# Patient Record
Sex: MALE | Race: WHITE | NOT HISPANIC OR LATINO | URBAN - METROPOLITAN AREA
[De-identification: names, ages, dates, MRNs, and addresses within clinical notes are randomized per-mention and may not be internally consistent; named-entity substitution may affect disease eponyms.]

---

## 2017-05-02 ENCOUNTER — OUTPATIENT (OUTPATIENT)
Dept: OUTPATIENT SERVICES | Facility: HOSPITAL | Age: 62
LOS: 1 days | Discharge: HOME | End: 2017-05-02

## 2017-06-28 DIAGNOSIS — Z01.818 ENCOUNTER FOR OTHER PREPROCEDURAL EXAMINATION: ICD-10-CM

## 2017-06-28 DIAGNOSIS — H50.10 UNSPECIFIED EXOTROPIA: ICD-10-CM

## 2019-10-23 ENCOUNTER — APPOINTMENT (OUTPATIENT)
Dept: NEUROSURGERY | Facility: CLINIC | Age: 64
End: 2019-10-23

## 2019-11-05 ENCOUNTER — INPATIENT (INPATIENT)
Facility: HOSPITAL | Age: 64
LOS: 8 days | Discharge: HOME HEALTH | End: 2019-11-14
Attending: STUDENT IN AN ORGANIZED HEALTH CARE EDUCATION/TRAINING PROGRAM | Admitting: STUDENT IN AN ORGANIZED HEALTH CARE EDUCATION/TRAINING PROGRAM
Payer: MEDICARE

## 2019-11-05 VITALS
RESPIRATION RATE: 18 BRPM | TEMPERATURE: 97 F | HEART RATE: 78 BPM | DIASTOLIC BLOOD PRESSURE: 68 MMHG | OXYGEN SATURATION: 98 % | SYSTOLIC BLOOD PRESSURE: 131 MMHG

## 2019-11-05 DIAGNOSIS — Z94.0 KIDNEY TRANSPLANT STATUS: Chronic | ICD-10-CM

## 2019-11-05 LAB
ALBUMIN SERPL ELPH-MCNC: 4.2 G/DL — SIGNIFICANT CHANGE UP (ref 3.5–5.2)
ALP SERPL-CCNC: 84 U/L — SIGNIFICANT CHANGE UP (ref 30–115)
ALT FLD-CCNC: 7 U/L — SIGNIFICANT CHANGE UP (ref 0–41)
ANION GAP SERPL CALC-SCNC: 13 MMOL/L — SIGNIFICANT CHANGE UP (ref 7–14)
APTT BLD: 30.5 SEC — SIGNIFICANT CHANGE UP (ref 27–39.2)
AST SERPL-CCNC: 14 U/L — SIGNIFICANT CHANGE UP (ref 0–41)
BASOPHILS # BLD AUTO: 0.03 K/UL — SIGNIFICANT CHANGE UP (ref 0–0.2)
BASOPHILS NFR BLD AUTO: 0.5 % — SIGNIFICANT CHANGE UP (ref 0–1)
BILIRUB SERPL-MCNC: 0.5 MG/DL — SIGNIFICANT CHANGE UP (ref 0.2–1.2)
BUN SERPL-MCNC: 20 MG/DL — SIGNIFICANT CHANGE UP (ref 10–20)
CALCIUM SERPL-MCNC: 10.3 MG/DL — HIGH (ref 8.5–10.1)
CHLORIDE SERPL-SCNC: 103 MMOL/L — SIGNIFICANT CHANGE UP (ref 98–110)
CO2 SERPL-SCNC: 23 MMOL/L — SIGNIFICANT CHANGE UP (ref 17–32)
CREAT SERPL-MCNC: 1.4 MG/DL — SIGNIFICANT CHANGE UP (ref 0.7–1.5)
EOSINOPHIL # BLD AUTO: 0.06 K/UL — SIGNIFICANT CHANGE UP (ref 0–0.7)
EOSINOPHIL NFR BLD AUTO: 1.1 % — SIGNIFICANT CHANGE UP (ref 0–8)
GLUCOSE SERPL-MCNC: 88 MG/DL — SIGNIFICANT CHANGE UP (ref 70–99)
HCT VFR BLD CALC: 38.2 % — LOW (ref 42–52)
HGB BLD-MCNC: 12.2 G/DL — LOW (ref 14–18)
IMM GRANULOCYTES NFR BLD AUTO: 0.4 % — HIGH (ref 0.1–0.3)
INR BLD: 1.32 RATIO — HIGH (ref 0.65–1.3)
LYMPHOCYTES # BLD AUTO: 1.25 K/UL — SIGNIFICANT CHANGE UP (ref 1.2–3.4)
LYMPHOCYTES # BLD AUTO: 21.9 % — SIGNIFICANT CHANGE UP (ref 20.5–51.1)
MAGNESIUM SERPL-MCNC: 1.9 MG/DL — SIGNIFICANT CHANGE UP (ref 1.8–2.4)
MCHC RBC-ENTMCNC: 30.1 PG — SIGNIFICANT CHANGE UP (ref 27–31)
MCHC RBC-ENTMCNC: 31.9 G/DL — LOW (ref 32–37)
MCV RBC AUTO: 94.3 FL — HIGH (ref 80–94)
MONOCYTES # BLD AUTO: 0.49 K/UL — SIGNIFICANT CHANGE UP (ref 0.1–0.6)
MONOCYTES NFR BLD AUTO: 8.6 % — SIGNIFICANT CHANGE UP (ref 1.7–9.3)
NEUTROPHILS # BLD AUTO: 3.86 K/UL — SIGNIFICANT CHANGE UP (ref 1.4–6.5)
NEUTROPHILS NFR BLD AUTO: 67.5 % — SIGNIFICANT CHANGE UP (ref 42.2–75.2)
NRBC # BLD: 0 /100 WBCS — SIGNIFICANT CHANGE UP (ref 0–0)
NT-PROBNP SERPL-SCNC: 2698 PG/ML — HIGH (ref 0–300)
PLATELET # BLD AUTO: 141 K/UL — SIGNIFICANT CHANGE UP (ref 130–400)
POTASSIUM SERPL-MCNC: 4.5 MMOL/L — SIGNIFICANT CHANGE UP (ref 3.5–5)
POTASSIUM SERPL-SCNC: 4.5 MMOL/L — SIGNIFICANT CHANGE UP (ref 3.5–5)
PROT SERPL-MCNC: 6.9 G/DL — SIGNIFICANT CHANGE UP (ref 6–8)
PROTHROM AB SERPL-ACNC: 15.1 SEC — HIGH (ref 9.95–12.87)
RBC # BLD: 4.05 M/UL — LOW (ref 4.7–6.1)
RBC # FLD: 14.6 % — HIGH (ref 11.5–14.5)
SODIUM SERPL-SCNC: 139 MMOL/L — SIGNIFICANT CHANGE UP (ref 135–146)
TROPONIN T SERPL-MCNC: <0.01 NG/ML — SIGNIFICANT CHANGE UP
WBC # BLD: 5.71 K/UL — SIGNIFICANT CHANGE UP (ref 4.8–10.8)
WBC # FLD AUTO: 5.71 K/UL — SIGNIFICANT CHANGE UP (ref 4.8–10.8)

## 2019-11-05 PROCEDURE — 99218: CPT

## 2019-11-05 PROCEDURE — 71046 X-RAY EXAM CHEST 2 VIEWS: CPT | Mod: 26

## 2019-11-05 PROCEDURE — 93010 ELECTROCARDIOGRAM REPORT: CPT | Mod: 77

## 2019-11-05 PROCEDURE — 93010 ELECTROCARDIOGRAM REPORT: CPT

## 2019-11-05 RX ORDER — DILTIAZEM HCL 120 MG
360 CAPSULE, EXT RELEASE 24 HR ORAL DAILY
Refills: 0 | Status: DISCONTINUED | OUTPATIENT
Start: 2019-11-05 | End: 2019-11-14

## 2019-11-05 RX ORDER — GABAPENTIN 400 MG/1
300 CAPSULE ORAL THREE TIMES A DAY
Refills: 0 | Status: DISCONTINUED | OUTPATIENT
Start: 2019-11-05 | End: 2019-11-10

## 2019-11-05 RX ORDER — OXYCODONE AND ACETAMINOPHEN 5; 325 MG/1; MG/1
1 TABLET ORAL ONCE
Refills: 0 | Status: DISCONTINUED | OUTPATIENT
Start: 2019-11-05 | End: 2019-11-05

## 2019-11-05 RX ORDER — SODIUM CHLORIDE 9 MG/ML
1000 INJECTION INTRAMUSCULAR; INTRAVENOUS; SUBCUTANEOUS
Refills: 0 | Status: DISCONTINUED | OUTPATIENT
Start: 2019-11-05 | End: 2019-11-06

## 2019-11-05 RX ORDER — TACROLIMUS 5 MG/1
2 CAPSULE ORAL
Refills: 0 | Status: DISCONTINUED | OUTPATIENT
Start: 2019-11-05 | End: 2019-11-14

## 2019-11-05 RX ORDER — VALGANCICLOVIR 450 MG/1
450 TABLET, FILM COATED ORAL DAILY
Refills: 0 | Status: DISCONTINUED | OUTPATIENT
Start: 2019-11-05 | End: 2019-11-05

## 2019-11-05 RX ORDER — ATORVASTATIN CALCIUM 80 MG/1
10 TABLET, FILM COATED ORAL AT BEDTIME
Refills: 0 | Status: DISCONTINUED | OUTPATIENT
Start: 2019-11-05 | End: 2019-11-14

## 2019-11-05 RX ORDER — ISOSORBIDE MONONITRATE 60 MG/1
30 TABLET, EXTENDED RELEASE ORAL DAILY
Refills: 0 | Status: DISCONTINUED | OUTPATIENT
Start: 2019-11-05 | End: 2019-11-14

## 2019-11-05 RX ORDER — ASPIRIN/CALCIUM CARB/MAGNESIUM 324 MG
325 TABLET ORAL ONCE
Refills: 0 | Status: COMPLETED | OUTPATIENT
Start: 2019-11-05 | End: 2019-11-05

## 2019-11-05 RX ORDER — MYCOPHENOLATE MOFETIL 250 MG/1
500 CAPSULE ORAL
Refills: 0 | Status: DISCONTINUED | OUTPATIENT
Start: 2019-11-05 | End: 2019-11-14

## 2019-11-05 RX ORDER — APIXABAN 2.5 MG/1
5 TABLET, FILM COATED ORAL EVERY 12 HOURS
Refills: 0 | Status: DISCONTINUED | OUTPATIENT
Start: 2019-11-05 | End: 2019-11-06

## 2019-11-05 RX ORDER — TAMSULOSIN HYDROCHLORIDE 0.4 MG/1
0.4 CAPSULE ORAL AT BEDTIME
Refills: 0 | Status: DISCONTINUED | OUTPATIENT
Start: 2019-11-05 | End: 2019-11-14

## 2019-11-05 RX ORDER — METOPROLOL TARTRATE 50 MG
100 TABLET ORAL DAILY
Refills: 0 | Status: DISCONTINUED | OUTPATIENT
Start: 2019-11-05 | End: 2019-11-14

## 2019-11-05 RX ADMIN — OXYCODONE AND ACETAMINOPHEN 1 TABLET(S): 5; 325 TABLET ORAL at 20:58

## 2019-11-05 RX ADMIN — MYCOPHENOLATE MOFETIL 500 MILLIGRAM(S): 250 CAPSULE ORAL at 22:15

## 2019-11-05 RX ADMIN — Medication 360 MILLIGRAM(S): at 22:08

## 2019-11-05 RX ADMIN — TAMSULOSIN HYDROCHLORIDE 0.4 MILLIGRAM(S): 0.4 CAPSULE ORAL at 22:15

## 2019-11-05 RX ADMIN — ATORVASTATIN CALCIUM 10 MILLIGRAM(S): 80 TABLET, FILM COATED ORAL at 22:14

## 2019-11-05 RX ADMIN — ISOSORBIDE MONONITRATE 30 MILLIGRAM(S): 60 TABLET, EXTENDED RELEASE ORAL at 22:10

## 2019-11-05 RX ADMIN — GABAPENTIN 300 MILLIGRAM(S): 400 CAPSULE ORAL at 22:14

## 2019-11-05 RX ADMIN — APIXABAN 5 MILLIGRAM(S): 2.5 TABLET, FILM COATED ORAL at 22:14

## 2019-11-05 RX ADMIN — Medication 100 MILLIGRAM(S): at 22:10

## 2019-11-05 RX ADMIN — SODIUM CHLORIDE 50 MILLILITER(S): 9 INJECTION INTRAMUSCULAR; INTRAVENOUS; SUBCUTANEOUS at 21:52

## 2019-11-05 RX ADMIN — TACROLIMUS 2 MILLIGRAM(S): 5 CAPSULE ORAL at 22:15

## 2019-11-05 NOTE — ED PROVIDER NOTE - PROGRESS NOTE DETAILS
Discussed case with patient's cardiologist who states patient has been awaiting CCTA study - recommends obs unit for CCTA . Dr. Baron aware of EKG obtained in ed and depression with twi reports that is why he wanted to do a cath but due to his embolism in arm wants pt to to go to obs for ccta, hydration due to kidney transplant, will follow. Dr. Low aware of EKG obtained in ed and depression with twi reports that is why he wanted to do a cath but due to his embolism in arm wants pt to to go to obs for ccta, hydration due to kidney transplant, will follow. Discussed case with Dr. Low Discussed case with Dr. Low who is aware of ST depressions in V4-V6 wanted to cath him but unable to due to embolism now wants CCTA if able to due to a.fibb. Recommends hydration with fluids @50cc. Endorsed care to GUSTAVO Bryant.

## 2019-11-05 NOTE — ED PROVIDER NOTE - NS ED ROS FT
Review of Systems:  	•	CONSTITUTIONAL - no fever, no diaphoresis, no chills  	•	SKIN - no rash  	•	HEMATOLOGIC - no bleeding, no bruising  	•	EYES - no eye pain, no blurry vision  	•	ENT - no congestion  	•	RESPIRATORY - + shortness of breath, no cough  	•	CARDIAC - + chest pain, no palpitations  	•	GI - no abd pain, no nausea, no vomiting, no diarrhea, no constipation  	•	GENITO-URINARY - no dysuria; no hematuria, no increased urinary frequency  	•	MUSCULOSKELETAL - no joint paint, no swelling, no redness  	•	NEUROLOGIC - no weakness, no headache, no paresthesias, no LOC  	•	PSYCH - no anxiety, no depression  	All other ROS are negative except as documented in HPI.

## 2019-11-05 NOTE — ED CDU PROVIDER INITIAL DAY NOTE - OBJECTIVE STATEMENT
65 yo male hx of ESRD s/p kidney transplant 12 years ago (nephrologist Dr Heart), HTN/HLD/A.fib on Eliquis present c/o chest pain off/on 1 month. pain is usually pressure like and doesn't relate to exertion. Denies chest pain/sob/palpitation now in EDOU. patient was seen by his Cardio (Dr Low) and was told to come to ED if continue with chest pain.   denies exogenous hormone use/recent hospitalization/hx of DVT. denies recent illness/fever/chill/HA/dizziness/sob/abd pain/n/v/d/urinary sxs.

## 2019-11-05 NOTE — ED PROVIDER NOTE - CARE PLAN
Assessment and plan of treatment:	Plan: Monitor, EKG, CXR, labs, reassess. Principal Discharge DX:	Chest pain  Assessment and plan of treatment:	Plan: Monitor, EKG, CXR, labs, reassess.

## 2019-11-05 NOTE — ED CDU PROVIDER INITIAL DAY NOTE - PMH
Atrial fibrillation    CKD (chronic kidney disease)    ESRD (end stage renal disease)    HLD (hyperlipidemia)    HTN (hypertension)

## 2019-11-05 NOTE — ED PROVIDER NOTE - CLINICAL SUMMARY MEDICAL DECISION MAKING FREE TEXT BOX
pt aware of lab and imaging, discussion had with Cardiology Dr. Low who reports admission to obs, cannot perform cath due to history of embolism , hydration due to kidney, will follow, ekg x2 obtained, obs team aware of pt and discussion with cardiologist and possible ccta, pt aware of plan and agrees.

## 2019-11-05 NOTE — ED PROVIDER NOTE - OBJECTIVE STATEMENT
63 yo M with pmhx of lymphoma, skin cancer, A.fibb on eliquis, LUE embolism, COPD, renal disease s/p kidney transplant presenting with mid-sternal, non-radiating chest pain x 1 month. Symptoms are moderate. Reports some associated sob on exertion. No alleviating/aggravating factors. Advised by cardiologist that he needs further testing is scheduled for January but cannot wait that long. No fever, chills, abdominal pain, nausea, vomiting, diarrhea, back pain, urinary symptoms, headache, dizziness, paresthesias, or weakness.

## 2019-11-05 NOTE — ED CDU PROVIDER INITIAL DAY NOTE - PHYSICAL EXAMINATION
CONSTITUTIONAL: Well-appearing; well-nourished; in no apparent distress.   EYES: PERRL; EOM intact.   ENT: normal nose; no rhinorrhea; normal pharynx with no tonsillar hypertrophy.   NECK: Supple; non-tender; no cervical lymphadenopathy. No JVD.   CARDIOVASCULAR: Normal S1, S2; no murmurs, rubs, or gallops.   RESPIRATORY: Normal chest excursion with respiration; breath sounds clear and equal bilaterally; no wheezes, rhonchi, or rales.  GI/: Normal bowel sounds; non-distended; non-tender; no palpable organomegaly.   MS: left upper with healing wound. no bleeding and drainage.   SKIN: see MS exam  NEURO/PSYCH: A & O x 4; grossly unremarkable. mood and manner are appropriate. Grooming and personal hygiene are appropriate. No apparent thoughts of harm to self or others.

## 2019-11-05 NOTE — ED PROVIDER NOTE - ATTENDING CONTRIBUTION TO CARE
65 y/o m w/ pmhx of lymphedema, cirrhosis, esrd not on hd as pt reports he had a kidney transplant, copd afib on Eliquis, recent admission to [facility] for LUE clot s/p extraction at the end of october, presents for one month of midsternal chest pain, described as pressure, non-radiating, intermittent, worsened today, associated with sob on exertion. reports was told by cardiologist to come to ed for further evaluation. No fever, chills, n/v, pleuritic cp, palpitations, diaphoresis, cough, ha/lh/dizziness, numbness/tingling, neck pain/ stiffness, abd pain, diarrhea, constipation, melena/brbpr, urinary symptoms, trauma, weakness, calf pain/swelling/erythema, sick contacts, recent travel or rash. (+) smoker, cardiologist-Dr. Baron/Dr. Low  on exam: malept  sitting on stretcher speaking full sentences, no rash, mmm, neck supple. non-tender , radial pulses 2/4 b/l, no jvd, no pain to palpation to chest wall, no pain with movement/ not reproducible, ctabl w/ breath sounds present b/l, no wheezing or crackles,  no accessory muscle use, no tachypnea, no stridor, bs present throughout all 4 quadrants, abd soft, nd, nt, no rebound tenderness or guarding, no cvat, FROM of ext, no calf pain/erythema, LUE with staples in place (reports was told he would be called by surgeon at [facility] to have staples removed this month)- no crepitus, no induration, no fluctuance, no pain to palpation, no warmth, to palpation, no purulent discharge or odor, AAOx3. motor 5/5 and sensation intact throughout upper and lower ext. no focal deficits.

## 2019-11-05 NOTE — ED CDU PROVIDER INITIAL DAY NOTE - MEDICAL DECISION MAKING DETAILS
Will remain on continuous telemetry.  For repeat cardiac enzymes and EKG and will attempt CCTA in the AM despite hx of atrial fibrillation.  Will discuss with on call Cardiac Radiologist.

## 2019-11-06 DIAGNOSIS — Z98.890 OTHER SPECIFIED POSTPROCEDURAL STATES: Chronic | ICD-10-CM

## 2019-11-06 LAB
APTT BLD: 47.3 SEC — HIGH (ref 27–39.2)
APTT BLD: 59.2 SEC — HIGH (ref 27–39.2)
BLD GP AB SCN SERPL QL: SIGNIFICANT CHANGE UP
CHOLEST SERPL-MCNC: 105 MG/DL — SIGNIFICANT CHANGE UP (ref 100–200)
ESTIMATED AVERAGE GLUCOSE: 100 MG/DL — SIGNIFICANT CHANGE UP (ref 68–114)
HBA1C BLD-MCNC: 5.1 % — SIGNIFICANT CHANGE UP (ref 4–5.6)
HDLC SERPL-MCNC: 32 MG/DL — LOW
LIPID PNL WITH DIRECT LDL SERPL: 62 MG/DL — SIGNIFICANT CHANGE UP (ref 4–129)
TOTAL CHOLESTEROL/HDL RATIO MEASUREMENT: 3.3 RATIO — LOW (ref 4–5.5)
TRIGL SERPL-MCNC: 112 MG/DL — SIGNIFICANT CHANGE UP (ref 10–149)
TROPONIN T SERPL-MCNC: <0.01 NG/ML — SIGNIFICANT CHANGE UP
TROPONIN T SERPL-MCNC: <0.01 NG/ML — SIGNIFICANT CHANGE UP

## 2019-11-06 PROCEDURE — 93010 ELECTROCARDIOGRAM REPORT: CPT | Mod: 77

## 2019-11-06 PROCEDURE — 93010 ELECTROCARDIOGRAM REPORT: CPT

## 2019-11-06 PROCEDURE — 75571 CT HRT W/O DYE W/CA TEST: CPT | Mod: 26

## 2019-11-06 PROCEDURE — 93971 EXTREMITY STUDY: CPT | Mod: 26,LT

## 2019-11-06 PROCEDURE — 93931 UPPER EXTREMITY STUDY: CPT | Mod: 26,LT

## 2019-11-06 PROCEDURE — 99217: CPT

## 2019-11-06 RX ORDER — CHLORHEXIDINE GLUCONATE 213 G/1000ML
1 SOLUTION TOPICAL
Refills: 0 | Status: DISCONTINUED | OUTPATIENT
Start: 2019-11-06 | End: 2019-11-14

## 2019-11-06 RX ORDER — ALPRAZOLAM 0.25 MG
0.5 TABLET ORAL ONCE
Refills: 0 | Status: DISCONTINUED | OUTPATIENT
Start: 2019-11-06 | End: 2019-11-06

## 2019-11-06 RX ORDER — HEPARIN SODIUM 5000 [USP'U]/ML
INJECTION INTRAVENOUS; SUBCUTANEOUS
Qty: 25000 | Refills: 0 | Status: DISCONTINUED | OUTPATIENT
Start: 2019-11-06 | End: 2019-11-06

## 2019-11-06 RX ORDER — OXYCODONE HYDROCHLORIDE 5 MG/1
10 TABLET ORAL EVERY 8 HOURS
Refills: 0 | Status: DISCONTINUED | OUTPATIENT
Start: 2019-11-06 | End: 2019-11-12

## 2019-11-06 RX ORDER — ZOLPIDEM TARTRATE 10 MG/1
5 TABLET ORAL AT BEDTIME
Refills: 0 | Status: DISCONTINUED | OUTPATIENT
Start: 2019-11-06 | End: 2019-11-12

## 2019-11-06 RX ORDER — OXYCODONE HYDROCHLORIDE 5 MG/1
10 TABLET ORAL ONCE
Refills: 0 | Status: DISCONTINUED | OUTPATIENT
Start: 2019-11-06 | End: 2019-11-06

## 2019-11-06 RX ORDER — METOPROLOL TARTRATE 50 MG
50 TABLET ORAL ONCE
Refills: 0 | Status: COMPLETED | OUTPATIENT
Start: 2019-11-06 | End: 2019-11-06

## 2019-11-06 RX ORDER — HEPARIN SODIUM 5000 [USP'U]/ML
1200 INJECTION INTRAVENOUS; SUBCUTANEOUS
Qty: 25000 | Refills: 0 | Status: DISCONTINUED | OUTPATIENT
Start: 2019-11-06 | End: 2019-11-08

## 2019-11-06 RX ADMIN — OXYCODONE HYDROCHLORIDE 10 MILLIGRAM(S): 5 TABLET ORAL at 05:04

## 2019-11-06 RX ADMIN — HEPARIN SODIUM 12 UNIT(S)/HR: 5000 INJECTION INTRAVENOUS; SUBCUTANEOUS at 22:33

## 2019-11-06 RX ADMIN — MYCOPHENOLATE MOFETIL 500 MILLIGRAM(S): 250 CAPSULE ORAL at 17:21

## 2019-11-06 RX ADMIN — OXYCODONE HYDROCHLORIDE 10 MILLIGRAM(S): 5 TABLET ORAL at 14:18

## 2019-11-06 RX ADMIN — ISOSORBIDE MONONITRATE 30 MILLIGRAM(S): 60 TABLET, EXTENDED RELEASE ORAL at 11:34

## 2019-11-06 RX ADMIN — MYCOPHENOLATE MOFETIL 500 MILLIGRAM(S): 250 CAPSULE ORAL at 06:41

## 2019-11-06 RX ADMIN — Medication 100 MILLIGRAM(S): at 06:41

## 2019-11-06 RX ADMIN — ATORVASTATIN CALCIUM 10 MILLIGRAM(S): 80 TABLET, FILM COATED ORAL at 21:27

## 2019-11-06 RX ADMIN — GABAPENTIN 300 MILLIGRAM(S): 400 CAPSULE ORAL at 11:34

## 2019-11-06 RX ADMIN — TACROLIMUS 2 MILLIGRAM(S): 5 CAPSULE ORAL at 17:21

## 2019-11-06 RX ADMIN — HEPARIN SODIUM 1200 UNIT(S)/HR: 5000 INJECTION INTRAVENOUS; SUBCUTANEOUS at 11:30

## 2019-11-06 RX ADMIN — Medication 50 MILLIGRAM(S): at 09:32

## 2019-11-06 RX ADMIN — Medication 0.5 MILLIGRAM(S): at 23:03

## 2019-11-06 RX ADMIN — OXYCODONE HYDROCHLORIDE 10 MILLIGRAM(S): 5 TABLET ORAL at 17:14

## 2019-11-06 RX ADMIN — TAMSULOSIN HYDROCHLORIDE 0.4 MILLIGRAM(S): 0.4 CAPSULE ORAL at 21:27

## 2019-11-06 RX ADMIN — GABAPENTIN 300 MILLIGRAM(S): 400 CAPSULE ORAL at 06:41

## 2019-11-06 RX ADMIN — GABAPENTIN 300 MILLIGRAM(S): 400 CAPSULE ORAL at 21:27

## 2019-11-06 RX ADMIN — Medication 360 MILLIGRAM(S): at 06:41

## 2019-11-06 RX ADMIN — Medication 10 MILLIGRAM(S): at 11:34

## 2019-11-06 RX ADMIN — TACROLIMUS 2 MILLIGRAM(S): 5 CAPSULE ORAL at 06:42

## 2019-11-06 NOTE — ED CDU PROVIDER SUBSEQUENT DAY NOTE - MEDICAL DECISION MAKING DETAILS
Case re-discussed with Dr. Low (Cardiology) and Dr. Carlisle (Radiology).  Will attempt rate control with betablockers and do a modulated study.  Patient stable.

## 2019-11-06 NOTE — H&P ADULT - NSICDXPASTMEDICALHX_GEN_ALL_CORE_FT
PAST MEDICAL HISTORY:  Atrial fibrillation     CKD (chronic kidney disease)     ESRD (end stage renal disease)     HLD (hyperlipidemia)     HTN (hypertension) PAST MEDICAL HISTORY:  Atrial fibrillation on eliquis    Cirrhosis possibly related to hepC    CKD (chronic kidney disease)     COPD, mild not on O2    ESRD (end stage renal disease)     HLD (hyperlipidemia)     HTN (hypertension)     Lymphoma ?questionable, not treated, not followed    Melanoma R eye, s/p laser    Peripheral neuropathy unclear cause

## 2019-11-06 NOTE — CONSULT NOTE ADULT - ASSESSMENT
64 year old male with PMH of COPD (no O2), hx of ESRD s/p AV fistulas placed b/l arms 15 years ago and s/p L kidney transplant, eye melanoma w/ R blindness, HTN, HLD, Afib on eliquis, LUE embolism in Oct consulted for follow up regarding a surgical wound from a LUE open arterial thrombectomy (brachial artery?) performed at [facility] on october 16th. Patient was lost to follow up. Currently he has no fever, pain, swelling, or discharge from the surgical site. On exam, scaling/dryness around incision, staples remain in situ, mild erythema, no increased temperature compared to right, no discharge, no tenderness.   He now presents to the ED with a 1 month history of chest pain and is being worked up by the medical service for CAD. Patient is planned for cardiac catheterization on 11/8.      Plan:   - discuss with attending

## 2019-11-06 NOTE — H&P ADULT - NSHPSOCIALHISTORY_GEN_ALL_CORE
Former smoking hx, quit >10ys prior. former IVDA nothing recent. No EtOH abuse. Lives alone, no family/children. Has friend who works as his aid 4 hours/day.

## 2019-11-06 NOTE — H&P ADULT - NSHPPHYSICALEXAM_GEN_ALL_CORE
PHYSICAL EXAM:  GENERAL: NAD, well-developed  HEENT:  Atraumatic, Normocephalic; EOMI, PERRLA, conjunctiva pink, sclera white, Supple, No JVD, thyromegally or LYAD appreciated  CHEST/LUNG: Clear to auscultation bilaterally; No wheeze, ronchi or rales  HEART: Regular rate and rhythm; No murmurs, rubs, or gallops  ABDOMEN: Soft, Nontender, Nondistended; Bowel sounds present  EXTREMITIES:  2+ Peripheral Pulses, No peripheral pitting edema  PSYCH: AAOx3  NEUROLOGY: non-focal  SKIN: No rashes/lesions appreciated PHYSICAL EXAM:  GENERAL: NAD, thin M flat in bed but complaining of SOB not in respiratory distress   HEENT:  Atraumatic, Normocephalic; EOMI, PERRLA, conjunctiva pink, sclera white, no red reflex R eye, no lens opacification, Supple, No JVD or cervical lymphadenopathy appreciated  CHEST/LUNG: Clear to auscultation bilaterally; No wheeze appreciated but decreased BS throughout.   HEART: Irregular rhythm, regular rate; No murmurs  ABDOMEN: Soft, Nontender, mild distension, tympanic, no shifting dullness; Bowel sounds present  EXTREMITIES:  2+ Peripheral Pulses, No peripheral pitting edema  PSYCH: AAOx3, anxious personality   NEUROLOGY: non-focal  SKIN: L arm ~13" with staples in surgical site itself healing but surrounded by crusting and some skin erythema w/o fluctuance or significant warmth PHYSICAL EXAM:  GENERAL: NAD, thin M flat in bed but complaining of SOB not in respiratory distress   HEENT:  Atraumatic, Normocephalic; EOMI, PERRLA, conjunctiva pink, sclera white, no red reflex R eye, no lens opacification, Supple, No JVD or cervical lymphadenopathy appreciated  CHEST/LUNG: Clear to auscultation bilaterally; No wheeze appreciated but decreased BS throughout. SaO2 100% on RA, no tachypnea or use of accessory muscles  HEART: Irregular rhythm, regular rate; No murmurs  ABDOMEN: Soft, Nontender, mild distension, tympanic, no shifting dullness; Bowel sounds present  EXTREMITIES:  2+ Peripheral radial pulse bilat, fingers warm without cyanosis or clubbing,, No peripheral pitting edema;   PSYCH: AAOx3, anxious personality   NEUROLOGY: non-focal  SKIN: L arm ~13" with staples in surgical site itself healing but surrounded by crusting and some skin erythema w/o fluctuance or significant warmth

## 2019-11-06 NOTE — H&P ADULT - ASSESSMENT
63y/o M with ESRD s/p kidney transplant, Afib on eliquis presenting 1mo CP found to have pos CCTA.     #Rule out underlying ischemic CAD  - Coronary Calcium score 99th percentile  - EKG no acute ST changes, trop x2 neg  - will start HEPARIN DRIP, goal 60-80, f/u PTT  - f/u cardio note (Gala)  - Cath planned 11/8    #Afib on eliquis  - rate controlled, hold eliquis while on heparin drip    #HTN  #HLD  #COPD    #MISC:  - DVT ppx: heparin drip  - GI ppx: not indicated  - Diet: DASH  - Activity: OOBTC 63y/o M with ESRD s/p kidney transplant, Afib on eliquis presenting 1mo CP found to have pos CCTA.     #Rule out underlying ischemic CAD  - Coronary Calcium score 99th percentile  - EKG no acute ST changes, trop x2 neg  - will start HEPARIN DRIP, goal 60-80, f/u PTT  - f/u cardio note (Gala)  - Cath planned 11/8    #LUE hx of ?embolism, with staples in place w/ overlying skin changes  - no fluctuance, but has overlying redness and crusting  - US LUE  - Vascular consult (staples likely due to be removed)  - consider ID consult depending on vascular recs    #Afib on eliquis  - rate controlled, hold eliquis while on heparin drip  - c/w cardizem 360, toprol 100    #Hx of ESRD, s/p L kidney transplant  - c/w cellcept, tacrolimus  - currently labs stable, not on HD    #Hx of COPD  - not on any meds, no active sxs  - will give Duonebs PRN    #HLD  - c/w lipitor 10 daily  - check lipid panel, A1c  - really should be on higher dose but was told by his Dr to only do 10 q48h?? unclear why, pt doesn't know    #Likely BPH- c/w flomax  #HTN- c/w cardizem, toprol, imdur  #Hx of ?cirrhosis due to hep C- liver enzymes stable, outpt PCP f/u  #R eye blindness w/ hx of melanoma- c/w outpt optho f/u    #MISC:  - DVT ppx: heparin drip  - GI ppx: not indicated  - Diet: DASH  - Activity: OOBTC

## 2019-11-06 NOTE — H&P ADULT - NSHPLABSRESULTS_GEN_ALL_CORE
< from: CT Heart Calcium Score (11.06.19 @ 10:52) >    1.  Total coronary calcium score is 4510.  For a 64 year old male , this   corresponds to 99 percentile.    2.  Scattered bilateral pulmonary nodules measuring up to 5 mm on the   left and 2 mm on the right. Further evaluation with dedicated noncontrast   CT chest is recommended.    < end of copied text >

## 2019-11-06 NOTE — CONSULT NOTE ADULT - SUBJECTIVE AND OBJECTIVE BOX
DAVONTE CHOU [id number]  64y Male      HPI: 64 year old male with PMH of COPD (no O2), hx of ESRD s/p AV fistulas placed b/l arms 15 years ago and s/p L kidney transplant, eye melanoma w/ R blindness, HTN, HLD, Afib on eliquis, LUE embolism in Oct consulted for follow up regarding a surgical wound from a LUE open arterial thrombectomy (brachial artery?) performed at [facility] on october 16th. Patient states Dr. Peters was the surgeon however Dr. Peters does not operate at [facility]. Records are not accessible here. In mid October, he was asked to stop his home eliquis (has afib) in advance of a coronary angiogram. Two days after stopping it, he woke up at night with numbness and pain in his left arm. Patient went to [facility] and was diagnosed with what I can discern as a brachial artery thromboembolism and was taken to the OR for thrombectomy. Patient was lost to follow up. Currently he has no fever, pain, swelling, or discharge from the surgical site. He now presents to the ED with a 1 month history of chest pain and is being worked up by the medical service for CAD. Patient is planned for cardiac catheterization on 11/8.       PAST MEDICAL & SURGICAL HISTORY:  Cirrhosis: possibly related to hepC  Melanoma: R eye, s/p laser  Lymphoma: ?questionable, not treated, not followed  Peripheral neuropathy: unclear cause  COPD, mild: not on O2  Atrial fibrillation: on eliquis  HLD (hyperlipidemia)  HTN (hypertension)  ESRD (end stage renal disease)  CKD (chronic kidney disease)  S/P arteriovenous (AV) fistula creation: prior old fistula in R arm  History of kidney transplant: 3 years ago        MEDICATIONS  (STANDING):  atorvastatin 10 milliGRAM(s) Oral at bedtime  chlorhexidine 4% Liquid 1 Application(s) Topical <User Schedule>  diltiazem    milliGRAM(s) Oral daily  gabapentin 300 milliGRAM(s) Oral three times a day  heparin  Infusion.  Unit(s)/Hr (12 mL/Hr) IV Continuous <Continuous>  isosorbide   mononitrate ER Tablet (IMDUR) 30 milliGRAM(s) Oral daily  metoprolol succinate  milliGRAM(s) Oral daily  mycophenolate mofetil 500 milliGRAM(s) Oral two times a day  PARoxetine 10 milliGRAM(s) Oral daily  tacrolimus 2 milliGRAM(s) Oral two times a day  tamsulosin 0.4 milliGRAM(s) Oral at bedtime    MEDICATIONS  (PRN):  oxyCODONE    IR 10 milliGRAM(s) Oral every 8 hours PRN Severe Pain (7 - 10)      Allergies    Allergy Status Unknown    Intolerances        REVIEW OF SYSTEMS    [x] A ten-point review of systems was otherwise negative except as noted.  [ ] Due to altered mental status/intubation, subjective information were not able to be obtained from the patient. History was obtained, to the extent possible, from review of the chart and collateral sources of information.      Vital Signs Last 24 Hrs  T(C): 36.8 (06 Nov 2019 14:14), Max: 36.8 (06 Nov 2019 14:14)  T(F): 98.3 (06 Nov 2019 14:14), Max: 98.3 (06 Nov 2019 14:14)  HR: 54 (06 Nov 2019 14:14) (54 - 86)  BP: 113/58 (06 Nov 2019 14:14) (113/58 - 131/68)  BP(mean): --  RR: 18 (06 Nov 2019 14:14) (18 - 18)  SpO2: 97% (06 Nov 2019 08:09) (97% - 99%)    PHYSICAL EXAM:  GENERAL: NAD, well-appearing  CHEST/LUNG: Clear to auscultation bilaterally  HEART: irregularly irregular rhythm, S1 and S2 present with no added sounds  ABDOMEN: Soft, Nontender, Nondistended;   EXTREMITIES:  left arm has approximately 25cm incision closed with staples, dry skin is noted around the surgical site with scabs around the proximal aspect of the scar, mild erythema adjacent to incision, no discharge, equal in temperature to right arm, no fluctuance, mildly firm to palpation, non tender        LABS:  Labs:  CAPILLARY BLOOD GLUCOSE                              12.2   5.71  )-----------( 141      ( 05 Nov 2019 20:18 )             38.2       Auto Neutrophil %: 67.5 % (11-05-19 @ 20:18)  Auto Immature Granulocyte %: 0.4 % (11-05-19 @ 20:18)    11-05    139  |  103  |  20  ----------------------------<  88  4.5   |  23  |  1.4      Calcium, Total Serum: 10.3 mg/dL (11-05-19 @ 20:18)      LFTs:             6.9  | 0.5  | 14       ------------------[84      ( 05 Nov 2019 20:18 )  4.2  | x    | 7           Lipase:x      Amylase:x             Coags:     x      ----< x       ( 06 Nov 2019 15:00 )     47.3        CARDIAC MARKERS ( 06 Nov 2019 15:00 )  x     / <0.01 ng/mL / x     / x     / x      CARDIAC MARKERS ( 05 Nov 2019 23:30 )  x     / <0.01 ng/mL / x     / x     / x      CARDIAC MARKERS ( 05 Nov 2019 20:18 )  x     / <0.01 ng/mL / x     / x     / x          Serum Pro-Brain Natriuretic Peptide: 2698 pg/mL (11-05-19 @ 20:30)        RADIOLOGY & ADDITIONAL STUDIES:  < from: CT Heart Calcium Score (11.06.19 @ 10:52) >  IMPRESSION:    1.  Total coronary calcium score is 4510.  For a 64 year old male , this   corresponds to 99 percentile.    2.  Scattered bilateral pulmonary nodules measuring up to 5 mm on the   left and 2 mm on the right. Further evaluation with dedicated noncontrast   CT chest is recommended.    CAC-DRS Category determined risk classification and treatment   recommendations    < end of copied text >

## 2019-11-06 NOTE — H&P ADULT - ATTENDING COMMENTS
I saw and examined the patient independently. I agree with above history, physical exam and plan of care which I have reviewed and edited where appropriate with following additions.    patient reports he has been having midsternal chest pain with exertional dyspnea over some time, was scheduled for cardiac pisano as OP but had chest pain again yesterday so cardiologist told him to come to ER. Patient had lengthy discussion about LUE embolectomy and wound not healing properly although he followed up at [facility] for that so he does not like [facility] anymore.     chest pain with exertional dyspnea possible unstable angina:  telemonitoring.   trops negative x , BNP on higher side.   TTE.   CT coronaries with high calcium score.   Cardio , Dr. Rodgers followed up , patient scheduled for possible cardiac Cath later today.  on heparin drip/ to be held for cardiac cath today.   not on Aspirin/plavix??/ on eliquis for A- fib   c/w toprol/ on low dose atorvastatin unclear why but was told by his Doctor not to increase the dose.    h/o LUE DVT sp thrombectomy :  sutures LUE in place with dried up healing tissue . does not look infected.   Vascular fu requested.   doppler LUE negative for clots.    dvt ppx on heparin drip.     Progress note Handoff:   Pending: cardiac cath/ vascular attending fu .   Discussion: plan of care with  patient /  satisfied- all questions answered.  Disposition: home when stable

## 2019-11-06 NOTE — H&P ADULT - HISTORY OF PRESENT ILLNESS
63y/o M with PMH of COPD (no O2), hx of ESRD s/p kidney transplant, hx of lymphoma, HTN, HLD, Afib on eliquis, LUE embolism (believed to be from Afib) presenting with 1mo of midsternal non-radiating chest pain. Pain is moderate, some SOB on exertion but no significantly alleviating or triggering factors. Follows with Dr. Low who scheduled him for further f/u in January but doesn't want to wait any longer. Otherwise denies fever, chills, abd pain, N/V/D/C, urinary symptoms, dizzy/lightheadedness, weakness, headache.     In ED: T98.3, /58, HR 54, RR18, SpO2 97%. Labs grossly unremarkable except for BNP 2698, trop x2 neg. EKG showed rate controlled Afib w/o acute ST change. CXR neg. Was sent to OBS pending CCTA which came back grossly pos with score meeting 99th percentile. Admit for cardiac cath on Friday 11/8. 63y/o M with PMH of COPD (no O2), hx of ESRD s/p L kidney transplant, eye melanoma w/ R blindness, HTN, HLD, Afib on eliquis, LUE embolism in Oct (believed to be from ?Afib) presenting with 1mo of midsternal non-radiating chest pain. Pain is moderate, some SOB on exertion but no significantly alleviating or triggering factors. Follows with Dr. Low who scheduled him for further f/u in January but doesn't want to wait any longer so Maranda sent him to ED. Endorses dyspnea on exertion. Otherwise denies fever, chills, abd pain, N/V/D/C, urinary symptoms, dizzy/lightheadedness, weakness, headache. Lives alone, has aid.     Has questionable prior hx of cirrhosis likely 2/2 hep C & lymphoma but doens't follow with anyone for them and doesn't know too much info on these.    In ED: T98.3, /58, HR 54, RR18, SpO2 97%. Labs grossly unremarkable except for BNP 2698, trop x2 neg. EKG showed rate controlled Afib w/o acute ST change. CXR neg. Was sent to OBS pending CCTA which came back grossly pos with score meeting 99th percentile. Admit for cardiac cath on Friday 11/8.

## 2019-11-06 NOTE — ED CDU PROVIDER SUBSEQUENT DAY NOTE - PROGRESS NOTE DETAILS
pt seen bedside, NAD, no complaints overnight, asymptomatic. Negative cardiac enzymes x2 and nl ekg. pt has a-fib and pt is scheduled to go for CCTA need to confirm with radiologist he is able to get the test still. Will continue to monitor patient.

## 2019-11-06 NOTE — ED CDU PROVIDER DISPOSITION NOTE - CLINICAL COURSE
CCTA not obtainable due to extremely high calcium burden.  D/W Dr. Low.  Will prep for cath in the next 48 hours.  D/C DOAC and start bridging with Heparin.  Stable for Tele.

## 2019-11-06 NOTE — H&P ADULT - NSICDXPASTSURGICALHX_GEN_ALL_CORE_FT
PAST SURGICAL HISTORY:  History of kidney transplant 12 years ago PAST SURGICAL HISTORY:  History of kidney transplant 3 years ago    S/P arteriovenous (AV) fistula creation prior old fistula in R arm

## 2019-11-07 LAB
ALBUMIN SERPL ELPH-MCNC: 3.5 G/DL — SIGNIFICANT CHANGE UP (ref 3.5–5.2)
ALP SERPL-CCNC: 66 U/L — SIGNIFICANT CHANGE UP (ref 30–115)
ALT FLD-CCNC: 7 U/L — SIGNIFICANT CHANGE UP (ref 0–41)
ANION GAP SERPL CALC-SCNC: 13 MMOL/L — SIGNIFICANT CHANGE UP (ref 7–14)
APTT BLD: 59.6 SEC — HIGH (ref 27–39.2)
AST SERPL-CCNC: 11 U/L — SIGNIFICANT CHANGE UP (ref 0–41)
BASOPHILS # BLD AUTO: 0.01 K/UL — SIGNIFICANT CHANGE UP (ref 0–0.2)
BASOPHILS NFR BLD AUTO: 0.2 % — SIGNIFICANT CHANGE UP (ref 0–1)
BILIRUB DIRECT SERPL-MCNC: 0.2 MG/DL — SIGNIFICANT CHANGE UP (ref 0–0.2)
BILIRUB INDIRECT FLD-MCNC: 0.3 MG/DL — SIGNIFICANT CHANGE UP (ref 0.2–1.2)
BILIRUB SERPL-MCNC: 0.5 MG/DL — SIGNIFICANT CHANGE UP (ref 0.2–1.2)
BUN SERPL-MCNC: 20 MG/DL — SIGNIFICANT CHANGE UP (ref 10–20)
CALCIUM SERPL-MCNC: 9.7 MG/DL — SIGNIFICANT CHANGE UP (ref 8.5–10.1)
CHLORIDE SERPL-SCNC: 108 MMOL/L — SIGNIFICANT CHANGE UP (ref 98–110)
CO2 SERPL-SCNC: 19 MMOL/L — SIGNIFICANT CHANGE UP (ref 17–32)
CREAT SERPL-MCNC: 1.1 MG/DL — SIGNIFICANT CHANGE UP (ref 0.7–1.5)
EOSINOPHIL # BLD AUTO: 0.03 K/UL — SIGNIFICANT CHANGE UP (ref 0–0.7)
EOSINOPHIL NFR BLD AUTO: 0.7 % — SIGNIFICANT CHANGE UP (ref 0–8)
GLUCOSE SERPL-MCNC: 111 MG/DL — HIGH (ref 70–99)
HCT VFR BLD CALC: 31.7 % — LOW (ref 42–52)
HGB BLD-MCNC: 10.1 G/DL — LOW (ref 14–18)
IMM GRANULOCYTES NFR BLD AUTO: 0.5 % — HIGH (ref 0.1–0.3)
INR BLD: 1.18 RATIO — SIGNIFICANT CHANGE UP (ref 0.65–1.3)
LYMPHOCYTES # BLD AUTO: 0.64 K/UL — LOW (ref 1.2–3.4)
LYMPHOCYTES # BLD AUTO: 14.9 % — LOW (ref 20.5–51.1)
MAGNESIUM SERPL-MCNC: 1.6 MG/DL — LOW (ref 1.8–2.4)
MCHC RBC-ENTMCNC: 29.9 PG — SIGNIFICANT CHANGE UP (ref 27–31)
MCHC RBC-ENTMCNC: 31.9 G/DL — LOW (ref 32–37)
MCV RBC AUTO: 93.8 FL — SIGNIFICANT CHANGE UP (ref 80–94)
MONOCYTES # BLD AUTO: 0.18 K/UL — SIGNIFICANT CHANGE UP (ref 0.1–0.6)
MONOCYTES NFR BLD AUTO: 4.2 % — SIGNIFICANT CHANGE UP (ref 1.7–9.3)
NEUTROPHILS # BLD AUTO: 3.42 K/UL — SIGNIFICANT CHANGE UP (ref 1.4–6.5)
NEUTROPHILS NFR BLD AUTO: 79.5 % — HIGH (ref 42.2–75.2)
NRBC # BLD: 0 /100 WBCS — SIGNIFICANT CHANGE UP (ref 0–0)
PLATELET # BLD AUTO: 103 K/UL — LOW (ref 130–400)
POTASSIUM SERPL-MCNC: 4.3 MMOL/L — SIGNIFICANT CHANGE UP (ref 3.5–5)
POTASSIUM SERPL-SCNC: 4.3 MMOL/L — SIGNIFICANT CHANGE UP (ref 3.5–5)
PROT SERPL-MCNC: 5.5 G/DL — LOW (ref 6–8)
PROTHROM AB SERPL-ACNC: 13.6 SEC — HIGH (ref 9.95–12.87)
RBC # BLD: 3.38 M/UL — LOW (ref 4.7–6.1)
RBC # FLD: 14.8 % — HIGH (ref 11.5–14.5)
SODIUM SERPL-SCNC: 140 MMOL/L — SIGNIFICANT CHANGE UP (ref 135–146)
WBC # BLD: 4.3 K/UL — LOW (ref 4.8–10.8)
WBC # FLD AUTO: 4.3 K/UL — LOW (ref 4.8–10.8)

## 2019-11-07 PROCEDURE — 99223 1ST HOSP IP/OBS HIGH 75: CPT | Mod: AI

## 2019-11-07 PROCEDURE — 93571 IV DOP VEL&/PRESS C FLO 1ST: CPT | Mod: 26,LD

## 2019-11-07 PROCEDURE — 93458 L HRT ARTERY/VENTRICLE ANGIO: CPT | Mod: 26

## 2019-11-07 RX ORDER — MAGNESIUM SULFATE 500 MG/ML
2 VIAL (ML) INJECTION ONCE
Refills: 0 | Status: COMPLETED | OUTPATIENT
Start: 2019-11-07 | End: 2019-11-07

## 2019-11-07 RX ORDER — SODIUM CHLORIDE 9 MG/ML
1000 INJECTION INTRAMUSCULAR; INTRAVENOUS; SUBCUTANEOUS
Refills: 0 | Status: DISCONTINUED | OUTPATIENT
Start: 2019-11-07 | End: 2019-11-12

## 2019-11-07 RX ORDER — ASPIRIN/CALCIUM CARB/MAGNESIUM 324 MG
81 TABLET ORAL DAILY
Refills: 0 | Status: DISCONTINUED | OUTPATIENT
Start: 2019-11-07 | End: 2019-11-13

## 2019-11-07 RX ADMIN — TACROLIMUS 2 MILLIGRAM(S): 5 CAPSULE ORAL at 18:45

## 2019-11-07 RX ADMIN — Medication 360 MILLIGRAM(S): at 06:10

## 2019-11-07 RX ADMIN — ATORVASTATIN CALCIUM 10 MILLIGRAM(S): 80 TABLET, FILM COATED ORAL at 21:42

## 2019-11-07 RX ADMIN — Medication 10 MILLIGRAM(S): at 11:56

## 2019-11-07 RX ADMIN — SODIUM CHLORIDE 150 MILLILITER(S): 9 INJECTION INTRAMUSCULAR; INTRAVENOUS; SUBCUTANEOUS at 17:03

## 2019-11-07 RX ADMIN — MYCOPHENOLATE MOFETIL 500 MILLIGRAM(S): 250 CAPSULE ORAL at 18:45

## 2019-11-07 RX ADMIN — TAMSULOSIN HYDROCHLORIDE 0.4 MILLIGRAM(S): 0.4 CAPSULE ORAL at 21:42

## 2019-11-07 RX ADMIN — Medication 100 MILLIGRAM(S): at 06:09

## 2019-11-07 RX ADMIN — GABAPENTIN 300 MILLIGRAM(S): 400 CAPSULE ORAL at 21:42

## 2019-11-07 RX ADMIN — OXYCODONE HYDROCHLORIDE 10 MILLIGRAM(S): 5 TABLET ORAL at 13:49

## 2019-11-07 RX ADMIN — ISOSORBIDE MONONITRATE 30 MILLIGRAM(S): 60 TABLET, EXTENDED RELEASE ORAL at 11:56

## 2019-11-07 RX ADMIN — HEPARIN SODIUM 12 UNIT(S)/HR: 5000 INJECTION INTRAVENOUS; SUBCUTANEOUS at 06:10

## 2019-11-07 RX ADMIN — CHLORHEXIDINE GLUCONATE 1 APPLICATION(S): 213 SOLUTION TOPICAL at 06:10

## 2019-11-07 RX ADMIN — GABAPENTIN 300 MILLIGRAM(S): 400 CAPSULE ORAL at 13:51

## 2019-11-07 RX ADMIN — MYCOPHENOLATE MOFETIL 500 MILLIGRAM(S): 250 CAPSULE ORAL at 06:09

## 2019-11-07 RX ADMIN — GABAPENTIN 300 MILLIGRAM(S): 400 CAPSULE ORAL at 06:09

## 2019-11-07 RX ADMIN — Medication 50 GRAM(S): at 11:55

## 2019-11-07 RX ADMIN — TACROLIMUS 2 MILLIGRAM(S): 5 CAPSULE ORAL at 06:09

## 2019-11-07 NOTE — CONSULT NOTE ADULT - ASSESSMENT
Angina recurrent on medications   renal transplant   htn   sob   cirrhosis   afib on eliquis     pt comfortable now in bed   with no angina, elevated calcium score   for cath today with dr. miller   further w/u based on cath       Note from 11/6 and f/u 11/7

## 2019-11-07 NOTE — CONSULT NOTE ADULT - SUBJECTIVE AND OBJECTIVE BOX
Patient is a 64y old  Male who presents with a chief complaint of chest pain (2019 10:36)        PAST MEDICAL & SURGICAL HISTORY:  Cirrhosis: possibly related to hepC  Melanoma: R eye, s/p laser  Lymphoma: ?questionable, not treated, not followed  Peripheral neuropathy: unclear cause  COPD, mild: not on O2  Atrial fibrillation: on eliquis  HLD (hyperlipidemia)  HTN (hypertension)  ESRD (end stage renal disease)  CKD (chronic kidney disease)  S/P arteriovenous (AV) fistula creation: prior old fistula in R arm  History of kidney transplant: 3 years ago      HPI:      pt wiht h/o cirrhosis, AFib on eliquis, CKD ESRD s/p renal transplant with recurrent angina on meds had elevated calcium score           PREVIOUS DIAGNOSTIC TESTING:      ECHO  FINDINGS:    STRESS  FINDINGS:    CATHETERIZATION  FINDINGS:    ELECTROPHYSIOLOGY STUDY  FINDINGS:    CAROTID ULTRASOUND:  FINDINGS    VENOUS DUPLEX SCAN:  FINDINGS:    CHEST CT PULMONARY ANGIO with IV Contrast:  FINDINGS:  MEDICATIONS  (STANDING):  atorvastatin 10 milliGRAM(s) Oral at bedtime  chlorhexidine 4% Liquid 1 Application(s) Topical <User Schedule>  diltiazem    milliGRAM(s) Oral daily  gabapentin 300 milliGRAM(s) Oral three times a day  heparin  Infusion 1200 Unit(s)/Hr (12 mL/Hr) IV Continuous <Continuous>  isosorbide   mononitrate ER Tablet (IMDUR) 30 milliGRAM(s) Oral daily  metoprolol succinate  milliGRAM(s) Oral daily  mycophenolate mofetil 500 milliGRAM(s) Oral two times a day  PARoxetine 10 milliGRAM(s) Oral daily  tacrolimus 2 milliGRAM(s) Oral two times a day  tamsulosin 0.4 milliGRAM(s) Oral at bedtime    MEDICATIONS  (PRN):  oxyCODONE    IR 10 milliGRAM(s) Oral every 8 hours PRN Severe Pain (7 - 10)  zolpidem 5 milliGRAM(s) Oral at bedtime PRN Insomnia      FAMILY HISTORY:  FHx: breast cancer: sister ( in 30s)  FH: dementia: mother, alive      SOCIAL HISTORY:    CIGARETTES:    ALCOHOL:    REVIEW OF SYSTEMS:      RESPIRATORY: SEE HPI   CARDIOVASCULAR: SEE HPI   GASTROINTESTINAL: No abdominal pain  NEUROLOGICAL: grossly intact motor and sensory   ENDOCRINE: No heat or cold intolerance, or hair loss  MUSCULOSKELETAL: No joint pain or swelling, muscle, back, or extremity pain  HEME/LYMPH: No easy bruising or bleeding gums      Vital Signs Last 24 Hrs  T(C): 35.9 (2019 13:16), Max: 36.6 (2019 21:03)  T(F): 96.7 (2019 13:16), Max: 97.9 (2019 21:03)  HR: 76 (2019 13:16) (72 - 79)  BP: 138/72 (2019 13:16) (118/57 - 140/78)  BP(mean): --  RR: 16 (2019 13:16) (16 - 18)  SpO2: 94% (2019 11:53) (93% - 94%)    PHYSICAL EXAM:    GENERAL: In no apparent distress, well nourished, and hydrated.  HEAD:  Atraumatic, Normocephalic  EYES: EOMI, PERRLA, conjunctiva and sclera clear  ENMT: No tonsillar erythema, exudates, or enlargement; Moist mucous membranes, Good dentition, No lesions  NECK: Supple and normal thyroid.  No JVD or carotid bruit.  Carotid pulse is 2+ bilaterally.  HEART: Regular rate and rhythm; No murmurs, rubs, or gallops.  PULMONARY: Clear to auscultation and perfusion.  No rales, wheezing, or rhonchi bilaterally.  ABDOMEN: Soft, Nontender, Nondistended; Bowel sounds present  EXTREMITIES:  2+ Peripheral Pulses, No clubbing, cyanosis, or edema  LYMPH: No lymphadenopathy noted  NEUROLOGICAL: Grossly nonfocal          INTERPRETATION OF TELEMETRY:    ECG:    I&O's Detail    2019 07:  -  2019 07:00  --------------------------------------------------------  IN:    heparin  Infusion.: 132 mL    heparin Infusion: 156 mL    Oral Fluid: 740 mL  Total IN: 1028 mL    OUT:    Voided: 300 mL  Total OUT: 300 mL    Total NET: 728 mL      2019 07:01  -  2019 15:46  --------------------------------------------------------  IN:    Oral Fluid: 210 mL  Total IN: 210 mL    OUT:    Voided: 1000 mL  Total OUT: 1000 mL    Total NET: -790 mL          LABS:                        10.1   4.30  )-----------( 103      ( 2019 07:02 )             31.7     11-07    140  |  108  |  20  ----------------------------<  111<H>  4.3   |  19  |  1.1    Ca    9.7      2019 07:02  Mg     1.6     1107    TPro  5.5<L>  /  Alb  3.5  /  TBili  0.5  /  DBili  0.2  /  AST  11  /  ALT  7   /  AlkPhos  66  11-07    CARDIAC MARKERS ( 2019 15:00 )  x     / <0.01 ng/mL / x     / x     / x      CARDIAC MARKERS ( 2019 23:30 )  x     / <0.01 ng/mL / x     / x     / x      CARDIAC MARKERS ( 2019 20:18 )  x     / <0.01 ng/mL / x     / x     / x          PT/INR - ( 2019 07:02 )   PT: 13.60 sec;   INR: 1.18 ratio         PTT - ( 2019 07:02 )  PTT:59.6 sec    BNP  I&O's Detail    2019 07:  -  2019 07:00  --------------------------------------------------------  IN:    heparin  Infusion.: 132 mL    heparin Infusion: 156 mL    Oral Fluid: 740 mL  Total IN: 1028 mL    OUT:    Voided: 300 mL  Total OUT: 300 mL    Total NET: 728 mL      2019 07:  -  2019 15:46  --------------------------------------------------------  IN:    Oral Fluid: 210 mL  Total IN: 210 mL    OUT:    Voided: 1000 mL  Total OUT: 1000 mL    Total NET: -790 mL        Daily     Daily Weight in k (2019 06:07)    RADIOLOGY & ADDITIONAL STUDIES:

## 2019-11-07 NOTE — PROGRESS NOTE ADULT - ASSESSMENT
65y/o M with ESRD s/p kidney transplant, Afib on eliquis presenting 1mo CP found to have pos CCTA.     #Rule out underlying ischemic CAD  - Coronary Calcium score 99th percentile  - EKG no acute ST changes, trop x2 neg  - cw HEPARIN DRIP, goal 60-80  - Dr Maranda serna cardiac cath at 2 o clock today    #LUE hx of embolism, with staples in place w/ overlying skin changes (His eliquis was stopped x 2 days for possible cath at [facility] and he developed arm clots s/p thrombectomy)  - no fluctuance, but has overlying redness and crusting  - US LUE negative  - Vascular consult (staples likely due to be removed)  - will consider ID consult depending on vascular recs    #Afib on eliquis  - rate controlled, hold eliquis while on heparin drip  - c/w cardizem 360, toprol 100    #Hx of ESRD, s/p L kidney transplant  - c/w cellcept, tacrolimus  - currently labs stable, not on HD    #Hx of COPD  - not on any meds, no active sxs  - cw Duonebs PRN    #HLD  - c/w lipitor 10 daily  - lipid panel wnl, HbA1c 5.1      #Likely BPH- c/w flomax  #HTN- c/w cardizem, toprol, imdur  #Hx of ?cirrhosis due to hep C- liver enzymes stable, outpt PCP f/u  #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

## 2019-11-07 NOTE — PROGRESS NOTE ADULT - SUBJECTIVE AND OBJECTIVE BOX
SUBJECTIVE:  Patient is a 64y old Male who presents with a chief complaint of chest pain (06 Nov 2019 17:11)  Currently admitted to medicine with the primary diagnosis of Chest pain / high calcium score on CT       Today is hospital day 1d. This morning he is resting comfortably in bed and reports no new issues or overnight events.       PAST MEDICAL & SURGICAL HISTORY  Cirrhosis: possibly related to hepC  Melanoma: R eye, s/p laser  Lymphoma: ?questionable, not treated, not followed  Peripheral neuropathy: unclear cause  COPD, mild: not on O2  Atrial fibrillation: on eliquis  HLD (hyperlipidemia)  HTN (hypertension)  ESRD (end stage renal disease)  CKD (chronic kidney disease)  S/P arteriovenous (AV) fistula creation: prior old fistula in R arm  History of kidney transplant: 3 years ago    SOCIAL HISTORY:  Negative for smoking/alcohol/drug use.     ALLERGIES:  Allergy Status Unknown    MEDICATIONS:  STANDING MEDICATIONS  atorvastatin 10 milliGRAM(s) Oral at bedtime  chlorhexidine 4% Liquid 1 Application(s) Topical <User Schedule>  diltiazem    milliGRAM(s) Oral daily  gabapentin 300 milliGRAM(s) Oral three times a day  heparin  Infusion 1200 Unit(s)/Hr IV Continuous <Continuous>  isosorbide   mononitrate ER Tablet (IMDUR) 30 milliGRAM(s) Oral daily  metoprolol succinate  milliGRAM(s) Oral daily  mycophenolate mofetil 500 milliGRAM(s) Oral two times a day  PARoxetine 10 milliGRAM(s) Oral daily  tacrolimus 2 milliGRAM(s) Oral two times a day  tamsulosin 0.4 milliGRAM(s) Oral at bedtime    PRN MEDICATIONS  oxyCODONE    IR 10 milliGRAM(s) Oral every 8 hours PRN  zolpidem 5 milliGRAM(s) Oral at bedtime PRN    Home Medications:  Cardizem  mg/24 hours oral capsule, extended release: 1 cap(s) orally once a day (06 Nov 2019 15:17)  CellCept 500 mg oral tablet: 1 tab(s) orally 2 times a day (06 Nov 2019 15:17)  Flomax 0.4 mg oral capsule: 1 cap(s) orally once a day (06 Nov 2019 15:17)  gabapentin 300 mg oral tablet: 1 tab(s) orally 3 times a day (06 Nov 2019 15:17)  Imdur 30 mg oral tablet, extended release: 1 tab(s) orally once a day (in the morning) (06 Nov 2019 15:17)  Lipitor 10 mg oral tablet: 1 tab(s) orally every 48 hours (06 Nov 2019 15:17)  oxyCODONE 10 mg oral tablet: 1 tab(s) orally every 6 hours, As Needed (06 Nov 2019 15:17)  Paxil 10 mg oral tablet: 1 tab(s) orally once a day (06 Nov 2019 15:17)  tacrolimus 1 mg oral capsule: 2 cap(s) orally every 12 hours (06 Nov 2019 15:17)  Toprol- mg oral tablet, extended release: 1 tab(s) orally once a day (06 Nov 2019 15:17)    Vital Signs Last 24 Hrs  T(C): 35.9 (07 Nov 2019 06:07), Max: 36.8 (06 Nov 2019 14:14)  T(F): 96.7 (07 Nov 2019 06:07), Max: 98.3 (06 Nov 2019 14:14)  HR: 78 (07 Nov 2019 06:07) (54 - 79)  BP: 140/78 (07 Nov 2019 06:07) (113/58 - 140/78)  BP(mean): --  RR: 18 (07 Nov 2019 06:07) (18 - 18)  SpO2: 93% (06 Nov 2019 20:04) (93% - 93%)      LABS:                        10.1   4.30  )-----------( 103      ( 07 Nov 2019 07:02 )             31.7     11-07    140  |  108  |  20  ----------------------------<  111<H>  4.3   |  19  |  1.1    Ca    9.7      07 Nov 2019 07:02  Mg     1.6     11-07    TPro  5.5<L>  /  Alb  3.5  /  TBili  0.5  /  DBili  0.2  /  AST  11  /  ALT  7   /  AlkPhos  66  11-07    PT/INR - ( 07 Nov 2019 07:02 )   PT: 13.60 sec;   INR: 1.18 ratio         PTT - ( 07 Nov 2019 07:02 )  PTT:59.6 sec      Troponin T, Serum: <0.01 ng/mL (11-06-19 @ 15:00)      CARDIAC MARKERS ( 06 Nov 2019 15:00 )  x     / <0.01 ng/mL / x     / x     / x      CARDIAC MARKERS ( 05 Nov 2019 23:30 )  x     / <0.01 ng/mL / x     / x     / x      CARDIAC MARKERS ( 05 Nov 2019 20:18 )  x     / <0.01 ng/mL / x     / x     / x              PHYSICAL EXAM:  GEN: No acute distress / legally blind / staples left arm   LUNGS: Clear to auscultation bilaterally   HEART: S1/S2 present. RRR.   ABD: Soft, non-tender, non-distended. Bowel sounds present  NEURO: AAOX3

## 2019-11-07 NOTE — CHART NOTE - NSCHARTNOTEFT_GEN_A_CORE
PRE-OP DIAGNOSIS: stable angina, hx of ESRD s/p kidney transplant, HTN, DL, very high calcium score on CT coronaries    PROCEDURE: [facility] with coronary angiography    Physician: Dr Rodgers  Assistant: Beth Bojorquez    ANESTHESIA TYPE:  [  ]General Anesthesia  [  ] Sedation  [ x ] Local/Regional    ESTIMATED BLOOD LOSS:    10   mL    CONDITION  [  ] Critical  [  ] Serious  [  ]Fair  [ x ]Good      SPECIMENS REMOVED (IF APPLICABLE): N/A      IV CONTRAST:    60      mL      IMPLANTS (IF APPLICABLE)      FINDINGS    Left Heart Catheterization:    LVEDP: elevated         LEFT HEART CATHETERIZATION                                    Left main normal    LAD:    Prox LAD: heavy calcified no sginificant lesion. MId LAD 60% lesion, Distal: minor irregularities.                     Diag: patent    Left Circumflex: mild to moderate disease   OM: small patent     Right Coronary Artery: heavy calcified vessel, Prox 50- 60% diffuse disease, MId 80% eccentric lesion , distal midls disease  RPDA patent       DOMINANCE: Right    ACCESS: right femoral  CLOSURE: manual    INTERVENTION  iFR mid LAD lesion , non significant disease           POST-OP DIAGNOSIS  heavy calcified coronary arteries, non significant mid LAD stenosis  significant mid RCA calcified lesion with diffuse disease         PLAN OF CARE  [ ] D/C Home today  [ ]  D/C in AM  [x ] Return to In-patient bed  IV hydration  resume heparin 6 hrs after removing femoral sheath  monitor cbc/ptt  follow up kidney function  will plan for possible PCI to RCA with atherectomy. PRE-OP DIAGNOSIS: stable angina, hx of ESRD s/p kidney transplant, HTN, DL, very high calcium score on CT coronaries    PROCEDURE: [facility] with coronary angiography    Physician: Dr Rodgers  Assistant: Beth Bojorquez    ANESTHESIA TYPE:  [  ]General Anesthesia  [  ] Sedation  [ x ] Local/Regional    ESTIMATED BLOOD LOSS:    10   mL    CONDITION  [  ] Critical  [  ] Serious  [  ]Fair  [ x ]Good      SPECIMENS REMOVED (IF APPLICABLE): N/A      IV CONTRAST:    60      mL      IMPLANTS (IF APPLICABLE)      FINDINGS    Left Heart Catheterization:    LVEDP: elevated         LEFT HEART CATHETERIZATION                                    Left main normal    LAD:    Prox LAD: heavy calcified no significant lesion. MId LAD 60% lesion, Distal: minor irregularities.                     Diag: patent    Left Circumflex: mild to moderate disease   OM: small patent     Right Coronary Artery: heavy calcified vessel, Prox 50- 60% diffuse disease, MId 80% eccentric lesion , distal 80% disease  RPDA patent       DOMINANCE: Right    ACCESS: right femoral  CLOSURE: manual    INTERVENTION  iFR mid LAD lesion , non significant disease           POST-OP DIAGNOSIS  heavy calcified coronary arteries, non significant mid LAD stenosis  significant mid RCA calcified lesion with diffuse disease         PLAN OF CARE  [ ] D/C Home today  [ ]  D/C in AM  [x ] Return to In-patient bed  IV hydration  resume heparin 6 hrs after removing femoral sheath  monitor cbc/ptt  follow up kidney function  will plan for possible PCI to RCA with atherectomy.

## 2019-11-08 LAB
ANION GAP SERPL CALC-SCNC: 12 MMOL/L — SIGNIFICANT CHANGE UP (ref 7–14)
APTT BLD: 43 SEC — HIGH (ref 27–39.2)
APTT BLD: 50.1 SEC — HIGH (ref 27–39.2)
BASOPHILS # BLD AUTO: 0.01 K/UL — SIGNIFICANT CHANGE UP (ref 0–0.2)
BASOPHILS NFR BLD AUTO: 0.3 % — SIGNIFICANT CHANGE UP (ref 0–1)
BUN SERPL-MCNC: 17 MG/DL — SIGNIFICANT CHANGE UP (ref 10–20)
CALCIUM SERPL-MCNC: 10 MG/DL — SIGNIFICANT CHANGE UP (ref 8.5–10.1)
CHLORIDE SERPL-SCNC: 106 MMOL/L — SIGNIFICANT CHANGE UP (ref 98–110)
CO2 SERPL-SCNC: 21 MMOL/L — SIGNIFICANT CHANGE UP (ref 17–32)
CREAT SERPL-MCNC: 1.1 MG/DL — SIGNIFICANT CHANGE UP (ref 0.7–1.5)
EOSINOPHIL # BLD AUTO: 0.02 K/UL — SIGNIFICANT CHANGE UP (ref 0–0.7)
EOSINOPHIL NFR BLD AUTO: 0.5 % — SIGNIFICANT CHANGE UP (ref 0–8)
GLUCOSE SERPL-MCNC: 109 MG/DL — HIGH (ref 70–99)
HCT VFR BLD CALC: 33.3 % — LOW (ref 42–52)
HCT VFR BLD CALC: 33.7 % — LOW (ref 42–52)
HCV AB S/CO SERPL IA: 11.72 S/CO — HIGH (ref 0–0.99)
HCV AB SERPL-IMP: REACTIVE
HCV RNA FLD QL NAA+PROBE: SIGNIFICANT CHANGE UP
HCV RNA SPEC QL PROBE+SIG AMP: SIGNIFICANT CHANGE UP
HGB BLD-MCNC: 10.8 G/DL — LOW (ref 14–18)
HGB BLD-MCNC: 10.9 G/DL — LOW (ref 14–18)
IMM GRANULOCYTES NFR BLD AUTO: 0.3 % — SIGNIFICANT CHANGE UP (ref 0.1–0.3)
LYMPHOCYTES # BLD AUTO: 0.46 K/UL — LOW (ref 1.2–3.4)
LYMPHOCYTES # BLD AUTO: 12.6 % — LOW (ref 20.5–51.1)
MCHC RBC-ENTMCNC: 29.9 PG — SIGNIFICANT CHANGE UP (ref 27–31)
MCHC RBC-ENTMCNC: 30.3 PG — SIGNIFICANT CHANGE UP (ref 27–31)
MCHC RBC-ENTMCNC: 32.3 G/DL — SIGNIFICANT CHANGE UP (ref 32–37)
MCHC RBC-ENTMCNC: 32.4 G/DL — SIGNIFICANT CHANGE UP (ref 32–37)
MCV RBC AUTO: 92.2 FL — SIGNIFICANT CHANGE UP (ref 80–94)
MCV RBC AUTO: 93.6 FL — SIGNIFICANT CHANGE UP (ref 80–94)
MONOCYTES # BLD AUTO: 0.13 K/UL — SIGNIFICANT CHANGE UP (ref 0.1–0.6)
MONOCYTES NFR BLD AUTO: 3.6 % — SIGNIFICANT CHANGE UP (ref 1.7–9.3)
NEUTROPHILS # BLD AUTO: 3.03 K/UL — SIGNIFICANT CHANGE UP (ref 1.4–6.5)
NEUTROPHILS NFR BLD AUTO: 82.7 % — HIGH (ref 42.2–75.2)
NRBC # BLD: 0 /100 WBCS — SIGNIFICANT CHANGE UP (ref 0–0)
NRBC # BLD: 0 /100 WBCS — SIGNIFICANT CHANGE UP (ref 0–0)
PLATELET # BLD AUTO: 80 K/UL — LOW (ref 130–400)
PLATELET # BLD AUTO: 87 K/UL — LOW (ref 130–400)
POTASSIUM SERPL-MCNC: 4.6 MMOL/L — SIGNIFICANT CHANGE UP (ref 3.5–5)
POTASSIUM SERPL-SCNC: 4.6 MMOL/L — SIGNIFICANT CHANGE UP (ref 3.5–5)
RBC # BLD: 3.6 M/UL — LOW (ref 4.7–6.1)
RBC # BLD: 3.61 M/UL — LOW (ref 4.7–6.1)
RBC # FLD: 14.6 % — HIGH (ref 11.5–14.5)
RBC # FLD: 14.7 % — HIGH (ref 11.5–14.5)
SODIUM SERPL-SCNC: 139 MMOL/L — SIGNIFICANT CHANGE UP (ref 135–146)
WBC # BLD: 3.66 K/UL — LOW (ref 4.8–10.8)
WBC # BLD: 4 K/UL — LOW (ref 4.8–10.8)
WBC # FLD AUTO: 3.66 K/UL — LOW (ref 4.8–10.8)
WBC # FLD AUTO: 4 K/UL — LOW (ref 4.8–10.8)

## 2019-11-08 PROCEDURE — 99233 SBSQ HOSP IP/OBS HIGH 50: CPT

## 2019-11-08 RX ORDER — HEPARIN SODIUM 5000 [USP'U]/ML
1300 INJECTION INTRAVENOUS; SUBCUTANEOUS
Qty: 25000 | Refills: 0 | Status: DISCONTINUED | OUTPATIENT
Start: 2019-11-08 | End: 2019-11-08

## 2019-11-08 RX ORDER — HEPARIN SODIUM 5000 [USP'U]/ML
1500 INJECTION INTRAVENOUS; SUBCUTANEOUS
Qty: 25000 | Refills: 0 | Status: DISCONTINUED | OUTPATIENT
Start: 2019-11-08 | End: 2019-11-11

## 2019-11-08 RX ADMIN — MYCOPHENOLATE MOFETIL 500 MILLIGRAM(S): 250 CAPSULE ORAL at 17:27

## 2019-11-08 RX ADMIN — Medication 81 MILLIGRAM(S): at 11:41

## 2019-11-08 RX ADMIN — Medication 10 MILLIGRAM(S): at 11:41

## 2019-11-08 RX ADMIN — HEPARIN SODIUM 13 UNIT(S)/HR: 5000 INJECTION INTRAVENOUS; SUBCUTANEOUS at 17:29

## 2019-11-08 RX ADMIN — Medication 100 MILLIGRAM(S): at 05:40

## 2019-11-08 RX ADMIN — TACROLIMUS 2 MILLIGRAM(S): 5 CAPSULE ORAL at 17:27

## 2019-11-08 RX ADMIN — HEPARIN SODIUM 12 UNIT(S)/HR: 5000 INJECTION INTRAVENOUS; SUBCUTANEOUS at 01:19

## 2019-11-08 RX ADMIN — TAMSULOSIN HYDROCHLORIDE 0.4 MILLIGRAM(S): 0.4 CAPSULE ORAL at 21:48

## 2019-11-08 RX ADMIN — OXYCODONE HYDROCHLORIDE 10 MILLIGRAM(S): 5 TABLET ORAL at 15:41

## 2019-11-08 RX ADMIN — Medication 360 MILLIGRAM(S): at 05:40

## 2019-11-08 RX ADMIN — GABAPENTIN 300 MILLIGRAM(S): 400 CAPSULE ORAL at 05:40

## 2019-11-08 RX ADMIN — MYCOPHENOLATE MOFETIL 500 MILLIGRAM(S): 250 CAPSULE ORAL at 05:40

## 2019-11-08 RX ADMIN — ATORVASTATIN CALCIUM 10 MILLIGRAM(S): 80 TABLET, FILM COATED ORAL at 21:48

## 2019-11-08 RX ADMIN — ISOSORBIDE MONONITRATE 30 MILLIGRAM(S): 60 TABLET, EXTENDED RELEASE ORAL at 11:41

## 2019-11-08 RX ADMIN — GABAPENTIN 300 MILLIGRAM(S): 400 CAPSULE ORAL at 14:12

## 2019-11-08 RX ADMIN — GABAPENTIN 300 MILLIGRAM(S): 400 CAPSULE ORAL at 21:48

## 2019-11-08 RX ADMIN — HEPARIN SODIUM 13 UNIT(S)/HR: 5000 INJECTION INTRAVENOUS; SUBCUTANEOUS at 10:06

## 2019-11-08 RX ADMIN — TACROLIMUS 2 MILLIGRAM(S): 5 CAPSULE ORAL at 05:41

## 2019-11-08 RX ADMIN — OXYCODONE HYDROCHLORIDE 10 MILLIGRAM(S): 5 TABLET ORAL at 14:11

## 2019-11-08 NOTE — PROGRESS NOTE ADULT - ASSESSMENT
renal transplant   angina   RCA calcified lesions       d/w dr. miller and reviewed records   keep on IV heparin   start ivf ns 50 cc/hour x 1 liter   for pci on monday   f/u with dr. miller for timing   continue other meds   hold eliquis

## 2019-11-08 NOTE — PROGRESS NOTE ADULT - SUBJECTIVE AND OBJECTIVE BOX
SUBJECTIVE:    Patient is a 64y old Male who presents with a chief complaint of chest pain      Today is hospital day 2d. This morning he is resting comfortably in bed and reports no new issues or overnight events. He is frustrated about the staples in his arm and wants to know when are they coming out    PAST MEDICAL & SURGICAL HISTORY  Cirrhosis: possibly related to hepC  Melanoma: R eye, s/p laser  Lymphoma: ?questionable, not treated, not followed  Peripheral neuropathy: unclear cause  COPD, mild: not on O2  Atrial fibrillation: on eliquis  HLD (hyperlipidemia)  HTN (hypertension)  ESRD (end stage renal disease)  CKD (chronic kidney disease)  S/P arteriovenous (AV) fistula creation: prior old fistula in R arm  History of kidney transplant: 3 years ago      ALLERGIES:  Allergy Status Unknown    MEDICATIONS:  STANDING MEDICATIONS  aspirin  chewable 81 milliGRAM(s) Oral daily  atorvastatin 10 milliGRAM(s) Oral at bedtime  chlorhexidine 4% Liquid 1 Application(s) Topical <User Schedule>  diltiazem    milliGRAM(s) Oral daily  gabapentin 300 milliGRAM(s) Oral three times a day  heparin  Infusion 1300 Unit(s)/Hr IV Continuous <Continuous>  isosorbide   mononitrate ER Tablet (IMDUR) 30 milliGRAM(s) Oral daily  metoprolol succinate  milliGRAM(s) Oral daily  mycophenolate mofetil 500 milliGRAM(s) Oral two times a day  PARoxetine 10 milliGRAM(s) Oral daily  sodium chloride 0.9%. 1000 milliLiter(s) IV Continuous <Continuous>  tacrolimus 2 milliGRAM(s) Oral two times a day  tamsulosin 0.4 milliGRAM(s) Oral at bedtime    PRN MEDICATIONS  oxyCODONE    IR 10 milliGRAM(s) Oral every 8 hours PRN  zolpidem 5 milliGRAM(s) Oral at bedtime PRN    VITALS:   T(F): 99.1  HR: 93  BP: 137/75  RR: 18  SpO2: 97%    LABS:                        10.8   3.66  )-----------( 80       ( 08 Nov 2019 05:28 )             33.3     11-08    139  |  106  |  17  ----------------------------<  109<H>  4.6   |  21  |  1.1    Ca    10.0      08 Nov 2019 05:28  Mg     1.6     11-07    TPro  5.5<L>  /  Alb  3.5  /  TBili  0.5  /  DBili  0.2  /  AST  11  /  ALT  7   /  AlkPhos  66  11-07    PT/INR - ( 07 Nov 2019 07:02 )   PT: 13.60 sec;   INR: 1.18 ratio         PTT - ( 08 Nov 2019 05:28 )  PTT:43.0 sec          CARDIAC MARKERS ( 06 Nov 2019 15:00 )  x     / <0.01 ng/mL / x     / x     / x            PHYSICAL EXAM:  GEN: No acute distress  LUNGS: Clear to auscultation bilaterally   HEART: S1/S2 present. RRR.   ABD: Soft  EXT: Staples in LUE  NEURO: AAOX3

## 2019-11-08 NOTE — PROGRESS NOTE ADULT - SUBJECTIVE AND OBJECTIVE BOX
SUBJ: pt comfortable groin stable       MEDICATIONS  (STANDING):  aspirin  chewable 81 milliGRAM(s) Oral daily  atorvastatin 10 milliGRAM(s) Oral at bedtime  chlorhexidine 4% Liquid 1 Application(s) Topical <User Schedule>  diltiazem    milliGRAM(s) Oral daily  gabapentin 300 milliGRAM(s) Oral three times a day  heparin  Infusion 1300 Unit(s)/Hr (13 mL/Hr) IV Continuous <Continuous>  isosorbide   mononitrate ER Tablet (IMDUR) 30 milliGRAM(s) Oral daily  metoprolol succinate  milliGRAM(s) Oral daily  mycophenolate mofetil 500 milliGRAM(s) Oral two times a day  PARoxetine 10 milliGRAM(s) Oral daily  sodium chloride 0.9%. 1000 milliLiter(s) (150 mL/Hr) IV Continuous <Continuous>  tacrolimus 2 milliGRAM(s) Oral two times a day  tamsulosin 0.4 milliGRAM(s) Oral at bedtime    MEDICATIONS  (PRN):  oxyCODONE    IR 10 milliGRAM(s) Oral every 8 hours PRN Severe Pain (7 - 10)  zolpidem 5 milliGRAM(s) Oral at bedtime PRN Insomnia            Vital Signs Last 24 Hrs  T(C): 37.3 (08 Nov 2019 06:09), Max: 37.3 (08 Nov 2019 06:09)  T(F): 99.1 (08 Nov 2019 06:09), Max: 99.1 (08 Nov 2019 06:09)  HR: 93 (08 Nov 2019 06:09) (72 - 93)  BP: 137/75 (08 Nov 2019 06:09) (120/57 - 138/75)  BP(mean): --  RR: 18 (08 Nov 2019 06:09) (16 - 18)  SpO2: 97% (07 Nov 2019 23:53) (94% - 97%)    REVIEW OF SYSTEMS:  see hpi all other systems negative     PHYSICAL EXAM:  ·  ·RESPIRATORY:    clear to auscultation bilaterally  · CARDIOVASCULAR	regular rate and rhythm  no rub  no murmur  normal PMI no JVD   . GASTROINTESTINAL:  no tenderness   · EXTREMITIES: No cyanosis, clubbing or edema  · VASCULAR: 	Equal and normal pulses (carotid, femoral, dorsalis pedis)  ·NEUROLOGICAL:   alert and oriented x 3;     TELEMETRY:    ECG:    TTE:    LABS:                        10.8   3.66  )-----------( 80       ( 08 Nov 2019 05:28 )             33.3     11-08    139  |  106  |  17  ----------------------------<  109<H>  4.6   |  21  |  1.1    Ca    10.0      08 Nov 2019 05:28  Mg     1.6     11-07    TPro  5.5<L>  /  Alb  3.5  /  TBili  0.5  /  DBili  0.2  /  AST  11  /  ALT  7   /  AlkPhos  66  11-07    CARDIAC MARKERS ( 06 Nov 2019 15:00 )  x     / <0.01 ng/mL / x     / x     / x          PT/INR - ( 07 Nov 2019 07:02 )   PT: 13.60 sec;   INR: 1.18 ratio         PTT - ( 08 Nov 2019 05:28 )  PTT:43.0 sec    I&O's Summary    07 Nov 2019 07:01  -  08 Nov 2019 07:00  --------------------------------------------------------  IN: 660 mL / OUT: 2375 mL / NET: -1715 mL      BNP  RADIOLOGY & ADDITIONAL STUDIES:    IMPRESSION AND PLAN:

## 2019-11-08 NOTE — PROGRESS NOTE ADULT - ASSESSMENT
65y/o M with ESRD s/p kidney transplant, Afib on eliquis presenting 1mo CP found to have pos CCTA.     # Rule out underlying ischemic CAD  - Coronary Calcium score 99th percentile  - EKG no acute ST changes, trop x2 neg  - cw HEPARIN DRIP, goal 60-80. rate inc to 13. will monitor   - PCI on Monday as per cardio     # LUE hx of embolism, with staples in place w/ overlying skin changes (His eliquis was stopped x 2 days for possible cath at [facility] and he developed arm clots s/p thrombectomy)  - no fluctuance, but has overlying redness and crusting  - US LUE negative  - Vascular consult (staples likely due to be removed)  - will consider ID consult depending on vascular recs    # Afib on eliquis  - rate controlled, hold eliquis while on heparin drip  - c/w cardizem 360, toprol 100    # Hx of ESRD, s/p L kidney transplant  - c/w cellcept, tacrolimus  - currently labs stable, not on HD    # Hx of COPD  - not on any meds, no active sxs  - cw Duonebs PRN    # HLD  - c/w lipitor 10 daily  - lipid panel wnl, HbA1c 5.1      #Likely BPH- c/w flomax  #HTN- c/w cardizem, toprol, imdur  #Hx of ?cirrhosis due to hep C- liver enzymes stable, outpt PCP f/u  #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

## 2019-11-08 NOTE — PROGRESS NOTE ADULT - ATTENDING COMMENTS
I saw and examined the patient independently. I agree with above history, physical exam and plan of care which I have reviewed and edited where appropriate with following additions.    patient looked comfortable/ very anxious and concerned about cardiac Cath with contrast with risk of kidney injury as he was on dialysis 11 years ago and is post renal transplant . little upset still waiting for vascular eval for LUE.     chest pain with exertional dyspnea possible unstable angina:  telemonitoring.   trops negative x , BNP on higher side.   TTE- pending   CT coronaries with high calcium score.   Cardiology following - for possible cardiac Cath on Monday/ renal function stable but will hydrate for 12 hours prior to procedure to prevent YOLANDA.   on heparin drip as per cardio/fu PTT q6hr and adjust rate accordingly.   platelet count dropped today to 80/ fu repeat with 4 pm labs if persistently dropping >50% of baselilne/ hold heparin drip and send HIT AB although patient had cirrhosis underlying.   not on Aspirin/plavix??/ on eliquis for A- fib /eliquis on hold for anticipated procedure as patient is on heparin drip   c/w toprol/ on low dose atorvastatin unclear why but was told by his Doctor not to increase the dose.    h/o LUE DVT sp thrombectomy :  sutures LUE in place with dried up healing tissue . does not look infected.   Vascular eval requested/patient does not want to fu with RUMSY anymore.   doppler LUE negative for clots.    dvt ppx on heparin drip.     Progress note Handoff:   Pending: cardiac cath on Monday / vascular attending fu .   Discussion: plan of care with  patient /  satisfied- all questions answered.  Disposition: home when stable .

## 2019-11-09 LAB
ANION GAP SERPL CALC-SCNC: 14 MMOL/L — SIGNIFICANT CHANGE UP (ref 7–14)
APTT BLD: 74 SEC — CRITICAL HIGH (ref 27–39.2)
BASOPHILS # BLD AUTO: 0.01 K/UL — SIGNIFICANT CHANGE UP (ref 0–0.2)
BASOPHILS NFR BLD AUTO: 0.4 % — SIGNIFICANT CHANGE UP (ref 0–1)
BUN SERPL-MCNC: 18 MG/DL — SIGNIFICANT CHANGE UP (ref 10–20)
CALCIUM SERPL-MCNC: 10.1 MG/DL — SIGNIFICANT CHANGE UP (ref 8.5–10.1)
CHLORIDE SERPL-SCNC: 105 MMOL/L — SIGNIFICANT CHANGE UP (ref 98–110)
CO2 SERPL-SCNC: 20 MMOL/L — SIGNIFICANT CHANGE UP (ref 17–32)
CREAT SERPL-MCNC: 1.3 MG/DL — SIGNIFICANT CHANGE UP (ref 0.7–1.5)
EOSINOPHIL # BLD AUTO: 0.03 K/UL — SIGNIFICANT CHANGE UP (ref 0–0.7)
EOSINOPHIL NFR BLD AUTO: 1.2 % — SIGNIFICANT CHANGE UP (ref 0–8)
GLUCOSE SERPL-MCNC: 100 MG/DL — HIGH (ref 70–99)
HCT VFR BLD CALC: 34.2 % — LOW (ref 42–52)
HGB BLD-MCNC: 11 G/DL — LOW (ref 14–18)
IMM GRANULOCYTES NFR BLD AUTO: 0.4 % — HIGH (ref 0.1–0.3)
LYMPHOCYTES # BLD AUTO: 0.55 K/UL — LOW (ref 1.2–3.4)
LYMPHOCYTES # BLD AUTO: 22.5 % — SIGNIFICANT CHANGE UP (ref 20.5–51.1)
MAGNESIUM SERPL-MCNC: 1.8 MG/DL — SIGNIFICANT CHANGE UP (ref 1.8–2.4)
MCHC RBC-ENTMCNC: 30.1 PG — SIGNIFICANT CHANGE UP (ref 27–31)
MCHC RBC-ENTMCNC: 32.2 G/DL — SIGNIFICANT CHANGE UP (ref 32–37)
MCV RBC AUTO: 93.7 FL — SIGNIFICANT CHANGE UP (ref 80–94)
MONOCYTES # BLD AUTO: 0.13 K/UL — SIGNIFICANT CHANGE UP (ref 0.1–0.6)
MONOCYTES NFR BLD AUTO: 5.3 % — SIGNIFICANT CHANGE UP (ref 1.7–9.3)
NEUTROPHILS # BLD AUTO: 1.71 K/UL — SIGNIFICANT CHANGE UP (ref 1.4–6.5)
NEUTROPHILS NFR BLD AUTO: 70.2 % — SIGNIFICANT CHANGE UP (ref 42.2–75.2)
NRBC # BLD: 0 /100 WBCS — SIGNIFICANT CHANGE UP (ref 0–0)
PHOSPHATE SERPL-MCNC: 3.6 MG/DL — SIGNIFICANT CHANGE UP (ref 2.1–4.9)
PLATELET # BLD AUTO: 77 K/UL — LOW (ref 130–400)
POTASSIUM SERPL-MCNC: 4.8 MMOL/L — SIGNIFICANT CHANGE UP (ref 3.5–5)
POTASSIUM SERPL-SCNC: 4.8 MMOL/L — SIGNIFICANT CHANGE UP (ref 3.5–5)
RBC # BLD: 3.65 M/UL — LOW (ref 4.7–6.1)
RBC # FLD: 14.8 % — HIGH (ref 11.5–14.5)
SODIUM SERPL-SCNC: 139 MMOL/L — SIGNIFICANT CHANGE UP (ref 135–146)
WBC # BLD: 2.44 K/UL — LOW (ref 4.8–10.8)
WBC # FLD AUTO: 2.44 K/UL — LOW (ref 4.8–10.8)

## 2019-11-09 PROCEDURE — 99233 SBSQ HOSP IP/OBS HIGH 50: CPT

## 2019-11-09 PROCEDURE — 99232 SBSQ HOSP IP/OBS MODERATE 35: CPT

## 2019-11-09 PROCEDURE — 93306 TTE W/DOPPLER COMPLETE: CPT | Mod: 26

## 2019-11-09 RX ORDER — KETOROLAC TROMETHAMINE 30 MG/ML
15 SYRINGE (ML) INJECTION ONCE
Refills: 0 | Status: DISCONTINUED | OUTPATIENT
Start: 2019-11-09 | End: 2019-11-09

## 2019-11-09 RX ADMIN — ZOLPIDEM TARTRATE 5 MILLIGRAM(S): 10 TABLET ORAL at 22:03

## 2019-11-09 RX ADMIN — CHLORHEXIDINE GLUCONATE 1 APPLICATION(S): 213 SOLUTION TOPICAL at 05:37

## 2019-11-09 RX ADMIN — MYCOPHENOLATE MOFETIL 500 MILLIGRAM(S): 250 CAPSULE ORAL at 18:19

## 2019-11-09 RX ADMIN — TACROLIMUS 2 MILLIGRAM(S): 5 CAPSULE ORAL at 05:37

## 2019-11-09 RX ADMIN — Medication 100 MILLIGRAM(S): at 05:37

## 2019-11-09 RX ADMIN — GABAPENTIN 300 MILLIGRAM(S): 400 CAPSULE ORAL at 22:04

## 2019-11-09 RX ADMIN — GABAPENTIN 300 MILLIGRAM(S): 400 CAPSULE ORAL at 18:18

## 2019-11-09 RX ADMIN — TAMSULOSIN HYDROCHLORIDE 0.4 MILLIGRAM(S): 0.4 CAPSULE ORAL at 22:04

## 2019-11-09 RX ADMIN — ISOSORBIDE MONONITRATE 30 MILLIGRAM(S): 60 TABLET, EXTENDED RELEASE ORAL at 12:01

## 2019-11-09 RX ADMIN — GABAPENTIN 300 MILLIGRAM(S): 400 CAPSULE ORAL at 05:37

## 2019-11-09 RX ADMIN — TACROLIMUS 2 MILLIGRAM(S): 5 CAPSULE ORAL at 18:19

## 2019-11-09 RX ADMIN — ATORVASTATIN CALCIUM 10 MILLIGRAM(S): 80 TABLET, FILM COATED ORAL at 22:04

## 2019-11-09 RX ADMIN — Medication 15 MILLIGRAM(S): at 16:41

## 2019-11-09 RX ADMIN — Medication 15 MILLIGRAM(S): at 18:26

## 2019-11-09 RX ADMIN — MYCOPHENOLATE MOFETIL 500 MILLIGRAM(S): 250 CAPSULE ORAL at 05:37

## 2019-11-09 RX ADMIN — HEPARIN SODIUM 15 UNIT(S)/HR: 5000 INJECTION INTRAVENOUS; SUBCUTANEOUS at 00:00

## 2019-11-09 RX ADMIN — HEPARIN SODIUM 15 UNIT(S)/HR: 5000 INJECTION INTRAVENOUS; SUBCUTANEOUS at 12:01

## 2019-11-09 RX ADMIN — Medication 81 MILLIGRAM(S): at 12:01

## 2019-11-09 RX ADMIN — Medication 360 MILLIGRAM(S): at 05:37

## 2019-11-09 NOTE — PROGRESS NOTE ADULT - SUBJECTIVE AND OBJECTIVE BOX
SUBJECTIVE:    Patient is a 64y old Male who presents with a chief complaint of chest pain     Today is hospital day 3d. This morning he is resting comfortably in bed and reports no new issues or overnight events. He reports no more chest pain. He is very frustrated about the arm situation     PAST MEDICAL & SURGICAL HISTORY  Cirrhosis: possibly related to hepC  Melanoma: R eye, s/p laser  Lymphoma: ?questionable, not treated, not followed  Peripheral neuropathy: unclear cause  COPD, mild: not on O2  Atrial fibrillation: on eliquis  HLD (hyperlipidemia)  HTN (hypertension)  ESRD (end stage renal disease)  CKD (chronic kidney disease)  S/P arteriovenous (AV) fistula creation: prior old fistula in R arm  History of kidney transplant: 3 years ago      ALLERGIES:  Allergy Status Unknown    MEDICATIONS:  STANDING MEDICATIONS  aspirin  chewable 81 milliGRAM(s) Oral daily  atorvastatin 10 milliGRAM(s) Oral at bedtime  chlorhexidine 4% Liquid 1 Application(s) Topical <User Schedule>  diltiazem    milliGRAM(s) Oral daily  gabapentin 300 milliGRAM(s) Oral three times a day  heparin  Infusion 1500 Unit(s)/Hr IV Continuous <Continuous>  isosorbide   mononitrate ER Tablet (IMDUR) 30 milliGRAM(s) Oral daily  metoprolol succinate  milliGRAM(s) Oral daily  mycophenolate mofetil 500 milliGRAM(s) Oral two times a day  PARoxetine 10 milliGRAM(s) Oral daily  sodium chloride 0.9%. 1000 milliLiter(s) IV Continuous <Continuous>  tacrolimus 2 milliGRAM(s) Oral two times a day  tamsulosin 0.4 milliGRAM(s) Oral at bedtime    PRN MEDICATIONS  oxyCODONE    IR 10 milliGRAM(s) Oral every 8 hours PRN  zolpidem 5 milliGRAM(s) Oral at bedtime PRN    VITALS:   T(F): 97.5  HR: 89  BP: 133/79  RR: 18  SpO2: --    LABS:                        11.0   2.44  )-----------( 77       ( 09 Nov 2019 05:20 )             34.2     11-09    139  |  105  |  18  ----------------------------<  100<H>  4.8   |  20  |  1.3    Ca    10.1      09 Nov 2019 05:20  Phos  3.6     11-09  Mg     1.8     11-09      PTT - ( 09 Nov 2019 05:20 )  PTT:74.0 sec      PHYSICAL EXAM:  GEN: No acute distress  LUNGS: Clear to auscultation bilaterally   HEART: S1/S2 present. RRR.   ABD: Soft  EXT: Staples in LUE  NEURO: AAOX3

## 2019-11-09 NOTE — PROGRESS NOTE ADULT - ASSESSMENT
65y/o M with ESRD s/p kidney transplant, Afib on eliquis presenting 1mo CP found to have pos CCTA.     # Rule out underlying ischemic CAD  - Coronary Calcium score 99th percentile  - EKG no acute ST changes, trop x2 neg  - cw HEPARIN DRIP, goal 60-80. Will monitor. currently @15   - PCI on Monday as per cardio     # LUE hx of embolism, with staples in place w/ overlying skin changes (His eliquis was stopped x 2 days for possible cath at [facility] and he developed arm clots s/p thrombectomy)  - no fluctuance, but has overlying redness and crusting  - US LUE negative  - Spoke with vascular surgery over the phone. As per patient he had surgery with Dr Peters at [facility]. As per Dr Peters he does not do surgery at [facility]. Explained the situation to vascular team. They want the patient to follow up with the doctor who did the surgery. Patient not willing to go back to [facility]. Patient aware and frustrated of the situation.     # Afib on eliquis  - rate controlled, hold eliquis while on heparin drip  - c/w cardizem 360, toprol 100    # Hx of ESRD, s/p L kidney transplant  - c/w cellcept, tacrolimus  - currently labs stable, not on HD    # Hx of COPD  - not on any meds, no active sxs  - cw Duonebs PRN    # HLD  - c/w lipitor 10 daily  - lipid panel wnl, HbA1c 5.1      #Likely BPH- c/w flomax  #HTN- c/w cardizem, toprol, imdur  #Hx of ?cirrhosis due to hep C- liver enzymes stable, outpt PCP f/u. 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     # Rule out underlying ischemic CAD  - Coronary Calcium score 99th percentile  - EKG no acute ST changes, trop x2 neg  - cw HEPARIN DRIP, goal 60-80. Will monitor. currently @15   - will monitor platelet on heparin. If falls suspect HIT  - PCI on Monday as per cardio. Will make NPO at midnight tomorrow for procedure. Will start IV hydration on the day of procedure since he was on dialysis before transplant and is scared of contrast induced nephropathy     # LUE hx of embolism, with staples in place w/ overlying skin changes (His eliquis was stopped x 2 days for possible cath at [facility] and he developed arm clots s/p thrombectomy)  - no fluctuance, but has overlying redness and crusting  - US LUE negative  - Spoke with vascular surgery over the phone. As per patient he had surgery with Dr Peters at [facility]. As per Dr Peters he does not do surgery at [facility]. Explained the situation to vascular team. They want the patient to follow up with the doctor who did the surgery. Patient not willing to go back to [facility]. Patient aware and frustrated of the situation.     # Afib on eliquis  - rate controlled, hold eliquis while on heparin drip  - c/w cardizem 360, toprol 100    # Hx of ESRD, s/p L kidney transplant  - c/w cellcept, tacrolimus  - currently labs stable, not on HD    # Hx of COPD  - not on any meds, no active sxs  - cw Duonebs PRN    # HLD  - c/w lipitor 10 daily  - lipid panel wnl, HbA1c 5.1      #Likely BPH- c/w flomax  #HTN- c/w cardizem, toprol, imdur  #Hx of ?cirrhosis due to hep C- liver enzymes stable, outpt PCP f/u. 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

## 2019-11-09 NOTE — PROGRESS NOTE ADULT - SUBJECTIVE AND OBJECTIVE BOX
SUBJECTIVE:    No CP / Dyspnea.    VITALS:  T(C): 36.4 (11-09-19 @ 05:11), Max: 37.2 (11-08-19 @ 20:59)  HR: 89 (11-09-19 @ 05:11) (83 - 89)  BP: 133/79 (11-09-19 @ 05:11) (133/79 - 146/69)  RR: 18 (11-09-19 @ 05:11) (18 - 18)  SpO2: --    I&O's Summary:    08 Nov 2019 07:01  -  09 Nov 2019 07:00  --------------------------------------------------------  IN: 406 mL / OUT: 1125 mL / NET: -719 mL    09 Nov 2019 07:01  -  09 Nov 2019 14:15  --------------------------------------------------------  IN: 290 mL / OUT: 350 mL / NET: -60 mL    PHYSICAL:  Alert, no distress  IRIR, nL s1/s2, no m/r/g  CTA  Warm, no edema    LABS:                        11.0   2.44  )-----------( 77       ( 09 Nov 2019 05:20 )             34.2     11-09    139  |  105  |  18  ----------------------------<  100<H>  4.8   |  20  |  1.3    Ca    10.1      09 Nov 2019 05:20  Phos  3.6     11-09  Mg     1.8     11-09      PTT - ( 09 Nov 2019 05:20 )  PTT:74.0 sec    MEDICATIONS  (STANDING):  aspirin  chewable 81 milliGRAM(s) Oral daily  atorvastatin 10 milliGRAM(s) Oral at bedtime  chlorhexidine 4% Liquid 1 Application(s) Topical <User Schedule>  diltiazem    milliGRAM(s) Oral daily  gabapentin 300 milliGRAM(s) Oral three times a day  heparin  Infusion 1500 Unit(s)/Hr (15 mL/Hr) IV Continuous <Continuous>  isosorbide   mononitrate ER Tablet (IMDUR) 30 milliGRAM(s) Oral daily  metoprolol succinate  milliGRAM(s) Oral daily  mycophenolate mofetil 500 milliGRAM(s) Oral two times a day  PARoxetine 10 milliGRAM(s) Oral daily  sodium chloride 0.9%. 1000 milliLiter(s) (150 mL/Hr) IV Continuous <Continuous>  tacrolimus 2 milliGRAM(s) Oral two times a day  tamsulosin 0.4 milliGRAM(s) Oral at bedtime    IMPRESSION:  1) CAD / Stable Angina:  - Stable.    2) Atrial Fibrillation:  - Rate Controlled.    3) History of KATIUSKA arterial embolism.    RECOMMEND:  - Cont ASA  - Cont Heparin  - Cont Lipitor.  - Cont Metoprolol, Cardizem, and Imdur.  - Nephrology consult (renal transplant / immunosuppressives / impending cath).  - PCI Monday as planned.

## 2019-11-09 NOTE — PROGRESS NOTE ADULT - ATTENDING COMMENTS
I saw and examined the patient independently. I agree with above history, physical exam and plan of care which I have reviewed and edited where appropriate with following additions.     patient little upset about why our vascular team can not take over his case for LUE thrombectomy and stitches removal which was done at [facility].     chest pain with exertional dyspnea possible unstable angina:  telemonitoring.   trops negative x , BNP on higher side.   TTE- shows normal LV systoic and diastolic function .   CT coronaries with high calcium score.   Cardiology following - for possible cardiac Cath on Monday  renal function stable but with h/o renal transplant on immunosuppressants- cardio suggests nephro consult prevent YOLANDA .   on heparin drip as per cardio/fu PTT q6hr and adjust rate accordingly.   platelet count dropped but remains stable / continue heparin drip as per cardio / monitor platelet count closely   not on Aspirin/plavix??/ on eliquis for A- fib /eliquis on hold for anticipated procedure as patient is on heparin drip   c/w toprol/ on low dose atorvastatin unclear why but was told by his Doctor not to increase the dose.    h/o LUE DVT sp thrombectomy :  sutures LUE in place with dried up healing tissue . does not look infected.   Vascular eval requested/patient does not want to fu with [facility] anymore.   doppler LUE negative for clots.    dvt ppx on heparin drip.     Progress note Handoff:   Pending: cardiac cath on Monday / vascular attending fu .   Discussion: plan of care with  patient /  satisfied- all questions answered.  Disposition: home when stable . I saw and examined the patient independently. I agree with above history, physical exam and plan of care which I have reviewed and edited where appropriate with following additions.     patient little upset about why our vascular team can not take over his case for LUE thrombectomy and stitches removal which was done at [facility].     chest pain with exertional dyspnea possible unstable angina:  telemonitoring.   trops negative x , BNP on higher side.   TTE- shows normal LV systoic and diastolic function .   CT coronaries with high calcium score.   Cardiology following - for possible cardiac Cath on Monday  renal function stable but with h/o renal transplant on immunosuppressants- cardio suggests nephro consult prevent YOLANDA .   on heparin drip as per cardio/fu PTT q6hr and adjust rate accordingly.   platelet count dropped but remains stable / continue heparin drip as per cardio / monitor platelet count closely   not on Aspirin/plavix??/ on eliquis for A- fib /eliquis on hold for anticipated procedure as patient is on heparin drip   c/w toprol/ on low dose atorvastatin unclear why but was told by his Doctor not to increase the dose.    h/o LUE DVT sp thrombectomy :  sutures LUE in place with dried up healing tissue . does not look infected.   Vascular eval requested but our vascular team refused to take over the case and suggest to fu with the vascular doctor who performed surgery at [facility] for suture removal   doppler LUE negative for clots.    Hx of ESRD, s/p L kidney transplant  - c/w cellcept, tacrolimus  - currently labs stable, not on HD  nephrology consult for fluid management to prevent YOLANDA for Cardiac cath.     dvt ppx on heparin drip.     Progress note Handoff:   Pending: cardiac cath on Monday   Discussion: plan of care with  patient /  satisfied- all questions answered.  Disposition: home when stable after cardiac cath

## 2019-11-10 LAB
ANION GAP SERPL CALC-SCNC: 16 MMOL/L — HIGH (ref 7–14)
APTT BLD: 65.9 SEC — HIGH (ref 27–39.2)
APTT BLD: 72.8 SEC — CRITICAL HIGH (ref 27–39.2)
BASOPHILS # BLD AUTO: 0.01 K/UL — SIGNIFICANT CHANGE UP (ref 0–0.2)
BASOPHILS NFR BLD AUTO: 0.3 % — SIGNIFICANT CHANGE UP (ref 0–1)
BUN SERPL-MCNC: 26 MG/DL — HIGH (ref 10–20)
CALCIUM SERPL-MCNC: 10 MG/DL — SIGNIFICANT CHANGE UP (ref 8.5–10.1)
CHLORIDE SERPL-SCNC: 105 MMOL/L — SIGNIFICANT CHANGE UP (ref 98–110)
CO2 SERPL-SCNC: 16 MMOL/L — LOW (ref 17–32)
CREAT SERPL-MCNC: 1.3 MG/DL — SIGNIFICANT CHANGE UP (ref 0.7–1.5)
EOSINOPHIL # BLD AUTO: 0.04 K/UL — SIGNIFICANT CHANGE UP (ref 0–0.7)
EOSINOPHIL NFR BLD AUTO: 1.2 % — SIGNIFICANT CHANGE UP (ref 0–8)
GLUCOSE SERPL-MCNC: 113 MG/DL — HIGH (ref 70–99)
HCT VFR BLD CALC: 33.2 % — LOW (ref 42–52)
HGB BLD-MCNC: 10.8 G/DL — LOW (ref 14–18)
IMM GRANULOCYTES NFR BLD AUTO: 0.3 % — SIGNIFICANT CHANGE UP (ref 0.1–0.3)
INR BLD: 1.09 RATIO — SIGNIFICANT CHANGE UP (ref 0.65–1.3)
LYMPHOCYTES # BLD AUTO: 0.58 K/UL — LOW (ref 1.2–3.4)
LYMPHOCYTES # BLD AUTO: 17.2 % — LOW (ref 20.5–51.1)
MAGNESIUM SERPL-MCNC: 1.7 MG/DL — LOW (ref 1.8–2.4)
MCHC RBC-ENTMCNC: 30.3 PG — SIGNIFICANT CHANGE UP (ref 27–31)
MCHC RBC-ENTMCNC: 32.5 G/DL — SIGNIFICANT CHANGE UP (ref 32–37)
MCV RBC AUTO: 93 FL — SIGNIFICANT CHANGE UP (ref 80–94)
MONOCYTES # BLD AUTO: 0.17 K/UL — SIGNIFICANT CHANGE UP (ref 0.1–0.6)
MONOCYTES NFR BLD AUTO: 5 % — SIGNIFICANT CHANGE UP (ref 1.7–9.3)
NEUTROPHILS # BLD AUTO: 2.56 K/UL — SIGNIFICANT CHANGE UP (ref 1.4–6.5)
NEUTROPHILS NFR BLD AUTO: 76 % — HIGH (ref 42.2–75.2)
NRBC # BLD: 0 /100 WBCS — SIGNIFICANT CHANGE UP (ref 0–0)
PLATELET # BLD AUTO: 74 K/UL — LOW (ref 130–400)
POTASSIUM SERPL-MCNC: 4.3 MMOL/L — SIGNIFICANT CHANGE UP (ref 3.5–5)
POTASSIUM SERPL-SCNC: 4.3 MMOL/L — SIGNIFICANT CHANGE UP (ref 3.5–5)
PROTHROM AB SERPL-ACNC: 12.5 SEC — SIGNIFICANT CHANGE UP (ref 9.95–12.87)
RBC # BLD: 3.57 M/UL — LOW (ref 4.7–6.1)
RBC # FLD: 14.6 % — HIGH (ref 11.5–14.5)
SODIUM SERPL-SCNC: 137 MMOL/L — SIGNIFICANT CHANGE UP (ref 135–146)
WBC # BLD: 3.37 K/UL — LOW (ref 4.8–10.8)
WBC # FLD AUTO: 3.37 K/UL — LOW (ref 4.8–10.8)

## 2019-11-10 PROCEDURE — 99233 SBSQ HOSP IP/OBS HIGH 50: CPT

## 2019-11-10 RX ORDER — GABAPENTIN 400 MG/1
600 CAPSULE ORAL DAILY
Refills: 0 | Status: DISCONTINUED | OUTPATIENT
Start: 2019-11-11 | End: 2019-11-10

## 2019-11-10 RX ORDER — SODIUM CHLORIDE 9 MG/ML
1000 INJECTION INTRAMUSCULAR; INTRAVENOUS; SUBCUTANEOUS
Refills: 0 | Status: DISCONTINUED | OUTPATIENT
Start: 2019-11-10 | End: 2019-11-11

## 2019-11-10 RX ORDER — GABAPENTIN 400 MG/1
300 CAPSULE ORAL AT BEDTIME
Refills: 0 | Status: DISCONTINUED | OUTPATIENT
Start: 2019-11-10 | End: 2019-11-12

## 2019-11-10 RX ORDER — GABAPENTIN 400 MG/1
300 CAPSULE ORAL ONCE
Refills: 0 | Status: COMPLETED | OUTPATIENT
Start: 2019-11-10 | End: 2019-11-10

## 2019-11-10 RX ORDER — GABAPENTIN 400 MG/1
600 CAPSULE ORAL DAILY
Refills: 0 | Status: DISCONTINUED | OUTPATIENT
Start: 2019-11-11 | End: 2019-11-12

## 2019-11-10 RX ORDER — GABAPENTIN 400 MG/1
300 CAPSULE ORAL DAILY
Refills: 0 | Status: DISCONTINUED | OUTPATIENT
Start: 2019-11-10 | End: 2019-11-10

## 2019-11-10 RX ADMIN — MYCOPHENOLATE MOFETIL 500 MILLIGRAM(S): 250 CAPSULE ORAL at 06:07

## 2019-11-10 RX ADMIN — Medication 81 MILLIGRAM(S): at 13:24

## 2019-11-10 RX ADMIN — GABAPENTIN 300 MILLIGRAM(S): 400 CAPSULE ORAL at 21:39

## 2019-11-10 RX ADMIN — OXYCODONE HYDROCHLORIDE 10 MILLIGRAM(S): 5 TABLET ORAL at 20:17

## 2019-11-10 RX ADMIN — ISOSORBIDE MONONITRATE 30 MILLIGRAM(S): 60 TABLET, EXTENDED RELEASE ORAL at 13:24

## 2019-11-10 RX ADMIN — Medication 1 APPLICATION(S): at 06:07

## 2019-11-10 RX ADMIN — Medication 1 APPLICATION(S): at 18:11

## 2019-11-10 RX ADMIN — Medication 360 MILLIGRAM(S): at 06:07

## 2019-11-10 RX ADMIN — HEPARIN SODIUM 15 UNIT(S)/HR: 5000 INJECTION INTRAVENOUS; SUBCUTANEOUS at 22:19

## 2019-11-10 RX ADMIN — CHLORHEXIDINE GLUCONATE 1 APPLICATION(S): 213 SOLUTION TOPICAL at 06:06

## 2019-11-10 RX ADMIN — TACROLIMUS 2 MILLIGRAM(S): 5 CAPSULE ORAL at 06:07

## 2019-11-10 RX ADMIN — ATORVASTATIN CALCIUM 10 MILLIGRAM(S): 80 TABLET, FILM COATED ORAL at 21:38

## 2019-11-10 RX ADMIN — TACROLIMUS 2 MILLIGRAM(S): 5 CAPSULE ORAL at 18:10

## 2019-11-10 RX ADMIN — GABAPENTIN 300 MILLIGRAM(S): 400 CAPSULE ORAL at 06:07

## 2019-11-10 RX ADMIN — GABAPENTIN 300 MILLIGRAM(S): 400 CAPSULE ORAL at 15:07

## 2019-11-10 RX ADMIN — TAMSULOSIN HYDROCHLORIDE 0.4 MILLIGRAM(S): 0.4 CAPSULE ORAL at 21:38

## 2019-11-10 RX ADMIN — Medication 100 MILLIGRAM(S): at 06:07

## 2019-11-10 RX ADMIN — OXYCODONE HYDROCHLORIDE 10 MILLIGRAM(S): 5 TABLET ORAL at 21:17

## 2019-11-10 RX ADMIN — MYCOPHENOLATE MOFETIL 500 MILLIGRAM(S): 250 CAPSULE ORAL at 18:10

## 2019-11-10 NOTE — PROGRESS NOTE ADULT - SUBJECTIVE AND OBJECTIVE BOX
Progress Note:  Provider Speciality                            Hospitalist      DAVONTE CHOU [id number] 64y Male     CHIEF PRESENTING COMPLAINT:  Patient is a 64y old  Male who presents with a chief complaint of chest pain (10 Nov 2019 12:37)        SUBJECTIVE:  Patient was seen and examined at bedside.   Reports doing ok just tired of waiting long time for cardiac cath.   reports his LUE wound stitches were removed yesterday   No significant overnight events reported.     HISTORY OF PRESENTING ILLNESS:  HPI:  63y/o M with PMH of COPD (no O2), hx of ESRD s/p L kidney transplant, eye melanoma w/ R blindness, HTN, HLD, Afib on eliquis, LUE embolism in Oct (believed to be from ?Afib) presenting with 1mo of midsternal non-radiating chest pain. Pain is moderate, some SOB on exertion but no significantly alleviating or triggering factors. Follows with Dr. Low who scheduled him for further f/u in January but doesn't want to wait any longer so Maranda sent him to ED. Endorses dyspnea on exertion. Otherwise denies fever, chills, abd pain, N/V/D/C, urinary symptoms, dizzy/lightheadedness, weakness, headache. Lives alone, has aid.     Has questionable prior hx of cirrhosis likely 2/2 hep C & lymphoma but doens't follow with anyone for them and doesn't know too much info on these.    In ED: T98.3, /58, HR 54, RR18, SpO2 97%. Labs grossly unremarkable except for BNP 2698, trop x2 neg. EKG showed rate controlled Afib w/o acute ST change. CXR neg. Was sent to OBS pending CCTA which came back grossly pos with score meeting 99th percentile. Admit for cardiac cath on Friday 11/8. (06 Nov 2019 14:26)        REVIEW OF SYSTEMS:    At least 10 systems were reviewed in ROS. All systems reviewed  are within normal limits except for the complaints as described in Subjective.    PAST MEDICAL & SURGICAL HISTORY:  PAST MEDICAL & SURGICAL HISTORY:  Cirrhosis: possibly related to hepC  Melanoma: R eye, s/p laser  Lymphoma: ?questionable, not treated, not followed  Peripheral neuropathy: unclear cause  COPD, mild: not on O2  Atrial fibrillation: on eliquis  HLD (hyperlipidemia)  HTN (hypertension)  ESRD (end stage renal disease)  CKD (chronic kidney disease)  S/P arteriovenous (AV) fistula creation: prior old fistula in R arm  History of kidney transplant: 3 years ago          VITAL SIGNS:  Vital Signs Last 24 Hrs  T(C): 36.4 (10 Nov 2019 13:17), Max: 36.6 (09 Nov 2019 20:52)  T(F): 97.6 (10 Nov 2019 13:17), Max: 97.9 (09 Nov 2019 20:52)  HR: 90 (10 Nov 2019 13:17) (79 - 90)  BP: 125/58 (10 Nov 2019 13:17) (125/58 - 125/70)  BP(mean): --  RR: 18 (09 Nov 2019 20:52) (18 - 18)  SpO2: --          PHYSICAL EXAMINATION:    General: AAO, Not in acute distress  HEENT:   EOMI, atraumatic  Heart: S1+S2 audible, no murmur  Lungs: bilateral  fair air entry, no wheezing, no crepitations.  Abdomen: Soft, non-tender, non-distended   CNS: AAO  , no focal deficits.  Extremities:  LUE wound from prior thrombectomy looks clean/ sp stitches removal today             CONSULTS:  Consultant(s) Notes Reviewed by me.   Care Discussed with Consultants/Other Providers where required.        MEDICATIONS:  MEDICATIONS  (STANDING):  aspirin  chewable 81 milliGRAM(s) Oral daily  atorvastatin 10 milliGRAM(s) Oral at bedtime  chlorhexidine 4% Liquid 1 Application(s) Topical <User Schedule>  diltiazem    milliGRAM(s) Oral daily  gabapentin 300 milliGRAM(s) Oral at bedtime  gabapentin 600 milliGRAM(s) Oral daily  gabapentin 300 milliGRAM(s) Oral once  heparin  Infusion 1500 Unit(s)/Hr (15 mL/Hr) IV Continuous <Continuous>  isosorbide   mononitrate ER Tablet (IMDUR) 30 milliGRAM(s) Oral daily  metoprolol succinate  milliGRAM(s) Oral daily  mycophenolate mofetil 500 milliGRAM(s) Oral two times a day  PARoxetine 10 milliGRAM(s) Oral daily  silver sulfADIAZINE 1% Cream 1 Application(s) Topical two times a day  sodium chloride 0.9%. 1000 milliLiter(s) (75 mL/Hr) IV Continuous <Continuous>  sodium chloride 0.9%. 1000 milliLiter(s) (150 mL/Hr) IV Continuous <Continuous>  tacrolimus 2 milliGRAM(s) Oral two times a day  tamsulosin 0.4 milliGRAM(s) Oral at bedtime    MEDICATIONS  (PRN):  oxyCODONE    IR 10 milliGRAM(s) Oral every 8 hours PRN Severe Pain (7 - 10)  zolpidem 5 milliGRAM(s) Oral at bedtime PRN Insomnia      LABOROTORY DATA/MICROBIOLOGY/I & O's:                        10.8   3.37  )-----------( 74       ( 10 Nov 2019 05:49 )             33.2     11-10    137  |  105  |  26<H>  ----------------------------<  113<H>  4.3   |  16<L>  |  1.3    Ca    10.0      10 Nov 2019 05:49  Phos  3.6     11-09  Mg     1.7     11-10      PT/INR - ( 10 Nov 2019 05:49 )   PT: 12.50 sec;   INR: 1.09 ratio         PTT - ( 10 Nov 2019 05:49 )  PTT:72.8 sec    CAPILLARY BLOOD GLUCOSE                    11-09-19 @ 07:01  -  11-10-19 @ 07:00  --------------------------------------------------------  IN: 1140 mL / OUT: 1750 mL / NET: -610 mL    11-10-19 @ 07:01  -  11-10-19 @ 15:34  --------------------------------------------------------  IN: 30 mL / OUT: 0 mL / NET: 30 mL              ASSESSMENT:  63y/o M with ESRD s/p kidney transplant, Afib on eliquis presenting 1mo CP found to have pos CCTA.       chest pain with exertional dyspnea possible unstable angina:  telemonitoring.   trops negative x , BNP on higher side.   TTE- shows normal LV systolic and diastolic function .   CT coronaries with high calcium score.   Cardiology following - for possible cardiac Cath tomorrow /Keep NPO after midnight  IVF pre and post cath as per nephro  renal function stable   nephro consult appreciated - c.w IVF pre/post cath and immunosuppressants   on heparin drip as per cardio/fu PTT q6hr and adjust rate accordingly.   platelet count dropping gradually/ monitor daily   c/w low dose Aspirin  c/w toprol/ on low dose atorvastatin unclear why but was told by his Doctor not to increase the dose.    h/o LUE arterial thrombus sp open thrombectomy :  sutures LUE removed    vascular evaluated patient    doppler LUE negative for clots.    Hx of ESRD, s/p L kidney transplant  - c/w cellcept, tacrolimus  - currently labs stable, not on HD  nephrology consult for fluid management appreciated .     thrombocytopenia:   platelet count gradually dropping with continuation of heparin drip   monitor daily - no active bleed reported   Hematology consult for further recommendations      Afib on eliquis  - rate controlled, hold eliquis while on heparin drip  - c/w cardizem 360, toprol 100mg daily     h/o eye melanoma leading to R eye blindness:   fu ophthalmology  as OP      Hx of COPD: stable   - not on any meds, no active sxs  - cw Duonebs PRN     HLD  - c/w lipitor 10 daily    BPH- c/w flomax    HTN- c/w cardizem, toprol, imdur    Hx of ?cirrhosis due to hep C:   liver enzymes stable,   HEP C viral RNA not detected.  outpt PCP /GI f/u      dvt ppx on heparin drip.     Progress note Handoff:   Pending: cardiac cath tomorrow   Discussion: plan of care with  patient /  satisfied- all questions answered.  Disposition: home when stable after cardiac cath .

## 2019-11-10 NOTE — CONSULT NOTE ADULT - SUBJECTIVE AND OBJECTIVE BOX
HPI:    63y/o M with PMH of COPD (no O2), hx of ESRD s/p L kidney transplant, eye melanoma w/ R blindness, HTN, HLD, Afib on eliquis, LUE embolism in Oct (believed to be from ?Afib) presenting with 1mo of midsternal non-radiating chest pain. Pain is moderate, some SOB on exertion but no significantly alleviating or triggering factors. Follows with Dr. Low who scheduled him for further f/u in January but doesn't want to wait any longer so Maranda sent him to ED. Endorses dyspnea on exertion. Otherwise denies fever, chills, abd pain, N/V/D/C, urinary symptoms, dizzy/lightheadedness, weakness, headache. Lives alone, has aid.   In ED: T98.3, /58, HR 54, RR18, SpO2 97%. Labs grossly unremarkable except for BNP 2698, trop x2 neg. EKG showed rate controlled Afib w/o acute ST change. CXR neg. Was sent to OBS pending CCTA which came back grossly pos with score meeting 99th percentile.  Nephrology called for Kidney Transplant status and Management of Immunosuppression      PAST MEDICAL & SURGICAL HISTORY:  PAST MEDICAL & SURGICAL HISTORY:  Cirrhosis: possibly related to hepC  Melanoma: R eye, s/p laser  Lymphoma: ?questionable, not treated, not followed  Peripheral neuropathy: unclear cause  COPD, mild: not on O2  Atrial fibrillation: on eliquis  HLD (hyperlipidemia)  HTN (hypertension)  ESRD (end stage renal disease)  CKD (chronic kidney disease)  S/P arteriovenous (AV) fistula creation: prior old fistula in R arm  History of kidney transplant: 3 years ago      REVIEW OF SYSTEMS  REVIEW OF SYSTEMS      General:	 Lying in bed in no acute distress    Skin/Breast:  no redness and rash  	  Ophthalmologic:  no eye pain, or blurred vision  	  ENMT:	no nasal pain, or discharge    Respiratory and Thorax:   no cough, no sob, no phlegm  	  Cardiovascular:	no chest pain, sob    Gastrointestinal:	no nausea, vomiting or diarrhea    Genitourinary:	no hematuria, no dysuria or burning in urine    Musculoskeletal:	no muscle pain    Neurological:	 no headche, tingling or numbness    Psychiatric:	no depression, anxiety    Hematology/Lymphatics:	no lymphnodes    Endocrine:	no heat or cold intolerance    Allergic/Immunologic:	 no rash    MEDICATIONS:  MEDICATIONS  (STANDING):  aspirin  chewable 81 milliGRAM(s) Oral daily  atorvastatin 10 milliGRAM(s) Oral at bedtime  chlorhexidine 4% Liquid 1 Application(s) Topical <User Schedule>  diltiazem    milliGRAM(s) Oral daily  gabapentin 600 milliGRAM(s) Oral daily  gabapentin 300 milliGRAM(s) Oral daily  heparin  Infusion 1500 Unit(s)/Hr (15 mL/Hr) IV Continuous <Continuous>  isosorbide   mononitrate ER Tablet (IMDUR) 30 milliGRAM(s) Oral daily  metoprolol succinate  milliGRAM(s) Oral daily  mycophenolate mofetil 500 milliGRAM(s) Oral two times a day  PARoxetine 10 milliGRAM(s) Oral daily  silver sulfADIAZINE 1% Cream 1 Application(s) Topical two times a day  sodium chloride 0.9%. 1000 milliLiter(s) (150 mL/Hr) IV Continuous <Continuous>  tacrolimus 2 milliGRAM(s) Oral two times a day  tamsulosin 0.4 milliGRAM(s) Oral at bedtime    MEDICATIONS  (PRN):  oxyCODONE    IR 10 milliGRAM(s) Oral every 8 hours PRN Severe Pain (7 - 10)  zolpidem 5 milliGRAM(s) Oral at bedtime PRN Insomnia      ALLERGIES:      Allergy Status Unknown    Intolerances        SOCIAL HISTORY:  denies smoking    FAMILY HISTORY:  FAMILY HISTORY:  FHx: breast cancer: sister ( in 30s)  FH: dementia: mother, alive      VITALS:  T(F): 97.9, Max: 97.9 (19 @ 20:52)  HR: 79  BP: 125/70  RR: 18  SpO2: --    IN AND OUT        PHYSICAL EXAM:  PHYSICAL EXAM:      Constitutional:  NAD    Eyes:  ELENITA    ENMT: No discharge    Neck:  SUPPLE    Respiratory:  clear bilateral    Cardiovascular:  regular rhythm, no murmur    Gastrointestinal:  Left LLQ scar, non tender    Extremities:  no edema    Neurological: Alert awake oriented    Lymph Nodes:  no lymph nodes          LABS:                        10.8   3.37  )-----------( 74       ( 10 Nov 2019 05:49 )             33.2     11-10    137  |  105  |  26<H>  ----------------------------<  113<H>  4.3   |  16<L>  |  1.3    Ca    10.0      10 Nov 2019 05:49  Phos  3.6     11-09  Mg     1.7     11-10      PT/INR - ( 10 Nov 2019 05:49 )   PT: 12.50 sec;   INR: 1.09 ratio         PTT - ( 10 Nov 2019 05:49 )  PTT:72.8 sec      RADIOLOGY & ADDITIONAL STUDIES:

## 2019-11-10 NOTE — CONSULT NOTE ADULT - ASSESSMENT
Assessment    1-s/p Kidney Transplant in 2016 [facility]  2- h/o ESRD s/p Transplant  3- CAD  4- Atrial fibrillation  5- HTN  6- Anemia  7- Hypomagnesemia    Plan    Renal function stable  cont Tacrolimus 2/2  cont Cellcept 500 mg BID  BP acceptable, cont current meds  Going for cath in am  start NS at 75cc/hr for 12 hours pre and prost cath  Replace Magnesium  will follow

## 2019-11-11 LAB
ANION GAP SERPL CALC-SCNC: 18 MMOL/L — HIGH (ref 7–14)
APTT BLD: 28.9 SEC — SIGNIFICANT CHANGE UP (ref 27–39.2)
APTT BLD: 50.3 SEC — HIGH (ref 27–39.2)
BUN SERPL-MCNC: 22 MG/DL — HIGH (ref 10–20)
CALCIUM SERPL-MCNC: 10.4 MG/DL — HIGH (ref 8.5–10.1)
CHLORIDE SERPL-SCNC: 101 MMOL/L — SIGNIFICANT CHANGE UP (ref 98–110)
CO2 SERPL-SCNC: 16 MMOL/L — LOW (ref 17–32)
CREAT SERPL-MCNC: 1.3 MG/DL — SIGNIFICANT CHANGE UP (ref 0.7–1.5)
GLUCOSE SERPL-MCNC: 115 MG/DL — HIGH (ref 70–99)
HCT VFR BLD CALC: 35 % — LOW (ref 42–52)
HGB BLD-MCNC: 11.3 G/DL — LOW (ref 14–18)
INR BLD: 1.16 RATIO — SIGNIFICANT CHANGE UP (ref 0.65–1.3)
INR BLD: 1.18 RATIO — SIGNIFICANT CHANGE UP (ref 0.65–1.3)
LACTATE SERPL-SCNC: 1.1 MMOL/L — SIGNIFICANT CHANGE UP (ref 0.5–2.2)
MAGNESIUM SERPL-MCNC: 1.4 MG/DL — LOW (ref 1.8–2.4)
MAGNESIUM SERPL-MCNC: 1.6 MG/DL — LOW (ref 1.8–2.4)
MCHC RBC-ENTMCNC: 30.3 PG — SIGNIFICANT CHANGE UP (ref 27–31)
MCHC RBC-ENTMCNC: 32.3 G/DL — SIGNIFICANT CHANGE UP (ref 32–37)
MCV RBC AUTO: 93.8 FL — SIGNIFICANT CHANGE UP (ref 80–94)
NRBC # BLD: 0 /100 WBCS — SIGNIFICANT CHANGE UP (ref 0–0)
PLATELET # BLD AUTO: 74 K/UL — LOW (ref 130–400)
POTASSIUM SERPL-MCNC: 5 MMOL/L — SIGNIFICANT CHANGE UP (ref 3.5–5)
POTASSIUM SERPL-SCNC: 5 MMOL/L — SIGNIFICANT CHANGE UP (ref 3.5–5)
PROTHROM AB SERPL-ACNC: 13.3 SEC — HIGH (ref 9.95–12.87)
PROTHROM AB SERPL-ACNC: 13.6 SEC — HIGH (ref 9.95–12.87)
RBC # BLD: 3.73 M/UL — LOW (ref 4.7–6.1)
RBC # FLD: 14.8 % — HIGH (ref 11.5–14.5)
SODIUM SERPL-SCNC: 135 MMOL/L — SIGNIFICANT CHANGE UP (ref 135–146)
WBC # BLD: 7.35 K/UL — SIGNIFICANT CHANGE UP (ref 4.8–10.8)
WBC # FLD AUTO: 7.35 K/UL — SIGNIFICANT CHANGE UP (ref 4.8–10.8)

## 2019-11-11 PROCEDURE — 99223 1ST HOSP IP/OBS HIGH 75: CPT

## 2019-11-11 PROCEDURE — 93971 EXTREMITY STUDY: CPT | Mod: 26,RT

## 2019-11-11 PROCEDURE — 99233 SBSQ HOSP IP/OBS HIGH 50: CPT

## 2019-11-11 RX ORDER — DAPTOMYCIN 500 MG/10ML
INJECTION, POWDER, LYOPHILIZED, FOR SOLUTION INTRAVENOUS
Refills: 0 | Status: DISCONTINUED | OUTPATIENT
Start: 2019-11-11 | End: 2019-11-13

## 2019-11-11 RX ORDER — MORPHINE SULFATE 50 MG/1
2 CAPSULE, EXTENDED RELEASE ORAL ONCE
Refills: 0 | Status: DISCONTINUED | OUTPATIENT
Start: 2019-11-11 | End: 2019-11-11

## 2019-11-11 RX ORDER — HYDROMORPHONE HYDROCHLORIDE 2 MG/ML
1 INJECTION INTRAMUSCULAR; INTRAVENOUS; SUBCUTANEOUS ONCE
Refills: 0 | Status: DISCONTINUED | OUTPATIENT
Start: 2019-11-11 | End: 2019-11-11

## 2019-11-11 RX ORDER — OXYCODONE HYDROCHLORIDE 5 MG/1
5 TABLET ORAL ONCE
Refills: 0 | Status: DISCONTINUED | OUTPATIENT
Start: 2019-11-11 | End: 2019-11-11

## 2019-11-11 RX ORDER — CEFAZOLIN SODIUM 1 G
VIAL (EA) INJECTION
Refills: 0 | Status: DISCONTINUED | OUTPATIENT
Start: 2019-11-11 | End: 2019-11-11

## 2019-11-11 RX ORDER — CEFAZOLIN SODIUM 1 G
1000 VIAL (EA) INJECTION EVERY 8 HOURS
Refills: 0 | Status: DISCONTINUED | OUTPATIENT
Start: 2019-11-11 | End: 2019-11-11

## 2019-11-11 RX ORDER — MAGNESIUM SULFATE 500 MG/ML
2 VIAL (ML) INJECTION ONCE
Refills: 0 | Status: COMPLETED | OUTPATIENT
Start: 2019-11-11 | End: 2019-11-11

## 2019-11-11 RX ORDER — DAPTOMYCIN 500 MG/10ML
450 INJECTION, POWDER, LYOPHILIZED, FOR SOLUTION INTRAVENOUS EVERY 24 HOURS
Refills: 0 | Status: DISCONTINUED | OUTPATIENT
Start: 2019-11-12 | End: 2019-11-13

## 2019-11-11 RX ORDER — DILTIAZEM HCL 120 MG
5 CAPSULE, EXT RELEASE 24 HR ORAL
Qty: 125 | Refills: 0 | Status: DISCONTINUED | OUTPATIENT
Start: 2019-11-11 | End: 2019-11-11

## 2019-11-11 RX ORDER — CEFAZOLIN SODIUM 1 G
1000 VIAL (EA) INJECTION ONCE
Refills: 0 | Status: COMPLETED | OUTPATIENT
Start: 2019-11-11 | End: 2019-11-11

## 2019-11-11 RX ORDER — METOPROLOL TARTRATE 50 MG
50 TABLET ORAL ONCE
Refills: 0 | Status: COMPLETED | OUTPATIENT
Start: 2019-11-11 | End: 2019-11-11

## 2019-11-11 RX ORDER — ACETAMINOPHEN 500 MG
650 TABLET ORAL EVERY 6 HOURS
Refills: 0 | Status: DISCONTINUED | OUTPATIENT
Start: 2019-11-11 | End: 2019-11-12

## 2019-11-11 RX ORDER — SODIUM CHLORIDE 9 MG/ML
1000 INJECTION INTRAMUSCULAR; INTRAVENOUS; SUBCUTANEOUS
Refills: 0 | Status: DISCONTINUED | OUTPATIENT
Start: 2019-11-11 | End: 2019-11-14

## 2019-11-11 RX ORDER — DAPTOMYCIN 500 MG/10ML
450 INJECTION, POWDER, LYOPHILIZED, FOR SOLUTION INTRAVENOUS ONCE
Refills: 0 | Status: COMPLETED | OUTPATIENT
Start: 2019-11-11 | End: 2019-11-11

## 2019-11-11 RX ORDER — DILTIAZEM HCL 120 MG
19 CAPSULE, EXT RELEASE 24 HR ORAL ONCE
Refills: 0 | Status: COMPLETED | OUTPATIENT
Start: 2019-11-11 | End: 2019-11-11

## 2019-11-11 RX ORDER — HEPARIN SODIUM 5000 [USP'U]/ML
1500 INJECTION INTRAVENOUS; SUBCUTANEOUS
Qty: 25000 | Refills: 0 | Status: DISCONTINUED | OUTPATIENT
Start: 2019-11-11 | End: 2019-11-12

## 2019-11-11 RX ADMIN — MORPHINE SULFATE 2 MILLIGRAM(S): 50 CAPSULE, EXTENDED RELEASE ORAL at 01:49

## 2019-11-11 RX ADMIN — TACROLIMUS 2 MILLIGRAM(S): 5 CAPSULE ORAL at 18:09

## 2019-11-11 RX ADMIN — MORPHINE SULFATE 2 MILLIGRAM(S): 50 CAPSULE, EXTENDED RELEASE ORAL at 02:20

## 2019-11-11 RX ADMIN — GABAPENTIN 600 MILLIGRAM(S): 400 CAPSULE ORAL at 12:37

## 2019-11-11 RX ADMIN — TACROLIMUS 2 MILLIGRAM(S): 5 CAPSULE ORAL at 05:35

## 2019-11-11 RX ADMIN — OXYCODONE HYDROCHLORIDE 10 MILLIGRAM(S): 5 TABLET ORAL at 13:38

## 2019-11-11 RX ADMIN — ISOSORBIDE MONONITRATE 30 MILLIGRAM(S): 60 TABLET, EXTENDED RELEASE ORAL at 12:36

## 2019-11-11 RX ADMIN — ATORVASTATIN CALCIUM 10 MILLIGRAM(S): 80 TABLET, FILM COATED ORAL at 21:47

## 2019-11-11 RX ADMIN — GABAPENTIN 300 MILLIGRAM(S): 400 CAPSULE ORAL at 21:47

## 2019-11-11 RX ADMIN — SODIUM CHLORIDE 75 MILLILITER(S): 9 INJECTION INTRAMUSCULAR; INTRAVENOUS; SUBCUTANEOUS at 13:39

## 2019-11-11 RX ADMIN — Medication 10 MILLIGRAM(S): at 12:36

## 2019-11-11 RX ADMIN — HYDROMORPHONE HYDROCHLORIDE 1 MILLIGRAM(S): 2 INJECTION INTRAMUSCULAR; INTRAVENOUS; SUBCUTANEOUS at 08:06

## 2019-11-11 RX ADMIN — OXYCODONE HYDROCHLORIDE 10 MILLIGRAM(S): 5 TABLET ORAL at 05:36

## 2019-11-11 RX ADMIN — MORPHINE SULFATE 2 MILLIGRAM(S): 50 CAPSULE, EXTENDED RELEASE ORAL at 01:50

## 2019-11-11 RX ADMIN — Medication 1 APPLICATION(S): at 18:09

## 2019-11-11 RX ADMIN — CHLORHEXIDINE GLUCONATE 1 APPLICATION(S): 213 SOLUTION TOPICAL at 05:26

## 2019-11-11 RX ADMIN — Medication 12.5 GRAM(S): at 10:49

## 2019-11-11 RX ADMIN — HYDROMORPHONE HYDROCHLORIDE 1 MILLIGRAM(S): 2 INJECTION INTRAMUSCULAR; INTRAVENOUS; SUBCUTANEOUS at 08:36

## 2019-11-11 RX ADMIN — HYDROMORPHONE HYDROCHLORIDE 1 MILLIGRAM(S): 2 INJECTION INTRAMUSCULAR; INTRAVENOUS; SUBCUTANEOUS at 19:47

## 2019-11-11 RX ADMIN — Medication 19 MILLIGRAM(S): at 08:28

## 2019-11-11 RX ADMIN — HEPARIN SODIUM 15 UNIT(S)/HR: 5000 INJECTION INTRAVENOUS; SUBCUTANEOUS at 13:38

## 2019-11-11 RX ADMIN — MORPHINE SULFATE 2 MILLIGRAM(S): 50 CAPSULE, EXTENDED RELEASE ORAL at 00:40

## 2019-11-11 RX ADMIN — MYCOPHENOLATE MOFETIL 500 MILLIGRAM(S): 250 CAPSULE ORAL at 05:35

## 2019-11-11 RX ADMIN — Medication 100 MILLIGRAM(S): at 08:55

## 2019-11-11 RX ADMIN — Medication 650 MILLIGRAM(S): at 13:00

## 2019-11-11 RX ADMIN — Medication 360 MILLIGRAM(S): at 05:36

## 2019-11-11 RX ADMIN — HYDROMORPHONE HYDROCHLORIDE 1 MILLIGRAM(S): 2 INJECTION INTRAMUSCULAR; INTRAVENOUS; SUBCUTANEOUS at 09:40

## 2019-11-11 RX ADMIN — Medication 5 MG/HR: at 08:54

## 2019-11-11 RX ADMIN — TAMSULOSIN HYDROCHLORIDE 0.4 MILLIGRAM(S): 0.4 CAPSULE ORAL at 23:08

## 2019-11-11 RX ADMIN — OXYCODONE HYDROCHLORIDE 10 MILLIGRAM(S): 5 TABLET ORAL at 14:15

## 2019-11-11 RX ADMIN — Medication 1 APPLICATION(S): at 05:36

## 2019-11-11 RX ADMIN — DAPTOMYCIN 118 MILLIGRAM(S): 500 INJECTION, POWDER, LYOPHILIZED, FOR SOLUTION INTRAVENOUS at 19:27

## 2019-11-11 RX ADMIN — HYDROMORPHONE HYDROCHLORIDE 1 MILLIGRAM(S): 2 INJECTION INTRAMUSCULAR; INTRAVENOUS; SUBCUTANEOUS at 10:00

## 2019-11-11 RX ADMIN — Medication 50 MILLIGRAM(S): at 13:38

## 2019-11-11 RX ADMIN — HEPARIN SODIUM 15 UNIT(S)/HR: 5000 INJECTION INTRAVENOUS; SUBCUTANEOUS at 08:07

## 2019-11-11 RX ADMIN — OXYCODONE HYDROCHLORIDE 10 MILLIGRAM(S): 5 TABLET ORAL at 06:36

## 2019-11-11 RX ADMIN — Medication 650 MILLIGRAM(S): at 12:35

## 2019-11-11 RX ADMIN — SODIUM CHLORIDE 75 MILLILITER(S): 9 INJECTION INTRAMUSCULAR; INTRAVENOUS; SUBCUTANEOUS at 03:45

## 2019-11-11 RX ADMIN — Medication 81 MILLIGRAM(S): at 12:36

## 2019-11-11 RX ADMIN — Medication 100 MILLIGRAM(S): at 15:36

## 2019-11-11 RX ADMIN — MYCOPHENOLATE MOFETIL 500 MILLIGRAM(S): 250 CAPSULE ORAL at 18:09

## 2019-11-11 RX ADMIN — Medication 100 MILLIGRAM(S): at 05:35

## 2019-11-11 NOTE — CHART NOTE - NSCHARTNOTEFT_GEN_A_CORE
Called by the nurse because the patient is very persistent that he wants to leave AMA.   IV was removed from his right arm and he has pain at that site.  The removal site was examined. Redness and fluctuance noted. Duplex ordered.   Patient is specifically requesting 4 mg of morphine. 2 mg was given already. He still wants 2 mg more. Ordered 2 more mg of morphine. Patient will get a total of 4 mg of morphine.  As per the patient, he has pain meds at home and if he is not given enough here, he will go home and take pain meds   Risks of leaving AMA explained to the patient.

## 2019-11-11 NOTE — PROVIDER CONTACT NOTE (OTHER) - SITUATION
Patient came back from Cath LAB complaining of right arm pain in the upper area of forearm. Patient assessed. Patient appears to have localized redness near upper forearm.

## 2019-11-11 NOTE — PROGRESS NOTE ADULT - SUBJECTIVE AND OBJECTIVE BOX
pt seen and examined  events noted  cath canceled  ID and Hem notes noted  f/up their recom  d/c iv cardizem  will follow

## 2019-11-11 NOTE — PROVIDER CONTACT NOTE (OTHER) - ACTION/TREATMENT ORDERED:
MD Hanley came to assess patient. MD Hanley ordered dilaudid for pain. Antibiotics ordered. Infectious disease came to see patient. Pain medication given.

## 2019-11-11 NOTE — PROGRESS NOTE ADULT - ASSESSMENT
63y/o M with ESRD s/p kidney transplant, Afib on eliquis presenting with 1mo chest pain, found to have pos CCTA. Planned for cath, however patient developed cellulitis in R arm.     # Rule out underlying ischemic CAD  - Coronary Calcium score 99th percentile  - EKG no acute ST changes, trop x2 neg  - will monitor platelet on heparin. If falls suspect HIT  - Cath on Monday --canceled secondary to cellulitis, will need cath when cleared as per cardio  -- cw HEPARIN DRIP, goal 60-80. Will monitor. Currently @15     #CORBY cellulitis  -ID consult placed, patient immunosuppressed  --Warm compress  --Started Ancef 11/11  --F/u Duplex rule out thrombophlebitis  --F/u blood culture     # Afib on eliquis--RVR, uncontrolled  - Holding Eliquis while on heparin drip  --c/w cardizem 360, toprol 100  --On cardizem drip-will discuss rate control with cardiology    # LUE hx of embolism, staples in place w/ overlying skin changes (Eliquis had been stopped x 2 days for possible cath at [facility] and he developed arm clots s/p thrombectomy)  - no fluctuance, but has overlying redness and crusting  - US LUE negative  - Spoke with vascular surgery over the phone. As per patient he had surgery with Dr Peters at [facility]. As per Dr Peters he does not do surgery at [facility]. Explained the situation to vascular team. They want the patient to follow up with the doctor who did the surgery. Patient not willing to go back to [facility]. Patient aware and frustrated of the situation.     # Hx of ESRD, s/p L kidney transplant  - currently labs stable, not on hemodialysis  --c/w cellcept, tacrolimus  --Monitor and replete electrolytes    # Hx of COPD  - not on any meds, no active sxs  --cw Duonebs PRN    # HLD  -lipid panel wnl, HbA1c 5.1  -- c/w lipitor 10 daily    #Likely BPH- c/w flomax  #HTN- c/w cardizem, toprol, imdur  #Hx of ?cirrhosis due to hep C- liver enzymes stable, outpt PCP f/u. 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 Planned for cath, however patient developed cellulitis in R arm.     # Rule out underlying ischemic CAD  - Coronary Calcium score 99th percentile  - EKG no acute ST changes, trop x2 neg  - will monitor platelet on heparin. If falls suspect HIT  - Cath on Monday --canceled secondary to cellulitis, will need cath when cleared as per cardio  -- cw HEPARIN DRIP, goal 60-80. Will monitor. Currently @15     #RUE cellulitis  -ID consult placed, patient immunosuppressed  -Requesting opiates, not happy with oxycodone or small amounts Dilaudid, pain management consult placed  --Warm compress  --Started Ancef 11/11  --F/u Duplex rule out thrombophlebitis  --F/u blood culture     # Afib on eliquis--RVR, uncontrolled  - Holding Eliquis while on heparin drip  --c/w cardizem 360, toprol 100  --On cardizem drip-will discuss rate control with cardiology    # LUE hx of embolism, staples in place w/ overlying skin changes (Eliquis had been stopped x 2 days for possible cath at [facility] and he developed arm clots s/p thrombectomy)  - no fluctuance, but has overlying redness and crusting  - US LUE negative  - Spoke with vascular surgery over the phone. As per patient he had surgery with Dr Peters at [facility]. As per Dr Peters he does not do surgery at [facility]. Explained the situation to vascular team. They want the patient to follow up with the doctor who did the surgery. Patient not willing to go back to [facility]. Patient aware and frustrated of the situation.     # Hx of ESRD, s/p L kidney transplant  - currently labs stable, not on hemodialysis  --c/w cellcept, tacrolimus  --Monitor and replete electrolytes    # Hx of COPD  - not on any meds, no active sxs  --cw Duonebs PRN    # HLD  -lipid panel wnl, HbA1c 5.1  -- c/w lipitor 10 daily    #Likely BPH- c/w flomax  #HTN- c/w cardizem, toprol, imdur  #Hx of ?cirrhosis due to hep C- liver enzymes stable, outpt PCP f/u. 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

## 2019-11-11 NOTE — PROGRESS NOTE ADULT - SUBJECTIVE AND OBJECTIVE BOX
DAVONTE CHOU 64y Male  MRN#: [id number]     SUBJECTIVE  Patient is a 64y old Male who presents with a chief complaint of chest pain (10 Nov 2019 15:33)      Today is hospital day 5d, and this morning he is lying in bed. Was taken down for cath, however R arm IV site appears to be infected, patient returned to floor and cath canceled. RVR overnight to 181. Patient reports severe pain in R arm, requesting 4 mg of Dilaudid; states oxycodone "does nothing". Chart states he takes oxycodone daily at home, patient tells me he does not take it daily. Denies chest pain, palpitations, shortness of breath.     OBJECTIVE  PAST MEDICAL & SURGICAL HISTORY  Cirrhosis: possibly related to hepC  Melanoma: R eye, s/p laser  Lymphoma: ?questionable, not treated, not followed  Peripheral neuropathy: unclear cause  COPD, mild: not on O2  Atrial fibrillation: on eliquis  HLD (hyperlipidemia)  HTN (hypertension)  ESRD (end stage renal disease)  CKD (chronic kidney disease)  S/P arteriovenous (AV) fistula creation: prior old fistula in R arm  History of kidney transplant: 3 years ago    ALLERGIES:  Allergy Status Unknown    MEDICATIONS:  STANDING MEDICATIONS  aspirin  chewable 81 milliGRAM(s) Oral daily  atorvastatin 10 milliGRAM(s) Oral at bedtime  ceFAZolin   IVPB      ceFAZolin   IVPB 1000 milliGRAM(s) IV Intermittent every 8 hours  chlorhexidine 4% Liquid 1 Application(s) Topical <User Schedule>  diltiazem    milliGRAM(s) Oral daily  diltiazem Infusion 5 mG/Hr IV Continuous <Continuous>  gabapentin 300 milliGRAM(s) Oral at bedtime  gabapentin 600 milliGRAM(s) Oral daily  heparin  Infusion 1500 Unit(s)/Hr IV Continuous <Continuous>  isosorbide   mononitrate ER Tablet (IMDUR) 30 milliGRAM(s) Oral daily  magnesium sulfate  IVPB 2 Gram(s) IV Intermittent once  metoprolol succinate  milliGRAM(s) Oral daily  mycophenolate mofetil 500 milliGRAM(s) Oral two times a day  PARoxetine 10 milliGRAM(s) Oral daily  silver sulfADIAZINE 1% Cream 1 Application(s) Topical two times a day  sodium chloride 0.9%. 1000 milliLiter(s) IV Continuous <Continuous>  sodium chloride 0.9%. 1000 milliLiter(s) IV Continuous <Continuous>  tacrolimus 2 milliGRAM(s) Oral two times a day  tamsulosin 0.4 milliGRAM(s) Oral at bedtime    PRN MEDICATIONS  oxyCODONE    IR 10 milliGRAM(s) Oral every 8 hours PRN  zolpidem 5 milliGRAM(s) Oral at bedtime PRN    HOME MEDICATIONS  Home Medications:  Cardizem  mg/24 hours oral capsule, extended release: 1 cap(s) orally once a day (06 Nov 2019 15:17)  CellCept 500 mg oral tablet: 1 tab(s) orally 2 times a day (06 Nov 2019 15:17)  Flomax 0.4 mg oral capsule: 1 cap(s) orally once a day (06 Nov 2019 15:17)  gabapentin 300 mg oral tablet: 1 tab(s) orally 3 times a day (06 Nov 2019 15:17)  Imdur 30 mg oral tablet, extended release: 1 tab(s) orally once a day (in the morning) (06 Nov 2019 15:17)  Lipitor 10 mg oral tablet: 1 tab(s) orally every 48 hours (06 Nov 2019 15:17)  oxyCODONE 10 mg oral tablet: 1 tab(s) orally every 6 hours, As Needed (06 Nov 2019 15:17)  Paxil 10 mg oral tablet: 1 tab(s) orally once a day (06 Nov 2019 15:17)  tacrolimus 1 mg oral capsule: 2 cap(s) orally every 12 hours (06 Nov 2019 15:17)  Toprol- mg oral tablet, extended release: 1 tab(s) orally once a day (06 Nov 2019 15:17)      LABS:                        11.3   7.35  )-----------( 74       ( 11 Nov 2019 05:29 )             35.0     11-11    135  |  101  |  22<H>  ----------------------------<  115<H>  5.0   |  16<L>  |  1.3    Ca    10.4<H>      11 Nov 2019 05:29  Mg     1.4     11-11        PT/INR - ( 11 Nov 2019 05:29 )   PT: 13.30 sec;   INR: 1.16 ratio         PTT - ( 11 Nov 2019 05:29 )  PTT:28.9 sec      PHYSICAL EXAM:  T(C): 37.4 (11-11-19 @ 08:15), Max: 37.4 (11-11-19 @ 08:15)  HR: 137 (11-11-19 @ 06:01) (80 - 137)  BP: 129/92 (11-11-19 @ 06:01) (110/58 - 129/92)  RR: 18 (11-11-19 @ 06:01) (18 - 18)  SpO2: --    GENERAL: NAD, well-developed, 64y sitting in bed  EENT: EOMI, conjunctiva and sclera clear, No nasal obstruction or discharge  RESPIRATORY: Clear to auscultation bilaterally; No wheeze or crackles  CARDIOVASCULAR: Tachycardic, difficult to determine rhythm no LE edema  GASTROINTESTINAL: Abdomen Soft, Nontender, Nondistended; Bowel sounds present  MUSCULOSKELETAL:  No cyanosis. L arm with well-healed linear post-surgical scar; R forearm with prior IV site with erythema, tenderness, no drainage noted  NEUROLOGY: A&Ox3, non focal, cognition intact, MAEE    ADMISSION SUMMARY  Patient is a 64y old Male who presents with a chief complaint of chest pain (10 Nov 2019 15:33)

## 2019-11-11 NOTE — PROGRESS NOTE ADULT - SUBJECTIVE AND OBJECTIVE BOX
[facility] FOLLOW UP NOTE  --------------------------------------------------------------------------------  24 hour events/subjective: Patient examined. Appears comfortable.    PAST HISTORY  --------------------------------------------------------------------------------  No significant changes to PMH, PSH, FHx, SHx, unless otherwise noted    ALLERGIES & MEDICATIONS  --------------------------------------------------------------------------------  Allergies    Allergy Status Unknown    Standing Inpatient Medications  aspirin  chewable 81 milliGRAM(s) Oral daily  atorvastatin 10 milliGRAM(s) Oral at bedtime  ceFAZolin   IVPB 1000 milliGRAM(s) IV Intermittent every 8 hours  ceFAZolin   IVPB      chlorhexidine 4% Liquid 1 Application(s) Topical <User Schedule>  diltiazem    milliGRAM(s) Oral daily  diltiazem Infusion 5 mG/Hr IV Continuous <Continuous>  gabapentin 300 milliGRAM(s) Oral at bedtime  gabapentin 600 milliGRAM(s) Oral daily  heparin  Infusion 1500 Unit(s)/Hr IV Continuous <Continuous>  isosorbide   mononitrate ER Tablet (IMDUR) 30 milliGRAM(s) Oral daily  metoprolol succinate  milliGRAM(s) Oral daily  mycophenolate mofetil 500 milliGRAM(s) Oral two times a day  PARoxetine 10 milliGRAM(s) Oral daily  silver sulfADIAZINE 1% Cream 1 Application(s) Topical two times a day  sodium chloride 0.9%. 1000 milliLiter(s) IV Continuous <Continuous>  sodium chloride 0.9%. 1000 milliLiter(s) IV Continuous <Continuous>  tacrolimus 2 milliGRAM(s) Oral two times a day  tamsulosin 0.4 milliGRAM(s) Oral at bedtime    PRN Inpatient Medications  acetaminophen   Tablet .. 650 milliGRAM(s) Oral every 6 hours PRN  oxyCODONE    IR 10 milliGRAM(s) Oral every 8 hours PRN  zolpidem 5 milliGRAM(s) Oral at bedtime PRN      VITALS/PHYSICAL EXAM  --------------------------------------------------------------------------------  T(C): 37.1 (11-11-19 @ 14:37), Max: 38.3 (11-11-19 @ 12:17)  HR: 70 (11-11-19 @ 13:30) (70 - 137)  BP: 109/66 (11-11-19 @ 14:15) (109/66 - 136/72)  RR: 14 (11-11-19 @ 13:30) (14 - 18)    11-10-19 @ 07:01  -  11-11-19 @ 07:00  --------------------------------------------------------  IN: 120 mL / OUT: 500 mL / NET: -380 mL    11-11-19 @ 07:01  -  11-11-19 @ 15:06  --------------------------------------------------------  IN: 570 mL / OUT: 700 mL / NET: -130 mL    Physical Exam:  	Gen: NAD  	Pulm: CTA B/L  	CV: RRR, S1S2  	Abd: +BS, soft, nontender/nondistended  	: No suprapubic tenderness  	LE: Warm,  no edema    LABS/STUDIES  --------------------------------------------------------------------------------              11.3   7.35  >-----------<  74       [11-11-19 @ 05:29]              35.0     135  |  101  |  22  ----------------------------<  115      [11-11-19 @ 05:29]  5.0   |  16  |  1.3        Ca     10.4     [11-11-19 @ 05:29]      Mg     1.6     [11-11-19 @ 12:14]      PT/INR: PT 13.30, INR 1.16       [11-11-19 @ 05:29]  PTT: 28.9       [11-11-19 @ 05:29]      Creatinine Trend:  SCr 1.3 [11-11 @ 05:29]  SCr 1.3 [11-10 @ 05:49]  SCr 1.3 [11-09 @ 05:20]  SCr 1.1 [11-08 @ 05:28]  SCr 1.1 [11-07 @ 07:02]        HbA1c 5.1      [11-06-19 @ 15:00]  Lipid: chol 105, , HDL 32, LDL 62      [11-06-19 @ 15:00]    HCV 11.72, Reactive      [11-07-19 @ 07:02]

## 2019-11-11 NOTE — PROGRESS NOTE ADULT - ATTENDING COMMENTS
I saw and examined the patient independently. I agree with above history, physical exam and plan of care which I have reviewed and edited where appropriate with following additions.     patient very anxious today morning as Cardiac Cath was cancelled due to fever with possible infection of RUE with pain RUE .     chest pain with exertional dyspnea possible unstable angina:  telemonitoring.   trops negative , BNP on higher side.   TTE- shows normal LV systolic and diastolic function .   CT coronaries with high calcium score.   Cardiology following - Cardiac cath cancelled today due to fever with possible thrombophlebitis RUE in a patient with baseline immunosuppression.   c/w ASA/toprol / statin/imdur / heparin drip continuation for AC for AFIB with monitoring of PTT q6hr   Nephrology fu appreciated for recommendations to prevent YOLANDA.       RUE catheter related thrombophlebitis:   febrile to 101 F in last 24 hours.   monitor for further fever spikes.   fu doppler us RUE.   send LA/ BCX/cxr   c/w gentle IVF hydration for 12 hours and restart later before cardiac cath when planned.   c/w IV ancef for now.   ID consult.     AFIB with RVR possibly precipitated by  thrombophlebitis:   HR overnight increased to 150's to 160's.   started on cardizem drip .   c/w oral cardizem and toprol and titrate down drip as tolerated   Cardio follow up .  c/w heparin drip for AC /continue holding eliquis for anticipated cardiac cath procedure       h/o LUE arterial thrombus sp open thrombectomy :  sutures LUE removed    vascular evaluated patient    doppler LUE negative for clots.    Hx of ESRD, s/p L kidney transplant  - c/w cellcept, tacrolimus  - currently labs stable, not on HD  nephrology consult for fluid management to prevent YOLANDA appreciated.     acute on chronic thrombocytopenia with h/o cirrhosis.   platelet count gradually dropped but stable in 70's.    no active bleed reported   Hematology eval appreciated   c/w heparin drip for AC for AFIB.     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  outpt PCP /GI f/u      dvt ppx on heparin drip.     Progress note Handoff:   Pending: clinical improvement /improvement of Afib with RVR and thrombophlebitis consult/ cardiac cath   Discussion: plan of care with  patient /  satisfied- all questions answered.  Disposition: home when stable after cardiac cath .

## 2019-11-11 NOTE — CONSULT NOTE ADULT - ASSESSMENT
Assessment and plan     Thrombocytopenia   Known to have thrombocytopenia in past most likely secondary to Cirrhosis and Splenomegaly   HIT Score 3- Less likely to be HIT   No further workup recommended Assessment and plan     Thrombocytopenia   Most likely secondary to Cirrhosis and Splenomegaly. Normal Platelet count on admission could be reactive and the levels then dropped down to his probable baseline of 70s. Please obtain his records/labs from [facility] for his last admission.   Continue Heparin IV for now. Monitor PTT  Screen for HIV

## 2019-11-11 NOTE — PROGRESS NOTE ADULT - ASSESSMENT
1- ESRD s/p Kidney Transplant in 2016 [facility]  2- CAD  3- Atrial fibrillation  4- HTN  5- Anemia  6- Hypomagnesemia    Plan    Renal function stable  cont Tacrolimus  cont Cellcept   Cath cancelled due to thrombophlebitis  Replace Magnesium  Check tacro level, on diltiazem drip

## 2019-11-11 NOTE — CONSULT NOTE ADULT - SUBJECTIVE AND OBJECTIVE BOX
Patient is a 64y old  Male who presents with a chief complaint of chest pain. Heme/onc consulted for thrombocytopenia.       This patient is 65y/o M, poor historian with PMH of COPD (not on home O2), hx of ESRD s/p L kidney transplant on cellcept and tacrolimus, eye melanoma w/ R blindness, HTN, HLD, Afib on eliquis, LUE embolism in Oct (believed to be from ?Afib), Hepatitis C s/p treatment, Liver Cirrhosis with splenomegaly. Patient came to ED with chief complaint of chest pain for 1 month for which CCTA was done which showed CAD, now awaiting cath but course complicated by right upper extremity cellulitis. Has questionable history of lymphoma in the past. Heme/Onc has been consulted for thrombocytopenia.       PAST MEDICAL & SURGICAL HISTORY:  Cirrhosis: possibly related to hepC  Melanoma: R eye, s/p laser surgery now has blindness  Lymphoma: ?questionable, not treated, not followed  Peripheral neuropathy: unclear cause  COPD, mild: not on O2  Atrial fibrillation: on eliquis  HLD (hyperlipidemia)  HTN (hypertension)  ESRD (end stage renal disease)  CKD (chronic kidney disease) at baseline kidney function   S/P arteriovenous (AV) fistula creation: prior old fistula in R arm  History of kidney transplant: 3 years ago      Home Medications:  Cardizem  mg/24 hours oral capsule, extended release: 1 cap(s) orally once a day (2019 15:17)  CellCept 500 mg oral tablet: 1 tab(s) orally 2 times a day (2019 15:17)  Flomax 0.4 mg oral capsule: 1 cap(s) orally once a day (2019 15:17)  gabapentin 300 mg oral tablet: 1 tab(s) orally 3 times a day (2019 15:17)  Imdur 30 mg oral tablet, extended release: 1 tab(s) orally once a day (in the morning) (2019 15:17)  Lipitor 10 mg oral tablet: 1 tab(s) orally every 48 hours (2019 15:17)  oxyCODONE 10 mg oral tablet: 1 tab(s) orally every 6 hours, As Needed (2019 15:17)  Paxil 10 mg oral tablet: 1 tab(s) orally once a day (2019 15:17)  tacrolimus 1 mg oral capsule: 2 cap(s) orally every 12 hours (2019 15:17)  Toprol- mg oral tablet, extended release: 1 tab(s) orally once a day (2019 15:17)      Tobacco use: H/O of smoking  EtOH use: Denies absolute usage of alcohol  Illicit drug use: Reports using in remote past only IV as well      Allergy Status Unknown      FAMILY HISTORY:  FHx: breast cancer: sister ( in 30s)  FH: dementia: mother, alive    Hospital Medications   acetaminophen   Tablet .. 650 milliGRAM(s) Oral every 6 hours PRN  aspirin  chewable 81 milliGRAM(s) Oral daily  atorvastatin 10 milliGRAM(s) Oral at bedtime  ceFAZolin   IVPB 1000 milliGRAM(s) IV Intermittent every 8 hours  ceFAZolin   IVPB      chlorhexidine 4% Liquid 1 Application(s) Topical <User Schedule>  diltiazem    milliGRAM(s) Oral daily  diltiazem Infusion 5 mG/Hr IV Continuous <Continuous>  gabapentin 300 milliGRAM(s) Oral at bedtime  gabapentin 600 milliGRAM(s) Oral daily  heparin  Infusion 1500 Unit(s)/Hr IV Continuous <Continuous>  isosorbide   mononitrate ER Tablet (IMDUR) 30 milliGRAM(s) Oral daily  metoprolol succinate  milliGRAM(s) Oral daily  mycophenolate mofetil 500 milliGRAM(s) Oral two times a day  oxyCODONE    IR 10 milliGRAM(s) Oral every 8 hours PRN  PARoxetine 10 milliGRAM(s) Oral daily  silver sulfADIAZINE 1% Cream 1 Application(s) Topical two times a day  sodium chloride 0.9%. 1000 milliLiter(s) IV Continuous <Continuous>  sodium chloride 0.9%. 1000 milliLiter(s) IV Continuous <Continuous>  tacrolimus 2 milliGRAM(s) Oral two times a day  tamsulosin 0.4 milliGRAM(s) Oral at bedtime  zolpidem 5 milliGRAM(s) Oral at bedtime PRN      Vital Signs Last 24 Hrs  T(C): 38.3 (2019 12:17), Max: 38.3 (2019 12:17)  T(F): 101 (2019 12:17), Max: 101 (2019 12:17)  HR: 123 (2019 12:17) (80 - 137)  BP: 136/72 (2019 12:17) (110/58 - 136/72)  BP(mean): --  RR: 18 (2019 12:17) (18 - 18)  SpO2: --    I&O's Summary    10 Nov 2019 07:01  -  2019 07:00  --------------------------------------------------------  IN: 120 mL / OUT: 500 mL / NET: -380 mL    2019 07:01  -  2019 13:00  --------------------------------------------------------  IN: 240 mL / OUT: 0 mL / NET: 240 mL                              11.3   7.35  )-----------( 74       ( 2019 05:29 )             35.0           135  |  101  |  22<H>  ----------------------------<  115<H>  5.0   |  16<L>  |  1.3    Ca    10.4<H>      2019 05:29  Mg     1.4             Physical examination Patient is a 64y old  Male who presents with a chief complaint of chest pain. Heme/onc consulted for thrombocytopenia.       This patient is 63y/o M, poor historian with PMH of COPD (not on home O2), hx of ESRD s/p L kidney transplant on cellcept and tacrolimus, eye melanoma w/ R blindness, HTN, HLD, Afib on eliquis, LUE embolism in Oct (believed to be from ?Afib), Hepatitis C s/p treatment, Liver Cirrhosis with splenomegaly. Patient came to ED with chief complaint of chest pain for 1 month for which CCTA was done which showed CAD, now awaiting cath but course complicated by right upper extremity cellulitis. Has questionable history of lymphoma in the past. Heme/Onc has been consulted for thrombocytopenia.       PAST MEDICAL & SURGICAL HISTORY:  Cirrhosis: possibly related to hepC  Melanoma: R eye, s/p laser surgery now has blindness  Lymphoma: ?questionable, not treated, not followed  Peripheral neuropathy: unclear cause  COPD, mild: not on O2  Atrial fibrillation: on eliquis  HLD (hyperlipidemia)  HTN (hypertension)  ESRD (end stage renal disease)  CKD (chronic kidney disease) at baseline kidney function   S/P arteriovenous (AV) fistula creation: prior old fistula in R arm  History of kidney transplant: 3 years ago      Home Medications:  Cardizem  mg/24 hours oral capsule, extended release: 1 cap(s) orally once a day (2019 15:17)  CellCept 500 mg oral tablet: 1 tab(s) orally 2 times a day (2019 15:17)  Flomax 0.4 mg oral capsule: 1 cap(s) orally once a day (2019 15:17)  gabapentin 300 mg oral tablet: 1 tab(s) orally 3 times a day (2019 15:17)  Imdur 30 mg oral tablet, extended release: 1 tab(s) orally once a day (in the morning) (2019 15:17)  Lipitor 10 mg oral tablet: 1 tab(s) orally every 48 hours (2019 15:17)  oxyCODONE 10 mg oral tablet: 1 tab(s) orally every 6 hours, As Needed (2019 15:17)  Paxil 10 mg oral tablet: 1 tab(s) orally once a day (2019 15:17)  tacrolimus 1 mg oral capsule: 2 cap(s) orally every 12 hours (2019 15:17)  Toprol- mg oral tablet, extended release: 1 tab(s) orally once a day (2019 15:17)      Tobacco use: H/O of smoking  EtOH use: Denies absolute usage of alcohol  Illicit drug use: Reports using in remote past only IV as well      Allergy Status Unknown      FAMILY HISTORY:  FHx: breast cancer: sister ( in 30s)  FH: dementia: mother, alive    Hospital Medications   acetaminophen   Tablet .. 650 milliGRAM(s) Oral every 6 hours PRN  aspirin  chewable 81 milliGRAM(s) Oral daily  atorvastatin 10 milliGRAM(s) Oral at bedtime  ceFAZolin   IVPB 1000 milliGRAM(s) IV Intermittent every 8 hours  ceFAZolin   IVPB      chlorhexidine 4% Liquid 1 Application(s) Topical <User Schedule>  diltiazem    milliGRAM(s) Oral daily  diltiazem Infusion 5 mG/Hr IV Continuous <Continuous>  gabapentin 300 milliGRAM(s) Oral at bedtime  gabapentin 600 milliGRAM(s) Oral daily  heparin  Infusion 1500 Unit(s)/Hr IV Continuous <Continuous>  isosorbide   mononitrate ER Tablet (IMDUR) 30 milliGRAM(s) Oral daily  metoprolol succinate  milliGRAM(s) Oral daily  mycophenolate mofetil 500 milliGRAM(s) Oral two times a day  oxyCODONE    IR 10 milliGRAM(s) Oral every 8 hours PRN  PARoxetine 10 milliGRAM(s) Oral daily  silver sulfADIAZINE 1% Cream 1 Application(s) Topical two times a day  sodium chloride 0.9%. 1000 milliLiter(s) IV Continuous <Continuous>  sodium chloride 0.9%. 1000 milliLiter(s) IV Continuous <Continuous>  tacrolimus 2 milliGRAM(s) Oral two times a day  tamsulosin 0.4 milliGRAM(s) Oral at bedtime  zolpidem 5 milliGRAM(s) Oral at bedtime PRN      Vital Signs Last 24 Hrs  T(C): 38.3 (2019 12:17), Max: 38.3 (2019 12:17)  T(F): 101 (2019 12:17), Max: 101 (2019 12:17)  HR: 123 (2019 12:17) (80 - 137)  BP: 136/72 (2019 12:17) (110/58 - 136/72)  BP(mean): --  RR: 18 (2019 12:17) (18 - 18)  SpO2: --    I&O's Summary    10 Nov 2019 07:01  -  2019 07:00  --------------------------------------------------------  IN: 120 mL / OUT: 500 mL / NET: -380 mL    2019 07:01  -  2019 13:00  --------------------------------------------------------  IN: 240 mL / OUT: 0 mL / NET: 240 mL                              11.3   7.35  )-----------( 74       ( 2019 05:29 )             35.0           135  |  101  |  22<H>  ----------------------------<  115<H>  5.0   |  16<L>  |  1.3    Ca    10.4<H>      2019 05:29  Mg     1.4             Physical examination   GENERAL: NAD, well-developed, 64y sitting in bed  EENT: EOMI, conjunctiva and sclera clear, No nasal obstruction or discharge  RESPIRATORY: Clear to auscultation bilaterally; No wheeze or crackles  CARDIOVASCULAR: Tachycardic, difficult to determine rhythm no LE edema  GASTROINTESTINAL: Abdomen Soft, Nontender, Nondistended; Bowel sounds present  MUSCULOSKELETAL:  No cyanosis. L arm with well-healed linear post-surgical scar; R forearm with prior IV site with erythema, tenderness, no drainage noted  NEUROLOGY: A&Ox3, non focal, cognition intact, MAEE

## 2019-11-11 NOTE — CONSULT NOTE ADULT - ATTENDING COMMENTS
Patient examined independently , above note read, modified and discussed with house staff . History of Hepatitis C with cirrhosis , splenomegaly on exam . 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 ( hep c related ? , not requiring treatement except for antiviral therapy ) , S/P renal transplant , atrial fibrillation with recent episode of LUE embolization while on NOAC . Now admitted with suspected coronary ischemia , started on heparin and scheduled for cardiac cath . . RUE  cellulitis/abscess ? Platelets dropped 2 days after admission from 140 to 70 and remained stable for last 48 hours , wbc is fluctuating ( normal to low )   no clinical evidence of new thrombosis . History of recent heparin exposure in October ?. Cannot rule out HIT with certainty however the patient has most likely low baseline platelets and wbc secondary to hypersplenism .  Will obtain recent blood work and reports from [facility] , also check HIT panel .

## 2019-11-11 NOTE — CONSULT NOTE ADULT - SUBJECTIVE AND OBJECTIVE BOX
DAVONTE CHOU  64y, Male  Allergy: Allergy Status Unknown      All historical available data reviewed.    HPI:  63y/o M with PMH of COPD (no O2), hx of ESRD s/p L kidney transplant, eye melanoma w/ R blindness, HTN, HLD, Afib on eliquis, LUE embolism in Oct (believed to be from ?Afib) presenting with 1mo of midsternal non-radiating chest pain. Pain is moderate, some SOB on exertion but no significantly alleviating or triggering factors. Follows with Dr. Low who scheduled him for further f/u in January but doesn't want to wait any longer so Maranda sent him to ED. Endorses dyspnea on exertion. Otherwise denies fever, chills, abd pain, N/V/D/C, urinary symptoms, dizzy/lightheadedness, weakness, headache. Lives alone, has aid.     Has questionable prior hx of cirrhosis likely 2/2 hep C & lymphoma but doens't follow with anyone for them and doesn't know too much info on these.    In ED: T98.3, /58, HR 54, RR18, SpO2 97%. Labs grossly unremarkable except for BNP 2698, trop x2 neg. EKG showed rate controlled Afib w/o acute ST change. CXR neg. Was sent to OBS pending CCTA which came back grossly pos with score meeting 99th percentile. Admit for cardiac cath on . (2019 14:26)    FAMILY HISTORY:  FHx: breast cancer: sister ( in 30s)  FH: dementia: mother, alive    PAST MEDICAL & SURGICAL HISTORY:  Cirrhosis: possibly related to hepC  Melanoma: R eye, s/p laser  Lymphoma: ?questionable, not treated, not followed  Peripheral neuropathy: unclear cause  COPD, mild: not on O2  Atrial fibrillation: on eliquis  HLD (hyperlipidemia)  HTN (hypertension)  ESRD (end stage renal disease)  CKD (chronic kidney disease)  S/P arteriovenous (AV) fistula creation: prior old fistula in R arm  History of kidney transplant: 3 years ago        VITALS:  T(F): 98.7, Max: 101 (19 @ 12:17)  HR: 70  BP: 109/66  RR: 14Vital Signs Last 24 Hrs  T(C): 37.1 (2019 14:37), Max: 38.3 (2019 12:17)  T(F): 98.7 (2019 14:37), Max: 101 (2019 12:17)  HR: 70 (2019 13:30) (70 - 137)  BP: 109/66 (2019 14:15) (109/66 - 136/72)  BP(mean): --  RR: 14 (2019 13:30) (14 - 18)  SpO2: --    TESTS & MEASUREMENTS:                        11.3   7.35  )-----------( 74       ( 2019 05:29 )             35.0         135  |  101  |  22<H>  ----------------------------<  115<H>  5.0   |  16<L>  |  1.3    Ca    10.4<H>      2019 05:29  Mg     1.6                     RADIOLOGY & ADDITIONAL TESTS:  Personal review of radiological diagnostics performed  Echo and EKG results noted when applicable.     ANTIBIOTICS:

## 2019-11-11 NOTE — CONSULT NOTE ADULT - ASSESSMENT
63y/o M with ESRD s/p kidney transplant, Afib on eliquis presenting with 1mo chest pain, found to have pos CCTA. Planned for cath, however patient developed cellulitis in R arm.     IMPRESSION:  Right cephalic/ basilic vein superficial thrombophlebitis non suppurative but appears to be an evolving abscess at the insertion site  no sepsis    RECOMMENDATIONS:  vascular surgery evaluation  warm compressors  BCx  Daptomycin 6 mg / kg iv q24h  d/c other ABx

## 2019-11-12 DIAGNOSIS — G62.9 POLYNEUROPATHY, UNSPECIFIED: ICD-10-CM

## 2019-11-12 LAB
ANION GAP SERPL CALC-SCNC: 13 MMOL/L — SIGNIFICANT CHANGE UP (ref 7–14)
APTT BLD: 40 SEC — HIGH (ref 27–39.2)
APTT BLD: 47.9 SEC — HIGH (ref 27–39.2)
APTT BLD: 49.3 SEC — HIGH (ref 27–39.2)
BASOPHILS # BLD AUTO: 0.01 K/UL — SIGNIFICANT CHANGE UP (ref 0–0.2)
BASOPHILS NFR BLD AUTO: 0.1 % — SIGNIFICANT CHANGE UP (ref 0–1)
BUN SERPL-MCNC: 14 MG/DL — SIGNIFICANT CHANGE UP (ref 10–20)
CALCIUM SERPL-MCNC: 10.2 MG/DL — HIGH (ref 8.5–10.1)
CHLORIDE SERPL-SCNC: 103 MMOL/L — SIGNIFICANT CHANGE UP (ref 98–110)
CO2 SERPL-SCNC: 20 MMOL/L — SIGNIFICANT CHANGE UP (ref 17–32)
CREAT SERPL-MCNC: 1 MG/DL — SIGNIFICANT CHANGE UP (ref 0.7–1.5)
EOSINOPHIL # BLD AUTO: 0 K/UL — SIGNIFICANT CHANGE UP (ref 0–0.7)
EOSINOPHIL NFR BLD AUTO: 0 % — SIGNIFICANT CHANGE UP (ref 0–8)
GLUCOSE SERPL-MCNC: 114 MG/DL — HIGH (ref 70–99)
HCT VFR BLD CALC: 33.7 % — LOW (ref 42–52)
HGB BLD-MCNC: 10.9 G/DL — LOW (ref 14–18)
IMM GRANULOCYTES NFR BLD AUTO: 0.5 % — HIGH (ref 0.1–0.3)
INR BLD: 1.31 RATIO — HIGH (ref 0.65–1.3)
INR BLD: 1.33 RATIO — HIGH (ref 0.65–1.3)
LYMPHOCYTES # BLD AUTO: 0.66 K/UL — LOW (ref 1.2–3.4)
LYMPHOCYTES # BLD AUTO: 7.9 % — LOW (ref 20.5–51.1)
MAGNESIUM SERPL-MCNC: 1.8 MG/DL — SIGNIFICANT CHANGE UP (ref 1.8–2.4)
MCHC RBC-ENTMCNC: 30 PG — SIGNIFICANT CHANGE UP (ref 27–31)
MCHC RBC-ENTMCNC: 32.3 G/DL — SIGNIFICANT CHANGE UP (ref 32–37)
MCV RBC AUTO: 92.8 FL — SIGNIFICANT CHANGE UP (ref 80–94)
MONOCYTES # BLD AUTO: 0.78 K/UL — HIGH (ref 0.1–0.6)
MONOCYTES NFR BLD AUTO: 9.3 % — SIGNIFICANT CHANGE UP (ref 1.7–9.3)
MRSA PCR RESULT.: NEGATIVE — SIGNIFICANT CHANGE UP
NEUTROPHILS # BLD AUTO: 6.88 K/UL — HIGH (ref 1.4–6.5)
NEUTROPHILS NFR BLD AUTO: 82.2 % — HIGH (ref 42.2–75.2)
NRBC # BLD: 0 /100 WBCS — SIGNIFICANT CHANGE UP (ref 0–0)
PLATELET # BLD AUTO: 68 K/UL — LOW (ref 130–400)
POTASSIUM SERPL-MCNC: 4.4 MMOL/L — SIGNIFICANT CHANGE UP (ref 3.5–5)
POTASSIUM SERPL-SCNC: 4.4 MMOL/L — SIGNIFICANT CHANGE UP (ref 3.5–5)
PROTHROM AB SERPL-ACNC: 15 SEC — HIGH (ref 9.95–12.87)
PROTHROM AB SERPL-ACNC: 15.2 SEC — HIGH (ref 9.95–12.87)
RBC # BLD: 3.63 M/UL — LOW (ref 4.7–6.1)
RBC # FLD: 15 % — HIGH (ref 11.5–14.5)
SODIUM SERPL-SCNC: 136 MMOL/L — SIGNIFICANT CHANGE UP (ref 135–146)
TACROLIMUS SERPL-MCNC: 5.8 NG/ML — SIGNIFICANT CHANGE UP
WBC # BLD: 8.37 K/UL — SIGNIFICANT CHANGE UP (ref 4.8–10.8)
WBC # FLD AUTO: 8.37 K/UL — SIGNIFICANT CHANGE UP (ref 4.8–10.8)

## 2019-11-12 PROCEDURE — 99233 SBSQ HOSP IP/OBS HIGH 50: CPT

## 2019-11-12 PROCEDURE — 99222 1ST HOSP IP/OBS MODERATE 55: CPT

## 2019-11-12 RX ORDER — ACETAMINOPHEN 500 MG
650 TABLET ORAL EVERY 6 HOURS
Refills: 0 | Status: DISCONTINUED | OUTPATIENT
Start: 2019-11-12 | End: 2019-11-14

## 2019-11-12 RX ORDER — OXYCODONE HYDROCHLORIDE 5 MG/1
10 TABLET ORAL EVERY 4 HOURS
Refills: 0 | Status: DISCONTINUED | OUTPATIENT
Start: 2019-11-12 | End: 2019-11-12

## 2019-11-12 RX ORDER — GABAPENTIN 400 MG/1
300 CAPSULE ORAL EVERY 8 HOURS
Refills: 0 | Status: DISCONTINUED | OUTPATIENT
Start: 2019-11-12 | End: 2019-11-14

## 2019-11-12 RX ORDER — OXYCODONE HYDROCHLORIDE 5 MG/1
10 TABLET ORAL EVERY 4 HOURS
Refills: 0 | Status: DISCONTINUED | OUTPATIENT
Start: 2019-11-12 | End: 2019-11-14

## 2019-11-12 RX ORDER — ZOLPIDEM TARTRATE 10 MG/1
5 TABLET ORAL AT BEDTIME
Refills: 0 | Status: DISCONTINUED | OUTPATIENT
Start: 2019-11-12 | End: 2019-11-14

## 2019-11-12 RX ORDER — HEPARIN SODIUM 5000 [USP'U]/ML
1600 INJECTION INTRAVENOUS; SUBCUTANEOUS
Qty: 25000 | Refills: 0 | Status: DISCONTINUED | OUTPATIENT
Start: 2019-11-12 | End: 2019-11-13

## 2019-11-12 RX ADMIN — Medication 360 MILLIGRAM(S): at 05:09

## 2019-11-12 RX ADMIN — Medication 650 MILLIGRAM(S): at 17:38

## 2019-11-12 RX ADMIN — Medication 650 MILLIGRAM(S): at 23:52

## 2019-11-12 RX ADMIN — OXYCODONE HYDROCHLORIDE 10 MILLIGRAM(S): 5 TABLET ORAL at 07:42

## 2019-11-12 RX ADMIN — TACROLIMUS 2 MILLIGRAM(S): 5 CAPSULE ORAL at 17:39

## 2019-11-12 RX ADMIN — OXYCODONE HYDROCHLORIDE 10 MILLIGRAM(S): 5 TABLET ORAL at 22:14

## 2019-11-12 RX ADMIN — Medication 100 MILLIGRAM(S): at 05:09

## 2019-11-12 RX ADMIN — TACROLIMUS 2 MILLIGRAM(S): 5 CAPSULE ORAL at 05:08

## 2019-11-12 RX ADMIN — OXYCODONE HYDROCHLORIDE 10 MILLIGRAM(S): 5 TABLET ORAL at 17:30

## 2019-11-12 RX ADMIN — HEPARIN SODIUM 16 UNIT(S)/HR: 5000 INJECTION INTRAVENOUS; SUBCUTANEOUS at 05:02

## 2019-11-12 RX ADMIN — Medication 10 MILLIGRAM(S): at 12:03

## 2019-11-12 RX ADMIN — GABAPENTIN 600 MILLIGRAM(S): 400 CAPSULE ORAL at 12:03

## 2019-11-12 RX ADMIN — OXYCODONE HYDROCHLORIDE 10 MILLIGRAM(S): 5 TABLET ORAL at 12:00

## 2019-11-12 RX ADMIN — Medication 1 APPLICATION(S): at 12:02

## 2019-11-12 RX ADMIN — MYCOPHENOLATE MOFETIL 500 MILLIGRAM(S): 250 CAPSULE ORAL at 17:39

## 2019-11-12 RX ADMIN — GABAPENTIN 300 MILLIGRAM(S): 400 CAPSULE ORAL at 22:14

## 2019-11-12 RX ADMIN — ATORVASTATIN CALCIUM 10 MILLIGRAM(S): 80 TABLET, FILM COATED ORAL at 22:14

## 2019-11-12 RX ADMIN — OXYCODONE HYDROCHLORIDE 10 MILLIGRAM(S): 5 TABLET ORAL at 16:39

## 2019-11-12 RX ADMIN — OXYCODONE HYDROCHLORIDE 10 MILLIGRAM(S): 5 TABLET ORAL at 23:00

## 2019-11-12 RX ADMIN — TAMSULOSIN HYDROCHLORIDE 0.4 MILLIGRAM(S): 0.4 CAPSULE ORAL at 22:15

## 2019-11-12 RX ADMIN — DAPTOMYCIN 118 MILLIGRAM(S): 500 INJECTION, POWDER, LYOPHILIZED, FOR SOLUTION INTRAVENOUS at 16:39

## 2019-11-12 RX ADMIN — MYCOPHENOLATE MOFETIL 500 MILLIGRAM(S): 250 CAPSULE ORAL at 05:08

## 2019-11-12 RX ADMIN — OXYCODONE HYDROCHLORIDE 10 MILLIGRAM(S): 5 TABLET ORAL at 16:10

## 2019-11-12 RX ADMIN — Medication 650 MILLIGRAM(S): at 19:17

## 2019-11-12 RX ADMIN — OXYCODONE HYDROCHLORIDE 10 MILLIGRAM(S): 5 TABLET ORAL at 05:26

## 2019-11-12 RX ADMIN — ISOSORBIDE MONONITRATE 30 MILLIGRAM(S): 60 TABLET, EXTENDED RELEASE ORAL at 12:03

## 2019-11-12 RX ADMIN — Medication 1 APPLICATION(S): at 17:39

## 2019-11-12 RX ADMIN — Medication 81 MILLIGRAM(S): at 12:02

## 2019-11-12 NOTE — PROGRESS NOTE ADULT - ASSESSMENT
1- ESRD s/p Kidney Transplant in 2016 [facility]  2- CAD  3- Atrial fibrillation  4- HTN  5- Anemia  6- Hypomagnesemia    Plan    Renal function stable  cont Tacrolimus  cont Cellcept   Cath cancelled due to thrombophlebitis  Replace Magnesium  Followup tacro level, was on diltiazem drip

## 2019-11-12 NOTE — CONSULT NOTE ADULT - ASSESSMENT
REVIEW OF SYSTEMS    General:	NAD   Skin/Breast:	Neg  Ophthalmologic:	Neg  ENMT: Neg	  Respiratory and Thorax: Neg	  Cardiovascular:	Neg  Gastrointestinal:	Neg  Genitourinary:	Neg  Musculoskeletal:	Neg  Neurological:	Neg  Psychiatric:	Hx of illicit drug use  Hematology/Lymphatics:	Neg  Endocrine:	Neg        PHYSICAL EXAM:    GENERAL: NAD, well-groomed, well-developed  HEAD:  Atraumatic, Normocephalic  EYES: EOMI, PERRLA, conjunctiva and sclera clear  ENMT: No tonsillar erythema, exudates, or enlargement; Moist mucous membranes, Good dentition, No lesions  NECK: Supple, No JVD, Normal thyroid  NERVOUS SYSTEM:  Alert & Oriented X3, Good concentration; Motor Strength 5/5 B/L upper and lower extremities  CHEST/LUNG: Clear to percussion bilaterally; No rales, rhonchi, wheezing, or rubs  HEART: Regular rate and rhythm; No murmurs, rubs, or gallops  ABDOMEN: Soft, Nontender, Nondistended; Bowel sounds present  EXTREMITIES:  Bilat upper ext's with darkened skin and multiple scars. right with possible AV Fistula tube still in place.   LYMPH: No lymphadenopathy noted  SKIN: bilat arm with surgical scars and some small scabs      Patient lying in bed supine. Patient with difficulty straightening out his arm secondary to pain. Neg cyanosis noted. Right arm tender to touch to the right upper arm. Pain is 8/10 now. Pain is described as sharp, constant, non-radiating.

## 2019-11-12 NOTE — CONSULT NOTE ADULT - SUBJECTIVE AND OBJECTIVE BOX
Chief Complaint:         65y/o M with PMH of COPD (no O2), hx of ESRD s/p L kidney transplant, eye melanoma w/ R blindness, HTN, HLD, Afib on eliquis, LUE embolism in Oct. Complaining of pain to the right mid to upper arm and left forearm.     Allergies    Allergy Status Unknown    Intolerances        PAST MEDICAL & SURGICAL HISTORY:  Cirrhosis: possibly related to hepC  Melanoma: R eye, s/p laser  Lymphoma: ?questionable, not treated, not followed  Peripheral neuropathy: unclear cause  COPD, mild: not on O2  Atrial fibrillation: on eliquis  HLD (hyperlipidemia)  HTN (hypertension)  ESRD (end stage renal disease)  CKD (chronic kidney disease)  S/P arteriovenous (AV) fistula creation: prior old fistula in R arm  History of kidney transplant: 3 years ago        SOCIAL HISTORY:  Denies Smoking, Alcohol, or Drug Use    Allergy Status Unknown      PAIN MEDICATIONS:  acetaminophen   Tablet .. 650 milliGRAM(s) Oral every 6 hours  gabapentin 300 milliGRAM(s) Oral every 8 hours  oxyCODONE    IR 10 milliGRAM(s) Oral every 4 hours PRN  PARoxetine 10 milliGRAM(s) Oral daily  zolpidem 5 milliGRAM(s) Oral at bedtime PRN    Heme:  aspirin  chewable 81 milliGRAM(s) Oral daily  heparin  Infusion 1600 Unit(s)/Hr IV Continuous <Continuous>    Antibiotics:  DAPTOmycin IVPB 450 milliGRAM(s) IV Intermittent every 24 hours  DAPTOmycin IVPB        Cardiovascular:  diltiazem    milliGRAM(s) Oral daily  isosorbide   mononitrate ER Tablet (IMDUR) 30 milliGRAM(s) Oral daily  metoprolol succinate  milliGRAM(s) Oral daily  tamsulosin 0.4 milliGRAM(s) Oral at bedtime    GI:    Endocrine:  atorvastatin 10 milliGRAM(s) Oral at bedtime    All Other Medications:  chlorhexidine 4% Liquid 1 Application(s) Topical <User Schedule>  mycophenolate mofetil 500 milliGRAM(s) Oral two times a day  silver sulfADIAZINE 1% Cream 1 Application(s) Topical two times a day  sodium chloride 0.9%. 1000 milliLiter(s) IV Continuous <Continuous>  sodium chloride 0.9%. 1000 milliLiter(s) IV Continuous <Continuous>  tacrolimus 2 milliGRAM(s) Oral two times a day      Vital Signs Last 24 Hrs  T(C): 36.8 (12 Nov 2019 13:37), Max: 37.8 (11 Nov 2019 20:52)  T(F): 98.2 (12 Nov 2019 13:37), Max: 100 (11 Nov 2019 20:52)  HR: 103 (12 Nov 2019 13:37) (97 - 112)  BP: 119/63 (12 Nov 2019 13:37) (119/63 - 136/81)  BP(mean): --  RR: 19 (12 Nov 2019 13:37) (18 - 19)  SpO2: --      11-11-19 @ 07:01  -  11-12-19 @ 07:00  --------------------------------------------------------  IN: 1470 mL / OUT: 1750 mL / NET: -280 mL    11-12-19 @ 07:01  -  11-12-19 @ 14:53  --------------------------------------------------------  IN: 785 mL / OUT: 500 mL / NET: 285 mL        LABS:                          10.9   8.37  )-----------( 68       ( 12 Nov 2019 04:56 )             33.7     11-12    136  |  103  |  14  ----------------------------<  114<H>  4.4   |  20  |  1.0    Ca    10.2<H>      12 Nov 2019 04:56  Mg     1.8     11-12      PT/INR - ( 12 Nov 2019 04:56 )   PT: 15.20 sec;   INR: 1.33 ratio         PTT - ( 12 Nov 2019 04:56 )  PTT:49.3 sec      RADIOLOGY:    Drug Screen:        [ ]  [facility]  Reviewed on 11/12/19, 2:54P  Patient Name:	Chito Echols	YOB: 1955  Address:	[address] [place] [id number]	Sex:	Male  Rx Written	Rx Dispensed	Drug	Quantity	Days Supply	Prescriber Name  10/28/2019	10/28/2019	oxycodone hcl 10 mg tablet	90	30	Evan Jeyson  09/25/2019	09/30/2019	oxycodone hcl 10 mg tablet	120	30	Dinah Trujillo  08/28/2019	09/02/2019	oxycodone hcl 10 mg tablet	90	30	Gordon, Jeyson  08/24/2019	08/27/2019	diazepam 10 mg tablet	1	1	Lion Sweet MD  07/30/2019	07/30/2019	oxycodone hcl 10 mg tablet	90	30	Gordon, Jeyson  06/27/2019	06/28/2019	oxycodone hcl 10 mg tablet	90	30	Gordon, Jeyson  05/28/2019	05/29/2019	oxycodone hcl 10 mg tablet	90	30	GordonJeyson lawson MD  05/01/2019	05/02/2019	methadone hcl 10 mg tablet	90	30	Jennie Trujillohleen  04/20/2019	04/20/2019	methadone hcl 10 mg tablet	45	15	Jeyson Gordon MD  04/04/2019	04/05/2019	methadone hcl 10 mg tablet	30	15	Jeyson Gordon MD  03/25/2019	03/25/2019	oxycodone hcl 10 mg tablet	21	7	Jeyson Gordon MD  02/27/2019	02/27/2019	oxycodone-acetaminophen  mg tab	6	2	Daniel Lyon Chi)  02/12/2019	02/13/2019	oxycodone-acetaminophen  mg tab	5	1	Daniel Lyon Chi (MD)  Patient Name:	Chito Echols	YOB: 1955  Address:	[address] [place] [address]	Sex:	Male  Rx Written	Rx Dispensed	Drug	Quantity	Days Supply	Prescriber Name  06/28/2019	06/28/2019	oxycodone-acetaminophen 5-325 mg tab	12	3	Lion Coffey  06/19/2019	06/19/2019	oxycodone-acetaminophen 7.5-325 mg tablet	20	5	Lion Coffey

## 2019-11-12 NOTE — PROGRESS NOTE ADULT - ASSESSMENT
63y/o M with ESRD s/p kidney transplant, Afib on eliquis presenting with 1mo chest pain, found to have pos CCTA. Planned for cath, however patient developed cellulitis in R arm.     # Rule out underlying ischemic CAD  - Coronary Calcium score 99th percentile  - EKG no acute ST changes, trop x2 neg  - will monitor platelet on heparin. If falls suspect HIT  - Cath canceled secondary to cellulitis, will need cath when cleared as per cardio  -- cw Heparin drip, goal 60-80. Will monitor. Currently @16    #RUE cellulitis  -RUE Duplex neg   -ID following, patient immunosuppressed  --Pain management consult placed  --Warm compress  --Started Daptomycin 11/11   --F/u blood culture   --Vascular consult placed for concern for evolving abscess    # Afib on eliquis--RVR  - Holding Eliquis while on heparin drip  -11/11 cardizem drip for 's and gave additional dose of lopressor 50 with good effect  --c/w cardizem 360, toprol 100    #Thrombocytopenia. Per [facility] record, Plt levels vary, but went 140->70 this admission  -Heme/onc following  -Low suspicion for HIT at this time, more likely due to hypersplenism  --F/u HIT antibody  --Continue heparin for now, Plt stable    # LUE hx of embolism, staples in place w/ overlying skin changes (Eliquis had been stopped x 2 days for possible cath at [facility] and he developed arm clots s/p thrombectomy)  - no fluctuance, but has overlying redness and crusting  - US LUE negative  - Spoke with vascular surgery over the phone. As per patient he had surgery with Dr Peters at [facility]. As per Dr Peters he does not do surgery at [facility]. Explained the situation to vascular team. They want the patient to follow up with the doctor who did the surgery. Patient not willing to go back to [facility]. Patient aware and frustrated of the situation.     # Hx of ESRD, s/p L kidney transplant  - currently labs stable, not on hemodialysis  --c/w cellcept, tacrolimus  --Monitor and replete electrolytes  --F/u tacrolimus level    # Hx of COPD  - not on any meds, no active sxs  --cw Duonebs PRN    # HLD  -lipid panel wnl, HbA1c 5.1  -- c/w lipitor 10 daily    #Likely BPH- c/w flomax  #HTN- c/w cardizem, toprol, imdur  #Hx of ?cirrhosis due to hep C- liver enzymes stable, outpt PCP f/u. 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

## 2019-11-12 NOTE — CONSULT NOTE ADULT - PROBLEM SELECTOR RECOMMENDATION 9
Change acetaminophen   Tablet .. 650 milliGRAM(s) Oral every 6 hours  Change gabapentin 300 milliGRAM(s) Oral every 8 hours  Start oxyCODONE    IR 10 milliGRAM(s) Oral every 4 hours PRN  Start Warm compress to bilat arms Q1hr [id number] PRN

## 2019-11-12 NOTE — PROGRESS NOTE ADULT - ASSESSMENT
Admitted with unstable angina.   s/p coronary angiogram. Significant atherosclerotic and calcific RCA disease.   Right arm cellulitis in hospital. on antibiotics now.   chronic atrial fibrillation.   recent left arm embolus s/p embolectomy at [facility].  ESRD s/p renal transplant.   htn  H/o Hep c and cirrhosis.   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 THIS IS NOT NEW. HIS BASELINE IS AROUND 70s.  legally blind.       Adv:  continue antibiotics.  No active cardiac coronary intervention for now.   continue all meds.   PLEASE FOLLOW EP ADVICE ABOUT MEDS. FOR HEART RATE CONTROL.  STOP IV HEPARIN  RESUME ELIQUIS 5 MG BID if no other surgical / interventional procedures planned by any other physician. NO CORONARY INTERVENTION FROM MY PERSPECTIVE FOR NOW.   will schedule rota pci of RCA as out pt with lovenox bridging when eliquis is stopped.   PLEASE RECALL US PRN

## 2019-11-12 NOTE — PROGRESS NOTE ADULT - ATTENDING COMMENTS
I saw and examined the patient independently. I agree with above history, physical exam and plan of care which I have reviewed and edited where appropriate with following additions.    patient looked anxious/  c/o severe pain RUE with phlebitis today morning requesting pain management to see him ASAP as current pain meds are not working.     chest pain with exertional dyspnea possible unstable angina:  telemonitoring.   trops negative.  TTE- shows normal LV systolic and diastolic function .   CT coronaries with high calcium score.   Cardiology following - needs Cardiac cath but  cancelled yesterday due to  possible thrombophlebitis RUE in a patient with baseline immunosuppression.   c/w ASA/toprol / statin/imdur / heparin drip continuation for AC for AFIB with monitoring of PTT q6hr     RUE catheter related thrombophlebitis:   afebrile today   monitor for further fever spikes.   doppler us RUE: negative for sup. thrombophlebitis or dvt  LA: noted / normal /BCX: pending.  c/w gentle IVF hydration   ID follow up appreciated- ABX changed / c/w IV daptomycin   agree with vascular consult for possible evolving abscess RUE.    AFIB with RVR possibly precipitated by  thrombophlebitis:   HR better controlled today  EP consult appreciated.   off cardizem drip now/ c/w oral cardizem and titrate up metoprolol as suggested by EP   c/w heparin drip for AC /continue holding eliquis for anticipated cardiac cath procedure       h/o LUE arterial thrombus sp open thrombectomy :  sutures LUE removed    vascular evaluated patient    doppler LUE negative for clots.    Hx of ESRD, s/p L kidney transplant  - c/w cellcept, tacrolimus  - currently labs stable, not on HD  nephrology following- fu recs.    acute on chronic thrombocytopenia with h/o cirrhosis.   platelet count gradually dropping  from 140's to 68 today - patient reports he always had low platelet count.    no active bleed reported   Hematology eval appreciated -sent HIT ABx/obtain records from [facility] to see baseline platelet count   c/w heparin drip for AC for AFIB for now.     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  outpt PCP /GI f/u      dvt ppx on heparin drip.     Progress note Handoff:   Pending: acute/ clinical improvement /improvement  thrombophlebitis/ID clearance for Cardiac Cath/ cardiac cath   Discussion: plan of care with  patient /  satisfied- all questions answered.  Disposition: home when stable after cardiac cath .

## 2019-11-12 NOTE — PROGRESS NOTE ADULT - SUBJECTIVE AND OBJECTIVE BOX
DAVONTE CHOU 64y Male  MRN#: [id number]     SUBJECTIVE  Patient is a 64y old Male who presents with a chief complaint of chest pain (11 Nov 2019 18:16)      Today is hospital day 6d, and this morning he is sitting in his chair without distress. Appears comfortable in his chair and chatting with roommate when initially looked in on him; after entered the room, patient begins complaining of severe pain in R forearm, states 2 mg of Dilaudid "was like Aspirin" for him and he needs more. Other than arm pain, no complaints; no chest pain, shortness of breath, palpitations, abdominal pain, leg pain.   On telemetry, RVR to 150's; this AM, 100-110. Afebrile.   No acute overnight events.     OBJECTIVE  PAST MEDICAL & SURGICAL HISTORY  Cirrhosis: possibly related to hepC  Melanoma: R eye, s/p laser  Lymphoma: ?questionable, not treated, not followed  Peripheral neuropathy: unclear cause  COPD, mild: not on O2  Atrial fibrillation: on eliquis  HLD (hyperlipidemia)  HTN (hypertension)  ESRD (end stage renal disease)  CKD (chronic kidney disease)  S/P arteriovenous (AV) fistula creation: prior old fistula in R arm  History of kidney transplant: 3 years ago    ALLERGIES:  Allergy Status Unknown    MEDICATIONS:  STANDING MEDICATIONS  aspirin  chewable 81 milliGRAM(s) Oral daily  atorvastatin 10 milliGRAM(s) Oral at bedtime  chlorhexidine 4% Liquid 1 Application(s) Topical <User Schedule>  DAPTOmycin IVPB 450 milliGRAM(s) IV Intermittent every 24 hours  DAPTOmycin IVPB      diltiazem    milliGRAM(s) Oral daily  gabapentin 300 milliGRAM(s) Oral at bedtime  gabapentin 600 milliGRAM(s) Oral daily  heparin  Infusion 1600 Unit(s)/Hr IV Continuous <Continuous>  isosorbide   mononitrate ER Tablet (IMDUR) 30 milliGRAM(s) Oral daily  metoprolol succinate  milliGRAM(s) Oral daily  mycophenolate mofetil 500 milliGRAM(s) Oral two times a day  PARoxetine 10 milliGRAM(s) Oral daily  silver sulfADIAZINE 1% Cream 1 Application(s) Topical two times a day  sodium chloride 0.9%. 1000 milliLiter(s) IV Continuous <Continuous>  sodium chloride 0.9%. 1000 milliLiter(s) IV Continuous <Continuous>  tacrolimus 2 milliGRAM(s) Oral two times a day  tamsulosin 0.4 milliGRAM(s) Oral at bedtime    PRN MEDICATIONS  acetaminophen   Tablet .. 650 milliGRAM(s) Oral every 6 hours PRN  oxyCODONE    IR 10 milliGRAM(s) Oral every 8 hours PRN  zolpidem 5 milliGRAM(s) Oral at bedtime PRN    HOME MEDICATIONS  Home Medications:  Cardizem  mg/24 hours oral capsule, extended release: 1 cap(s) orally once a day (06 Nov 2019 15:17)  CellCept 500 mg oral tablet: 1 tab(s) orally 2 times a day (06 Nov 2019 15:17)  Flomax 0.4 mg oral capsule: 1 cap(s) orally once a day (06 Nov 2019 15:17)  gabapentin 300 mg oral tablet: 1 tab(s) orally 3 times a day (06 Nov 2019 15:17)  Imdur 30 mg oral tablet, extended release: 1 tab(s) orally once a day (in the morning) (06 Nov 2019 15:17)  Lipitor 10 mg oral tablet: 1 tab(s) orally every 48 hours (06 Nov 2019 15:17)  oxyCODONE 10 mg oral tablet: 1 tab(s) orally every 6 hours, As Needed (06 Nov 2019 15:17)  Paxil 10 mg oral tablet: 1 tab(s) orally once a day (06 Nov 2019 15:17)  tacrolimus 1 mg oral capsule: 2 cap(s) orally every 12 hours (06 Nov 2019 15:17)  Toprol- mg oral tablet, extended release: 1 tab(s) orally once a day (06 Nov 2019 15:17)      LABS:                        10.9   8.37  )-----------( 68       ( 12 Nov 2019 04:56 )             33.7     11-12    136  |  103  |  14  ----------------------------<  114<H>  4.4   |  20  |  1.0    Ca    10.2<H>      12 Nov 2019 04:56  Mg     1.8     11-12        PT/INR - ( 12 Nov 2019 04:56 )   PT: 15.20 sec;   INR: 1.33 ratio         PTT - ( 12 Nov 2019 04:56 )  PTT:49.3 sec      Lactate, Blood: 1.1 mmol/L (11-11-19 @ 17:26)      PHYSICAL EXAM:  T(C): 37.1 (11-12-19 @ 05:31), Max: 38.3 (11-11-19 @ 12:17)  HR: 110 (11-12-19 @ 05:31) (70 - 123)  BP: 126/73 (11-12-19 @ 05:31) (109/66 - 136/72)  RR: 18 (11-12-19 @ 05:31) (14 - 18)  SpO2: --    GENERAL: NAD, well-developed, 64y sitting in his chair  EENT: EOMI, conjunctiva and sclera clear, No nasal obstruction or discharge  RESPIRATORY: Clear to auscultation bilaterally; No wheeze or crackles  CARDIOVASCULAR: Tachycardic, difficult to determine rhythm no LE edema  GASTROINTESTINAL: Abdomen Soft, Nontender, Nondistended; Bowel sounds present  MUSCULOSKELETAL:  No cyanosis. L arm with well-healed linear post-surgical scar; R forearm with prior IV site with erythema, tenderness, no drainage noted  NEUROLOGY: A&Ox3, non focal, cognition intact, MAEE        ADMISSION SUMMARY  Patient is a 64y old Male who presents with a chief complaint of chest pain (11 Nov 2019 18:16)

## 2019-11-12 NOTE — PROGRESS NOTE ADULT - ASSESSMENT
· Assessment		  65y/o M with ESRD s/p kidney transplant, Afib on eliquis presenting with 1mo chest pain, found to have pos CCTA. Planned for cath, however patient developed cellulitis in R arm.     IMPRESSION:  Right cephalic/ basilic vein superficial thrombophlebitis non suppurative  Clinically improved  no sepsis    RECOMMENDATIONS:  warm compressors  BCx  Daptomycin 6 mg / kg iv q24h

## 2019-11-12 NOTE — PROGRESS NOTE ADULT - SUBJECTIVE AND OBJECTIVE BOX
[facility] [facility] FOLLOW UP NOTE  --------------------------------------------------------------------------------  24 hour events/subjective: Patient examined. Appears comfortable.    PAST HISTORY  --------------------------------------------------------------------------------  No significant changes to PMH, PSH, FHx, SHx, unless otherwise noted    ALLERGIES & MEDICATIONS  --------------------------------------------------------------------------------  Allergies    Allergy Status Unknown    Standing Inpatient Medications  acetaminophen   Tablet .. 650 milliGRAM(s) Oral every 6 hours  aspirin  chewable 81 milliGRAM(s) Oral daily  atorvastatin 10 milliGRAM(s) Oral at bedtime  chlorhexidine 4% Liquid 1 Application(s) Topical <User Schedule>  DAPTOmycin IVPB 450 milliGRAM(s) IV Intermittent every 24 hours  DAPTOmycin IVPB      diltiazem    milliGRAM(s) Oral daily  gabapentin 300 milliGRAM(s) Oral every 8 hours  heparin  Infusion 1600 Unit(s)/Hr IV Continuous <Continuous>  isosorbide   mononitrate ER Tablet (IMDUR) 30 milliGRAM(s) Oral daily  metoprolol succinate  milliGRAM(s) Oral daily  mycophenolate mofetil 500 milliGRAM(s) Oral two times a day  PARoxetine 10 milliGRAM(s) Oral daily  silver sulfADIAZINE 1% Cream 1 Application(s) Topical two times a day  sodium chloride 0.9%. 1000 milliLiter(s) IV Continuous <Continuous>  sodium chloride 0.9%. 1000 milliLiter(s) IV Continuous <Continuous>  tacrolimus 2 milliGRAM(s) Oral two times a day  tamsulosin 0.4 milliGRAM(s) Oral at bedtime    PRN Inpatient Medications  oxyCODONE    IR 10 milliGRAM(s) Oral every 4 hours PRN  zolpidem 5 milliGRAM(s) Oral at bedtime PRN    VITALS/PHYSICAL EXAM  --------------------------------------------------------------------------------  T(C): 36.8 (11-12-19 @ 13:37), Max: 37.8 (11-11-19 @ 20:52)  HR: 103 (11-12-19 @ 13:37) (97 - 112)  BP: 119/63 (11-12-19 @ 13:37) (109/66 - 136/81)  RR: 19 (11-12-19 @ 13:37) (18 - 19)      11-11-19 @ 07:01  -  11-12-19 @ 07:00  --------------------------------------------------------  IN: 1470 mL / OUT: 1750 mL / NET: -280 mL    11-12-19 @ 07:01  -  11-12-19 @ 14:03  --------------------------------------------------------  IN: 785 mL / OUT: 400 mL / NET: 385 mL      Physical Exam:  	Gen: NAD  	Pulm: CTA B/L  	CV: RRR, S1S2  	Abd: +BS, soft, nontender/nondistended  	: No suprapubic tenderness    LABS/STUDIES  --------------------------------------------------------------------------------              10.9   8.37  >-----------<  68       [11-12-19 @ 04:56]              33.7     136  |  103  |  14  ----------------------------<  114      [11-12-19 @ 04:56]  4.4   |  20  |  1.0        Ca     10.2     [11-12-19 @ 04:56]      Mg     1.8     [11-12-19 @ 04:56]    PT/INR: PT 15.20, INR 1.33       [11-12-19 @ 04:56]  PTT: 49.3       [11-12-19 @ 04:56]    Creatinine Trend:  SCr 1.0 [11-12 @ 04:56]  SCr 1.3 [11-11 @ 05:29]  SCr 1.3 [11-10 @ 05:49]  SCr 1.3 [11-09 @ 05:20]  SCr 1.1 [11-08 @ 05:28]        HbA1c 5.1      [11-06-19 @ 15:00]  Lipid: chol 105, , HDL 32, LDL 62      [11-06-19 @ 15:00]    HCV 11.72, Reactive      [11-07-19 @ 07:02]

## 2019-11-12 NOTE — PROGRESS NOTE ADULT - SUBJECTIVE AND OBJECTIVE BOX
Pt feels fine. no chest pain no dyspnea. no palpitations. right arm is bothersome.    On Exam:    Vital Signs Last 24 Hrs  T(C): 36.8 (12 Nov 2019 13:37), Max: 37.8 (11 Nov 2019 20:52)  T(F): 98.2 (12 Nov 2019 13:37), Max: 100 (11 Nov 2019 20:52)  HR: 88 (12 Nov 2019 17:41) (88 - 112)  BP: 124/68 (12 Nov 2019 17:41) (119/63 - 136/81)  RR: 19 (12 Nov 2019 13:37) (18 - 19)  SpO2: --    PHYSICAL EXAM:    · NECK:	 supple  ·RESPIRATORY:  Good air entry bilaterally.  · CARDIOVASCULAR	Irregularly irregular rhythm. S1 variable in intensity.  . ABDOMEN :  no distention; bowel sounds normal; no rebound tenderness; no guarding; no rigidity  · EXTREMITIES: left arm no edema. right arm cellulitis. .  ·NEUROLOGICAL:   alert and oriented x 3;                          10.9   8.37  )-----------( 68       ( 12 Nov 2019 04:56 )             33.7       11-12    136  |  103  |  14  ----------------------------<  114<H>  4.4   |  20  |  1.0    Ca    10.2<H>      12 Nov 2019 04:56  Mg     1.8     11-12

## 2019-11-12 NOTE — PROGRESS NOTE ADULT - SUBJECTIVE AND OBJECTIVE BOX
DAVONTE CHOU  64y, Male    All available historical data reviewed    OVERNIGHT EVENTS:  none    ROS:  General: Denies rigors, nightsweats  HEENT: Denies headache, rhinorrhea, sore throat, eye pain  CV: Denies CP, palpitations  PULM: Denies wheezing, hemoptysis  GI: Denies hematemesis, hematochezia, melena  : Denies discharge, hematuria  MSK: Denies arthralgias, myalgias, has pain LUE  SKIN: Denies rash, lesions  NEURO: Denies paresthesias, weakness  PSYCH: Denies depression, anxiety    VITALS:  T(F): 98.2, Max: 100 (11-11-19 @ 20:52)  HR: 103  BP: 119/63  RR: 19Vital Signs Last 24 Hrs  T(C): 36.8 (12 Nov 2019 13:37), Max: 37.8 (11 Nov 2019 20:52)  T(F): 98.2 (12 Nov 2019 13:37), Max: 100 (11 Nov 2019 20:52)  HR: 103 (12 Nov 2019 13:37) (97 - 112)  BP: 119/63 (12 Nov 2019 13:37) (119/63 - 136/81)  BP(mean): --  RR: 19 (12 Nov 2019 13:37) (18 - 19)  SpO2: --    TESTS & MEASUREMENTS:                        10.9   8.37  )-----------( 68       ( 12 Nov 2019 04:56 )             33.7     11-12    136  |  103  |  14  ----------------------------<  114<H>  4.4   |  20  |  1.0    Ca    10.2<H>      12 Nov 2019 04:56  Mg     1.8     11-12                RADIOLOGY & ADDITIONAL TESTS:  Personal review of radiological diagnostics performed  Echo and EKG results noted when applicable.     ANTIBIOTICS:  DAPTOmycin IVPB 450 milliGRAM(s) IV Intermittent every 24 hours  DAPTOmycin IVPB

## 2019-11-12 NOTE — CONSULT NOTE ADULT - SUBJECTIVE AND OBJECTIVE BOX
Patient is a 64y old  Male who presents with a chief complaint of chest pain (2019 10:17)    HPI:  63y/o M with PMH of COPD (no O2), hx of ESRD s/p L kidney transplant, eye melanoma w/ R blindness, HTN, HLD, Afib on eliquis, LUE embolism in Oct (believed to be from ?Afib) presenting with 1mo of midsternal non-radiating chest pain. Pain is moderate, some SOB on exertion but no significantly alleviating or triggering factors. Follows with Dr. Low who scheduled him for further f/u in January but doesn't want to wait any longer so Maranda sent him to ED. Endorses dyspnea on exertion. Otherwise denies fever, chills, abd pain, N/V/D/C, urinary symptoms, dizzy/lightheadedness, weakness, headache. Lives alone, has aid.     Has questionable prior hx of cirrhosis likely 2/2 hep C & lymphoma but doens't follow with anyone for them and doesn't know too much info on these.    In ED: T98.3, /58, HR 54, RR18, SpO2 97%. Labs grossly unremarkable except for BNP 2698, trop x2 neg. EKG showed rate controlled Afib w/o acute ST change. CXR neg. Was sent to OBS pending CCTA which came back grossly pos with score meeting 99th percentile. Planned for LHC on , however patient developed cellulitis in R arm and procedure was canceled.    Patient went into AF with RVR and was started on a cardizem gtt. Gtt discontinued yesterday approximately 3 PM d/t HR in the 60s and started on PO. EP consulted for further recommendations.       REVIEW OF SYSTEMS  A ten-point review of systems was otherwise negative except as noted.    PAST MEDICAL & SURGICAL HISTORY:  Cirrhosis: possibly related to hepC  Melanoma: R eye, s/p laser  Lymphoma: ?questionable, not treated, not followed  Peripheral neuropathy: unclear cause  COPD, mild: not on O2  Atrial fibrillation: on eliquis  HLD (hyperlipidemia)  HTN (hypertension)  ESRD (end stage renal disease)  CKD (chronic kidney disease)  S/P arteriovenous (AV) fistula creation: prior old fistula in R arm  History of kidney transplant: 3 years ago      Home Medications:  Cardizem  mg/24 hours oral capsule, extended release: 1 cap(s) orally once a day (2019 15:17)  CellCept 500 mg oral tablet: 1 tab(s) orally 2 times a day (2019 15:17)  Flomax 0.4 mg oral capsule: 1 cap(s) orally once a day (2019 15:17)  gabapentin 300 mg oral tablet: 1 tab(s) orally 3 times a day (2019 15:17)  Imdur 30 mg oral tablet, extended release: 1 tab(s) orally once a day (in the morning) (2019 15:17)  Lipitor 10 mg oral tablet: 1 tab(s) orally every 48 hours (2019 15:17)  oxyCODONE 10 mg oral tablet: 1 tab(s) orally every 6 hours, As Needed (2019 15:17)  Paxil 10 mg oral tablet: 1 tab(s) orally once a day (2019 15:17)  tacrolimus 1 mg oral capsule: 2 cap(s) orally every 12 hours (2019 15:17)  Toprol- mg oral tablet, extended release: 1 tab(s) orally once a day (2019 15:17)      Allergies:    Allergy Status Unknown      PREVIOUS DIAGNOSTIC TESTING:      ECHO  FINDINGS:  < from: Transthoracic Echocardiogram (19 @ 14:57) >  Summary:   1. Mild mitral valve regurgitation.   2. Moderate mitral annular calcification.   3. Structurally normal mitral valve, with normal leaflet excursion.   4. Mild-moderate tricuspid regurgitation.   5. Sclerotic aortic valve with normal opening.   6. Mildly dilated aortic root to 4.1cm.   7. LA volume Index is 59.6 ml/m² ml/m2.    < end of copied text >    STRESS  FINDINGS:    CATHETERIZATION  FINDINGS:  < from: Cardiac Cath Lab - Adult (19 @ 15:43) >  VENTRICLES: No left ventriculogram was performed.    VALVES: AORTIC VALVE: No significant aorticgradient.    CORONARY CIRCULATION: The coronary circulation is right dominant. There was    2-vessel coronary artery disease (LAD and RCA). Left main: Normal.    Proximal LAD: The vessel was heavily calcified. Angiography showed mild    atherosclerosis with no flow limiting lesions. Mid LAD: There was a 60 %    stenosis at a site with no prior intervention. The lesion was moderately    calcified. There was ANGELO grade 3 flow through the vessel (brisk flow).    Distal LAD: Angiography showed mild atherosclerosis with no flow limiting    lesions. 1st diagonal: Angiography showed mild atherosclerosis with no    flow limiting lesions. Proximal circumflex: Angiography showed mild    atherosclerosis with no flow limiting lesions. Distal circumflex:    Angiography showed minor luminal irregularities with no flow limiting    lesions. 1st obtuse marginal: The vessel was small sized. RCA: The vessel    was large sized and heavily calcified. Proximal RCA: There was a diffuse    80 % stenosis at a site with no prior intervention. The lesion was heavily    calcified. There was ANGELO grade 3 flow through the vessel (brisk flow).    Mid RCA: There was a tubular 80 % stenosis at a site with no prior    intervention. The lesion was heavily calcified. There was ANGELO grade 3    flow through the vessel (brisk flow). Distal RCA: Angiography showed minor    luminal irregularities with no flow limiting lesions. Right PDA: Normal.      COMPLICATIONS: No complications occurred during the cath lab visit.    IMPRESSIONS: There is 2 vessel coronary artery disease.    < end of copied text >    ELECTROPHYSIOLOGY STUDY  FINDINGS:    CAROTID ULTRASOUND:  FINDINGS    VENOUS DUPLEX SCAN:  FINDINGS:  < from: VA Duplex Upper Ext Vein Scan, Left (19 @ 17:53) >  Impression:    No evidence of deep venous thrombosis in the left upper extremity.    Nonvisualization of left cephalic and basilic veins.    < end of copied text >    CHEST CT PULMONARY ANGIO with IV Contrast:  FINDINGS:    FAMILY HISTORY:  FHx: breast cancer: sister ( in 30s)  FH: dementia: mother, alive      SOCIAL HISTORY:  Former smoking hx, quit >10ys prior. former IVDA nothing recent. No EtOH abuse. Lives alone, no family/children. Has friend who works as his aid 4 hours/day.    MEDICATIONS  (STANDING):  aspirin  chewable 81 milliGRAM(s) Oral daily  atorvastatin 10 milliGRAM(s) Oral at bedtime  chlorhexidine 4% Liquid 1 Application(s) Topical <User Schedule>  DAPTOmycin IVPB 450 milliGRAM(s) IV Intermittent every 24 hours  DAPTOmycin IVPB      diltiazem    milliGRAM(s) Oral daily  gabapentin 300 milliGRAM(s) Oral at bedtime  gabapentin 600 milliGRAM(s) Oral daily  heparin  Infusion 1600 Unit(s)/Hr (16 mL/Hr) IV Continuous <Continuous>  isosorbide   mononitrate ER Tablet (IMDUR) 30 milliGRAM(s) Oral daily  metoprolol succinate  milliGRAM(s) Oral daily  mycophenolate mofetil 500 milliGRAM(s) Oral two times a day  oxyCODONE    IR 10 milliGRAM(s) Oral every 4 hours  PARoxetine 10 milliGRAM(s) Oral daily  silver sulfADIAZINE 1% Cream 1 Application(s) Topical two times a day  sodium chloride 0.9%. 1000 milliLiter(s) (75 mL/Hr) IV Continuous <Continuous>  sodium chloride 0.9%. 1000 milliLiter(s) (150 mL/Hr) IV Continuous <Continuous>  tacrolimus 2 milliGRAM(s) Oral two times a day  tamsulosin 0.4 milliGRAM(s) Oral at bedtime    MEDICATIONS  (PRN):  acetaminophen   Tablet .. 650 milliGRAM(s) Oral every 6 hours PRN Temp greater or equal to 38C (100.4F)  zolpidem 5 milliGRAM(s) Oral at bedtime PRN Insomnia      Vital Signs Last 24 Hrs  T(C): 36.8 (2019 13:37), Max: 38.2 (2019 13:53)  T(F): 98.2 (2019 13:37), Max: 100.8 (2019 13:53)  HR: 103 (2019 13:37) (97 - 112)  BP: 119/63 (2019 13:37) (109/66 - 136/81)  BP(mean): --  RR: 19 (2019 13:37) (18 - 19)  SpO2: --    PHYSICAL EXAM:    GENERAL: In no apparent distress, well nourished, and hydrated..  HEART: Irregular rate and rhythm; No murmurs, rubs, or gallops.  PULMONARY: Clear to auscultation and perfusion.  No rales, wheezing, or rhonchi bilaterally.  ABDOMEN: Soft, Nontender, Nondistended; Bowel sounds present  EXTREMITIES:  2+ Peripheral Pulses, No clubbing, cyanosis, or edema  NEUROLOGICAL: Grossly nonfocal. AO x3.      INTERPRETATION OF TELEMETRY: AF 88 BPM    ECG: < from: 12 Lead ECG (19 @ 23:44) >  Ventricular Rate 61 BPM    Atrial Rate 68 BPM    QRS Duration 76 ms    Q-T Interval 454 ms    QTC Calculation(Bezet) 457 ms    R Axis 56 degrees    T Axis -68 degrees    Diagnosis Line Atrial fibrillation  Voltage criteria for left ventricular hypertrophy  Cannot rule out Septal infarct , age undetermined  T wave abnormality, consider lateral ischemia  Abnormal ECG    < end of copied text >      I&O's Detail    2019 07:01  -  2019 07:00  --------------------------------------------------------  IN:    heparin Infusion: 150 mL    Oral Fluid: 570 mL    sodium chloride 0.9%.: 750 mL  Total IN: 1470 mL    OUT:    Voided: 1750 mL  Total OUT: 1750 mL    Total NET: -280 mL      2019 07:01  -  2019 13:48  --------------------------------------------------------  IN:    heparin Infusion: 80 mL    Oral Fluid: 330 mL    sodium chloride 0.9%.: 375 mL  Total IN: 785 mL    OUT:    Voided: 400 mL  Total OUT: 400 mL    Total NET: 385 mL    LABS:                        10.9   8.37  )-----------( 68       ( 2019 04:56 )             33.7         136  |  103  |  14  ----------------------------<  114<H>  4.4   |  20  |  1.0    Ca    10.2<H>      2019 04:56  Mg     1.8           PT/INR - ( 2019 04:56 )   PT: 15.20 sec;   INR: 1.33 ratio         PTT - ( 2019 04:56 )  PTT:49.3 sec    I&O's Detail    2019 07:01  -  2019 07:00  --------------------------------------------------------  IN:    heparin Infusion: 150 mL    Oral Fluid: 570 mL    sodium chloride 0.9%.: 750 mL  Total IN: 1470 mL    OUT:    Voided: 1750 mL  Total OUT: 1750 mL    Total NET: -280 mL      2019 07:  -  2019 13:49  --------------------------------------------------------  IN:    heparin Infusion: 80 mL    Oral Fluid: 330 mL    sodium chloride 0.9%.: 375 mL  Total IN: 785 mL    OUT:    Voided: 400 mL  Total OUT: 400 mL    Total NET: 385 mL      RADIOLOGY & ADDITIONAL STUDIES:

## 2019-11-12 NOTE — CONSULT NOTE ADULT - ASSESSMENT
65y/o M with PMH of COPD (no O2), hx of ESRD s/p L kidney transplant, eye melanoma w/ R blindness, HTN, HLD, Afib on eliquis, LUE embolism in Oct (believed to be from ?Afib), and questionable prior hx of cirrhosis likely 2/2 hep C & lymphoma presenting with chest pain. Now in AF with RVR and was started on a cardizem gtt, later transitioned to PO.    Impression:  AF with RVR, now rate controlled  Hx COPD  Hx HTN  Hx HLD    Plan:   - Patient rate controlled as of this morning  - Cont cardizem  mg daily  - Would increase lopressor a BP tolerates - consider 50 mg PO Q8  - Maintain k>4 and mag >2  - Cont tele  - [facility] PENDING  - Cont Heparin gtt for stroke prevention  - Cont to trend platelets

## 2019-11-13 ENCOUNTER — TRANSCRIPTION ENCOUNTER (OUTPATIENT)
Age: 64
End: 2019-11-13

## 2019-11-13 LAB
ANION GAP SERPL CALC-SCNC: 14 MMOL/L — SIGNIFICANT CHANGE UP (ref 7–14)
APTT BLD: 50.2 SEC — HIGH (ref 27–39.2)
APTT BLD: 55.3 SEC — HIGH (ref 27–39.2)
BASOPHILS # BLD AUTO: 0.02 K/UL — SIGNIFICANT CHANGE UP (ref 0–0.2)
BASOPHILS NFR BLD AUTO: 0.4 % — SIGNIFICANT CHANGE UP (ref 0–1)
BUN SERPL-MCNC: 15 MG/DL — SIGNIFICANT CHANGE UP (ref 10–20)
CALCIUM SERPL-MCNC: 10 MG/DL — SIGNIFICANT CHANGE UP (ref 8.5–10.1)
CHLORIDE SERPL-SCNC: 101 MMOL/L — SIGNIFICANT CHANGE UP (ref 98–110)
CO2 SERPL-SCNC: 18 MMOL/L — SIGNIFICANT CHANGE UP (ref 17–32)
CREAT SERPL-MCNC: 1.1 MG/DL — SIGNIFICANT CHANGE UP (ref 0.7–1.5)
EOSINOPHIL # BLD AUTO: 0.02 K/UL — SIGNIFICANT CHANGE UP (ref 0–0.7)
EOSINOPHIL NFR BLD AUTO: 0.4 % — SIGNIFICANT CHANGE UP (ref 0–8)
GLUCOSE SERPL-MCNC: 131 MG/DL — HIGH (ref 70–99)
HCT VFR BLD CALC: 30.6 % — LOW (ref 42–52)
HEPARIN-PF4 AB RESULT: <0.6 U/ML — SIGNIFICANT CHANGE UP (ref 0–0.9)
HGB BLD-MCNC: 10 G/DL — LOW (ref 14–18)
IMM GRANULOCYTES NFR BLD AUTO: 0.6 % — HIGH (ref 0.1–0.3)
LYMPHOCYTES # BLD AUTO: 0.58 K/UL — LOW (ref 1.2–3.4)
LYMPHOCYTES # BLD AUTO: 11.3 % — LOW (ref 20.5–51.1)
MAGNESIUM SERPL-MCNC: 1.7 MG/DL — LOW (ref 1.8–2.4)
MCHC RBC-ENTMCNC: 30.8 PG — SIGNIFICANT CHANGE UP (ref 27–31)
MCHC RBC-ENTMCNC: 32.7 G/DL — SIGNIFICANT CHANGE UP (ref 32–37)
MCV RBC AUTO: 94.2 FL — HIGH (ref 80–94)
MONOCYTES # BLD AUTO: 0.39 K/UL — SIGNIFICANT CHANGE UP (ref 0.1–0.6)
MONOCYTES NFR BLD AUTO: 7.6 % — SIGNIFICANT CHANGE UP (ref 1.7–9.3)
NEUTROPHILS # BLD AUTO: 4.08 K/UL — SIGNIFICANT CHANGE UP (ref 1.4–6.5)
NEUTROPHILS NFR BLD AUTO: 79.7 % — HIGH (ref 42.2–75.2)
NRBC # BLD: 0 /100 WBCS — SIGNIFICANT CHANGE UP (ref 0–0)
PF4 HEPARIN CMPLX AB SER-ACNC: NEGATIVE — SIGNIFICANT CHANGE UP
PLATELET # BLD AUTO: 56 K/UL — LOW (ref 130–400)
POTASSIUM SERPL-MCNC: 4.4 MMOL/L — SIGNIFICANT CHANGE UP (ref 3.5–5)
POTASSIUM SERPL-SCNC: 4.4 MMOL/L — SIGNIFICANT CHANGE UP (ref 3.5–5)
RBC # BLD: 3.25 M/UL — LOW (ref 4.7–6.1)
RBC # FLD: 15 % — HIGH (ref 11.5–14.5)
SODIUM SERPL-SCNC: 133 MMOL/L — LOW (ref 135–146)
WBC # BLD: 5.12 K/UL — SIGNIFICANT CHANGE UP (ref 4.8–10.8)
WBC # FLD AUTO: 5.12 K/UL — SIGNIFICANT CHANGE UP (ref 4.8–10.8)

## 2019-11-13 PROCEDURE — 99233 SBSQ HOSP IP/OBS HIGH 50: CPT

## 2019-11-13 RX ORDER — APIXABAN 2.5 MG/1
5 TABLET, FILM COATED ORAL EVERY 12 HOURS
Refills: 0 | Status: DISCONTINUED | OUTPATIENT
Start: 2019-11-13 | End: 2019-11-13

## 2019-11-13 RX ORDER — ASPIRIN/CALCIUM CARB/MAGNESIUM 324 MG
81 TABLET ORAL DAILY
Refills: 0 | Status: DISCONTINUED | OUTPATIENT
Start: 2019-11-13 | End: 2019-11-14

## 2019-11-13 RX ORDER — MAGNESIUM SULFATE 500 MG/ML
2 VIAL (ML) INJECTION ONCE
Refills: 0 | Status: COMPLETED | OUTPATIENT
Start: 2019-11-13 | End: 2019-11-13

## 2019-11-13 RX ORDER — CEPHALEXIN 500 MG
500 CAPSULE ORAL EVERY 8 HOURS
Refills: 0 | Status: DISCONTINUED | OUTPATIENT
Start: 2019-11-13 | End: 2019-11-14

## 2019-11-13 RX ORDER — APIXABAN 2.5 MG/1
5 TABLET, FILM COATED ORAL EVERY 12 HOURS
Refills: 0 | Status: DISCONTINUED | OUTPATIENT
Start: 2019-11-13 | End: 2019-11-14

## 2019-11-13 RX ORDER — POLYETHYLENE GLYCOL 3350 17 G/17G
17 POWDER, FOR SOLUTION ORAL DAILY
Refills: 0 | Status: DISCONTINUED | OUTPATIENT
Start: 2019-11-13 | End: 2019-11-14

## 2019-11-13 RX ORDER — SENNA PLUS 8.6 MG/1
2 TABLET ORAL AT BEDTIME
Refills: 0 | Status: DISCONTINUED | OUTPATIENT
Start: 2019-11-13 | End: 2019-11-14

## 2019-11-13 RX ADMIN — Medication 650 MILLIGRAM(S): at 23:55

## 2019-11-13 RX ADMIN — OXYCODONE HYDROCHLORIDE 10 MILLIGRAM(S): 5 TABLET ORAL at 04:05

## 2019-11-13 RX ADMIN — OXYCODONE HYDROCHLORIDE 10 MILLIGRAM(S): 5 TABLET ORAL at 15:52

## 2019-11-13 RX ADMIN — TACROLIMUS 2 MILLIGRAM(S): 5 CAPSULE ORAL at 17:41

## 2019-11-13 RX ADMIN — CHLORHEXIDINE GLUCONATE 1 APPLICATION(S): 213 SOLUTION TOPICAL at 06:17

## 2019-11-13 RX ADMIN — APIXABAN 5 MILLIGRAM(S): 2.5 TABLET, FILM COATED ORAL at 08:29

## 2019-11-13 RX ADMIN — Medication 650 MILLIGRAM(S): at 06:43

## 2019-11-13 RX ADMIN — POLYETHYLENE GLYCOL 3350 17 GRAM(S): 17 POWDER, FOR SOLUTION ORAL at 11:39

## 2019-11-13 RX ADMIN — MYCOPHENOLATE MOFETIL 500 MILLIGRAM(S): 250 CAPSULE ORAL at 06:17

## 2019-11-13 RX ADMIN — TAMSULOSIN HYDROCHLORIDE 0.4 MILLIGRAM(S): 0.4 CAPSULE ORAL at 22:27

## 2019-11-13 RX ADMIN — Medication 650 MILLIGRAM(S): at 18:01

## 2019-11-13 RX ADMIN — Medication 100 MILLIGRAM(S): at 06:17

## 2019-11-13 RX ADMIN — OXYCODONE HYDROCHLORIDE 10 MILLIGRAM(S): 5 TABLET ORAL at 12:30

## 2019-11-13 RX ADMIN — GABAPENTIN 300 MILLIGRAM(S): 400 CAPSULE ORAL at 06:17

## 2019-11-13 RX ADMIN — ATORVASTATIN CALCIUM 10 MILLIGRAM(S): 80 TABLET, FILM COATED ORAL at 22:27

## 2019-11-13 RX ADMIN — Medication 500 MILLIGRAM(S): at 14:18

## 2019-11-13 RX ADMIN — OXYCODONE HYDROCHLORIDE 10 MILLIGRAM(S): 5 TABLET ORAL at 04:51

## 2019-11-13 RX ADMIN — Medication 81 MILLIGRAM(S): at 11:39

## 2019-11-13 RX ADMIN — SENNA PLUS 2 TABLET(S): 8.6 TABLET ORAL at 22:27

## 2019-11-13 RX ADMIN — APIXABAN 5 MILLIGRAM(S): 2.5 TABLET, FILM COATED ORAL at 17:41

## 2019-11-13 RX ADMIN — Medication 650 MILLIGRAM(S): at 23:06

## 2019-11-13 RX ADMIN — Medication 650 MILLIGRAM(S): at 12:01

## 2019-11-13 RX ADMIN — ISOSORBIDE MONONITRATE 30 MILLIGRAM(S): 60 TABLET, EXTENDED RELEASE ORAL at 11:39

## 2019-11-13 RX ADMIN — TACROLIMUS 2 MILLIGRAM(S): 5 CAPSULE ORAL at 06:17

## 2019-11-13 RX ADMIN — Medication 650 MILLIGRAM(S): at 00:32

## 2019-11-13 RX ADMIN — Medication 1 APPLICATION(S): at 17:43

## 2019-11-13 RX ADMIN — Medication 1 APPLICATION(S): at 06:18

## 2019-11-13 RX ADMIN — Medication 650 MILLIGRAM(S): at 06:16

## 2019-11-13 RX ADMIN — OXYCODONE HYDROCHLORIDE 10 MILLIGRAM(S): 5 TABLET ORAL at 16:39

## 2019-11-13 RX ADMIN — Medication 650 MILLIGRAM(S): at 11:39

## 2019-11-13 RX ADMIN — Medication 650 MILLIGRAM(S): at 17:41

## 2019-11-13 RX ADMIN — OXYCODONE HYDROCHLORIDE 10 MILLIGRAM(S): 5 TABLET ORAL at 23:06

## 2019-11-13 RX ADMIN — Medication 360 MILLIGRAM(S): at 06:17

## 2019-11-13 RX ADMIN — GABAPENTIN 300 MILLIGRAM(S): 400 CAPSULE ORAL at 22:27

## 2019-11-13 RX ADMIN — MYCOPHENOLATE MOFETIL 500 MILLIGRAM(S): 250 CAPSULE ORAL at 17:42

## 2019-11-13 RX ADMIN — OXYCODONE HYDROCHLORIDE 10 MILLIGRAM(S): 5 TABLET ORAL at 11:48

## 2019-11-13 RX ADMIN — GABAPENTIN 300 MILLIGRAM(S): 400 CAPSULE ORAL at 14:18

## 2019-11-13 RX ADMIN — OXYCODONE HYDROCHLORIDE 10 MILLIGRAM(S): 5 TABLET ORAL at 22:27

## 2019-11-13 RX ADMIN — Medication 10 MILLIGRAM(S): at 11:39

## 2019-11-13 RX ADMIN — Medication 500 MILLIGRAM(S): at 22:27

## 2019-11-13 RX ADMIN — Medication 12.5 GRAM(S): at 14:18

## 2019-11-13 NOTE — PROGRESS NOTE ADULT - SUBJECTIVE AND OBJECTIVE BOX
DAVONTE CHOU  64y, Male    All available historical data reviewed    OVERNIGHT EVENTS:  no fevers  ROS:  General: Denies rigors, nightsweats  HEENT: Denies headache, rhinorrhea, sore throat, eye pain  CV: Denies CP, palpitations  PULM: Denies wheezing, hemoptysis  GI: Denies hematemesis, hematochezia, melena  : Denies discharge, hematuria  MSK: Denies arthralgias, myalgias, has pain right arm  SKIN: Denies rash, lesions  NEURO: Denies paresthesias, weakness  PSYCH: Denies depression, anxiety    VITALS:  T(F): 98, Max: 98.8 (11-12-19 @ 20:40)  HR: 94  BP: 133/76  RR: 18Vital Signs Last 24 Hrs  T(C): 36.7 (13 Nov 2019 04:35), Max: 37.1 (12 Nov 2019 20:40)  T(F): 98 (13 Nov 2019 04:35), Max: 98.8 (12 Nov 2019 20:40)  HR: 94 (13 Nov 2019 04:35) (88 - 103)  BP: 133/76 (13 Nov 2019 04:35) (111/62 - 136/81)  BP(mean): --  RR: 18 (13 Nov 2019 04:35) (18 - 19)  SpO2: --    TESTS & MEASUREMENTS:                        10.0   5.12  )-----------( 56       ( 13 Nov 2019 06:57 )             30.6     11-13    133<L>  |  101  |  15  ----------------------------<  131<H>  4.4   |  18  |  1.1    Ca    10.0      13 Nov 2019 06:57  Mg     1.7     11-13          Culture - Blood (collected 11-11-19 @ 12:14)  Source: .Blood Blood-Venous  Preliminary Report (11-12-19 @ 23:00):    No growth to date.    Culture - Blood (collected 11-11-19 @ 12:14)  Source: .Blood Blood-Venous  Preliminary Report (11-12-19 @ 23:00):    No growth to date.            RADIOLOGY & ADDITIONAL TESTS:  Personal review of radiological diagnostics performed  Echo and EKG results noted when applicable.     ANTIBIOTICS:  DAPTOmycin IVPB 450 milliGRAM(s) IV Intermittent every 24 hours  DAPTOmycin IVPB

## 2019-11-13 NOTE — PROGRESS NOTE ADULT - ASSESSMENT
· Assessment		  63y/o M with ESRD s/p kidney transplant, Afib on eliquis presenting with 1mo chest pain, found to have pos CCTA. Planned for cath, however patient developed cellulitis in R arm.     IMPRESSION:  Right cephalic/ basilic vein superficial thrombophlebitis non suppurative  Clinically improved  no abscess  no sepsis  BCx 11/11 MARIAN FONSECA    RECOMMENDATIONS:  warm compressors  po Keflex 50 mg q8h for 7 days  recall prn please

## 2019-11-13 NOTE — DISCHARGE NOTE NURSING/CASE MANAGEMENT/SOCIAL WORK - PATIENT PORTAL LINK FT
You can access the FollowMyHealth Patient Portal offered by [facility] by registering at the following website: [email or web address]. By joining [employer]’s FollowMyHealth portal, you will also be able to view your health information using other applications (apps) compatible with our system.

## 2019-11-13 NOTE — PROGRESS NOTE ADULT - ASSESSMENT
1- ESRD s/p Kidney Transplant in 2016 [facility]  2- CAD  3- Atrial fibrillation  4- HTN  5- Anemia  6- Hypomagnesemia    Plan    Renal function stable  Tacrolimus level acceptable  cont Cellcept   Cath cancelled due to thrombophlebitis  Replace Magnesium

## 2019-11-13 NOTE — DIETITIAN INITIAL EVALUATION ADULT. - FACTORS AFF FOOD INTAKE
c/o pain in his arm, however observed pt. eating lunch- pt. states his unable to cut his meats in smaller pieces d/t pain, denies GI distress or chew/swallow difficulty, unsure of last BM,  not on bowel regimen/pain

## 2019-11-13 NOTE — PROGRESS NOTE ADULT - ASSESSMENT
Activity: out of bed to chairRD s/p kidney transplant, Afib on eliquis presenting with 1mo chest pain, found to have pos CCTA. Planned for cath, however patient developed cellulitis in R arm; cath now postponed to outpatient and focus is on resolving cellulitis.    #RUE cellulitis, improving  -BCx 11/11 and 11/12 NGTD  -RUE Duplex neg   -ID following, patient immunosuppressed  -Pain management following for complaints of severe pain and requests for Dilaudid  -Vascular consulted, no intervention at this time  --Warm compress  --Daptomycin 11/11---Will discuss plan for abx with ID  --Oxycodone, no Dilaudid    # Rule out underlying ischemic CAD  - Coronary Calcium score 99th percentile  - EKG no acute ST changes, trop x2 neg  --Cath canceled secondary to cellulitis, plan for cath outpatient per Dr Low    # Afib on eliquis--rate controlled  - Back on Eliquis as of 11/13  --c/w cardizem 360, toprol 100--EP recs noted, go up on metroprolol if needed  --No events on telemetry, ok to dc     #Thrombocytopenia. Per [facility] record Plt levels vary, but went 140->70 this admission  -Heme/onc following  -Low suspicion for HIT at this time, more likely due to hypersplenism  --F/u HIT antibody    # LUE hx of embolism, staples in place w/ overlying skin changes (Eliquis had been stopped x 2 days for possible cath at [facility] and he developed arm clots s/p thrombectomy)  - no fluctuance, but has overlying redness and crusting  - US LUE negative  - Spoke with vascular surgery over the phone. As per patient he had surgery with Dr Peters at [facility]. As per Dr Peters he does not do surgery at [facility]. Explained the situation to vascular team. They want the patient to follow up with the doctor who did the surgery. Patient not willing to go back to [facility]. Patient aware and frustrated of the situation.     # Hx of ESRD, s/p L kidney transplant  - currently labs stable, not on hemodialysis  -Nephro following  --c/w cellcept, tacrolimus  --Monitor and replete electrolytes  --F/u tacrolimus level    # Hx of COPD  - not on any meds, no active sxs  --cw Duonebs PRN    # HLD  -lipid panel wnl, HbA1c 5.1  -- c/w lipitor 10 daily    #Likely BPH- c/w flomax  #HTN- c/w cardizem, toprol, imdur  #Hx of ?cirrhosis due to hep C- liver enzymes stable, outpt PCP f/u. Hep C past infection  #R eye blindness w/ hx of melanoma- c/w outpatient optho f/u    DVT ppx: Eliquis  GI ppx: not indicated  Diet: DASH  Activity: As tolerated  CODE: FULL Activity: out of bed to chairRD s/p kidney transplant, Afib on eliquis presenting with 1mo chest pain, found to have pos CCTA. Planned for cath, however patient developed cellulitis in R arm; cath now postponed to outpatient and focus is on resolving cellulitis.    #RUE cellulitis, improving  -BCx 11/11 and 11/12 NGTD  -RUE Duplex neg   -ID following, patient immunosuppressed  -Pain management following for complaints of severe pain and requests for Dilaudid  -Vascular consulted, no intervention at this time  --Warm compress  --Daptomycin 11/11---Will discuss plan for abx with ID  --Oxycodone, no Dilaudid    # Rule out underlying ischemic CAD  - Coronary Calcium score 99th percentile  - EKG no acute ST changes, trop x2 neg  --Cath canceled secondary to cellulitis, plan for cath outpatient per Dr Low    # Afib on eliquis--rate controlled  - Back on Eliquis as of 11/13  --c/w cardizem 360, toprol 100--EP recs noted, go up on metroprolol if needed  --No events on telemetry, ok to dc     #Thrombocytopenia. Per [facility] record Plt levels vary, but went 140->70 this admission  -Heme/onc following  -Low suspicion for HIT at this time, more likely due to hypersplenism  --F/u HIT antibody    # LUE hx of embolism, staples in place w/ overlying skin changes (Eliquis had been stopped x 2 days for possible cath at [facility] and he developed arm clots s/p thrombectomy)  - no fluctuance, but has overlying redness and crusting  - US LUE negative  - Spoke with vascular surgery over the phone. As per patient he had surgery with Dr Goldsmith at [facility]. They want the patient to follow up with the doctor who did the surgery. Patient not willing to go back to [facility]. Patient aware and frustrated of the situation.     # Hx of ESRD, s/p L kidney transplant  - currently labs stable, not on hemodialysis  -Nephro following  --c/w cellcept, tacrolimus  --Monitor and replete electrolytes  --F/u tacrolimus level    # Hx of COPD  - not on any meds, no active sxs  --cw Duonebs PRN    # HLD  -lipid panel wnl, HbA1c 5.1  -- c/w lipitor 10 daily    #Likely BPH- c/w flomax  #HTN- c/w cardizem, toprol, imdur  #Hx of ?cirrhosis due to hep C- liver enzymes stable, outpt PCP f/u. Hep C past infection  #R eye blindness w/ hx of melanoma- c/w outpatient optho f/u    DVT ppx: Eliquis  GI ppx: not indicated  Diet: DASH  Activity: As tolerated  CODE: FULL Activity: out of bed to chairRD s/p kidney transplant, Afib on eliquis presenting with 1mo chest pain, found to have pos CCTA. Planned for cath, however patient developed cellulitis in R arm; cath now postponed to outpatient and focus is on resolving cellulitis.    #RUE cellulitis, improving  -BCx 11/11 and 11/12 NGTD  -RUE Duplex neg   -ID following, patient immunosuppressed  -Pain management following for complaints of severe pain and requests for Dilaudid  -Vascular consulted, no intervention at this time  --Warm compress  --Daptomycin 11/11---Will discuss plan for abx with ID  --Oxycodone, no Dilaudid    # Rule out underlying ischemic CAD  - Coronary Calcium score 99th percentile  - EKG no acute ST changes, trop x2 neg  --Cath canceled secondary to cellulitis, plan for cath outpatient per Dr Low    # Afib on eliquis--rate controlled  - Back on Eliquis as of 11/13  --c/w cardizem 360, toprol 100--EP recs noted, go up on metroprolol if needed  --No events on telemetry, ok to dc     #Thrombocytopenia. Per [facility] record Plt levels vary, but went 140->70 this admission  -Heme/onc following  -Low suspicion for HIT at this time, more likely due to hypersplenism  --F/u HIT antibody    # LUE hx of embolism, staples in place w/ overlying skin changes (Eliquis had been stopped x 2 days for possible cath at [facility] and he developed arm clots s/p thrombectomy)  - no fluctuance, but has overlying redness and crusting  - US LUE negative  - Spoke with vascular surgery over the phone. As per patient he had surgery with Dr Goldsmith at [facility]. They want the patient to follow up with the doctor who did the surgery. Patient not willing to go back to [facility]. Patient aware and frustrated of the situation.     # Hx of ESRD, s/p L kidney transplant  - currently labs stable, not on hemodialysis  -Nephro following  --c/w cellcept, tacrolimus  --Monitor and replete electrolytes  --F/u tacrolimus level    # Hx of COPD  - not on any meds, no active sxs  --cw Duonebs PRN    # HLD  -lipid panel wnl, HbA1c 5.1  -- c/w lipitor 10 daily    #Likely BPH- c/w flomax  #HTN- c/w cardizem, toprol, imdur  #Hx of ?cirrhosis due to hep C- liver enzymes stable, outpt PCP f/u. Hep C past infection  #R eye blindness w/ hx of melanoma- c/w outpatient optho f/u    DVT ppx: Eliquis  GI ppx: not indicated  Diet: DASH  Activity: As tolerated  CODE: FULL     Attending Attestation    Pt has been seen and examined. Case and Plan discussed with pt and agree with above documentation as reviewed.   ID/Nephro follow ups appreciated.    RUE Thrombophlebitis  - Negative blood cxs  - stop daptomycin  - Keflex 50mg po q8hrs x 7days  - c/w warm compresses and arm elevation   - Pain control    Chest Pain   - Rule out CAD  - d/c telemetry as he ruled out  - Needs to f/u to schedule Cardiac Cath - being postponed due to active infection    Afib  - Rate controlled / On Eliquis    ESRD - Kidney Transplantation   - c/w Cellcept and Tacrolimus  - Monitor BMP    Dispo: Anticipate / f/u CV in 7-10 days to schedule LHC

## 2019-11-13 NOTE — DIETITIAN INITIAL EVALUATION ADULT. - DIET TYPE
Pt. reports relatively good appetite PTP, relies heavily on take out meals, regular diet. Picky eater. Discussed heart healthy diet with pt. Stable weight trends. NKFA. No supplement use. No cultural/Islam dietary restr. dysphagia 3, soft, thin liquids/DASH/TLC (sodium and cholesterol restricted diet)

## 2019-11-13 NOTE — PROGRESS NOTE ADULT - SUBJECTIVE AND OBJECTIVE BOX
DAVONTE CHOU 64y Male  MRN#: [id number]     SUBJECTIVE  Patient is a 64y old Male who presents with a chief complaint of chest pain (12 Nov 2019 18:31)      Today is hospital day 7d, and this morning he is lying in bed without distress. Reports that the oxycodone has been helping somewhat but he still has pain. Also has constipation, reports that Dilaudid does not give him constipation. Last bowel movement 3 days. Denies chest pain, palpitations, shortness of breath.   No acute overnight events.     OBJECTIVE  PAST MEDICAL & SURGICAL HISTORY  Cirrhosis: possibly related to hepC  Melanoma: R eye, s/p laser  Lymphoma: ?questionable, not treated, not followed  Peripheral neuropathy: unclear cause  COPD, mild: not on O2  Atrial fibrillation: on eliquis  HLD (hyperlipidemia)  HTN (hypertension)  ESRD (end stage renal disease)  CKD (chronic kidney disease)  S/P arteriovenous (AV) fistula creation: prior old fistula in R arm  History of kidney transplant: 3 years ago    ALLERGIES:  Allergy Status Unknown    MEDICATIONS:  STANDING MEDICATIONS  acetaminophen   Tablet .. 650 milliGRAM(s) Oral every 6 hours  apixaban 5 milliGRAM(s) Oral every 12 hours  aspirin  chewable 81 milliGRAM(s) Oral daily  atorvastatin 10 milliGRAM(s) Oral at bedtime  chlorhexidine 4% Liquid 1 Application(s) Topical <User Schedule>  DAPTOmycin IVPB 450 milliGRAM(s) IV Intermittent every 24 hours  DAPTOmycin IVPB      diltiazem    milliGRAM(s) Oral daily  gabapentin 300 milliGRAM(s) Oral every 8 hours  isosorbide   mononitrate ER Tablet (IMDUR) 30 milliGRAM(s) Oral daily  metoprolol succinate  milliGRAM(s) Oral daily  mycophenolate mofetil 500 milliGRAM(s) Oral two times a day  PARoxetine 10 milliGRAM(s) Oral daily  polyethylene glycol 3350 17 Gram(s) Oral daily  silver sulfADIAZINE 1% Cream 1 Application(s) Topical two times a day  sodium chloride 0.9%. 1000 milliLiter(s) IV Continuous <Continuous>  tacrolimus 2 milliGRAM(s) Oral two times a day  tamsulosin 0.4 milliGRAM(s) Oral at bedtime    PRN MEDICATIONS  oxyCODONE    IR 10 milliGRAM(s) Oral every 4 hours PRN  zolpidem 5 milliGRAM(s) Oral at bedtime PRN    HOME MEDICATIONS  Home Medications:  Cardizem  mg/24 hours oral capsule, extended release: 1 cap(s) orally once a day (06 Nov 2019 15:17)  CellCept 500 mg oral tablet: 1 tab(s) orally 2 times a day (06 Nov 2019 15:17)  Flomax 0.4 mg oral capsule: 1 cap(s) orally once a day (06 Nov 2019 15:17)  gabapentin 300 mg oral tablet: 1 tab(s) orally 3 times a day (06 Nov 2019 15:17)  Imdur 30 mg oral tablet, extended release: 1 tab(s) orally once a day (in the morning) (06 Nov 2019 15:17)  Lipitor 10 mg oral tablet: 1 tab(s) orally every 48 hours (06 Nov 2019 15:17)  oxyCODONE 10 mg oral tablet: 1 tab(s) orally every 6 hours, As Needed (06 Nov 2019 15:17)  Paxil 10 mg oral tablet: 1 tab(s) orally once a day (06 Nov 2019 15:17)  tacrolimus 1 mg oral capsule: 2 cap(s) orally every 12 hours (06 Nov 2019 15:17)  Toprol- mg oral tablet, extended release: 1 tab(s) orally once a day (06 Nov 2019 15:17)      LABS:                        10.0   5.12  )-----------( 56       ( 13 Nov 2019 06:57 )             30.6     11-12    136  |  103  |  14  ----------------------------<  114<H>  4.4   |  20  |  1.0    Ca    10.2<H>      12 Nov 2019 04:56  Mg     1.8     11-12        PT/INR - ( 12 Nov 2019 04:56 )   PT: 15.20 sec;   INR: 1.33 ratio         PTT - ( 13 Nov 2019 06:57 )  PTT:50.2 sec          Culture - Blood (collected 11 Nov 2019 12:14)  Source: .Blood Blood-Venous  Preliminary Report (12 Nov 2019 23:00):    No growth to date.    Culture - Blood (collected 11 Nov 2019 12:14)  Source: .Blood Blood-Venous  Preliminary Report (12 Nov 2019 23:00):    No growth to date.        PHYSICAL EXAM:  T(C): 36.7 (11-13-19 @ 04:35), Max: 37.1 (11-12-19 @ 20:40)  HR: 94 (11-13-19 @ 04:35) (88 - 103)  BP: 133/76 (11-13-19 @ 04:35) (111/62 - 136/81)  RR: 18 (11-13-19 @ 04:35) (18 - 19)  SpO2: --    GENERAL: NAD, well-developed, 64y lying in bed, awake  EENT: EOMI, conjunctiva and sclera clear, No nasal obstruction or discharge  RESPIRATORY: Clear to auscultation bilaterally; No wheeze or crackles  CARDIOVASCULAR: Irregularly irregular, no LE edema  GASTROINTESTINAL: Abdomen Soft, Nontender, Nondistended  MUSCULOSKELETAL:  No cyanosis. L arm with well-healed linear post-surgical scar; R forearm with prior IV site with erythema, decreased tenderness from prior, no drainage noted  NEUROLOGY: A&Ox3, non focal, cognition intact, MAEE    ADMISSION SUMMARY  Patient is a 64y old Male who presents with a chief complaint of chest pain (12 Nov 2019 18:31)

## 2019-11-13 NOTE — PROGRESS NOTE ADULT - SUBJECTIVE AND OBJECTIVE BOX
[place] [facility]PHR[facility] FOLLOW UP NOTE  --------------------------------------------------------------------------------  24 hour events/subjective: Patient examined. Appears comfortable.    PAST HISTORY  --------------------------------------------------------------------------------  No significant changes to PMH, PSH, FHx, SHx, unless otherwise noted    ALLERGIES & MEDICATIONS  --------------------------------------------------------------------------------  Allergies    Allergy Status Unknown      Standing Inpatient Medications  acetaminophen   Tablet .. 650 milliGRAM(s) Oral every 6 hours  apixaban 5 milliGRAM(s) Oral every 12 hours  aspirin  chewable 81 milliGRAM(s) Oral daily  atorvastatin 10 milliGRAM(s) Oral at bedtime  cephalexin 500 milliGRAM(s) Oral every 8 hours  chlorhexidine 4% Liquid 1 Application(s) Topical <User Schedule>  diltiazem    milliGRAM(s) Oral daily  gabapentin 300 milliGRAM(s) Oral every 8 hours  isosorbide   mononitrate ER Tablet (IMDUR) 30 milliGRAM(s) Oral daily  magnesium sulfate  IVPB 2 Gram(s) IV Intermittent once  metoprolol succinate  milliGRAM(s) Oral daily  mycophenolate mofetil 500 milliGRAM(s) Oral two times a day  PARoxetine 10 milliGRAM(s) Oral daily  polyethylene glycol 3350 17 Gram(s) Oral daily  silver sulfADIAZINE 1% Cream 1 Application(s) Topical two times a day  sodium chloride 0.9%. 1000 milliLiter(s) IV Continuous <Continuous>  tacrolimus 2 milliGRAM(s) Oral two times a day  tamsulosin 0.4 milliGRAM(s) Oral at bedtime    PRN Inpatient Medications  oxyCODONE    IR 10 milliGRAM(s) Oral every 4 hours PRN  zolpidem 5 milliGRAM(s) Oral at bedtime PRN      VITALS/PHYSICAL EXAM  --------------------------------------------------------------------------------  T(C): 36.7 (11-13-19 @ 04:35), Max: 37.1 (11-12-19 @ 20:40)  HR: 94 (11-13-19 @ 04:35) (88 - 103)  BP: 133/76 (11-13-19 @ 04:35) (111/62 - 136/81)  RR: 18 (11-13-19 @ 04:35) (18 - 19)  SpO2: --  Wt(kg): --        11-12-19 @ 07:01  -  11-13-19 @ 07:00  --------------------------------------------------------  IN: 1451 mL / OUT: 1200 mL / NET: 251 mL    11-13-19 @ 07:01  -  11-13-19 @ 11:21  --------------------------------------------------------  IN: 240 mL / OUT: 200 mL / NET: 40 mL      Physical Exam:  	Gen: NAD  	Pulm: CTA B/L  	CV: RRR, S1S2  	Abd: +BS, soft, nontender/nondistended  	: No suprapubic tenderness      LABS/STUDIES  --------------------------------------------------------------------------------              10.0   5.12  >-----------<  56       [11-13-19 @ 06:57]              30.6     133  |  101  |  15  ----------------------------<  131      [11-13-19 @ 06:57]  4.4   |  18  |  1.1        Ca     10.0     [11-13-19 @ 06:57]      Mg     1.7     [11-13-19 @ 06:57]      PT/INR: PT 15.20, INR 1.33       [11-12-19 @ 04:56]  PTT: 50.2       [11-13-19 @ 06:57]      Creatinine Trend:  SCr 1.1 [11-13 @ 06:57]  SCr 1.0 [11-12 @ 04:56]  SCr 1.3 [11-11 @ 05:29]  SCr 1.3 [11-10 @ 05:49]  SCr 1.3 [11-09 @ 05:20]        HbA1c 5.1      [11-06-19 @ 15:00]  Lipid: chol 105, , HDL 32, LDL 62      [11-06-19 @ 15:00]    HCV 11.72, Reactive      [11-07-19 @ 07:02]

## 2019-11-14 ENCOUNTER — TRANSCRIPTION ENCOUNTER (OUTPATIENT)
Age: 64
End: 2019-11-14

## 2019-11-14 VITALS
TEMPERATURE: 98 F | RESPIRATION RATE: 16 BRPM | SYSTOLIC BLOOD PRESSURE: 104 MMHG | DIASTOLIC BLOOD PRESSURE: 58 MMHG | HEART RATE: 91 BPM

## 2019-11-14 LAB
ANION GAP SERPL CALC-SCNC: 15 MMOL/L — HIGH (ref 7–14)
BASOPHILS # BLD AUTO: 0.01 K/UL — SIGNIFICANT CHANGE UP (ref 0–0.2)
BASOPHILS NFR BLD AUTO: 0.2 % — SIGNIFICANT CHANGE UP (ref 0–1)
BUN SERPL-MCNC: 18 MG/DL — SIGNIFICANT CHANGE UP (ref 10–20)
CALCIUM SERPL-MCNC: 10.5 MG/DL — HIGH (ref 8.5–10.1)
CHLORIDE SERPL-SCNC: 102 MMOL/L — SIGNIFICANT CHANGE UP (ref 98–110)
CO2 SERPL-SCNC: 19 MMOL/L — SIGNIFICANT CHANGE UP (ref 17–32)
CREAT SERPL-MCNC: 1.4 MG/DL — SIGNIFICANT CHANGE UP (ref 0.7–1.5)
EOSINOPHIL # BLD AUTO: 0.05 K/UL — SIGNIFICANT CHANGE UP (ref 0–0.7)
EOSINOPHIL NFR BLD AUTO: 1.2 % — SIGNIFICANT CHANGE UP (ref 0–8)
GLUCOSE SERPL-MCNC: 105 MG/DL — HIGH (ref 70–99)
HCT VFR BLD CALC: 34.6 % — LOW (ref 42–52)
HGB BLD-MCNC: 11.1 G/DL — LOW (ref 14–18)
IMM GRANULOCYTES NFR BLD AUTO: 0.5 % — HIGH (ref 0.1–0.3)
LYMPHOCYTES # BLD AUTO: 0.47 K/UL — LOW (ref 1.2–3.4)
LYMPHOCYTES # BLD AUTO: 11.2 % — LOW (ref 20.5–51.1)
MAGNESIUM SERPL-MCNC: 2.1 MG/DL — SIGNIFICANT CHANGE UP (ref 1.8–2.4)
MCHC RBC-ENTMCNC: 30.4 PG — SIGNIFICANT CHANGE UP (ref 27–31)
MCHC RBC-ENTMCNC: 32.1 G/DL — SIGNIFICANT CHANGE UP (ref 32–37)
MCV RBC AUTO: 94.8 FL — HIGH (ref 80–94)
MONOCYTES # BLD AUTO: 0.38 K/UL — SIGNIFICANT CHANGE UP (ref 0.1–0.6)
MONOCYTES NFR BLD AUTO: 9.1 % — SIGNIFICANT CHANGE UP (ref 1.7–9.3)
NEUTROPHILS # BLD AUTO: 3.26 K/UL — SIGNIFICANT CHANGE UP (ref 1.4–6.5)
NEUTROPHILS NFR BLD AUTO: 77.8 % — HIGH (ref 42.2–75.2)
NRBC # BLD: 0 /100 WBCS — SIGNIFICANT CHANGE UP (ref 0–0)
PLATELET # BLD AUTO: 71 K/UL — LOW (ref 130–400)
POTASSIUM SERPL-MCNC: 4.6 MMOL/L — SIGNIFICANT CHANGE UP (ref 3.5–5)
POTASSIUM SERPL-SCNC: 4.6 MMOL/L — SIGNIFICANT CHANGE UP (ref 3.5–5)
RBC # BLD: 3.65 M/UL — LOW (ref 4.7–6.1)
RBC # FLD: 14.7 % — HIGH (ref 11.5–14.5)
SODIUM SERPL-SCNC: 136 MMOL/L — SIGNIFICANT CHANGE UP (ref 135–146)
WBC # BLD: 4.19 K/UL — LOW (ref 4.8–10.8)
WBC # FLD AUTO: 4.19 K/UL — LOW (ref 4.8–10.8)

## 2019-11-14 PROCEDURE — 99238 HOSP IP/OBS DSCHRG MGMT 30/<: CPT

## 2019-11-14 RX ORDER — ASPIRIN/CALCIUM CARB/MAGNESIUM 324 MG
1 TABLET ORAL
Qty: 30 | Refills: 2
Start: 2019-11-14 | End: 2020-02-11

## 2019-11-14 RX ORDER — CEPHALEXIN 500 MG
1 CAPSULE ORAL
Qty: 18 | Refills: 0
Start: 2019-11-14 | End: 2019-11-19

## 2019-11-14 RX ORDER — ACETAMINOPHEN 500 MG
2 TABLET ORAL
Qty: 0 | Refills: 0 | DISCHARGE
Start: 2019-11-14

## 2019-11-14 RX ORDER — OXYCODONE HYDROCHLORIDE 5 MG/1
10 TABLET ORAL EVERY 8 HOURS
Refills: 0 | Status: DISCONTINUED | OUTPATIENT
Start: 2019-11-14 | End: 2019-11-14

## 2019-11-14 RX ORDER — APIXABAN 2.5 MG/1
1 TABLET, FILM COATED ORAL
Qty: 60 | Refills: 0
Start: 2019-11-14 | End: 2019-12-13

## 2019-11-14 RX ADMIN — CHLORHEXIDINE GLUCONATE 1 APPLICATION(S): 213 SOLUTION TOPICAL at 05:30

## 2019-11-14 RX ADMIN — MYCOPHENOLATE MOFETIL 500 MILLIGRAM(S): 250 CAPSULE ORAL at 05:30

## 2019-11-14 RX ADMIN — Medication 500 MILLIGRAM(S): at 13:53

## 2019-11-14 RX ADMIN — OXYCODONE HYDROCHLORIDE 10 MILLIGRAM(S): 5 TABLET ORAL at 13:52

## 2019-11-14 RX ADMIN — Medication 360 MILLIGRAM(S): at 05:30

## 2019-11-14 RX ADMIN — APIXABAN 5 MILLIGRAM(S): 2.5 TABLET, FILM COATED ORAL at 05:30

## 2019-11-14 RX ADMIN — Medication 650 MILLIGRAM(S): at 05:29

## 2019-11-14 RX ADMIN — OXYCODONE HYDROCHLORIDE 10 MILLIGRAM(S): 5 TABLET ORAL at 09:17

## 2019-11-14 RX ADMIN — TACROLIMUS 2 MILLIGRAM(S): 5 CAPSULE ORAL at 05:30

## 2019-11-14 RX ADMIN — OXYCODONE HYDROCHLORIDE 10 MILLIGRAM(S): 5 TABLET ORAL at 14:30

## 2019-11-14 RX ADMIN — ISOSORBIDE MONONITRATE 30 MILLIGRAM(S): 60 TABLET, EXTENDED RELEASE ORAL at 11:11

## 2019-11-14 RX ADMIN — Medication 650 MILLIGRAM(S): at 11:10

## 2019-11-14 RX ADMIN — OXYCODONE HYDROCHLORIDE 10 MILLIGRAM(S): 5 TABLET ORAL at 04:24

## 2019-11-14 RX ADMIN — OXYCODONE HYDROCHLORIDE 10 MILLIGRAM(S): 5 TABLET ORAL at 05:13

## 2019-11-14 RX ADMIN — GABAPENTIN 300 MILLIGRAM(S): 400 CAPSULE ORAL at 13:53

## 2019-11-14 RX ADMIN — Medication 81 MILLIGRAM(S): at 11:11

## 2019-11-14 RX ADMIN — POLYETHYLENE GLYCOL 3350 17 GRAM(S): 17 POWDER, FOR SOLUTION ORAL at 11:16

## 2019-11-14 RX ADMIN — Medication 100 MILLIGRAM(S): at 05:30

## 2019-11-14 RX ADMIN — Medication 1 APPLICATION(S): at 05:30

## 2019-11-14 RX ADMIN — OXYCODONE HYDROCHLORIDE 10 MILLIGRAM(S): 5 TABLET ORAL at 09:47

## 2019-11-14 RX ADMIN — Medication 650 MILLIGRAM(S): at 15:30

## 2019-11-14 RX ADMIN — Medication 500 MILLIGRAM(S): at 05:30

## 2019-11-14 RX ADMIN — Medication 650 MILLIGRAM(S): at 06:53

## 2019-11-14 RX ADMIN — Medication 10 MILLIGRAM(S): at 11:12

## 2019-11-14 RX ADMIN — GABAPENTIN 300 MILLIGRAM(S): 400 CAPSULE ORAL at 05:30

## 2019-11-14 NOTE — DISCHARGE NOTE PROVIDER - PROVIDER TOKENS
PROVIDER:[TOKEN:[[id number]],FOLLOWUP:[1 week]],PROVIDER:[TOKEN:[[id number]],FOLLOWUP:[1 week]],PROVIDER:[TOKEN:[[id number]],FOLLOWUP:[1 week]]

## 2019-11-14 NOTE — PROGRESS NOTE ADULT - ASSESSMENT
63y/o M with ESRD s/p kidney transplant, Afib on eliquis presenting with 1mo chest pain, found to have pos CCTA. Planned for cath, however patient developed cellulitis in R arm; cath now postponed to outpatient and focus is on resolving cellulitis.    #RUE cellulitis, improving  -BCx 11/11 and 11/12 NGTD  -RUE Duplex neg   -ID following, patient immunosuppressed  -Pain management following for complaints of severe pain and requests for Dilaudid  -Vascular consulted, no intervention at this time  --Warm compress  --Daptomycin 11/11---switch to Keflex x7 days, 11/13-19  --Oxycodone, no Dilaudid    # Rule out underlying ischemic CAD  - Coronary Calcium score 99th percentile  - EKG no acute ST changes, trop x2 neg  --Cath canceled secondary to cellulitis, plan for cath outpatient per Dr Low    # Afib on eliquis--rate controlled  - Back on Eliquis as of 11/13  --c/w cardizem 360, toprol 100--EP recs noted, go up on metroprolol if needed  --No events on telemetry    #Thrombocytopenia. Per [facility] record Plt levels vary, but went 140->70 this admission  -Heme/onc following  -Likely due to hypersplenism  --HIT antibody neg    # LUE hx of embolism, staples in place w/ overlying skin changes (Eliquis had been stopped x 2 days for possible cath at [facility] and he developed arm clots s/p thrombectomy)  - no fluctuance, but has overlying redness and crusting  - US LUE negative  - Spoke with vascular surgery over the phone. As per patient he had surgery with Dr Goldsmith at [facility]. They want the patient to follow up with the doctor who did the surgery. Patient not willing to go back to [facility]. Patient aware and frustrated of the situation.     # Hx of ESRD, s/p L kidney transplant  - currently labs stable, not on hemodialysis  -Nephro following  -Tacrolimus level good  --c/w cellcept, tacrolimus  --Monitor and replete electrolytes    # Hx of COPD  - not on any meds, no active sxs  --cw Duonebs PRN    # HLD  -lipid panel wnl, HbA1c 5.1  -- c/w lipitor 10 daily    #Likely BPH- c/w flomax  #HTN- c/w cardizem, toprol, imdur  #Hx of ?cirrhosis due to hep C- liver enzymes stable, outpt PCP f/u. Hep C past infection  #R eye blindness w/ hx of melanoma- c/w outpatient optho f/u    DVT ppx: Eliquis  GI ppx: not indicated  Diet: DASH  Activity: As tolerated  CODE: FULL   Disposition: Back to home with services

## 2019-11-14 NOTE — DISCHARGE NOTE PROVIDER - NSDCCPCAREPLAN_GEN_ALL_CORE_FT
PRINCIPAL DISCHARGE DIAGNOSIS  Diagnosis: Chest pain  Assessment and Plan of Treatment: You came in with chest pain but we could not do a cath because you had cellulitis.  -Please Follow up with cardiology  -If you develop chest pain, palpitations, shortness of breath, nausea, vomiting, sweating, feel unwell or pass out, please seek immediate medical attention      SECONDARY DISCHARGE DIAGNOSES  Diagnosis: Cellulitis  Assessment and Plan of Treatment: You developed cellulitis of the right arm. We are giving you antibiotics for it.  -Please complete the course of antibiotics as prescribed  -Please Follow up with the infectious disease doctor and your primary care provider  -If you develop worsening pain, pus, fever, chills, please seek immediate medical attention    Diagnosis: Atrial fibrillation with RVR  Assessment and Plan of Treatment: Your heart rate was very fast during your hospitalization and we controlled it with medication.  -Please continue to take your medications as prescribed  -Please Follow up with cardiology  -If you develop chest pain, shortness of breath, palpitations, dizziness, or pass out please seek immediate medical attention    Diagnosis: Pain, chronic  Assessment and Plan of Treatment: You have chronic pain and have been taking oxycodone for it.   -Please do not take more oxycodone than prescribed.  -Please Follow up with your primary care provider and a pain management specialist

## 2019-11-14 NOTE — DISCHARGE NOTE PROVIDER - NSDCHHENCOUNTER_GEN_ALL_CORE
Please check out the documentary [place] over knives as well as the website for information about a plant based diet.    
14-Nov-2019

## 2019-11-14 NOTE — PROGRESS NOTE ADULT - SUBJECTIVE AND OBJECTIVE BOX
DAVONTE CHOU 64y Male  MRN#: [id number]     SUBJECTIVE  Patient is a 64y old Male who presents with a chief complaint of chest pain (13 Nov 2019 11:20)      Today is hospital day 8d, and this morning he is lying in bed without distress. Sleeping comfortably.  No acute overnight events.     OBJECTIVE  PAST MEDICAL & SURGICAL HISTORY  Cirrhosis: possibly related to hepC  Melanoma: R eye, s/p laser  Lymphoma: ?questionable, not treated, not followed  Peripheral neuropathy: unclear cause  COPD, mild: not on O2  Atrial fibrillation: on eliquis  HLD (hyperlipidemia)  HTN (hypertension)  ESRD (end stage renal disease)  CKD (chronic kidney disease)  S/P arteriovenous (AV) fistula creation: prior old fistula in R arm  History of kidney transplant: 3 years ago    ALLERGIES:  Allergy Status Unknown    MEDICATIONS:  STANDING MEDICATIONS  acetaminophen   Tablet .. 650 milliGRAM(s) Oral every 6 hours  apixaban 5 milliGRAM(s) Oral every 12 hours  aspirin  chewable 81 milliGRAM(s) Oral daily  atorvastatin 10 milliGRAM(s) Oral at bedtime  cephalexin 500 milliGRAM(s) Oral every 8 hours  chlorhexidine 4% Liquid 1 Application(s) Topical <User Schedule>  diltiazem    milliGRAM(s) Oral daily  gabapentin 300 milliGRAM(s) Oral every 8 hours  isosorbide   mononitrate ER Tablet (IMDUR) 30 milliGRAM(s) Oral daily  metoprolol succinate  milliGRAM(s) Oral daily  mycophenolate mofetil 500 milliGRAM(s) Oral two times a day  PARoxetine 10 milliGRAM(s) Oral daily  polyethylene glycol 3350 17 Gram(s) Oral daily  senna 2 Tablet(s) Oral at bedtime  silver sulfADIAZINE 1% Cream 1 Application(s) Topical two times a day  sodium chloride 0.9%. 1000 milliLiter(s) IV Continuous <Continuous>  tacrolimus 2 milliGRAM(s) Oral two times a day  tamsulosin 0.4 milliGRAM(s) Oral at bedtime    PRN MEDICATIONS  oxyCODONE    IR 10 milliGRAM(s) Oral every 4 hours PRN  zolpidem 5 milliGRAM(s) Oral at bedtime PRN    HOME MEDICATIONS  Home Medications:  Cardizem  mg/24 hours oral capsule, extended release: 1 cap(s) orally once a day (06 Nov 2019 15:17)  CellCept 500 mg oral tablet: 1 tab(s) orally 2 times a day (06 Nov 2019 15:17)  Flomax 0.4 mg oral capsule: 1 cap(s) orally once a day (06 Nov 2019 15:17)  gabapentin 300 mg oral tablet: 1 tab(s) orally 3 times a day (06 Nov 2019 15:17)  Imdur 30 mg oral tablet, extended release: 1 tab(s) orally once a day (in the morning) (06 Nov 2019 15:17)  Lipitor 10 mg oral tablet: 1 tab(s) orally every 48 hours (06 Nov 2019 15:17)  oxyCODONE 10 mg oral tablet: 1 tab(s) orally every 6 hours, As Needed (06 Nov 2019 15:17)  Paxil 10 mg oral tablet: 1 tab(s) orally once a day (06 Nov 2019 15:17)  tacrolimus 1 mg oral capsule: 2 cap(s) orally every 12 hours (06 Nov 2019 15:17)  Toprol- mg oral tablet, extended release: 1 tab(s) orally once a day (06 Nov 2019 15:17)      LABS:                        11.1   4.19  )-----------( 71       ( 14 Nov 2019 05:34 )             34.6     11-14    136  |  102  |  18  ----------------------------<  105<H>  4.6   |  19  |  1.4    Ca    10.5<H>      14 Nov 2019 05:34  Mg     2.1     11-14        PTT - ( 13 Nov 2019 06:57 )  PTT:50.2 sec          Culture - Blood (collected 12 Nov 2019 04:56)  Source: .Blood None  Preliminary Report (13 Nov 2019 14:01):    No growth to date.    Culture - Blood (collected 11 Nov 2019 12:14)  Source: .Blood Blood-Venous  Preliminary Report (12 Nov 2019 23:00):    No growth to date.    Culture - Blood (collected 11 Nov 2019 12:14)  Source: .Blood Blood-Venous  Preliminary Report (12 Nov 2019 23:00):    No growth to date.        PHYSICAL EXAM:  T(C): 36.9 (11-14-19 @ 05:18), Max: 37.1 (11-13-19 @ 20:56)  HR: 74 (11-14-19 @ 05:18) (74 - 99)  BP: 116/74 (11-14-19 @ 05:18) (112/58 - 118/73)  RR: 18 (11-14-19 @ 05:18) (18 - 18)  SpO2: 95% (11-13-19 @ 19:42) (95% - 95%)    GENERAL: NAD, well-developed, 64y lying in bed, asleep  EENT: NC, AT  RESPIRATORY: On room air, no increased work of breathing  CARDIOVASCULAR/GASTROINTESTINAL: Deferred  MUSCULOSKELETAL:  No cyanosis. L arm with well-healed linear post-surgical scar; R forearm with prior IV site with erythema, no drainage noted  NEUROLOGY: Deferred    ADMISSION SUMMARY  Patient is a 64y old Male who presents with a chief complaint of chest pain (13 Nov 2019 11:20)

## 2019-11-14 NOTE — DISCHARGE NOTE PROVIDER - NSDCFUADDAPPT_GEN_ALL_CORE_FT
To see a pain management specialist for your chronic pain, please contact:  [facility] [address]  [phone]

## 2019-11-14 NOTE — PROGRESS NOTE ADULT - SUBJECTIVE AND OBJECTIVE BOX
Patient is a 64y old  Male who presents with a chief complaint of chest pain (14 Nov 2019 11:09)      Subjective: Seen  and examined , no acute events reported.       Vital Signs Last 24 Hrs  T(C): 36.2 (14 Nov 2019 13:27), Max: 37.1 (13 Nov 2019 20:56)  T(F): 97.1 (14 Nov 2019 13:27), Max: 98.7 (13 Nov 2019 20:56)  HR: 96 (14 Nov 2019 13:27) (74 - 96)  BP: 101/58 (14 Nov 2019 13:27) (101/58 - 118/73)  BP(mean): --  RR: 18 (14 Nov 2019 05:18) (18 - 18)  SpO2: 95% (13 Nov 2019 19:42) (95% - 95%)      Physical examination   GENERAL: NAD  EENT: EOMI, conjunctiva and sclera clear, No nasal obstruction or discharge  RESPIRATORY: Clear to auscultation bilaterally; No wheeze or crackles  CARDIOVASCULAR: Tachycardic, difficult to determine rhythm no LE edema  GASTROINTESTINAL: Abdomen Soft, Nontender, Nondistended; Bowel sounds present  MUSCULOSKELETAL:  No cyanosis. L arm with well-healed linear post-surgical scar; R forearm with prior IV site with erythema, tenderness, no drainage noted  NEUROLOGY: A&Ox3, non focal, cognition intact, MAEE        MEDICATIONS  (STANDING):  acetaminophen   Tablet .. 650 milliGRAM(s) Oral every 6 hours  apixaban 5 milliGRAM(s) Oral every 12 hours  aspirin  chewable 81 milliGRAM(s) Oral daily  atorvastatin 10 milliGRAM(s) Oral at bedtime  cephalexin 500 milliGRAM(s) Oral every 8 hours  chlorhexidine 4% Liquid 1 Application(s) Topical <User Schedule>  diltiazem    milliGRAM(s) Oral daily  gabapentin 300 milliGRAM(s) Oral every 8 hours  isosorbide   mononitrate ER Tablet (IMDUR) 30 milliGRAM(s) Oral daily  metoprolol succinate  milliGRAM(s) Oral daily  mycophenolate mofetil 500 milliGRAM(s) Oral two times a day  PARoxetine 10 milliGRAM(s) Oral daily  polyethylene glycol 3350 17 Gram(s) Oral daily  senna 2 Tablet(s) Oral at bedtime  silver sulfADIAZINE 1% Cream 1 Application(s) Topical two times a day  sodium chloride 0.9%. 1000 milliLiter(s) (75 mL/Hr) IV Continuous <Continuous>  tacrolimus 2 milliGRAM(s) Oral two times a day  tamsulosin 0.4 milliGRAM(s) Oral at bedtime    MEDICATIONS  (PRN):  oxyCODONE    IR 10 milliGRAM(s) Oral every 8 hours PRN Severe Pain (7 - 10)  zolpidem 5 milliGRAM(s) Oral at bedtime PRN Insomnia      LABS:                          11.1   4.19  )-----------( 71       ( 14 Nov 2019 05:34 )             34.6         Mean Cell Volume : 94.8 fL  Mean Cell Hemoglobin : 30.4 pg  Mean Cell Hemoglobin Concentration : 32.1 g/dL  Auto Neutrophil # : 3.26 K/uL  Auto Lymphocyte # : 0.47 K/uL  Auto Monocyte # : 0.38 K/uL  Auto Eosinophil # : 0.05 K/uL  Auto Basophil # : 0.01 K/uL  Auto Neutrophil % : 77.8 %  Auto Lymphocyte % : 11.2 %  Auto Monocyte % : 9.1 %  Auto Eosinophil % : 1.2 %  Auto Basophil % : 0.2 %      Serial CBC's  11-14 @ 05:34  Hct-34.6 / Hgb-11.1 / Plat-71 / RBC-3.65 / WBC-4.19  Serial CBC's  11-13 @ 06:57  Hct-30.6 / Hgb-10.0 / Plat-56 / RBC-3.25 / WBC-5.12  Serial CBC's  11-12 @ 04:56  Hct-33.7 / Hgb-10.9 / Plat-68 / RBC-3.63 / WBC-8.37  Serial CBC's  11-11 @ 05:29  Hct-35.0 / Hgb-11.3 / Plat-74 / RBC-3.73 / WBC-7.35      11-14    136  |  102  |  18  ----------------------------<  105<H>  4.6   |  19  |  1.4    Ca    10.5<H>      14 Nov 2019 05:34  Mg     2.1     11-14        PTT - ( 13 Nov 2019 06:57 )  PTT:50.2 sec                      Culture - Blood (collected 12 Nov 2019 04:56)  Source: .Blood None  Preliminary Report (13 Nov 2019 14:01):    No growth to date.            BLOOD SMEAR INTERPRETATION:       RADIOLOGY & ADDITIONAL STUDIES:

## 2019-11-14 NOTE — DISCHARGE NOTE PROVIDER - HOSPITAL COURSE
63y/o M with ESRD s/p kidney transplant, Afib on eliquis presenting with 1mo chest pain, found to have pos CCTA. Planned for cath, however patient developed cellulitis in R arm; cath now postponed to outpatient and focus was on resolving cellulitis. Patient improved on abx, ready to be discharged. 65y/o M with ESRD s/p kidney transplant, Afib on eliquis presenting with 1mo chest pain, found to have pos CCTA. Planned for cath, however patient developed cellulitis in R arm; cath now postponed to outpatient and focus was on resolving cellulitis. Patient improved on abx, ready to be discharged.         Vital Signs Last 24 Hrs    T(C): 36.9 (14 Nov 2019 05:18), Max: 37.1 (13 Nov 2019 20:56)    T(F): 98.5 (14 Nov 2019 05:18), Max: 98.7 (13 Nov 2019 20:56)    HR: 74 (14 Nov 2019 05:18) (74 - 83)    BP: 116/74 (14 Nov 2019 05:18) (116/74 - 118/73)    RR: 18 (14 Nov 2019 05:18) (18 - 18)    SpO2: 95% (13 Nov 2019 19:42) (95% - 95%)        MEDICATIONS  (STANDING):    acetaminophen   Tablet .. 650 milliGRAM(s) Oral every 6 hours    apixaban 5 milliGRAM(s) Oral every 12 hours    aspirin  chewable 81 milliGRAM(s) Oral daily    atorvastatin 10 milliGRAM(s) Oral at bedtime    cephalexin 500 milliGRAM(s) Oral every 8 hours    diltiazem    milliGRAM(s) Oral daily    gabapentin 300 milliGRAM(s) Oral every 8 hours    isosorbide   mononitrate ER Tablet (IMDUR) 30 milliGRAM(s) Oral daily    metoprolol succinate  milliGRAM(s) Oral daily    mycophenolate mofetil 500 milliGRAM(s) Oral two times a day    PARoxetine 10 milliGRAM(s) Oral daily    polyethylene glycol 3350 17 Gram(s) Oral daily    senna 2 Tablet(s) Oral at bedtime    silver sulfADIAZINE 1% Cream 1 Application(s) Topical two times a day    tacrolimus 2 milliGRAM(s) Oral two times a day    tamsulosin 0.4 milliGRAM(s) Oral at bedtime        MEDICATIONS  (PRN):    oxyCODONE IR 10 milliGRAM(s) Oral every 8 hours PRN Severe Pain (7 - 10)    zolpidem 5 milliGRAM(s) Oral at bedtime PRN Insomnia        Assessment:    RUE Phlebitis / Cellulitis - complete course of keflex per ID recs.    Follow up with ID in the clinic     follow up with Nephrology in the clinic (Kidney transplant)    Follow up with Cardiology (cardiac cath to be scheduled from the office)    Follow up with PMD or Pain Management clinic for Chronic Pain management (pls keep pain meds as home doses and he needs follow up)        All above discussed with intern covering    Med rec discussed as well 65y/o M with ESRD s/p kidney transplant, Afib on eliquis presenting with 1mo chest pain, found to have pos CCTA. Planned for cath, however patient developed cellulitis in R arm; cath now postponed to outpatient and focus was on resolving cellulitis. Patient improved on abx, ready to be discharged.             Attending Attestation        Vital Signs Last 24 Hrs    T(C): 36.9 (14 Nov 2019 05:18), Max: 37.1 (13 Nov 2019 20:56)    T(F): 98.5 (14 Nov 2019 05:18), Max: 98.7 (13 Nov 2019 20:56)    HR: 74 (14 Nov 2019 05:18) (74 - 83)    BP: 116/74 (14 Nov 2019 05:18) (116/74 - 118/73)    RR: 18 (14 Nov 2019 05:18) (18 - 18)    SpO2: 95% (13 Nov 2019 19:42) (95% - 95%)        MEDICATIONS  (STANDING):    acetaminophen   Tablet .. 650 milliGRAM(s) Oral every 6 hours    apixaban 5 milliGRAM(s) Oral every 12 hours    aspirin  chewable 81 milliGRAM(s) Oral daily    atorvastatin 10 milliGRAM(s) Oral at bedtime    cephalexin 500 milliGRAM(s) Oral every 8 hours    diltiazem    milliGRAM(s) Oral daily    gabapentin 300 milliGRAM(s) Oral every 8 hours    isosorbide   mononitrate ER Tablet (IMDUR) 30 milliGRAM(s) Oral daily    metoprolol succinate  milliGRAM(s) Oral daily    mycophenolate mofetil 500 milliGRAM(s) Oral two times a day    PARoxetine 10 milliGRAM(s) Oral daily    polyethylene glycol 3350 17 Gram(s) Oral daily    senna 2 Tablet(s) Oral at bedtime    silver sulfADIAZINE 1% Cream 1 Application(s) Topical two times a day    tacrolimus 2 milliGRAM(s) Oral two times a day    tamsulosin 0.4 milliGRAM(s) Oral at bedtime        MEDICATIONS  (PRN):    oxyCODONE IR 10 milliGRAM(s) Oral every 8 hours PRN Severe Pain (7 - 10)    zolpidem 5 milliGRAM(s) Oral at bedtime PRN Insomnia        Assessment:    RUE Phlebitis / Cellulitis - complete course of keflex per ID recs.    Follow up with ID in the clinic     follow up with Nephrology in the clinic (Kidney transplant) - tacrolimus levels are good c/w same dose     Follow up with Cardiology (cardiac cath to be scheduled from the office)    Afib with RVR resolved - c/w Cardizem 360mg cd by mouth daily and eliquis and follow with cardiologist     Follow up with PMD or Pain Management clinic for Chronic Pain management (pls keep pain meds as home doses and he needs follow up)        All above discussed with intern covering    Med rec discussed as well

## 2019-11-14 NOTE — CHART NOTE - NSCHARTNOTEFT_GEN_A_CORE
Reviewed the discharge paperwork with the patient and nurse Cyr at bedside. The patient is instructed to continue his home oxycodone as prescribed by his outpatient doctor. He stated that he has no pills left and this is "the most important" medication. He states that he refuses to go home unless we give him some oxycodone to take home with him.   Per pain management NP notes/i-stop record there, he picked up a 30-day supply of oxycodone on 10/28.  Discussed with attending Dr Rajan, instructed patient that he will need to follow up with his outpatient doctor for oxycodone refills.

## 2019-11-14 NOTE — PROGRESS NOTE ADULT - ASSESSMENT
65y/o M with PMH of COPD (no O2), hx of ESRD s/p L kidney transplant, eye melanoma w/ R blindness, HTN, HLD, Afib on eliquis, LUE embolism in Oct (believed to be from ?Afib) presenting with 1mo of midsternal non-radiating chest pain. Pain is moderate, some SOB on exertion but no significantly alleviating or triggering factors.  Hematology was consulted for thrombocytopenia.    # Chronic Thrombocytopenia likely sec to cirrhosis , splenomegaly , HEP C , lymphoma :  -Records were obtained from [facility] , chronic thrombocytopenia with fluctuating platelet counts 50-90 k .   - HIT screening negative .   -recommend out pt f/u .

## 2019-11-14 NOTE — DISCHARGE NOTE PROVIDER - NSDCMRMEDTOKEN_GEN_ALL_CORE_FT
acetaminophen 325 mg oral tablet: 2 tab(s) orally every 6 hours, As Needed  apixaban 5 mg oral tablet: 1 tab(s) orally every 12 hours  aspirin 81 mg oral tablet, chewable: 1 tab(s) orally once a day  Cardizem  mg/24 hours oral capsule, extended release: 1 cap(s) orally once a day  CellCept 500 mg oral tablet: 1 tab(s) orally 2 times a day  cephalexin 500 mg oral capsule: 1 cap(s) orally every 8 hours until you have completed the antibiotics.   Flomax 0.4 mg oral capsule: 1 cap(s) orally once a day  gabapentin 300 mg oral tablet: 1 tab(s) orally 3 times a day  Imdur 30 mg oral tablet, extended release: 1 tab(s) orally once a day (in the morning)  Lipitor 10 mg oral tablet: 1 tab(s) orally every 48 hours  oxyCODONE 10 mg oral tablet: 1 tab(s) orally every 6 hours, As Needed  Paxil 10 mg oral tablet: 1 tab(s) orally once a day  tacrolimus 1 mg oral capsule: 2 cap(s) orally every 12 hours  Toprol- mg oral tablet, extended release: 1 tab(s) orally once a day acetaminophen 325 mg oral tablet: 2 tab(s) orally every 6 hours, As Needed  apixaban 5 mg oral tablet: 1 tab(s) orally every 12 hours  aspirin 81 mg oral tablet, chewable: 1 tab(s) orally once a day  Cardizem  mg/24 hours oral capsule, extended release: 1 cap(s) orally once a day  CellCept 500 mg oral tablet: 1 tab(s) orally 2 times a day  cephalexin 500 mg oral capsule: 1 cap(s) orally every 8 hours until you have completed the antibiotics.   Flomax 0.4 mg oral capsule: 1 cap(s) orally once a day  gabapentin 300 mg oral tablet: 1 tab(s) orally 3 times a day  Imdur 30 mg oral tablet, extended release: 1 tab(s) orally once a day (in the morning)  Lipitor 10 mg oral tablet: 1 tab(s) orally every 48 hours  oxyCODONE 10 mg oral tablet: 1 tab(s) orally every 6 hours, As Needed  tacrolimus 1 mg oral capsule: 2 cap(s) orally every 12 hours  Toprol- mg oral tablet, extended release: 1 tab(s) orally once a day

## 2019-11-14 NOTE — PROGRESS NOTE ADULT - SUBJECTIVE AND OBJECTIVE BOX
[place] [facility] FOLLOW UP NOTE  --------------------------------------------------------------------------------  24 hour events/subjective: Patient examined. Appears comfortable.    PAST HISTORY  --------------------------------------------------------------------------------  No significant changes to PMH, PSH, FHx, SHx, unless otherwise noted    ALLERGIES & MEDICATIONS  --------------------------------------------------------------------------------  Allergies    Allergy Status Unknown    Intolerances      Standing Inpatient Medications  acetaminophen   Tablet .. 650 milliGRAM(s) Oral every 6 hours  apixaban 5 milliGRAM(s) Oral every 12 hours  aspirin  chewable 81 milliGRAM(s) Oral daily  atorvastatin 10 milliGRAM(s) Oral at bedtime  cephalexin 500 milliGRAM(s) Oral every 8 hours  chlorhexidine 4% Liquid 1 Application(s) Topical <User Schedule>  diltiazem    milliGRAM(s) Oral daily  gabapentin 300 milliGRAM(s) Oral every 8 hours  isosorbide   mononitrate ER Tablet (IMDUR) 30 milliGRAM(s) Oral daily  metoprolol succinate  milliGRAM(s) Oral daily  mycophenolate mofetil 500 milliGRAM(s) Oral two times a day  PARoxetine 10 milliGRAM(s) Oral daily  polyethylene glycol 3350 17 Gram(s) Oral daily  senna 2 Tablet(s) Oral at bedtime  silver sulfADIAZINE 1% Cream 1 Application(s) Topical two times a day  sodium chloride 0.9%. 1000 milliLiter(s) IV Continuous <Continuous>  tacrolimus 2 milliGRAM(s) Oral two times a day  tamsulosin 0.4 milliGRAM(s) Oral at bedtime    PRN Inpatient Medications  oxyCODONE    IR 10 milliGRAM(s) Oral every 8 hours PRN  zolpidem 5 milliGRAM(s) Oral at bedtime PRN      VITALS/PHYSICAL EXAM  --------------------------------------------------------------------------------  T(C): 36.2 (11-14-19 @ 13:27), Max: 37.1 (11-13-19 @ 20:56)  HR: 96 (11-14-19 @ 13:27) (74 - 96)  BP: 101/58 (11-14-19 @ 13:27) (101/58 - 118/73)  RR: 18 (11-14-19 @ 05:18) (18 - 18)  SpO2: 95% (11-13-19 @ 19:42) (95% - 95%)  Wt(kg): --  Height (cm): 175.3 (11-13-19 @ 12:42)      11-13-19 @ 07:01  -  11-14-19 @ 07:00  --------------------------------------------------------  IN: 1080 mL / OUT: 1500 mL / NET: -420 mL    11-14-19 @ 07:01  -  11-14-19 @ 13:47  --------------------------------------------------------  IN: 240 mL / OUT: 200 mL / NET: 40 mL      Physical Exam:  	Gen: NAD  	Pulm: CTA B/L  	CV: RRR, S1S2  	Abd: +BS, soft, nontender/nondistended  	: No suprapubic tenderness  	LE: Warm, no edema    LABS/STUDIES  --------------------------------------------------------------------------------              11.1   4.19  >-----------<  71       [11-14-19 @ 05:34]              34.6     136  |  102  |  18  ----------------------------<  105      [11-14-19 @ 05:34]  4.6   |  19  |  1.4        Ca     10.5     [11-14-19 @ 05:34]      Mg     2.1     [11-14-19 @ 05:34]        PTT: 50.2       [11-13-19 @ 06:57]      Creatinine Trend:  SCr 1.4 [11-14 @ 05:34]  SCr 1.1 [11-13 @ 06:57]  SCr 1.0 [11-12 @ 04:56]  SCr 1.3 [11-11 @ 05:29]  SCr 1.3 [11-10 @ 05:49]        HbA1c 5.1      [11-06-19 @ 15:00]  Lipid: chol 105, , HDL 32, LDL 62      [11-06-19 @ 15:00]    HCV 11.72, Reactive      [11-07-19 @ 07:02]

## 2019-11-14 NOTE — DISCHARGE NOTE PROVIDER - CARE PROVIDERS DIRECT ADDRESSES
[email or web address],DirectAddress_Unknown,DirectAddress_Unknown

## 2019-11-16 LAB
CULTURE RESULTS: SIGNIFICANT CHANGE UP
CULTURE RESULTS: SIGNIFICANT CHANGE UP
SPECIMEN SOURCE: SIGNIFICANT CHANGE UP
SPECIMEN SOURCE: SIGNIFICANT CHANGE UP

## 2019-11-17 LAB
CULTURE RESULTS: SIGNIFICANT CHANGE UP
SPECIMEN SOURCE: SIGNIFICANT CHANGE UP

## 2019-11-19 DIAGNOSIS — I10 ESSENTIAL (PRIMARY) HYPERTENSION: ICD-10-CM

## 2019-11-19 DIAGNOSIS — Z85.820 PERSONAL HISTORY OF MALIGNANT MELANOMA OF SKIN: ICD-10-CM

## 2019-11-19 DIAGNOSIS — I08.1 RHEUMATIC DISORDERS OF BOTH MITRAL AND TRICUSPID VALVES: ICD-10-CM

## 2019-11-19 DIAGNOSIS — E78.5 HYPERLIPIDEMIA, UNSPECIFIED: ICD-10-CM

## 2019-11-19 DIAGNOSIS — Z79.01 LONG TERM (CURRENT) USE OF ANTICOAGULANTS: ICD-10-CM

## 2019-11-19 DIAGNOSIS — Z94.0 KIDNEY TRANSPLANT STATUS: ICD-10-CM

## 2019-11-19 DIAGNOSIS — E83.42 HYPOMAGNESEMIA: ICD-10-CM

## 2019-11-19 DIAGNOSIS — I80.8 PHLEBITIS AND THROMBOPHLEBITIS OF OTHER SITES: ICD-10-CM

## 2019-11-19 DIAGNOSIS — B19.20 UNSPECIFIED VIRAL HEPATITIS C WITHOUT HEPATIC COMA: ICD-10-CM

## 2019-11-19 DIAGNOSIS — Z80.3 FAMILY HISTORY OF MALIGNANT NEOPLASM OF BREAST: ICD-10-CM

## 2019-11-19 DIAGNOSIS — T85.79XA INFECTION AND INFLAMMATORY REACTION DUE TO OTHER INTERNAL PROSTHETIC DEVICES, IMPLANTS AND GRAFTS, INITIAL ENCOUNTER: ICD-10-CM

## 2019-11-19 DIAGNOSIS — I48.20 CHRONIC ATRIAL FIBRILLATION, UNSPECIFIED: ICD-10-CM

## 2019-11-19 DIAGNOSIS — G62.9 POLYNEUROPATHY, UNSPECIFIED: ICD-10-CM

## 2019-11-19 DIAGNOSIS — N18.9 CHRONIC KIDNEY DISEASE, UNSPECIFIED: ICD-10-CM

## 2019-11-19 DIAGNOSIS — R16.1 SPLENOMEGALY, NOT ELSEWHERE CLASSIFIED: ICD-10-CM

## 2019-11-19 DIAGNOSIS — Z85.72 PERSONAL HISTORY OF NON-HODGKIN LYMPHOMAS: ICD-10-CM

## 2019-11-19 DIAGNOSIS — D69.59 OTHER SECONDARY THROMBOCYTOPENIA: ICD-10-CM

## 2019-11-19 DIAGNOSIS — J44.9 CHRONIC OBSTRUCTIVE PULMONARY DISEASE, UNSPECIFIED: ICD-10-CM

## 2019-11-19 DIAGNOSIS — N40.0 BENIGN PROSTATIC HYPERPLASIA WITHOUT LOWER URINARY TRACT SYMPTOMS: ICD-10-CM

## 2019-11-19 DIAGNOSIS — Z87.898 PERSONAL HISTORY OF OTHER SPECIFIED CONDITIONS: ICD-10-CM

## 2019-11-19 DIAGNOSIS — I25.110 ATHEROSCLEROTIC HEART DISEASE OF NATIVE CORONARY ARTERY WITH UNSTABLE ANGINA PECTORIS: ICD-10-CM

## 2019-11-19 DIAGNOSIS — D64.9 ANEMIA, UNSPECIFIED: ICD-10-CM

## 2019-11-19 DIAGNOSIS — R07.9 CHEST PAIN, UNSPECIFIED: ICD-10-CM

## 2019-11-19 DIAGNOSIS — K74.60 UNSPECIFIED CIRRHOSIS OF LIVER: ICD-10-CM

## 2019-11-19 DIAGNOSIS — L03.113 CELLULITIS OF RIGHT UPPER LIMB: ICD-10-CM

## 2019-11-19 DIAGNOSIS — Z87.891 PERSONAL HISTORY OF NICOTINE DEPENDENCE: ICD-10-CM

## 2019-11-22 PROBLEM — I10 ESSENTIAL (PRIMARY) HYPERTENSION: Chronic | Status: ACTIVE | Noted: 2019-11-05

## 2019-11-22 PROBLEM — E78.5 HYPERLIPIDEMIA, UNSPECIFIED: Chronic | Status: ACTIVE | Noted: 2019-11-05

## 2019-11-22 PROBLEM — I48.91 UNSPECIFIED ATRIAL FIBRILLATION: Chronic | Status: ACTIVE | Noted: 2019-11-05

## 2019-11-22 PROBLEM — J44.9 CHRONIC OBSTRUCTIVE PULMONARY DISEASE, UNSPECIFIED: Chronic | Status: ACTIVE | Noted: 2019-11-06

## 2019-11-22 PROBLEM — N18.9 CHRONIC KIDNEY DISEASE, UNSPECIFIED: Chronic | Status: ACTIVE | Noted: 2019-11-05

## 2019-11-22 PROBLEM — C85.90 NON-HODGKIN LYMPHOMA, UNSPECIFIED, UNSPECIFIED SITE: Chronic | Status: ACTIVE | Noted: 2019-11-06

## 2019-11-22 PROBLEM — K74.60 UNSPECIFIED CIRRHOSIS OF LIVER: Chronic | Status: ACTIVE | Noted: 2019-11-06

## 2019-11-22 PROBLEM — G62.9 POLYNEUROPATHY, UNSPECIFIED: Chronic | Status: ACTIVE | Noted: 2019-11-06

## 2019-11-22 PROBLEM — C43.9 MALIGNANT MELANOMA OF SKIN, UNSPECIFIED: Chronic | Status: ACTIVE | Noted: 2019-11-06

## 2019-11-22 PROBLEM — N18.6 END STAGE RENAL DISEASE: Chronic | Status: ACTIVE | Noted: 2019-11-05

## 2019-11-26 ENCOUNTER — OUTPATIENT (OUTPATIENT)
Dept: OUTPATIENT SERVICES | Facility: HOSPITAL | Age: 64
LOS: 1 days | Discharge: HOME | End: 2019-11-26

## 2019-11-26 ENCOUNTER — INPATIENT (INPATIENT)
Facility: HOSPITAL | Age: 64
LOS: 0 days | Discharge: HOME | End: 2019-11-27
Attending: INTERNAL MEDICINE | Admitting: INTERNAL MEDICINE
Payer: MEDICARE

## 2019-11-26 VITALS
TEMPERATURE: 98 F | DIASTOLIC BLOOD PRESSURE: 98 MMHG | HEIGHT: 69 IN | SYSTOLIC BLOOD PRESSURE: 146 MMHG | RESPIRATION RATE: 18 BRPM | HEART RATE: 106 BPM | WEIGHT: 168.65 LBS

## 2019-11-26 DIAGNOSIS — E78.49 OTHER HYPERLIPIDEMIA: ICD-10-CM

## 2019-11-26 DIAGNOSIS — I10 ESSENTIAL (PRIMARY) HYPERTENSION: ICD-10-CM

## 2019-11-26 DIAGNOSIS — I25.10 ATHEROSCLEROTIC HEART DISEASE OF NATIVE CORONARY ARTERY WITHOUT ANGINA PECTORIS: ICD-10-CM

## 2019-11-26 DIAGNOSIS — Z98.890 OTHER SPECIFIED POSTPROCEDURAL STATES: Chronic | ICD-10-CM

## 2019-11-26 DIAGNOSIS — K74.60 UNSPECIFIED CIRRHOSIS OF LIVER: ICD-10-CM

## 2019-11-26 DIAGNOSIS — Z94.0 KIDNEY TRANSPLANT STATUS: Chronic | ICD-10-CM

## 2019-11-26 DIAGNOSIS — I48.91 UNSPECIFIED ATRIAL FIBRILLATION: ICD-10-CM

## 2019-11-26 DIAGNOSIS — Z79.02 LONG TERM (CURRENT) USE OF ANTITHROMBOTICS/ANTIPLATELETS: ICD-10-CM

## 2019-11-26 DIAGNOSIS — Z94.0 KIDNEY TRANSPLANT STATUS: ICD-10-CM

## 2019-11-26 DIAGNOSIS — Z87.891 PERSONAL HISTORY OF NICOTINE DEPENDENCE: ICD-10-CM

## 2019-11-26 DIAGNOSIS — J44.9 CHRONIC OBSTRUCTIVE PULMONARY DISEASE, UNSPECIFIED: ICD-10-CM

## 2019-11-26 DIAGNOSIS — Z79.82 LONG TERM (CURRENT) USE OF ASPIRIN: ICD-10-CM

## 2019-11-26 DIAGNOSIS — I20.0 UNSTABLE ANGINA: ICD-10-CM

## 2019-11-26 DIAGNOSIS — I25.110 ATHEROSCLEROTIC HEART DISEASE OF NATIVE CORONARY ARTERY WITH UNSTABLE ANGINA PECTORIS: ICD-10-CM

## 2019-11-26 DIAGNOSIS — N19 UNSPECIFIED KIDNEY FAILURE: ICD-10-CM

## 2019-11-26 LAB
ANION GAP SERPL CALC-SCNC: 12 MMOL/L — SIGNIFICANT CHANGE UP (ref 7–14)
APTT BLD: >200 SEC — CRITICAL HIGH (ref 27–39.2)
BUN SERPL-MCNC: 27 MG/DL — HIGH (ref 10–20)
CALCIUM SERPL-MCNC: 10 MG/DL — SIGNIFICANT CHANGE UP (ref 8.5–10.1)
CHLORIDE SERPL-SCNC: 105 MMOL/L — SIGNIFICANT CHANGE UP (ref 98–110)
CO2 SERPL-SCNC: 21 MMOL/L — SIGNIFICANT CHANGE UP (ref 17–32)
CREAT SERPL-MCNC: 1.2 MG/DL — SIGNIFICANT CHANGE UP (ref 0.7–1.5)
GLUCOSE SERPL-MCNC: 107 MG/DL — HIGH (ref 70–99)
HCT VFR BLD CALC: 36 % — LOW (ref 42–52)
HGB BLD-MCNC: 11.2 G/DL — LOW (ref 14–18)
INR BLD: 1.27 RATIO — SIGNIFICANT CHANGE UP (ref 0.65–1.3)
MCHC RBC-ENTMCNC: 30.5 PG — SIGNIFICANT CHANGE UP (ref 27–31)
MCHC RBC-ENTMCNC: 31.1 G/DL — LOW (ref 32–37)
MCV RBC AUTO: 98.1 FL — HIGH (ref 80–94)
NRBC # BLD: 0 /100 WBCS — SIGNIFICANT CHANGE UP (ref 0–0)
PLATELET # BLD AUTO: 102 K/UL — LOW (ref 130–400)
POTASSIUM SERPL-MCNC: 4.9 MMOL/L — SIGNIFICANT CHANGE UP (ref 3.5–5)
POTASSIUM SERPL-SCNC: 4.9 MMOL/L — SIGNIFICANT CHANGE UP (ref 3.5–5)
PROTHROM AB SERPL-ACNC: 14.6 SEC — HIGH (ref 9.95–12.87)
RBC # BLD: 3.67 M/UL — LOW (ref 4.7–6.1)
RBC # FLD: 15.3 % — HIGH (ref 11.5–14.5)
SODIUM SERPL-SCNC: 138 MMOL/L — SIGNIFICANT CHANGE UP (ref 135–146)
WBC # BLD: 5.73 K/UL — SIGNIFICANT CHANGE UP (ref 4.8–10.8)
WBC # FLD AUTO: 5.73 K/UL — SIGNIFICANT CHANGE UP (ref 4.8–10.8)

## 2019-11-26 RX ORDER — PANTOPRAZOLE SODIUM 20 MG/1
40 TABLET, DELAYED RELEASE ORAL
Refills: 0 | Status: DISCONTINUED | OUTPATIENT
Start: 2019-11-26 | End: 2019-11-27

## 2019-11-26 RX ORDER — APIXABAN 2.5 MG/1
5 TABLET, FILM COATED ORAL EVERY 12 HOURS
Refills: 0 | Status: DISCONTINUED | OUTPATIENT
Start: 2019-11-27 | End: 2019-11-27

## 2019-11-26 RX ORDER — ATORVASTATIN CALCIUM 80 MG/1
10 TABLET, FILM COATED ORAL
Refills: 0 | Status: DISCONTINUED | OUTPATIENT
Start: 2019-11-26 | End: 2019-11-27

## 2019-11-26 RX ORDER — ACETAMINOPHEN 500 MG
650 TABLET ORAL EVERY 6 HOURS
Refills: 0 | Status: DISCONTINUED | OUTPATIENT
Start: 2019-11-26 | End: 2019-11-27

## 2019-11-26 RX ORDER — TACROLIMUS 5 MG/1
1 CAPSULE ORAL EVERY 12 HOURS
Refills: 0 | Status: DISCONTINUED | OUTPATIENT
Start: 2019-11-26 | End: 2019-11-27

## 2019-11-26 RX ORDER — ISOSORBIDE MONONITRATE 60 MG/1
30 TABLET, EXTENDED RELEASE ORAL DAILY
Refills: 0 | Status: DISCONTINUED | OUTPATIENT
Start: 2019-11-26 | End: 2019-11-27

## 2019-11-26 RX ORDER — METOPROLOL TARTRATE 50 MG
100 TABLET ORAL DAILY
Refills: 0 | Status: DISCONTINUED | OUTPATIENT
Start: 2019-11-26 | End: 2019-11-27

## 2019-11-26 RX ORDER — OXYCODONE HYDROCHLORIDE 5 MG/1
10 TABLET ORAL EVERY 6 HOURS
Refills: 0 | Status: DISCONTINUED | OUTPATIENT
Start: 2019-11-26 | End: 2019-11-27

## 2019-11-26 RX ORDER — ASPIRIN/CALCIUM CARB/MAGNESIUM 324 MG
81 TABLET ORAL DAILY
Refills: 0 | Status: DISCONTINUED | OUTPATIENT
Start: 2019-11-26 | End: 2019-11-27

## 2019-11-26 RX ORDER — GABAPENTIN 400 MG/1
300 CAPSULE ORAL THREE TIMES A DAY
Refills: 0 | Status: DISCONTINUED | OUTPATIENT
Start: 2019-11-26 | End: 2019-11-27

## 2019-11-26 RX ORDER — MYCOPHENOLATE MOFETIL 250 MG/1
500 CAPSULE ORAL
Refills: 0 | Status: DISCONTINUED | OUTPATIENT
Start: 2019-11-26 | End: 2019-11-27

## 2019-11-26 RX ORDER — CLOPIDOGREL BISULFATE 75 MG/1
75 TABLET, FILM COATED ORAL DAILY
Refills: 0 | Status: DISCONTINUED | OUTPATIENT
Start: 2019-11-27 | End: 2019-11-27

## 2019-11-26 RX ORDER — OXYCODONE HYDROCHLORIDE 5 MG/1
10 TABLET ORAL ONCE
Refills: 0 | Status: DISCONTINUED | OUTPATIENT
Start: 2019-11-26 | End: 2019-11-27

## 2019-11-26 RX ORDER — DILTIAZEM HCL 120 MG
300 CAPSULE, EXT RELEASE 24 HR ORAL DAILY
Refills: 0 | Status: DISCONTINUED | OUTPATIENT
Start: 2019-11-26 | End: 2019-11-27

## 2019-11-26 RX ORDER — SODIUM CHLORIDE 9 MG/ML
1000 INJECTION INTRAMUSCULAR; INTRAVENOUS; SUBCUTANEOUS
Refills: 0 | Status: DISCONTINUED | OUTPATIENT
Start: 2019-11-26 | End: 2019-11-27

## 2019-11-26 RX ADMIN — ATORVASTATIN CALCIUM 10 MILLIGRAM(S): 80 TABLET, FILM COATED ORAL at 22:47

## 2019-11-26 RX ADMIN — OXYCODONE HYDROCHLORIDE 10 MILLIGRAM(S): 5 TABLET ORAL at 20:42

## 2019-11-26 RX ADMIN — GABAPENTIN 300 MILLIGRAM(S): 400 CAPSULE ORAL at 22:47

## 2019-11-26 RX ADMIN — OXYCODONE HYDROCHLORIDE 10 MILLIGRAM(S): 5 TABLET ORAL at 21:43

## 2019-11-26 NOTE — ASU PATIENT PROFILE, ADULT - PSH
History of kidney transplant  3 years ago  S/P arteriovenous (AV) fistula creation  prior old fistula in R arm

## 2019-11-26 NOTE — PROGRESS NOTE ADULT - SUBJECTIVE AND OBJECTIVE BOX
I have personally seen and examined the patient.  I agree with the history and physical which I have reviewed and noted any changes below.  11-26-19 @ 13:00    PRE-OP DIAGNOSIS: Unstable angina    PROCEDURE: PCI of RCA    Physician:  Kin Low MD  Assistant:  MD    ANESTHESIA TYPE:    [ X] Sedation  [ X] Local/Regional    ESTIMATED BLOOD LOSS:   <20  mL    CONDITION      [x  ]Fair    IV CONTRAST:  150  mL      IMPLANTS (IF APPLICABLE) 3.5 x 18 mm Cobra stent x 2 (prox and mid non overlapping)      FINDINGS    RCA heavily calcified stenotic lesions.                            INTERVENTIONS: JR4 guide, heparin, aspirin, plavix.   Right CFA 6 fr sheath. Right CFV 5 fr sheath with US access. 5 Fr TVP placed in RV.  1.25 Rotablator Geneva and 1.75 [place]. Multiple passes.   Run through wire and Hernán Blue wire.  3.5 x 18 mm Cobra stent x 2 (prox and mid non overlapping)  Post dilated with 3.75 x 12 mm NC balloon x multiple inflations.   Excellent angiographic results obtained.   TVP removed  sheaths sutured in place.    COMPLICATIONS:  None      POST-OP DIAGNOSIS Unstable angina  s/p PCI of RCA.    PLAN OF CARE     D/C in AM if stable.  Admit for observation  Continue aspirin, plavix.   restart eliquis 5 mg bid tomorrow am  labs in am  iv saline 75 cc/hr till 9 am tomorrow.   Continue B-blocker & Statin therapy    GIVE PPIs.

## 2019-11-26 NOTE — ASU PATIENT PROFILE, ADULT - PMH
Atrial fibrillation  on eliquis  Cirrhosis  possibly related to hepC  CKD (chronic kidney disease)    COPD, mild  not on O2  ESRD (end stage renal disease)    HLD (hyperlipidemia)    HTN (hypertension)    Lymphoma  ?questionable, not treated, not followed  Melanoma  R eye, s/p laser  Peripheral neuropathy  unclear cause

## 2019-11-26 NOTE — H&P CARDIOLOGY - HISTORY OF PRESENT ILLNESS
Pre cath note: 64 male with CAD, CKD ( s/p kidney transplant) presenting for an elective LHC. Previously admitted for for unstable angina s/p LHC with mid-LAD stenosis (iFR 1) and RCA calcified lesion. He is here today for RCA PCI.     indication:  [ ] STEMI                [ ] NSTEMI                 [ ] Acute coronary syndrome                     [x]Unstable Angina   [ ] high risk  [ ] intermediate risk  [ ] low risk                     [ ] Stable Angina     non-invasive testing:                          Date:                     result: [ ] high risk  [x] intermediate risk  [ ] low risk    Anti- Anginal medications:                    [ ] not used                       [x] used                   [ ] not used but strong indication not to use    Ejection Fraction                   [ ] <29            [ ] 30-39%   [ ] 40-49%     [x]>50%    CHF                   [ ] active (within last 14 days on meds   [ ] Chronic (on meds but no exacerbation)    COPD                   [ ] mild (on chronic bronchodilators)  [ ] moderate (on chronic steroid therapy)      [ ] severe (indication for home O2 or PACO2 >50)    Other risk factors:                       [ ] Previous MI                     [ ] CVA/ stroke                    [ ] carotid stent/ CEA                    [ ] PVD/PAD- (arterial aneurysm, non-palpable pulses, tortuous vessel with inability to insert catheter, infra-renal dissection, renal or subclavian artery stenosis)                    [ ] diabetic                    [ ] previous CABG                    [x] Renal Failure

## 2019-11-27 ENCOUNTER — TRANSCRIPTION ENCOUNTER (OUTPATIENT)
Age: 64
End: 2019-11-27

## 2019-11-27 VITALS
DIASTOLIC BLOOD PRESSURE: 94 MMHG | RESPIRATION RATE: 18 BRPM | TEMPERATURE: 99 F | SYSTOLIC BLOOD PRESSURE: 154 MMHG | HEART RATE: 108 BPM

## 2019-11-27 LAB
ANION GAP SERPL CALC-SCNC: 14 MMOL/L — SIGNIFICANT CHANGE UP (ref 7–14)
BUN SERPL-MCNC: 16 MG/DL — SIGNIFICANT CHANGE UP (ref 10–20)
CALCIUM SERPL-MCNC: 10.1 MG/DL — SIGNIFICANT CHANGE UP (ref 8.5–10.1)
CHLORIDE SERPL-SCNC: 100 MMOL/L — SIGNIFICANT CHANGE UP (ref 98–110)
CO2 SERPL-SCNC: 20 MMOL/L — SIGNIFICANT CHANGE UP (ref 17–32)
CREAT SERPL-MCNC: 1 MG/DL — SIGNIFICANT CHANGE UP (ref 0.7–1.5)
GLUCOSE SERPL-MCNC: 134 MG/DL — HIGH (ref 70–99)
HCT VFR BLD CALC: 35.8 % — LOW (ref 42–52)
HGB BLD-MCNC: 11.3 G/DL — LOW (ref 14–18)
MCHC RBC-ENTMCNC: 30.3 PG — SIGNIFICANT CHANGE UP (ref 27–31)
MCHC RBC-ENTMCNC: 31.6 G/DL — LOW (ref 32–37)
MCV RBC AUTO: 96 FL — HIGH (ref 80–94)
NRBC # BLD: 0 /100 WBCS — SIGNIFICANT CHANGE UP (ref 0–0)
PLATELET # BLD AUTO: 97 K/UL — LOW (ref 130–400)
POTASSIUM SERPL-MCNC: 4.5 MMOL/L — SIGNIFICANT CHANGE UP (ref 3.5–5)
POTASSIUM SERPL-SCNC: 4.5 MMOL/L — SIGNIFICANT CHANGE UP (ref 3.5–5)
RBC # BLD: 3.73 M/UL — LOW (ref 4.7–6.1)
RBC # FLD: 15.3 % — HIGH (ref 11.5–14.5)
SODIUM SERPL-SCNC: 134 MMOL/L — LOW (ref 135–146)
WBC # BLD: 5.48 K/UL — SIGNIFICANT CHANGE UP (ref 4.8–10.8)
WBC # FLD AUTO: 5.48 K/UL — SIGNIFICANT CHANGE UP (ref 4.8–10.8)

## 2019-11-27 PROCEDURE — 93010 ELECTROCARDIOGRAM REPORT: CPT

## 2019-11-27 RX ORDER — PANTOPRAZOLE SODIUM 20 MG/1
1 TABLET, DELAYED RELEASE ORAL
Qty: 30 | Refills: 0
Start: 2019-11-27 | End: 2019-12-26

## 2019-11-27 RX ORDER — CLOPIDOGREL BISULFATE 75 MG/1
1 TABLET, FILM COATED ORAL
Qty: 30 | Refills: 0
Start: 2019-11-27 | End: 2019-12-26

## 2019-11-27 RX ADMIN — Medication 100 MILLIGRAM(S): at 05:47

## 2019-11-27 RX ADMIN — PANTOPRAZOLE SODIUM 40 MILLIGRAM(S): 20 TABLET, DELAYED RELEASE ORAL at 06:34

## 2019-11-27 RX ADMIN — Medication 300 MILLIGRAM(S): at 05:47

## 2019-11-27 RX ADMIN — OXYCODONE HYDROCHLORIDE 10 MILLIGRAM(S): 5 TABLET ORAL at 11:26

## 2019-11-27 RX ADMIN — Medication 81 MILLIGRAM(S): at 11:23

## 2019-11-27 RX ADMIN — CLOPIDOGREL BISULFATE 75 MILLIGRAM(S): 75 TABLET, FILM COATED ORAL at 11:24

## 2019-11-27 RX ADMIN — MYCOPHENOLATE MOFETIL 500 MILLIGRAM(S): 250 CAPSULE ORAL at 05:47

## 2019-11-27 RX ADMIN — TACROLIMUS 1 MILLIGRAM(S): 5 CAPSULE ORAL at 05:47

## 2019-11-27 RX ADMIN — ISOSORBIDE MONONITRATE 30 MILLIGRAM(S): 60 TABLET, EXTENDED RELEASE ORAL at 11:23

## 2019-11-27 RX ADMIN — APIXABAN 5 MILLIGRAM(S): 2.5 TABLET, FILM COATED ORAL at 05:47

## 2019-11-27 RX ADMIN — OXYCODONE HYDROCHLORIDE 10 MILLIGRAM(S): 5 TABLET ORAL at 01:27

## 2019-11-27 RX ADMIN — GABAPENTIN 300 MILLIGRAM(S): 400 CAPSULE ORAL at 05:47

## 2019-11-27 NOTE — DISCHARGE NOTE PROVIDER - HOSPITAL COURSE
DAVONTE CHOU     64y Male    PAST MEDICAL & SURGICAL HISTORY:    Cirrhosis: possibly related to hepC    Melanoma: R eye, s/p laser    Lymphoma: ?questionable, not treated, not followed    Peripheral neuropathy: unclear cause    COPD, mild: not on O2    Atrial fibrillation: on eliquis    HLD (hyperlipidemia)    HTN (hypertension)    ESRD (end stage renal disease)    CKD (chronic kidney disease)    S/P arteriovenous (AV) fistula creation: prior old fistula in R arm    History of kidney transplant: 3 years ago             HPI:    Pre cath note: 64 male with CAD, CKD ( s/p kidney transplant) presenting for an elective LHC. Previously admitted for for unstable angina s/p LHC with mid-LAD stenosis (iFR 1) and RCA calcified lesion. He is here today for RCA PCI.         s/p PCI COBRA stents x2    EXTREMITY:               Right Groin: pressure dressing removed, access site soft, no hematoma, no pain, + pulses/same as baseline, no sign of infection, no numbness                         Start Protonix 40 DR PO q Daily                      Resume Eliquis in AM                     BMP on Friday as out patient     	     Continue DAPT ( Aspirin 81 mg daily and Plavix 75 mg daily),  B-Blocker, Statin Therapy                     Patient given 30 day supply of ( Aspirin 81 mg daily and Plavix 75 mg daily ) to take at home                     Patient agreeing to take DAPT for at least one year or as directed by cardiologist                      Pt given instructions on importance of taking antiplatelet medication or risk acute stent thrombosis/death                     Post cath instructions, access site care and activity restrictions reviewed with patient                       Discussed with patient to return to hospital if experience chest pain, shortness breath, dizziness and site bleeding                     Aggressive risk factor modification, diet counseling, smoking cessation discussed with patient                         Can discharge patient from cardiac standpoint                      Follow up with Cardiology Dr. Low on December 4 2019 at 2:15 pm.

## 2019-11-27 NOTE — DISCHARGE NOTE PROVIDER - CARE PROVIDER_API CALL
Kin Low)  Cardiovascular Disease; Interventional Cardiology  [address] [place] [address]  Phone: [phone]  Fax: [phone]  Follow Up Time:

## 2019-11-27 NOTE — DISCHARGE NOTE PROVIDER - NSDCCPCAREPLAN_GEN_ALL_CORE_FT
PRINCIPAL DISCHARGE DIAGNOSIS  Diagnosis: CAD S/P percutaneous coronary angioplasty  Assessment and Plan of Treatment: Cobra stents x 2

## 2019-11-27 NOTE — DISCHARGE NOTE NURSING/CASE MANAGEMENT/SOCIAL WORK - PATIENT PORTAL LINK FT
You can access the FollowMyHealth Patient Portal offered by [facility] by registering at the following website: [email or web address]. By joining [employer]’s FollowMyHealth portal, you will also be able to view your health information using other applications (apps) compatible with our system.

## 2019-11-27 NOTE — PROGRESS NOTE ADULT - SUBJECTIVE AND OBJECTIVE BOX
Cardiology Follow up    DAVONTE CHOU   64y Male  PAST MEDICAL & SURGICAL HISTORY:  Cirrhosis: possibly related to hepC  Melanoma: R eye, s/p laser  Lymphoma: ?questionable, not treated, not followed  Peripheral neuropathy: unclear cause  COPD, mild: not on O2  Atrial fibrillation: on eliquis  HLD (hyperlipidemia)  HTN (hypertension)  ESRD (end stage renal disease)  CKD (chronic kidney disease)  S/P arteriovenous (AV) fistula creation: prior old fistula in R arm  History of kidney transplant: 3 years ago       HPI:  Pre cath note: 64 male with CAD, CKD ( s/p kidney transplant) presenting for an elective LHC. Previously admitted for for unstable angina s/p LHC with mid-LAD stenosis (iFR 1) and RCA calcified lesion. He is here today for RCA PCI.     indication:  [ ] STEMI                [ ] NSTEMI                 [ ] Acute coronary syndrome                     [x]Unstable Angina   [ ] high risk  [ ] intermediate risk  [ ] low risk                     [ ] Stable Angina     non-invasive testing:                          Date:                     result: [ ] high risk  [x] intermediate risk  [ ] low risk    Anti- Anginal medications:                    [ ] not used                       [x] used                   [ ] not used but strong indication not to use    Ejection Fraction                   [ ] <29            [ ] 30-39%   [ ] 40-49%     [x]>50%    CHF                   [ ] active (within last 14 days on meds   [ ] Chronic (on meds but no exacerbation)    COPD                   [ ] mild (on chronic bronchodilators)  [ ] moderate (on chronic steroid therapy)      [ ] severe (indication for home O2 or PACO2 >50)    Other risk factors:                       [ ] Previous MI                     [ ] CVA/ stroke                    [ ] carotid stent/ CEA                    [ ] PVD/PAD- (arterial aneurysm, non-palpable pulses, tortuous vessel with inability to insert catheter, infra-renal dissection, renal or subclavian artery stenosis)                    [ ] diabetic                    [ ] previous CABG                    [x] Renal Failure (26 Nov 2019 10:31)    Allergies    No Known Allergies    Intolerances    Patient without complaints. Pt ambulated without issues/symptoms  Denies CP, SOB, palpitations, or dizziness  No events on telemetry overnight    Vital Signs Last 24 Hrs  T(C): 37.1 (27 Nov 2019 05:47), Max: 37.1 (27 Nov 2019 05:47)  T(F): 98.8 (27 Nov 2019 05:47), Max: 98.8 (27 Nov 2019 05:47)  HR: 108 (27 Nov 2019 05:47) (106 - 108)  BP: 154/94 (27 Nov 2019 05:47) (146/98 - 154/94)  BP(mean): --  RR: 18 (27 Nov 2019 05:47) (18 - 18)  SpO2: --    MEDICATIONS  (STANDING):  apixaban 5 milliGRAM(s) Oral every 12 hours  aspirin  chewable 81 milliGRAM(s) Oral daily  atorvastatin 10 milliGRAM(s) Oral <User Schedule>  clopidogrel Tablet 75 milliGRAM(s) Oral daily  diltiazem    milliGRAM(s) Oral daily  gabapentin 300 milliGRAM(s) Oral three times a day  isosorbide   mononitrate ER Tablet (IMDUR) 30 milliGRAM(s) Oral daily  metoprolol succinate  milliGRAM(s) Oral daily  mycophenolate mofetil 500 milliGRAM(s) Oral two times a day  pantoprazole    Tablet 40 milliGRAM(s) Oral before breakfast  sodium chloride 0.9%. 1000 milliLiter(s) (75 mL/Hr) IV Continuous <Continuous>  tacrolimus 1 milliGRAM(s) Oral every 12 hours    MEDICATIONS  (PRN):  acetaminophen   Tablet .. 650 milliGRAM(s) Oral every 6 hours PRN Mild Pain (1 - 3)  oxyCODONE    IR 10 milliGRAM(s) Oral every 6 hours PRN Moderate Pain (4 - 6)    REVIEW OF SYSTEMS:          CONSTITUTIONAL: No weakness, fevers or chills          EYES/ENT: No visual changes;  No vertigo or throat pain           NECK: No pain or stiffness          RESPIRATORY: No cough, wheezing, hemoptysis          CARDIOVASCULAR: no pain, no PABLO, no palpitations           GASTROINTESTINAL: No abdominal or epigastric pain. No nausea, vomiting, or hematemesis;           GENITOURINARY: No dysuria, frequency or hematuria          NEUROLOGICAL: No numbness or weakness          SKIN: No itching, rashes    PHYSICAL EXAM:           CONSTITUTIONAL: Well-developed; well-nourished; in no acute distress  	SKIN: warm, dry  	HEAD: Normocephalic; atraumatic  	EYES: PERRL.  	ENT: No nasal discharge, airway clear, mucous membranes moist  	NECK: Supple; non tender.  	CARD: +S1, +S2, no murmurs, gallops, or rubs. Irregular rate and rhythm    	RESP: No wheezes, rales or rhonchi. CTA B/L  	ABD: soft ntnd, + BS x 4 quadrants  	EXT: moves all extremities,  no clubbing, cyanosis or edema  	NEURO: Alert and oriented x3, no focal deficits          PSYCH: Cooperative, appropriate          VASCULAR:  + 1 Rad / + 1 PTs / + 1 DPs          EXTREMITY:             Right Groin: pressure dressing removed, access site soft, no hematoma, no pain, + pulses/same as baseline, no sign of infection, no numbness  	     ECG:   A-Fib hr 115   QTc 390  ST and T wave abnormality, Voltage criteria for Left ventricular hypertrophy                                                                                                                 LABS:                        11.3   5.48  )-----------( 97       ( 27 Nov 2019 09:31 )             35.8     11-27    134<L>  |  100  |  16  ----------------------------<  134<H>  4.5   |  20  |  1.0    Ca    10.1      27 Nov 2019 09:31    A/P:  I discussed the case with Cardiologist Dr. Low recommend the following:    s/p PCI COBRA stents x2                     Resume Eliquis in AM                   BMP on Friday as OP  	     Continue DAPT ( Aspirin 81 mg daily and Plavix 75 mg daily),  B-Blocker, Statin Therapy                   Patient given 30 day supply of ( Aspirin 81 mg daily and Plavix 75 mg daily ) to take at home                   Patient agreeing to take DAPT for at least one year or as directed by cardiologist                    Pt given instructions on importance of taking antiplatelet medication or risk acute stent thrombosis/death                   Post cath instructions, access site care and activity restrictions reviewed with patient                     Discussed with patient to return to hospital if experience chest pain, shortness breath, dizziness and site bleeding                   Aggressive risk factor modification, diet counseling, smoking cessation discussed with patient                       Can discharge patient from cardiac standpoint after ambulating without symptoms and access site wnl and ECG reviewed                    Follow up with Cardiology Dr. Low on December 4 2019 at 2:15 pm.

## 2019-11-27 NOTE — PROGRESS NOTE ADULT - ASSESSMENT
s/p Rotablator pci of rca x 2 non overlapping COBRA stents.  esrd s/p renal transplant  chronic afib.   htn  dyslipidemia  ex smoker.   legally blind.     adv:  dc home  aspirin 81 mg once daily with food. (EC tab)  clopidogrel 75 mg once a day.  eliquis 5 mg bid.   CONTINUE PANTOPRAZOLE 40 MG ONCE A DAY    continue diltiazem 360  continue toprol xl 100   continue atorvastatin 10 mg .  continue other meds for renal transplant.     pt has appt on dec 4 at 2:15 pm. to see me in office.   will keep same.     follow am labs.

## 2019-11-27 NOTE — PROGRESS NOTE ADULT - SUBJECTIVE AND OBJECTIVE BOX
Pt feels fine. no chest pain no dyspnea. no palpitations.     On Exam:    Vital Signs Last 24 Hrs  T(C): 37.1 (27 Nov 2019 05:47), Max: 37.1 (27 Nov 2019 05:47)  T(F): 98.8 (27 Nov 2019 05:47), Max: 98.8 (27 Nov 2019 05:47)  HR: 108 (27 Nov 2019 05:47) (106 - 108)  BP: 154/94 (27 Nov 2019 05:47) (146/98 - 154/94)  RR: 18 (27 Nov 2019 05:47) (18 - 18)      PHYSICAL EXAM:    · NECK:	 supple  ·RESPIRATORY:  Good air entry bilaterally.  · CARDIOVASCULAR	rregularly irregular rhythm. S1 variable in intensity.  . ABDOMEN :  no distention; bowel sounds normal; no rebound tenderness; no guarding; no rigidity  · EXTREMITIES: No cyanosis, clubbing or edema. mild ecchymosis right groin. no hematoma.   ·NEUROLOGICAL:   alert and oriented x 3;                          11.2   5.73  )-----------( 102      ( 26 Nov 2019 10:40 )             36.0       11-26    138  |  105  |  27<H>  ----------------------------<  107<H>  4.9   |  21  |  1.2    Ca    10.0      26 Nov 2019 10:40

## 2019-11-27 NOTE — DISCHARGE NOTE PROVIDER - NSDCMRMEDTOKEN_GEN_ALL_CORE_FT
acetaminophen 325 mg oral tablet: 2 tab(s) orally every 6 hours, As Needed  apixaban 5 mg oral tablet: 1 tab(s) orally every 12 hours  aspirin 81 mg oral tablet, chewable: 1 tab(s) orally once a day  Cardizem  mg/24 hours oral capsule, extended release: 1 cap(s) orally once a day  CellCept 500 mg oral tablet: 1 tab(s) orally 2 times a day  cephalexin 500 mg oral capsule: 1 cap(s) orally every 8 hours until you have completed the antibiotics.   clopidogrel 75 mg oral tablet: 1 tab(s) orally once a day MDD:1  Flomax 0.4 mg oral capsule: 1 cap(s) orally once a day  gabapentin 300 mg oral tablet: 1 tab(s) orally 3 times a day  Imdur 30 mg oral tablet, extended release: 1 tab(s) orally once a day (in the morning)  Lipitor 10 mg oral tablet: 1 tab(s) orally every 48 hours  oxyCODONE 10 mg oral tablet: 1 tab(s) orally every 6 hours, As Needed  pantoprazole 40 mg oral delayed release tablet: 1 tab(s) orally once a day MDD:1  tacrolimus 1 mg oral capsule: 2 cap(s) orally every 12 hours  Toprol- mg oral tablet, extended release: 1 tab(s) orally once a day

## 2019-12-11 DIAGNOSIS — I12.9 HYPERTENSIVE CHRONIC KIDNEY DISEASE WITH STAGE 1 THROUGH STAGE 4 CHRONIC KIDNEY DISEASE, OR UNSPECIFIED CHRONIC KIDNEY DISEASE: ICD-10-CM

## 2019-12-11 DIAGNOSIS — Z79.82 LONG TERM (CURRENT) USE OF ASPIRIN: ICD-10-CM

## 2019-12-11 DIAGNOSIS — I48.20 CHRONIC ATRIAL FIBRILLATION, UNSPECIFIED: ICD-10-CM

## 2019-12-11 DIAGNOSIS — K74.60 UNSPECIFIED CIRRHOSIS OF LIVER: ICD-10-CM

## 2019-12-11 DIAGNOSIS — H54.8 LEGAL BLINDNESS, AS DEFINED IN USA: ICD-10-CM

## 2019-12-11 DIAGNOSIS — N18.9 CHRONIC KIDNEY DISEASE, UNSPECIFIED: ICD-10-CM

## 2019-12-11 DIAGNOSIS — J44.9 CHRONIC OBSTRUCTIVE PULMONARY DISEASE, UNSPECIFIED: ICD-10-CM

## 2019-12-11 DIAGNOSIS — Z94.0 KIDNEY TRANSPLANT STATUS: ICD-10-CM

## 2019-12-11 DIAGNOSIS — Z87.891 PERSONAL HISTORY OF NICOTINE DEPENDENCE: ICD-10-CM

## 2019-12-11 DIAGNOSIS — E78.5 HYPERLIPIDEMIA, UNSPECIFIED: ICD-10-CM

## 2019-12-11 DIAGNOSIS — G62.9 POLYNEUROPATHY, UNSPECIFIED: ICD-10-CM

## 2020-06-09 ENCOUNTER — OUTPATIENT (OUTPATIENT)
Dept: OUTPATIENT SERVICES | Facility: HOSPITAL | Age: 65
LOS: 1 days | Discharge: HOME | End: 2020-06-09
Payer: MEDICARE

## 2020-06-09 DIAGNOSIS — Z94.0 KIDNEY TRANSPLANT STATUS: Chronic | ICD-10-CM

## 2020-06-09 DIAGNOSIS — Z98.890 OTHER SPECIFIED POSTPROCEDURAL STATES: Chronic | ICD-10-CM

## 2020-06-09 PROCEDURE — 92004 COMPRE OPH EXAM NEW PT 1/>: CPT

## 2020-12-04 ENCOUNTER — TRANSCRIPTION ENCOUNTER (OUTPATIENT)
Age: 65
End: 2020-12-04

## 2020-12-07 ENCOUNTER — EMERGENCY (EMERGENCY)
Facility: HOSPITAL | Age: 65
LOS: 0 days | Discharge: HOME | End: 2020-12-07
Attending: EMERGENCY MEDICINE | Admitting: EMERGENCY MEDICINE
Payer: MEDICARE

## 2020-12-07 VITALS
SYSTOLIC BLOOD PRESSURE: 121 MMHG | DIASTOLIC BLOOD PRESSURE: 88 MMHG | TEMPERATURE: 98 F | WEIGHT: 169.98 LBS | HEIGHT: 69 IN | HEART RATE: 62 BPM | RESPIRATION RATE: 20 BRPM | OXYGEN SATURATION: 98 %

## 2020-12-07 DIAGNOSIS — J44.9 CHRONIC OBSTRUCTIVE PULMONARY DISEASE, UNSPECIFIED: ICD-10-CM

## 2020-12-07 DIAGNOSIS — Z79.82 LONG TERM (CURRENT) USE OF ASPIRIN: ICD-10-CM

## 2020-12-07 DIAGNOSIS — Z94.0 KIDNEY TRANSPLANT STATUS: Chronic | ICD-10-CM

## 2020-12-07 DIAGNOSIS — Z87.891 PERSONAL HISTORY OF NICOTINE DEPENDENCE: ICD-10-CM

## 2020-12-07 DIAGNOSIS — G51.0 BELL'S PALSY: ICD-10-CM

## 2020-12-07 DIAGNOSIS — Z79.899 OTHER LONG TERM (CURRENT) DRUG THERAPY: ICD-10-CM

## 2020-12-07 DIAGNOSIS — R29.810 FACIAL WEAKNESS: ICD-10-CM

## 2020-12-07 DIAGNOSIS — I12.0 HYPERTENSIVE CHRONIC KIDNEY DISEASE WITH STAGE 5 CHRONIC KIDNEY DISEASE OR END STAGE RENAL DISEASE: ICD-10-CM

## 2020-12-07 DIAGNOSIS — Z98.890 OTHER SPECIFIED POSTPROCEDURAL STATES: Chronic | ICD-10-CM

## 2020-12-07 DIAGNOSIS — N18.6 END STAGE RENAL DISEASE: ICD-10-CM

## 2020-12-07 DIAGNOSIS — E78.5 HYPERLIPIDEMIA, UNSPECIFIED: ICD-10-CM

## 2020-12-07 DIAGNOSIS — I48.91 UNSPECIFIED ATRIAL FIBRILLATION: ICD-10-CM

## 2020-12-07 PROCEDURE — 99283 EMERGENCY DEPT VISIT LOW MDM: CPT

## 2020-12-07 NOTE — ED PROVIDER NOTE - NS ED ROS FT
Constitutional: no fever, chills, no recent weight loss, change in appetite or malaise  Eyes: no redness/discharge/pain/vision changes  ENT: no rhinorrhea/ear pain/sore throat  Cardiac: No chest pain, SOB or edema.  Respiratory: No cough or respiratory distress  GI: No nausea, vomiting, diarrhea or abdominal pain.  : No dysuria, frequency, urgency or hematuria  MS: no pain to back or extremities, no loss of ROM, no weakness  Neuro: No headache; No LOC.  Skin: No skin rash.  Except as documented in the HPI, all other systems are negative.

## 2020-12-07 NOTE — ED PROVIDER NOTE - PATIENT PORTAL LINK FT
You can access the FollowMyHealth Patient Portal offered by [facility] by registering at the following website: [email or web address]. By joining [employer]’s FollowMyHealth portal, you will also be able to view your health information using other applications (apps) compatible with our system.

## 2020-12-07 NOTE — ED ADULT NURSE NOTE - CHIEF COMPLAINT QUOTE
left sided facial droop onset saturday. went to [facility] and was sent home. hx of CA kidney transplant and CVA. patient noted to have left sided arm weakness and slurred speech.

## 2020-12-07 NOTE — ED ADULT TRIAGE NOTE - CHIEF COMPLAINT QUOTE
left sided facial droop onset saturday. went to [facility] [place] and was sent home. hx of CA kidney transplant and CVA. patient noted to have left sided arm weakness and slurred speech.

## 2020-12-07 NOTE — ED PROVIDER NOTE - PROGRESS NOTE DETAILS
given hx and presentation several days post onset, utility of steroids and antivirals not indicated. pt to f/u with neuro. Pt counseled - warning si/sxs d/w pt. Pt instructed to return to ed or f/u with PCP should sxs persist/worsen. Pt verbalizes an understanding & agreement with the plan as discussed

## 2020-12-07 NOTE — ED PROVIDER NOTE - PHYSICAL EXAMINATION
CONSTITUTIONAL: Well-appearing; well-nourished; in no apparent distress.   CARDIOVASCULAR: Normal S1, S2; no murmurs, rubs, or gallops.   RESPIRATORY: Normal chest excursion with respiration; breath sounds clear and equal bilaterally; no wheezes, rhonchi, or rales.  MS: No evidence of trauma or deformity. Non-tender to palpation. equal motor/strength b/l  NEURO/PSYCH: left sided facial droop involving forehead, unable to close left eye; A & O x 4; otherwise grossly unremarkable. mood and manner are appropriate. Grooming and personal hygiene are appropriate. No apparent thoughts of harm to self or others.

## 2020-12-07 NOTE — ED PROVIDER NOTE - ATTENDING CONTRIBUTION TO CARE
64 y/o male h/o HTN, HLD, afib on eliquis, Hep C, cirrhosis, CKD, COPD, ESRD c/o left sided facial droop x 3 days, sx are constant, denies modifying factors, associated fullness behind left ear denies other associated sx or complaints.    Old chart reviewed.  I have reviewed and agree with the nursing note, except as documented in my note.    VSS, awake, alert, non-toxic appearing, lying comfortably in stretcher, in NAD, oropharynx clear, mmm, no skin rash or lesions, chest CTAB, non-labored breathing, no w/r/r, +S1/S2, no m/r/g, abdomen soft, NT, ND, +BS, alert and oriented to  person, place and time, clear speech, lft facial droop with lft forehead involvement and inability to close lft eye completely, otherwise cranial nerves II-XII are intact, upper and lower extremity strength is 5/5, symmetrical coordination and gait are normal.     IMP: CN 7 palsy, likely [place], rec artificial tears, no steroids / antivirals due to duration of sx. The patient was given detailed return precautions and advised to return to the emergency department if any new symptoms developed, symptoms worsened or for any concerns. The patient was offered the opportunity to ask questions and verbalized that they understand the diagnosis and discharge instructions.

## 2020-12-07 NOTE — ED PROVIDER NOTE - NSFOLLOWUPINSTRUCTIONS_ED_ALL_ED_FT
Unger's Palsy    Bell's palsy is a condition in which the muscles on one side of the face become paralyzed. This often causes one side of the face to droop. It is a common condition and most people recover completely. Causes include viral infections but most of the time the reason remains unknown. Signs and symptoms include not being able to raise your eyebrow, not being able to close your eye, drooping of the eyelid and corner of the mouth, sensitivity to loud noises, dryness of the eye, change in taste, and not being able to close your mouth/drooling. Take medicines only as directed by your health care provider. If your eye is affected use moisturizing eye drops to prevent drying of your eye and tape your eyelid shut at night as directed by your health care provider.    SEEK IMMEDIATE MEDICAL CARE IF YOU HAVE THE FOLLOWING SYMPTOMS: weakness or numbness in another part of your body, difficulty swallowing, fever, or neck pain.

## 2021-06-07 ENCOUNTER — APPOINTMENT (OUTPATIENT)
Dept: SURGERY | Facility: CLINIC | Age: 66
End: 2021-06-07

## 2021-06-10 ENCOUNTER — NON-APPOINTMENT (OUTPATIENT)
Age: 66
End: 2021-06-10

## 2021-07-01 ENCOUNTER — APPOINTMENT (OUTPATIENT)
Dept: SURGERY | Facility: CLINIC | Age: 66
End: 2021-07-01
Payer: MEDICARE

## 2021-07-01 VITALS
HEIGHT: 69 IN | HEART RATE: 70 BPM | BODY MASS INDEX: 25.03 KG/M2 | DIASTOLIC BLOOD PRESSURE: 72 MMHG | TEMPERATURE: 97.3 F | SYSTOLIC BLOOD PRESSURE: 128 MMHG | WEIGHT: 169 LBS

## 2021-07-01 DIAGNOSIS — Z85.79 PERSONAL HISTORY OF OTHER MALIGNANT NEOPLASMS OF LYMPHOID, HEMATOPOIETIC AND RELATED TISSUES: ICD-10-CM

## 2021-07-01 DIAGNOSIS — Z87.448 PERSONAL HISTORY OF OTHER DISEASES OF URINARY SYSTEM: ICD-10-CM

## 2021-07-01 PROCEDURE — 99202 OFFICE O/P NEW SF 15 MIN: CPT

## 2021-07-06 PROBLEM — Z85.79 HISTORY OF LYMPHOMA: Status: RESOLVED | Noted: 2021-07-01 | Resolved: 2021-07-06

## 2021-07-06 PROBLEM — Z87.448 HISTORY OF ACUTE RENAL FAILURE: Status: RESOLVED | Noted: 2021-07-01 | Resolved: 2021-07-06

## 2021-07-06 NOTE — PHYSICAL EXAM
[Normal Thyroid] : the thyroid was normal [Normal Breath Sounds] : Normal breath sounds [Normal Heart Sounds] : normal heart sounds [Normal Rate and Rhythm] : normal rate and rhythm [Abdominal Masses] : No abdominal masses [Abdomen Tenderness] : ~T ~M No abdominal tenderness [de-identified] : chronically ill-appearing, no acute distress [de-identified] : no adenopathy [de-identified] : No nipple discharge, nipple retraction, suspicious skin changes bilaterally. The breasts are asymmetrically enlarged, more so on the left and had a mixed fatty and glandular consistency throughout. No discrete masses or areas of suspicion are palpable in either breast region. The left breast region has diffuse mild tenderness. No axillary adenopathy bilaterally. [de-identified] : Questionable ascites, liver not clearly palpable.

## 2021-07-06 NOTE — DATA REVIEWED
[FreeTextEntry1] : Bilateral mammogram and breast sonogram from 2019 showed findings consistent with bilateral gynecomastia, more so on the left, but with no suspicious findings.

## 2021-07-06 NOTE — HISTORY OF PRESENT ILLNESS
[de-identified] : The patient presents to be evaluated for a left mastectomy. He has known bilateral gynecomastia and complains of progressive pain and enlargement of the left breast region. He was seen for this in the past by another general surgeon but the findings were thought to be benign and no surgery was performed at that time. He reports that he was recently evaluated by his cardiologist and found to be in satisfactory condition for surgery from that standpoint. He denies any other symptoms or changes in either breast and has no prior history of any prior breast surgery or biopsies.

## 2021-07-06 NOTE — ASSESSMENT
[FreeTextEntry1] : Asymmetric bilateral gynecomastia, symptomatic on the left, likely related to his underlying liver disease. He is requesting a left mastectomy but I believe that his multiple comorbidities place him at significant risk for surgery such as this, including significant risk of bleeding, infection, and problems with wound healing. As this is a chronic problem and there were no suspicious findings, surgery was not advised at this time. The patient was encouraged to seek other surgical opinions in this regard and all his questions were answered. He will return here as need be.

## 2022-02-19 ENCOUNTER — INPATIENT (INPATIENT)
Facility: HOSPITAL | Age: 67
LOS: 5 days | Discharge: HOME HEALTH | End: 2022-02-25
Attending: INTERNAL MEDICINE | Admitting: INTERNAL MEDICINE
Payer: MEDICARE

## 2022-02-19 VITALS
SYSTOLIC BLOOD PRESSURE: 107 MMHG | OXYGEN SATURATION: 100 % | HEART RATE: 78 BPM | HEIGHT: 69 IN | RESPIRATION RATE: 18 BRPM | DIASTOLIC BLOOD PRESSURE: 64 MMHG | TEMPERATURE: 98 F

## 2022-02-19 DIAGNOSIS — Z98.890 OTHER SPECIFIED POSTPROCEDURAL STATES: Chronic | ICD-10-CM

## 2022-02-19 DIAGNOSIS — Z94.0 KIDNEY TRANSPLANT STATUS: Chronic | ICD-10-CM

## 2022-02-19 LAB
ALBUMIN SERPL ELPH-MCNC: 3.7 G/DL — SIGNIFICANT CHANGE UP (ref 3.5–5.2)
ALP SERPL-CCNC: 56 U/L — SIGNIFICANT CHANGE UP (ref 30–115)
ALT FLD-CCNC: 9 U/L — SIGNIFICANT CHANGE UP (ref 0–41)
ANION GAP SERPL CALC-SCNC: 9 MMOL/L — SIGNIFICANT CHANGE UP (ref 7–14)
APTT BLD: 41.9 SEC — HIGH (ref 27–39.2)
AST SERPL-CCNC: 11 U/L — SIGNIFICANT CHANGE UP (ref 0–41)
BASOPHILS # BLD AUTO: 0 K/UL — SIGNIFICANT CHANGE UP (ref 0–0.2)
BASOPHILS NFR BLD AUTO: 0 % — SIGNIFICANT CHANGE UP (ref 0–1)
BILIRUB SERPL-MCNC: 0.5 MG/DL — SIGNIFICANT CHANGE UP (ref 0.2–1.2)
BLD GP AB SCN SERPL QL: SIGNIFICANT CHANGE UP
BUN SERPL-MCNC: 17 MG/DL — SIGNIFICANT CHANGE UP (ref 10–20)
CALCIUM SERPL-MCNC: 10.3 MG/DL — HIGH (ref 8.5–10.1)
CHLORIDE SERPL-SCNC: 105 MMOL/L — SIGNIFICANT CHANGE UP (ref 98–110)
CO2 SERPL-SCNC: 23 MMOL/L — SIGNIFICANT CHANGE UP (ref 17–32)
CREAT SERPL-MCNC: 1.8 MG/DL — HIGH (ref 0.7–1.5)
EOSINOPHIL # BLD AUTO: 0.03 K/UL — SIGNIFICANT CHANGE UP (ref 0–0.7)
EOSINOPHIL NFR BLD AUTO: 1.7 % — SIGNIFICANT CHANGE UP (ref 0–8)
GIANT PLATELETS BLD QL SMEAR: PRESENT — SIGNIFICANT CHANGE UP
GLUCOSE SERPL-MCNC: 100 MG/DL — HIGH (ref 70–99)
HCT VFR BLD CALC: 26.4 % — LOW (ref 42–52)
HGB BLD-MCNC: 7.8 G/DL — LOW (ref 14–18)
INR BLD: 1.4 RATIO — HIGH (ref 0.65–1.3)
LYMPHOCYTES # BLD AUTO: 0.36 K/UL — LOW (ref 1.2–3.4)
LYMPHOCYTES # BLD AUTO: 18.4 % — LOW (ref 20.5–51.1)
MANUAL SMEAR VERIFICATION: SIGNIFICANT CHANGE UP
MCHC RBC-ENTMCNC: 29.2 PG — SIGNIFICANT CHANGE UP (ref 27–31)
MCHC RBC-ENTMCNC: 29.5 G/DL — LOW (ref 32–37)
MCV RBC AUTO: 98.9 FL — HIGH (ref 80–94)
MONOCYTES # BLD AUTO: 0.17 K/UL — SIGNIFICANT CHANGE UP (ref 0.1–0.6)
MONOCYTES NFR BLD AUTO: 8.8 % — SIGNIFICANT CHANGE UP (ref 1.7–9.3)
NEUTROPHILS # BLD AUTO: 1.34 K/UL — LOW (ref 1.4–6.5)
NEUTROPHILS NFR BLD AUTO: 69.3 % — SIGNIFICANT CHANGE UP (ref 42.2–75.2)
PLAT MORPH BLD: NORMAL — SIGNIFICANT CHANGE UP
PLATELET # BLD AUTO: 98 K/UL — LOW (ref 130–400)
POIKILOCYTOSIS BLD QL AUTO: SIGNIFICANT CHANGE UP
POLYCHROMASIA BLD QL SMEAR: SLIGHT — SIGNIFICANT CHANGE UP
POTASSIUM SERPL-MCNC: 4.7 MMOL/L — SIGNIFICANT CHANGE UP (ref 3.5–5)
POTASSIUM SERPL-SCNC: 4.7 MMOL/L — SIGNIFICANT CHANGE UP (ref 3.5–5)
PROT SERPL-MCNC: 5.8 G/DL — LOW (ref 6–8)
PROTHROM AB SERPL-ACNC: 16.1 SEC — HIGH (ref 9.95–12.87)
RBC # BLD: 2.67 M/UL — LOW (ref 4.7–6.1)
RBC # FLD: 16 % — HIGH (ref 11.5–14.5)
RBC BLD AUTO: ABNORMAL
SARS-COV-2 RNA SPEC QL NAA+PROBE: SIGNIFICANT CHANGE UP
SODIUM SERPL-SCNC: 137 MMOL/L — SIGNIFICANT CHANGE UP (ref 135–146)
TROPONIN T SERPL-MCNC: 0.01 NG/ML — SIGNIFICANT CHANGE UP
VARIANT LYMPHS # BLD: 1.8 % — SIGNIFICANT CHANGE UP (ref 0–5)
WBC # BLD: 1.93 K/UL — LOW (ref 4.8–10.8)
WBC # FLD AUTO: 1.93 K/UL — LOW (ref 4.8–10.8)

## 2022-02-19 PROCEDURE — 71045 X-RAY EXAM CHEST 1 VIEW: CPT | Mod: 26

## 2022-02-19 PROCEDURE — 99221 1ST HOSP IP/OBS SF/LOW 40: CPT | Mod: GC

## 2022-02-19 PROCEDURE — 93010 ELECTROCARDIOGRAM REPORT: CPT

## 2022-02-19 PROCEDURE — 99285 EMERGENCY DEPT VISIT HI MDM: CPT

## 2022-02-19 PROCEDURE — 93010 ELECTROCARDIOGRAM REPORT: CPT | Mod: 77

## 2022-02-19 RX ORDER — CALCITRIOL 0.5 UG/1
0.5 CAPSULE ORAL DAILY
Refills: 0 | Status: DISCONTINUED | OUTPATIENT
Start: 2022-02-19 | End: 2022-02-22

## 2022-02-19 RX ORDER — OXYCODONE HYDROCHLORIDE 5 MG/1
5 TABLET ORAL ONCE
Refills: 0 | Status: DISCONTINUED | OUTPATIENT
Start: 2022-02-19 | End: 2022-02-19

## 2022-02-19 RX ORDER — ISOSORBIDE MONONITRATE 60 MG/1
1 TABLET, EXTENDED RELEASE ORAL
Qty: 0 | Refills: 0 | DISCHARGE

## 2022-02-19 RX ORDER — OXYCODONE HYDROCHLORIDE 5 MG/1
10 TABLET ORAL ONCE
Refills: 0 | Status: DISCONTINUED | OUTPATIENT
Start: 2022-02-19 | End: 2022-02-19

## 2022-02-19 RX ORDER — GABAPENTIN 400 MG/1
300 CAPSULE ORAL THREE TIMES A DAY
Refills: 0 | Status: DISCONTINUED | OUTPATIENT
Start: 2022-02-19 | End: 2022-02-25

## 2022-02-19 RX ORDER — DILTIAZEM HCL 120 MG
1 CAPSULE, EXT RELEASE 24 HR ORAL
Qty: 0 | Refills: 0 | DISCHARGE

## 2022-02-19 RX ORDER — OXYCODONE HYDROCHLORIDE 5 MG/1
1 TABLET ORAL
Qty: 0 | Refills: 0 | DISCHARGE

## 2022-02-19 RX ORDER — CLOPIDOGREL BISULFATE 75 MG/1
75 TABLET, FILM COATED ORAL DAILY
Refills: 0 | Status: DISCONTINUED | OUTPATIENT
Start: 2022-02-19 | End: 2022-02-25

## 2022-02-19 RX ORDER — METOPROLOL TARTRATE 50 MG
100 TABLET ORAL DAILY
Refills: 0 | Status: DISCONTINUED | OUTPATIENT
Start: 2022-02-19 | End: 2022-02-25

## 2022-02-19 RX ORDER — TACROLIMUS 5 MG/1
1 CAPSULE ORAL EVERY 12 HOURS
Refills: 0 | Status: DISCONTINUED | OUTPATIENT
Start: 2022-02-19 | End: 2022-02-25

## 2022-02-19 RX ORDER — SODIUM CHLORIDE 9 MG/ML
1000 INJECTION INTRAMUSCULAR; INTRAVENOUS; SUBCUTANEOUS
Refills: 0 | Status: DISCONTINUED | OUTPATIENT
Start: 2022-02-19 | End: 2022-02-22

## 2022-02-19 RX ORDER — APIXABAN 2.5 MG/1
5 TABLET, FILM COATED ORAL EVERY 12 HOURS
Refills: 0 | Status: DISCONTINUED | OUTPATIENT
Start: 2022-02-19 | End: 2022-02-25

## 2022-02-19 RX ORDER — ATORVASTATIN CALCIUM 80 MG/1
10 TABLET, FILM COATED ORAL
Refills: 0 | Status: DISCONTINUED | OUTPATIENT
Start: 2022-02-19 | End: 2022-02-25

## 2022-02-19 RX ORDER — MYCOPHENOLATE MOFETIL 250 MG/1
500 CAPSULE ORAL
Refills: 0 | Status: DISCONTINUED | OUTPATIENT
Start: 2022-02-19 | End: 2022-02-25

## 2022-02-19 RX ORDER — OXYCODONE HYDROCHLORIDE 5 MG/1
15 TABLET ORAL EVERY 12 HOURS
Refills: 0 | Status: DISCONTINUED | OUTPATIENT
Start: 2022-02-19 | End: 2022-02-19

## 2022-02-19 RX ORDER — TAMSULOSIN HYDROCHLORIDE 0.4 MG/1
0.4 CAPSULE ORAL AT BEDTIME
Refills: 0 | Status: DISCONTINUED | OUTPATIENT
Start: 2022-02-19 | End: 2022-02-25

## 2022-02-19 RX ADMIN — OXYCODONE HYDROCHLORIDE 10 MILLIGRAM(S): 5 TABLET ORAL at 17:49

## 2022-02-19 RX ADMIN — SODIUM CHLORIDE 50 MILLILITER(S): 9 INJECTION INTRAMUSCULAR; INTRAVENOUS; SUBCUTANEOUS at 17:49

## 2022-02-19 RX ADMIN — OXYCODONE HYDROCHLORIDE 5 MILLIGRAM(S): 5 TABLET ORAL at 20:15

## 2022-02-19 RX ADMIN — OXYCODONE HYDROCHLORIDE 5 MILLIGRAM(S): 5 TABLET ORAL at 20:48

## 2022-02-19 NOTE — ED PROVIDER NOTE - CCCP TRG CHIEF CMPLNT
Hospitalist Discharge Summary    Patient:  Brittni Gonzales  YOB: 1968    MRN: [id number]   Acct: [de-identified]    Primary Care Physician: Petty Alexander MD    Admit date:  11/12/2021    Discharge date:  11/14/2021    Discharge Diagnoses: Active Problems:    Breakthrough seizure ([address]  Resolved Problems:    * No resolved hospital problems.  *      Discharge Medications:         Medication List      CHANGE how you take these medications    lactulose 10 GM/15ML solution  Commonly known as: CHRONULAC  Take 15 mLs by mouth 3 times daily  What changed: how much to take     vitamin D 1.25 MG (17570 UT) Caps capsule  Commonly known as: ERGOCALCIFEROL  Take 1 capsule by mouth once a week  What changed: additional instructions     zonisamide 100 MG capsule  Commonly known as: ZONEGRAN  Take 5 capsules by mouth nightly  What changed: how much to take        CONTINUE taking these medications    Basaglar KwikPen 100 UNIT/ML injection pen  Generic drug: insulin glargine     cephALEXin 250 MG capsule  Commonly known as: KEFLEX  Take 2 capsules by mouth 3 times daily     divalproex 250 MG DR tablet  Commonly known as: DEPAKOTE     DULoxetine 60 MG extended release capsule  Commonly known as: CYMBALTA     gabapentin 600 MG tablet  Commonly known as: NEURONTIN     insulin lispro 100 UNIT/ML injection vial  Commonly known as: HUMALOG     Jardiance 25 MG tablet  Generic drug: empagliflozin  Take 25 mg by mouth daily     levETIRAcetam 1000 MG tablet  Commonly known as: KEPPRA  Take 2 tablets by mouth 2 times daily     LORazepam 0.5 MG tablet  Commonly known as: ATIVAN     metoprolol tartrate 25 MG tablet  Commonly known as: LOPRESSOR  Take 1 tablet by mouth 2 times daily     OneTouch Ultra strip  Generic drug: blood glucose test strips  1 each by In Vitro route 3 times daily DX: E11.65 Dispense Onetouch Ultra 2 test strips     pantoprazole 40 MG tablet  Commonly known as: Protonix  Take 1 tablet by mouth 2 times daily     promethazine 12.5 MG tablet  Commonly known as: PHENERGAN  Take 1-2 tablets by mouth every 8 hours as needed for Nausea     rOPINIRole 1 MG tablet  Commonly known as: REQUIP  TAKE 1 TABLET BY MOUTH THREE TIMES A DAY     sildenafil 100 MG tablet  Commonly known as: Viagra  Take 1 tablet by mouth as needed for Erectile Dysfunction     Vimpat 100 MG Tabs tablet  Generic drug: lacosamide     vitamin B-12 1000 MCG tablet  Commonly known as: CYANOCOBALAMIN        STOP taking these medications    mupirocin 2 % cream  Commonly known as: Bactroban              Diet:  ADULT DIET; Regular; 3 carb choices (45 gm/meal)    Activity:  Activity as tolerated (Patient may move about with assist as indicated or with supervision.)    Follow-up:  in 1-2 weeks with Radha Fritz MD. To the specialist as arranged     Consultants:  Neurology: Dr. Edwin Suarez    Procedures:  none    Diagnostic Test:      Recent Labs     11/12/21 2000   WBC 7.5   HGB 16.5   HCT 46.2        Recent Labs     11/12/21 2000      K 4.1      CO2 24   BUN 12   CREATININE 0.8   CALCIUM 8.8     Recent Labs     11/12/21 2000   AST 16   ALT 24   BILITOT 0.5   ALKPHOS 96     Lab Results   Component Value Date    AMYLASE 29 09/01/2020     Recent Labs     11/12/21  2017   INR 1.01     Recent Labs     11/12/21  2000 11/13/21  0218 11/13/21  0850   TROPONINT < 0.010 < 0.010 < 0.010     No results for input(s): BNP in the last 72 hours. No results for input(s): LACTA in the last 72 hours. Lab Results   Component Value Date    LABA1C 7.4 11/13/2021       Urinalysis:   Lab Results   Component Value Date    NITRU NEGATIVE 11/12/2021    WBCUA NONE 08/12/2019    WBCUA 6-10 10/17/2015    BACTERIA NONE 08/12/2019    RBCUA NONE 08/12/2019    BLOODU NEGATIVE 11/12/2021    SPECGRAV >1.030 08/12/2019    GLUCOSEU NEGATIVE 11/12/2021     VL DUP LOWER EXTREMITY VENOUS BILATERAL   Final Result   Normal venous ultrasound.  No evidence for deep venous thrombosis. **This report has been created using voice recognition software. It may contain minor errors which are inherent in voice recognition technology. **      Final report electronically signed by Dr. Katie Torres on 11/13/2021 5:56 PM      CT Head WO Contrast   Final Result   Stable appearance of the brain no acute process            **This report has been created using voice recognition software. It may contain minor errors which are inherent in voice recognition technology. **      Final report electronically signed by Dr. Shannon Go on 11/12/2021 8:49 PM      XR CHEST PORTABLE   Final Result   No acute finding            **This report has been created using voice recognition software. It may contain minor errors which are inherent in voice recognition technology. **      Final report electronically signed by Dr. Shannon Go on 11/12/2021 8:18 PM          Hospital Course: Please see the H&P for detailed admission details. Patient clinical course has improved,   Paco Montesinos is a 48 y.o. male with PMHx of difficult to control seizure disorder on multiple antielliptic medications, bipolar disorder, chronic lower back pain, depression, diabetes mellitus, diabetic foot ulcer with MRSA infection and GERD who presented to the emergency room here at [address] [address] accompanied by his family with complaints of uncontrolled blood sugars in the 400s+ multiple seizures and falls at home that has progressively gotten worse over the last 2 weeks. Patient also has been reported to see visual hallucinations and to act a little off with increased confusion at times according to the family. Patient apparently follows with neurology at [facility] and is supposed to have a weeklong EEG done the end of this month. He has been taking all of his seizure medications as prescribed which includes about 5 seizure medications. According to the patient and family he has had at least 5 falls in the last 48 hours. Patient himself states that his right foot kind of gives away at times and feels weaker. He does also have a resting tremor worsening his balance. Family believes that patient has been having seizures with his eyes rolling to the back and postictal state. Again compliant with medications as he states and his wife does help him with taking his medications. No fevers or chills, no nausea vomiting or diarrhea, no abdominal pain, no chest pain, no UTI or URI symptoms.     In the emergency room the patient was afebrile with tachycardia at 95 and tachypnea 18, blood pressure 145/94 with pulse oximetry of 96% on room air. Including CBC and BMP within acceptable range. Blood sugar 196. Beta hydroxybutyrate slightly elevated at 0.84. COVID-19 PCR was negative. Urinalysis was negative. CT of the head with no acute intracranial process. Chest x-ray also no acute cardiopulmonary process. EKG normal sinus rhythm with no ischemic changes. Patient was given 1 L of normal saline fluid bolus. Patient was then admitted to our hospitalist service for further evaluation and treatment.     At the time of evaluation on the medical floor patient was laying in bed comfortably sleeping but easily awakened. No acute distress. Cooperative and able to answer all questions appropriately despite being very slow to answer. Alert and oriented x3.     1. Breakthrough seizures:  Patient with known seizure disorder that is difficult to control on 5 antiepileptic medications (keppra, zonigran, depakote, vimpat and ativan). Neurology consultation: no change in meds  Follows with  neurology and is due for a weeklong EEG in [place] within 1 to 2 weeks. Fall/seizure/aspiration precautions.        2. Type 2 diabetes mellitus:  Check hemoglobin A1c in a.m. 7.3  Patient has been hyperglycemic at home. Continue insulin  meal time insulin per home regimen. Accu-Cheks q. before meals and at bedtime. Diabetic diet.     3.   Diabetic foot ulcer:  History of multiple diabetic foot ulcers and infections. Currently with right lateral surface lower extremity chronic diabetic ulcer with MRSA infection. On Keflex by wound clinic on 11/10/2021. Wound cultures positive for MRSA 11/10/2021. Change to clindamycin 450 mg 3 times daily x10 days. Continue Clindamycin/Keflex. Continue wound care and dressing changes per instructions by wound care clinic. Patient has been having recurrent RLE abscess'. Etiology unclear besides his DM increasing his risk.      4.  Psychiatric disorders:  Reports visual hallucinations with known underlying history of depression and anxiety. Resume silas meds     5. Diabetic neuropathy:   neurontin     6. Restless leg syndrome:  R  Requip.     7. GERD:  Home dose PPI.     8. DVT prophylaxis:  Subcu Lovenox.     9. Hypertension:  Not optimally controlled initially but better during the admission         10. Multiple falls at home:  Likely due to breakthrough seizures. No events on tele  Physical deconditioning and polypharmacy likely contributing. Urinalysis checked on medical floor and negative.   PT/OT evaluation and treatment and to assess safety prior to discharge versus placement for rehab.      Disposition: home    Condition: Stable    Time Spent: 33 minutes        Milena Jimenez MD, MD  Discharging Hospitalist abnormal lab result

## 2022-02-19 NOTE — ED PROVIDER NOTE - OBJECTIVE STATEMENT
65-year-old male with past medical history of atrial fibrillation on Eliquis, cirrhosis, hep C, CKD, status post kidney transplant, COPD, hypertension, hyperlipidemia, lymphoma, right eye melanoma, chronic back pain, presents with complaint of abnormal lab results.  Patient has been complaining of weakness for a few months and has been worsening.  Patient went to his cardiologist and was cleared.  Patient went to his nephrologist and had labs drawn (Dr. BEVERLEY LERMA) 10 days ago and was recently told that his labs were abnormal.  Patient was told to go to the ED for further evaluation.  Patient denies rectal bleeding or dark stool.  Patient denies chest pain or shortness of breath denies abdominal pain.  Patient denies fever, chills, weight loss or diaphoresis.  Patient cannot recall the name of his previous hematologist but thinks it might be Dr. iSm.  Patient was seen in inpatient by Dr. ARTUR GORDON a few years ago.

## 2022-02-19 NOTE — H&P ADULT - NSICDXPASTSURGICALHX_GEN_ALL_CORE_FT
PAST SURGICAL HISTORY:  History of kidney transplant 3 years ago    S/P arteriovenous (AV) fistula creation prior old fistula in R arm

## 2022-02-19 NOTE — H&P ADULT - NSICDXPASTMEDICALHX_GEN_ALL_CORE_FT
PAST MEDICAL HISTORY:  Atrial fibrillation on eliquis    Cirrhosis possibly related to hepC    CKD (chronic kidney disease)     COPD, mild not on O2    ESRD (end stage renal disease)     HLD (hyperlipidemia)     HTN (hypertension)     Lymphoma ?questionable, not treated, not followed    Melanoma R eye, s/p laser    Peripheral neuropathy unclear cause

## 2022-02-19 NOTE — H&P ADULT - ATTENDING COMMENTS
HPI:   65-year-old male with past medical history of atrial fibrillation on Eliquis, cirrhosis, Hep C, CKD, status post kidney transplant, COPD, hypertension, hyperlipidemia, lymphoma, right eye melanoma, chronic back pain, presents with complaint of abnormal lab results.  Patient has been complaining of weakness for a few months and has been worsening.  Patient went to his cardiologist and was cleared.  Patient went to his nephrologist and had labs drawn (Dr. Heart) 10 days ago and was recently told that his labs were abnormal.  Patient was told to go to the ED for further evaluation.  Patient denies rectal bleeding or dark stool.  Patient denies chest pain or shortness of breath denies abdominal pain.  Patient denies fever, chills, weight loss or diaphoresis.  Patient cannot recall the name of his previous hematologist but thinks it might be Dr. Sim.  Patient was seen in inpatient by Dr. Collins a few years ago.    Patient with abnormal labs drawn on 2/9 showing WBC 1.6 with absolute neutrophils of 1014, hemoglobin 7.4 and platelets of 53.  As per previous charts patient has chronic thrombocytopenia and runs 50-90.  Previous WBC count has been above 5 and hemoglobin is usually 9 or above.    In the ED, pt hemodynamically stable. Labs show WBC 1.93, Hb 7.8, MCV 98.9, Plt 98, Cr 1.8 (Baseline ~ 1.1), Ca 10.3    s/p 1u pRBC in the ED (19 Feb 2022 22:35)    REVIEW OF SYSTEMS: see cc/HPI   CONSTITUTIONAL: (+) generalized weakness, No fevers or chills  EYES/ENT: No visual changes;  No vertigo or throat pain   NECK: No pain or stiffness  RESPIRATORY: No cough, wheezing, hemoptysis; No shortness of breath  CARDIOVASCULAR: No chest pain or palpitations  GASTROINTESTINAL: No abdominal or epigastric pain. No nausea, vomiting, or hematemesis; No diarrhea or constipation. No melena or hematochezia.  GENITOURINARY: No dysuria, frequency or hematuria  NEUROLOGICAL: No numbness or weakness  SKIN: No itching, rashes    Physical Exam:   General: WN/WD NAD  Neurology: A&Ox3, nonfocal, follows commands  Eyes: PERRLA/ EOMI  ENT/Neck: Neck supple, trachea midline, No JVD  Respiratory: CTA B/L, No wheezing, rales, rhonchi  CV: Normal rate regular rhythm, S1S2, no murmurs, rubs or gallops  Abdominal: Soft, NT, ND +BS,   Extremities: No edema, + peripheral pulses  Skin: No Rashes, Hematoma, Ecchymosis  Incisions: n/a  Tubes: n/a    A/p  Pancytopenia - multifactorial w/ patient having a h/o hypersplenism , liver failure s/p transplant on immunosuppressant and low grade lymphoma r/o progression   Hypercalcemia - r/o malignancy   -agree w/ labs w/u   -Hem/Onc eval - Dr. Collins   -serial CBC ( s/p PRBC in the ED overnight )    SANTA on CDK   -Nephrology eval   -check Renal/bladder U/S   -Is and Os and Urine lytes/ Cr  -check levels     Mild hypercalcemia   -hydration     chronic A.Fib   -c/w Eliquis     Dyslipidemia   HTN   -statin and home antihypertensives     h/O COPD   -PRN Rx     BPH   -flomax     DVT prophylaxis -on Eliquis    Patient seen 2/19/22 ( note revised 2/20)

## 2022-02-19 NOTE — ED PROVIDER NOTE - NS ED ROS FT
Review of Systems:  	•	CONSTITUTIONAL - no fever, no diaphoresis, no weight change, +weakness  	•	SKIN - no rash  	•	HEMATOLOGIC - no bleeding, no bruising  	•	EYES - no eye pain, no blurred vision  	•	ENT - no change in hearing, no pain  	•	RESPIRATORY - no shortness of breath, no cough  	•	CARDIAC - no chest pain, no palpitations  	•	GI - no abd pain, no nausea, no vomiting, no diarrhea, no constipation, no bleeding  	•	 - no dysuria, no hematuria, no flank pain, no urinary retention  	•	MUSCULOSKELETAL - no joint paint, no swelling, no redness  	•	NEUROLOGIC - no focal weakness or numbness, no headache, no paresthesias, no dizziness, no facial droop, no slurred speech, no vision changes  All other systems negative, unless specified in HPI

## 2022-02-19 NOTE — H&P ADULT - HISTORY OF PRESENT ILLNESS
65-year-old male with past medical history of atrial fibrillation on Eliquis, cirrhosis, Hep C, CKD, status post kidney transplant, COPD, hypertension, hyperlipidemia, lymphoma, right eye melanoma, chronic back pain, presents with complaint of abnormal lab results.  Patient has been complaining of weakness for a few months and has been worsening.  Patient went to his cardiologist and was cleared.  Patient went to his nephrologist and had labs drawn (Dr. Heart) 10 days ago and was recently told that his labs were abnormal.  Patient was told to go to the ED for further evaluation.  Patient denies rectal bleeding or dark stool.  Patient denies chest pain or shortness of breath denies abdominal pain.  Patient denies fever, chills, weight loss or diaphoresis.  Patient cannot recall the name of his previous hematologist but thinks it might be Dr. Sim.  Patient was seen in inpatient by Dr. Collins a few years ago.    Patient with abnormal labs drawn on 2/9 showing WBC 1.6 with absolute neutrophils of 1014 hemoglobin 7.4 and platelets of 53.  As per previous charts patient has chronic thrombocytopenia and runs 50-90.  Previous WBC count has been above 5 and hemoglobin is usually 9 or above.    In the ED, pt hemodynamically stable. Labs show WBC 1.93, Hb 7.8, MCV 98.9, Plt 98, Cr 1.8 (Baseline ~ 1.1), Ca 10.3  65-year-old male with past medical history of atrial fibrillation on Eliquis, cirrhosis, Hep C, CKD, status post kidney transplant, COPD, hypertension, hyperlipidemia, lymphoma, right eye melanoma, chronic back pain, presents with complaint of abnormal lab results.  Patient has been complaining of weakness for a few months and has been worsening.  Patient went to his cardiologist and was cleared.  Patient went to his nephrologist and had labs drawn (Dr. Heart) 10 days ago and was recently told that his labs were abnormal.  Patient was told to go to the ED for further evaluation.  Patient denies rectal bleeding or dark stool.  Patient denies chest pain or shortness of breath denies abdominal pain.  Patient denies fever, chills, weight loss or diaphoresis.  Patient cannot recall the name of his previous hematologist but thinks it might be Dr. Sim.  Patient was seen in inpatient by Dr. Collins a few years ago.    Patient with abnormal labs drawn on 2/9 showing WBC 1.6 with absolute neutrophils of 1014, hemoglobin 7.4 and platelets of 53.  As per previous charts patient has chronic thrombocytopenia and runs 50-90.  Previous WBC count has been above 5 and hemoglobin is usually 9 or above.    In the ED, pt hemodynamically stable. Labs show WBC 1.93, Hb 7.8, MCV 98.9, Plt 98, Cr 1.8 (Baseline ~ 1.1), Ca 10.3    s/p 1u pRBC in the ED

## 2022-02-19 NOTE — ED PROVIDER NOTE - PHYSICAL EXAMINATION
VITAL SIGNS: I have reviewed nursing notes and confirm.  CONSTITUTIONAL: Well-developed; well-nourished; in no acute distress.  SKIN: Skin exam is warm and dry, no acute rash.  HEAD: Normocephalic; atraumatic.  EYES: PERRL, EOM intact; +pale conjunctiva and sclera clear.  ENT: No nasal discharge; airway clear. TMs clear. dry mm  NECK: Supple; non tender.  CARD: S1, S2 normal; no murmurs, gallops, or rubs. Regular rate and rhythm.  RESP: No wheezes, rales or rhonchi.  ABD: Normal bowel sounds; soft; non-distended; non-tender;  EXT: Normal ROM. No clubbing, cyanosis or edema.  NEURO: aox3, cn2-12 grossly normal, 5/5 strength all ext, sensation intact, finger to nose normal, romberg neg, normal gait, no nystagmus  PSYCH: Cooperative, appropriate.

## 2022-02-19 NOTE — H&P ADULT - NSHPPHYSICALEXAM_GEN_ALL_CORE
GENERAL: NAD, speaks in full sentences, no signs of respiratory distress  HEAD:  Atraumatic, Normocephalic  EYES: EOMI, PERRLA, conjunctiva and sclera clear  NECK: Supple, No JVD  CHEST/LUNG: Clear to auscultation bilaterally; No wheeze; No crackles; No accessory muscles used  HEART: Regular rate and rhythm; No murmurs;   ABDOMEN: Soft, Nontender, Nondistended; Bowel sounds present; No guarding  EXTREMITIES:  2+ Peripheral Pulses, No cyanosis or edema  PSYCH: AAOx3  NEUROLOGY: non-focal

## 2022-02-19 NOTE — H&P ADULT - NSHPLABSRESULTS_GEN_ALL_CORE
7.8    1.93  )-----------( 98       ( 19 Feb 2022 17:44 )             26.4       02-19    137  |  105  |  17  ----------------------------<  100<H>  4.7   |  23  |  1.8<H>    Ca    10.3<H>      19 Feb 2022 17:44    TPro  5.8<L>  /  Alb  3.7  /  TBili  0.5  /  DBili  x   /  AST  11  /  ALT  9   /  AlkPhos  56  02-19                  PT/INR - ( 19 Feb 2022 17:44 )   PT: 16.10 sec;   INR: 1.40 ratio         PTT - ( 19 Feb 2022 17:44 )  PTT:41.9 sec    Lactate Trend      CARDIAC MARKERS ( 19 Feb 2022 20:23 )  x     / 0.01 ng/mL / x     / x     / x            CAPILLARY BLOOD GLUCOSE

## 2022-02-19 NOTE — H&P ADULT - ASSESSMENT
Home medications reviewed  f/u TSH, vitamin B12, Folate, Zinc, Copper, Hepatitis panel, SPEP, UPEP  f/u heme/onc consult    #Pancytopenia likely multifactorial in a pt with hx of chronic thrombocytopenia likely sec to cirrhosis , splenomegaly , hep C , low grade lymphoma   DDX include hematologic malignancies, metastatic cancer, myelofibrosis,  infectious diseases, nutritional disorders , aplastic anemia, immune destruction,  disseminated intravascular coagulation, thrombotic thrombocytopenia purpura, hypersplenism, autoimmune disorders and Medications - pt on tacrolimus and Cellcept  ?ANC  f/u peripheral smear   Plt count improved 53-->98  WBC 1.6-->1.9  f/u TSH, vitamin B12, Folate, Iron,  Zinc, Copper, Hepatitis panel, SPEP, UPEP  f/u HIV  f/u retic count  f/u LDH   f/ uric acid levels, phosphorus   r/o MM - pt with mildly elevated Ca  f/u heme/onc consult   Monitor CBC qD  Keep active T&S  Transfuse to keep Hb above 7      #SANTA on CKD  s/p L. 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        #Cr       #Pancytopenia likely multifactorial in a pt with hx of chronic thrombocytopenia likely sec to cirrhosis , splenomegaly , hep C , low grade lymphoma   DDX include hematologic malignancies, metastatic cancer, myelofibrosis,  infectious diseases, nutritional disorders , aplastic anemia, immune destruction,  disseminated intravascular coagulation, thrombotic thrombocytopenia purpura, hypersplenism, autoimmune disorders and Medications - pt on tacrolimus and Cellcept  f/u peripheral smear   Plt count improved 53-->98  WBC 1.6-->1.9  f/u TSH, vitamin B12, Folate, Iron,  Zinc, Copper, Hepatitis panel, SPEP, UPEP  f/u HIV  f/u retic count  f/u LDH   f/ uric acid levels, phosphorus   r/o MM - pt with mildly elevated Ca  f/u heme/onc consult   s/p 1u pRBC in the ED  Monitor CBC qD  Keep active T&S  Transfuse to keep Hb above 7      #SANTA on CKD  s/p L. Kidney transplant   Cr 1.8, Cr level 2/9/2022 was 2.2, baseline about 3 year ago ~1.1  cont gentle hydration  f/u nephro consult   Cont home meds   f/u tacrolimus levels   Avoid nephrotoxic medications  f/u Ulytes, urine pr;cr    obtain RBUS if Cr worsening       #Mild Hypercalcemia   Gentle hydration       #Atrial fibrillation   - rate controlled,  -Cont Eliquis       #Hx of COPD  - not on any meds, no active sxs  - Duonebs PRN    #HLD  - c/w lipitor 10 daily  - f/u lipid panel, A1c      # BPH  - c/w flomax    #HTN  - c/w home meds    #Hx of cirrhosis due to hep C  - liver enzymes stable    #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  Patient has been complaining of weakness for a few months and has been worsening.  Patient went to his cardiologist and was cleared.  Patient went to his nephrologist and had labs drawn (Dr. Heart) 10 days ago and was recently told that his labs were abnormal.  Patient was told to go to the ED for further evaluation.  Patient denies rectal bleeding or dark stool.  Patient denies chest pain or shortness of breath denies abdominal pain.  Patient denies fever, chills, weight loss or diaphoresis.  Patient cannot recall the name of his previous hematologist but thinks it might be Dr. Sim.  Patient was seen in inpatient by Dr. Collins a few years ago.      #Pancytopenia likely multifactorial in a pt with hx of chronic thrombocytopenia likely sec to cirrhosis, splenomegaly , hep C , low grade lymphoma   DDX include hematologic malignancies, metastatic cancer, myelofibrosis,  infectious diseases, nutritional disorders , aplastic anemia, immune destruction,  disseminated intravascular coagulation, thrombotic thrombocytopenia purpura, hypersplenism, autoimmune disorders and Medications - pt on tacrolimus and Cellcept  f/u peripheral smear   Plt count improved 53-->98  WBC 1.6-->1.9  f/u TSH, vitamin B12, Folate, Iron,  Zinc, Copper, Hepatitis panel, SPEP, UPEP  f/u HIV  f/u retic count  f/u LDH   f/ uric acid levels, phosphorus   r/o MM - pt with mildly elevated Ca  f/u heme/onc consult   s/p 1u pRBC in the ED  Monitor CBC qD  Keep active T&S  Transfuse to keep Hb above 7      #SANTA on CKD  s/p L. Kidney transplant   Cr 1.8, Cr level 2/9/2022 was 2.2, baseline about 3 year ago ~1.1  cont gentle hydration  f/u nephro consult   Cont home meds   f/u tacrolimus levels   Avoid nephrotoxic medications  f/u Ulytes, urine pr;cr    obtain RBUS if Cr worsening       #Mild Hypercalcemia   Gentle hydration       #Atrial fibrillation   - rate controlled,  -Cont Eliquis       #Hx of COPD  - not on any meds, no active sxs  - Duonebs PRN    #HLD  - c/w lipitor 10 daily  - f/u lipid panel, A1c      # BPH  - c/w flomax    #HTN  - c/w home meds    #Hx of cirrhosis due to hep C  - liver enzymes stable    #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

## 2022-02-19 NOTE — ED PROVIDER NOTE - CLINICAL SUMMARY MEDICAL DECISION MAKING FREE TEXT BOX
Patient with abnormal labs drawn on 2/9 showing WBC 1.6 with absolute neutrophils of 1014 hemoglobin 7.4 and platelets of 53.  As per previous charts patient has chronic thrombocytopenia and runs 50-90.  Previous WBC count has been above 5 and hemoglobin is usually 9 or above.  Will check labs EKG and chest x-ray, will likely admit for pancytopenia work-up

## 2022-02-19 NOTE — ED ADULT TRIAGE NOTE - CHIEF COMPLAINT QUOTE
pt has low WBC count and platelet count, low hemoglobin. pt had kidney transplant 6 years ago. sent to ED for repeat blood work.

## 2022-02-19 NOTE — ED ADULT NURSE REASSESSMENT NOTE - NS ED NURSE REASSESS COMMENT FT1
Received pt alert, oriented x 3, no distress noted, complained of pain. MD made aware. Awaiting for disposition.

## 2022-02-20 LAB
ALBUMIN SERPL ELPH-MCNC: 3.6 G/DL — SIGNIFICANT CHANGE UP (ref 3.5–5.2)
ALP SERPL-CCNC: 55 U/L — SIGNIFICANT CHANGE UP (ref 30–115)
ALT FLD-CCNC: 8 U/L — SIGNIFICANT CHANGE UP (ref 0–41)
ANION GAP SERPL CALC-SCNC: 9 MMOL/L — SIGNIFICANT CHANGE UP (ref 7–14)
AST SERPL-CCNC: 11 U/L — SIGNIFICANT CHANGE UP (ref 0–41)
BASOPHILS # BLD AUTO: 0.01 K/UL — SIGNIFICANT CHANGE UP (ref 0–0.2)
BASOPHILS NFR BLD AUTO: 0.4 % — SIGNIFICANT CHANGE UP (ref 0–1)
BILIRUB SERPL-MCNC: 0.8 MG/DL — SIGNIFICANT CHANGE UP (ref 0.2–1.2)
BUN SERPL-MCNC: 19 MG/DL — SIGNIFICANT CHANGE UP (ref 10–20)
CALCIUM SERPL-MCNC: 9.9 MG/DL — SIGNIFICANT CHANGE UP (ref 8.5–10.1)
CHLORIDE SERPL-SCNC: 103 MMOL/L — SIGNIFICANT CHANGE UP (ref 98–110)
CHOLEST SERPL-MCNC: 112 MG/DL — SIGNIFICANT CHANGE UP
CO2 SERPL-SCNC: 24 MMOL/L — SIGNIFICANT CHANGE UP (ref 17–32)
CREAT SERPL-MCNC: 1.9 MG/DL — HIGH (ref 0.7–1.5)
EOSINOPHIL # BLD AUTO: 0.01 K/UL — SIGNIFICANT CHANGE UP (ref 0–0.7)
EOSINOPHIL NFR BLD AUTO: 0.4 % — SIGNIFICANT CHANGE UP (ref 0–8)
GLUCOSE SERPL-MCNC: 101 MG/DL — HIGH (ref 70–99)
HCT VFR BLD CALC: 29.8 % — LOW (ref 42–52)
HDLC SERPL-MCNC: 40 MG/DL — LOW
HGB BLD-MCNC: 9 G/DL — LOW (ref 14–18)
IMM GRANULOCYTES NFR BLD AUTO: 1.2 % — HIGH (ref 0.1–0.3)
LDH SERPL L TO P-CCNC: 176 U/L — SIGNIFICANT CHANGE UP (ref 50–242)
LIPID PNL WITH DIRECT LDL SERPL: 63 MG/DL — SIGNIFICANT CHANGE UP
LYMPHOCYTES # BLD AUTO: 0.33 K/UL — LOW (ref 1.2–3.4)
LYMPHOCYTES # BLD AUTO: 13.4 % — LOW (ref 20.5–51.1)
MCHC RBC-ENTMCNC: 29.3 PG — SIGNIFICANT CHANGE UP (ref 27–31)
MCHC RBC-ENTMCNC: 30.2 G/DL — LOW (ref 32–37)
MCV RBC AUTO: 97.1 FL — HIGH (ref 80–94)
MONOCYTES # BLD AUTO: 0.18 K/UL — SIGNIFICANT CHANGE UP (ref 0.1–0.6)
MONOCYTES NFR BLD AUTO: 7.3 % — SIGNIFICANT CHANGE UP (ref 1.7–9.3)
NEUTROPHILS # BLD AUTO: 1.91 K/UL — SIGNIFICANT CHANGE UP (ref 1.4–6.5)
NEUTROPHILS NFR BLD AUTO: 77.3 % — HIGH (ref 42.2–75.2)
NON HDL CHOLESTEROL: 72 MG/DL — SIGNIFICANT CHANGE UP
NRBC # BLD: 0 /100 WBCS — SIGNIFICANT CHANGE UP (ref 0–0)
PHOSPHATE SERPL-MCNC: 3.1 MG/DL — SIGNIFICANT CHANGE UP (ref 2.1–4.9)
PLATELET # BLD AUTO: 77 K/UL — LOW (ref 130–400)
POTASSIUM SERPL-MCNC: 4.6 MMOL/L — SIGNIFICANT CHANGE UP (ref 3.5–5)
POTASSIUM SERPL-SCNC: 4.6 MMOL/L — SIGNIFICANT CHANGE UP (ref 3.5–5)
PROT SERPL-MCNC: 5.5 G/DL — LOW (ref 6–8)
RBC # BLD: 3.07 M/UL — LOW (ref 4.7–6.1)
RBC # BLD: 3.07 M/UL — LOW (ref 4.7–6.1)
RBC # FLD: 16.3 % — HIGH (ref 11.5–14.5)
RETICS #: 149.5 K/UL — HIGH (ref 25–125)
RETICS/RBC NFR: 4.9 % — HIGH (ref 0.5–1.5)
SODIUM SERPL-SCNC: 136 MMOL/L — SIGNIFICANT CHANGE UP (ref 135–146)
TRIGL SERPL-MCNC: 65 MG/DL — SIGNIFICANT CHANGE UP
URATE SERPL-MCNC: 5.9 MG/DL — SIGNIFICANT CHANGE UP (ref 3.4–8.8)
WBC # BLD: 2.47 K/UL — LOW (ref 4.8–10.8)
WBC # FLD AUTO: 2.47 K/UL — LOW (ref 4.8–10.8)

## 2022-02-20 RX ORDER — IPRATROPIUM/ALBUTEROL SULFATE 18-103MCG
3 AEROSOL WITH ADAPTER (GRAM) INHALATION EVERY 6 HOURS
Refills: 0 | Status: DISCONTINUED | OUTPATIENT
Start: 2022-02-20 | End: 2022-02-25

## 2022-02-20 RX ORDER — ONDANSETRON 8 MG/1
4 TABLET, FILM COATED ORAL EVERY 8 HOURS
Refills: 0 | Status: DISCONTINUED | OUTPATIENT
Start: 2022-02-20 | End: 2022-02-25

## 2022-02-20 RX ORDER — LANOLIN ALCOHOL/MO/W.PET/CERES
3 CREAM (GRAM) TOPICAL AT BEDTIME
Refills: 0 | Status: DISCONTINUED | OUTPATIENT
Start: 2022-02-20 | End: 2022-02-25

## 2022-02-20 RX ORDER — OXYCODONE HYDROCHLORIDE 5 MG/1
15 TABLET ORAL EVERY 12 HOURS
Refills: 0 | Status: DISCONTINUED | OUTPATIENT
Start: 2022-02-20 | End: 2022-02-25

## 2022-02-20 RX ORDER — OXYCODONE HYDROCHLORIDE 5 MG/1
5 TABLET ORAL EVERY 6 HOURS
Refills: 0 | Status: DISCONTINUED | OUTPATIENT
Start: 2022-02-20 | End: 2022-02-20

## 2022-02-20 RX ORDER — ACETAMINOPHEN 500 MG
650 TABLET ORAL EVERY 6 HOURS
Refills: 0 | Status: DISCONTINUED | OUTPATIENT
Start: 2022-02-20 | End: 2022-02-25

## 2022-02-20 RX ADMIN — MYCOPHENOLATE MOFETIL 500 MILLIGRAM(S): 250 CAPSULE ORAL at 16:46

## 2022-02-20 RX ADMIN — GABAPENTIN 300 MILLIGRAM(S): 400 CAPSULE ORAL at 11:42

## 2022-02-20 RX ADMIN — Medication 1 DROP(S): at 16:04

## 2022-02-20 RX ADMIN — TACROLIMUS 1 MILLIGRAM(S): 5 CAPSULE ORAL at 05:48

## 2022-02-20 RX ADMIN — ATORVASTATIN CALCIUM 10 MILLIGRAM(S): 80 TABLET, FILM COATED ORAL at 05:48

## 2022-02-20 RX ADMIN — CALCITRIOL 0.5 MICROGRAM(S): 0.5 CAPSULE ORAL at 11:42

## 2022-02-20 RX ADMIN — APIXABAN 5 MILLIGRAM(S): 2.5 TABLET, FILM COATED ORAL at 16:46

## 2022-02-20 RX ADMIN — CLOPIDOGREL BISULFATE 75 MILLIGRAM(S): 75 TABLET, FILM COATED ORAL at 11:42

## 2022-02-20 RX ADMIN — OXYCODONE HYDROCHLORIDE 15 MILLIGRAM(S): 5 TABLET ORAL at 18:30

## 2022-02-20 RX ADMIN — TAMSULOSIN HYDROCHLORIDE 0.4 MILLIGRAM(S): 0.4 CAPSULE ORAL at 21:57

## 2022-02-20 RX ADMIN — TACROLIMUS 1 MILLIGRAM(S): 5 CAPSULE ORAL at 16:46

## 2022-02-20 RX ADMIN — SODIUM CHLORIDE 50 MILLILITER(S): 9 INJECTION INTRAMUSCULAR; INTRAVENOUS; SUBCUTANEOUS at 05:56

## 2022-02-20 RX ADMIN — APIXABAN 5 MILLIGRAM(S): 2.5 TABLET, FILM COATED ORAL at 05:48

## 2022-02-20 RX ADMIN — OXYCODONE HYDROCHLORIDE 15 MILLIGRAM(S): 5 TABLET ORAL at 16:43

## 2022-02-20 RX ADMIN — Medication 100 MILLIGRAM(S): at 05:49

## 2022-02-20 RX ADMIN — GABAPENTIN 300 MILLIGRAM(S): 400 CAPSULE ORAL at 21:57

## 2022-02-20 RX ADMIN — MYCOPHENOLATE MOFETIL 500 MILLIGRAM(S): 250 CAPSULE ORAL at 05:48

## 2022-02-20 RX ADMIN — GABAPENTIN 300 MILLIGRAM(S): 400 CAPSULE ORAL at 05:48

## 2022-02-20 RX ADMIN — Medication 1 DROP(S): at 05:52

## 2022-02-20 NOTE — PATIENT PROFILE ADULT - FALL HARM RISK - HARM RISK INTERVENTIONS
Assistance with ambulation/Assistance OOB with selected safe patient handling equipment/Communicate Risk of Fall with Harm to all staff/Discuss with provider need for PT consult/Monitor gait and stability/Provide patient with walking aids - walker, cane, crutches/Reinforce activity limits and safety measures with patient and family/Sit up slowly, dangle for a short time, stand at bedside before walking/Tailored Fall Risk Interventions/Visual Cue: Yellow wristband and red socks/Bed in lowest position, wheels locked, appropriate side rails in place/Call bell, personal items and telephone in reach/Instruct patient to call for assistance before getting out of bed or chair/Non-slip footwear when patient is out of bed/[place] to call system/Physically safe environment - no spills, clutter or unnecessary equipment/Purposeful Proactive Rounding/Room/bathroom lighting operational, light cord in reach

## 2022-02-20 NOTE — PROGRESS NOTE ADULT - SUBJECTIVE AND OBJECTIVE BOX
CHOU DAVONTE    Patient is a 67y old  Male who presents with a chief complaint of   HPI:   65-year-old male with past medical history of atrial fibrillation on Eliquis, cirrhosis, Hep C, CKD, status post kidney transplant, COPD, hypertension, hyperlipidemia, lymphoma, right eye melanoma, chronic back pain, presents with complaint of abnormal lab results.  Patient has been complaining of weakness for a few months and has been worsening.  Patient went to his cardiologist and was cleared.  Patient went to his nephrologist and had labs drawn (Dr. Heart) 10 days ago and was recently told that his labs were abnormal.  Patient was told to go to the ED for further evaluation.  Patient denies rectal bleeding or dark stool.  Patient denies chest pain or shortness of breath denies abdominal pain.  Patient denies fever, chills, weight loss or diaphoresis.  Patient cannot recall the name of his previous hematologist but thinks it might be Dr. Sim.  Patient was seen in inpatient by Dr. Collins a few years ago.    Patient with abnormal labs drawn on 2/9 showing WBC 1.6 with absolute neutrophils of 1014, hemoglobin 7.4 and platelets of 53.  As per previous charts patient has chronic thrombocytopenia and runs 50-90.  Previous WBC count has been above 5 and hemoglobin is usually 9 or above.    In the ED, pt hemodynamically stable. Labs show WBC 1.93, Hb 7.8, MCV 98.9, Plt 98, Cr 1.8 (Baseline ~ 1.1), Ca 10.3    s/p 1u pRBC in the ED (19 Feb 2022 22:35)    INTERVAL HPI/OVERNIGHT EVENTS:    No events. feels slightly better today. Still feels mild generalized weakness    ROS: All ROS negative except    PHYSICAL EXAM:  T(C): 35.6, Max: 36.7 (02-19-22 @ 15:51)  HR: 83 (78 - 98)  BP: 147/91 (107/64 - 166/104)  RR: 20 (18 - 20)  SpO2: 98% (98% - 100%)    GENERAL: NAD  NECK: Supple, No JVD  PULMONARY/CHEST: No rales, rhonchi, wheezing  CARDIOVASC: Regular rate and rhythm; No murmurs  GI/ABDOMEN: Soft, Nontender, Nondistended; Bowel sounds present  EXTREMITIES:  No clubbing, cyanosis, or edema, no deformity. No calf tenderness b/l.  SKIN: No rashes or lesions  NERVOUS SYSTEM:  Alert & Oriented X3, no gross neurological  deficit     Consultant(s) Notes Reviewed by me.   Care Discussed with Consultants/Other Providers     LABS:                        9.0    2.47  )-----------( 77       ( 20 Feb 2022 04:30 )             29.8     02-20    136  |  103  |  19  ----------------------------<  101<H>  4.6   |  24  |  1.9<H>    Ca    9.9      20 Feb 2022 04:30  Phos  3.1     02-20    TPro  5.5<L>  /  Alb  3.6  /  TBili  0.8  /  DBili  x   /  AST  11  /  ALT  8   /  AlkPhos  55  02-20    PT/INR - ( 19 Feb 2022 17:44 )   PT: 16.10 sec;   INR: 1.40 ratio         PTT - ( 19 Feb 2022 17:44 )  PTT:41.9 sec    CAPILLARY BLOOD GLUCOSE                RADIOLOGY & ADDITIONAL TESTS:      MEDICATIONS  (STANDING):  apixaban 5 milliGRAM(s) Oral every 12 hours  atorvastatin Oral Tab/Cap - Peds 10 milliGRAM(s) Oral every 48 hours  calcitriol   Capsule 0.5 MICROGram(s) Oral daily  clopidogrel Tablet 75 milliGRAM(s) Oral daily  gabapentin 300 milliGRAM(s) Oral three times a day  metoprolol succinate  milliGRAM(s) Oral daily  mycophenolate mofetil  Oral Tab/Cap - Peds 500 milliGRAM(s) Oral two times a day  polyvinyl alcohol 1.4%/povidone 0.6% Ophthalmic Solution - Peds 1 Drop(s) Both EYES four times a day  sodium chloride 0.9%. 1000 milliLiter(s) (50 mL/Hr) IV Continuous <Continuous>  tacrolimus 1 milliGRAM(s) Oral every 12 hours  tamsulosin 0.4 milliGRAM(s) Oral at bedtime    MEDICATIONS  (PRN):  acetaminophen     Tablet .. 650 milliGRAM(s) Oral every 6 hours PRN Temp greater or equal to 38C (100.4F), Mild Pain (1 - 3)  albuterol/ipratropium for Nebulization 3 milliLiter(s) Nebulizer every 6 hours PRN Shortness of Breath and/or Wheezing  aluminum hydroxide/magnesium hydroxide/simethicone Suspension 30 milliLiter(s) Oral every 4 hours PRN Dyspepsia  melatonin 3 milliGRAM(s) Oral at bedtime PRN Insomnia  ondansetron Injectable 4 milliGRAM(s) IV Push every 8 hours PRN Nausea and/or Vomiting

## 2022-02-20 NOTE — PROGRESS NOTE ADULT - ASSESSMENT
#Pancytopenia likely multifactorial in a pt with hx of chronic thrombocytopenia likely sec to cirrhosis, splenomegaly , hep C , low grade lymphoma     f/u peripheral smear   f/u TSH, vitamin B12, Folate, Iron,  Zinc, Copper, Hepatitis panel,  f/u HIV  f/ uric acid levels, phosphorus   f/u heme/onc consult   s/p 1u pRBC in the ED  Monitor CBC qD  Keep active T&S    #SANTA on CKD  s/p L. 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 Repeat Labs. renal/bladder sono. Trend CBC

## 2022-02-21 LAB
A1C WITH ESTIMATED AVERAGE GLUCOSE RESULT: 5.2 % — SIGNIFICANT CHANGE UP (ref 4–5.6)
ANION GAP SERPL CALC-SCNC: 8 MMOL/L — SIGNIFICANT CHANGE UP (ref 7–14)
APPEARANCE UR: CLEAR — SIGNIFICANT CHANGE UP
BACTERIA # UR AUTO: ABNORMAL
BILIRUB UR-MCNC: NEGATIVE — SIGNIFICANT CHANGE UP
BUN SERPL-MCNC: 25 MG/DL — HIGH (ref 10–20)
CALCIUM SERPL-MCNC: 9.4 MG/DL — SIGNIFICANT CHANGE UP (ref 8.5–10.1)
CHLORIDE SERPL-SCNC: 103 MMOL/L — SIGNIFICANT CHANGE UP (ref 98–110)
CHLORIDE UR-SCNC: 94 — SIGNIFICANT CHANGE UP
CO2 SERPL-SCNC: 22 MMOL/L — SIGNIFICANT CHANGE UP (ref 17–32)
COLOR SPEC: SIGNIFICANT CHANGE UP
CREAT ?TM UR-MCNC: 58 MG/DL — SIGNIFICANT CHANGE UP
CREAT SERPL-MCNC: 1.8 MG/DL — HIGH (ref 0.7–1.5)
DIFF PNL FLD: NEGATIVE — SIGNIFICANT CHANGE UP
EPI CELLS # UR: 0 /HPF — SIGNIFICANT CHANGE UP (ref 0–5)
ESTIMATED AVERAGE GLUCOSE: 103 MG/DL — SIGNIFICANT CHANGE UP (ref 68–114)
FERRITIN SERPL-MCNC: 492 NG/ML — HIGH (ref 30–400)
FOLATE SERPL-MCNC: 2.7 NG/ML — LOW
GLUCOSE SERPL-MCNC: 94 MG/DL — SIGNIFICANT CHANGE UP (ref 70–99)
GLUCOSE UR QL: NEGATIVE — SIGNIFICANT CHANGE UP
HAV IGM SER-ACNC: SIGNIFICANT CHANGE UP
HBV CORE IGM SER-ACNC: SIGNIFICANT CHANGE UP
HBV SURFACE AG SER-ACNC: SIGNIFICANT CHANGE UP
HCT VFR BLD CALC: 28.8 % — LOW (ref 42–52)
HCV AB S/CO SERPL IA: 11.34 S/CO — HIGH (ref 0–0.99)
HCV AB SERPL-IMP: REACTIVE
HGB BLD-MCNC: 8.7 G/DL — LOW (ref 14–18)
HIV 1+2 AB+HIV1 P24 AG SERPL QL IA: SIGNIFICANT CHANGE UP
HYALINE CASTS # UR AUTO: 1 /LPF — SIGNIFICANT CHANGE UP (ref 0–7)
KETONES UR-MCNC: NEGATIVE — SIGNIFICANT CHANGE UP
LEUKOCYTE ESTERASE UR-ACNC: ABNORMAL
MCHC RBC-ENTMCNC: 28.9 PG — SIGNIFICANT CHANGE UP (ref 27–31)
MCHC RBC-ENTMCNC: 30.2 G/DL — LOW (ref 32–37)
MCV RBC AUTO: 95.7 FL — HIGH (ref 80–94)
NITRITE UR-MCNC: NEGATIVE — SIGNIFICANT CHANGE UP
NRBC # BLD: 0 /100 WBCS — SIGNIFICANT CHANGE UP (ref 0–0)
OSMOLALITY UR: 444 MOS/KG — SIGNIFICANT CHANGE UP (ref 50–1200)
PH UR: 7 — SIGNIFICANT CHANGE UP (ref 5–8)
PLATELET # BLD AUTO: 65 K/UL — LOW (ref 130–400)
POTASSIUM SERPL-MCNC: 4.4 MMOL/L — SIGNIFICANT CHANGE UP (ref 3.5–5)
POTASSIUM SERPL-SCNC: 4.4 MMOL/L — SIGNIFICANT CHANGE UP (ref 3.5–5)
PROT SERPL-MCNC: 5.6 G/DL — LOW (ref 6–8.3)
PROT SERPL-MCNC: 5.6 G/DL — LOW (ref 6–8.3)
PROT UR-MCNC: ABNORMAL
RBC # BLD: 3.01 M/UL — LOW (ref 4.7–6.1)
RBC # FLD: 16 % — HIGH (ref 11.5–14.5)
RBC CASTS # UR COMP ASSIST: 6 /HPF — HIGH (ref 0–4)
SODIUM SERPL-SCNC: 133 MMOL/L — LOW (ref 135–146)
SODIUM UR-SCNC: 118 MMOL/L — SIGNIFICANT CHANGE UP
SP GR SPEC: 1.02 — SIGNIFICANT CHANGE UP (ref 1.01–1.03)
TACROLIMUS SERPL-MCNC: 11.8 NG/ML — SIGNIFICANT CHANGE UP
TSH SERPL-MCNC: 4.27 UIU/ML — HIGH (ref 0.27–4.2)
UROBILINOGEN FLD QL: SIGNIFICANT CHANGE UP
VIT B12 SERPL-MCNC: 494 PG/ML — SIGNIFICANT CHANGE UP (ref 232–1245)
WBC # BLD: 2.1 K/UL — LOW (ref 4.8–10.8)
WBC # FLD AUTO: 2.1 K/UL — LOW (ref 4.8–10.8)
WBC UR QL: 82 /HPF — HIGH (ref 0–5)

## 2022-02-21 PROCEDURE — 99223 1ST HOSP IP/OBS HIGH 75: CPT

## 2022-02-21 PROCEDURE — 71250 CT THORAX DX C-: CPT | Mod: 26

## 2022-02-21 PROCEDURE — 99233 SBSQ HOSP IP/OBS HIGH 50: CPT

## 2022-02-21 PROCEDURE — 74176 CT ABD & PELVIS W/O CONTRAST: CPT | Mod: 26

## 2022-02-21 RX ADMIN — OXYCODONE HYDROCHLORIDE 15 MILLIGRAM(S): 5 TABLET ORAL at 21:19

## 2022-02-21 RX ADMIN — MYCOPHENOLATE MOFETIL 500 MILLIGRAM(S): 250 CAPSULE ORAL at 17:26

## 2022-02-21 RX ADMIN — SODIUM CHLORIDE 50 MILLILITER(S): 9 INJECTION INTRAMUSCULAR; INTRAVENOUS; SUBCUTANEOUS at 06:24

## 2022-02-21 RX ADMIN — APIXABAN 5 MILLIGRAM(S): 2.5 TABLET, FILM COATED ORAL at 17:25

## 2022-02-21 RX ADMIN — Medication 100 MILLIGRAM(S): at 06:00

## 2022-02-21 RX ADMIN — CALCITRIOL 0.5 MICROGRAM(S): 0.5 CAPSULE ORAL at 12:36

## 2022-02-21 RX ADMIN — SODIUM CHLORIDE 50 MILLILITER(S): 9 INJECTION INTRAMUSCULAR; INTRAVENOUS; SUBCUTANEOUS at 17:25

## 2022-02-21 RX ADMIN — GABAPENTIN 300 MILLIGRAM(S): 400 CAPSULE ORAL at 06:00

## 2022-02-21 RX ADMIN — TACROLIMUS 1 MILLIGRAM(S): 5 CAPSULE ORAL at 17:26

## 2022-02-21 RX ADMIN — MYCOPHENOLATE MOFETIL 500 MILLIGRAM(S): 250 CAPSULE ORAL at 06:00

## 2022-02-21 RX ADMIN — TAMSULOSIN HYDROCHLORIDE 0.4 MILLIGRAM(S): 0.4 CAPSULE ORAL at 21:23

## 2022-02-21 RX ADMIN — Medication 1 DROP(S): at 06:07

## 2022-02-21 RX ADMIN — Medication 1 DROP(S): at 13:04

## 2022-02-21 RX ADMIN — TACROLIMUS 1 MILLIGRAM(S): 5 CAPSULE ORAL at 06:07

## 2022-02-21 RX ADMIN — APIXABAN 5 MILLIGRAM(S): 2.5 TABLET, FILM COATED ORAL at 06:00

## 2022-02-21 RX ADMIN — CLOPIDOGREL BISULFATE 75 MILLIGRAM(S): 75 TABLET, FILM COATED ORAL at 12:35

## 2022-02-21 RX ADMIN — GABAPENTIN 300 MILLIGRAM(S): 400 CAPSULE ORAL at 13:04

## 2022-02-21 RX ADMIN — Medication 1 DROP(S): at 17:26

## 2022-02-21 RX ADMIN — GABAPENTIN 300 MILLIGRAM(S): 400 CAPSULE ORAL at 21:23

## 2022-02-21 NOTE — CONSULT NOTE ADULT - SUBJECTIVE AND OBJECTIVE BOX
Patient is a 67y old  Male who presents with a chief complaint of Pancytopenia (2022 07:20)      HPI:   65-year-old male with past medical history of atrial fibrillation on Eliquis, cirrhosis, Hep C, CKD, status post kidney transplant, COPD, hypertension, hyperlipidemia, lymphoma, right eye melanoma, chronic back pain, presents with complaint of abnormal lab results.  Patient has been complaining of weakness for a few months and has been worsening.  Patient went to his cardiologist and was cleared.  Patient went to his nephrologist and had labs drawn (Dr. Heart) 10 days ago and was recently told that his labs were abnormal.  Patient was told to go to the ED for further evaluation.  Patient denies rectal bleeding or dark stool.  Patient denies chest pain or shortness of breath denies abdominal pain.  Patient denies fever, chills, weight loss or diaphoresis.  Patient cannot recall the name of his previous hematologist but thinks it might be Dr. Sim.  Patient was seen in inpatient by Dr. Collins a few years ago.    Patient with abnormal labs drawn on  showing WBC 1.6 with absolute neutrophils of 1014, hemoglobin 7.4 and platelets of 53.  As per previous charts patient has chronic thrombocytopenia and runs 50-90.  Previous WBC count has been above 5 and hemoglobin is usually 9 or above.    In the ED, pt hemodynamically stable. Labs show WBC 1.93, Hb 7.8, MCV 98.9, Plt 98, Cr 1.8 (Baseline ~ 1.1), Ca 10.3    s/p 1u pRBC in the ED (2022 22:35)     Additional History   65-year-old male with PMHx of atrial fibrillation on Eliquis, cirrhosis, Hep C, CKD, status post kidney transplant, COPD, hypertension, hyperlipidemia, lymphoma, right eye melanoma, chronic back pain, presents with complaint of abnormal lab results.  Patient has been complaining of weakness for a few months and has been worsening.  He went to his nephrologist and had labs drawn (Dr. Heart) 10 days ago and wa  told that his labs were abnormal. He was referred to ED for further evaluation.  Patient denies rectal bleeding or dark stool.  Patient denies chest pain or shortness of breath denies abdominal pain.      Patient with abnormal labs drawn on  showing WBC 1.6 with absolute neutrophils of 1014, hemoglobin 7.4 and platelets of 53.  As per previous charts patient has chronic thrombocytopenia and runs 50-90.  Previous WBC count has been above 5 and hemoglobin is usually 9 or above.    Of note he does have history of Hepatitis C with cirrhosis , splenomegaly on exam . 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 ( hep c related ? , not requiring treatement except for antiviral therapy )    Blood work on admission showed  WBC 1.93  Hemoglobin 7.8  Hct 26.4  MCV 98.9  Platelets 98K  Retic 4.9% (corrected absolute count of 3.1)    He received 1 unit of pRBC      ROS:  Negative except for:    PAST MEDICAL & SURGICAL HISTORY:  CKD (chronic kidney disease)    ESRD (end stage renal disease)    HTN (hypertension)    HLD (hyperlipidemia)    Atrial fibrillation  on eliquis    COPD, mild  not on O2    Peripheral neuropathy  unclear cause    Lymphoma  ?questionable, not treated, not followed    Melanoma  R eye, s/p laser    Cirrhosis  possibly related to hepC    History of kidney transplant  3 years ago    S/P arteriovenous (AV) fistula creation  prior old fistula in R arm        SOCIAL HISTORY:    FAMILY HISTORY:  FH: dementia  mother, alive    FHx: breast cancer  sister ( in 30s)        MEDICATIONS  (STANDING):  apixaban 5 milliGRAM(s) Oral every 12 hours  artificial  tears Solution 1 Drop(s) Both EYES four times a day  atorvastatin Oral Tab/Cap - Peds 10 milliGRAM(s) Oral every 48 hours  calcitriol   Capsule 0.5 MICROGram(s) Oral daily  clopidogrel Tablet 75 milliGRAM(s) Oral daily  gabapentin 300 milliGRAM(s) Oral three times a day  metoprolol succinate  milliGRAM(s) Oral daily  mycophenolate mofetil  Oral Tab/Cap - Peds 500 milliGRAM(s) Oral two times a day  sodium chloride 0.9%. 1000 milliLiter(s) (50 mL/Hr) IV Continuous <Continuous>  tacrolimus 1 milliGRAM(s) Oral every 12 hours  tamsulosin 0.4 milliGRAM(s) Oral at bedtime    MEDICATIONS  (PRN):  acetaminophen     Tablet .. 650 milliGRAM(s) Oral every 6 hours PRN Temp greater or equal to 38C (100.4F), Mild Pain (1 - 3)  albuterol/ipratropium for Nebulization 3 milliLiter(s) Nebulizer every 6 hours PRN Shortness of Breath and/or Wheezing  aluminum hydroxide/magnesium hydroxide/simethicone Suspension 30 milliLiter(s) Oral every 4 hours PRN Dyspepsia  melatonin 3 milliGRAM(s) Oral at bedtime PRN Insomnia  ondansetron Injectable 4 milliGRAM(s) IV Push every 8 hours PRN Nausea and/or Vomiting  oxyCODONE    IR 15 milliGRAM(s) Oral every 12 hours PRN Severe Pain (7 - 10)      Allergies    No Known Allergies    Intolerances        Vital Signs Last 24 Hrs  T(C): 35.9 (2022 07:44), Max: 36.2 (2022 16:00)  T(F): 96.6 (2022 07:44), Max: 97.1 (2022 16:00)  HR: 88 (2022 07:44) (60 - 89)  BP: 140/63 (2022 07:44) (140/63 - 165/89)  BP(mean): --  RR: 18 (2022 07:44) (18 - 18)  SpO2: 95% (2022 00:00) (95% - 95%)    PHYSICAL EXAM  General: adult in NAD  HEENT: PERRL  CV: normal S1/S2 with no murmur rubs or gallops  Lungs: CTABL  Abdomen: soft non-tender non-distended, slightly palpable spleen   Ext: no edema  Neuro: alert and oriented X 4      LABS:                          8.7    2.10  )-----------( 65       ( 2022 06:37 )             28.8         Mean Cell Volume : 95.7 fL  Mean Cell Hemoglobin : 28.9 pg  Mean Cell Hemoglobin Concentration : 30.2 g/dL  Auto Neutrophil # : x  Auto Lymphocyte # : x  Auto Monocyte # : x  Auto Eosinophil # : x  Auto Basophil # : x  Auto Neutrophil % : x  Auto Lymphocyte % : x  Auto Monocyte % : x  Auto Eosinophil % : x  Auto Basophil % : x      Serial CBC's   @ 06:37  Hct-28.8 / Hgb-8.7 / Plat-65 / RBC-3.01 / WBC-2.10  Serial CBC's   @ 04:30  Hct-29.8 / Hgb-9.0 / Plat-77 / RBC-3.07 / WBC-2.47  Serial CBC's   @ 17:44  Hct-26.4 / Hgb-7.8 / Plat-98 / RBC-2.67 / WBC-1.93          133<L>  |  103  |  25<H>  ----------------------------<  94  4.4   |  22  |  1.8<H>    Ca    9.4      2022 06:37  Phos  3.1         TPro  5.5<L>  /  Alb  3.6  /  TBili  0.8  /  DBili  x   /  AST  11  /  ALT  8   /  AlkPhos  55        PT/INR - ( 2022 17:44 )   PT: 16.10 sec;   INR: 1.40 ratio         PTT - ( 2022 17:44 )  PTT:41.9 sec    Reticulocyte Percent: 4.9 % ( @ 04:30)              BLOOD SMEAR INTERPRETATION:       RADIOLOGY & ADDITIONAL STUDIES:     Patient is a 67y old  Male who was admitted with a chief complaint of Pancytopenia (2022 07:20)      HPI:  65-year-old male with past medical history of atrial fibrillation on Eliquis, cirrhosis, Hep C, CKD, status post kidney transplant, COPD, hypertension, hyperlipidemia, lymphoma, right eye melanoma, chronic back pain, presents with complaint of abnormal lab results.  Patient has been complaining of weakness for a few months and that has been worsening.  Patient went to his cardiologist and was cleared.  Patient went to his nephrologist and had labs drawn (Dr. Heart) 10 days ago and was recently told that his labs were abnormal.  Patient was told to go to the ED for further evaluation.  Patient denies rectal bleeding or dark stool.  Patient denies chest pain or shortness of breath, denies abdominal pain. Also, patient denies fever, chills, weight loss or diaphoresis.  Patient cannot recall the name of his previous hematologist but thinks it might be Dr. Sim.  Patient was seen in inpatient by Dr. Collins a few years ago.    Patient with abnormal labs drawn on  showing WBC 1.6 with absolute neutrophils of 1014, hemoglobin 7.4 and platelets of 53.  As per previous charts patient has chronic thrombocytopenia and runs 50-90.  Previous WBC count has been above 5 and hemoglobin is usually 9 or above.    In the ED, pt hemodynamically stable. Labs show WBC 1.93, Hb 7.8, MCV 98.9, Plt 98, Cr 1.8 (Baseline ~ 1.1), Ca 10.3    s/p 1u pRBC in the ED (2022 22:35)     Additional History   65-year-old male with PMHx of atrial fibrillation on Eliquis, cirrhosis, Hep C, CKD, status post kidney transplant, COPD, hypertension, hyperlipidemia, lymphoma, right eye melanoma, chronic back pain, presents with complaint of abnormal lab results.  Patient has been complaining of weakness for a few months and has been worsening.  He went to his nephrologist and had labs drawn (Dr. Heart) 10 days ago and wa  told that his labs were abnormal. He was referred to ED for further evaluation.  Patient denies rectal bleeding or dark stool.  Patient denies chest pain or shortness of breath denies abdominal pain.      Patient with abnormal labs drawn on  showing WBC 1.6 with absolute neutrophils of 1014, hemoglobin 7.4 and platelets of 53.  As per previous charts patient has chronic thrombocytopenia and runs 50-90.  Previous WBC count has been above 5 and hemoglobin is usually 9 or above.    Of note he does have history of Hepatitis C with cirrhosis , splenomegaly on exam . 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 ( hep C related ? , not requiring treatement except for antiviral therapy )    Blood work on admission showed  WBC 1.93  Hemoglobin 7.8  Hct 26.4  MCV 98.9  Platelets 98K  Retic 4.9% (corrected absolute count of 3.1)    He received 1 unit of pRBC      ROS:  Negative except for:    PAST MEDICAL & SURGICAL HISTORY:  CKD (chronic kidney disease)    ESRD (end stage renal disease)    HTN (hypertension)    HLD (hyperlipidemia)    Atrial fibrillation  on eliquis    COPD, mild  not on O2    Peripheral neuropathy  unclear cause    Lymphoma  ?questionable, not treated, not followed    Melanoma  R eye, s/p laser    Cirrhosis  possibly related to hepC    History of kidney transplant  3 years ago    S/P arteriovenous (AV) fistula creation  prior old fistula in R arm        SOCIAL HISTORY:    FAMILY HISTORY:  FH: dementia  mother, alive    FHx: breast cancer  sister ( in 30s)        MEDICATIONS  (STANDING):  apixaban 5 milliGRAM(s) Oral every 12 hours  artificial  tears Solution 1 Drop(s) Both EYES four times a day  atorvastatin Oral Tab/Cap - Peds 10 milliGRAM(s) Oral every 48 hours  calcitriol   Capsule 0.5 MICROGram(s) Oral daily  clopidogrel Tablet 75 milliGRAM(s) Oral daily  gabapentin 300 milliGRAM(s) Oral three times a day  metoprolol succinate  milliGRAM(s) Oral daily  mycophenolate mofetil  Oral Tab/Cap - Peds 500 milliGRAM(s) Oral two times a day  sodium chloride 0.9%. 1000 milliLiter(s) (50 mL/Hr) IV Continuous <Continuous>  tacrolimus 1 milliGRAM(s) Oral every 12 hours  tamsulosin 0.4 milliGRAM(s) Oral at bedtime    MEDICATIONS  (PRN):  acetaminophen     Tablet .. 650 milliGRAM(s) Oral every 6 hours PRN Temp greater or equal to 38C (100.4F), Mild Pain (1 - 3)  albuterol/ipratropium for Nebulization 3 milliLiter(s) Nebulizer every 6 hours PRN Shortness of Breath and/or Wheezing  aluminum hydroxide/magnesium hydroxide/simethicone Suspension 30 milliLiter(s) Oral every 4 hours PRN Dyspepsia  melatonin 3 milliGRAM(s) Oral at bedtime PRN Insomnia  ondansetron Injectable 4 milliGRAM(s) IV Push every 8 hours PRN Nausea and/or Vomiting  oxyCODONE    IR 15 milliGRAM(s) Oral every 12 hours PRN Severe Pain (7 - 10)      Allergies    No Known Allergies    Intolerances        Vital Signs Last 24 Hrs  T(C): 35.9 (2022 07:44), Max: 36.2 (2022 16:00)  T(F): 96.6 (2022 07:44), Max: 97.1 (2022 16:00)  HR: 88 (2022 07:44) (60 - 89)  BP: 140/63 (2022 07:44) (140/63 - 165/89)  BP(mean): --  RR: 18 (2022 07:44) (18 - 18)  SpO2: 95% (2022 00:00) (95% - 95%)    PHYSICAL EXAM  General: adult in NAD  HEENT: Asymmetry of the eyes.   CV: normal S1/S2 with no murmur rubs or gallops  Lungs: CTABL  Abdomen: soft non-tender non-distended, slightly palpable spleen   Ext: no edema  Neuro: alert and oriented X 4      LABS:                          8.7    2.10  )-----------( 65       ( 2022 06:37 )             28.8         Mean Cell Volume : 95.7 fL  Mean Cell Hemoglobin : 28.9 pg  Mean Cell Hemoglobin Concentration : 30.2 g/dL  Auto Neutrophil # : x  Auto Lymphocyte # : x  Auto Monocyte # : x  Auto Eosinophil # : x  Auto Basophil # : x  Auto Neutrophil % : x  Auto Lymphocyte % : x  Auto Monocyte % : x  Auto Eosinophil % : x  Auto Basophil % : x      Serial CBC's   @ 06:37  Hct-28.8 / Hgb-8.7 / Plat-65 / RBC-3.01 / WBC-2.10  Serial CBC's   @ 04:30  Hct-29.8 / Hgb-9.0 / Plat-77 / RBC-3.07 / WBC-2.47  Serial CBC's   @ 17:44  Hct-26.4 / Hgb-7.8 / Plat-98 / RBC-2.67 / WBC-1.93          133<L>  |  103  |  25<H>  ----------------------------<  94  4.4   |  22  |  1.8<H>    Ca    9.4      2022 06:37  Phos  3.1         TPro  5.5<L>  /  Alb  3.6  /  TBili  0.8  /  DBili  x   /  AST  11  /  ALT  8   /  AlkPhos  55        PT/INR - ( 2022 17:44 )   PT: 16.10 sec;   INR: 1.40 ratio         PTT - ( 2022 17:44 )  PTT:41.9 sec    Reticulocyte Percent: 4.9 % ( @ 04:30)              BLOOD SMEAR INTERPRETATION:       RADIOLOGY & ADDITIONAL STUDIES:

## 2022-02-21 NOTE — PROGRESS NOTE ADULT - SUBJECTIVE AND OBJECTIVE BOX
Patient is a 67y old  Male who presents with a chief complaint of Pancytopenia (21 Feb 2022 07:20)    Patient was seen and examined.  C/o weakness  Denies any other complaints.  All systems reviewed positive history as mentioned above.    PAST MEDICAL & SURGICAL HISTORY:  ESRD (end stage renal disease)  HTN (hypertension)  HLD (hyperlipidemia)  Atrial fibrillationon eliquis  COPD, mild not on O2  Peripheral neuropathy, unclear cause  Lymphoma ?questionable, not treated, not followed  MelanomaR eye, s/p laser  Cirrhosis possibly related to hepC  History of kidney transplant 3 years ago  S/P arteriovenous (AV) fistula creation prior old fistula in R arm    Allergies  No Known Allergies    MEDICATIONS  (STANDING):  apixaban 5 milliGRAM(s) Oral every 12 hours  artificial  tears Solution 1 Drop(s) Both EYES four times a day  atorvastatin Oral Tab/Cap - Peds 10 milliGRAM(s) Oral every 48 hours  calcitriol   Capsule 0.5 MICROGram(s) Oral daily  clopidogrel Tablet 75 milliGRAM(s) Oral daily  gabapentin 300 milliGRAM(s) Oral three times a day  metoprolol succinate  milliGRAM(s) Oral daily  mycophenolate mofetil  Oral Tab/Cap - Peds 500 milliGRAM(s) Oral two times a day  sodium chloride 0.9%. 1000 milliLiter(s) (50 mL/Hr) IV Continuous <Continuous>  tacrolimus 1 milliGRAM(s) Oral every 12 hours  tamsulosin 0.4 milliGRAM(s) Oral at bedtime    MEDICATIONS  (PRN):  acetaminophen     Tablet .. 650 milliGRAM(s) Oral every 6 hours PRN Temp greater or equal to 38C (100.4F), Mild Pain (1 - 3)  albuterol/ipratropium for Nebulization 3 milliLiter(s) Nebulizer every 6 hours PRN Shortness of Breath and/or Wheezing  aluminum hydroxide/magnesium hydroxide/simethicone Suspension 30 milliLiter(s) Oral every 4 hours PRN Dyspepsia  melatonin 3 milliGRAM(s) Oral at bedtime PRN Insomnia  ondansetron Injectable 4 milliGRAM(s) IV Push every 8 hours PRN Nausea and/or Vomiting  oxyCODONE    IR 15 milliGRAM(s) Oral every 12 hours PRN Severe Pain (7 - 10)    Vital Signs Last 24 Hrs  T(C): 35.9  T(F): 96.6  HR: 88  BP: 140/63  BP(mean): --  RR: 18  SpO2: 95%    O/E:  Awake, alert, not in distress.  HEENT: atraumatic,  right eye blidness  Chest: clear.  CVS: SIS2 +, irregular+  no murmur.  P/A: Soft, BS+  CNS:  awake, alert  Ext: no edema feet.  Skin: no rash, no ulcers.  All systems reviewed positive findings as above.                          8.7<L>  2.10<L> )-----------( 65<L>    ( 21 Feb 2022 06:37 )             28.8<L>                        9.0<L>  2.47<L> )-----------( 77<L>    ( 20 Feb 2022 04:30 )             29.8<L>    02-21    133<L>  |  103  |  25<H>  ----------------------------<  94  4.4   |  22  |  1.8<H>  02-20    136  |  103  |  19  ----------------------------<  101<H>  4.6   |  24  |  1.9<H>    Ca    9.4      21 Feb 2022 06:37  Ca    9.9      20 Feb 2022 04:30  Ca    10.3<H>      19 Feb 2022 17:44  Phos  3.1     02-20    TPro  5.5<L>  /  Alb  3.6  /  TBili  0.8  /  DBili  x   /  AST  11  /  ALT  8   /  AlkPhos  55  02-20  TPro  5.8<L>  /  Alb  3.7  /  TBili  0.5  /  DBili  x   /  AST  11  /  ALT  9   /  AlkPhos  56  02-19      CARDIAC MARKERS ( 19 Feb 2022 20:23 )  x     / 0.01 ng/mL / x     / x     / x          PT/INR - ( 19 Feb 2022 17:44 )   PT: 16.10 sec;   INR: 1.40 ratio         PTT - ( 19 Feb 2022 17:44 )  PTT:41.9 sec

## 2022-02-21 NOTE — PROGRESS NOTE ADULT - SUBJECTIVE AND OBJECTIVE BOX
DAVONTE CHOU 67y Male  MRN#: [id number]   Hospital Day: 2d    HPI:   65-year-old male with past medical history of atrial fibrillation on Eliquis, cirrhosis, Hep C, CKD, status post kidney transplant, COPD, hypertension, hyperlipidemia, lymphoma, right eye melanoma, chronic back pain, presents with complaint of abnormal lab results.  Patient has been complaining of weakness for a few months and has been worsening.  Patient went to his cardiologist and was cleared.  Patient went to his nephrologist and had labs drawn (Dr. Heart) 10 days ago and was recently told that his labs were abnormal.  Patient was told to go to the ED for further evaluation.  Patient denies rectal bleeding or dark stool.  Patient denies chest pain or shortness of breath denies abdominal pain.  Patient denies fever, chills, weight loss or diaphoresis.  Patient cannot recall the name of his previous hematologist but thinks it might be Dr. Sim.  Patient was seen in inpatient by Dr. Collins a few years ago.    Patient with abnormal labs drawn on 2/9 showing WBC 1.6 with absolute neutrophils of 1014, hemoglobin 7.4 and platelets of 53.  As per previous charts patient has chronic thrombocytopenia and runs 50-90.  Previous WBC count has been above 5 and hemoglobin is usually 9 or above.    In the ED, pt hemodynamically stable. Labs show WBC 1.93, Hb 7.8, MCV 98.9, Plt 98, Cr 1.8 (Baseline ~ 1.1), Ca 10.3    s/p 1u pRBC in the ED (19 Feb 2022 22:35)      SUBJECTIVE  Patient is a 67y old Male who presents with a chief complaint of Weakness (20 Feb 2022 14:53)  Currently admitted to medicine with the primary diagnosis of Pancytopenia      INTERVAL HPI AND OVERNIGHT EVENTS:  Patient was examined and seen at bedside. This morning he is resting comfortably in bed and reports no issues or overnight events.    REVIEW OF SYMPTOMS:  CONSTITUTIONAL: No weakness, fevers or chills; No headaches  EYES: No visual changes, eye pain, or discharge  ENT: No vertigo; No ear pain or change in hearing; No sore throat or difficulty swallowing  NECK: No pain or stiffness  RESPIRATORY: No cough, wheezing, or hemoptysis; No shortness of breath  CARDIOVASCULAR: No chest pain or palpitations  GASTROINTESTINAL: No abdominal or epigastric pain; No nausea, vomiting, or hematemesis; No diarrhea or constipation; No melena or hematochezia  GENITOURINARY: No dysuria, frequency or hematuria  MUSCULOSKELETAL: No joint pain, no muscle pain, no weakness  NEUROLOGICAL: No numbness or weakness  SKIN: No itching or rashes    OBJECTIVE  PAST MEDICAL & SURGICAL HISTORY  CKD (chronic kidney disease)    ESRD (end stage renal disease)    HTN (hypertension)    HLD (hyperlipidemia)    Atrial fibrillation  on eliquis    COPD, mild  not on O2    Peripheral neuropathy  unclear cause    Lymphoma  ?questionable, not treated, not followed    Melanoma  R eye, s/p laser    Cirrhosis  possibly related to hepC    History of kidney transplant  3 years ago    S/P arteriovenous (AV) fistula creation  prior old fistula in R arm      ALLERGIES:  No Known Allergies    MEDICATIONS:  STANDING MEDICATIONS  apixaban 5 milliGRAM(s) Oral every 12 hours  atorvastatin Oral Tab/Cap - Peds 10 milliGRAM(s) Oral every 48 hours  calcitriol   Capsule 0.5 MICROGram(s) Oral daily  clopidogrel Tablet 75 milliGRAM(s) Oral daily  gabapentin 300 milliGRAM(s) Oral three times a day  metoprolol succinate  milliGRAM(s) Oral daily  mycophenolate mofetil  Oral Tab/Cap - Peds 500 milliGRAM(s) Oral two times a day  polyvinyl alcohol 1.4%/povidone 0.6% Ophthalmic Solution - Peds 1 Drop(s) Both EYES four times a day  sodium chloride 0.9%. 1000 milliLiter(s) IV Continuous <Continuous>  tacrolimus 1 milliGRAM(s) Oral every 12 hours  tamsulosin 0.4 milliGRAM(s) Oral at bedtime    PRN MEDICATIONS  acetaminophen     Tablet .. 650 milliGRAM(s) Oral every 6 hours PRN  albuterol/ipratropium for Nebulization 3 milliLiter(s) Nebulizer every 6 hours PRN  aluminum hydroxide/magnesium hydroxide/simethicone Suspension 30 milliLiter(s) Oral every 4 hours PRN  melatonin 3 milliGRAM(s) Oral at bedtime PRN  ondansetron Injectable 4 milliGRAM(s) IV Push every 8 hours PRN  oxyCODONE    IR 15 milliGRAM(s) Oral every 12 hours PRN      VITAL SIGNS: Last 24 Hours  T(C): 35.9 (21 Feb 2022 00:00), Max: 36.2 (20 Feb 2022 16:00)  T(F): 96.6 (21 Feb 2022 00:00), Max: 97.1 (20 Feb 2022 16:00)  HR: 89 (21 Feb 2022 05:34) (60 - 89)  BP: 165/89 (21 Feb 2022 05:34) (140/94 - 166/104)  BP(mean): --  RR: 18 (21 Feb 2022 00:00) (18 - 18)  SpO2: 95% (21 Feb 2022 00:00) (95% - 95%)    LABS:                        9.0    2.47  )-----------( 77       ( 20 Feb 2022 04:30 )             29.8     02-20    136  |  103  |  19  ----------------------------<  101<H>  4.6   |  24  |  1.9<H>    Ca    9.9      20 Feb 2022 04:30  Phos  3.1     02-20    TPro  5.5<L>  /  Alb  3.6  /  TBili  0.8  /  DBili  x   /  AST  11  /  ALT  8   /  AlkPhos  55  02-20    PT/INR - ( 19 Feb 2022 17:44 )   PT: 16.10 sec;   INR: 1.40 ratio         PTT - ( 19 Feb 2022 17:44 )  PTT:41.9 sec          CARDIAC MARKERS ( 19 Feb 2022 20:23 )  x     / 0.01 ng/mL / x     / x     / x          RADIOLOGY:      PHYSICAL EXAM:  CONSTITUTIONAL: No acute distress, well-developed, well-groomed, AAOx3  HEAD: Atraumatic, normocephalic  EYES: EOM intact, PERRLA, conjunctiva and sclera clear  ENT: Supple, no masses, no thyromegaly, no bruits, no JVD; moist mucous membranes  PULMONARY: Clear to auscultation bilaterally; no wheezes, rales, or rhonchi  CARDIOVASCULAR: Regular rate and rhythm; no murmurs, rubs, or gallops  GASTROINTESTINAL: Soft, non-tender, non-distended; bowel sounds present  MUSCULOSKELETAL: 2+ peripheral pulses; no clubbing, no cyanosis, no edema  NEUROLOGY: non-focal  SKIN: No rashes or lesions; warm and dry    ASSESSMENT & PLAN  #    PAST MEDICAL & SURGICAL HISTORY:  CKD (chronic kidney disease)    ESRD (end stage renal disease)    HTN (hypertension)    HLD (hyperlipidemia)    Atrial fibrillation  on eliquis    COPD, mild  not on O2    Peripheral neuropathy  unclear cause    Lymphoma  ?questionable, not treated, not followed    Melanoma  R eye, s/p laser    Cirrhosis  possibly related to hepC    History of kidney transplant  3 years ago    S/P arteriovenous (AV) fistula creation  prior old fistula in R arm        #Misc  - DVT Prophylaxis:  - Diet:  - GI Prophylaxis:  - Activity:  - IV Fluids:  - Code Status:    Dispo: DAVONTE CHOU 67y Male  MRN#: [id number]   Hospital Day: 2d    SUBJECTIVE  65-year-old male with past medical history of atrial fibrillation on Eliquis, cirrhosis, Hep C, CKD, status post kidney transplant, COPD, hypertension, hyperlipidemia, lymphoma, right eye melanoma, chronic back pain, presents with complaint of abnormal lab results outpatient.    Currently admitted to medicine with the primary diagnosis of Pancytopenia      INTERVAL HPI AND OVERNIGHT EVENTS:  Patient was examined and seen at bedside. This morning he is resting comfortably in bed and reports no issues or overnight events.    REVIEW OF SYMPTOMS:  Denies any k    OBJECTIVE  PAST MEDICAL & SURGICAL HISTORY  CKD (chronic kidney disease)    ESRD (end stage renal disease)    HTN (hypertension)    HLD (hyperlipidemia)    Atrial fibrillation  on eliquis    COPD, mild  not on O2    Peripheral neuropathy  unclear cause    Lymphoma  ?questionable, not treated, not followed    Melanoma  R eye, s/p laser    Cirrhosis  possibly related to hepC    History of kidney transplant  3 years ago    S/P arteriovenous (AV) fistula creation  prior old fistula in R arm      ALLERGIES:  No Known Allergies    MEDICATIONS:  STANDING MEDICATIONS  apixaban 5 milliGRAM(s) Oral every 12 hours  atorvastatin Oral Tab/Cap - Peds 10 milliGRAM(s) Oral every 48 hours  calcitriol   Capsule 0.5 MICROGram(s) Oral daily  clopidogrel Tablet 75 milliGRAM(s) Oral daily  gabapentin 300 milliGRAM(s) Oral three times a day  metoprolol succinate  milliGRAM(s) Oral daily  mycophenolate mofetil  Oral Tab/Cap - Peds 500 milliGRAM(s) Oral two times a day  polyvinyl alcohol 1.4%/povidone 0.6% Ophthalmic Solution - Peds 1 Drop(s) Both EYES four times a day  sodium chloride 0.9%. 1000 milliLiter(s) IV Continuous <Continuous>  tacrolimus 1 milliGRAM(s) Oral every 12 hours  tamsulosin 0.4 milliGRAM(s) Oral at bedtime    PRN MEDICATIONS  acetaminophen     Tablet .. 650 milliGRAM(s) Oral every 6 hours PRN  albuterol/ipratropium for Nebulization 3 milliLiter(s) Nebulizer every 6 hours PRN  aluminum hydroxide/magnesium hydroxide/simethicone Suspension 30 milliLiter(s) Oral every 4 hours PRN  melatonin 3 milliGRAM(s) Oral at bedtime PRN  ondansetron Injectable 4 milliGRAM(s) IV Push every 8 hours PRN  oxyCODONE    IR 15 milliGRAM(s) Oral every 12 hours PRN      VITAL SIGNS: Last 24 Hours  T(C): 35.9 (21 Feb 2022 00:00), Max: 36.2 (20 Feb 2022 16:00)  T(F): 96.6 (21 Feb 2022 00:00), Max: 97.1 (20 Feb 2022 16:00)  HR: 89 (21 Feb 2022 05:34) (60 - 89)  BP: 165/89 (21 Feb 2022 05:34) (140/94 - 166/104)  BP(mean): --  RR: 18 (21 Feb 2022 00:00) (18 - 18)  SpO2: 95% (21 Feb 2022 00:00) (95% - 95%)    LABS:                        9.0    2.47  )-----------( 77       ( 20 Feb 2022 04:30 )             29.8     02-20    136  |  103  |  19  ----------------------------<  101<H>  4.6   |  24  |  1.9<H>    Ca    9.9      20 Feb 2022 04:30  Phos  3.1     02-20    TPro  5.5<L>  /  Alb  3.6  /  TBili  0.8  /  DBili  x   /  AST  11  /  ALT  8   /  AlkPhos  55  02-20    PT/INR - ( 19 Feb 2022 17:44 )   PT: 16.10 sec;   INR: 1.40 ratio         PTT - ( 19 Feb 2022 17:44 )  PTT:41.9 sec          CARDIAC MARKERS ( 19 Feb 2022 20:23 )  x     / 0.01 ng/mL / x     / x     / x          RADIOLOGY:      PHYSICAL EXAM:  CONSTITUTIONAL: No acute distress, well-developed, well-groomed, AAOx3  HEAD: Atraumatic, normocephalic  EYES: EOM intact, PERRLA, conjunctiva and sclera clear  ENT: Supple, no masses, no thyromegaly, no bruits, no JVD; moist mucous membranes  PULMONARY: Clear to auscultation bilaterally; no wheezes, rales, or rhonchi  CARDIOVASCULAR: Regular rate and rhythm; no murmurs, rubs, or gallops  GASTROINTESTINAL: Soft, non-tender, non-distended; bowel sounds present  MUSCULOSKELETAL: 2+ peripheral pulses; no clubbing, no cyanosis, no edema  NEUROLOGY: non-focal  SKIN: No rashes or lesions; warm and dry    ASSESSMENT & PLAN  #    PAST MEDICAL & SURGICAL HISTORY:  CKD (chronic kidney disease)    ESRD (end stage renal disease)    HTN (hypertension)    HLD (hyperlipidemia)    Atrial fibrillation  on eliquis    COPD, mild  not on O2    Peripheral neuropathy  unclear cause    Lymphoma  ?questionable, not treated, not followed    Melanoma  R eye, s/p laser    Cirrhosis  possibly related to hepC    History of kidney transplant  3 years ago    S/P arteriovenous (AV) fistula creation  prior old fistula in R arm        #Misc  - DVT Prophylaxis:  - Diet:  - GI Prophylaxis:  - Activity:  - IV Fluids:  - Code Status:    Dispo: DAVONTE CHOU 67y Male  MRN#: [id number]   Hospital Day: 2d    SUBJECTIVE  65-year-old male with past medical history of atrial fibrillation on Eliquis, cirrhosis, Hep C, CKD, status post kidney transplant, COPD, hypertension, hyperlipidemia, lymphoma, right eye melanoma, chronic back pain, presents with complaint of abnormal lab results outpatient.    Currently admitted to medicine with the primary diagnosis of Pancytopenia      INTERVAL HPI AND OVERNIGHT EVENTS:  Patient was examined and seen at bedside. This morning he is resting comfortably in bed and reports no issues or overnight events.    REVIEW OF SYMPTOMS:  Denies any headache, cp, sob, abdominal pain. Denies n/v    OBJECTIVE  PAST MEDICAL & SURGICAL HISTORY  CKD (chronic kidney disease)    ESRD (end stage renal disease)    HTN (hypertension)    HLD (hyperlipidemia)    Atrial fibrillation  on eliquis    COPD, mild  not on O2    Peripheral neuropathy  unclear cause    Lymphoma  ?questionable, not treated, not followed    Melanoma  R eye, s/p laser    Cirrhosis  possibly related to hepC    History of kidney transplant  3 years ago    S/P arteriovenous (AV) fistula creation  prior old fistula in R arm      ALLERGIES:  No Known Allergies    MEDICATIONS:  STANDING MEDICATIONS  apixaban 5 milliGRAM(s) Oral every 12 hours  atorvastatin Oral Tab/Cap - Peds 10 milliGRAM(s) Oral every 48 hours  calcitriol   Capsule 0.5 MICROGram(s) Oral daily  clopidogrel Tablet 75 milliGRAM(s) Oral daily  gabapentin 300 milliGRAM(s) Oral three times a day  metoprolol succinate  milliGRAM(s) Oral daily  mycophenolate mofetil  Oral Tab/Cap - Peds 500 milliGRAM(s) Oral two times a day  polyvinyl alcohol 1.4%/povidone 0.6% Ophthalmic Solution - Peds 1 Drop(s) Both EYES four times a day  sodium chloride 0.9%. 1000 milliLiter(s) IV Continuous <Continuous>  tacrolimus 1 milliGRAM(s) Oral every 12 hours  tamsulosin 0.4 milliGRAM(s) Oral at bedtime    PRN MEDICATIONS  acetaminophen     Tablet .. 650 milliGRAM(s) Oral every 6 hours PRN  albuterol/ipratropium for Nebulization 3 milliLiter(s) Nebulizer every 6 hours PRN  aluminum hydroxide/magnesium hydroxide/simethicone Suspension 30 milliLiter(s) Oral every 4 hours PRN  melatonin 3 milliGRAM(s) Oral at bedtime PRN  ondansetron Injectable 4 milliGRAM(s) IV Push every 8 hours PRN  oxyCODONE    IR 15 milliGRAM(s) Oral every 12 hours PRN      VITAL SIGNS: Last 24 Hours  T(C): 35.9 (21 Feb 2022 00:00), Max: 36.2 (20 Feb 2022 16:00)  T(F): 96.6 (21 Feb 2022 00:00), Max: 97.1 (20 Feb 2022 16:00)  HR: 89 (21 Feb 2022 05:34) (60 - 89)  BP: 165/89 (21 Feb 2022 05:34) (140/94 - 166/104)  BP(mean): --  RR: 18 (21 Feb 2022 00:00) (18 - 18)  SpO2: 95% (21 Feb 2022 00:00) (95% - 95%)    LABS:                        9.0    2.47  )-----------( 77       ( 20 Feb 2022 04:30 )             29.8     02-20    136  |  103  |  19  ----------------------------<  101<H>  4.6   |  24  |  1.9<H>    Ca    9.9      20 Feb 2022 04:30  Phos  3.1     02-20    TPro  5.5<L>  /  Alb  3.6  /  TBili  0.8  /  DBili  x   /  AST  11  /  ALT  8   /  AlkPhos  55  02-20    PT/INR - ( 19 Feb 2022 17:44 )   PT: 16.10 sec;   INR: 1.40 ratio         PTT - ( 19 Feb 2022 17:44 )  PTT:41.9 sec      CARDIAC MARKERS ( 19 Feb 2022 20:23 )  x     / 0.01 ng/mL / x     / x     / x          RADIOLOGY:   from: Xray Chest 1 View-PORTABLE IMMEDIATE (Xray Chest 1 View-PORTABLE IMMEDIATE .) (02.19.22 @ 18:29)   No radiographicevidence of acute cardiopulmonary disease.    PHYSICAL EXAM:  CONSTITUTIONAL: No acute distress, AAOx3  HEAD: Atraumatic, normocephalic  EYES:  conjunctiva and sclera clear  ENT: Supple, moist mucous membranes  PULMONARY: Clear to auscultation bilaterally; no wheezes  CARDIOVASCULAR: Regular rate and rhythm; no murmurs  GASTROINTESTINAL: Soft, non-tender, non-distended; bowel sounds present  MUSCULOSKELETAL: no edema  SKIN: warm and dry

## 2022-02-21 NOTE — PROGRESS NOTE ADULT - ASSESSMENT
65-year-old male with past medical history of atrial fibrillation on Eliquis, cirrhosis, Hep C, CKD, status post kidney transplant, COPD, hypertension, hyperlipidemia, lymphoma, right eye melanoma, chronic back pain, presents with complaint of abnormal lab results.  Patient has been complaining of weakness for a few months and has been worsening.  Patient went to his cardiologist and was cleared.  Patient went to his nephrologist and had labs drawn (Dr. Heart) 10 days ago and was recently told that his labs were abnormal.  Patient was told to go to the ED for further evaluation.      # Generalized weaknes  # Pancytopenia likely multifactorial   # H/o Hep C with cirrhosis and splenomegaly  # H/o lymphoma  - Reticulocyte Percent: 4.9 % (02.20.22 @ 04:30)  - S/p 1 unit PRC  - Check CT chest abd/pelvis  - F/u B12, folate, HIV, cryoglobinLDH, haptoglobin, yanet test  - F/u  peripheral smear   - hematology eval  - monitor cbc  - LFT normal    # Acute kidney injury on CKD stage 3  # H/o renal transplant in 2016 [facility] for ESRD  # Hypercalcemia-resolving  # Hyponatremia  - renal US  - Check UA  - c/w mycophenalate, tacrolimus  - renal eval  - f/u tacrolimus levels   - hold calcitriol  - monitor BMP    # H/o Chronic afib- rate controlled  # H/o CAD  # Hypertension   - c/w eliquis, statin, plavix, metoprolol    # H/o COPD- stable  - c/w duoneb prn    # Neuropathy  - c/w neurontin    # H/o  BPH  - c/w flomax    # 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

## 2022-02-21 NOTE — CONSULT NOTE ADULT - ATTENDING COMMENTS
Patient also seen and examined by myself. I agree with Dr. Munoz's (Hem-Onc fellow) note above. Situation discussed with her and the patient who was not enthusiastic about the idea of bone marrow studies.   Will reevaluate, especially that his numbers look slightly better today.  All questions answered.

## 2022-02-21 NOTE — CONSULT NOTE ADULT - ASSESSMENT
65-year-old male with PMHx of atrial fibrillation on Eliquis, cirrhosis, Hep C, CKD, status post kidney transplant, COPD, hypertension, hyperlipidemia, low grade lymphoma, right eye melanoma, chronic back pain, presents with pancytopenia. Hematology consult requested for further evaluation and work up.     ASSESSMENT  #) Pancytopenia likely multifactorial in a pt with hx of chronic thrombocytopenia likely sec to cirrhosis, splenomegaly , hep C. His platelet count tends to range from 50-90K and does fluctuate   #) Low grade lymphoma (?). Patient reports he had bone marrow biopsy over 15 years ago in which he received the diagnosis   #) SANTA on CKD vs CKD?    PLAN  - Please complete anemia work up with LDH/Haptoglobin and Carolina test   - Would recommend to obtain CT Chest/Abdomen/Pelvis (can be non contrast to assess for any evidence of lymphadenopathy as well to assess his liver and spleen)   - Please send for cryoglobulin level as he mentioned he had abnormal cryoglobulin levels in past (possible sequelae of his hep C?)  - If above work up does not show any findings, will likely need to proceed with bone marrow biopsy  65-year-old male with PMHx of atrial fibrillation on Eliquis, cirrhosis, Hep C, CKD, status post kidney transplant, COPD, hypertension, hyperlipidemia, low grade lymphoma, right eye melanoma, chronic back pain, presents with pancytopenia. Hematology consult requested for further evaluation and work up.     ASSESSMENT  #) Pancytopenia likely multifactorial in a pt with hx of chronic thrombocytopenia likely sec to cirrhosis, splenomegaly , hep C. His platelet count tends to range from 50-90K and does fluctuate. Now seems to be improving slowly which is reassuring.   #) Low grade lymphoma (?). Patient reports he had bone marrow biopsy over 15 years ago in which he received the diagnosis.  #) SANTA on CKD vs CKD?    PLAN  - Please complete anemia work up with LDH/Haptoglobin and Carolina test   - Would recommend to obtain CT Chest/Abdomen/Pelvis (can be non contrast to assess for any evidence of lymphadenopathy as well to assess his liver and spleen)   - Please send for cryoglobulin level as he mentioned he had abnormal cryoglobulin levels in past (possible sequelae of his hep C?)  - If above work up does not show any findings, will likely need to proceed with bone marrow biopsy.

## 2022-02-22 LAB
ALBUMIN SERPL ELPH-MCNC: 3.3 G/DL — LOW (ref 3.5–5.2)
ALP SERPL-CCNC: 50 U/L — SIGNIFICANT CHANGE UP (ref 30–115)
ALT FLD-CCNC: 8 U/L — SIGNIFICANT CHANGE UP (ref 0–41)
ANION GAP SERPL CALC-SCNC: 12 MMOL/L — SIGNIFICANT CHANGE UP (ref 7–14)
AST SERPL-CCNC: 11 U/L — SIGNIFICANT CHANGE UP (ref 0–41)
BASOPHILS # BLD AUTO: 0.01 K/UL — SIGNIFICANT CHANGE UP (ref 0–0.2)
BASOPHILS NFR BLD AUTO: 0.5 % — SIGNIFICANT CHANGE UP (ref 0–1)
BILIRUB SERPL-MCNC: 0.6 MG/DL — SIGNIFICANT CHANGE UP (ref 0.2–1.2)
BUN SERPL-MCNC: 30 MG/DL — HIGH (ref 10–20)
CALCIUM SERPL-MCNC: 9.3 MG/DL — SIGNIFICANT CHANGE UP (ref 8.5–10.1)
CHLORIDE SERPL-SCNC: 106 MMOL/L — SIGNIFICANT CHANGE UP (ref 98–110)
CO2 SERPL-SCNC: 19 MMOL/L — SIGNIFICANT CHANGE UP (ref 17–32)
CREAT SERPL-MCNC: 1.7 MG/DL — HIGH (ref 0.7–1.5)
CREATININE, URINE RESULT: 62 MG/DL — SIGNIFICANT CHANGE UP
DIR ANTIGLOB POLYSPECIFIC INTERPRETATION: SIGNIFICANT CHANGE UP
EOSINOPHIL # BLD AUTO: 0.01 K/UL — SIGNIFICANT CHANGE UP (ref 0–0.7)
EOSINOPHIL NFR BLD AUTO: 0.5 % — SIGNIFICANT CHANGE UP (ref 0–8)
GLUCOSE SERPL-MCNC: 85 MG/DL — SIGNIFICANT CHANGE UP (ref 70–99)
HAPTOGLOB SERPL-MCNC: 132 MG/DL — SIGNIFICANT CHANGE UP (ref 34–200)
HCT VFR BLD CALC: 27.8 % — LOW (ref 42–52)
HGB BLD-MCNC: 8.5 G/DL — LOW (ref 14–18)
IMM GRANULOCYTES NFR BLD AUTO: 0.5 % — HIGH (ref 0.1–0.3)
LYMPHOCYTES # BLD AUTO: 0.48 K/UL — LOW (ref 1.2–3.4)
LYMPHOCYTES # BLD AUTO: 23.3 % — SIGNIFICANT CHANGE UP (ref 20.5–51.1)
MAGNESIUM SERPL-MCNC: 1.7 MG/DL — LOW (ref 1.8–2.4)
MCHC RBC-ENTMCNC: 29.4 PG — SIGNIFICANT CHANGE UP (ref 27–31)
MCHC RBC-ENTMCNC: 30.6 G/DL — LOW (ref 32–37)
MCV RBC AUTO: 96.2 FL — HIGH (ref 80–94)
MONOCYTES # BLD AUTO: 0.18 K/UL — SIGNIFICANT CHANGE UP (ref 0.1–0.6)
MONOCYTES NFR BLD AUTO: 8.7 % — SIGNIFICANT CHANGE UP (ref 1.7–9.3)
NEUTROPHILS # BLD AUTO: 1.37 K/UL — LOW (ref 1.4–6.5)
NEUTROPHILS NFR BLD AUTO: 66.5 % — SIGNIFICANT CHANGE UP (ref 42.2–75.2)
NRBC # BLD: 0 /100 WBCS — SIGNIFICANT CHANGE UP (ref 0–0)
PLATELET # BLD AUTO: 60 K/UL — LOW (ref 130–400)
POTASSIUM SERPL-MCNC: 4.5 MMOL/L — SIGNIFICANT CHANGE UP (ref 3.5–5)
POTASSIUM SERPL-SCNC: 4.5 MMOL/L — SIGNIFICANT CHANGE UP (ref 3.5–5)
PROT ?TM UR-MCNC: 26 MG/DL — HIGH (ref 0–12)
PROT SERPL-MCNC: 5.5 G/DL — LOW (ref 6–8)
RBC # BLD: 2.89 M/UL — LOW (ref 4.7–6.1)
RBC # FLD: 16 % — HIGH (ref 11.5–14.5)
SODIUM SERPL-SCNC: 137 MMOL/L — SIGNIFICANT CHANGE UP (ref 135–146)
WBC # BLD: 2.06 K/UL — LOW (ref 4.8–10.8)
WBC # FLD AUTO: 2.06 K/UL — LOW (ref 4.8–10.8)

## 2022-02-22 PROCEDURE — 99233 SBSQ HOSP IP/OBS HIGH 50: CPT

## 2022-02-22 RX ORDER — CEFTRIAXONE 500 MG/1
INJECTION, POWDER, FOR SOLUTION INTRAMUSCULAR; INTRAVENOUS
Refills: 0 | Status: COMPLETED | OUTPATIENT
Start: 2022-02-22 | End: 2022-02-26

## 2022-02-22 RX ORDER — SODIUM CHLORIDE 9 MG/ML
1000 INJECTION, SOLUTION INTRAVENOUS
Refills: 0 | Status: DISCONTINUED | OUTPATIENT
Start: 2022-02-22 | End: 2022-02-24

## 2022-02-22 RX ORDER — CEFTRIAXONE 500 MG/1
1000 INJECTION, POWDER, FOR SOLUTION INTRAMUSCULAR; INTRAVENOUS EVERY 24 HOURS
Refills: 0 | Status: COMPLETED | OUTPATIENT
Start: 2022-02-23 | End: 2022-02-25

## 2022-02-22 RX ORDER — MAGNESIUM SULFATE 500 MG/ML
2 VIAL (ML) INJECTION ONCE
Refills: 0 | Status: COMPLETED | OUTPATIENT
Start: 2022-02-22 | End: 2022-02-22

## 2022-02-22 RX ORDER — CEFTRIAXONE 500 MG/1
1000 INJECTION, POWDER, FOR SOLUTION INTRAMUSCULAR; INTRAVENOUS ONCE
Refills: 0 | Status: COMPLETED | OUTPATIENT
Start: 2022-02-22 | End: 2022-02-22

## 2022-02-22 RX ORDER — FOLIC ACID 0.8 MG
1 TABLET ORAL DAILY
Refills: 0 | Status: DISCONTINUED | OUTPATIENT
Start: 2022-02-22 | End: 2022-02-25

## 2022-02-22 RX ADMIN — Medication 25 GRAM(S): at 11:48

## 2022-02-22 RX ADMIN — SODIUM CHLORIDE 75 MILLILITER(S): 9 INJECTION, SOLUTION INTRAVENOUS at 11:49

## 2022-02-22 RX ADMIN — CALCITRIOL 0.5 MICROGRAM(S): 0.5 CAPSULE ORAL at 11:49

## 2022-02-22 RX ADMIN — CEFTRIAXONE 100 MILLIGRAM(S): 500 INJECTION, POWDER, FOR SOLUTION INTRAMUSCULAR; INTRAVENOUS at 09:51

## 2022-02-22 RX ADMIN — CLOPIDOGREL BISULFATE 75 MILLIGRAM(S): 75 TABLET, FILM COATED ORAL at 11:49

## 2022-02-22 RX ADMIN — GABAPENTIN 300 MILLIGRAM(S): 400 CAPSULE ORAL at 13:09

## 2022-02-22 RX ADMIN — TACROLIMUS 1 MILLIGRAM(S): 5 CAPSULE ORAL at 17:43

## 2022-02-22 RX ADMIN — APIXABAN 5 MILLIGRAM(S): 2.5 TABLET, FILM COATED ORAL at 17:42

## 2022-02-22 RX ADMIN — MYCOPHENOLATE MOFETIL 500 MILLIGRAM(S): 250 CAPSULE ORAL at 17:42

## 2022-02-22 RX ADMIN — APIXABAN 5 MILLIGRAM(S): 2.5 TABLET, FILM COATED ORAL at 06:09

## 2022-02-22 RX ADMIN — TACROLIMUS 1 MILLIGRAM(S): 5 CAPSULE ORAL at 06:10

## 2022-02-22 RX ADMIN — Medication 1 DROP(S): at 11:48

## 2022-02-22 RX ADMIN — ATORVASTATIN CALCIUM 10 MILLIGRAM(S): 80 TABLET, FILM COATED ORAL at 06:09

## 2022-02-22 RX ADMIN — ONDANSETRON 4 MILLIGRAM(S): 8 TABLET, FILM COATED ORAL at 21:10

## 2022-02-22 RX ADMIN — Medication 1 DROP(S): at 17:42

## 2022-02-22 RX ADMIN — SODIUM CHLORIDE 50 MILLILITER(S): 9 INJECTION INTRAMUSCULAR; INTRAVENOUS; SUBCUTANEOUS at 07:47

## 2022-02-22 RX ADMIN — GABAPENTIN 300 MILLIGRAM(S): 400 CAPSULE ORAL at 21:10

## 2022-02-22 RX ADMIN — Medication 100 MILLIGRAM(S): at 06:10

## 2022-02-22 RX ADMIN — MYCOPHENOLATE MOFETIL 500 MILLIGRAM(S): 250 CAPSULE ORAL at 06:11

## 2022-02-22 RX ADMIN — GABAPENTIN 300 MILLIGRAM(S): 400 CAPSULE ORAL at 06:09

## 2022-02-22 RX ADMIN — OXYCODONE HYDROCHLORIDE 15 MILLIGRAM(S): 5 TABLET ORAL at 20:09

## 2022-02-22 NOTE — PROGRESS NOTE ADULT - SUBJECTIVE AND OBJECTIVE BOX
Patient is a 67y old  Male who presents with a chief complaint of Pancytopenia (21 Feb 2022 07:20)    Patient was seen and examined.  C/o weakness  Denies any other complaints.  All systems reviewed positive history as mentioned above.    PAST MEDICAL & SURGICAL HISTORY:  ESRD (end stage renal disease)  HTN (hypertension)  HLD (hyperlipidemia)  Atrial fibrillationon eliquis  COPD, mild not on O2  Peripheral neuropathy, unclear cause  Lymphoma ?questionable, not treated, not followed  MelanomaR eye, s/p laser  Cirrhosis possibly related to hepC  History of kidney transplant 3 years ago  S/P arteriovenous (AV) fistula creation prior old fistula in R arm    Allergies  No Known Allergies    MEDICATIONS  (STANDING):  apixaban 5 milliGRAM(s) Oral every 12 hours  artificial  tears Solution 1 Drop(s) Both EYES four times a day  atorvastatin Oral Tab/Cap - Peds 10 milliGRAM(s) Oral every 48 hours  calcitriol   Capsule 0.5 MICROGram(s) Oral daily  cefTRIAXone   IVPB      clopidogrel Tablet 75 milliGRAM(s) Oral daily  gabapentin 300 milliGRAM(s) Oral three times a day  metoprolol succinate  milliGRAM(s) Oral daily  mycophenolate mofetil  Oral Tab/Cap - Peds 500 milliGRAM(s) Oral two times a day  sodium chloride 0.45%. 1000 milliLiter(s) (75 mL/Hr) IV Continuous <Continuous>  tacrolimus 1 milliGRAM(s) Oral every 12 hours  tamsulosin 0.4 milliGRAM(s) Oral at bedtime    MEDICATIONS  (PRN):  acetaminophen     Tablet .. 650 milliGRAM(s) Oral every 6 hours PRN Temp greater or equal to 38C (100.4F), Mild Pain (1 - 3)  albuterol/ipratropium for Nebulization 3 milliLiter(s) Nebulizer every 6 hours PRN Shortness of Breath and/or Wheezing  aluminum hydroxide/magnesium hydroxide/simethicone Suspension 30 milliLiter(s) Oral every 4 hours PRN Dyspepsia  melatonin 3 milliGRAM(s) Oral at bedtime PRN Insomnia  ondansetron Injectable 4 milliGRAM(s) IV Push every 8 hours PRN Nausea and/or Vomiting  oxyCODONE    IR 15 milliGRAM(s) Oral every 12 hours PRN Severe Pain (7 - 10)    T(C): 35.2 (02-22-22 @ 08:15), Max: 36.6 (02-21-22 @ 23:57)  HR: 80 (02-22-22 @ 08:15) (73 - 80)  BP: 148/91 (02-22-22 @ 08:15) (129/81 - 149/90)  RR: 20 (02-22-22 @ 08:15) (20 - 20)  SpO2: --    O/E:  Awake, alert, not in distress.  HEENT: atraumatic,  right eye blindness  Chest: clear.  CVS: SIS2 +, irregular+  no murmur.  P/A: Soft, BS+  CNS:  awake, alert  Ext: no edema feet.  Skin: no rash, no ulcers.  All systems reviewed positive findings as above.                                              8.5<L>  2.06<L> )-----------( 60<L>    ( 22 Feb 2022 04:30 )             27.8<L>                        8.7<L>  2.10<L> )-----------( 65<L>    ( 21 Feb 2022 06:37 )             28.8<L>  02-22    137  |  106  |  30<H>  ----------------------------<  85  4.5   |  19  |  1.7<H>  02-21    133<L>  |  103  |  25<H>  ----------------------------<  94  4.4   |  22  |  1.8<H>    Ca    9.3      22 Feb 2022 04:30  Ca    9.4      21 Feb 2022 06:37  Mg     1.7     02-22    TPro  5.5<L>  /  Alb  3.3<L>  /  TBili  0.6  /  DBili  x   /  AST  11  /  ALT  8   /  AlkPhos  50  02-22

## 2022-02-22 NOTE — PROGRESS NOTE ADULT - ASSESSMENT
65-year-old male with past medical history of atrial fibrillation on Eliquis, cirrhosis, Hep C, CKD, status post kidney transplant, COPD, hypertension, hyperlipidemia, lymphoma, right eye melanoma, chronic back pain, presents with complaint of abnormal lab results outpatient.    Currently admitted to medicine with the primary diagnosis of Pancytopenia    #Pancytopenia likely multifactorial in a pt with hx of chronic thrombocytopenia likely sec to cirrhosis, splenomegaly , hep C , low grade lymphoma   - s/p 1u pRBC in the ED  - TSH 4.27  - vitamin B12 494  - Folate 2.7  - F/U Iron,  Zinc, Copper  - Hep C positive  - HIV - neg  -  uric acid levels, phosphorus - wnl  - heme/onc consulted, recs appreciated - LDH/Haptoglobin and Carolina test, cryoglobulin level - all pending,  Pan CT  - Pan CT results reviewed  - Monitor CBC qD  - Keep active T&S    #SANTA on CKD - improving  - s/p L. Kidney transplant   - Cr 1.7 today, Cr level 2/9/2022 was 2.2, baseline about 3 year ago ~1.1  - cont gentle hydration  - Cont home meds   - tacrolimus level 11.8   - Avoid nephrotoxic medications  - f/u nephro consult   - UA - positive    #UTI  - UA positive  - will start pt on rocephin 1g q24    #Mild Hypercalcemia; resolved  Gentle hydration     #Atrial fibrillation - rate controlled  - c/w metoprolol succ ER 100mg daily  -Cont Eliquis     #Hx of COPD; not in exacerbation  - not on any meds, no active sxs  - Duonebs PRN    #HLD  - c/w lipitor 10 daily  - lipid panel wnl  -  A1c 5.2    # BPH  - c/w flomax    #HTN   - c/w metoprolol  - continue to monitor    #Hx of cirrhosis due to hep C  - liver enzymes stable    #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    Currently admitted to medicine with the primary diagnosis of Pancytopenia    #Pancytopenia likely multifactorial in a pt with hx of chronic thrombocytopenia likely sec to cirrhosis, splenomegaly , hep C , low grade lymphoma   - s/p 1u pRBC in the ED  - TSH 4.27  - vitamin B12 494  - Folate 2.7  - F/U Iron,  Zinc, Copper  - Hep C positive  - HIV - neg  -  uric acid levels, phosphorus - wnl  - heme/onc consulted, recs appreciated - LDH/Haptoglobin and Carolina test, cryoglobulin level - all pending,  Pan CT  - Pan CT results reviewed  - Monitor CBC qD  - Keep active T&S    #SANTA on CKD - improving  - s/p L. Kidney transplant   - Cr 1.7 today, Cr level 2/9/2022 was 2.2, baseline about 3 year ago ~1.1  - cont gentle hydration  - Cont home meds   - tacrolimus level 11.8   - Avoid nephrotoxic medications  - f/u nephro consult   - UA - positive    #Hypomag  - 1.7 today  - will replete    #UTI  - UA positive  - will start pt on rocephin 1g q24    #Mild Hypercalcemia; resolved  Gentle hydration     #Atrial fibrillation - rate controlled  - c/w metoprolol succ ER 100mg daily  -Cont Eliquis     #Hx of COPD; not in exacerbation  - not on any meds, no active sxs  - Duonebs PRN    #HLD  - c/w lipitor 10 daily  - lipid panel wnl  -  A1c 5.2    # BPH  - c/w flomax    #HTN   - c/w metoprolol  - continue to monitor    #Hx of cirrhosis due to hep C  - liver enzymes stable    #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

## 2022-02-22 NOTE — PROGRESS NOTE ADULT - ASSESSMENT
65-year-old male with past medical history of atrial fibrillation on Eliquis, cirrhosis, Hep C, CKD, status post kidney transplant, COPD, hypertension, hyperlipidemia, lymphoma, right eye melanoma, chronic back pain, presents with complaint of abnormal lab results.  Patient has been complaining of weakness for a few months and has been worsening.  Patient went to his cardiologist and was cleared.  Patient went to his nephrologist and had labs drawn (Dr. Heart) 10 days ago and was recently told that his labs were abnormal.  Patient was told to go to the ED for further evaluation.      # Generalized weaknes  # Pancytopenia likely multifactorial   # H/o Hep C with cirrhosis and splenomegaly  # H/o lymphoma  - Reticulocyte Percent: 4.9 % (02.20.22 @ 04:30)  - S/p 1 unit PRC  -  CT Abdomen and Pelvis No Cont (02.21.22 @ 18:27) >  No enlarged abdominal or pelvic lymph nodes.  2.  Mineral perivesicular fat stranding with small diverticulum arising   from the left posterior wall of the urinary bladder, possibly reflecting   underlying cystitis; correlation with urinalysis may be of use.  3.  Atrophic native kidneys and left lower quadrant renal transplant.      < end of copied text >      - Check CT chest abd/pelvis  - F/u B12, folate, HIV, cryoglobinLDH, haptoglobin, yanet test  - F/u  peripheral smear   - hematology eval  - monitor cbc  - LFT normal    # Acute kidney injury on CKD stage 3  # H/o renal transplant in 2016 [facility] for ESRD  # Hypercalcemia-resolving  # Hyponatremia  - renal US  - Check UA  - c/w mycophenalate, tacrolimus  - renal eval  - f/u tacrolimus levels   - hold calcitriol  - monitor BMP    # H/o Chronic afib- rate controlled  # H/o CAD  # Hypertension   - c/w eliquis, statin, plavix, metoprolol    # H/o COPD- stable  - c/w duoneb prn    # Neuropathy  - c/w neurontin    # H/o  BPH  - c/w flomax    # 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   65-year-old male with past medical history of atrial fibrillation on Eliquis, cirrhosis, Hep C, CKD, status post kidney transplant, COPD, hypertension, hyperlipidemia, lymphoma, right eye melanoma, chronic back pain, presents with complaint of abnormal lab results.  Patient has been complaining of weakness for a few months and has been worsening.  Patient went to his cardiologist and was cleared.  Patient went to his nephrologist and had labs drawn (Dr. Heart) 10 days ago and was recently told that his labs were abnormal.  Patient was told to go to the ED for further evaluation.      # Generalized weaknes  # Pancytopenia likely multifactorial   # H/o Hep C with cirrhosis and splenomegaly  # H/o lymphoma  - Reticulocyte Percent: 4.9 % (02.20.22 @ 04:30)  - S/p 1 unit PRC  -  CT Abdomen and Pelvis No Cont (02.21.22 @ 18:27) >No enlarged abdominal or pelvic lymph nodes.Mineral perivesicular fat stranding with small diverticulum arising  from the left posterior wall of the urinary bladder, possibly reflecting underlying cystitis;Atrophic native kidneys and left lower quadrant renal transplant.  - CT Chest No Cont (02.21.22 @ 18:26) >No significant interval change in slightly enlarged mediastinal lymph nodes, likely reactive. No suspicious mediastinal lesions.Cardiomegaly with enlarged left atrium and stable extensive mitral annular calcifications. Extensive coronary artery calcifications Centrilobular emphysema. Basilar reticular densities most pronounced, left lower lobe.  Likely subsegmental nodular atelectasis, medial left lower lobe (4/233).Stable mild dilatation ascending aorta 4.2 cm (4/114).  - Vitamin B12, Serum: 494 pg/mL (02.20.22 @ 04:30)  - Folate, Serum: 2.7 ng/mL (02.20.22 @ 04:30)  - HIV-1/2 Combo Result: Nonreact: 02.20.22 @ 04:30)  - Lactate Dehydrogenase, Serum: 176 U/L (02.20.22 @ 04:30)  - Dir Antiglob Polyspecific Interpretation: NEG (02.22.22 @ 04:30)  - F/u cryoglobin, haptoglobin  - F/u  peripheral smear   - hematology eval: If  work up does not show any findings, will likely need to proceed with bone marrow biopsy.  - monitor cbc  - LFT normal    # UTI  - F/u urine culture  - Start ceftriaxone    # Acute kidney injury, prerenal  on CKD stage 3  # H/o renal transplant in 2016 [facility] for ESRD  # Hypercalcemia-resolving  # Hyponatremia  - c/w mycophenalate, tacrolimus  - renal eval  -Tacrolimus (), Serum: 11.8ng/mL (02.20.22 @ 04:30)  - hold calcitriol  - monitor BMP    # Hypomagnesemia  - replete Mg    # H/o Chronic afib- rate controlled  # H/o CAD  # Hypertension   - c/w eliquis, statin, plavix, metoprolol    # H/o COPD- stable  - c/w duoneb prn    # Neuropathy  - c/w neurontin    # H/o  BPH  - c/w flomax    # H/o right  eye blindness sec to melanoma  - outpt F/u     # Full code    # Pending: F/u cryoglobin, haptoglobin, renal eval, hematology F/u  # Discussed plan of care with patient  # Disposition: Home when stable     65-year-old male with past medical history of atrial fibrillation on Eliquis, cirrhosis, Hep C, CKD, status post kidney transplant, COPD, hypertension, hyperlipidemia, lymphoma, right eye melanoma, chronic back pain, presents with complaint of abnormal lab results.  Patient has been complaining of weakness for a few months and has been worsening.  Patient went to his cardiologist and was cleared.  Patient went to his nephrologist and had labs drawn (Dr. Heart) 10 days ago and was recently told that his labs were abnormal.  Patient was told to go to the ED for further evaluation.      # Generalized weaknes  # Pancytopenia likely multifactorial   # H/o Hep C with cirrhosis and splenomegaly  # H/o lymphoma  - Reticulocyte Percent: 4.9 % (02.20.22 @ 04:30)  - S/p 1 unit PRC  -  CT Abdomen and Pelvis No Cont (02.21.22 @ 18:27) >No enlarged abdominal or pelvic lymph nodes.Mineral perivesicular fat stranding with small diverticulum arising  from the left posterior wall of the urinary bladder, possibly reflecting underlying cystitis;Atrophic native kidneys and left lower quadrant renal transplant.  - CT Chest No Cont (02.21.22 @ 18:26) >No significant interval change in slightly enlarged mediastinal lymph nodes, likely reactive. No suspicious mediastinal lesions.Cardiomegaly with enlarged left atrium and stable extensive mitral annular calcifications. Extensive coronary artery calcifications Centrilobular emphysema. Basilar reticular densities most pronounced, left lower lobe.  Likely subsegmental nodular atelectasis, medial left lower lobe (4/233).Stable mild dilatation ascending aorta 4.2 cm (4/114).  - Vitamin B12, Serum: 494 pg/mL (02.20.22 @ 04:30)  - Folate, Serum: 2.7 ng/mL (02.20.22 @ 04:30)  - HIV-1/2 Combo Result: Nonreact: 02.20.22 @ 04:30)  - Lactate Dehydrogenase, Serum: 176 U/L (02.20.22 @ 04:30)  - Dir Antiglob Polyspecific Interpretation: NEG (02.22.22 @ 04:30)  - F/u cryoglobin, haptoglobin  - F/u  peripheral smear   - hematology eval: If  work up does not show any findings, will likely need to proceed with bone marrow biopsy.  - monitor cbc  - LFT normal    # UTI  - F/u urine culture  - Start ceftriaxone    # Acute kidney injury, prerenal  on CKD stage 3  # H/o renal transplant in 2016 [facility] for ESRD  # Hypercalcemia-resolving  # Hyponatremia  - c/w mycophenalate, tacrolimus  - renal eval  -Tacrolimus (), Serum: 11.8ng/mL (02.20.22 @ 04:30)  - hold calcitriol  - monitor BMP    # Folic acid deficiency  - Start folic acid     # Hypomagnesemia  - replete Mg    # H/o Chronic afib- rate controlled  # H/o CAD  # Hypertension   - c/w eliquis, statin, plavix, metoprolol    # H/o COPD- stable  - c/w duoneb prn    # Neuropathy  - c/w neurontin    # H/o  BPH  - c/w flomax    # H/o right  eye blindness sec to melanoma  - outpt F/u     # Full code    # Pending: F/u cryoglobin, haptoglobin, renal eval, hematology F/u  # Discussed plan of care with patient  # Disposition: Home when stable

## 2022-02-22 NOTE — PROGRESS NOTE ADULT - SUBJECTIVE AND OBJECTIVE BOX
DAVONTE CHOU 67y Male  MRN#: [id number]   Hospital Day: 3d    SUBJECTIVE  65-year-old male with past medical history of atrial fibrillation on Eliquis, cirrhosis, Hep C, CKD, status post kidney transplant, COPD, hypertension, hyperlipidemia, lymphoma, right eye melanoma, chronic back pain, presents with complaint of abnormal lab results outpatient.    Currently admitted to medicine with the primary diagnosis of Pancytopenia      INTERVAL HPI AND OVERNIGHT EVENTS:  Patient was examined and seen at bedside. This morning he is resting comfortably in bed and reports no issues or overnight events.    REVIEW OF SYMPTOMS:  Denies any headache, cp, sob, abdominal pain. Denies n/v/c. Admits to his chronic back pain.     OBJECTIVE  PAST MEDICAL & SURGICAL HISTORY  CKD (chronic kidney disease)    ESRD (end stage renal disease)    HTN (hypertension)    HLD (hyperlipidemia)    Atrial fibrillation  on eliquis    COPD, mild  not on O2    Peripheral neuropathy  unclear cause    Lymphoma  ?questionable, not treated, not followed    Melanoma  R eye, s/p laser    Cirrhosis  possibly related to hepC    History of kidney transplant  3 years ago    S/P arteriovenous (AV) fistula creation  prior old fistula in R arm      ALLERGIES:  No Known Allergies    MEDICATIONS:  STANDING MEDICATIONS  apixaban 5 milliGRAM(s) Oral every 12 hours  artificial  tears Solution 1 Drop(s) Both EYES four times a day  atorvastatin Oral Tab/Cap - Peds 10 milliGRAM(s) Oral every 48 hours  calcitriol   Capsule 0.5 MICROGram(s) Oral daily  cefTRIAXone   IVPB      clopidogrel Tablet 75 milliGRAM(s) Oral daily  gabapentin 300 milliGRAM(s) Oral three times a day  magnesium sulfate  IVPB 2 Gram(s) IV Intermittent once  metoprolol succinate  milliGRAM(s) Oral daily  mycophenolate mofetil  Oral Tab/Cap - Peds 500 milliGRAM(s) Oral two times a day  sodium chloride 0.9%. 1000 milliLiter(s) IV Continuous <Continuous>  tacrolimus 1 milliGRAM(s) Oral every 12 hours  tamsulosin 0.4 milliGRAM(s) Oral at bedtime    PRN MEDICATIONS  acetaminophen     Tablet .. 650 milliGRAM(s) Oral every 6 hours PRN  albuterol/ipratropium for Nebulization 3 milliLiter(s) Nebulizer every 6 hours PRN  aluminum hydroxide/magnesium hydroxide/simethicone Suspension 30 milliLiter(s) Oral every 4 hours PRN  melatonin 3 milliGRAM(s) Oral at bedtime PRN  ondansetron Injectable 4 milliGRAM(s) IV Push every 8 hours PRN  oxyCODONE    IR 15 milliGRAM(s) Oral every 12 hours PRN      VITAL SIGNS: Last 24 Hours  T(C): 35.2 (2022 08:15), Max: 36.6 (2022 23:57)  T(F): 95.3 (2022 08:15), Max: 97.9 (2022 23:57)  HR: 80 (2022 08:15) (73 - 80)  BP: 148/91 (2022 08:15) (129/81 - 149/90)  BP(mean): --  RR: 20 (2022 08:15) (20 - 20)  SpO2: --    LABS:                        8.5    2.06  )-----------( 60       ( 2022 04:30 )             27.8         137  |  106  |  30<H>  ----------------------------<  85  4.5   |  19  |  1.7<H>    Ca    9.3      2022 04:30  Mg     1.7         TPro  5.5<L>  /  Alb  3.3<L>  /  TBili  0.6  /  DBili  x   /  AST  11  /  ALT  8   /  AlkPhos  50        Urinalysis Basic - ( 2022 12:58 )    Color: Light Yellow / Appearance: Clear / S.016 / pH: x  Gluc: x / Ketone: Negative  / Bili: Negative / Urobili: <2 mg/dL   Blood: x / Protein: 30 mg/dL / Nitrite: Negative   Leuk Esterase: Large / RBC: 6 /HPF / WBC 82 /HPF   Sq Epi: x / Non Sq Epi: 0 /HPF / Bacteria: Moderate    RADIOLOGY:  < from: CT Chest No Cont (22 @ 18:26) >  Since 2019.    No significant interval change in slightly enlarged mediastinal lymph   nodes, likely reactive. No suspicious mediastinal lesions.    Cardiomegaly with enlarged left atrium and stable extensive mitral   annular calcifications. Extensive coronary artery calcifications    Centrilobular emphysema.    Basilar reticular densities most pronounced, left lower lobe.  Likely   subsegmental nodular atelectasis, medial left lower lobe (4/233).    Stable mild dilatation ascending aorta 4.2 cm (4/114).      < from: CT Abdomen and Pelvis No Cont (22 @ 18:27) >  1.  No enlarged abdominal or pelvic lymph nodes.  2.  Mineral perivesicular fat stranding with small diverticulum arising   from the left posterior wall of the urinary bladder, possibly reflecting   underlying cystitis; correlation with urinalysis may be of use.  3.  Atrophic native kidneys and left lower quadrant renal transplant.      Please see separately dictated reportof concurrently performed thoracic   CT for report of intrathoracic findings.    < end of copied text >    PHYSICAL EXAM:  CONSTITUTIONAL: No acute distress, AAOx3  HEAD: Atraumatic, normocephalic  EYES:  no conjunctiva and sclera clear  ENT: Supple, moist mucous membranes  PULMONARY: Clear to auscultation bilaterally; no wheezes  CARDIOVASCULAR: Regular rate and rhythm; no murmurs  GASTROINTESTINAL: Soft, non-tender, non-distended; bowel sounds present  MUSCULOSKELETAL: no edema  SKIN: warm and dry

## 2022-02-22 NOTE — PROGRESS NOTE ADULT - TIME BILLING
Direct patient care, Discussed on rounds with Housestaff
Direct patient care, discussed on rounds with Housestaff

## 2022-02-23 LAB
% ALBUMIN: 54.7 % — SIGNIFICANT CHANGE UP
% ALPHA 1: 6.8 % — SIGNIFICANT CHANGE UP
% ALPHA 2: 13.5 % — SIGNIFICANT CHANGE UP
% BETA: 10.7 % — SIGNIFICANT CHANGE UP
% GAMMA: 14.3 % — SIGNIFICANT CHANGE UP
% M SPIKE: 2.8 % — SIGNIFICANT CHANGE UP
ALBUMIN SERPL ELPH-MCNC: 3.1 G/DL — LOW (ref 3.6–5.5)
ALBUMIN SERPL ELPH-MCNC: 3.2 G/DL — LOW (ref 3.5–5.2)
ALBUMIN/GLOB SERPL ELPH: 1.2 RATIO — SIGNIFICANT CHANGE UP
ALP SERPL-CCNC: 44 U/L — SIGNIFICANT CHANGE UP (ref 30–115)
ALPHA1 GLOB SERPL ELPH-MCNC: 0.4 G/DL — SIGNIFICANT CHANGE UP (ref 0.1–0.4)
ALPHA2 GLOB SERPL ELPH-MCNC: 0.8 G/DL — SIGNIFICANT CHANGE UP (ref 0.5–1)
ALT FLD-CCNC: 7 U/L — SIGNIFICANT CHANGE UP (ref 0–41)
ANION GAP SERPL CALC-SCNC: 9 MMOL/L — SIGNIFICANT CHANGE UP (ref 7–14)
AST SERPL-CCNC: 11 U/L — SIGNIFICANT CHANGE UP (ref 0–41)
B-GLOBULIN SERPL ELPH-MCNC: 0.6 G/DL — SIGNIFICANT CHANGE UP (ref 0.5–1)
BASOPHILS # BLD AUTO: 0.01 K/UL — SIGNIFICANT CHANGE UP (ref 0–0.2)
BASOPHILS NFR BLD AUTO: 0.4 % — SIGNIFICANT CHANGE UP (ref 0–1)
BILIRUB SERPL-MCNC: 0.5 MG/DL — SIGNIFICANT CHANGE UP (ref 0.2–1.2)
BUN SERPL-MCNC: 26 MG/DL — HIGH (ref 10–20)
CALCIUM SERPL-MCNC: 9.1 MG/DL — SIGNIFICANT CHANGE UP (ref 8.5–10.1)
CHLORIDE SERPL-SCNC: 105 MMOL/L — SIGNIFICANT CHANGE UP (ref 98–110)
CO2 SERPL-SCNC: 19 MMOL/L — SIGNIFICANT CHANGE UP (ref 17–32)
COPPER SERPL-MCNC: 136 UG/DL — HIGH (ref 69–132)
CREAT SERPL-MCNC: 1.7 MG/DL — HIGH (ref 0.7–1.5)
EOSINOPHIL # BLD AUTO: 0.02 K/UL — SIGNIFICANT CHANGE UP (ref 0–0.7)
EOSINOPHIL NFR BLD AUTO: 0.9 % — SIGNIFICANT CHANGE UP (ref 0–8)
GAMMA GLOBULIN: 0.8 G/DL — SIGNIFICANT CHANGE UP (ref 0.6–1.6)
GLUCOSE SERPL-MCNC: 89 MG/DL — SIGNIFICANT CHANGE UP (ref 70–99)
HCT VFR BLD CALC: 26.8 % — LOW (ref 42–52)
HGB BLD-MCNC: 8 G/DL — LOW (ref 14–18)
IMM GRANULOCYTES NFR BLD AUTO: 0.4 % — HIGH (ref 0.1–0.3)
LYMPHOCYTES # BLD AUTO: 0.54 K/UL — LOW (ref 1.2–3.4)
LYMPHOCYTES # BLD AUTO: 23.7 % — SIGNIFICANT CHANGE UP (ref 20.5–51.1)
M-SPIKE: 0.2 G/DL — HIGH (ref 0–0)
MAGNESIUM SERPL-MCNC: 2.1 MG/DL — SIGNIFICANT CHANGE UP (ref 1.8–2.4)
MCHC RBC-ENTMCNC: 28.9 PG — SIGNIFICANT CHANGE UP (ref 27–31)
MCHC RBC-ENTMCNC: 29.9 G/DL — LOW (ref 32–37)
MCV RBC AUTO: 96.8 FL — HIGH (ref 80–94)
MONOCYTES # BLD AUTO: 0.2 K/UL — SIGNIFICANT CHANGE UP (ref 0.1–0.6)
MONOCYTES NFR BLD AUTO: 8.8 % — SIGNIFICANT CHANGE UP (ref 1.7–9.3)
MYCOPHENOLATE SERPL-MCNC: 3.5 UG/ML — SIGNIFICANT CHANGE UP (ref 1–3.5)
MYCOPHENOLIC ACID GLUCURONIDE: 41 UG/ML — SIGNIFICANT CHANGE UP (ref 15–125)
NEUTROPHILS # BLD AUTO: 1.5 K/UL — SIGNIFICANT CHANGE UP (ref 1.4–6.5)
NEUTROPHILS NFR BLD AUTO: 65.8 % — SIGNIFICANT CHANGE UP (ref 42.2–75.2)
NRBC # BLD: 0 /100 WBCS — SIGNIFICANT CHANGE UP (ref 0–0)
PLATELET # BLD AUTO: 60 K/UL — LOW (ref 130–400)
POTASSIUM SERPL-MCNC: 4.5 MMOL/L — SIGNIFICANT CHANGE UP (ref 3.5–5)
POTASSIUM SERPL-SCNC: 4.5 MMOL/L — SIGNIFICANT CHANGE UP (ref 3.5–5)
PROT PATTERN SERPL ELPH-IMP: SIGNIFICANT CHANGE UP
PROT SERPL-MCNC: 4.9 G/DL — LOW (ref 6–8)
RBC # BLD: 2.77 M/UL — LOW (ref 4.7–6.1)
RBC # FLD: 16.1 % — HIGH (ref 11.5–14.5)
SODIUM SERPL-SCNC: 133 MMOL/L — LOW (ref 135–146)
WBC # BLD: 2.28 K/UL — LOW (ref 4.8–10.8)
WBC # FLD AUTO: 2.28 K/UL — LOW (ref 4.8–10.8)
ZINC SERPL-MCNC: 49 UG/DL — SIGNIFICANT CHANGE UP (ref 44–115)

## 2022-02-23 PROCEDURE — 99233 SBSQ HOSP IP/OBS HIGH 50: CPT

## 2022-02-23 RX ADMIN — OXYCODONE HYDROCHLORIDE 15 MILLIGRAM(S): 5 TABLET ORAL at 21:41

## 2022-02-23 RX ADMIN — TACROLIMUS 1 MILLIGRAM(S): 5 CAPSULE ORAL at 05:41

## 2022-02-23 RX ADMIN — APIXABAN 5 MILLIGRAM(S): 2.5 TABLET, FILM COATED ORAL at 05:40

## 2022-02-23 RX ADMIN — CLOPIDOGREL BISULFATE 75 MILLIGRAM(S): 75 TABLET, FILM COATED ORAL at 12:05

## 2022-02-23 RX ADMIN — TACROLIMUS 1 MILLIGRAM(S): 5 CAPSULE ORAL at 17:28

## 2022-02-23 RX ADMIN — GABAPENTIN 300 MILLIGRAM(S): 400 CAPSULE ORAL at 05:40

## 2022-02-23 RX ADMIN — Medication 1 DROP(S): at 17:27

## 2022-02-23 RX ADMIN — Medication 1 MILLIGRAM(S): at 12:05

## 2022-02-23 RX ADMIN — GABAPENTIN 300 MILLIGRAM(S): 400 CAPSULE ORAL at 21:42

## 2022-02-23 RX ADMIN — Medication 1 DROP(S): at 12:10

## 2022-02-23 RX ADMIN — TAMSULOSIN HYDROCHLORIDE 0.4 MILLIGRAM(S): 0.4 CAPSULE ORAL at 21:41

## 2022-02-23 RX ADMIN — CEFTRIAXONE 100 MILLIGRAM(S): 500 INJECTION, POWDER, FOR SOLUTION INTRAMUSCULAR; INTRAVENOUS at 10:37

## 2022-02-23 RX ADMIN — MYCOPHENOLATE MOFETIL 500 MILLIGRAM(S): 250 CAPSULE ORAL at 05:39

## 2022-02-23 RX ADMIN — Medication 1 DROP(S): at 05:39

## 2022-02-23 RX ADMIN — OXYCODONE HYDROCHLORIDE 15 MILLIGRAM(S): 5 TABLET ORAL at 09:35

## 2022-02-23 RX ADMIN — Medication 100 MILLIGRAM(S): at 05:40

## 2022-02-23 RX ADMIN — GABAPENTIN 300 MILLIGRAM(S): 400 CAPSULE ORAL at 13:18

## 2022-02-23 RX ADMIN — MYCOPHENOLATE MOFETIL 500 MILLIGRAM(S): 250 CAPSULE ORAL at 17:27

## 2022-02-23 RX ADMIN — APIXABAN 5 MILLIGRAM(S): 2.5 TABLET, FILM COATED ORAL at 17:26

## 2022-02-23 NOTE — PROGRESS NOTE ADULT - SUBJECTIVE AND OBJECTIVE BOX
DAVONTE CHOU  67y  [facility] [id number]      Patient is a 67y old  Male who presents with a chief complaint of Pancytopenia (2022 11:03)      INTERVAL HPI/OVERNIGHT EVENTS:  no acute events overnight       REVIEW OF SYSTEMS:  CONSTITUTIONAL: No fever, weight loss, or fatigue  EYES: No eye pain, visual disturbances, or discharge  ENMT:  No difficulty hearing, tinnitus, vertigo; No sinus or throat pain  NECK: No pain or stiffness  BREASTS: No pain, masses, or nipple discharge  RESPIRATORY: No cough, wheezing, chills or hemoptysis; No shortness of breath  CARDIOVASCULAR: No chest pain, palpitations, dizziness, or leg swelling  GASTROINTESTINAL: No abdominal or epigastric pain. No nausea, vomiting, or hematemesis; No diarrhea or constipation. No melena or hematochezia.  GENITOURINARY: No dysuria, frequency, hematuria, or incontinence  NEUROLOGICAL: No headaches, memory loss, loss of strength, numbness, or tremors  SKIN: No itching, burning, rashes, or lesions   LYMPH NODES: No enlarged glands  ENDOCRINE: No heat or cold intolerance; No hair loss  MUSCULOSKELETAL: No joint pain or swelling; No muscle, back, or extremity pain  PSYCHIATRIC: No depression, anxiety, mood swings, or difficulty sleeping  HEME/LYMPH: No easy bruising, or bleeding gums  ALLERY AND IMMUNOLOGIC: No hives or eczema  FAMILY HISTORY:  FH: dementia  mother, alive    FHx: breast cancer  sister ( in 30s)      T(C): 35.8 (22 @ 08:04), Max: 36.1 (22 @ 15:46)  HR: 81 (22 @ 08:04) (75 - 81)  BP: 122/85 (22 @ 08:04) (122/85 - 140/64)  RR: 18 (22 @ 08:04) (17 - 18)  SpO2: --  Wt(kg): --Vital Signs Last 24 Hrs  T(C): 35.8 (2022 08:04), Max: 36.1 (2022 15:46)  T(F): 96.5 (2022 08:04), Max: 96.9 (2022 15:46)  HR: 81 (2022 08:04) (75 - 81)  BP: 122/85 (2022 08:04) (122/85 - 140/64)  BP(mean): --  RR: 18 (2022 08:04) (17 - 18)  SpO2: --    PHYSICAL EXAM:  GENERAL: NAD, well-groomed, well-developed  HEAD:  Atraumatic, Normocephalic  EYES: EOMI, PERRLA, conjunctiva and sclera clear  ENMT: No tonsillar erythema, exudates, or enlargement; Moist mucous membranes, Good dentition, No lesions  NECK: Supple, No JVD, Normal thyroid  NERVOUS SYSTEM:  Alert & Oriented X3, Good concentration; Motor Strength 5/5 B/L upper and lower extremities; DTRs 2+ intact and symmetric  PULM: Clear to auscultation bilaterally  CARDIAC: Regular rate and rhythm; No murmurs, rubs, or gallops  GI: Soft, Nontender, Nondistended; Bowel sounds present  EXTREMITIES:  2+ Peripheral Pulses, No clubbing, cyanosis, or edema  LYMPH: No lymphadenopathy noted  SKIN: No rashes or lesions    Consultant(s) Notes Reviewed:  [x ] YES  [ ] NO  Care Discussed with Consultants/Other Providers [ x] YES  [ ] NO    LABS:                            8.0    2.28  )-----------( 60       ( 2022 06:30 )             26.8       133<L>  |  105  |  26<H>  ----------------------------<  89  4.5   |  19  |  1.7<H>    Ca    9.1      2022 06:30  Mg     2.1         TPro  4.9<L>  /  Alb  3.2<L>  /  TBili  0.5  /  DBili  x   /  AST  11  /  ALT  7   /  AlkPhos  44              acetaminophen     Tablet .. 650 milliGRAM(s) Oral every 6 hours PRN  albuterol/ipratropium for Nebulization 3 milliLiter(s) Nebulizer every 6 hours PRN  aluminum hydroxide/magnesium hydroxide/simethicone Suspension 30 milliLiter(s) Oral every 4 hours PRN  apixaban 5 milliGRAM(s) Oral every 12 hours  artificial  tears Solution 1 Drop(s) Both EYES four times a day  atorvastatin Oral Tab/Cap - Peds 10 milliGRAM(s) Oral every 48 hours  cefTRIAXone   IVPB      cefTRIAXone   IVPB 1000 milliGRAM(s) IV Intermittent every 24 hours  clopidogrel Tablet 75 milliGRAM(s) Oral daily  folic acid 1 milliGRAM(s) Oral daily  gabapentin 300 milliGRAM(s) Oral three times a day  melatonin 3 milliGRAM(s) Oral at bedtime PRN  metoprolol succinate  milliGRAM(s) Oral daily  mycophenolate mofetil  Oral Tab/Cap - Peds 500 milliGRAM(s) Oral two times a day  ondansetron Injectable 4 milliGRAM(s) IV Push every 8 hours PRN  oxyCODONE    IR 15 milliGRAM(s) Oral every 12 hours PRN  sodium chloride 0.45%. 1000 milliLiter(s) IV Continuous <Continuous>  tacrolimus 1 milliGRAM(s) Oral every 12 hours  tamsulosin 0.4 milliGRAM(s) Oral at bedtime      HEALTH ISSUES - PROBLEM Dx:          Case Discussed with House Staff   41 minutes spent on total encounter; more than 50% of the visit was spent counseling and/or coordinating care by the attending physician.   Spectra [phone]

## 2022-02-23 NOTE — PROGRESS NOTE ADULT - ASSESSMENT
65-year-old male with past medical history of atrial fibrillation on Eliquis, cirrhosis, Hep C, CKD, status post kidney transplant, COPD, hypertension, hyperlipidemia, lymphoma, right eye melanoma, chronic back pain, presents with complaint of abnormal lab results outpatient.      Currently admitted to medicine with the primary diagnosis of Pancytopenia    #Pancytopenia likely multifactorial in a pt with hx of chronic thrombocytopenia likely sec to cirrhosis, splenomegaly , hep C , low grade lymphoma   - s/p 1u pRBC in the ED  - TSH 4.27  - vitamin B12 494  - Folate 2.7  - Zinc wnl, Copper 136, slightly high  - F/U Iron  - Hep C positive  - HIV - neg  -  uric acid levels, phosphorus - wnl  - heme/onc consulted, recs appreciated - LDH/Haptoglobin wnl, [place] test neg, cryoglobulin level - pending  - Pan CT results reviewed  - Monitor CBC qD  - Keep active T&S  - heme/onc f/u    #SANTA on CKD - improving  - s/p L. Kidney transplant   - Cr 1.7 today, Cr level 2/9/2022 was 2.2, baseline about 3 year ago ~1.1  - cont gentle hydration  - Cont home meds   - tacrolimus level 11.8   - Avoid nephrotoxic medications  - f/u nephro consult ?  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

## 2022-02-23 NOTE — PROGRESS NOTE ADULT - SUBJECTIVE AND OBJECTIVE BOX
DAVONTE CHOU 67y Male  MRN#: [id number]   Hospital Day: 4d    SUBJECTIVE  65-year-old male with past medical history of atrial fibrillation on Eliquis, cirrhosis, Hep C, CKD, status post kidney transplant, COPD, hypertension, hyperlipidemia, lymphoma, right eye melanoma, chronic back pain, presents with complaint of abnormal lab results outpatient.    Currently admitted to medicine with the primary diagnosis of Pancytopenia      INTERVAL HPI AND OVERNIGHT EVENTS:  Patient was examined and seen at bedside. This morning he is resting comfortably in bed and reports no issues or overnight events.    REVIEW OF SYMPTOMS:  Denies any headache, cp, sob, abdominal pain. Denies n/v/c. Admits to his chronic back pain.     OBJECTIVE  PAST MEDICAL & SURGICAL HISTORY  CKD (chronic kidney disease)    ESRD (end stage renal disease)    HTN (hypertension)    HLD (hyperlipidemia)    Atrial fibrillation  on eliquis    COPD, mild  not on O2    Peripheral neuropathy  unclear cause    Lymphoma  ?questionable, not treated, not followed    Melanoma  R eye, s/p laser    Cirrhosis  possibly related to hepC    History of kidney transplant  3 years ago    S/P arteriovenous (AV) fistula creation  prior old fistula in R arm      ALLERGIES:  No Known Allergies    MEDICATIONS:  STANDING MEDICATIONS  apixaban 5 milliGRAM(s) Oral every 12 hours  artificial  tears Solution 1 Drop(s) Both EYES four times a day  atorvastatin Oral Tab/Cap - Peds 10 milliGRAM(s) Oral every 48 hours  cefTRIAXone   IVPB      cefTRIAXone   IVPB 1000 milliGRAM(s) IV Intermittent every 24 hours  clopidogrel Tablet 75 milliGRAM(s) Oral daily  folic acid 1 milliGRAM(s) Oral daily  gabapentin 300 milliGRAM(s) Oral three times a day  metoprolol succinate  milliGRAM(s) Oral daily  mycophenolate mofetil  Oral Tab/Cap - Peds 500 milliGRAM(s) Oral two times a day  sodium chloride 0.45%. 1000 milliLiter(s) IV Continuous <Continuous>  tacrolimus 1 milliGRAM(s) Oral every 12 hours  tamsulosin 0.4 milliGRAM(s) Oral at bedtime    PRN MEDICATIONS  acetaminophen     Tablet .. 650 milliGRAM(s) Oral every 6 hours PRN  albuterol/ipratropium for Nebulization 3 milliLiter(s) Nebulizer every 6 hours PRN  aluminum hydroxide/magnesium hydroxide/simethicone Suspension 30 milliLiter(s) Oral every 4 hours PRN  melatonin 3 milliGRAM(s) Oral at bedtime PRN  ondansetron Injectable 4 milliGRAM(s) IV Push every 8 hours PRN  oxyCODONE    IR 15 milliGRAM(s) Oral every 12 hours PRN      VITAL SIGNS: Last 24 Hours  T(C): 35.8 (2022 08:04), Max: 36.1 (2022 15:46)  T(F): 96.5 (2022 08:04), Max: 96.9 (2022 15:46)  HR: 81 (2022 08:04) (75 - 81)  BP: 122/85 (2022 08:04) (122/85 - 140/64)  BP(mean): --  RR: 18 (2022 08:04) (17 - 18)  SpO2: --    LABS:                        8.0    2.28  )-----------( 60       ( 2022 06:30 )             26.8     02-23    133<L>  |  105  |  26<H>  ----------------------------<  89  4.5   |  19  |  1.7<H>    Ca    9.1      2022 06:30  Mg     2.1     -    TPro  4.9<L>  /  Alb  3.2<L>  /  TBili  0.5  /  DBili  x   /  AST  11  /  ALT  7   /  AlkPhos  44  02-23      Urinalysis Basic - ( 2022 12:58 )    Color: Light Yellow / Appearance: Clear / S.016 / pH: x  Gluc: x / Ketone: Negative  / Bili: Negative / Urobili: <2 mg/dL   Blood: x / Protein: 30 mg/dL / Nitrite: Negative   Leuk Esterase: Large / RBC: 6 /HPF / WBC 82 /HPF   Sq Epi: x / Non Sq Epi: 0 /HPF / Bacteria: Moderate    RADIOLOGY:      PHYSICAL EXAM:  CONSTITUTIONAL: No acute distress, AAOx3  HEAD: Atraumatic, normocephalic  EYES:  no conjunctiva and sclera clear  ENT: Supple, moist mucous membranes  PULMONARY: Clear to auscultation bilaterally; no wheezes  CARDIOVASCULAR: Regular rate and rhythm; no murmurs  GASTROINTESTINAL: Soft, non-tender, non-distended; bowel sounds present  MUSCULOSKELETAL: no edema  SKIN: warm and dry

## 2022-02-23 NOTE — PROGRESS NOTE ADULT - ASSESSMENT
65-year-old male with past medical history of atrial fibrillation on Eliquis, cirrhosis, Hep C, CKD, status post kidney transplant, COPD, hypertension, hyperlipidemia, lymphoma, right eye melanoma, chronic back pain, presents with complaint of abnormal lab results.  Patient has been complaining of weakness for a few months and has been worsening.  Patient went to his cardiologist and was cleared.  Patient went to his nephrologist and had labs drawn (Dr. Heart) 10 days ago and was recently told that his labs were abnormal.  Patient was told to go to the ED for further evaluation.      # Generalized weakness secondary  Pancytopenia secondary to suspected urinary tract infection in the setting of  H/o Hep C with cirrhosis and splenomegaly and H/o lymphoma and immunosuppresion   on ceftriaxone   improving cbc   awaiting cultures   workup negative currently , may need bmt , will follow up with hem onc     #).Stable mild dilatation ascending aorta 4.2 cm (4/114).    #Atelectasis deep breathing    # Acute kidney injury, prerenal  on CKD stage 3    # H/o renal transplant in 2016 [facility] for ESRD    # Hypercalcemia-resolving    # Hyponatremia recurred , no intervention     # Folic acid deficiency on folic acid supplementaion     # Hypomagnesemia resolved    #Insomnia on melatonin     # H/o Chronic afib- rate controlled on apixiban    # H/o CAD    #Mild mitral valve regurgitation    #Mild-moderate tricuspid regurgitation.    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

## 2022-02-24 LAB
ALBUMIN SERPL ELPH-MCNC: 3.5 G/DL — SIGNIFICANT CHANGE UP (ref 3.5–5.2)
ALP SERPL-CCNC: 50 U/L — SIGNIFICANT CHANGE UP (ref 30–115)
ALT FLD-CCNC: 9 U/L — SIGNIFICANT CHANGE UP (ref 0–41)
ANION GAP SERPL CALC-SCNC: 9 MMOL/L — SIGNIFICANT CHANGE UP (ref 7–14)
AST SERPL-CCNC: 12 U/L — SIGNIFICANT CHANGE UP (ref 0–41)
BASOPHILS # BLD AUTO: 0.01 K/UL — SIGNIFICANT CHANGE UP (ref 0–0.2)
BASOPHILS NFR BLD AUTO: 0.4 % — SIGNIFICANT CHANGE UP (ref 0–1)
BILIRUB SERPL-MCNC: 0.4 MG/DL — SIGNIFICANT CHANGE UP (ref 0.2–1.2)
BLD GP AB SCN SERPL QL: SIGNIFICANT CHANGE UP
BUN SERPL-MCNC: 28 MG/DL — HIGH (ref 10–20)
CALCIUM SERPL-MCNC: 9.5 MG/DL — SIGNIFICANT CHANGE UP (ref 8.5–10.1)
CHLORIDE SERPL-SCNC: 108 MMOL/L — SIGNIFICANT CHANGE UP (ref 98–110)
CO2 SERPL-SCNC: 22 MMOL/L — SIGNIFICANT CHANGE UP (ref 17–32)
CREAT SERPL-MCNC: 2 MG/DL — HIGH (ref 0.7–1.5)
CULTURE RESULTS: SIGNIFICANT CHANGE UP
EOSINOPHIL # BLD AUTO: 0.02 K/UL — SIGNIFICANT CHANGE UP (ref 0–0.7)
EOSINOPHIL NFR BLD AUTO: 0.9 % — SIGNIFICANT CHANGE UP (ref 0–8)
GLUCOSE SERPL-MCNC: 103 MG/DL — HIGH (ref 70–99)
HCT VFR BLD CALC: 27.3 % — LOW (ref 42–52)
HGB BLD-MCNC: 8.1 G/DL — LOW (ref 14–18)
IMM GRANULOCYTES NFR BLD AUTO: 0.4 % — HIGH (ref 0.1–0.3)
IRON SATN MFR SERPL: 26 % — SIGNIFICANT CHANGE UP (ref 15–50)
IRON SATN MFR SERPL: 63 UG/DL — SIGNIFICANT CHANGE UP (ref 35–150)
LYMPHOCYTES # BLD AUTO: 0.5 K/UL — LOW (ref 1.2–3.4)
LYMPHOCYTES # BLD AUTO: 21.4 % — SIGNIFICANT CHANGE UP (ref 20.5–51.1)
MAGNESIUM SERPL-MCNC: 2 MG/DL — SIGNIFICANT CHANGE UP (ref 1.8–2.4)
MCHC RBC-ENTMCNC: 29.2 PG — SIGNIFICANT CHANGE UP (ref 27–31)
MCHC RBC-ENTMCNC: 29.7 G/DL — LOW (ref 32–37)
MCV RBC AUTO: 98.6 FL — HIGH (ref 80–94)
MONOCYTES # BLD AUTO: 0.16 K/UL — SIGNIFICANT CHANGE UP (ref 0.1–0.6)
MONOCYTES NFR BLD AUTO: 6.8 % — SIGNIFICANT CHANGE UP (ref 1.7–9.3)
NEUTROPHILS # BLD AUTO: 1.64 K/UL — SIGNIFICANT CHANGE UP (ref 1.4–6.5)
NEUTROPHILS NFR BLD AUTO: 70.1 % — SIGNIFICANT CHANGE UP (ref 42.2–75.2)
NRBC # BLD: 0 /100 WBCS — SIGNIFICANT CHANGE UP (ref 0–0)
PLATELET # BLD AUTO: 57 K/UL — LOW (ref 130–400)
POTASSIUM SERPL-MCNC: 4.9 MMOL/L — SIGNIFICANT CHANGE UP (ref 3.5–5)
POTASSIUM SERPL-SCNC: 4.9 MMOL/L — SIGNIFICANT CHANGE UP (ref 3.5–5)
PROT SERPL-MCNC: 5.3 G/DL — LOW (ref 6–8)
RBC # BLD: 2.77 M/UL — LOW (ref 4.7–6.1)
RBC # FLD: 15.9 % — HIGH (ref 11.5–14.5)
SODIUM SERPL-SCNC: 139 MMOL/L — SIGNIFICANT CHANGE UP (ref 135–146)
SPECIMEN SOURCE: SIGNIFICANT CHANGE UP
TIBC SERPL-MCNC: 239 UG/DL — SIGNIFICANT CHANGE UP (ref 220–430)
UIBC SERPL-MCNC: 176 UG/DL — SIGNIFICANT CHANGE UP (ref 110–370)
WBC # BLD: 2.34 K/UL — LOW (ref 4.8–10.8)
WBC # FLD AUTO: 2.34 K/UL — LOW (ref 4.8–10.8)

## 2022-02-24 PROCEDURE — 99233 SBSQ HOSP IP/OBS HIGH 50: CPT

## 2022-02-24 PROCEDURE — 99232 SBSQ HOSP IP/OBS MODERATE 35: CPT

## 2022-02-24 RX ORDER — SODIUM CHLORIDE 9 MG/ML
1000 INJECTION, SOLUTION INTRAVENOUS
Refills: 0 | Status: DISCONTINUED | OUTPATIENT
Start: 2022-02-24 | End: 2022-02-25

## 2022-02-24 RX ADMIN — SODIUM CHLORIDE 75 MILLILITER(S): 9 INJECTION, SOLUTION INTRAVENOUS at 18:12

## 2022-02-24 RX ADMIN — CLOPIDOGREL BISULFATE 75 MILLIGRAM(S): 75 TABLET, FILM COATED ORAL at 11:32

## 2022-02-24 RX ADMIN — ATORVASTATIN CALCIUM 10 MILLIGRAM(S): 80 TABLET, FILM COATED ORAL at 05:05

## 2022-02-24 RX ADMIN — Medication 1 DROP(S): at 05:05

## 2022-02-24 RX ADMIN — GABAPENTIN 300 MILLIGRAM(S): 400 CAPSULE ORAL at 21:08

## 2022-02-24 RX ADMIN — Medication 1 DROP(S): at 11:32

## 2022-02-24 RX ADMIN — APIXABAN 5 MILLIGRAM(S): 2.5 TABLET, FILM COATED ORAL at 18:12

## 2022-02-24 RX ADMIN — GABAPENTIN 300 MILLIGRAM(S): 400 CAPSULE ORAL at 11:32

## 2022-02-24 RX ADMIN — CEFTRIAXONE 100 MILLIGRAM(S): 500 INJECTION, POWDER, FOR SOLUTION INTRAMUSCULAR; INTRAVENOUS at 12:05

## 2022-02-24 RX ADMIN — TAMSULOSIN HYDROCHLORIDE 0.4 MILLIGRAM(S): 0.4 CAPSULE ORAL at 21:08

## 2022-02-24 RX ADMIN — MYCOPHENOLATE MOFETIL 500 MILLIGRAM(S): 250 CAPSULE ORAL at 05:05

## 2022-02-24 RX ADMIN — MYCOPHENOLATE MOFETIL 500 MILLIGRAM(S): 250 CAPSULE ORAL at 18:12

## 2022-02-24 RX ADMIN — TACROLIMUS 1 MILLIGRAM(S): 5 CAPSULE ORAL at 05:05

## 2022-02-24 RX ADMIN — TACROLIMUS 1 MILLIGRAM(S): 5 CAPSULE ORAL at 18:32

## 2022-02-24 RX ADMIN — Medication 1 DROP(S): at 00:00

## 2022-02-24 RX ADMIN — GABAPENTIN 300 MILLIGRAM(S): 400 CAPSULE ORAL at 05:05

## 2022-02-24 RX ADMIN — APIXABAN 5 MILLIGRAM(S): 2.5 TABLET, FILM COATED ORAL at 05:04

## 2022-02-24 RX ADMIN — Medication 100 MILLIGRAM(S): at 05:04

## 2022-02-24 RX ADMIN — Medication 1 MILLIGRAM(S): at 11:31

## 2022-02-24 NOTE — PROGRESS NOTE ADULT - SUBJECTIVE AND OBJECTIVE BOX
DAVONTE CHOU 67y Male  MRN#: [id number]   Hospital Day: 5d    SUBJECTIVE  65-year-old male with past medical history of atrial fibrillation on Eliquis, cirrhosis, Hep C, CKD, status post kidney transplant, COPD, hypertension, hyperlipidemia, lymphoma, right eye melanoma, chronic back pain, presents with complaint of abnormal lab results outpatient.    Currently admitted to medicine with the primary diagnosis of Pancytopenia      INTERVAL HPI AND OVERNIGHT EVENTS:  Patient was examined and seen at bedside. This morning he is resting comfortably in bed and reports no issues or overnight events.    REVIEW OF SYMPTOMS:  Denies any headache, cp, sob, abdominal pain. Denies n/v/c. Admits being anxious with everything going on.       OBJECTIVE  PAST MEDICAL & SURGICAL HISTORY  CKD (chronic kidney disease)    ESRD (end stage renal disease)    HTN (hypertension)    HLD (hyperlipidemia)    Atrial fibrillation  on eliquis    COPD, mild  not on O2    Peripheral neuropathy  unclear cause    Lymphoma  ?questionable, not treated, not followed    Melanoma  R eye, s/p laser    Cirrhosis  possibly related to hepC    History of kidney transplant  3 years ago    S/P arteriovenous (AV) fistula creation  prior old fistula in R arm      ALLERGIES:  No Known Allergies    MEDICATIONS:  STANDING MEDICATIONS  apixaban 5 milliGRAM(s) Oral every 12 hours  artificial  tears Solution 1 Drop(s) Both EYES four times a day  atorvastatin Oral Tab/Cap - Peds 10 milliGRAM(s) Oral every 48 hours  cefTRIAXone   IVPB      cefTRIAXone   IVPB 1000 milliGRAM(s) IV Intermittent every 24 hours  clopidogrel Tablet 75 milliGRAM(s) Oral daily  folic acid 1 milliGRAM(s) Oral daily  gabapentin 300 milliGRAM(s) Oral three times a day  lactated ringers. 1000 milliLiter(s) IV Continuous <Continuous>  metoprolol succinate  milliGRAM(s) Oral daily  mycophenolate mofetil  Oral Tab/Cap - Peds 500 milliGRAM(s) Oral two times a day  tacrolimus 1 milliGRAM(s) Oral every 12 hours  tamsulosin 0.4 milliGRAM(s) Oral at bedtime    PRN MEDICATIONS  acetaminophen     Tablet .. 650 milliGRAM(s) Oral every 6 hours PRN  albuterol/ipratropium for Nebulization 3 milliLiter(s) Nebulizer every 6 hours PRN  aluminum hydroxide/magnesium hydroxide/simethicone Suspension 30 milliLiter(s) Oral every 4 hours PRN  melatonin 3 milliGRAM(s) Oral at bedtime PRN  ondansetron Injectable 4 milliGRAM(s) IV Push every 8 hours PRN  oxyCODONE    IR 15 milliGRAM(s) Oral every 12 hours PRN      VITAL SIGNS: Last 24 Hours  T(C): 36 (24 Feb 2022 07:59), Max: 36.5 (23 Feb 2022 15:00)  T(F): 96.8 (24 Feb 2022 07:59), Max: 97.7 (23 Feb 2022 15:00)  HR: 72 (24 Feb 2022 07:59) (61 - 85)  BP: 155/79 (24 Feb 2022 07:59) (145/60 - 167/75)  BP(mean): --  RR: 18 (24 Feb 2022 07:59) (18 - 18)  SpO2: --    LABS:                        8.1    2.34  )-----------( 57       ( 24 Feb 2022 05:44 )             27.3     02-24    139  |  108  |  28<H>  ----------------------------<  103<H>  4.9   |  22  |  2.0<H>    Ca    9.5      24 Feb 2022 05:44  Mg     2.0     02-24    TPro  5.3<L>  /  Alb  3.5  /  TBili  0.4  /  DBili  x   /  AST  12  /  ALT  9   /  AlkPhos  50  02-24        Culture - Urine (collected 22 Feb 2022 10:00)  Source: Clean Catch Clean Catch (Midstream)  Final Report (24 Feb 2022 09:02):    50,000 - 99,000 CFU/mL Coag Negative Staphylococcus    "Susceptibilities not performed"        RADIOLOGY:       PHYSICAL EXAM:  CONSTITUTIONAL: No acute distress, AAOx3  HEAD: Atraumatic, normocephalic  EYES:  no conjunctiva and sclera clear  ENT: Supple, moist mucous membranes  PULMONARY: Clear to auscultation bilaterally; no wheezes  CARDIOVASCULAR: Regular rate and rhythm; no murmurs  GASTROINTESTINAL: Soft, non-tender, non-distended; bowel sounds present  MUSCULOSKELETAL: no edema  SKIN: warm and dry

## 2022-02-24 NOTE — PROGRESS NOTE ADULT - SUBJECTIVE AND OBJECTIVE BOX
DAVONTE CHOU 67y Male  MRN#: [id number]     SUBJECTIVE  Patient is a 67y old Male who presents with a chief complaint of Pancytopenia (24 Feb 2022 09:43)  Today is hospital day 5d, and this morning he is lying in bed without distress.   No acute overnight events.     OBJECTIVE  PAST MEDICAL & SURGICAL HISTORY  CKD (chronic kidney disease)    ESRD (end stage renal disease)    HTN (hypertension)    HLD (hyperlipidemia)    Atrial fibrillation  on eliquis    COPD, mild  not on O2    Peripheral neuropathy  unclear cause    Lymphoma  ?questionable, not treated, not followed    Melanoma  R eye, s/p laser    Cirrhosis  possibly related to hepC    History of kidney transplant  3 years ago    S/P arteriovenous (AV) fistula creation  prior old fistula in R arm      ALLERGIES:  No Known Allergies    MEDICATIONS:  STANDING MEDICATIONS  apixaban 5 milliGRAM(s) Oral every 12 hours  artificial  tears Solution 1 Drop(s) Both EYES four times a day  atorvastatin Oral Tab/Cap - Peds 10 milliGRAM(s) Oral every 48 hours  cefTRIAXone   IVPB      cefTRIAXone   IVPB 1000 milliGRAM(s) IV Intermittent every 24 hours  clopidogrel Tablet 75 milliGRAM(s) Oral daily  folic acid 1 milliGRAM(s) Oral daily  gabapentin 300 milliGRAM(s) Oral three times a day  lactated ringers. 1000 milliLiter(s) IV Continuous <Continuous>  metoprolol succinate  milliGRAM(s) Oral daily  mycophenolate mofetil  Oral Tab/Cap - Peds 500 milliGRAM(s) Oral two times a day  tacrolimus 1 milliGRAM(s) Oral every 12 hours  tamsulosin 0.4 milliGRAM(s) Oral at bedtime    PRN MEDICATIONS  acetaminophen     Tablet .. 650 milliGRAM(s) Oral every 6 hours PRN  albuterol/ipratropium for Nebulization 3 milliLiter(s) Nebulizer every 6 hours PRN  aluminum hydroxide/magnesium hydroxide/simethicone Suspension 30 milliLiter(s) Oral every 4 hours PRN  melatonin 3 milliGRAM(s) Oral at bedtime PRN  ondansetron Injectable 4 milliGRAM(s) IV Push every 8 hours PRN  oxyCODONE    IR 15 milliGRAM(s) Oral every 12 hours PRN    HOME MEDICATIONS  Home Medications:  calcitriol 0.5 mcg oral capsule: 1 cap(s) orally once a day (19 Feb 2022 23:57)  CellCept 500 mg oral tablet: 1 tab(s) orally 2 times a day (26 Nov 2019 10:38)  Flomax 0.4 mg oral capsule: 1 cap(s) orally once a day (26 Nov 2019 10:38)  gabapentin 300 mg oral tablet: 1 tab(s) orally 3 times a day (26 Nov 2019 10:38)  Lipitor 10 mg oral tablet: 1 tab(s) orally every 48 hours (26 Nov 2019 10:38)  tacrolimus 1 mg oral capsule: 2 cap(s) orally every 12 hours (26 Nov 2019 10:38)  Toprol- mg oral tablet, extended release: 1 tab(s) orally once a day (26 Nov 2019 10:38)      VITAL SIGNS: Last 24 Hours  T(C): 36 (24 Feb 2022 07:59), Max: 36.5 (23 Feb 2022 15:00)  T(F): 96.8 (24 Feb 2022 07:59), Max: 97.7 (23 Feb 2022 15:00)  HR: 72 (24 Feb 2022 07:59) (61 - 85)  BP: 155/79 (24 Feb 2022 07:59) (145/60 - 167/75)  BP(mean): --  RR: 18 (24 Feb 2022 07:59) (18 - 18)  SpO2: --      LABS:                        8.1    2.34  )-----------( 57       ( 24 Feb 2022 05:44 )             27.3     02-24    139  |  108  |  28<H>  ----------------------------<  103<H>  4.9   |  22  |  2.0<H>    Ca    9.5      24 Feb 2022 05:44  Mg     2.0     02-24    TPro  5.3<L>  /  Alb  3.5  /  TBili  0.4  /  DBili  x   /  AST  12  /  ALT  9   /  AlkPhos  50  02-24    LIVER FUNCTIONS - ( 24 Feb 2022 05:44 )  Alb: 3.5 g/dL / Pro: 5.3 g/dL / ALK PHOS: 50 U/L / ALT: 9 U/L / AST: 12 U/L / GGT: x                     Culture - Urine (collected 22 Feb 2022 10:00)  Source: Clean Catch Clean Catch (Midstream)  Final Report (24 Feb 2022 09:02):    50,000 - 99,000 CFU/mL Coag Negative Staphylococcus    "Susceptibilities not performed"          CAPILLARY BLOOD GLUCOSE          RADIOLOGY:    < from: CT Abdomen and Pelvis No Cont (02.21.22 @ 18:27) >  IMPRESSION:  1.  No enlarged abdominal or pelvic lymph nodes.  2.  Mineral perivesicular fat stranding with small diverticulum arising   from the left posterior wall of the urinary bladder, possibly reflecting   underlying cystitis; correlation with urinalysis may be of use.  3.  Atrophic native kidneys and left lower quadrant renal transplant  Please see separately dictated reportof concurrently performed thoracic   CT for report of intrathoracic findings.  --- End of Report ---  < end of copied text >      < from: CT Chest No Cont (02.21.22 @ 18:26) >  IMPRESSION:  Since 11/6/2019.  No significant interval change in slightly enlarged mediastinal lymph   nodes, likely reactive. No suspicious mediastinal lesions.  Cardiomegaly with enlarged left atrium and stable extensive mitral   annular calcifications. Extensive coronary artery calcifications  Centrilobular emphysema.  Basilar reticular densities most pronounced, left lower lobe.  Likely   subsegmental nodular atelectasis, medial left lower lobe (4/233).  Stable mild dilatation ascending aorta 4.2 cm (4/114).  CT abdomen and pelvis  performed on the same day, see separate report.  --- End of Report ---  < end of copied text >        PHYSICAL EXAM:  GENERAL: NAD, AAO x 4, 67y M  HEAD:  Atraumatic, Normocephalic  EYES: EOMI, conjunctiva clear and sclera white  NECK: Supple, No JVD  CHEST/LUNG: Clear to auscultation bilaterally; No wheeze; No crackles; No accessory muscles used  HEART: Regular rate and rhythm; No murmurs;   ABDOMEN: Soft, Nontender, Nondistended; Bowel sounds present; No guarding  EXTREMITIES:  2+ Peripheral Pulses, No cyanosis or edema  NEUROLOGY: non-focal    ADMISSION SUMMARY  Patient is a 67y old Male who presents with a chief complaint of Pancytopenia (24 Feb 2022 09:43)

## 2022-02-24 NOTE — CONSULT NOTE ADULT - SUBJECTIVE AND OBJECTIVE BOX
[facility] INITIAL CONSULT NOTE  --------------------------------------------------------------------------------  HPI:   65-year-old male with past medical history of atrial fibrillation on Eliquis, cirrhosis, Hep C, CKD, status post  donor kidney transplant 2016 at [place], COPD, hypertension, hyperlipidemia, lymphoma, right eye melanoma, chronic back pain, presents with complaint of abnormal lab results.  Patient has been complaining of weakness for a few months and has been worsening.  Patient went to his nephrologist and had labs drawn and told that his labs were abnormal.  Patient was told to go to the ED for further evaluation. Labs drawn on  show WBC 1.6, hemoglobin 7.4 and platelets of 53.  As per previous charts patient has chronic thrombocytopenia and runs 50-90.  Previous WBC count has been above 5 and hemoglobin is usually 9 or above.    PAST HISTORY  --------------------------------------------------------------------------------  PAST MEDICAL & SURGICAL HISTORY:  CKD (chronic kidney disease)  ESRD (end stage renal disease)   donor kidney transplant  HTN (hypertension)  HLD (hyperlipidemia)  Atrial fibrillation on eliquis  COPD, mild not on O2  Peripheral neuropathy  Lymphoma questionable, not treated, not followed  Melanoma R eye, s/p laser  Cirrhosis possibly related to hepC  History of kidney transplant  S/P arteriovenous (AV) fistula creation prior old fistula in R arm    FAMILY HISTORY:  FH: dementia  mother, alive    FHx: breast cancer  sister ( in 30s)    SOCIAL HISTORY:  No current ETOH, tobacco, or illicit drug use    ALLERGIES & MEDICATIONS  --------------------------------------------------------------------------------  Allergies    No Known Allergies    Standing Inpatient Medications  apixaban 5 milliGRAM(s) Oral every 12 hours  artificial  tears Solution 1 Drop(s) Both EYES four times a day  atorvastatin Oral Tab/Cap - Peds 10 milliGRAM(s) Oral every 48 hours  cefTRIAXone   IVPB      cefTRIAXone   IVPB 1000 milliGRAM(s) IV Intermittent every 24 hours  clopidogrel Tablet 75 milliGRAM(s) Oral daily  folic acid 1 milliGRAM(s) Oral daily  gabapentin 300 milliGRAM(s) Oral three times a day  metoprolol succinate  milliGRAM(s) Oral daily  mycophenolate mofetil  Oral Tab/Cap - Peds 500 milliGRAM(s) Oral two times a day  tacrolimus 1 milliGRAM(s) Oral every 12 hours  tamsulosin 0.4 milliGRAM(s) Oral at bedtime    PRN Inpatient Medications  acetaminophen     Tablet .. 650 milliGRAM(s) Oral every 6 hours PRN  albuterol/ipratropium for Nebulization 3 milliLiter(s) Nebulizer every 6 hours PRN  aluminum hydroxide/magnesium hydroxide/simethicone Suspension 30 milliLiter(s) Oral every 4 hours PRN  melatonin 3 milliGRAM(s) Oral at bedtime PRN  ondansetron Injectable 4 milliGRAM(s) IV Push every 8 hours PRN  oxyCODONE    IR 15 milliGRAM(s) Oral every 12 hours PRN    REVIEW OF SYSTEMS  --------------------------------------------------------------------------------  Gen: No fevers/chills  Skin: No rashes  Head/Eyes/Ears/Mouth: No headache; No sinus pain/discomfort, sore throat  Respiratory: No dyspnea, cough, wheezing, hemoptysis  CV: No chest pain, PND, orthopnea  GI: No abdominal pain, diarrhea, constipation, nausea, vomiting, melena, hematochezia  : No increased frequency, dysuria, hematuria, nocturia  All other systems were reviewed and are negative, except as noted.    VITALS/PHYSICAL EXAM  --------------------------------------------------------------------------------  T(C): 36 (22 @ 07:59), Max: 36.5 (22 @ 15:00)  HR: 72 (22 07:59) (61 - 85)  BP: 155/79 (22 07:59) (122/85 - 167/75)  RR: 18 (22 07:59) (18 - 18)    Physical Exam:  	Gen: NAD  	Pulm: CTA B/L  	CV: RRR, S1S2  	Back: No CVA tenderness; no sacral edema  	Abd: +BS, soft, nontender/nondistended  	: No suprapubic tenderness  	LE: Warm, no edema  	Neuro: AAO x3    LABS/STUDIES  --------------------------------------------------------------------------------              8.1    2.34  >-----------<  57       [22 05:44]              27.3     139  |  108  |  28  ----------------------------<  103      [22 05:44]  4.9   |  22  |  2.0        Ca     9.5     [22 05:44]      Mg     2.0     [22 05:44]    TPro  5.3  /  Alb  3.5  /  TBili  0.4  /  DBili  x   /  AST  12  /  ALT  9   /  AlkPhos  50  [22 @ 05:44]    Creatinine Trend:  SCr 2.0 [ 05:44]  SCr 1.7 [:30]  SCr 1.7 [:30]  SCr 1.8 [:37]  SCr 1.9 [:30]    Urinalysis - [22 12:58]      Color Light Yellow / Appearance Clear / SG 1.016 / pH 7.0      Gluc Negative / Ketone Negative  / Bili Negative / Urobili <2 mg/dL       Blood Negative / Protein 30 mg/dL / Leuk Est Large / Nitrite Negative      RBC 6 / WBC 82 / Hyaline 1 / Gran  / Sq Epi  / Non Sq Epi 0 / Bacteria Moderate    Urine Creatinine 58      [22:58]  Urine Protein 26      [22:58]  Urine Sodium 118.0      [22:58]  Urine Chloride 94      [22 12:58]  Urine Osmolality 444      [22 12:58]    Ferritin 492      [22 04:30]  HbA1c 5.1      [19 @ 15:00]  TSH 4.27      [22 04:30]  Lipid: chol 112, TG 65, HDL 40, LDL --      [22 04:30]    HBsAg Nonreact      [22 04:30]  HCV 11.34, Reactive      [22 04:30]  HIV Nonreact      [22 04:30]    Immunofixation Serum:   IgM Kappa Band Identified    Reference Range: None Detected      [22 04:30]  SPEP Interpretation: Gamma-Migrating Paraprotein Identified      [22 04:30]

## 2022-02-24 NOTE — PROGRESS NOTE ADULT - SUBJECTIVE AND OBJECTIVE BOX
ADVONTE CHOU  67y  [facility] F3-4B 006 B      Patient is a 67y old  Male who presents with a chief complaint of Pancytopenia (2022 09:43)      INTERVAL HPI/OVERNIGHT EVENTS:    no acute events overnight , patient remarks that he feels improved    REVIEW OF SYSTEMS:  CONSTITUTIONAL: No fever, weight loss, or fatigue  EYES: No eye pain, visual disturbances, or discharge  ENMT:  No difficulty hearing, tinnitus, vertigo; No sinus or throat pain  NECK: No pain or stiffness  BREASTS: No pain, masses, or nipple discharge  RESPIRATORY: No cough, wheezing, chills or hemoptysis; No shortness of breath  CARDIOVASCULAR: No chest pain, palpitations, dizziness, or leg swelling  GASTROINTESTINAL: No abdominal or epigastric pain. No nausea, vomiting, or hematemesis; No diarrhea or constipation. No melena or hematochezia.  GENITOURINARY: No dysuria, frequency, hematuria, or incontinence  NEUROLOGICAL: No headaches, memory loss, loss of strength, numbness, or tremors  SKIN: No itching, burning, rashes, or lesions   LYMPH NODES: No enlarged glands  ENDOCRINE: No heat or cold intolerance; No hair loss  MUSCULOSKELETAL: No joint pain or swelling; No muscle, back, or extremity pain  PSYCHIATRIC: No depression, anxiety, mood swings, or difficulty sleeping  HEME/LYMPH: No easy bruising, or bleeding gums  ALLERY AND IMMUNOLOGIC: No hives or eczema  FAMILY HISTORY:  FH: dementia  mother, alive    FHx: breast cancer  sister ( in 30s)      T(C): 36 (22 @ 07:59), Max: 36.5 (22 @ 15:00)  HR: 72 (22 @ 07:59) (61 - 85)  BP: 155/79 (22 @ 07:59) (145/60 - 167/75)  RR: 18 (22 @ 07:59) (18 - 18)  SpO2: --  Wt(kg): --Vital Signs Last 24 Hrs  T(C): 36 (2022 07:59), Max: 36.5 (2022 15:00)  T(F): 96.8 (2022 07:59), Max: 97.7 (2022 15:00)  HR: 72 (2022 07:59) (61 - 85)  BP: 155/79 (2022 07:59) (145/60 - 167/75)  BP(mean): --  RR: 18 (2022 07:59) (18 - 18)  SpO2: --    PHYSICAL EXAM:  GENERAL: NAD, well-groomed, well-developed  HEAD:  Atraumatic, Normocephalic  EYES: EOMI, PERRLA, conjunctiva and sclera clear  ENMT: No tonsillar erythema, exudates, or enlargement; Moist mucous membranes, Good dentition, No lesions  NECK: Supple, No JVD, Normal thyroid  NERVOUS SYSTEM:  Alert & Oriented X3, Good concentration; Motor Strength 5/5 B/L upper and lower extremities; DTRs 2+ intact and symmetric  PULM: Clear to auscultation bilaterally  CARDIAC: Regular rate and rhythm; No murmurs, rubs, or gallops  GI: Soft, Nontender, Nondistended; Bowel sounds present  EXTREMITIES:  2+ Peripheral Pulses, No clubbing, cyanosis, or edema  LYMPH: No lymphadenopathy noted  SKIN: No rashes or lesions    Consultant(s) Notes Reviewed:  [x ] YES  [ ] NO  Care Discussed with Consultants/Other Providers [ x] YES  [ ] NO    LABS:                            8.1    2.34  )-----------( 57       ( 2022 05:44 )             27.3   02-    139  |  108  |  28<H>  ----------------------------<  103<H>  4.9   |  22  |  2.0<H>    Ca    9.5      2022 05:44  Mg     2.0     -24    TPro  5.3<L>  /  Alb  3.5  /  TBili  0.4  /  DBili  x   /  AST  12  /  ALT  9   /  AlkPhos  50  02-24            Culture - Urine (collected 2022 10:00)  Source: Clean Catch Clean Catch (Midstream)  Final Report (2022 09:02):    50,000 - 99,000 CFU/mL Coag Negative Staphylococcus    "Susceptibilities not performed"      acetaminophen     Tablet .. 650 milliGRAM(s) Oral every 6 hours PRN  albuterol/ipratropium for Nebulization 3 milliLiter(s) Nebulizer every 6 hours PRN  aluminum hydroxide/magnesium hydroxide/simethicone Suspension 30 milliLiter(s) Oral every 4 hours PRN  apixaban 5 milliGRAM(s) Oral every 12 hours  artificial  tears Solution 1 Drop(s) Both EYES four times a day  atorvastatin Oral Tab/Cap - Peds 10 milliGRAM(s) Oral every 48 hours  cefTRIAXone   IVPB      cefTRIAXone   IVPB 1000 milliGRAM(s) IV Intermittent every 24 hours  clopidogrel Tablet 75 milliGRAM(s) Oral daily  folic acid 1 milliGRAM(s) Oral daily  gabapentin 300 milliGRAM(s) Oral three times a day  lactated ringers. 1000 milliLiter(s) IV Continuous <Continuous>  melatonin 3 milliGRAM(s) Oral at bedtime PRN  metoprolol succinate  milliGRAM(s) Oral daily  mycophenolate mofetil  Oral Tab/Cap - Peds 500 milliGRAM(s) Oral two times a day  ondansetron Injectable 4 milliGRAM(s) IV Push every 8 hours PRN  oxyCODONE    IR 15 milliGRAM(s) Oral every 12 hours PRN  tacrolimus 1 milliGRAM(s) Oral every 12 hours  tamsulosin 0.4 milliGRAM(s) Oral at bedtime      HEALTH ISSUES - PROBLEM Dx:          Case Discussed with House Staff     Spectra [phone]

## 2022-02-24 NOTE — PROGRESS NOTE ADULT - ASSESSMENT
65-year-old male with past medical history of atrial fibrillation on Eliquis, cirrhosis, Hep C, CKD, status post kidney transplant, COPD, hypertension, hyperlipidemia, lymphoma, right eye melanoma, chronic back pain, presents with complaint of abnormal lab results outpatient.      Currently admitted to medicine with the primary diagnosis of Pancytopenia    #Pancytopenia likely multifactorial in a pt with hx of chronic thrombocytopenia likely sec to cirrhosis, splenomegaly , hep C , low grade lymphoma   - s/p 1u pRBC in the ED  - TSH 4.27  - vitamin B12 494  - Folate 2.7  - Zinc wnl, Copper 136, slightly high  - F/U Iron  - Hep C positive  - HIV - neg  -  uric acid levels, phosphorus - wnl  - heme/onc consulted, recs appreciated - LDH/Haptoglobin wnl, Carolina test neg, cryoglobulin level - pending  - Pan CT results reviewed - no lymphadenopathy  - Monitor CBC qD  - Keep active T&S  - heme/onc f/u    #SANTA on CKD - worsening  - s/p L. Kidney transplant   - Cr 1.7 today, Cr level 2/9/2022 was 2.2, baseline about 3 year ago ~1.1  - cont gentle hydration  - Cont home meds   - tacrolimus level 11.8   - Avoid nephrotoxic medications  - nephro consulted, recs appreicated  - will restart fluids  - repeat tacrolimus level for tmmr am  - UA - gram + rods    #UTI  - UA, gram + rods  - c/w  rocephin 1g q24    #Atrial fibrillation - rate controlled  - c/w metoprolol succ ER 100mg daily  -Cont Eliquis     #Hx of COPD; not in exacerbation  - not on any meds, no active sxs  - Duonebs PRN    #HLD  - c/w lipitor 10 daily  - lipid panel wnl  -  A1c 5.2    # BPH  - c/w flomax    #HTN   - c/w metoprolol  - continue to monitor    #Hx of cirrhosis due to hep C  - liver enzymes stable    #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 After informed consent was obtained, he was placed on the CT-simulator table in the supine position and a custom Vac-Kenzie immobilization device was made for the lower extremities to aid in patient immobilization and treatment reproducibility. Radiopaque markers were placed for our CT isocenter. A rectal marker was placed into the rectum for radiographic delineation. A thin sliced CT scan was then obtained and transferred into the treatment planning computers. The prostate and seminal vesicles were identified and an approximate isocenter was placed. Isocenter coordinates were recorded and appropriate marks were placed on the patientâs skin. The images will be transferred to the dosimetry computers. The prostate and base of the seminal vesicles will be contoured on serial slices and 3-dimensional target volumes will be generated. Normal tissue structures including the bladder, rectum, and femoral heads will also be contoured so that the IMRT plan including dose-volume-histograms can be evaluated prior to selecting the final treatment design. Complex blocking will be utilized to customize treatment fields to decrease the amount of tissue within the treatment field to decrease the risk of associated side effects. The dose constraints as defined in our database are adapted from dose constraints based upon the RTOG Prostate Group Consensus 2009, as well as RTOG studies [address] and 0415.       Intact Prostate Guidelines  Organ at Risk Dose Limit Guideline   Bladder V50<55%  V70<30%   Femoral Head V50<5%   Rectum V50<50%  V70<20%     Post Prostatectomy Prostate Bed Guidelines  Organ at Risk Dose Limit Guideline   Bladder V40<60%  V65<40%   Femoral Head V50<5%   Rectum V40<45%  V65<25% In certain cases these constraints may be exceeded due to the need to optimally cover the patientâs known cancer and/or high risk regions. I was present for and directed the simulation procedure.       Authenticated by Kev Chambers M.D. on 1/03/2018 at 9:35 AM  Kev Chambers M.D.

## 2022-02-24 NOTE — PROGRESS NOTE ADULT - ASSESSMENT
65-year-old male with past medical history of atrial fibrillation on Eliquis, cirrhosis, Hep C, CKD, status post kidney transplant, COPD, hypertension, hyperlipidemia, lymphoma, right eye melanoma, chronic back pain, presents with complaint of abnormal lab results.  Patient has been complaining of weakness for a few months and has been worsening.  Patient went to his cardiologist and was cleared.  Patient went to his nephrologist and had labs drawn (Dr. Heart) 10 days ago and was recently told that his labs were abnormal.  Patient was told to go to the ED for further evaluation.      # Generalized weakness secondary  Pancytopenia secondary to suspected urinary tract infection in the setting of  H/o Hep C with cirrhosis and splenomegaly and H/o lymphoma and immunosuppresion   on ceftriaxone   WBC improving and hemoglobin improving but platelet count mild fluctuation without definite improvement  op bone marrow biopsy     #).Stable mild dilatation ascending aorta 4.2 cm (4/114).    #Atelectasis deep breathing    # Acute kidney injury, prerenal  on CKD stage 3    # H/o renal transplant in 2016 DDKT for ESRD    # Hyponatremia resolved    # Folic acid deficiency on folic acid supplementaion     # Hypomagnesemia resolved    #Insomnia on melatonin     # H/o Chronic afib- rate controlled on apixiban    # H/o CAD    #Mild mitral valve regurgitation    #Mild-moderate tricuspid regurgitation.    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

## 2022-02-24 NOTE — PROGRESS NOTE ADULT - ATTENDING COMMENTS
The patient was seen. Agree with above.  64 yo male with multiple medical problems atrial fibrillation on Eliquis, cirrhosis, Hep C, CKD, status post kidney transplant, COPD, hypertension, hyperlipidemia, h/o low grade lymphoma, right eye melanoma, chronic back pain, has worsening of pancytopenia. Etiology could be multifactorial including cirrhosis, splenomegaly, Hep C, drug induced (tacrolimus and mycophenolate mofetil). However, primary bone marrow disorder or lymphoma involvement can not excluded. Discuss BM biopsy for further evaluation. He prefers to have it done as out patient.

## 2022-02-24 NOTE — PROGRESS NOTE ADULT - ASSESSMENT
65-year-old male with PMHx of atrial fibrillation on Eliquis, cirrhosis, Hep C, CKD, status post kidney transplant, COPD, hypertension, hyperlipidemia, low grade lymphoma, right eye melanoma, chronic back pain, presents with pancytopenia. Hematology consult requested for further evaluation and work up.     # Chronic pancytopenia likely multifactorial from cirrhosis, splenomegaly ,Hep C, drug induced (tacrolimus and mycophenolate mofetil) and nutrition deficiency (folate low)  - His platelet count tends to range from 50-90K and does fluctuate. Now seems to be improving slowly which is reassuring.   - anemia work up with LDH/Haptoglobin and Carolina test are normal   - f/u cryoglobulin level as he mentioned he had abnormal cryoglobulin levels in past (possible sequelae of his hep C?)  - c/w folate supplement  - bone marrow bx can be consider outpatient    # Low grade lymphoma (?). Patient reports he had bone marrow biopsy over 15 years ago in which he received the diagnosis.  - CT Chest shows slightly enlarged mediastinal lymph nodes which is likely reactive   - CT AP shows no other evidence of lymphadenopathy   - LDH is 176   - outpatient f/u     # SANTA on CKD vs CKD?  - nephro consult appreciated  65-year-old male with PMHx of atrial fibrillation on Eliquis, cirrhosis, Hep C, CKD, status post kidney transplant, COPD, hypertension, hyperlipidemia, low grade lymphoma, right eye melanoma, chronic back pain, presents with pancytopenia. Hematology consult requested for further evaluation and work up.     # Chronic pancytopenia likely multifactorial from cirrhosis, splenomegaly ,Hep C, drug induced (tacrolimus and mycophenolate mofetil) and nutrition deficiency (folate low)  - His platelet count tends to range from 50-90K and does fluctuate. Now seems to be improving slowly which is reassuring.   - anemia work up with LDH/Haptoglobin and Carolina test are normal   - f/u cryoglobulin level as he mentioned he had abnormal cryoglobulin levels in past (possible sequelae of his hep C?)  - c/w folate supplement  - bone marrow bx outpatient    # Low grade lymphoma (?). Patient reports he had bone marrow biopsy over 15 years ago in which he received the diagnosis.  - CT Chest shows slightly enlarged mediastinal lymph nodes which is likely reactive   - CT AP shows no other evidence of lymphadenopathy   - LDH is 176   - outpatient f/u     # SANTA on CKD vs CKD?  - nephro consult appreciated     Ok to be d/c from hematology perspective

## 2022-02-25 ENCOUNTER — TRANSCRIPTION ENCOUNTER (OUTPATIENT)
Age: 67
End: 2022-02-25

## 2022-02-25 VITALS
SYSTOLIC BLOOD PRESSURE: 130 MMHG | RESPIRATION RATE: 18 BRPM | TEMPERATURE: 97 F | HEART RATE: 97 BPM | DIASTOLIC BLOOD PRESSURE: 62 MMHG

## 2022-02-25 LAB
ALBUMIN SERPL ELPH-MCNC: 3.4 G/DL — LOW (ref 3.5–5.2)
ALP SERPL-CCNC: 51 U/L — SIGNIFICANT CHANGE UP (ref 30–115)
ALT FLD-CCNC: 10 U/L — SIGNIFICANT CHANGE UP (ref 0–41)
ANION GAP SERPL CALC-SCNC: 10 MMOL/L — SIGNIFICANT CHANGE UP (ref 7–14)
AST SERPL-CCNC: 13 U/L — SIGNIFICANT CHANGE UP (ref 0–41)
BASOPHILS # BLD AUTO: 0.01 K/UL — SIGNIFICANT CHANGE UP (ref 0–0.2)
BASOPHILS NFR BLD AUTO: 0.5 % — SIGNIFICANT CHANGE UP (ref 0–1)
BILIRUB SERPL-MCNC: 0.5 MG/DL — SIGNIFICANT CHANGE UP (ref 0.2–1.2)
BUN SERPL-MCNC: 25 MG/DL — HIGH (ref 10–20)
CALCIUM SERPL-MCNC: 9.4 MG/DL — SIGNIFICANT CHANGE UP (ref 8.5–10.1)
CHLORIDE SERPL-SCNC: 106 MMOL/L — SIGNIFICANT CHANGE UP (ref 98–110)
CK SERPL-CCNC: 28 U/L — SIGNIFICANT CHANGE UP (ref 0–225)
CO2 SERPL-SCNC: 20 MMOL/L — SIGNIFICANT CHANGE UP (ref 17–32)
CREAT SERPL-MCNC: 1.7 MG/DL — HIGH (ref 0.7–1.5)
CRYOGLOB SERPL-MCNC: POSITIVE
EOSINOPHIL # BLD AUTO: 0.02 K/UL — SIGNIFICANT CHANGE UP (ref 0–0.7)
EOSINOPHIL NFR BLD AUTO: 0.9 % — SIGNIFICANT CHANGE UP (ref 0–8)
GLUCOSE SERPL-MCNC: 113 MG/DL — HIGH (ref 70–99)
HCT VFR BLD CALC: 28 % — LOW (ref 42–52)
HCV RNA FLD QL NAA+PROBE: SIGNIFICANT CHANGE UP
HGB BLD-MCNC: 8.5 G/DL — LOW (ref 14–18)
IMM GRANULOCYTES NFR BLD AUTO: 0.5 % — HIGH (ref 0.1–0.3)
LYMPHOCYTES # BLD AUTO: 0.4 K/UL — LOW (ref 1.2–3.4)
LYMPHOCYTES # BLD AUTO: 18.5 % — LOW (ref 20.5–51.1)
MAGNESIUM SERPL-MCNC: 1.8 MG/DL — SIGNIFICANT CHANGE UP (ref 1.8–2.4)
MCHC RBC-ENTMCNC: 28.9 PG — SIGNIFICANT CHANGE UP (ref 27–31)
MCHC RBC-ENTMCNC: 30.4 G/DL — LOW (ref 32–37)
MCV RBC AUTO: 95.2 FL — HIGH (ref 80–94)
MONOCYTES # BLD AUTO: 0.18 K/UL — SIGNIFICANT CHANGE UP (ref 0.1–0.6)
MONOCYTES NFR BLD AUTO: 8.3 % — SIGNIFICANT CHANGE UP (ref 1.7–9.3)
NEUTROPHILS # BLD AUTO: 1.54 K/UL — SIGNIFICANT CHANGE UP (ref 1.4–6.5)
NEUTROPHILS NFR BLD AUTO: 71.3 % — SIGNIFICANT CHANGE UP (ref 42.2–75.2)
NRBC # BLD: 0 /100 WBCS — SIGNIFICANT CHANGE UP (ref 0–0)
PLATELET # BLD AUTO: 54 K/UL — LOW (ref 130–400)
POTASSIUM SERPL-MCNC: 4.3 MMOL/L — SIGNIFICANT CHANGE UP (ref 3.5–5)
POTASSIUM SERPL-SCNC: 4.3 MMOL/L — SIGNIFICANT CHANGE UP (ref 3.5–5)
PROT SERPL-MCNC: 5.5 G/DL — LOW (ref 6–8)
RBC # BLD: 2.94 M/UL — LOW (ref 4.7–6.1)
RBC # FLD: 15.7 % — HIGH (ref 11.5–14.5)
SODIUM SERPL-SCNC: 136 MMOL/L — SIGNIFICANT CHANGE UP (ref 135–146)
TACROLIMUS SERPL-MCNC: 7.9 NG/ML — SIGNIFICANT CHANGE UP
WBC # BLD: 2.16 K/UL — LOW (ref 4.8–10.8)
WBC # FLD AUTO: 2.16 K/UL — LOW (ref 4.8–10.8)

## 2022-02-25 PROCEDURE — 99239 HOSP IP/OBS DSCHRG MGMT >30: CPT

## 2022-02-25 RX ORDER — MYCOPHENOLATE MOFETIL 250 MG/1
1 CAPSULE ORAL
Qty: 60 | Refills: 0
Start: 2022-02-25

## 2022-02-25 RX ORDER — FOLIC ACID 0.8 MG
1 TABLET ORAL
Qty: 30 | Refills: 3
Start: 2022-02-25 | End: 2022-06-24

## 2022-02-25 RX ORDER — MYCOPHENOLATE MOFETIL 250 MG/1
1 CAPSULE ORAL
Qty: 0 | Refills: 0 | DISCHARGE

## 2022-02-25 RX ORDER — CEFPODOXIME PROXETIL 100 MG
1 TABLET ORAL
Qty: 14 | Refills: 0
Start: 2022-02-25 | End: 2022-03-03

## 2022-02-25 RX ADMIN — APIXABAN 5 MILLIGRAM(S): 2.5 TABLET, FILM COATED ORAL at 05:01

## 2022-02-25 RX ADMIN — CLOPIDOGREL BISULFATE 75 MILLIGRAM(S): 75 TABLET, FILM COATED ORAL at 12:02

## 2022-02-25 RX ADMIN — GABAPENTIN 300 MILLIGRAM(S): 400 CAPSULE ORAL at 12:01

## 2022-02-25 RX ADMIN — Medication 1 MILLIGRAM(S): at 12:01

## 2022-02-25 RX ADMIN — MYCOPHENOLATE MOFETIL 500 MILLIGRAM(S): 250 CAPSULE ORAL at 05:01

## 2022-02-25 RX ADMIN — CEFTRIAXONE 100 MILLIGRAM(S): 500 INJECTION, POWDER, FOR SOLUTION INTRAMUSCULAR; INTRAVENOUS at 12:02

## 2022-02-25 RX ADMIN — TACROLIMUS 1 MILLIGRAM(S): 5 CAPSULE ORAL at 05:02

## 2022-02-25 RX ADMIN — Medication 100 MILLIGRAM(S): at 05:01

## 2022-02-25 RX ADMIN — GABAPENTIN 300 MILLIGRAM(S): 400 CAPSULE ORAL at 05:02

## 2022-02-25 NOTE — PROGRESS NOTE ADULT - SUBJECTIVE AND OBJECTIVE BOX
[facility] FOLLOW UP NOTE  --------------------------------------------------------------------------------  24 hour events/subjective: Patient examined. Appears comfortable.    PAST HISTORY  --------------------------------------------------------------------------------  No significant changes to PMH, PSH, FHx, SHx, unless otherwise noted    ALLERGIES & MEDICATIONS  --------------------------------------------------------------------------------  Allergies    No Known Allergies    Standing Inpatient Medications  apixaban 5 milliGRAM(s) Oral every 12 hours  artificial  tears Solution 1 Drop(s) Both EYES four times a day  atorvastatin Oral Tab/Cap - Peds 10 milliGRAM(s) Oral every 48 hours  clopidogrel Tablet 75 milliGRAM(s) Oral daily  folic acid 1 milliGRAM(s) Oral daily  gabapentin 300 milliGRAM(s) Oral three times a day  lactated ringers. 1000 milliLiter(s) IV Continuous <Continuous>  metoprolol succinate  milliGRAM(s) Oral daily  mycophenolate mofetil  Oral Tab/Cap - Peds 500 milliGRAM(s) Oral two times a day  tacrolimus 1 milliGRAM(s) Oral every 12 hours  tamsulosin 0.4 milliGRAM(s) Oral at bedtime    PRN Inpatient Medications  acetaminophen     Tablet .. 650 milliGRAM(s) Oral every 6 hours PRN  albuterol/ipratropium for Nebulization 3 milliLiter(s) Nebulizer every 6 hours PRN  aluminum hydroxide/magnesium hydroxide/simethicone Suspension 30 milliLiter(s) Oral every 4 hours PRN  melatonin 3 milliGRAM(s) Oral at bedtime PRN  ondansetron Injectable 4 milliGRAM(s) IV Push every 8 hours PRN  oxyCODONE    IR 15 milliGRAM(s) Oral every 12 hours PRN    VITALS/PHYSICAL EXAM  --------------------------------------------------------------------------------  T(C): 36.2 (02-25-22 @ 08:19), Max: 36.6 (02-24-22 @ 16:11)  HR: 97 (02-25-22 @ 08:19) (80 - 97)  BP: 130/62 (02-25-22 @ 08:19) (130/62 - 166/92)  RR: 18 (02-25-22 @ 08:19) (18 - 18)    02-24-22 @ 07:01  -  02-25-22 @ 07:00  --------------------------------------------------------  IN: 490 mL / OUT: 1700 mL / NET: -1210 mL    Physical Exam:  	Gen: NAD  	Pulm: CTA B/L  	CV: RRR, S1S2  	Abd: +BS, soft, nontender/nondistended  	: No suprapubic tenderness  	LE: Warm,  no edema    LABS/STUDIES  --------------------------------------------------------------------------------              8.5    2.16  >-----------<  54       [02-25-22 @ 04:30]              28.0     136  |  106  |  25  ----------------------------<  113      [02-25-22 @ 04:30]  4.3   |  20  |  1.7        Ca     9.4     [02-25-22 @ 04:30]      Mg     1.8     [02-25-22 @ 04:30]    TPro  5.5  /  Alb  3.4  /  TBili  0.5  /  DBili  x   /  AST  13  /  ALT  10  /  AlkPhos  51  [02-25-22 @ 04:30]    CK 28      [02-25-22 @ 04:30]    Creatinine Trend:  SCr 1.7 [02-25 @ 04:30]  SCr 2.0 [02-24 @ 05:44]  SCr 1.7 [02-23 @ 06:30]  SCr 1.7 [02-22 @ 04:30]  SCr 1.8 [02-21 @ 06:37]    Urinalysis - [02-21-22 @ 12:58]      Color Light Yellow / Appearance Clear / SG 1.016 / pH 7.0      Gluc Negative / Ketone Negative  / Bili Negative / Urobili <2 mg/dL       Blood Negative / Protein 30 mg/dL / Leuk Est Large / Nitrite Negative      RBC 6 / WBC 82 / Hyaline 1 / Gran  / Sq Epi  / Non Sq Epi 0 / Bacteria Moderate    Urine Creatinine 58      [02-21-22 @ 12:58]  Urine Protein 26      [02-21-22 @ 12:58]  Urine Sodium 118.0      [02-21-22 @ 12:58]  Urine Chloride 94      [02-21-22 @ 12:58]  Urine Osmolality 444      [02-21-22 @ 12:58]    Iron 63, TIBC 239, %sat 26      [02-24-22 @ 09:26]  Ferritin 492      [02-20-22 @ 04:30]  HbA1c 5.1      [11-06-19 @ 15:00]  TSH 4.27      [02-20-22 @ 04:30]  Lipid: chol 112, TG 65, HDL 40, LDL --      [02-20-22 @ 04:30]    HBsAg Nonreact      [02-20-22 @ 04:30]  HCV 11.34, Reactive      [02-20-22 @ 04:30]  HIV Nonreact      [02-20-22 @ 04:30]    Immunofixation Serum:   IgM Kappa Band Identified    Reference Range: None Detected      [02-20-22 @ 04:30]  SPEP Interpretation: Gamma-Migrating Paraprotein Identified      [02-20-22 @ 04:30]  Cryoglobulin: Positive      [02-22-22 @ 04:30]

## 2022-02-25 NOTE — PROGRESS NOTE ADULT - SUBJECTIVE AND OBJECTIVE BOX
DAVONTE CHOU  67y  [facility] F3-4B 006 B      Patient is a 67y old  Male who presents with a chief complaint of Pancytopenia (2022 09:43)      INTERVAL HPI/OVERNIGHT EVENTS:    no acute events overnight     REVIEW OF SYSTEMS:  ROS neg   FH: dementia  mother, alive    FHx: breast cancer  sister ( in 30s)      T(C): 36.2 (22 @ 08:19), Max: 36.6 (22 @ 16:11)  HR: 97 (22 @ 08:19) (80 - 97)  BP: 130/62 (22 @ 08:19) (130/62 - 166/92)  RR: 18 (22 @ 08:19) (18 - 18)  SpO2: --  Wt(kg): --Vital Signs Last 24 Hrs  T(C): 36.2 (2022 08:19), Max: 36.6 (2022 16:11)  T(F): 97.1 (2022 08:19), Max: 97.9 (2022 16:11)  HR: 97 (2022 08:19) (80 - 97)  BP: 130/62 (2022 08:19) (130/62 - 166/92)  BP(mean): --  RR: 18 (2022 08:19) (18 - 18)  SpO2: --    PHYSICAL EXAM:  GENERAL: NAD, well-groomed, well-developed  HEAD:  Atraumatic, Normocephalic  EYES: EOMI, PERRLA, conjunctiva and sclera clear  ENMT: No tonsillar erythema, exudates, or enlargement; Moist mucous membranes, Good dentition, No lesions  NECK: Supple, No JVD, Normal thyroid  NERVOUS SYSTEM:  Alert & Oriented X3  PULM: Clear to auscultation bilaterally  CARDIAC: Regular rate and rhythm; No murmurs, rubs, or gallops  GI: Soft, Nontender, Nondistended; Bowel sounds present  EXTREMITIES:  2+ Peripheral Pulses, No clubbing, cyanosis, or edema  LYMPH: No lymphadenopathy noted  SKIN: No rashes or lesions    Consultant(s) Notes Reviewed:  [x ] YES  [ ] NO  Care Discussed with Consultants/Other Providers [ x] YES  [ ] NO    LABS:                            8.5    2.16  )-----------( 54       ( 2022 04:30 )             28.0       136  |  106  |  25<H>  ----------------------------<  113<H>  4.3   |  20  |  1.7<H>    Ca    9.4      2022 04:30  Mg     1.8         TPro  5.5<L>  /  Alb  3.4<L>  /  TBili  0.5  /  DBili  x   /  AST  13  /  ALT  10  /  AlkPhos  51              acetaminophen     Tablet .. 650 milliGRAM(s) Oral every 6 hours PRN  albuterol/ipratropium for Nebulization 3 milliLiter(s) Nebulizer every 6 hours PRN  aluminum hydroxide/magnesium hydroxide/simethicone Suspension 30 milliLiter(s) Oral every 4 hours PRN  apixaban 5 milliGRAM(s) Oral every 12 hours  artificial  tears Solution 1 Drop(s) Both EYES four times a day  atorvastatin Oral Tab/Cap - Peds 10 milliGRAM(s) Oral every 48 hours  cefTRIAXone   IVPB      cefTRIAXone   IVPB 1000 milliGRAM(s) IV Intermittent every 24 hours  clopidogrel Tablet 75 milliGRAM(s) Oral daily  folic acid 1 milliGRAM(s) Oral daily  gabapentin 300 milliGRAM(s) Oral three times a day  lactated ringers. 1000 milliLiter(s) IV Continuous <Continuous>  melatonin 3 milliGRAM(s) Oral at bedtime PRN  metoprolol succinate  milliGRAM(s) Oral daily  mycophenolate mofetil  Oral Tab/Cap - Peds 500 milliGRAM(s) Oral two times a day  ondansetron Injectable 4 milliGRAM(s) IV Push every 8 hours PRN  oxyCODONE    IR 15 milliGRAM(s) Oral every 12 hours PRN  tacrolimus 1 milliGRAM(s) Oral every 12 hours  tamsulosin 0.4 milliGRAM(s) Oral at bedtime      HEALTH ISSUES - PROBLEM Dx:          Case Discussed with House Staff   .   Spectra [phone]

## 2022-02-25 NOTE — DISCHARGE NOTE PROVIDER - CARE PROVIDERS DIRECT ADDRESSES
,DirectAddress_Unknown,[email or web address],DirectAddress_Unknown

## 2022-02-25 NOTE — PROGRESS NOTE ADULT - ASSESSMENT
65-year-old male with past medical history of atrial fibrillation on Eliquis, cirrhosis, Hep C, CKD, status post kidney transplant, COPD, hypertension, hyperlipidemia, lymphoma, right eye melanoma, chronic back pain, presents with complaint of abnormal lab results.  Patient has been complaining of weakness for a few months and has been worsening.  Patient went to his cardiologist and was cleared.  Patient went to his nephrologist and had labs drawn (Dr. Heart) 10 days ago and was recently told that his labs were abnormal.  Patient was told to go to the ED for further evaluation.      # Generalized weakness secondary  Pancytopenia secondary to suspected urinary tract infection in the setting of  H/o Hep C with cirrhosis and splenomegaly and H/o lymphoma and immunosuppresion   on ceftriaxone  - dc on vantin 200 mg q12h for 10 day course   op bone marrow biopsy     #).Stable mild dilatation ascending aorta 4.2 cm (4/114).    #Atelectasis deep breathing    # Acute kidney injury, prerenal  on CKD stage 3    # H/o renal transplant in 2016 DDKT for ESRD    # Hyponatremia resolved    # Folic acid deficiency on folic acid supplementaion     # Hypomagnesemia resolved    #Insomnia on melatonin     # H/o Chronic afib- rate controlled on apixiban    # H/o CAD    #Mild mitral valve regurgitation    #Mild-moderate tricuspid regurgitation.    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

## 2022-02-25 NOTE — DISCHARGE NOTE PROVIDER - NSDCMRMEDTOKEN_GEN_ALL_CORE_FT
apixaban 5 mg oral tablet: 1 tab(s) orally every 12 hours  Artificial Tears ophthalmic solution: 1 drop(s) in the left eye 4 times a day   calcitriol 0.5 mcg oral capsule: 1 cap(s) orally once a day  CellCept 500 mg oral tablet: 1 tab(s) orally 2 times a day  clopidogrel 75 mg oral tablet: 1 tab(s) orally once a day MDD:1  Flomax 0.4 mg oral capsule: 1 cap(s) orally once a day  folic acid 1 mg oral tablet: 1 tab(s) orally once a day  gabapentin 300 mg oral tablet: 1 tab(s) orally 3 times a day  Lipitor 10 mg oral tablet: 1 tab(s) orally every 48 hours  tacrolimus 1 mg oral capsule: 2 cap(s) orally every 12 hours  Toprol- mg oral tablet, extended release: 1 tab(s) orally once a day   apixaban 5 mg oral tablet: 1 tab(s) orally every 12 hours  Artificial Tears ophthalmic solution: 1 drop(s) in the left eye 4 times a day   calcitriol 0.5 mcg oral capsule: 1 cap(s) orally once a day  cefpodoxime 200 mg oral tablet: 1 tab(s) orally 2 times a day   CellCept 500 mg oral tablet: 1 tab(s) orally 2 times a day  clopidogrel 75 mg oral tablet: 1 tab(s) orally once a day MDD:1  Flomax 0.4 mg oral capsule: 1 cap(s) orally once a day  folic acid 1 mg oral tablet: 1 tab(s) orally once a day  gabapentin 300 mg oral tablet: 1 tab(s) orally 3 times a day  Lipitor 10 mg oral tablet: 1 tab(s) orally every 48 hours  tacrolimus 1 mg oral capsule: 2 cap(s) orally every 12 hours  Toprol- mg oral tablet, extended release: 1 tab(s) orally once a day

## 2022-02-25 NOTE — DISCHARGE NOTE NURSING/CASE MANAGEMENT/SOCIAL WORK - PATIENT PORTAL LINK FT
You can access the FollowMyHealth Patient Portal offered by [facility] by registering at the following website: [email or web address]. By joining [employer]’s FollowMyHealth portal, you will also be able to view your health information using other applications (apps) compatible with our system.

## 2022-02-25 NOTE — DISCHARGE NOTE NURSING/CASE MANAGEMENT/SOCIAL WORK - NSDCPEFALRISK_GEN_ALL_CORE
For information on Fall & Injury Prevention, visit: [email or web address] OR  [email or web address] OR  https://www.cdc.gov/steadi/patient.html

## 2022-02-25 NOTE — DISCHARGE NOTE PROVIDER - HOSPITAL COURSE
65-year-old male with past medical history of atrial fibrillation on Eliquis, cirrhosis, Hep C, CKD, status post kidney transplant, COPD, hypertension, hyperlipidemia, lymphoma, right eye melanoma, chronic back pain, presents with complaint of abnormal lab results.  Patient has been complaining of weakness for a few months and has been worsening.  Patient went to his cardiologist and was cleared.  Patient went to his nephrologist and had labs drawn (Dr. Heart) 10 days ago and was recently told that his labs were abnormal.  Patient was told to go to the ED for further evaluation.  Patient denies rectal bleeding or dark stool.  Patient denies chest pain or shortness of breath denies abdominal pain.  Patient denies fever, chills, weight loss or diaphoresis.  Patient cannot recall the name of his previous hematologist but thinks it might be Dr. Sim.  Patient was seen in inpatient by Dr. Collins a few years ago.    Patient with abnormal labs drawn on 2/9 showing WBC 1.6 with absolute neutrophils of 1014, hemoglobin 7.4 and platelets of 53.  As per previous charts patient has chronic thrombocytopenia and runs 50-90.  Previous WBC count has been above 5 and hemoglobin is usually 9 or above.    In the ED, pt hemodynamically stable. Labs show WBC 1.93, Hb 7.8, MCV 98.9, Plt 98, Cr 1.8 (Baseline ~ 1.1), Ca 10.3    s/p 1u pRBC in the ED    While in the hospital, patient was seen by heme/onc. In hospital workup was grossly negative. Patient will follow up with heme/onc outpatient for a possible bone marrow biopsy. Course has been complicated by UTI for which patient was treated for. Patient was also seen by Nephrology given SANTA and as pt is a renal transplant. SANTA improved.     Patient is stable and ready for discharged.

## 2022-02-25 NOTE — DISCHARGE NOTE PROVIDER - NSDCCPCAREPLAN_GEN_ALL_CORE_FT
PRINCIPAL DISCHARGE DIAGNOSIS  Diagnosis: Pancytopenia  Assessment and Plan of Treatment: You presented to the hospital for low blood count levels. While in the hospital, you were seen by the hematology specialist. Blood workup was negative for any particular causes. Suspision remains that this was due to multiple factors. You were transfused one unit of blood while in the hospital. Please follow up with the hematology specialist outpatient.      SECONDARY DISCHARGE DIAGNOSES  Diagnosis: SANTA (acute kidney injury)  Assessment and Plan of Treatment: While in the hospital, you were found to have acute kidney injury. This was most likely due to dehydration. You were properly hydrated with fluids. You were also seen by the nephrologist, who continued your post transplant medications. Please follow up with your nephrologist outpatient.    Diagnosis: Acute UTI  Assessment and Plan of Treatment: In the hospital, you were found to have a urinary tract infection. You were treated with antibiotics which led to the resolution of your symptoms.

## 2022-02-25 NOTE — PROGRESS NOTE ADULT - PROVIDER SPECIALTY LIST ADULT
Internal Medicine
Heme/Onc
Hospitalist
Hospitalist
Nephrology
Hospitalist
Internal Medicine
Hospitalist
Internal Medicine
Internal Medicine

## 2022-02-25 NOTE — DISCHARGE NOTE PROVIDER - PROVIDER TOKENS
PROVIDER:[TOKEN:[[id number]],FOLLOWUP:[2 weeks],ESTABLISHEDPATIENT:[T]],PROVIDER:[TOKEN:[[id number]],FOLLOWUP:[1 week]],PROVIDER:[TOKEN:[[id number]],FOLLOWUP:[1 week],ESTABLISHEDPATIENT:[T]]

## 2022-02-25 NOTE — PROGRESS NOTE ADULT - ASSESSMENT
Status post  donor Kidney transplant  Pancytopenia  UTI  CAD  Atrial fibrillation  HTN  Cirrhosis    Plan    Case D/W [facility] team:  Continue tacrolimus  Decrease mycophenolate to 250mg PO BID  Followup at [facility] [phone]

## 2022-02-25 NOTE — DISCHARGE NOTE PROVIDER - CARE PROVIDER_API CALL
Lion Sweet)  Geriatric Medicine; Internal Medicine  [address] [place] [address]  Phone: [phone]  Fax: [phone]  Established Patient  Follow Up Time: 2 weeks    Scarlett Mayfield)  Hematology; Internal Medicine; Medical Oncology  [address] [place] [address]  Phone: [phone]  Fax: [phone]  Follow Up Time: 1 week    Laci Heart)  Internal Medicine; Nephrology  [address] [place] [address]  Phone: [phone]  Fax: [phone]  Established Patient  Follow Up Time: 1 week

## 2022-02-26 LAB
COPPER SERPL-MCNC: 123 UG/DL — SIGNIFICANT CHANGE UP (ref 69–132)
ZINC SERPL-MCNC: 65 UG/DL — SIGNIFICANT CHANGE UP (ref 44–115)

## 2022-03-02 LAB
% GAMMA, URINE: 9.6 % — SIGNIFICANT CHANGE UP
ALBUMIN 24H MFR UR ELPH: 20.2 % — SIGNIFICANT CHANGE UP
ALPHA1 GLOB 24H MFR UR ELPH: 34.9 % — SIGNIFICANT CHANGE UP
ALPHA2 GLOB 24H MFR UR ELPH: 18.3 % — SIGNIFICANT CHANGE UP
B-GLOBULIN 24H MFR UR ELPH: 17 % — SIGNIFICANT CHANGE UP
INTERPRETATION 24H UR IFE-IMP: SIGNIFICANT CHANGE UP
INTERPRETATION 24H UR IFE-IMP: SIGNIFICANT CHANGE UP
M PROTEIN 24H UR ELPH-MRATE: SIGNIFICANT CHANGE UP
PROT ?TM UR-MCNC: 26 MG/DL — HIGH (ref 0–12)
PROT PATTERN 24H UR ELPH-IMP: SIGNIFICANT CHANGE UP
TOTAL VOLUME - 24 HOUR: SIGNIFICANT CHANGE UP ML
URINE CREATININE CALCULATION: SIGNIFICANT CHANGE UP G/24 H (ref 1–2)

## 2022-03-09 DIAGNOSIS — I12.9 HYPERTENSIVE CHRONIC KIDNEY DISEASE WITH STAGE 1 THROUGH STAGE 4 CHRONIC KIDNEY DISEASE, OR UNSPECIFIED CHRONIC KIDNEY DISEASE: ICD-10-CM

## 2022-03-09 DIAGNOSIS — N18.30 CHRONIC KIDNEY DISEASE, STAGE 3 UNSPECIFIED: ICD-10-CM

## 2022-03-09 DIAGNOSIS — E83.42 HYPOMAGNESEMIA: ICD-10-CM

## 2022-03-09 DIAGNOSIS — Z87.891 PERSONAL HISTORY OF NICOTINE DEPENDENCE: ICD-10-CM

## 2022-03-09 DIAGNOSIS — G89.29 OTHER CHRONIC PAIN: ICD-10-CM

## 2022-03-09 DIAGNOSIS — M43.26 FUSION OF SPINE, LUMBAR REGION: ICD-10-CM

## 2022-03-09 DIAGNOSIS — N17.9 ACUTE KIDNEY FAILURE, UNSPECIFIED: ICD-10-CM

## 2022-03-09 DIAGNOSIS — K74.60 UNSPECIFIED CIRRHOSIS OF LIVER: ICD-10-CM

## 2022-03-09 DIAGNOSIS — R79.9 ABNORMAL FINDING OF BLOOD CHEMISTRY, UNSPECIFIED: ICD-10-CM

## 2022-03-09 DIAGNOSIS — E83.52 HYPERCALCEMIA: ICD-10-CM

## 2022-03-09 DIAGNOSIS — M54.9 DORSALGIA, UNSPECIFIED: ICD-10-CM

## 2022-03-09 DIAGNOSIS — Z94.0 KIDNEY TRANSPLANT STATUS: ICD-10-CM

## 2022-03-09 DIAGNOSIS — I34.0 NONRHEUMATIC MITRAL (VALVE) INSUFFICIENCY: ICD-10-CM

## 2022-03-09 DIAGNOSIS — D69.6 THROMBOCYTOPENIA, UNSPECIFIED: ICD-10-CM

## 2022-03-09 DIAGNOSIS — E53.8 DEFICIENCY OF OTHER SPECIFIED B GROUP VITAMINS: ICD-10-CM

## 2022-03-09 DIAGNOSIS — J44.9 CHRONIC OBSTRUCTIVE PULMONARY DISEASE, UNSPECIFIED: ICD-10-CM

## 2022-03-09 DIAGNOSIS — I48.20 CHRONIC ATRIAL FIBRILLATION, UNSPECIFIED: ICD-10-CM

## 2022-03-09 DIAGNOSIS — E78.5 HYPERLIPIDEMIA, UNSPECIFIED: ICD-10-CM

## 2022-03-09 DIAGNOSIS — I36.1 NONRHEUMATIC TRICUSPID (VALVE) INSUFFICIENCY: ICD-10-CM

## 2022-03-09 DIAGNOSIS — Z86.19 PERSONAL HISTORY OF OTHER INFECTIOUS AND PARASITIC DISEASES: ICD-10-CM

## 2022-03-09 DIAGNOSIS — Z85.840 PERSONAL HISTORY OF MALIGNANT NEOPLASM OF EYE: ICD-10-CM

## 2022-03-09 DIAGNOSIS — H54.8 LEGAL BLINDNESS, AS DEFINED IN USA: ICD-10-CM

## 2022-03-09 DIAGNOSIS — Z79.82 LONG TERM (CURRENT) USE OF ASPIRIN: ICD-10-CM

## 2022-03-09 DIAGNOSIS — G62.9 POLYNEUROPATHY, UNSPECIFIED: ICD-10-CM

## 2022-03-09 DIAGNOSIS — I77.819 AORTIC ECTASIA, UNSPECIFIED SITE: ICD-10-CM

## 2022-03-09 DIAGNOSIS — N40.0 BENIGN PROSTATIC HYPERPLASIA WITHOUT LOWER URINARY TRACT SYMPTOMS: ICD-10-CM

## 2022-03-09 DIAGNOSIS — D61.818 OTHER PANCYTOPENIA: ICD-10-CM

## 2022-03-09 DIAGNOSIS — J98.11 ATELECTASIS: ICD-10-CM

## 2022-03-09 DIAGNOSIS — Z79.01 LONG TERM (CURRENT) USE OF ANTICOAGULANTS: ICD-10-CM

## 2022-03-09 DIAGNOSIS — E87.1 HYPO-OSMOLALITY AND HYPONATREMIA: ICD-10-CM

## 2022-03-09 DIAGNOSIS — N39.0 URINARY TRACT INFECTION, SITE NOT SPECIFIED: ICD-10-CM

## 2022-04-08 ENCOUNTER — LABORATORY RESULT (OUTPATIENT)
Age: 67
End: 2022-04-08

## 2022-04-08 ENCOUNTER — APPOINTMENT (OUTPATIENT)
Dept: HEMATOLOGY ONCOLOGY | Facility: CLINIC | Age: 67
End: 2022-04-08
Payer: MEDICARE

## 2022-04-08 VITALS
WEIGHT: 148 LBS | HEART RATE: 73 BPM | SYSTOLIC BLOOD PRESSURE: 107 MMHG | DIASTOLIC BLOOD PRESSURE: 69 MMHG | TEMPERATURE: 97.7 F | BODY MASS INDEX: 21.92 KG/M2 | HEIGHT: 69 IN

## 2022-04-08 DIAGNOSIS — M19.90 UNSPECIFIED OSTEOARTHRITIS, UNSPECIFIED SITE: ICD-10-CM

## 2022-04-08 DIAGNOSIS — Z78.9 OTHER SPECIFIED HEALTH STATUS: ICD-10-CM

## 2022-04-08 DIAGNOSIS — Z80.3 FAMILY HISTORY OF MALIGNANT NEOPLASM OF BREAST: ICD-10-CM

## 2022-04-08 LAB
HCT VFR BLD CALC: 31.7 %
HGB BLD-MCNC: 9.8 G/DL
MCHC RBC-ENTMCNC: 28.4 PG
MCHC RBC-ENTMCNC: 30.9 G/DL
MCV RBC AUTO: 91.9 FL
PLATELET # BLD AUTO: 101 K/UL
PMV BLD: 10.8 FL
RBC # BLD: 3.45 M/UL
RBC # FLD: 14.3 %
WBC # FLD AUTO: 3.65 K/UL

## 2022-04-08 PROCEDURE — 99205 OFFICE O/P NEW HI 60 MIN: CPT

## 2022-04-08 PROCEDURE — 99215 OFFICE O/P EST HI 40 MIN: CPT

## 2022-04-11 LAB
ALBUMIN SERPL ELPH-MCNC: 3.8 G/DL
ALP BLD-CCNC: 53 U/L
ALT SERPL-CCNC: <5 U/L
ANION GAP SERPL CALC-SCNC: 14 MMOL/L
AST SERPL-CCNC: 8 U/L
BILIRUB SERPL-MCNC: 0.4 MG/DL
BUN SERPL-MCNC: 20 MG/DL
CALCIUM SERPL-MCNC: 10 MG/DL
CHLORIDE SERPL-SCNC: 101 MMOL/L
CO2 SERPL-SCNC: 21 MMOL/L
CREAT SERPL-MCNC: 1.9 MG/DL
DEPRECATED KAPPA LC FREE/LAMBDA SER: 1.75 RATIO
EGFR: 39 ML/MIN/1.73M2
FERRITIN SERPL-MCNC: 684 NG/ML
GLUCOSE SERPL-MCNC: 100 MG/DL
IRON SATN MFR SERPL: 37 %
IRON SERPL-MCNC: 83 UG/DL
KAPPA LC CSF-MCNC: 4.03 MG/DL
KAPPA LC SERPL-MCNC: 7.06 MG/DL
POTASSIUM SERPL-SCNC: 4.3 MMOL/L
PROT SERPL-MCNC: 6.2 G/DL
RHEUMATOID FACT SER QL: 56 IU/ML
SODIUM SERPL-SCNC: 136 MMOL/L
TIBC SERPL-MCNC: 226 UG/DL
UIBC SERPL-MCNC: 143 UG/DL
VIT B12 SERPL-MCNC: 660 PG/ML

## 2022-04-11 NOTE — RESULTS/DATA
[FreeTextEntry1] : Patient's records from February reviewed. Persistent pancytopenia with WBC about 2.0-2.9 range, Hgb 8.5, platelets 55-75 K range.\par

## 2022-04-11 NOTE — PHYSICAL EXAM
[Ambulatory and capable of all self care but unable to carry out any work activities] : Status 2- Ambulatory and capable of all self care but unable to carry out any work activities. Up and about more than 50% of waking hours [Normal] : no peripheral adenopathy appreciated [de-identified] : Diplopia with no vision in the left [de-identified] : RR with extrasystoles [de-identified] : Soft. ?Linear area about 8-10 cm from the LUQ down to the LLQ? Transplanted kidney not well felt.

## 2022-04-11 NOTE — REASON FOR VISIT
[Initial Consultation] : an initial consultation for [Friend] : friend [FreeTextEntry2] : Pancytopenia

## 2022-04-11 NOTE — HISTORY OF PRESENT ILLNESS
[Disease:__________________________] : Disease: [unfilled] [de-identified] : The patient is 67 year old white male referred by the hospital for "low counts".\par He went to the hospital about 3 weeks ago because of severe weakness and terrible tiredness. He was found to have UTI also and was treated with antibiotherapy and 1 unit of PRBC.\par The patient is denying any bleeding from any source. However patient has cirrhosis most likely from his past history of hepatitis C no longer active (he had lot of tattoos on the back).\par The appetite is "not good". He has lost more than 20 lbs over the last 3-4 months. \par The patient did relatively well after discharge but last week he had a period of incontinence and that has resolved.\par The patient is denying any fever or night sweats. \par No problem with bowel movements. \par No bleeding but bruises very easily since he is on Eliquis which was prescribed for 2 strokes ("one in the eye and the other in the arm").\par The patient had significant dyspnea on exertion which improved after the transfusion but now gradually getting worse again. He is always feeling cold.\par \par No headache or dizziness.\par \par The patient had apparently blood problems years ago and had bone marrow aspiration and biopsy which was negative although apparently a low grade lymphoma could have been present according to his managing physician who was also doing plasmapheresis on him.

## 2022-04-11 NOTE — REVIEW OF SYSTEMS
[Fatigue] : fatigue [Recent Change In Weight] : ~T recent weight change [Vision Problems] : vision problems [Shortness Of Breath] : shortness of breath [Joint Pain] : joint pain [Easy Bruising] : a tendency for easy bruising [Negative] : Psychiatric [FreeTextEntry3] : Getting shots in the left eye and having blurry vision [FreeTextEntry9] : Sciatica

## 2022-04-12 LAB — ANACR T: NEGATIVE

## 2022-04-13 LAB
ALBUMIN MFR SERPL ELPH: 53.9 %
ALBUMIN SERPL-MCNC: 3.3 G/DL
ALBUMIN/GLOB SERPL: 1.1 RATIO
ALPHA1 GLOB MFR SERPL ELPH: 7.9 %
ALPHA1 GLOB SERPL ELPH-MCNC: 0.5 G/DL
ALPHA2 GLOB MFR SERPL ELPH: 14.7 %
ALPHA2 GLOB SERPL ELPH-MCNC: 0.9 G/DL
B-GLOBULIN MFR SERPL ELPH: 9.9 %
B-GLOBULIN SERPL ELPH-MCNC: 0.6 G/DL
GAMMA GLOB FLD ELPH-MCNC: 0.8 G/DL
GAMMA GLOB MFR SERPL ELPH: 13.6 %
INTERPRETATION SERPL IEP-IMP: NORMAL
M PROTEIN MFR SERPL ELPH: NORMAL
M PROTEIN SPEC IFE-MCNC: NORMAL
MONOCLON BAND OBS SERPL: NORMAL
PROT SERPL-MCNC: 6.2 G/DL

## 2022-04-15 LAB — METHYLMALONATE SERPL-SCNC: 425 NMOL/L

## 2022-04-28 ENCOUNTER — OUTPATIENT (OUTPATIENT)
Dept: OUTPATIENT SERVICES | Facility: HOSPITAL | Age: 67
LOS: 1 days | Discharge: HOME | End: 2022-04-28

## 2022-04-28 ENCOUNTER — LABORATORY RESULT (OUTPATIENT)
Age: 67
End: 2022-04-28

## 2022-04-28 ENCOUNTER — APPOINTMENT (OUTPATIENT)
Dept: HEMATOLOGY ONCOLOGY | Facility: CLINIC | Age: 67
End: 2022-04-28
Payer: MEDICARE

## 2022-04-28 DIAGNOSIS — Z98.890 OTHER SPECIFIED POSTPROCEDURAL STATES: Chronic | ICD-10-CM

## 2022-04-28 DIAGNOSIS — Z00.00 ENCOUNTER FOR GENERAL ADULT MEDICAL EXAMINATION W/OUT ABNORMAL FINDINGS: ICD-10-CM

## 2022-04-28 DIAGNOSIS — Z94.0 KIDNEY TRANSPLANT STATUS: Chronic | ICD-10-CM

## 2022-04-28 DIAGNOSIS — D61.818 OTHER PANCYTOPENIA: ICD-10-CM

## 2022-04-28 LAB
HCT VFR BLD CALC: 30.8 %
HGB BLD-MCNC: 9.5 G/DL
MCHC RBC-ENTMCNC: 28.2 PG
MCHC RBC-ENTMCNC: 30.8 G/DL
MCV RBC AUTO: 91.4 FL
PLATELET # BLD AUTO: 76 K/UL
PMV BLD: 11.6 FL
RBC # BLD: 3.37 M/UL
RBC # FLD: 15.5 %
WBC # FLD AUTO: 3.41 K/UL

## 2022-04-28 PROCEDURE — 99213 OFFICE O/P EST LOW 20 MIN: CPT

## 2022-04-29 DIAGNOSIS — D47.2 MONOCLONAL GAMMOPATHY: ICD-10-CM

## 2022-04-29 LAB
ALBUMIN SERPL ELPH-MCNC: 4.1 G/DL
ALP BLD-CCNC: 50 U/L
ALT SERPL-CCNC: 7 U/L
ANION GAP SERPL CALC-SCNC: 11 MMOL/L
AST SERPL-CCNC: 11 U/L
BILIRUB SERPL-MCNC: 0.5 MG/DL
BUN SERPL-MCNC: 24 MG/DL
CALCIUM SERPL-MCNC: 10.8 MG/DL
CHLORIDE SERPL-SCNC: 102 MMOL/L
CO2 SERPL-SCNC: 24 MMOL/L
CREAT SERPL-MCNC: 2.1 MG/DL
EGFR: 34 ML/MIN/1.73M2
FERRITIN SERPL-MCNC: 349 NG/ML
GLUCOSE SERPL-MCNC: 94 MG/DL
IRON SATN MFR SERPL: 27 %
IRON SERPL-MCNC: 75 UG/DL
POTASSIUM SERPL-SCNC: 4.8 MMOL/L
PROT SERPL-MCNC: 6.1 G/DL
SODIUM SERPL-SCNC: 137 MMOL/L
TIBC SERPL-MCNC: 280 UG/DL
UIBC SERPL-MCNC: 205 UG/DL
VIT B12 SERPL-MCNC: 628 PG/ML

## 2022-04-29 NOTE — HISTORY OF PRESENT ILLNESS
[de-identified] : The patient is coming for a follow up to go over the results of his recent blood work (from 4/8).\par He had the same chronic symptoms of tiredness and weakness.\par The CBC showed stable pancytopenia. \par On the other hand, his methylmalonic acid was somewhat elevated although his B 12 level was within normal.\par His serum electrophoresis with immunofixation showed small IgM (2.1 %) and IgG (unable to quantitate) spikes. Free light chain ratio was slightly elevated at 1.75 but that is in the setting of kidney disease with creatinine of 1.9. The rheumatoid factor also came back elevated at 56 (upper limit 13).\par The results were communicated to the patient.\par At this time, will repeat the CBC, CMP, ferritin, vitamin B 12 and MMA levels.\par The patient will be followed up closely.\par He will be seen in a month, or sooner if the repeat studies show significant change from previous.\par \par All questions answered.\par

## 2022-05-09 LAB — METHYLMALONATE SERPL-SCNC: 418 NMOL/L

## 2022-06-04 NOTE — ASSESSMENT
[FreeTextEntry1] : Pancytopenia, most likely secondary to cirrhosis. Kidney disease most likely aggravating the anemia. Chronicity not clear but hemogram from February of this year showing consistently the same abnormalities.\par Additional factors cannot be ruled out.\par \par The patient will certainly need additional work up.\par To begin with, will repeat the CBC and obtain CMP, LDH,SPEP with IFES, B 12, folate, MMA, SPEP with IFES, free light chains, EMA, RA.\par Further recommendations after those results are available.\par \par All questions answered.\par \par F/U after the above resulted.
4

## 2022-08-05 NOTE — DISCHARGE NOTE PROVIDER - CARE PROVIDER_API CALL
Dylan Epperson)  Internal Medicine  [address] [facility] [place], [address]  [place] [address]  Phone: [phone]  Fax: [phone]  Follow Up Time: 1 week    Candice Hernandez)  Internal Medicine  [address] [place] [address]  Phone: [phone]  Fax: [phone]  Follow Up Time: 1 week    Kin Low)  Cardiovascular Disease; Interventional Cardiology  [address] [place] [address]  Phone: [phone]  Fax: [phone]  Follow Up Time: 1 week
show

## 2022-09-27 RX ORDER — TAMSULOSIN HYDROCHLORIDE 0.4 MG/1
1 CAPSULE ORAL
Qty: 0 | Refills: 0 | DISCHARGE

## 2022-09-27 RX ORDER — METOPROLOL TARTRATE 50 MG
1 TABLET ORAL
Qty: 0 | Refills: 0 | DISCHARGE

## 2022-09-27 RX ORDER — ATORVASTATIN CALCIUM 80 MG/1
1 TABLET, FILM COATED ORAL
Qty: 0 | Refills: 0 | DISCHARGE

## 2023-01-12 NOTE — ED PROVIDER NOTE - OBJECTIVE STATEMENT
pt with pmhx Atrial fibrillation on eliquis, Cirrhosis possibly related to hepC, CKD, COPD not on home O2, ESRD no longer on dialysis, HLD, HTN, ?Lymphoma (not treated, not followed), Melanoma R eye, Peripheral neuropathy presents to ED c/o left sided facial droop for 3 days. initially only involved mouth and thought it was related to tooth so went to UC after being unable to see his dentist. was started on amox, but the following day, sxs progressed to entire left side of face being involved. Denies fever/chill/HA/dizziness/chest pain/palpitation/sob/abd pain/n/v/d/ black stool/bloody stool/urinary sxs
21.8

## 2023-06-19 ENCOUNTER — APPOINTMENT (OUTPATIENT)
Dept: NEUROSURGERY | Facility: CLINIC | Age: 68
End: 2023-06-19
Payer: MEDICARE

## 2023-06-19 VITALS — HEIGHT: 69 IN | WEIGHT: 148 LBS | BODY MASS INDEX: 21.92 KG/M2

## 2023-06-19 DIAGNOSIS — Z85.3 PERSONAL HISTORY OF MALIGNANT NEOPLASM OF BREAST: ICD-10-CM

## 2023-06-19 DIAGNOSIS — Z87.19 PERSONAL HISTORY OF OTHER DISEASES OF THE DIGESTIVE SYSTEM: ICD-10-CM

## 2023-06-19 DIAGNOSIS — Z86.2 PERSONAL HISTORY OF DISEASES OF THE BLOOD AND BLOOD-FORMING ORGANS AND CERTAIN DISORDERS INVOLVING THE IMMUNE MECHANISM: ICD-10-CM

## 2023-06-19 DIAGNOSIS — Z87.448 PERSONAL HISTORY OF OTHER DISEASES OF URINARY SYSTEM: ICD-10-CM

## 2023-06-19 DIAGNOSIS — Z87.898 PERSONAL HISTORY OF OTHER SPECIFIED CONDITIONS: ICD-10-CM

## 2023-06-19 DIAGNOSIS — Z86.79 PERSONAL HISTORY OF OTHER DISEASES OF THE CIRCULATORY SYSTEM: ICD-10-CM

## 2023-06-19 DIAGNOSIS — Z86.39 PERSONAL HISTORY OF OTHER ENDOCRINE, NUTRITIONAL AND METABOLIC DISEASE: ICD-10-CM

## 2023-06-19 PROCEDURE — 99204 OFFICE O/P NEW MOD 45 MIN: CPT

## 2023-06-19 NOTE — ASSESSMENT
[FreeTextEntry1] : Mr. CHOU will proceed with an MRI of the lumbar spine and xrays with dynamic views.  I will see him back in the office once testing is complete to discuss treatment options.\par \par Marsha Albert, MS [place]\par Adele Arnold MD \par \par \par

## 2023-06-19 NOTE — HISTORY OF PRESENT ILLNESS
[de-identified] : Mr. Echols has a longstanding history of chronic lower back pain with persistent left lower extremity radiculopathy.  He has radicular symptoms started about a year ago however has worsened in the past few months. \par \par He is under the care of of Dr. Gordon for pain management.  He has trialed physical therapy and lumbar epidural injections without improvement.  He has no recent testing. No bowel / bladder dysfunction.\par \par \par PHYSICAL EXAM: \par Constitutional: Well appearing, no distress\par HEENT: Normocephalic Atraumatic\par Psychiatric: Alert and oriented to all spheres, normal mood\par Pulmonary: No respiratory distress\par Neurologic: \par CN II-XII grossly intact\par Palpation: + left paraspinal spasms. \par Strength: Full strength in all major muscle groups\par Sensation: Full sensation to light touch in all extremities\par Reflexes: \par  2+ patellar\par  2+ ankle jerk\par Restriction in ROM lumbar spine. \par SLR negative\par Gait: steady, walking w/o assistance. \par \par

## 2023-07-24 ENCOUNTER — APPOINTMENT (OUTPATIENT)
Dept: NEUROSURGERY | Facility: CLINIC | Age: 68
End: 2023-07-24
Payer: MEDICARE

## 2023-07-24 VITALS — WEIGHT: 148 LBS | HEIGHT: 69 IN | BODY MASS INDEX: 21.92 KG/M2

## 2023-07-24 PROCEDURE — 99212 OFFICE O/P EST SF 10 MIN: CPT

## 2023-07-24 NOTE — ASSESSMENT
[FreeTextEntry1] : EMG to confirm left L5 radiculopathy.  May be a candidate for a left L5-S1 endoscopic foraminotomy.  He will return once the EMG is done.

## 2023-07-24 NOTE — HISTORY OF PRESENT ILLNESS
[FreeTextEntry1] : Mr. Echols is here for a follow up neurosurgical clinic visit. He continues to report chronic lower back pain with persistent left lower extremity radiculopathy. \par \par MRI Lumbar at [facility] on 6/27/23

## 2023-08-07 ENCOUNTER — APPOINTMENT (OUTPATIENT)
Dept: NEUROSURGERY | Facility: CLINIC | Age: 68
End: 2023-08-07
Payer: MEDICARE

## 2023-08-07 VITALS — WEIGHT: 148 LBS | BODY MASS INDEX: 21.92 KG/M2 | HEIGHT: 69 IN

## 2023-08-07 PROCEDURE — 99214 OFFICE O/P EST MOD 30 MIN: CPT

## 2023-08-07 NOTE — HISTORY OF PRESENT ILLNESS
[FreeTextEntry1] : CHIEF COMPLAINT: Mr. Echols, Presents today still with Lower back pain. Pain radiates down the left leg down into the feet. He is experiencing numbness, tingling and weakness in both legs and feet. When walking he is off balance. He has tried physical therapy and epidural steroid injections.  MRI shows left foraminal L5-S1 stenosis EMG confirms left L5 radiculopathy

## 2023-08-07 NOTE — ASSESSMENT
[FreeTextEntry1] : I discussed the MRI and clinical findings which correlate.  He has failed conservative management.  I recommend a left L5-S1 endoscopic foraminotomy.  I discussed risks benefits recovery and expectations.  He wishes to proceed.

## 2023-08-23 ENCOUNTER — OUTPATIENT (OUTPATIENT)
Dept: OUTPATIENT SERVICES | Facility: HOSPITAL | Age: 68
LOS: 1 days | End: 2023-08-23
Payer: MEDICARE

## 2023-08-23 ENCOUNTER — RESULT REVIEW (OUTPATIENT)
Age: 68
End: 2023-08-23

## 2023-08-23 VITALS
RESPIRATION RATE: 18 BRPM | SYSTOLIC BLOOD PRESSURE: 141 MMHG | HEART RATE: 92 BPM | HEIGHT: 69 IN | OXYGEN SATURATION: 97 % | TEMPERATURE: 98 F | WEIGHT: 138.01 LBS | DIASTOLIC BLOOD PRESSURE: 85 MMHG

## 2023-08-23 DIAGNOSIS — Z98.890 OTHER SPECIFIED POSTPROCEDURAL STATES: Chronic | ICD-10-CM

## 2023-08-23 DIAGNOSIS — Z94.0 KIDNEY TRANSPLANT STATUS: Chronic | ICD-10-CM

## 2023-08-23 DIAGNOSIS — Z01.818 ENCOUNTER FOR OTHER PREPROCEDURAL EXAMINATION: ICD-10-CM

## 2023-08-23 DIAGNOSIS — M54.16 RADICULOPATHY, LUMBAR REGION: ICD-10-CM

## 2023-08-23 LAB
ALBUMIN SERPL ELPH-MCNC: 4.4 G/DL — SIGNIFICANT CHANGE UP (ref 3.5–5.2)
ALP SERPL-CCNC: 81 U/L — SIGNIFICANT CHANGE UP (ref 30–115)
ALT FLD-CCNC: 6 U/L — SIGNIFICANT CHANGE UP (ref 0–41)
ANION GAP SERPL CALC-SCNC: 14 MMOL/L — SIGNIFICANT CHANGE UP (ref 7–14)
APTT BLD: 34.3 SEC — SIGNIFICANT CHANGE UP (ref 27–39.2)
AST SERPL-CCNC: 14 U/L — SIGNIFICANT CHANGE UP (ref 0–41)
BASOPHILS # BLD AUTO: 0.01 K/UL — SIGNIFICANT CHANGE UP (ref 0–0.2)
BASOPHILS NFR BLD AUTO: 0.3 % — SIGNIFICANT CHANGE UP (ref 0–1)
BILIRUB SERPL-MCNC: 0.5 MG/DL — SIGNIFICANT CHANGE UP (ref 0.2–1.2)
BLD GP AB SCN SERPL QL: SIGNIFICANT CHANGE UP
BUN SERPL-MCNC: 45 MG/DL — HIGH (ref 10–20)
CALCIUM SERPL-MCNC: 10.3 MG/DL — SIGNIFICANT CHANGE UP (ref 8.4–10.5)
CHLORIDE SERPL-SCNC: 104 MMOL/L — SIGNIFICANT CHANGE UP (ref 98–110)
CO2 SERPL-SCNC: 20 MMOL/L — SIGNIFICANT CHANGE UP (ref 17–32)
CREAT SERPL-MCNC: 1.5 MG/DL — SIGNIFICANT CHANGE UP (ref 0.7–1.5)
EGFR: 51 ML/MIN/1.73M2 — LOW
EOSINOPHIL # BLD AUTO: 0.01 K/UL — SIGNIFICANT CHANGE UP (ref 0–0.7)
EOSINOPHIL NFR BLD AUTO: 0.3 % — SIGNIFICANT CHANGE UP (ref 0–8)
GLUCOSE SERPL-MCNC: 104 MG/DL — HIGH (ref 70–99)
HCT VFR BLD CALC: 40 % — LOW (ref 42–52)
HGB BLD-MCNC: 12.7 G/DL — LOW (ref 14–18)
IMM GRANULOCYTES NFR BLD AUTO: 0.8 % — HIGH (ref 0.1–0.3)
INR BLD: 1.46 RATIO — HIGH (ref 0.65–1.3)
LYMPHOCYTES # BLD AUTO: 0.53 K/UL — LOW (ref 1.2–3.4)
LYMPHOCYTES # BLD AUTO: 13.8 % — LOW (ref 20.5–51.1)
MCHC RBC-ENTMCNC: 29.8 PG — SIGNIFICANT CHANGE UP (ref 27–31)
MCHC RBC-ENTMCNC: 31.8 G/DL — LOW (ref 32–37)
MCV RBC AUTO: 93.9 FL — SIGNIFICANT CHANGE UP (ref 80–94)
MONOCYTES # BLD AUTO: 0.18 K/UL — SIGNIFICANT CHANGE UP (ref 0.1–0.6)
MONOCYTES NFR BLD AUTO: 4.7 % — SIGNIFICANT CHANGE UP (ref 1.7–9.3)
MRSA PCR RESULT.: NEGATIVE — SIGNIFICANT CHANGE UP
NEUTROPHILS # BLD AUTO: 3.08 K/UL — SIGNIFICANT CHANGE UP (ref 1.4–6.5)
NEUTROPHILS NFR BLD AUTO: 80.1 % — HIGH (ref 42.2–75.2)
NRBC # BLD: 0 /100 WBCS — SIGNIFICANT CHANGE UP (ref 0–0)
PLATELET # BLD AUTO: 65 K/UL — LOW (ref 130–400)
PMV BLD: 12.6 FL — HIGH (ref 7.4–10.4)
POTASSIUM SERPL-MCNC: 3.7 MMOL/L — SIGNIFICANT CHANGE UP (ref 3.5–5)
POTASSIUM SERPL-SCNC: 3.7 MMOL/L — SIGNIFICANT CHANGE UP (ref 3.5–5)
PROT SERPL-MCNC: 6.5 G/DL — SIGNIFICANT CHANGE UP (ref 6–8)
PROTHROM AB SERPL-ACNC: 16.8 SEC — HIGH (ref 9.95–12.87)
RBC # BLD: 4.26 M/UL — LOW (ref 4.7–6.1)
RBC # FLD: 14.4 % — SIGNIFICANT CHANGE UP (ref 11.5–14.5)
SODIUM SERPL-SCNC: 138 MMOL/L — SIGNIFICANT CHANGE UP (ref 135–146)
WBC # BLD: 3.84 K/UL — LOW (ref 4.8–10.8)
WBC # FLD AUTO: 3.84 K/UL — LOW (ref 4.8–10.8)

## 2023-08-23 PROCEDURE — 71046 X-RAY EXAM CHEST 2 VIEWS: CPT | Mod: 26

## 2023-08-23 PROCEDURE — 87641 MR-STAPH DNA AMP PROBE: CPT

## 2023-08-23 PROCEDURE — 71046 X-RAY EXAM CHEST 2 VIEWS: CPT

## 2023-08-23 PROCEDURE — 86901 BLOOD TYPING SEROLOGIC RH(D): CPT

## 2023-08-23 PROCEDURE — 85610 PROTHROMBIN TIME: CPT

## 2023-08-23 PROCEDURE — 85025 COMPLETE CBC W/AUTO DIFF WBC: CPT

## 2023-08-23 PROCEDURE — 86850 RBC ANTIBODY SCREEN: CPT

## 2023-08-23 PROCEDURE — 36415 COLL VENOUS BLD VENIPUNCTURE: CPT

## 2023-08-23 PROCEDURE — 99214 OFFICE O/P EST MOD 30 MIN: CPT | Mod: 25

## 2023-08-23 PROCEDURE — 87640 STAPH A DNA AMP PROBE: CPT

## 2023-08-23 PROCEDURE — 93010 ELECTROCARDIOGRAM REPORT: CPT

## 2023-08-23 PROCEDURE — 86900 BLOOD TYPING SEROLOGIC ABO: CPT

## 2023-08-23 PROCEDURE — 93005 ELECTROCARDIOGRAM TRACING: CPT

## 2023-08-23 PROCEDURE — 85730 THROMBOPLASTIN TIME PARTIAL: CPT

## 2023-08-23 PROCEDURE — 80053 COMPREHEN METABOLIC PANEL: CPT

## 2023-08-23 RX ORDER — GABAPENTIN 400 MG/1
1 CAPSULE ORAL
Qty: 0 | Refills: 0 | DISCHARGE

## 2023-08-23 NOTE — H&P PST ADULT - REASON FOR ADMISSION
69 Y/O LEGALLY BLIND  M  WITH PMHX ESRD S/P KIDNEY TRANSPLANT 7 YRS AGO OFFS HEMO SINCE, AFIB ON ELIQUIS, HTN, SCHEDULED FOR PAST FOR LEFT LUMBAR 5-SACRAL 1 ENDOSCOPIC FORAMINOTOMY UNDER GA WITH DR GAMA ON 9/12/23. PT REPORTS SEVERE LOWER BACK PAIN 10/10 RADIATING TO LEGS. HE STATES NOTHING RELIEVES HIS PAIN.

## 2023-08-23 NOTE — H&P PST ADULT - NSICDXPASTSURGICALHX_GEN_ALL_CORE_FT
PAST SURGICAL HISTORY:  History of kidney transplant 3 years ago    S/P arteriovenous (AV) fistula creation prior old fistula in R arm    S/P lumpectomy, right breast

## 2023-08-23 NOTE — H&P PST ADULT - NSANTHOSAYNRD_GEN_A_CORE
No. LAINA screening performed.  STOP BANG Legend: 0-2 = LOW Risk; 3-4 = INTERMEDIATE Risk; 5-8 = HIGH Risk

## 2023-08-23 NOTE — H&P PST ADULT - NSICDXPASTMEDICALHX_GEN_ALL_CORE_FT
PAST MEDICAL HISTORY:  Atrial fibrillation on eliquis    Breast cancer     Cirrhosis possibly related to hepC    CKD (chronic kidney disease)     COPD, mild not on O2    ESRD (end stage renal disease)     HLD (hyperlipidemia)     HTN (hypertension)     Legally blind     Lymphoma ?questionable, not treated, not followed    Melanoma R eye, s/p laser    Peripheral neuropathy unclear cause    Stroke

## 2023-08-23 NOTE — H&P PST ADULT - HISTORY OF PRESENT ILLNESS
PATIENT CURRENTLY DENIES CHEST PAIN  PALPITATIONS,  DYSURIA, OR UPPER RESPIRATORY INFECTION IN PAST 2 WEEKS- PT REPORTS OCCASIONAL SHORTNESS OF BREATH AFTER WALKING- OFFERED PT TO GO TO ED FOR EVALUATION PT REFUSING AT THIS TIME. PT STATES "THIS HAS BEEN GOING ON FOR MONTHS"- PT AGREES IF SYMPTOMS WORSEN HE WILL GO TO ED.     EXERCISE  TOLERANCE  1/2 FLIGHT OF STAIRS  WITHOUT SHORTNESS OF BREATH    Denies travel outside the [place] in the past 30 days  Patient denies any signs or symptoms of COVID 19 and denies contact with known positive individuals.     Patient reports receiving covid vaccine x doses    Anesthesia Alert  NO--Difficult Airway  NO--History of neck surgery or radiation  NO--Limited ROM of neck  NO--History of Malignant hyperthermia  NO--No personal or family history of Pseudocholinesterase deficiency.  NO--Prior Anesthesia Complication  NO--Latex Allergy  NO--Loose teeth  NO--History of Rheumatoid Arthritis  YES--Bleeding risk ELIQUIS  NO--LAINA  NO--Other_____  CLASS II    [facility] Activity Status Index (DASI)   RESULT SUMMARY:  15.2 points  The higher the score (maximum 58.2), the higher the functional status.  4.61 METs  INPUTS:  Take care of self —> 2.75 = Yes  Walk indoors —> 1.75 = Yes  Walk 1&ndash;2 blocks on level ground —> 0 = No  Climb a flight of stairs or walk up a hill —> 0 = No  Run a short distance —> 8 = Yes  Do light work around the house —> 2.7 = Yes  Do moderate work around the house —> 0 = No  Do heavy work around the house —> 0 = No  Do yardwork —> 0 = No  Have sexual relations —> 0 = No  Participate in moderate recreational activities —> 0 = No  Participate in strenuous sports —> 0 = No    Revised Cardiac Risk Index   RESULT SUMMARY:  2 points  Class III Risk  10.1 %  30-day risk of death, MI, or cardiac arrest  INPUTS:  Elevated-risk surgery —> 0 = No  History of ischemic heart disease —> 1 = Yes  History of congestive heart failure —> 0 = No  History of cerebrovascular disease —> 1 = Yes  Pre-operative treatment with insulin —> 0 = No  Pre-operative creatinine >2 mg/dL / 176.8 µmol/L —> 0 = No      Opioid Risk Assessment Tool (Male)       Family history of substance abuse            Alcohol (3)            Illegal Drugs (3)            Prescription drugs (4)       Personal history of substance abuse- YES            Alcohol (3)            Illegal Drugs (4) 4            Prescription drugs (5)       Age between 16-45 (1)       History of preadolescent sexual abuse (0)        Psychological disease (ADD, ADHD, OCD, Bipolar Disorder, Schizophrenia, Depression) (1)     Scoring Totals:  Low Risk (0-3)  Moderate Risk (4-7)  High Risk (>/=8)- MODERATE RISK         PT DENIES ANY RASHES, ABRASION, OR OPEN WOUNDS OR CUTS    AS PER THE PT, THIS IS HIS/HER COMPLETE MEDICAL AND SURGICAL HX, INCLUDING MEDICATIONS PRESCRIBED AND OVER THE COUNTER    Patient verbalized understanding of instructions and was given the opportunity to ask questions and have them answered.    pt denies any suicidal ideation or thoughts, pt states not a threat to self or others

## 2023-08-24 DIAGNOSIS — Z01.818 ENCOUNTER FOR OTHER PREPROCEDURAL EXAMINATION: ICD-10-CM

## 2023-08-24 DIAGNOSIS — M54.16 RADICULOPATHY, LUMBAR REGION: ICD-10-CM

## 2023-09-01 PROBLEM — C50.919 MALIGNANT NEOPLASM OF UNSPECIFIED SITE OF UNSPECIFIED FEMALE BREAST: Chronic | Status: ACTIVE | Noted: 2023-08-23

## 2023-09-01 PROBLEM — I63.9 CEREBRAL INFARCTION, UNSPECIFIED: Chronic | Status: ACTIVE | Noted: 2023-08-23

## 2023-09-01 PROBLEM — H54.8 LEGAL BLINDNESS, AS DEFINED IN USA: Chronic | Status: ACTIVE | Noted: 2023-08-23

## 2023-09-08 ENCOUNTER — NON-APPOINTMENT (OUTPATIENT)
Age: 68
End: 2023-09-08

## 2023-09-15 ENCOUNTER — INPATIENT (INPATIENT)
Facility: HOSPITAL | Age: 68
LOS: 0 days | Discharge: NURSING, REHAB OR LONG-TERM CARE | DRG: 516 | End: 2023-09-16
Attending: NEUROLOGICAL SURGERY | Admitting: NEUROLOGICAL SURGERY
Payer: MEDICARE

## 2023-09-15 ENCOUNTER — TRANSCRIPTION ENCOUNTER (OUTPATIENT)
Age: 68
End: 2023-09-15

## 2023-09-15 ENCOUNTER — APPOINTMENT (OUTPATIENT)
Dept: NEUROSURGERY | Facility: HOSPITAL | Age: 68
End: 2023-09-15

## 2023-09-15 VITALS
HEART RATE: 95 BPM | WEIGHT: 154.98 LBS | TEMPERATURE: 98 F | OXYGEN SATURATION: 100 % | HEIGHT: 69 IN | RESPIRATION RATE: 16 BRPM | DIASTOLIC BLOOD PRESSURE: 92 MMHG | SYSTOLIC BLOOD PRESSURE: 166 MMHG

## 2023-09-15 DIAGNOSIS — Z86.73 PERSONAL HISTORY OF TRANSIENT ISCHEMIC ATTACK (TIA), AND CEREBRAL INFARCTION WITHOUT RESIDUAL DEFICITS: ICD-10-CM

## 2023-09-15 DIAGNOSIS — Z85.820 PERSONAL HISTORY OF MALIGNANT MELANOMA OF SKIN: ICD-10-CM

## 2023-09-15 DIAGNOSIS — Z79.01 LONG TERM (CURRENT) USE OF ANTICOAGULANTS: ICD-10-CM

## 2023-09-15 DIAGNOSIS — M48.07 SPINAL STENOSIS, LUMBOSACRAL REGION: ICD-10-CM
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
## 2023-09-15 DIAGNOSIS — D61.818 OTHER PANCYTOPENIA: ICD-10-CM

## 2023-09-15 DIAGNOSIS — J44.9 CHRONIC OBSTRUCTIVE PULMONARY DISEASE, UNSPECIFIED: ICD-10-CM

## 2023-09-15 DIAGNOSIS — I48.91 UNSPECIFIED ATRIAL FIBRILLATION: ICD-10-CM

## 2023-09-15 DIAGNOSIS — Z98.890 OTHER SPECIFIED POSTPROCEDURAL STATES: Chronic | ICD-10-CM

## 2023-09-15 DIAGNOSIS — Z85.3 PERSONAL HISTORY OF MALIGNANT NEOPLASM OF BREAST: ICD-10-CM

## 2023-09-15 DIAGNOSIS — Z85.72 PERSONAL HISTORY OF NON-HODGKIN LYMPHOMAS: ICD-10-CM

## 2023-09-15 DIAGNOSIS — E78.5 HYPERLIPIDEMIA, UNSPECIFIED: ICD-10-CM

## 2023-09-15 DIAGNOSIS — M48.061 SPINAL STENOSIS, LUMBAR REGION WITHOUT NEUROGENIC CLAUDICATION: ICD-10-CM

## 2023-09-15 DIAGNOSIS — Z94.0 KIDNEY TRANSPLANT STATUS: Chronic | ICD-10-CM

## 2023-09-15 DIAGNOSIS — K74.60 UNSPECIFIED CIRRHOSIS OF LIVER: ICD-10-CM
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
## 2023-09-15 DIAGNOSIS — Z94.0 KIDNEY TRANSPLANT STATUS: ICD-10-CM

## 2023-09-15 DIAGNOSIS — N18.9 CHRONIC KIDNEY DISEASE, UNSPECIFIED: ICD-10-CM

## 2023-09-15 DIAGNOSIS — I12.9 HYPERTENSIVE CHRONIC KIDNEY DISEASE WITH STAGE 1 THROUGH STAGE 4 CHRONIC KIDNEY DISEASE, OR UNSPECIFIED CHRONIC KIDNEY DISEASE: ICD-10-CM

## 2023-09-15 DIAGNOSIS — Z99.2 DEPENDENCE ON RENAL DIALYSIS: ICD-10-CM

## 2023-09-15 DIAGNOSIS — G62.9 POLYNEUROPATHY, UNSPECIFIED: ICD-10-CM

## 2023-09-15 DIAGNOSIS — H54.8 LEGAL BLINDNESS, AS DEFINED IN USA: ICD-10-CM

## 2023-09-15 DIAGNOSIS — T81.89XA OTHER COMPLICATIONS OF PROCEDURES, NOT ELSEWHERE CLASSIFIED, INITIAL ENCOUNTER: ICD-10-CM

## 2023-09-15 DIAGNOSIS — M54.16 RADICULOPATHY, LUMBAR REGION: ICD-10-CM

## 2023-09-15 DIAGNOSIS — M54.17 RADICULOPATHY, LUMBOSACRAL REGION: ICD-10-CM

## 2023-09-15 LAB — BLD GP AB SCN SERPL QL: SIGNIFICANT CHANGE UP

## 2023-09-15 PROCEDURE — C9399: CPT

## 2023-09-15 PROCEDURE — 36415 COLL VENOUS BLD VENIPUNCTURE: CPT

## 2023-09-15 PROCEDURE — 97530 THERAPEUTIC ACTIVITIES: CPT | Mod: GP

## 2023-09-15 PROCEDURE — 80053 COMPREHEN METABOLIC PANEL: CPT

## 2023-09-15 PROCEDURE — 85025 COMPLETE CBC W/AUTO DIFF WBC: CPT

## 2023-09-15 PROCEDURE — 80048 BASIC METABOLIC PNL TOTAL CA: CPT

## 2023-09-15 RX ORDER — AMIODARONE HYDROCHLORIDE 400 MG/1
1 TABLET ORAL
Refills: 0 | DISCHARGE

## 2023-09-15 RX ORDER — SENNA PLUS 8.6 MG/1
2 TABLET ORAL AT BEDTIME
Refills: 0 | Status: DISCONTINUED | OUTPATIENT
Start: 2023-09-15 | End: 2023-09-16

## 2023-09-15 RX ORDER — SODIUM CHLORIDE 9 MG/ML
1000 INJECTION INTRAMUSCULAR; INTRAVENOUS; SUBCUTANEOUS
Refills: 0 | Status: DISCONTINUED | OUTPATIENT
Start: 2023-09-15 | End: 2023-09-16

## 2023-09-15 RX ORDER — ONDANSETRON 8 MG/1
4 TABLET, FILM COATED ORAL ONCE
Refills: 0 | Status: COMPLETED | OUTPATIENT
Start: 2023-09-15 | End: 2023-09-15

## 2023-09-15 RX ORDER — METHOCARBAMOL 500 MG/1
750 TABLET, FILM COATED ORAL EVERY 8 HOURS
Refills: 0 | Status: DISCONTINUED | OUTPATIENT
Start: 2023-09-15 | End: 2023-09-16

## 2023-09-15 RX ORDER — HYDROMORPHONE HYDROCHLORIDE 2 MG/ML
1 INJECTION INTRAMUSCULAR; INTRAVENOUS; SUBCUTANEOUS
Refills: 0 | Status: DISCONTINUED | OUTPATIENT
Start: 2023-09-15 | End: 2023-09-16

## 2023-09-15 RX ORDER — TACROLIMUS 5 MG/1
2 CAPSULE ORAL EVERY 12 HOURS
Refills: 0 | Status: DISCONTINUED | OUTPATIENT
Start: 2023-09-15 | End: 2023-09-16

## 2023-09-15 RX ORDER — METHOCARBAMOL 500 MG/1
1 TABLET, FILM COATED ORAL
Qty: 9 | Refills: 0
Start: 2023-09-15 | End: 2023-09-17

## 2023-09-15 RX ORDER — MYCOPHENOLATE MOFETIL 250 MG/1
500 CAPSULE ORAL
Refills: 0 | Status: DISCONTINUED | OUTPATIENT
Start: 2023-09-15 | End: 2023-09-16

## 2023-09-15 RX ORDER — ACETAMINOPHEN 500 MG
650 TABLET ORAL EVERY 6 HOURS
Refills: 0 | Status: DISCONTINUED | OUTPATIENT
Start: 2023-09-15 | End: 2023-09-16

## 2023-09-15 RX ORDER — METOPROLOL TARTRATE 50 MG
2 TABLET ORAL
Refills: 0 | Status: DISCONTINUED | OUTPATIENT
Start: 2023-09-15 | End: 2023-09-16

## 2023-09-15 RX ORDER — FUROSEMIDE 40 MG
40 TABLET ORAL DAILY
Refills: 0 | Status: DISCONTINUED | OUTPATIENT
Start: 2023-09-15 | End: 2023-09-16

## 2023-09-15 RX ORDER — TRAMADOL HYDROCHLORIDE 50 MG/1
1 TABLET ORAL
Qty: 12 | Refills: 0
Start: 2023-09-15 | End: 2023-09-17

## 2023-09-15 RX ORDER — DOCUSATE SODIUM 100 MG
1 CAPSULE ORAL
Qty: 9 | Refills: 0
Start: 2023-09-15 | End: 2023-09-17

## 2023-09-15 RX ORDER — CALCITRIOL 0.5 UG/1
0.5 CAPSULE ORAL DAILY
Refills: 0 | Status: DISCONTINUED | OUTPATIENT
Start: 2023-09-15 | End: 2023-09-16

## 2023-09-15 RX ORDER — CEFAZOLIN SODIUM 1 G
1000 VIAL (EA) INJECTION EVERY 8 HOURS
Refills: 0 | Status: COMPLETED | OUTPATIENT
Start: 2023-09-15 | End: 2023-09-16

## 2023-09-15 RX ORDER — OXYCODONE HYDROCHLORIDE 5 MG/1
5 TABLET ORAL ONCE
Refills: 0 | Status: DISCONTINUED | OUTPATIENT
Start: 2023-09-15 | End: 2023-09-15

## 2023-09-15 RX ORDER — ACETAMINOPHEN 500 MG
1000 TABLET ORAL ONCE
Refills: 0 | Status: COMPLETED | OUTPATIENT
Start: 2023-09-15 | End: 2023-09-15

## 2023-09-15 RX ORDER — TAMSULOSIN HYDROCHLORIDE 0.4 MG/1
1 CAPSULE ORAL
Refills: 0 | DISCHARGE

## 2023-09-15 RX ORDER — HYDROMORPHONE HYDROCHLORIDE 2 MG/ML
0.5 INJECTION INTRAMUSCULAR; INTRAVENOUS; SUBCUTANEOUS
Refills: 0 | Status: DISCONTINUED | OUTPATIENT
Start: 2023-09-15 | End: 2023-09-16

## 2023-09-15 RX ADMIN — HYDROMORPHONE HYDROCHLORIDE 0.5 MILLIGRAM(S): 2 INJECTION INTRAMUSCULAR; INTRAVENOUS; SUBCUTANEOUS at 19:05

## 2023-09-15 RX ADMIN — HYDROMORPHONE HYDROCHLORIDE 0.5 MILLIGRAM(S): 2 INJECTION INTRAMUSCULAR; INTRAVENOUS; SUBCUTANEOUS at 23:30

## 2023-09-15 RX ADMIN — Medication 400 MILLIGRAM(S): at 21:44

## 2023-09-15 RX ADMIN — Medication 2 MILLIGRAM(S): at 22:18

## 2023-09-15 RX ADMIN — OXYCODONE HYDROCHLORIDE 5 MILLIGRAM(S): 5 TABLET ORAL at 21:44

## 2023-09-15 RX ADMIN — Medication 1000 MILLIGRAM(S): at 21:55

## 2023-09-15 RX ADMIN — Medication 2 MILLIGRAM(S): at 21:30

## 2023-09-15 RX ADMIN — ONDANSETRON 4 MILLIGRAM(S): 8 TABLET, FILM COATED ORAL at 22:42

## 2023-09-15 RX ADMIN — HYDROMORPHONE HYDROCHLORIDE 0.5 MILLIGRAM(S): 2 INJECTION INTRAMUSCULAR; INTRAVENOUS; SUBCUTANEOUS at 18:30

## 2023-09-15 RX ADMIN — HYDROMORPHONE HYDROCHLORIDE 0.5 MILLIGRAM(S): 2 INJECTION INTRAMUSCULAR; INTRAVENOUS; SUBCUTANEOUS at 18:45

## 2023-09-15 RX ADMIN — OXYCODONE HYDROCHLORIDE 5 MILLIGRAM(S): 5 TABLET ORAL at 21:55

## 2023-09-15 RX ADMIN — HYDROMORPHONE HYDROCHLORIDE 0.5 MILLIGRAM(S): 2 INJECTION INTRAMUSCULAR; INTRAVENOUS; SUBCUTANEOUS at 19:30

## 2023-09-15 RX ADMIN — Medication 2 MILLIGRAM(S): at 21:45

## 2023-09-15 NOTE — PRE-ANESTHESIA EVALUATION ADULT - NSANTHADDINFOFT_GEN_ALL_CORE
GA planned; Risks discussed including dental injury and more serious complications including cardiac and pulmonary complications and stroke.  Patient expresses understanding with regard to risks of anesthesia and wishes to proceed.    Art line GA planned; Risks discussed including dental injury and more serious complications including cardiac and pulmonary complications and stroke.  Patient expresses understanding with regard to risks of anesthesia and wishes to proceed.    Pre-induction Art line    Did not take AM metoprolol; will give IV metoprolol preoperatively    Elevated risk for perioperative event    late entry due to patient care

## 2023-09-15 NOTE — ASU DISCHARGE PLAN (ADULT/PEDIATRIC) - NS MD DC FALL RISK RISK
For information on Fall & Injury Prevention, visit: [email or web address] OR  [email or web address] OR  https://www.cdc.gov/steadi/patient.html

## 2023-09-15 NOTE — ASU PATIENT PROFILE, ADULT - FALL HARM RISK - HARM RISK INTERVENTIONS
Assistance with ambulation/Assistance OOB with selected safe patient handling equipment/Communicate Risk of Fall with Harm to all staff/Discuss with provider need for PT consult/Monitor for mental status changes/Monitor gait and stability/Provide patient with walking aids - walker, cane, crutches/Reinforce activity limits and safety measures with patient and family/Tailored Fall Risk Interventions/Visual Cue: Yellow wristband and red socks/Bed in lowest position, wheels locked, appropriate side rails in place/Call bell, personal items and telephone in reach/Instruct patient to call for assistance before getting out of bed or chair/Non-slip footwear when patient is out of bed/[place] to call system/Physically safe environment - no spills, clutter or unnecessary equipment/Purposeful Proactive Rounding/Room/bathroom lighting operational, light cord in reach

## 2023-09-15 NOTE — PRE-ANESTHESIA EVALUATION ADULT - NSANTHPMHFT_GEN_ALL_CORE
cardiology note reviewed  chronic diastolic dysfunction  echo ef 50-55%, gr 2 diastolic dysfunction, severe LVH, severe LAE, mild aortic sclerosis, mild TR, mild MR    high risk for surgery    chronic SOB but at baseline, no orthopnea at this time

## 2023-09-15 NOTE — ASU DISCHARGE PLAN (ADULT/PEDIATRIC) - CARE PROVIDER_API CALL
Adele Arnold  Neurosurgery  [address] [place] [address], [address] [address]  Phone: [phone]  Fax: [phone]  Follow Up Time:

## 2023-09-16 ENCOUNTER — TRANSCRIPTION ENCOUNTER (OUTPATIENT)
Age: 68
End: 2023-09-16

## 2023-09-16 VITALS
HEART RATE: 78 BPM | DIASTOLIC BLOOD PRESSURE: 76 MMHG | SYSTOLIC BLOOD PRESSURE: 160 MMHG | TEMPERATURE: 97 F | RESPIRATION RATE: 18 BRPM

## 2023-09-16 LAB
ALBUMIN SERPL ELPH-MCNC: 4 G/DL — SIGNIFICANT CHANGE UP (ref 3.5–5.2)
ALP SERPL-CCNC: 71 U/L — SIGNIFICANT CHANGE UP (ref 30–115)
ALT FLD-CCNC: 25 U/L — SIGNIFICANT CHANGE UP (ref 0–41)
ANION GAP SERPL CALC-SCNC: 12 MMOL/L — SIGNIFICANT CHANGE UP (ref 7–14)
ANION GAP SERPL CALC-SCNC: 9 MMOL/L — SIGNIFICANT CHANGE UP (ref 7–14)
AST SERPL-CCNC: 25 U/L — SIGNIFICANT CHANGE UP (ref 0–41)
BASOPHILS # BLD AUTO: 0 K/UL — SIGNIFICANT CHANGE UP (ref 0–0.2)
BASOPHILS # BLD AUTO: 0.01 K/UL — SIGNIFICANT CHANGE UP (ref 0–0.2)
BASOPHILS NFR BLD AUTO: 0 % — SIGNIFICANT CHANGE UP (ref 0–1)
BASOPHILS NFR BLD AUTO: 0.2 % — SIGNIFICANT CHANGE UP (ref 0–1)
BILIRUB SERPL-MCNC: 0.4 MG/DL — SIGNIFICANT CHANGE UP (ref 0.2–1.2)
BUN SERPL-MCNC: 18 MG/DL — SIGNIFICANT CHANGE UP (ref 10–20)
BUN SERPL-MCNC: 20 MG/DL — SIGNIFICANT CHANGE UP (ref 10–20)
CALCIUM SERPL-MCNC: 10.2 MG/DL — SIGNIFICANT CHANGE UP (ref 8.4–10.5)
CALCIUM SERPL-MCNC: 9.8 MG/DL — SIGNIFICANT CHANGE UP (ref 8.4–10.5)
CHLORIDE SERPL-SCNC: 105 MMOL/L — SIGNIFICANT CHANGE UP (ref 98–110)
CHLORIDE SERPL-SCNC: 106 MMOL/L — SIGNIFICANT CHANGE UP (ref 98–110)
CO2 SERPL-SCNC: 20 MMOL/L — SIGNIFICANT CHANGE UP (ref 17–32)
CO2 SERPL-SCNC: 21 MMOL/L — SIGNIFICANT CHANGE UP (ref 17–32)
CREAT SERPL-MCNC: 1.3 MG/DL — SIGNIFICANT CHANGE UP (ref 0.7–1.5)
CREAT SERPL-MCNC: 1.3 MG/DL — SIGNIFICANT CHANGE UP (ref 0.7–1.5)
EGFR: 61 ML/MIN/1.73M2 — SIGNIFICANT CHANGE UP
EGFR: 61 ML/MIN/1.73M2 — SIGNIFICANT CHANGE UP
EOSINOPHIL # BLD AUTO: 0 K/UL — SIGNIFICANT CHANGE UP (ref 0–0.7)
EOSINOPHIL # BLD AUTO: 0 K/UL — SIGNIFICANT CHANGE UP (ref 0–0.7)
EOSINOPHIL NFR BLD AUTO: 0 % — SIGNIFICANT CHANGE UP (ref 0–8)
EOSINOPHIL NFR BLD AUTO: 0 % — SIGNIFICANT CHANGE UP (ref 0–8)
GLUCOSE SERPL-MCNC: 123 MG/DL — HIGH (ref 70–99)
GLUCOSE SERPL-MCNC: 151 MG/DL — HIGH (ref 70–99)
HCT VFR BLD CALC: 36.4 % — LOW (ref 42–52)
HCT VFR BLD CALC: 38.7 % — LOW (ref 42–52)
HGB BLD-MCNC: 11.5 G/DL — LOW (ref 14–18)
HGB BLD-MCNC: 12.2 G/DL — LOW (ref 14–18)
IMM GRANULOCYTES NFR BLD AUTO: 0.5 % — HIGH (ref 0.1–0.3)
LYMPHOCYTES # BLD AUTO: 0.15 K/UL — LOW (ref 1.2–3.4)
LYMPHOCYTES # BLD AUTO: 0.4 K/UL — LOW (ref 1.2–3.4)
LYMPHOCYTES # BLD AUTO: 5.3 % — LOW (ref 20.5–51.1)
LYMPHOCYTES # BLD AUTO: 9.3 % — LOW (ref 20.5–51.1)
MCHC RBC-ENTMCNC: 28.9 PG — SIGNIFICANT CHANGE UP (ref 27–31)
MCHC RBC-ENTMCNC: 29.1 PG — SIGNIFICANT CHANGE UP (ref 27–31)
MCHC RBC-ENTMCNC: 31.5 G/DL — LOW (ref 32–37)
MCHC RBC-ENTMCNC: 31.6 G/DL — LOW (ref 32–37)
MCV RBC AUTO: 91.5 FL — SIGNIFICANT CHANGE UP (ref 80–94)
MCV RBC AUTO: 92.4 FL — SIGNIFICANT CHANGE UP (ref 80–94)
MONOCYTES # BLD AUTO: 0.1 K/UL — SIGNIFICANT CHANGE UP (ref 0.1–0.6)
MONOCYTES # BLD AUTO: 0.2 K/UL — SIGNIFICANT CHANGE UP (ref 0.1–0.6)
MONOCYTES NFR BLD AUTO: 3.5 % — SIGNIFICANT CHANGE UP (ref 1.7–9.3)
MONOCYTES NFR BLD AUTO: 4.6 % — SIGNIFICANT CHANGE UP (ref 1.7–9.3)
NEUTROPHILS # BLD AUTO: 2.63 K/UL — SIGNIFICANT CHANGE UP (ref 1.4–6.5)
NEUTROPHILS # BLD AUTO: 3.68 K/UL — SIGNIFICANT CHANGE UP (ref 1.4–6.5)
NEUTROPHILS NFR BLD AUTO: 85.4 % — HIGH (ref 42.2–75.2)
NEUTROPHILS NFR BLD AUTO: 91.2 % — HIGH (ref 42.2–75.2)
NRBC # BLD: 0 /100 WBCS — SIGNIFICANT CHANGE UP (ref 0–0)
PLATELET # BLD AUTO: 52 K/UL — LOW (ref 130–400)
PLATELET # BLD AUTO: 52 K/UL — LOW (ref 130–400)
PMV BLD: 14 FL — HIGH (ref 7.4–10.4)
PMV BLD: SIGNIFICANT CHANGE UP (ref 7.4–10.4)
POTASSIUM SERPL-MCNC: 4.1 MMOL/L — SIGNIFICANT CHANGE UP (ref 3.5–5)
POTASSIUM SERPL-MCNC: 4.8 MMOL/L — SIGNIFICANT CHANGE UP (ref 3.5–5)
POTASSIUM SERPL-SCNC: 4.1 MMOL/L — SIGNIFICANT CHANGE UP (ref 3.5–5)
POTASSIUM SERPL-SCNC: 4.8 MMOL/L — SIGNIFICANT CHANGE UP (ref 3.5–5)
PROT SERPL-MCNC: 5.7 G/DL — LOW (ref 6–8)
RBC # BLD: 3.98 M/UL — LOW (ref 4.7–6.1)
RBC # BLD: 4.19 M/UL — LOW (ref 4.7–6.1)
RBC # FLD: 14.2 % — SIGNIFICANT CHANGE UP (ref 11.5–14.5)
RBC # FLD: 14.4 % — SIGNIFICANT CHANGE UP (ref 11.5–14.5)
SODIUM SERPL-SCNC: 135 MMOL/L — SIGNIFICANT CHANGE UP (ref 135–146)
SODIUM SERPL-SCNC: 138 MMOL/L — SIGNIFICANT CHANGE UP (ref 135–146)
WBC # BLD: 2.88 K/UL — LOW (ref 4.8–10.8)
WBC # BLD: 4.31 K/UL — LOW (ref 4.8–10.8)
WBC # FLD AUTO: 2.88 K/UL — LOW (ref 4.8–10.8)
WBC # FLD AUTO: 4.31 K/UL — LOW (ref 4.8–10.8)

## 2023-09-16 PROCEDURE — 99232 SBSQ HOSP IP/OBS MODERATE 35: CPT

## 2023-09-16 RX ORDER — OXYCODONE AND ACETAMINOPHEN 5; 325 MG/1; MG/1
2 TABLET ORAL EVERY 4 HOURS
Refills: 0 | Status: DISCONTINUED | OUTPATIENT
Start: 2023-09-16 | End: 2023-09-16

## 2023-09-16 RX ORDER — METOPROLOL TARTRATE 50 MG
100 TABLET ORAL DAILY
Refills: 0 | Status: DISCONTINUED | OUTPATIENT
Start: 2023-09-16 | End: 2023-09-16

## 2023-09-16 RX ORDER — OXYCODONE AND ACETAMINOPHEN 5; 325 MG/1; MG/1
1 TABLET ORAL EVERY 4 HOURS
Refills: 0 | Status: DISCONTINUED | OUTPATIENT
Start: 2023-09-16 | End: 2023-09-16

## 2023-09-16 RX ORDER — HEPARIN SODIUM 5000 [USP'U]/ML
5000 INJECTION INTRAVENOUS; SUBCUTANEOUS EVERY 8 HOURS
Refills: 0 | Status: DISCONTINUED | OUTPATIENT
Start: 2023-09-16 | End: 2023-09-16

## 2023-09-16 RX ORDER — MORPHINE SULFATE 50 MG/1
2 CAPSULE, EXTENDED RELEASE ORAL
Refills: 0 | Status: DISCONTINUED | OUTPATIENT
Start: 2023-09-16 | End: 2023-09-16

## 2023-09-16 RX ADMIN — Medication 100 MILLIGRAM(S): at 06:09

## 2023-09-16 RX ADMIN — MORPHINE SULFATE 2 MILLIGRAM(S): 50 CAPSULE, EXTENDED RELEASE ORAL at 00:30

## 2023-09-16 RX ADMIN — MYCOPHENOLATE MOFETIL 500 MILLIGRAM(S): 250 CAPSULE ORAL at 06:10

## 2023-09-16 RX ADMIN — HYDROMORPHONE HYDROCHLORIDE 1 MILLIGRAM(S): 2 INJECTION INTRAMUSCULAR; INTRAVENOUS; SUBCUTANEOUS at 17:04

## 2023-09-16 RX ADMIN — HYDROMORPHONE HYDROCHLORIDE 1 MILLIGRAM(S): 2 INJECTION INTRAMUSCULAR; INTRAVENOUS; SUBCUTANEOUS at 06:07

## 2023-09-16 RX ADMIN — HYDROMORPHONE HYDROCHLORIDE 1 MILLIGRAM(S): 2 INJECTION INTRAMUSCULAR; INTRAVENOUS; SUBCUTANEOUS at 10:43

## 2023-09-16 RX ADMIN — Medication 100 MILLIGRAM(S): at 12:36

## 2023-09-16 RX ADMIN — OXYCODONE AND ACETAMINOPHEN 2 TABLET(S): 5; 325 TABLET ORAL at 01:00

## 2023-09-16 RX ADMIN — MORPHINE SULFATE 2 MILLIGRAM(S): 50 CAPSULE, EXTENDED RELEASE ORAL at 01:00

## 2023-09-16 RX ADMIN — HEPARIN SODIUM 5000 UNIT(S): 5000 INJECTION INTRAVENOUS; SUBCUTANEOUS at 12:36

## 2023-09-16 RX ADMIN — TACROLIMUS 2 MILLIGRAM(S): 5 CAPSULE ORAL at 06:11

## 2023-09-16 RX ADMIN — Medication 1 TABLET(S): at 12:36

## 2023-09-16 RX ADMIN — MYCOPHENOLATE MOFETIL 500 MILLIGRAM(S): 250 CAPSULE ORAL at 17:25

## 2023-09-16 RX ADMIN — TACROLIMUS 2 MILLIGRAM(S): 5 CAPSULE ORAL at 17:25

## 2023-09-16 RX ADMIN — METHOCARBAMOL 750 MILLIGRAM(S): 500 TABLET, FILM COATED ORAL at 12:37

## 2023-09-16 RX ADMIN — OXYCODONE AND ACETAMINOPHEN 2 TABLET(S): 5; 325 TABLET ORAL at 01:30

## 2023-09-16 RX ADMIN — Medication 40 MILLIGRAM(S): at 06:11

## 2023-09-16 RX ADMIN — CALCITRIOL 0.5 MICROGRAM(S): 0.5 CAPSULE ORAL at 12:37

## 2023-09-16 RX ADMIN — SODIUM CHLORIDE 75 MILLILITER(S): 9 INJECTION INTRAMUSCULAR; INTRAVENOUS; SUBCUTANEOUS at 06:58

## 2023-09-16 RX ADMIN — HYDROMORPHONE HYDROCHLORIDE 0.5 MILLIGRAM(S): 2 INJECTION INTRAMUSCULAR; INTRAVENOUS; SUBCUTANEOUS at 00:00

## 2023-09-16 RX ADMIN — METHOCARBAMOL 750 MILLIGRAM(S): 500 TABLET, FILM COATED ORAL at 06:11

## 2023-09-16 NOTE — DISCHARGE NOTE PROVIDER - HOSPITAL COURSE
68 yr old male that presented for a Left L5-S1 laminoforaminotomy on 9/15 pt did well but post operatively his heart rate went into the 120's he was admittied to the tele unit and cardiac fellow was consulted , pt had a bedside echo which was normal and his HR came down to 78 pt was seen also by hospitalist . Pt was discharged home POD #1 and told to continue his preop metoprolol and follow up with his Cardiologist  within 2 wks

## 2023-09-16 NOTE — DISCHARGE NOTE PROVIDER - CARE PROVIDER_API CALL
Adele Arnold  Neurosurgery  [address] [place] [address], [address]  [place] [address]  Phone: [phone]  Fax: [phone]  Follow Up Time:     Kin Low  Interventional Cardiology  [address] [place] [address], [address]  [place] [address]  Phone: [phone]  Fax: [phone]  Follow Up Time:

## 2023-09-16 NOTE — DISCHARGE NOTE PROVIDER - NSDCFUSCHEDAPPT_GEN_ALL_CORE_FT
Adele Arnold Physician [facility]  NEUROSURG [address] [facility]  Scheduled Appointment: 10/02/2023

## 2023-09-16 NOTE — DISCHARGE NOTE PROVIDER - NSDCFUADDINST_GEN_ALL_CORE_FT
May shower do not scrub incision site , do not submerge into a bathtub or a pool  may leave incision site open to the air   Do not drive or drink alcohol while taking pain medications   Call Dr Arnold if you experience worsening symptoms , drainage from the incision or fevers > 101   Do not do strenuous activity or  heavy objects   follow up with Dr Arnold as an outpatient and with your cardiologist Dr Low within 2 weeks

## 2023-09-16 NOTE — CONSULT NOTE ADULT - SUBJECTIVE AND OBJECTIVE BOX
HPI:67 y/o  m  ptn admitted  to [facility]  b/o  lbp  sp  lt  sij  sx pod#1 ptn referred  to acute  rehab  for eval and  tx  ptn  pmh  as  below  ptn pta  was  indep  in adl  tf and  ambulation  pt  lab ,imaging  and  medical notes  are appreciated and reviewed     PTN  REFERRED TO ACUTE  REHAB  FOR  EVAL AND  TX   PAST MEDICAL & SURGICAL HISTORY:  CKD (chronic kidney disease)      ESRD (end stage renal disease)      HTN (hypertension)      HLD (hyperlipidemia)      Atrial fibrillation  on eliquis      COPD, mild  not on O2      Peripheral neuropathy  unclear cause      Lymphoma  ?questionable, not treated, not followed      Melanoma  R eye, s/p laser      Cirrhosis  possibly related to hepC      Breast cancer      Legally blind      Stroke      History of kidney transplant  3 years ago      S/P arteriovenous (AV) fistula creation  prior old fistula in R arm      S/P lumpectomy, right breast          Hospital Course:    TODAY'S SUBJECTIVE & REVIEW OF SYMPTOMS:     Constitutional WNL   Cardio WNL   Resp WNL   GI WNL  Heme WNL  Endo WNL  Skin WNL  MSK WNL  Neuro WNL  Cognitive WNL  Psych WNL      MEDICATIONS  (STANDING):  calcitriol   Capsule 0.5 MICROGram(s) Oral daily  ceFAZolin   IVPB 1000 milliGRAM(s) IV Intermittent every 8 hours  furosemide    Tablet 40 milliGRAM(s) Oral daily  heparin   Injectable 5000 Unit(s) SubCutaneous every 8 hours  methocarbamol 750 milliGRAM(s) Oral every 8 hours  metoprolol succinate  milliGRAM(s) Oral daily  multivitamin 1 Tablet(s) Oral daily  mycophenolate mofetil 500 milliGRAM(s) Oral two times a day  senna 2 Tablet(s) Oral at bedtime  sodium chloride 0.9%. 1000 milliLiter(s) (75 mL/Hr) IV Continuous <Continuous>  tacrolimus 2 milliGRAM(s) Oral every 12 hours    MEDICATIONS  (PRN):  acetaminophen     Tablet .. 650 milliGRAM(s) Oral every 6 hours PRN Temp greater or equal to 38C (100.4F), Mild Pain (1 - 3)  HYDROmorphone  Injectable 0.5 milliGRAM(s) IV Push every 10 minutes PRN Moderate Pain (4 - 6)  HYDROmorphone  Injectable 1 milliGRAM(s) IV Push every 10 minutes PRN Severe Pain (7 - 10)  metoprolol tartrate Injectable 2 milliGRAM(s) IV Push every 15 minutes PRN rate control  morphine  - Injectable 2 milliGRAM(s) IV Push every 3 hours PRN Severe Pain (7 - 10)  oxycodone    5 mG/acetaminophen 325 mG 1 Tablet(s) Oral every 4 hours PRN Mild Pain (1 - 3)  oxycodone    5 mG/acetaminophen 325 mG 2 Tablet(s) Oral every 4 hours PRN Moderate Pain (4 - 6)      FAMILY HISTORY:  FH: dementia  mother, alive    FHx: breast cancer  sister ( in 30s)        Allergies    No Known Drug Allergies  Rifuxen (Swelling)    Intolerances        SOCIAL HISTORY:    [  ] Etoh  [  ] Smoking  [  ] Substance abuse     Home Environment:  [x  ] Home Alone  [  ] Lives with Family  [ x ] Home Health Aid 6  hrs  x  7 days     Dwelling:  [ x ] Apartment  [  ] Private House  [  ] Adult Home  [  ] Skilled Nursing Facility      [  ] Short Term  [  ] Long Term  [ - ] Stairs       Elevator [  ]    FUNCTIONAL STATUS PTA: (Check all that apply)  Ambulation: [ x  ]Independent    [  ] Dependent     [  ] Non-Ambulatory  Assistive Device: [  x] SA Cane  [  ]  Q Cane  [  ] Walker  [  ]  Wheelchair  ADL : [ x ] Independent  [  x]  Dependent       Vital Signs Last 24 Hrs  T(C): 35.9 (16 Sep 2023 06:09), Max: 36.7 (16 Sep 2023 01:00)  T(F): 96.7 (16 Sep 2023 06:09), Max: 98.1 (16 Sep 2023 01:00)  HR: 75 (16 Sep 2023 06:09) (75 - 125)  BP: 162/85 (16 Sep 2023 06:09) (106/55 - 166/92)  BP(mean): --  RR: 18 (16 Sep 2023 06:09) (14 - 20)  SpO2: 100% (16 Sep 2023 06:09) (93% - 100%)    Parameters below as of 16 Sep 2023 01:00  Patient On (Oxygen Delivery Method): room air          PHYSICAL EXAM: Alert & Oriented X3  GENERAL: NAD, well-groomed, well-developed  HEAD:  Atraumatic, Normocephalic  EYES: EOMI, PERRLA, conjunctiva and sclera clear  NECK: Supple, No JVD, Normal thyroid  CHEST/LUNG: Clear to percussion bilaterally; No rales, rhonchi, wheezing, or rubs  HEART: Regular rate and rhythm; No murmurs, rubs, or gallops  ABDOMEN: Soft, Nontender, Nondistended; Bowel sounds present  EXTREMITIES:  2+ Peripheral Pulses, No clubbing, cyanosis, or edema    NERVOUS SYSTEM:  Cranial Nerves 2-12 intact [ x ] Abnormal  [  ]  ROM: WFL all extremities [ x]  Abnormal [  ]  Motor Strength: WFL all extremities  [ x ]  Abnormal [  ]  Sensation: intact to light touch [  x] Abnormal [  ]  Reflexes: Symmetric [ x ]  Abnormal [  ]    FUNCTIONAL STATUS:  Bed Mobility: Independent [  ]  Supervision [  ]  Needs Assistance [  ]  N/A [x  ]  Transfers: Independent [  ]  Supervision [  ]  Needs Assistance [  ]  N/A [x  ]   Ambulation: Independent [  ]  Supervision [  ]  Needs Assistance [  ]  N/A [x  ]  ADL: Independent [  ] Requires Assistance [  ] N/A [x  ]  SEE PT/OT IE NOTES    LABS:                        12.2   2.88  )-----------( 52       ( 16 Sep 2023 02:30 )             38.7     09-16    138  |  106  |  20  ----------------------------<  151<H>  4.8   |  20  |  1.3    Ca    10.2      16 Sep 2023 02:30        Urinalysis Basic - ( 16 Sep 2023 02:30 )    Color: x / Appearance: x / SG: x / pH: x  Gluc: 151 mg/dL / Ketone: x  / Bili: x / Urobili: x   Blood: x / Protein: x / Nitrite: x   Leuk Esterase: x / RBC: x / WBC x   Sq Epi: x / Non Sq Epi: x / Bacteria: x        RADIOLOGY & ADDITIONAL STUDIES:< from: CT Abdomen and Pelvis No Cont (22 @ 18:27) >  1.  No enlarged abdominal or pelvic lymph nodes.  2.  Mineral perivesicular fat stranding with small diverticulum arising   from the left posterior wall of the urinary bladder, possibly reflecting   underlying cystitis; correlation with urinalysis may be of use.  3.  Atrophic native kidneys and left lower quadrant renal transplant.    < end of copied text >      Assesment: HPI:69 y/o  m  ptn admitted  to [facility]  b/o  lbp  sp  lt  l5-s1 foraminotomy   sx pod#1 ptn referred  to acute  rehab  for eval and  tx  ptn  pmh  as  below  ptn pta  was  indep  in adl  tf and  ambulation  pt  lab ,imaging  and  medical notes  are appreciated and reviewed     PTN  REFERRED TO ACUTE  REHAB  FOR  EVAL AND  TX   PAST MEDICAL & SURGICAL HISTORY:  CKD (chronic kidney disease)      ESRD (end stage renal disease)      HTN (hypertension)      HLD (hyperlipidemia)      Atrial fibrillation  on eliquis      COPD, mild  not on O2      Peripheral neuropathy  unclear cause      Lymphoma  ?questionable, not treated, not followed      Melanoma  R eye, s/p laser      Cirrhosis  possibly related to hepC      Breast cancer      Legally blind      Stroke      History of kidney transplant  3 years ago      S/P arteriovenous (AV) fistula creation  prior old fistula in R arm      S/P lumpectomy, right breast          Hospital Course:    TODAY'S SUBJECTIVE & REVIEW OF SYMPTOMS:     Constitutional WNL   Cardio WNL   Resp WNL   GI WNL  Heme WNL  Endo WNL  Skin WNL  MSK WNL  Neuro WNL  Cognitive WNL  Psych WNL      MEDICATIONS  (STANDING):  calcitriol   Capsule 0.5 MICROGram(s) Oral daily  ceFAZolin   IVPB 1000 milliGRAM(s) IV Intermittent every 8 hours  furosemide    Tablet 40 milliGRAM(s) Oral daily  heparin   Injectable 5000 Unit(s) SubCutaneous every 8 hours  methocarbamol 750 milliGRAM(s) Oral every 8 hours  metoprolol succinate  milliGRAM(s) Oral daily  multivitamin 1 Tablet(s) Oral daily  mycophenolate mofetil 500 milliGRAM(s) Oral two times a day  senna 2 Tablet(s) Oral at bedtime  sodium chloride 0.9%. 1000 milliLiter(s) (75 mL/Hr) IV Continuous <Continuous>  tacrolimus 2 milliGRAM(s) Oral every 12 hours    MEDICATIONS  (PRN):  acetaminophen     Tablet .. 650 milliGRAM(s) Oral every 6 hours PRN Temp greater or equal to 38C (100.4F), Mild Pain (1 - 3)  HYDROmorphone  Injectable 0.5 milliGRAM(s) IV Push every 10 minutes PRN Moderate Pain (4 - 6)  HYDROmorphone  Injectable 1 milliGRAM(s) IV Push every 10 minutes PRN Severe Pain (7 - 10)  metoprolol tartrate Injectable 2 milliGRAM(s) IV Push every 15 minutes PRN rate control  morphine  - Injectable 2 milliGRAM(s) IV Push every 3 hours PRN Severe Pain (7 - 10)  oxycodone    5 mG/acetaminophen 325 mG 1 Tablet(s) Oral every 4 hours PRN Mild Pain (1 - 3)  oxycodone    5 mG/acetaminophen 325 mG 2 Tablet(s) Oral every 4 hours PRN Moderate Pain (4 - 6)      FAMILY HISTORY:  FH: dementia  mother, alive    FHx: breast cancer  sister ( in 30s)        Allergies    No Known Drug Allergies  Rifuxen (Swelling)    Intolerances        SOCIAL HISTORY:    [  ] Etoh  [  ] Smoking  [  ] Substance abuse     Home Environment:  [x  ] Home Alone  [  ] Lives with Family  [ x ] Home Health Aid 6  hrs  x  7 days     Dwelling:  [ x ] Apartment  [  ] Private House  [  ] Adult Home  [  ] Skilled Nursing Facility      [  ] Short Term  [  ] Long Term  [ - ] Stairs       Elevator [  ]    FUNCTIONAL STATUS PTA: (Check all that apply)  Ambulation: [ x  ]Independent    [  ] Dependent     [  ] Non-Ambulatory  Assistive Device: [  x] SA Cane  [  ]  Q Cane  [  ] Walker  [  ]  Wheelchair  ADL : [ x ] Independent  [  x]  Dependent       Vital Signs Last 24 Hrs  T(C): 35.9 (16 Sep 2023 06:09), Max: 36.7 (16 Sep 2023 01:00)  T(F): 96.7 (16 Sep 2023 06:09), Max: 98.1 (16 Sep 2023 01:00)  HR: 75 (16 Sep 2023 06:09) (75 - 125)  BP: 162/85 (16 Sep 2023 06:09) (106/55 - 166/92)  BP(mean): --  RR: 18 (16 Sep 2023 06:09) (14 - 20)  SpO2: 100% (16 Sep 2023 06:09) (93% - 100%)    Parameters below as of 16 Sep 2023 01:00  Patient On (Oxygen Delivery Method): room air          PHYSICAL EXAM: Alert & Oriented X3  GENERAL: NAD, well-groomed, well-developed  HEAD:  Atraumatic, Normocephalic  EYES: EOMI, PERRLA, conjunctiva and sclera clear  NECK: Supple, No JVD, Normal thyroid  CHEST/LUNG: Clear to percussion bilaterally; No rales, rhonchi, wheezing, or rubs  HEART: Regular rate and rhythm; No murmurs, rubs, or gallops  ABDOMEN: Soft, Nontender, Nondistended; Bowel sounds present  EXTREMITIES:  2+ Peripheral Pulses, No clubbing, cyanosis, or edema    NERVOUS SYSTEM:  Cranial Nerves 2-12 intact [ x ] Abnormal  [  ]  ROM: WFL all extremities [ x]  Abnormal [  ]  Motor Strength: WFL all extremities  [ x ]  Abnormal [  ]  Sensation: intact to light touch [  x] Abnormal [  ]  Reflexes: Symmetric [ x ]  Abnormal [  ]    FUNCTIONAL STATUS:  Bed Mobility: Independent [  ]  Supervision [  ]  Needs Assistance [  ]  N/A [x  ]  Transfers: Independent [  ]  Supervision [  ]  Needs Assistance [  ]  N/A [x  ]   Ambulation: Independent [  ]  Supervision [  ]  Needs Assistance [  ]  N/A [x  ]  ADL: Independent [  ] Requires Assistance [  ] N/A [x  ]  SEE PT/OT IE NOTES    LABS:                        12.2   2.88  )-----------( 52       ( 16 Sep 2023 02:30 )             38.7     09-16    138  |  106  |  20  ----------------------------<  151<H>  4.8   |  20  |  1.3    Ca    10.2      16 Sep 2023 02:30        Urinalysis Basic - ( 16 Sep 2023 02:30 )    Color: x / Appearance: x / SG: x / pH: x  Gluc: 151 mg/dL / Ketone: x  / Bili: x / Urobili: x   Blood: x / Protein: x / Nitrite: x   Leuk Esterase: x / RBC: x / WBC x   Sq Epi: x / Non Sq Epi: x / Bacteria: x        RADIOLOGY & ADDITIONAL STUDIES:< from: CT Abdomen and Pelvis No Cont (22 @ 18:27) >  1.  No enlarged abdominal or pelvic lymph nodes.  2.  Mineral perivesicular fat stranding with small diverticulum arising   from the left posterior wall of the urinary bladder, possibly reflecting   underlying cystitis; correlation with urinalysis may be of use.  3.  Atrophic native kidneys and left lower quadrant renal transplant.    < end of copied text >      Assesment:

## 2023-09-16 NOTE — DISCHARGE NOTE PROVIDER - CARE PROVIDERS DIRECT ADDRESSES
[email or web address]

## 2023-09-16 NOTE — PHYSICAL THERAPY INITIAL EVALUATION ADULT - SPECIFY REASON(S)
1100 am Pt approached at bedside for PT initial evaluation ; s/p lumbar foramiinotomy POD 1. Pt however, c/o severe nausea, willing to participate with therapy

## 2023-09-16 NOTE — PROGRESS NOTE ADULT - SUBJECTIVE AND OBJECTIVE BOX
DAVONTE CHOU 68y Male  MRN#: [id number]     SUBJECTIVE  Patient is a 68y old Male who presents with a chief complaint of Currently admitted to medicine with the primary diagnosis of endoscopic foraminotomy.compliacted with afib with rvr   no overnight events  denies pain   tolerating po   + BM     OBJECTIVE  PAST MEDICAL & SURGICAL HISTORY  CKD (chronic kidney disease)    ESRD (end stage renal disease)    HTN (hypertension)    HLD (hyperlipidemia)    Atrial fibrillation  on eliquis    COPD, mild  not on O2    Peripheral neuropathy  unclear cause    Lymphoma  ?questionable, not treated, not followed    Melanoma  R eye, s/p laser    Cirrhosis  possibly related to hepC    Breast cancer    Legally blind    Stroke    History of kidney transplant  3 years ago    S/P arteriovenous (AV) fistula creation  prior old fistula in R arm    S/P lumpectomy, right breast      ALLERGIES:  No Known Drug Allergies  Rifuxen (Swelling)    MEDICATIONS:  STANDING MEDICATIONS  calcitriol   Capsule 0.5 MICROGram(s) Oral daily  ceFAZolin   IVPB 1000 milliGRAM(s) IV Intermittent every 8 hours  furosemide    Tablet 40 milliGRAM(s) Oral daily  heparin   Injectable 5000 Unit(s) SubCutaneous every 8 hours  methocarbamol 750 milliGRAM(s) Oral every 8 hours  metoprolol succinate  milliGRAM(s) Oral daily  multivitamin 1 Tablet(s) Oral daily  mycophenolate mofetil 500 milliGRAM(s) Oral two times a day  senna 2 Tablet(s) Oral at bedtime  sodium chloride 0.9%. 1000 milliLiter(s) IV Continuous <Continuous>  tacrolimus 2 milliGRAM(s) Oral every 12 hours    PRN MEDICATIONS  acetaminophen     Tablet .. 650 milliGRAM(s) Oral every 6 hours PRN  metoprolol tartrate Injectable 2 milliGRAM(s) IV Push every 15 minutes PRN  morphine  - Injectable 2 milliGRAM(s) IV Push every 3 hours PRN  oxycodone    5 mG/acetaminophen 325 mG 1 Tablet(s) Oral every 4 hours PRN  oxycodone    5 mG/acetaminophen 325 mG 2 Tablet(s) Oral every 4 hours PRN      VITAL SIGNS: Last 24 Hours  T(C): 35.9 (16 Sep 2023 06:09), Max: 36.7 (16 Sep 2023 01:00)  T(F): 96.7 (16 Sep 2023 06:09), Max: 98.1 (16 Sep 2023 01:00)  HR: 75 (16 Sep 2023 06:09) (75 - 125)  BP: 162/85 (16 Sep 2023 06:09) (106/55 - 166/92)  BP(mean): --  RR: 18 (16 Sep 2023 06:09) (14 - 20)  SpO2: 100% (16 Sep 2023 06:09) (93% - 100%)    LABS:                        12.2   2.88  )-----------( 52       ( 16 Sep 2023 02:30 )             38.7     09-16    138  |  106  |  20  ----------------------------<  151<H>  4.8   |  20  |  1.3    Ca    10.2      16 Sep 2023 02:30        Urinalysis Basic - ( 16 Sep 2023 02:30 )    Color: x / Appearance: x / SG: x / pH: x  Gluc: 151 mg/dL / Ketone: x  / Bili: x / Urobili: x   Blood: x / Protein: x / Nitrite: x   Leuk Esterase: x / RBC: x / WBC x   Sq Epi: x / Non Sq Epi: x / Bacteria: x      PHYSICAL EXAM:    GENERAL: no distress  HEENT:  Atraumatic, Normocephalic.   PULMONARY: Clear to auscultation bilaterally  CARDIOVASCULAR: Regular rate and rhythm  GASTROINTESTINAL: Soft, Nontender, Nondistended  MUSCULOSKELETAL:   No clubbing, cyanosis, or edema  NEUROLOGY:  AAOx3  SKIN: No rashes or lesions    
POD # 1    S/P Left L5-S1 endoscopic laminoforaminotomy      pt seen and and examined at bedside pt is alert no complaints          Vital Signs Last 24 Hrs  T(C): 36.1 (16 Sep 2023 12:34), Max: 36.7 (16 Sep 2023 01:00)  T(F): 97 (16 Sep 2023 12:34), Max: 98.1 (16 Sep 2023 01:00)  HR: 78 (16 Sep 2023 12:34) (75 - 125)  BP: 160/76 (16 Sep 2023 12:34) (106/55 - 166/92)  BP(mean): --  RR: 18 (16 Sep 2023 12:34) (14 - 20)  SpO2: 100% (16 Sep 2023 06:09) (93% - 100%)    Parameters below as of 16 Sep 2023 01:00  Patient On (Oxygen Delivery Method): room air        PHYSICAL EXAM:    Alert , follows commands  MAEX4   MS bilateral UE's 5/5        bilateral LE's 5/5   back incision clean dry intact           MEDICATIONS:  Antibiotics:  ceFAZolin   IVPB 1000 milliGRAM(s) IV Intermittent every 8 hours    Neuro:  acetaminophen     Tablet .. 650 milliGRAM(s) Oral every 6 hours PRN  methocarbamol 750 milliGRAM(s) Oral every 8 hours  morphine  - Injectable 2 milliGRAM(s) IV Push every 3 hours PRN  oxycodone    5 mG/acetaminophen 325 mG 1 Tablet(s) Oral every 4 hours PRN  oxycodone    5 mG/acetaminophen 325 mG 2 Tablet(s) Oral every 4 hours PRN    Anticoagulation:  heparin   Injectable 5000 Unit(s) SubCutaneous every 8 hours    OTHER:  furosemide    Tablet 40 milliGRAM(s) Oral daily  metoprolol succinate  milliGRAM(s) Oral daily  metoprolol tartrate Injectable 2 milliGRAM(s) IV Push every 15 minutes PRN  mycophenolate mofetil 500 milliGRAM(s) Oral two times a day  senna 2 Tablet(s) Oral at bedtime  tacrolimus 2 milliGRAM(s) Oral every 12 hours    IVF:  calcitriol   Capsule 0.5 MICROGram(s) Oral daily  multivitamin 1 Tablet(s) Oral daily  sodium chloride 0.9%. 1000 milliLiter(s) IV Continuous <Continuous>        LABS:                        11.5   4.31  )-----------( 52       ( 16 Sep 2023 11:26 )             36.4     09-16    135  |  105  |  18  ----------------------------<  123<H>  4.1   |  21  |  1.3    Ca    9.8      16 Sep 2023 11:26    TPro  5.7<L>  /  Alb  4.0  /  TBili  0.4  /  DBili  x   /  AST  25  /  ALT  25  /  AlkPhos  71  09-16      Urinalysis Basic - ( 16 Sep 2023 11:26 )    Color: x / Appearance: x / SG: x / pH: x  Gluc: 123 mg/dL / Ketone: x  / Bili: x / Urobili: x   Blood: x / Protein: x / Nitrite: x   Leuk Esterase: x / RBC: x / WBC x   Sq Epi: x / Non Sq Epi: x / Bacteria: x        A/p              S/P Left L5-S1 endoscopic laminoforaminotomy                    DC tele                    DC home   
NEUROSURGERY POST OP NOTE:    Patient is a 68 year-old male POD#0 s/p left L5-S1 endoscopic foraminotomy. Patient was seen and examined at bedside in PACU. Patient sitting upright in bed A&Ox3 and following all commands. Patient admits to incisional pain but otherwise denies radiation of pain to lower extremities, numbness, tingling, headaches at this time. Admits to persistent baseline neuropathy to b/l feet. Patient being admitted for observation as he has been in Afib with RVR with HR in 120-140s. Pt s/p 1 episode of NBNB vomiting. Pt denies nausea or abdominal pain at this time.    -E         Vital Signs Last 24 Hrs  T(C): 36.5 (15 Sep 2023 18:22), Max: 36.5 (15 Sep 2023 14:13)  T(F): 97.7 (15 Sep 2023 18:22), Max: 97.7 (15 Sep 2023 14:13)  HR: 125 (15 Sep 2023 23:30) (95 - 125)  BP: 159/92 (15 Sep 2023 23:30) (106/55 - 166/92)  BP(mean): --  RR: 20 (15 Sep 2023 23:30) (14 - 20)  SpO2: 98% (15 Sep 2023 23:30) (93% - 100%)        acetaminophen     Tablet .. 650 milliGRAM(s) Oral every 6 hours PRN  calcitriol   Capsule 0.5 MICROGram(s) Oral daily  ceFAZolin   IVPB 1000 milliGRAM(s) IV Intermittent every 8 hours  furosemide    Tablet 40 milliGRAM(s) Oral daily  HYDROmorphone  Injectable 0.5 milliGRAM(s) IV Push every 10 minutes PRN  HYDROmorphone  Injectable 1 milliGRAM(s) IV Push every 10 minutes PRN  methocarbamol 750 milliGRAM(s) Oral every 8 hours  metoprolol tartrate Injectable 2 milliGRAM(s) IV Push every 15 minutes PRN  morphine  - Injectable 2 milliGRAM(s) IV Push every 3 hours PRN  multivitamin 1 Tablet(s) Oral daily  mycophenolate mofetil 500 milliGRAM(s) Oral two times a day  oxycodone    5 mG/acetaminophen 325 mG 1 Tablet(s) Oral every 4 hours PRN  oxycodone    5 mG/acetaminophen 325 mG 2 Tablet(s) Oral every 4 hours PRN  senna 2 Tablet(s) Oral at bedtime  sodium chloride 0.9%. 1000 milliLiter(s) IV Continuous <Continuous>  tacrolimus 2 milliGRAM(s) Oral every 12 hours      Physical Exam:  General: Lying in bed, following all commands  AAOX3. Verbal function intact  Facial motions symmetric  EOMI  Motor: MAEx4  5/5 strength in b/l UE's and LE's  Sensation: intact to touch in all extremities  Wound: Incision clean, dry, and intact without drainage        Assessment/Plan:  68 year-old male POD#0 s/p left L5-S1 endoscopic foraminotomy.  -PRN analgesia  -PT/OT/Rehab  -Being admitted to telemetry, 3C  -Cardio recommendations appreciated- pt to continue home meds, will restart Eliquis tomorrow  -Encourage incentive spirometry  -D/w attending

## 2023-09-16 NOTE — CONSULT NOTE ADULT - ASSESSMENT
IMPRESSION: Rehab of 67 y/o m  lbp  lumber  radiculopathy      PRECAUTIONS: [  ] Cardiac  [  ] Respiratory  [  ] Seizures [  ] Contact Isolation  [  ] Droplet Isolation  [ FALL ] Other    Weight Bearing Status:     RECOMMENDATION:    Out of Bed to Chair     DVT/Decubiti Prophylaxis    REHAB PLAN:     [ x  ] Bedside P/T 3-5 times a week   [   ]   Bedside O/T  2-3 times a week             [   ] No Rehab Therapy Indicated                   [   ]  Speech Therapy   Conditioning/ROM                                    ADL  Bed Mobility                                               Conditioning/ROM  Transfers                                                     Bed Mobility  Sitting /Standing Balance                         Transfers                                        Gait Training                                               Sitting/Standing Balance  Stair Training [   ]Applicable                    Home equipment Eval                                                                        Splinting  [   ] Only      GOALS:   ADL  x ] Independent     [    x] With device                            [ x  ]  CG                                                         [  x ]  CG                                                                  [  x ] CG                            [    ] Min A                                                   [   ] Min A                                                              [   ] Min  A          DISCHARGE PLAN:   [   ]  Good candidate for Intensive Rehabilitation/Hospital based-[facility]A [facility]                                             Will tolerate 3hrs Intensive Rehab Daily                                       [   x ]  Short Term Rehab in Skilled Nursing Facility cont [facility] care                                       [  x  ]  Home with Outpatient or  services                                         [    ]  Possible Candidate for Intensive Hospital based Rehab                                        IMPRESSION: Rehab of 67 y/o m  lbp  lumber  radiculopathy  sp lt  l5s1 foraminotomy      PRECAUTIONS: [  ] Cardiac  [  ] Respiratory  [  ] Seizures [  ] Contact Isolation  [  ] Droplet Isolation  [ FALL ] Other    Weight Bearing Status:     RECOMMENDATION:    Out of Bed to Chair     DVT/Decubiti Prophylaxis    REHAB PLAN:     [ x  ] Bedside P/T 3-5 times a week   [   ]   Bedside O/T  2-3 times a week             [   ] No Rehab Therapy Indicated                   [   ]  Speech Therapy   Conditioning/ROM                                    ADL  Bed Mobility                                               Conditioning/ROM  Transfers                                                     Bed Mobility  Sitting /Standing Balance                         Transfers                                        Gait Training                                               Sitting/Standing Balance  Stair Training [   ]Applicable                    Home equipment Eval                                                                        Splinting  [   ] Only      GOALS:   ADL  x ] Independent     [    x] With device                            [ x  ]  CG                                                         [  x ]  CG                                                                  [  x ] CG                            [    ] Min A                                                   [   ] Min A                                                              [   ] Min  A          DISCHARGE PLAN:   [   ]  Good candidate for Intensive Rehabilitation/Hospital based-4A [facility]                                             Will tolerate 3hrs Intensive Rehab Daily                                       [   x ]  Short Term Rehab in Skilled Nursing Facility cont [facility] care                                       [  x  ]  Home with Outpatient or  services                                         [    ]  Possible Candidate for Intensive Hospital based Rehab

## 2023-09-16 NOTE — DISCHARGE NOTE NURSING/CASE MANAGEMENT/SOCIAL WORK - PATIENT PORTAL LINK FT
You can access the FollowMyHealth Patient Portal offered by [facility] by registering at the following website: [email or web address]. By joining [employer]’s FollowMyHealth portal, you will also be able to view your health information using other applications (apps) compatible with our system.

## 2023-09-16 NOTE — DISCHARGE NOTE PROVIDER - NSDCMRMEDTOKEN_GEN_ALL_CORE_FT
apixaban 5 mg oral tablet: 1 tab(s) orally every 12 hours  calcitriol 0.5 mcg oral capsule: 1 cap(s) orally once a day  docusate sodium 100 mg oral tablet: 1 tab(s) orally 3 times a day as needed for  constipation  furosemide 40 mg oral tablet: 1 tab(s) orally once a day  methocarbamol 750 mg oral tablet: 1 tab(s) orally every 8 hours as needed for  muscle spasm  metoprolol succinate 100 mg oral capsule, extended release: 1 cap(s) orally once a day  mycophenolate mofetil 500 mg oral tablet: 2 tab(s) orally once a day  oxyCODONE 15 mg oral tablet: 1 tab(s) orally as needed for  moderate pain  tacrolimus 1 mg oral capsule: 2 cap(s) orally every 12 hours  traMADol 50 mg oral tablet: 1 tab(s) orally every 6 hours as needed for  severe pain MDD: 4

## 2023-09-16 NOTE — PROGRESS NOTE ADULT - ASSESSMENT
68 year-old male w PMHx of AFib on Eliquis, ESRD s/p renal transplant, HTN, Lumbar radiculopathy s/p elective outpatient foraminotomy under general anesthesia, procedure was uneventful. In PACU, pt went into AFib with RVR, HR ranging from 120-140s, BP elevated, asymptomatic. Pt HR likely elevated secondary to being post op, having received anesthesia and severe pain.  Being admitted to telemetry.    # Afib w RVR now controlled   #POD#0 s/p left L5-S1 endoscopic foraminotomy.  # Pancytopenia, chronic. following with hem/onc  Recommendations:  Resume Diet   Miralax PRN  C/W Eliquis once ok with Nsx   Avoid nephrotoxins   Adequate hydration   Cont tele monitoring  Give adequate pain control  C/w home Metoprolol dose  Consult pt's cardiologist - Dr. Low  Follow up with hematology as outpatient   DVT PPX

## 2023-10-02 ENCOUNTER — APPOINTMENT (OUTPATIENT)
Dept: NEUROSURGERY | Facility: CLINIC | Age: 68
End: 2023-10-02
Payer: MEDICARE

## 2023-10-02 VITALS — WEIGHT: 133 LBS | BODY MASS INDEX: 19.7 KG/M2 | HEIGHT: 69 IN

## 2023-10-02 PROCEDURE — 99024 POSTOP FOLLOW-UP VISIT: CPT

## 2023-10-05 ENCOUNTER — APPOINTMENT (OUTPATIENT)
Dept: ELECTROPHYSIOLOGY | Facility: CLINIC | Age: 68
End: 2023-10-05
Payer: MEDICARE

## 2023-10-05 VITALS
WEIGHT: 134 LBS | TEMPERATURE: 98 F | BODY MASS INDEX: 19.85 KG/M2 | HEART RATE: 87 BPM | HEIGHT: 69 IN | SYSTOLIC BLOOD PRESSURE: 136 MMHG | DIASTOLIC BLOOD PRESSURE: 80 MMHG

## 2023-10-05 PROCEDURE — 93000 ELECTROCARDIOGRAM COMPLETE: CPT

## 2023-10-05 PROCEDURE — 99205 OFFICE O/P NEW HI 60 MIN: CPT

## 2023-10-05 RX ORDER — DILTIAZEM HYDROCHLORIDE 90 MG/1
90 TABLET, COATED ORAL
Refills: 0 | Status: COMPLETED | COMMUNITY
End: 2023-10-05

## 2023-10-05 RX ORDER — PANTOPRAZOLE 40 MG/1
40 TABLET, DELAYED RELEASE ORAL
Refills: 0 | Status: COMPLETED | COMMUNITY
End: 2023-10-05

## 2023-10-05 RX ORDER — CLOPIDOGREL 75 MG/1
75 TABLET, FILM COATED ORAL
Refills: 0 | Status: COMPLETED | COMMUNITY
End: 2023-10-05

## 2023-10-05 RX ORDER — ISOSORBIDE MONONITRATE 30 MG
TABLET, EXTENDED RELEASE 24 HR ORAL
Refills: 0 | Status: COMPLETED | COMMUNITY
End: 2023-10-05

## 2023-10-05 RX ORDER — CEPHALEXIN 500 MG/1
500 TABLET ORAL
Refills: 0 | Status: COMPLETED | COMMUNITY
End: 2023-10-05

## 2023-10-30 ENCOUNTER — APPOINTMENT (OUTPATIENT)
Dept: NEUROSURGERY | Facility: CLINIC | Age: 68
End: 2023-10-30
Payer: MEDICARE

## 2023-10-30 VITALS — HEIGHT: 69 IN | BODY MASS INDEX: 19.99 KG/M2 | WEIGHT: 135 LBS

## 2023-10-30 DIAGNOSIS — R06.02 SHORTNESS OF BREATH: ICD-10-CM

## 2023-10-30 DIAGNOSIS — Z09 ENCOUNTER FOR FOLLOW-UP EXAMINATION AFTER COMPLETED TREATMENT FOR CONDITIONS OTHER THAN MALIGNANT NEOPLASM: ICD-10-CM

## 2023-10-30 PROCEDURE — 99024 POSTOP FOLLOW-UP VISIT: CPT

## 2023-11-02 ENCOUNTER — APPOINTMENT (OUTPATIENT)
Dept: ELECTROPHYSIOLOGY | Facility: CLINIC | Age: 68
End: 2023-11-02
Payer: MEDICARE

## 2023-11-02 VITALS
HEART RATE: 98 BPM | BODY MASS INDEX: 19.85 KG/M2 | TEMPERATURE: 98 F | HEIGHT: 69 IN | WEIGHT: 134 LBS | DIASTOLIC BLOOD PRESSURE: 80 MMHG | SYSTOLIC BLOOD PRESSURE: 140 MMHG

## 2023-11-02 PROCEDURE — 99214 OFFICE O/P EST MOD 30 MIN: CPT

## 2023-11-02 PROCEDURE — 93000 ELECTROCARDIOGRAM COMPLETE: CPT

## 2023-11-10 ENCOUNTER — RESULT REVIEW (OUTPATIENT)
Age: 68
End: 2023-11-10

## 2023-11-10 ENCOUNTER — OUTPATIENT (OUTPATIENT)
Dept: OUTPATIENT SERVICES | Facility: HOSPITAL | Age: 68
LOS: 1 days | Discharge: HOME | End: 2023-11-10
Payer: MEDICARE

## 2023-11-10 ENCOUNTER — TRANSCRIPTION ENCOUNTER (OUTPATIENT)
Age: 68
End: 2023-11-10

## 2023-11-10 VITALS
SYSTOLIC BLOOD PRESSURE: 142 MMHG | OXYGEN SATURATION: 100 % | RESPIRATION RATE: 18 BRPM | HEART RATE: 95 BPM | DIASTOLIC BLOOD PRESSURE: 63 MMHG

## 2023-11-10 VITALS
HEIGHT: 69 IN | DIASTOLIC BLOOD PRESSURE: 65 MMHG | TEMPERATURE: 97 F | HEART RATE: 100 BPM | WEIGHT: 134.92 LBS | RESPIRATION RATE: 18 BRPM | SYSTOLIC BLOOD PRESSURE: 139 MMHG

## 2023-11-10 DIAGNOSIS — R63.4 ABNORMAL WEIGHT LOSS: ICD-10-CM

## 2023-11-10 DIAGNOSIS — Z98.890 OTHER SPECIFIED POSTPROCEDURAL STATES: Chronic | ICD-10-CM

## 2023-11-10 DIAGNOSIS — Z12.11 ENCOUNTER FOR SCREENING FOR MALIGNANT NEOPLASM OF COLON: ICD-10-CM

## 2023-11-10 DIAGNOSIS — Z94.0 KIDNEY TRANSPLANT STATUS: Chronic | ICD-10-CM

## 2023-11-10 PROCEDURE — 88312 SPECIAL STAINS GROUP 1: CPT

## 2023-11-10 PROCEDURE — C1889: CPT

## 2023-11-10 PROCEDURE — 88312 SPECIAL STAINS GROUP 1: CPT | Mod: 26

## 2023-11-10 PROCEDURE — 88305 TISSUE EXAM BY PATHOLOGIST: CPT | Mod: 26

## 2023-11-10 PROCEDURE — 88305 TISSUE EXAM BY PATHOLOGIST: CPT

## 2023-11-10 RX ORDER — PANTOPRAZOLE SODIUM 20 MG/1
1 TABLET, DELAYED RELEASE ORAL
Qty: 60 | Refills: 0
Start: 2023-11-10

## 2023-11-10 RX ORDER — CALCITRIOL 0.5 UG/1
1 CAPSULE ORAL
Qty: 0 | Refills: 0 | DISCHARGE

## 2023-11-10 NOTE — ASU PATIENT PROFILE, ADULT - FALL HARM RISK - HARM RISK INTERVENTIONS
Assistance with ambulation/Assistance OOB with selected safe patient handling equipment/Communicate Risk of Fall with Harm to all staff/Discuss with provider need for PT consult/Monitor gait and stability/Provide patient with walking aids - walker, cane, crutches/Reinforce activity limits and safety measures with patient and family/Sit up slowly, dangle for a short time, stand at bedside before walking/Tailored Fall Risk Interventions/Visual Cue: Yellow wristband and red socks/Bed in lowest position, wheels locked, appropriate side rails in place/Call bell, personal items and telephone in reach/Instruct patient to call for assistance before getting out of bed or chair/Non-slip footwear when patient is out of bed/[place] to call system/Physically safe environment - no spills, clutter or unnecessary equipment/Purposeful Proactive Rounding/Room/bathroom lighting operational, light cord in reach

## 2023-11-10 NOTE — ASU DISCHARGE PLAN (ADULT/PEDIATRIC) - CARE PROVIDER_API CALL
Masood Campos  Gastroenterology  [address] [place] [id number]  Phone: [phone]  Fax: ([phone]  Follow Up Time:

## 2023-11-10 NOTE — ASU DISCHARGE PLAN (ADULT/PEDIATRIC) - NS MD DC FALL RISK RISK
For information on Fall & Injury Prevention, visit: [email or web address]-[email or web address]-mobility OR  [email or web address]-tips-to-avoid-injury OR  https://www.cdc.gov/steadi/patient.html

## 2023-11-10 NOTE — H&P PST ADULT - HISTORY OF PRESENT ILLNESS
68 year old male with PMH of cirrhosis - is here for EGD and colonoscopy for evaluation for wt loss and CRC screening.

## 2023-11-10 NOTE — ASU PATIENT PROFILE, ADULT - ANESTHESIA, PREVIOUS REACTION, PROFILE
[address]  [address] [facility] ~ [phone]                Infant Custody Release   10/2/2018    Girl  July           Admission Information     Date & Time  10/2/2018 Provider  Mayra Salcedo MD Departme
none

## 2023-11-10 NOTE — CHART NOTE - NSCHARTNOTEFT_GEN_A_CORE
PACU ANESTHESIA ADMISSION NOTE      Procedure:   Post op diagnosis:      ____  Intubated  TV:______       Rate: ______      FiO2: ______    _x___  Patent Airway    _x___  Full return of protective reflexes    _x___  Full recovery from anesthesia / back to baseline status    Vitals  SPO2:-100  HR:-122  RR:-12  B.P:-142/52  TEMP:-98    Mental Status:  _x___ Awake   ___x_ Alert   _____ Drowsy   _____ Sedated    Nausea/Vomiting:  _x___  NO       ______Yes,   See Post - Op Orders         Pain Scale (0-10):  __0___    Treatment: _x___ None    __x__ See Post - Op/PCA Orders    Post - Operative Fluids:   ___ Oral   ____x See Post - Op Orders    Plan: Discharge:   __x__Home       ____Floor     _____Critical Care    _____  Other:_________________    Comments:  Report endorsed to RN in pacu  Vitals stable  No anesthesia issues or complications noted.  Discharge to patient to / home when criteria met.

## 2023-11-10 NOTE — ASU PATIENT PROFILE, ADULT - NSICDXPASTSURGICALHX_GEN_ALL_CORE_FT
PAST SURGICAL HISTORY:  History of back surgery     History of kidney transplant 3 years ago    S/P arteriovenous (AV) fistula creation prior old fistula in R arm    S/P lumpectomy, right breast

## 2023-11-13 LAB
SURGICAL PATHOLOGY STUDY: SIGNIFICANT CHANGE UP

## 2023-11-15 DIAGNOSIS — H54.8 LEGAL BLINDNESS, AS DEFINED IN USA: ICD-10-CM

## 2023-11-15 DIAGNOSIS — E78.5 HYPERLIPIDEMIA, UNSPECIFIED: ICD-10-CM

## 2023-11-15 DIAGNOSIS — K29.50 UNSPECIFIED CHRONIC GASTRITIS WITHOUT BLEEDING: ICD-10-CM

## 2023-11-15 DIAGNOSIS — D12.3 BENIGN NEOPLASM OF TRANSVERSE COLON: ICD-10-CM

## 2023-11-15 DIAGNOSIS — D12.2 BENIGN NEOPLASM OF ASCENDING COLON: ICD-10-CM

## 2023-11-15 DIAGNOSIS — D12.5 BENIGN NEOPLASM OF SIGMOID COLON: ICD-10-CM

## 2023-11-15 DIAGNOSIS — K44.9 DIAPHRAGMATIC HERNIA WITHOUT OBSTRUCTION OR GANGRENE: ICD-10-CM

## 2023-11-15 DIAGNOSIS — N18.6 END STAGE RENAL DISEASE: ICD-10-CM

## 2023-11-15 DIAGNOSIS — I12.0 HYPERTENSIVE CHRONIC KIDNEY DISEASE WITH STAGE 5 CHRONIC KIDNEY DISEASE OR END STAGE RENAL DISEASE: ICD-10-CM

## 2023-11-15 DIAGNOSIS — Z87.891 PERSONAL HISTORY OF NICOTINE DEPENDENCE: ICD-10-CM

## 2023-11-15 DIAGNOSIS — I48.91 UNSPECIFIED ATRIAL FIBRILLATION: ICD-10-CM

## 2023-11-15 DIAGNOSIS — K64.8 OTHER HEMORRHOIDS: ICD-10-CM

## 2023-11-15 DIAGNOSIS — J44.9 CHRONIC OBSTRUCTIVE PULMONARY DISEASE, UNSPECIFIED: ICD-10-CM

## 2023-11-15 DIAGNOSIS — I10 ESSENTIAL (PRIMARY) HYPERTENSION: ICD-10-CM

## 2023-11-15 DIAGNOSIS — R63.4 ABNORMAL WEIGHT LOSS: ICD-10-CM

## 2023-11-15 DIAGNOSIS — Z85.3 PERSONAL HISTORY OF MALIGNANT NEOPLASM OF BREAST: ICD-10-CM

## 2023-11-16 DIAGNOSIS — D69.6 THROMBOCYTOPENIA, UNSPECIFIED: ICD-10-CM

## 2023-12-04 ENCOUNTER — NON-APPOINTMENT (OUTPATIENT)
Age: 68
End: 2023-12-04

## 2023-12-06 ENCOUNTER — APPOINTMENT (OUTPATIENT)
Dept: NEUROSURGERY | Facility: CLINIC | Age: 68
End: 2023-12-06
Payer: MEDICARE

## 2023-12-06 VITALS — HEIGHT: 69 IN | WEIGHT: 134 LBS | BODY MASS INDEX: 19.85 KG/M2

## 2023-12-06 PROCEDURE — 99211 OFF/OP EST MAY X REQ PHY/QHP: CPT | Mod: 24

## 2023-12-07 ENCOUNTER — OUTPATIENT (OUTPATIENT)
Dept: OUTPATIENT SERVICES | Facility: HOSPITAL | Age: 68
LOS: 1 days | End: 2023-12-07
Payer: MEDICARE

## 2023-12-07 ENCOUNTER — RESULT REVIEW (OUTPATIENT)
Age: 68
End: 2023-12-07

## 2023-12-07 VITALS
OXYGEN SATURATION: 98 % | RESPIRATION RATE: 20 BRPM | DIASTOLIC BLOOD PRESSURE: 70 MMHG | HEIGHT: 69 IN | SYSTOLIC BLOOD PRESSURE: 112 MMHG | TEMPERATURE: 98 F | WEIGHT: 138.89 LBS | HEART RATE: 96 BPM

## 2023-12-07 DIAGNOSIS — Z98.890 OTHER SPECIFIED POSTPROCEDURAL STATES: Chronic | ICD-10-CM

## 2023-12-07 DIAGNOSIS — Z94.0 KIDNEY TRANSPLANT STATUS: Chronic | ICD-10-CM

## 2023-12-07 DIAGNOSIS — Z01.818 ENCOUNTER FOR OTHER PREPROCEDURAL EXAMINATION: ICD-10-CM

## 2023-12-07 DIAGNOSIS — I48.19 OTHER PERSISTENT ATRIAL FIBRILLATION: ICD-10-CM

## 2023-12-07 LAB
ALBUMIN SERPL ELPH-MCNC: 4.2 G/DL — SIGNIFICANT CHANGE UP (ref 3.5–5.2)
ALBUMIN SERPL ELPH-MCNC: 4.2 G/DL — SIGNIFICANT CHANGE UP (ref 3.5–5.2)
ALP SERPL-CCNC: 62 U/L — SIGNIFICANT CHANGE UP (ref 30–115)
ALP SERPL-CCNC: 62 U/L — SIGNIFICANT CHANGE UP (ref 30–115)
ALT FLD-CCNC: 11 U/L — SIGNIFICANT CHANGE UP (ref 0–41)
ALT FLD-CCNC: 11 U/L — SIGNIFICANT CHANGE UP (ref 0–41)
ANION GAP SERPL CALC-SCNC: 15 MMOL/L — HIGH (ref 7–14)
ANION GAP SERPL CALC-SCNC: 15 MMOL/L — HIGH (ref 7–14)
APTT BLD: 35.7 SEC — SIGNIFICANT CHANGE UP (ref 27–39.2)
APTT BLD: 35.7 SEC — SIGNIFICANT CHANGE UP (ref 27–39.2)
AST SERPL-CCNC: 16 U/L — SIGNIFICANT CHANGE UP (ref 0–41)
AST SERPL-CCNC: 16 U/L — SIGNIFICANT CHANGE UP (ref 0–41)
BASOPHILS # BLD AUTO: 0.01 K/UL — SIGNIFICANT CHANGE UP (ref 0–0.2)
BASOPHILS # BLD AUTO: 0.01 K/UL — SIGNIFICANT CHANGE UP (ref 0–0.2)
BASOPHILS NFR BLD AUTO: 0.3 % — SIGNIFICANT CHANGE UP (ref 0–1)
BASOPHILS NFR BLD AUTO: 0.3 % — SIGNIFICANT CHANGE UP (ref 0–1)
BILIRUB SERPL-MCNC: 0.5 MG/DL — SIGNIFICANT CHANGE UP (ref 0.2–1.2)
BILIRUB SERPL-MCNC: 0.5 MG/DL — SIGNIFICANT CHANGE UP (ref 0.2–1.2)
BLD GP AB SCN SERPL QL: SIGNIFICANT CHANGE UP
BLD GP AB SCN SERPL QL: SIGNIFICANT CHANGE UP
BUN SERPL-MCNC: 30 MG/DL — HIGH (ref 10–20)
BUN SERPL-MCNC: 30 MG/DL — HIGH (ref 10–20)
CALCIUM SERPL-MCNC: 11.8 MG/DL — HIGH (ref 8.4–10.5)
CALCIUM SERPL-MCNC: 11.8 MG/DL — HIGH (ref 8.4–10.5)
CHLORIDE SERPL-SCNC: 101 MMOL/L — SIGNIFICANT CHANGE UP (ref 98–110)
CHLORIDE SERPL-SCNC: 101 MMOL/L — SIGNIFICANT CHANGE UP (ref 98–110)
CO2 SERPL-SCNC: 25 MMOL/L — SIGNIFICANT CHANGE UP (ref 17–32)
CO2 SERPL-SCNC: 25 MMOL/L — SIGNIFICANT CHANGE UP (ref 17–32)
CREAT SERPL-MCNC: 1.5 MG/DL — SIGNIFICANT CHANGE UP (ref 0.7–1.5)
CREAT SERPL-MCNC: 1.5 MG/DL — SIGNIFICANT CHANGE UP (ref 0.7–1.5)
EGFR: 51 ML/MIN/1.73M2 — LOW
EGFR: 51 ML/MIN/1.73M2 — LOW
EOSINOPHIL # BLD AUTO: 0.02 K/UL — SIGNIFICANT CHANGE UP (ref 0–0.7)
EOSINOPHIL # BLD AUTO: 0.02 K/UL — SIGNIFICANT CHANGE UP (ref 0–0.7)
EOSINOPHIL NFR BLD AUTO: 0.6 % — SIGNIFICANT CHANGE UP (ref 0–8)
EOSINOPHIL NFR BLD AUTO: 0.6 % — SIGNIFICANT CHANGE UP (ref 0–8)
GLUCOSE SERPL-MCNC: 76 MG/DL — SIGNIFICANT CHANGE UP (ref 70–99)
GLUCOSE SERPL-MCNC: 76 MG/DL — SIGNIFICANT CHANGE UP (ref 70–99)
HCT VFR BLD CALC: 40.4 % — LOW (ref 42–52)
HCT VFR BLD CALC: 40.4 % — LOW (ref 42–52)
HGB BLD-MCNC: 12.7 G/DL — LOW (ref 14–18)
HGB BLD-MCNC: 12.7 G/DL — LOW (ref 14–18)
IMM GRANULOCYTES NFR BLD AUTO: 0.6 % — HIGH (ref 0.1–0.3)
IMM GRANULOCYTES NFR BLD AUTO: 0.6 % — HIGH (ref 0.1–0.3)
INR BLD: 1.17 RATIO — SIGNIFICANT CHANGE UP (ref 0.65–1.3)
INR BLD: 1.17 RATIO — SIGNIFICANT CHANGE UP (ref 0.65–1.3)
LYMPHOCYTES # BLD AUTO: 0.77 K/UL — LOW (ref 1.2–3.4)
LYMPHOCYTES # BLD AUTO: 0.77 K/UL — LOW (ref 1.2–3.4)
LYMPHOCYTES # BLD AUTO: 23.1 % — SIGNIFICANT CHANGE UP (ref 20.5–51.1)
LYMPHOCYTES # BLD AUTO: 23.1 % — SIGNIFICANT CHANGE UP (ref 20.5–51.1)
MCHC RBC-ENTMCNC: 29.9 PG — SIGNIFICANT CHANGE UP (ref 27–31)
MCHC RBC-ENTMCNC: 29.9 PG — SIGNIFICANT CHANGE UP (ref 27–31)
MCHC RBC-ENTMCNC: 31.4 G/DL — LOW (ref 32–37)
MCHC RBC-ENTMCNC: 31.4 G/DL — LOW (ref 32–37)
MCV RBC AUTO: 95.1 FL — HIGH (ref 80–94)
MCV RBC AUTO: 95.1 FL — HIGH (ref 80–94)
MONOCYTES # BLD AUTO: 0.24 K/UL — SIGNIFICANT CHANGE UP (ref 0.1–0.6)
MONOCYTES # BLD AUTO: 0.24 K/UL — SIGNIFICANT CHANGE UP (ref 0.1–0.6)
MONOCYTES NFR BLD AUTO: 7.2 % — SIGNIFICANT CHANGE UP (ref 1.7–9.3)
MONOCYTES NFR BLD AUTO: 7.2 % — SIGNIFICANT CHANGE UP (ref 1.7–9.3)
NEUTROPHILS # BLD AUTO: 2.27 K/UL — SIGNIFICANT CHANGE UP (ref 1.4–6.5)
NEUTROPHILS # BLD AUTO: 2.27 K/UL — SIGNIFICANT CHANGE UP (ref 1.4–6.5)
NEUTROPHILS NFR BLD AUTO: 68.2 % — SIGNIFICANT CHANGE UP (ref 42.2–75.2)
NEUTROPHILS NFR BLD AUTO: 68.2 % — SIGNIFICANT CHANGE UP (ref 42.2–75.2)
NRBC # BLD: 0 /100 WBCS — SIGNIFICANT CHANGE UP (ref 0–0)
NRBC # BLD: 0 /100 WBCS — SIGNIFICANT CHANGE UP (ref 0–0)
PLATELET # BLD AUTO: 58 K/UL — LOW (ref 130–400)
PLATELET # BLD AUTO: 58 K/UL — LOW (ref 130–400)
PMV BLD: 13.9 FL — HIGH (ref 7.4–10.4)
PMV BLD: 13.9 FL — HIGH (ref 7.4–10.4)
POTASSIUM SERPL-MCNC: 4.5 MMOL/L — SIGNIFICANT CHANGE UP (ref 3.5–5)
POTASSIUM SERPL-MCNC: 4.5 MMOL/L — SIGNIFICANT CHANGE UP (ref 3.5–5)
POTASSIUM SERPL-SCNC: 4.5 MMOL/L — SIGNIFICANT CHANGE UP (ref 3.5–5)
POTASSIUM SERPL-SCNC: 4.5 MMOL/L — SIGNIFICANT CHANGE UP (ref 3.5–5)
PROT SERPL-MCNC: 6.3 G/DL — SIGNIFICANT CHANGE UP (ref 6–8)
PROT SERPL-MCNC: 6.3 G/DL — SIGNIFICANT CHANGE UP (ref 6–8)
PROTHROM AB SERPL-ACNC: 13.4 SEC — HIGH (ref 9.95–12.87)
PROTHROM AB SERPL-ACNC: 13.4 SEC — HIGH (ref 9.95–12.87)
RBC # BLD: 4.25 M/UL — LOW (ref 4.7–6.1)
RBC # BLD: 4.25 M/UL — LOW (ref 4.7–6.1)
RBC # FLD: 15.1 % — HIGH (ref 11.5–14.5)
RBC # FLD: 15.1 % — HIGH (ref 11.5–14.5)
SODIUM SERPL-SCNC: 141 MMOL/L — SIGNIFICANT CHANGE UP (ref 135–146)
SODIUM SERPL-SCNC: 141 MMOL/L — SIGNIFICANT CHANGE UP (ref 135–146)
WBC # BLD: 3.33 K/UL — LOW (ref 4.8–10.8)
WBC # BLD: 3.33 K/UL — LOW (ref 4.8–10.8)
WBC # FLD AUTO: 3.33 K/UL — LOW (ref 4.8–10.8)
WBC # FLD AUTO: 3.33 K/UL — LOW (ref 4.8–10.8)

## 2023-12-07 PROCEDURE — 86850 RBC ANTIBODY SCREEN: CPT

## 2023-12-07 PROCEDURE — 80053 COMPREHEN METABOLIC PANEL: CPT

## 2023-12-07 PROCEDURE — 71046 X-RAY EXAM CHEST 2 VIEWS: CPT | Mod: 26

## 2023-12-07 PROCEDURE — 71046 X-RAY EXAM CHEST 2 VIEWS: CPT

## 2023-12-07 PROCEDURE — 86901 BLOOD TYPING SEROLOGIC RH(D): CPT

## 2023-12-07 PROCEDURE — 86900 BLOOD TYPING SEROLOGIC ABO: CPT

## 2023-12-07 PROCEDURE — 36415 COLL VENOUS BLD VENIPUNCTURE: CPT

## 2023-12-07 PROCEDURE — 93005 ELECTROCARDIOGRAM TRACING: CPT

## 2023-12-07 PROCEDURE — 93010 ELECTROCARDIOGRAM REPORT: CPT

## 2023-12-07 PROCEDURE — 85610 PROTHROMBIN TIME: CPT

## 2023-12-07 PROCEDURE — 85025 COMPLETE CBC W/AUTO DIFF WBC: CPT

## 2023-12-07 PROCEDURE — 85730 THROMBOPLASTIN TIME PARTIAL: CPT

## 2023-12-07 PROCEDURE — 99214 OFFICE O/P EST MOD 30 MIN: CPT | Mod: 25

## 2023-12-07 NOTE — H&P PST ADULT - HISTORY OF PRESENT ILLNESS
67 Y/O MALE PT TO PAST WITH C/O SOB, FATIGUE OVER PAST 6 MO   PT NOW FOR SCHEDULED PROCEDURE ( A-FIB ABLATION ) . PT DENIES ANY CP SOB PALP COUGH DYSURIA FEVER URI.   Anesthesia Alert  NO--Difficult Airway  NO--History of neck surgery or radiation  NO--Limited ROM of neck  NO--History of Malignant hyperthermia  NO--Personal or family history of Pseudocholinesterase deficiency.  NO--Prior Anesthesia Complication  NO--Latex Allergy  NO--Loose teeth  NO--History of Rheumatoid Arthritis  NO--LAINA  NO--Bleeding risk  NO--Other_____      RESULT SUMMARY:  1 points  Class II Risk    6.0 %  30-day risk of death, MI, or cardiac arrest          INPUTS:  Elevated-risk surgery —> 0 = No  History of ischemic heart disease —> 0 = No  History of congestive heart failure —> 0 = No  History of cerebrovascular disease —> 1 = Yes  Pre-operative treatment with insulin —> 0 = No  Pre-operative creatinine >2 mg/dL / 176.8 µmol/L —> 0 = No      RESULT SUMMARY:  9.95 points  The higher the score (maximum 58.2), the higher the functional status.    3.97 METs        INPUTS:  Take care of self —> 2.75 = Yes  Walk indoors —> 1.75 = Yes  Walk 1&ndash;2 blocks on level ground —> 2.75 = Yes  Climb a flight of stairs or walk up a hill —> 0 = No  Run a short distance —> 0 = No  Do light work around the house —> 2.7 = Yes  Do moderate work around the house —> 0 = No  Do heavy work around the house —> 0 = No  Do yardwork —> 0 = No  Have sexual relations —> 0 = No  Participate in moderate recreational activities —> 0 = No  Participate in strenuous sports —> 0 = No   69 Y/O MALE PT TO PAST WITH C/O SOB, FATIGUE OVER PAST 6 MO   PT NOW FOR SCHEDULED PROCEDURE ( A-FIB ABLATION ) . PT DENIES ANY CP SOB PALP COUGH DYSURIA FEVER URI.   Anesthesia Alert  NO--Difficult Airway  NO--History of neck surgery or radiation  NO--Limited ROM of neck  NO--History of Malignant hyperthermia  NO--Personal or family history of Pseudocholinesterase deficiency.  NO--Prior Anesthesia Complication  NO--Latex Allergy  NO--Loose teeth  NO--History of Rheumatoid Arthritis  NO--LAINA  NO--Bleeding risk  NO--Other_____      RESULT SUMMARY:  1 points  Class II Risk    6.0 %  30-day risk of death, MI, or cardiac arrest          INPUTS:  Elevated-risk surgery —> 0 = No  History of ischemic heart disease —> 0 = No  History of congestive heart failure —> 0 = No  History of cerebrovascular disease —> 1 = Yes  Pre-operative treatment with insulin —> 0 = No  Pre-operative creatinine >2 mg/dL / 176.8 µmol/L —> 0 = No      RESULT SUMMARY:  9.95 points  The higher the score (maximum 58.2), the higher the functional status.    3.97 METs        INPUTS:  Take care of self —> 2.75 = Yes  Walk indoors —> 1.75 = Yes  Walk 1&ndash;2 blocks on level ground —> 2.75 = Yes  Climb a flight of stairs or walk up a hill —> 0 = No  Run a short distance —> 0 = No  Do light work around the house —> 2.7 = Yes  Do moderate work around the house —> 0 = No  Do heavy work around the house —> 0 = No  Do yardwork —> 0 = No  Have sexual relations —> 0 = No  Participate in moderate recreational activities —> 0 = No  Participate in strenuous sports —> 0 = No

## 2023-12-07 NOTE — H&P PST ADULT - CARDIOVASCULAR
normal/regular rate and rhythm/S1 S2 present/no gallops/no rub/no murmur/Irregularly irregular rhythm

## 2023-12-08 DIAGNOSIS — I48.19 OTHER PERSISTENT ATRIAL FIBRILLATION: ICD-10-CM

## 2023-12-08 DIAGNOSIS — Z01.818 ENCOUNTER FOR OTHER PREPROCEDURAL EXAMINATION: ICD-10-CM

## 2023-12-15 ENCOUNTER — APPOINTMENT (OUTPATIENT)
Dept: ELECTROPHYSIOLOGY | Facility: HOSPITAL | Age: 68
End: 2023-12-15

## 2023-12-15 ENCOUNTER — INPATIENT (INPATIENT)
Facility: HOSPITAL | Age: 68
LOS: 2 days | Discharge: HOME HEALTH | DRG: 273 | End: 2023-12-18
Attending: STUDENT IN AN ORGANIZED HEALTH CARE EDUCATION/TRAINING PROGRAM | Admitting: INTERNAL MEDICINE
Payer: MEDICARE

## 2023-12-15 VITALS
DIASTOLIC BLOOD PRESSURE: 80 MMHG | SYSTOLIC BLOOD PRESSURE: 119 MMHG | WEIGHT: 138.89 LBS | TEMPERATURE: 98 F | HEART RATE: 120 BPM | OXYGEN SATURATION: 98 % | RESPIRATION RATE: 22 BRPM | HEIGHT: 69 IN

## 2023-12-15 DIAGNOSIS — Z98.890 OTHER SPECIFIED POSTPROCEDURAL STATES: Chronic | ICD-10-CM

## 2023-12-15 DIAGNOSIS — I48.19 OTHER PERSISTENT ATRIAL FIBRILLATION: ICD-10-CM

## 2023-12-15 DIAGNOSIS — Z94.0 KIDNEY TRANSPLANT STATUS: Chronic | ICD-10-CM

## 2023-12-15 LAB
ANION GAP SERPL CALC-SCNC: 11 MMOL/L — SIGNIFICANT CHANGE UP (ref 7–14)
ANION GAP SERPL CALC-SCNC: 11 MMOL/L — SIGNIFICANT CHANGE UP (ref 7–14)
APTT BLD: 33 SEC — SIGNIFICANT CHANGE UP (ref 27–39.2)
APTT BLD: 33 SEC — SIGNIFICANT CHANGE UP (ref 27–39.2)
BLD GP AB SCN SERPL QL: SIGNIFICANT CHANGE UP
BLD GP AB SCN SERPL QL: SIGNIFICANT CHANGE UP
BUN SERPL-MCNC: 28 MG/DL — HIGH (ref 10–20)
BUN SERPL-MCNC: 28 MG/DL — HIGH (ref 10–20)
CALCIUM SERPL-MCNC: 10.8 MG/DL — HIGH (ref 8.4–10.5)
CALCIUM SERPL-MCNC: 10.8 MG/DL — HIGH (ref 8.4–10.5)
CHLORIDE SERPL-SCNC: 105 MMOL/L — SIGNIFICANT CHANGE UP (ref 98–110)
CHLORIDE SERPL-SCNC: 105 MMOL/L — SIGNIFICANT CHANGE UP (ref 98–110)
CO2 SERPL-SCNC: 23 MMOL/L — SIGNIFICANT CHANGE UP (ref 17–32)
CO2 SERPL-SCNC: 23 MMOL/L — SIGNIFICANT CHANGE UP (ref 17–32)
CREAT SERPL-MCNC: 1.7 MG/DL — HIGH (ref 0.7–1.5)
CREAT SERPL-MCNC: 1.7 MG/DL — HIGH (ref 0.7–1.5)
EGFR: 44 ML/MIN/1.73M2 — LOW
EGFR: 44 ML/MIN/1.73M2 — LOW
GLUCOSE SERPL-MCNC: 106 MG/DL — HIGH (ref 70–99)
GLUCOSE SERPL-MCNC: 106 MG/DL — HIGH (ref 70–99)
HCT VFR BLD CALC: 42.6 % — SIGNIFICANT CHANGE UP (ref 42–52)
HCT VFR BLD CALC: 42.6 % — SIGNIFICANT CHANGE UP (ref 42–52)
HGB BLD-MCNC: 13.3 G/DL — LOW (ref 14–18)
HGB BLD-MCNC: 13.3 G/DL — LOW (ref 14–18)
INR BLD: 1.13 RATIO — SIGNIFICANT CHANGE UP (ref 0.65–1.3)
INR BLD: 1.13 RATIO — SIGNIFICANT CHANGE UP (ref 0.65–1.3)
MCHC RBC-ENTMCNC: 29.2 PG — SIGNIFICANT CHANGE UP (ref 27–31)
MCHC RBC-ENTMCNC: 29.2 PG — SIGNIFICANT CHANGE UP (ref 27–31)
MCHC RBC-ENTMCNC: 31.2 G/DL — LOW (ref 32–37)
MCHC RBC-ENTMCNC: 31.2 G/DL — LOW (ref 32–37)
MCV RBC AUTO: 93.6 FL — SIGNIFICANT CHANGE UP (ref 80–94)
MCV RBC AUTO: 93.6 FL — SIGNIFICANT CHANGE UP (ref 80–94)
NRBC # BLD: 0 /100 WBCS — SIGNIFICANT CHANGE UP (ref 0–0)
NRBC # BLD: 0 /100 WBCS — SIGNIFICANT CHANGE UP (ref 0–0)
PLATELET # BLD AUTO: 82 K/UL — LOW (ref 130–400)
PLATELET # BLD AUTO: 82 K/UL — LOW (ref 130–400)
PMV BLD: 12.8 FL — HIGH (ref 7.4–10.4)
PMV BLD: 12.8 FL — HIGH (ref 7.4–10.4)
POTASSIUM SERPL-MCNC: 4.2 MMOL/L — SIGNIFICANT CHANGE UP (ref 3.5–5)
POTASSIUM SERPL-MCNC: 4.2 MMOL/L — SIGNIFICANT CHANGE UP (ref 3.5–5)
POTASSIUM SERPL-SCNC: 4.2 MMOL/L — SIGNIFICANT CHANGE UP (ref 3.5–5)
POTASSIUM SERPL-SCNC: 4.2 MMOL/L — SIGNIFICANT CHANGE UP (ref 3.5–5)
PROTHROM AB SERPL-ACNC: 12.9 SEC — HIGH (ref 9.95–12.87)
PROTHROM AB SERPL-ACNC: 12.9 SEC — HIGH (ref 9.95–12.87)
RBC # BLD: 4.55 M/UL — LOW (ref 4.7–6.1)
RBC # BLD: 4.55 M/UL — LOW (ref 4.7–6.1)
RBC # FLD: 14.9 % — HIGH (ref 11.5–14.5)
RBC # FLD: 14.9 % — HIGH (ref 11.5–14.5)
SODIUM SERPL-SCNC: 139 MMOL/L — SIGNIFICANT CHANGE UP (ref 135–146)
SODIUM SERPL-SCNC: 139 MMOL/L — SIGNIFICANT CHANGE UP (ref 135–146)
WBC # BLD: 4.67 K/UL — LOW (ref 4.8–10.8)
WBC # BLD: 4.67 K/UL — LOW (ref 4.8–10.8)
WBC # FLD AUTO: 4.67 K/UL — LOW (ref 4.8–10.8)
WBC # FLD AUTO: 4.67 K/UL — LOW (ref 4.8–10.8)

## 2023-12-15 PROCEDURE — 83735 ASSAY OF MAGNESIUM: CPT

## 2023-12-15 PROCEDURE — C9399: CPT

## 2023-12-15 PROCEDURE — 93656 COMPRE EP EVAL ABLTJ ATR FIB: CPT

## 2023-12-15 PROCEDURE — 80048 BASIC METABOLIC PNL TOTAL CA: CPT

## 2023-12-15 PROCEDURE — 85027 COMPLETE CBC AUTOMATED: CPT

## 2023-12-15 PROCEDURE — 84439 ASSAY OF FREE THYROXINE: CPT

## 2023-12-15 PROCEDURE — 93655 ICAR CATH ABLTJ DSCRT ARRHYT: CPT | Mod: 59

## 2023-12-15 PROCEDURE — 93312 ECHO TRANSESOPHAGEAL: CPT | Mod: 26

## 2023-12-15 PROCEDURE — 93325 DOPPLER ECHO COLOR FLOW MAPG: CPT | Mod: 26

## 2023-12-15 PROCEDURE — 84443 ASSAY THYROID STIM HORMONE: CPT

## 2023-12-15 PROCEDURE — 93320 DOPPLER ECHO COMPLETE: CPT | Mod: 26

## 2023-12-15 PROCEDURE — 80076 HEPATIC FUNCTION PANEL: CPT

## 2023-12-15 PROCEDURE — 93655 ICAR CATH ABLTJ DSCRT ARRHYT: CPT

## 2023-12-15 PROCEDURE — 93657 TX L/R ATRIAL FIB ADDL: CPT

## 2023-12-15 PROCEDURE — 93005 ELECTROCARDIOGRAM TRACING: CPT

## 2023-12-15 PROCEDURE — 76937 US GUIDE VASCULAR ACCESS: CPT | Mod: 26

## 2023-12-15 PROCEDURE — 36415 COLL VENOUS BLD VENIPUNCTURE: CPT

## 2023-12-15 RX ORDER — OXYCODONE HYDROCHLORIDE 5 MG/1
15 TABLET ORAL ONCE
Refills: 0 | Status: DISCONTINUED | OUTPATIENT
Start: 2023-12-15 | End: 2023-12-15

## 2023-12-15 RX ORDER — AMIODARONE HYDROCHLORIDE 400 MG/1
1 TABLET ORAL
Qty: 450 | Refills: 0 | Status: DISCONTINUED | OUTPATIENT
Start: 2023-12-15 | End: 2023-12-16

## 2023-12-15 RX ORDER — FUROSEMIDE 40 MG
40 TABLET ORAL ONCE
Refills: 0 | Status: COMPLETED | OUTPATIENT
Start: 2023-12-15 | End: 2023-12-15

## 2023-12-15 RX ORDER — ASPIRIN/CALCIUM CARB/MAGNESIUM 324 MG
81 TABLET ORAL DAILY
Refills: 0 | Status: DISCONTINUED | OUTPATIENT
Start: 2023-12-15 | End: 2023-12-15

## 2023-12-15 RX ORDER — APIXABAN 2.5 MG/1
5 TABLET, FILM COATED ORAL EVERY 12 HOURS
Refills: 0 | Status: DISCONTINUED | OUTPATIENT
Start: 2023-12-15 | End: 2023-12-18

## 2023-12-15 RX ORDER — AMIODARONE HYDROCHLORIDE 400 MG/1
150 TABLET ORAL ONCE
Refills: 0 | Status: COMPLETED | OUTPATIENT
Start: 2023-12-15 | End: 2023-12-15

## 2023-12-15 RX ORDER — METOPROLOL TARTRATE 50 MG
25 TABLET ORAL
Refills: 0 | Status: DISCONTINUED | OUTPATIENT
Start: 2023-12-15 | End: 2023-12-16

## 2023-12-15 RX ORDER — TAMSULOSIN HYDROCHLORIDE 0.4 MG/1
0.4 CAPSULE ORAL AT BEDTIME
Refills: 0 | Status: DISCONTINUED | OUTPATIENT
Start: 2023-12-15 | End: 2023-12-18

## 2023-12-15 RX ORDER — PANTOPRAZOLE SODIUM 20 MG/1
40 TABLET, DELAYED RELEASE ORAL
Refills: 0 | Status: DISCONTINUED | OUTPATIENT
Start: 2023-12-15 | End: 2023-12-18

## 2023-12-15 RX ORDER — FUROSEMIDE 40 MG
40 TABLET ORAL DAILY
Refills: 0 | Status: DISCONTINUED | OUTPATIENT
Start: 2023-12-15 | End: 2023-12-15

## 2023-12-15 RX ADMIN — OXYCODONE HYDROCHLORIDE 15 MILLIGRAM(S): 5 TABLET ORAL at 22:30

## 2023-12-15 RX ADMIN — Medication 40 MILLIGRAM(S): at 21:21

## 2023-12-15 RX ADMIN — AMIODARONE HYDROCHLORIDE 600 MILLIGRAM(S): 400 TABLET ORAL at 21:10

## 2023-12-15 RX ADMIN — APIXABAN 5 MILLIGRAM(S): 2.5 TABLET, FILM COATED ORAL at 23:15

## 2023-12-15 RX ADMIN — OXYCODONE HYDROCHLORIDE 15 MILLIGRAM(S): 5 TABLET ORAL at 21:16

## 2023-12-15 RX ADMIN — AMIODARONE HYDROCHLORIDE 33.3 MG/MIN: 400 TABLET ORAL at 21:21

## 2023-12-15 RX ADMIN — TAMSULOSIN HYDROCHLORIDE 0.4 MILLIGRAM(S): 0.4 CAPSULE ORAL at 21:16

## 2023-12-15 NOTE — PRE-ANESTHESIA EVALUATION ADULT - NSANTHPEFT_GEN_ALL_CORE
T(C): 36.7 (12-15-23 @ 07:28), Max: 36.7 (12-15-23 @ 07:28)  HR: 120 (12-15-23 @ 07:28) (120 - 120)  BP: 119/80 (12-15-23 @ 07:28) (119/80 - 119/80)  RR: 22 (12-15-23 @ 07:28) (22 - 22)  SpO2: 98% (12-15-23 @ 07:28) (98% - 98%)    CONSTITUTIONAL: Well groomed, no apparent distress  RESP: No respiratory distress, no use of accessory muscles; CTA b/l, no WRR  CV: irregular rhythm, normal rate, +S1,S2  PSYCH: Appropriate insight/judgment; A+O x 3

## 2023-12-15 NOTE — PATIENT PROFILE ADULT - FALL HARM RISK - HARM RISK INTERVENTIONS
Assistance with ambulation/Assistance OOB with selected safe patient handling equipment/Communicate Risk of Fall with Harm to all staff/Discuss with provider need for PT consult/Monitor gait and stability/Provide patient with walking aids - walker, cane, crutches/Reinforce activity limits and safety measures with patient and family/Sit up slowly, dangle for a short time, stand at bedside before walking/Tailored Fall Risk Interventions/Use of alarms - bed, chair and/or voice tab/Visual Cue: Yellow wristband and red socks/Bed in lowest position, wheels locked, appropriate side rails in place/Call bell, personal items and telephone in reach/Instruct patient to call for assistance before getting out of bed or chair/Non-slip footwear when patient is out of bed/[place] to call system/Physically safe environment - no spills, clutter or unnecessary equipment/Purposeful Proactive Rounding/Room/bathroom lighting operational, light cord in reach Assistance with ambulation/Assistance OOB with selected safe patient handling equipment/Communicate Risk of Fall with Harm to all staff/Discuss with provider need for PT consult/Monitor gait and stability/Provide patient with walking aids - walker, cane, crutches/Reinforce activity limits and safety measures with patient and family/Sit up slowly, dangle for a short time, stand at bedside before walking/Tailored Fall Risk Interventions/Use of alarms - bed, chair and/or voice tab/Visual Cue: Yellow wristband and red socks/Bed in lowest position, wheels locked, appropriate side rails in place/Call bell, personal items and telephone in reach/Instruct patient to call for assistance before getting out of bed or chair/Non-slip footwear when patient is out of bed/[place] to call system/Physically safe environment - no spills, clutter or unnecessary equipment/Purposeful Proactive Rounding/Room/bathroom lighting operational, light cord in reach

## 2023-12-15 NOTE — ASU PATIENT PROFILE, ADULT - FALL HARM RISK - UNIVERSAL INTERVENTIONS
Bed in lowest position, wheels locked, appropriate side rails in place/Call bell, personal items and telephone in reach/Instruct patient to call for assistance before getting out of bed or chair/Non-slip footwear when patient is out of bed/[place] to call system/Physically safe environment - no spills, clutter or unnecessary equipment/Purposeful Proactive Rounding/Room/bathroom lighting operational, light cord in reach Bed in lowest position, wheels locked, appropriate side rails in place/Call bell, personal items and telephone in reach/Instruct patient to call for assistance before getting out of bed or chair/Non-slip footwear when patient is out of bed/[place] to call system/Physically safe environment - no spills, clutter or unnecessary equipment/Purposeful Proactive Rounding/Room/bathroom lighting operational, light cord in reach

## 2023-12-15 NOTE — ASU PATIENT PROFILE, ADULT - IS PATIENT PREGNANT?
Josy Kc is a 64 year old female who comes in today for a Blood Pressure check because of ongoing blood pressure monitoring.    *Document pulse and BP  *Use new set of vitals button for multiple readings.  *Use extended vitals for orthostatic    Vitals as recorded, a large cuff was used.    Patient is not taking medication as prescribed, Last dose of HCTZ 1-11-17.  Patient is not tolerating medications well. Stopped due to H/aches. Patient has no symptoms now.  Patient is monitoring Blood Pressure at home.  Average readings if yes, taken this 5 am 136/76, 7 am 134/76    Current complaints: none    Disposition: MD/AP notified while patient in the clinic. Reviewed results with Dr. Ram. Will forward note to Dr. Mcneill who is in this afternoon.  Patient had f/up labs today and has an appt with Dr. Mcneill 2-3-2017 to review these results.    Roxy Gold MA   not applicable (Male)

## 2023-12-15 NOTE — PRE-ANESTHESIA EVALUATION ADULT - NSANTHPMHFT_GEN_ALL_CORE
70 yo M with Dyspnea on exertion due to afib now here for afib ablation. 72 yo M with Dyspnea on exertion due to afib now here for afib ablation.

## 2023-12-15 NOTE — ASU PATIENT PROFILE, ADULT - NSICDXPASTSURGICALHX_GEN_ALL_CORE_FT
PAST SURGICAL HISTORY:  History of back surgery     History of kidney transplant 3 years ago    S/P arteriovenous (AV) fistula creation prior old fistula in R arm    S/P lumpectomy, right breast      PAST SURGICAL HISTORY:  History of back surgery s/p Left L5-S1 endoscopic laminoforaminotomy    History of kidney transplant 3 years ago    S/P arteriovenous (AV) fistula creation prior old fistula in R arm    S/P lumpectomy, right breast

## 2023-12-15 NOTE — PATIENT PROFILE ADULT - FUNCTIONAL ASSESSMENT - BASIC MOBILITY 6.
2-calculated by average/Not able to assess (calculate score using [facility] averaging method)  2-calculated by average/Not able to assess (calculate score using [facility] averaging method)

## 2023-12-16 ENCOUNTER — TRANSCRIPTION ENCOUNTER (OUTPATIENT)
Age: 68
End: 2023-12-16

## 2023-12-16 PROCEDURE — 99233 SBSQ HOSP IP/OBS HIGH 50: CPT

## 2023-12-16 RX ORDER — MORPHINE SULFATE 50 MG/1
2 CAPSULE, EXTENDED RELEASE ORAL ONCE
Refills: 0 | Status: DISCONTINUED | OUTPATIENT
Start: 2023-12-16 | End: 2023-12-16

## 2023-12-16 RX ORDER — AMIODARONE HYDROCHLORIDE 400 MG/1
200 TABLET ORAL ONCE
Refills: 0 | Status: COMPLETED | OUTPATIENT
Start: 2023-12-17 | End: 2023-12-17

## 2023-12-16 RX ORDER — METOPROLOL TARTRATE 50 MG
50 TABLET ORAL
Refills: 0 | Status: DISCONTINUED | OUTPATIENT
Start: 2023-12-16 | End: 2023-12-17

## 2023-12-16 RX ORDER — OXYCODONE HYDROCHLORIDE 5 MG/1
15 TABLET ORAL EVERY 8 HOURS
Refills: 0 | Status: DISCONTINUED | OUTPATIENT
Start: 2023-12-16 | End: 2023-12-18

## 2023-12-16 RX ORDER — AMIODARONE HYDROCHLORIDE 400 MG/1
200 TABLET ORAL
Refills: 0 | Status: DISCONTINUED | OUTPATIENT
Start: 2023-12-17 | End: 2023-12-17

## 2023-12-16 RX ORDER — AMIODARONE HYDROCHLORIDE 400 MG/1
0.5 TABLET ORAL
Qty: 450 | Refills: 0 | Status: DISCONTINUED | OUTPATIENT
Start: 2023-12-16 | End: 2023-12-17

## 2023-12-16 RX ADMIN — APIXABAN 5 MILLIGRAM(S): 2.5 TABLET, FILM COATED ORAL at 23:01

## 2023-12-16 RX ADMIN — AMIODARONE HYDROCHLORIDE 16.7 MG/MIN: 400 TABLET ORAL at 16:32

## 2023-12-16 RX ADMIN — Medication 50 MILLIGRAM(S): at 17:34

## 2023-12-16 RX ADMIN — MORPHINE SULFATE 2 MILLIGRAM(S): 50 CAPSULE, EXTENDED RELEASE ORAL at 19:00

## 2023-12-16 RX ADMIN — MORPHINE SULFATE 2 MILLIGRAM(S): 50 CAPSULE, EXTENDED RELEASE ORAL at 12:19

## 2023-12-16 RX ADMIN — APIXABAN 5 MILLIGRAM(S): 2.5 TABLET, FILM COATED ORAL at 11:15

## 2023-12-16 RX ADMIN — Medication 25 MILLIGRAM(S): at 05:08

## 2023-12-16 RX ADMIN — MORPHINE SULFATE 2 MILLIGRAM(S): 50 CAPSULE, EXTENDED RELEASE ORAL at 17:55

## 2023-12-16 RX ADMIN — Medication 30 MILLILITER(S): at 15:47

## 2023-12-16 RX ADMIN — PANTOPRAZOLE SODIUM 40 MILLIGRAM(S): 20 TABLET, DELAYED RELEASE ORAL at 05:21

## 2023-12-16 RX ADMIN — TAMSULOSIN HYDROCHLORIDE 0.4 MILLIGRAM(S): 0.4 CAPSULE ORAL at 23:01

## 2023-12-16 RX ADMIN — AMIODARONE HYDROCHLORIDE 16.7 MG/MIN: 400 TABLET ORAL at 07:55

## 2023-12-16 RX ADMIN — MORPHINE SULFATE 2 MILLIGRAM(S): 50 CAPSULE, EXTENDED RELEASE ORAL at 14:23

## 2023-12-16 NOTE — DISCHARGE NOTE NURSING/CASE MANAGEMENT/SOCIAL WORK - NSDCPEFALRISK_GEN_ALL_CORE
For information on Fall & Injury Prevention, visit: [email or web address].[facility]/news/fall-prevention-protects-and-maintains-health-and-mobility OR  https://www.[facility]/news/fall-prevention-tips-[email or web address]-injury OR  https://www.cdc.gov/steadi/patient.html For information on Fall & Injury Prevention, visit: [email or web address]://[email or web address]/[email or web address]-prevention-protects-and-maintains-health-[email or web address]-mobility OR  https://www.[facility]/news/fall-prevention-tips-to-avoid-injury OR  https://www.cdc.gov/steadi/patient.html

## 2023-12-16 NOTE — PROGRESS NOTE ADULT - ASSESSMENT
A/P  Patient S/P Afib/flutter Ablation  Paient is doing well  - continue Eliquis   - protonix 40 mg daily x 30 days  - d/c cardizem  - Metoprolol dose changed to 25 mg po BID  - D/c IV amiodarone and resume home does of amio  - check CBC, LFT and TSH this morning.  - No heavy lifting x 1 week  - Pt may shower today  - No bath/swimming x 1 week  - ok to discharge home today pending lab results  - follow up with EP in 1 month   A/P  Patient S/P Afib/flutter Ablation  Pt is currently back in Aflutter at 110 BPM  - continue Eliquis   - Cont IV amiodarone gtt to complete 24 hour IV load  - Once IV amio is done, change to 200 mg po BID  - protonix 40 mg daily x 30 days  - d/c cardizem  - Metoprolol dose changed to 25 mg po BID  - check CBC, LFT and TSH this morning.  - No heavy lifting x 1 week  - Pt may shower today  - No bath/swimming x 1 week  - Will monitor pt in the hospital another night  - follow up with EP in 1 month  - Pt is requesting pain meds due to back pain.

## 2023-12-16 NOTE — DISCHARGE NOTE PROVIDER - NSDCFUSCHEDAPPT_GEN_ALL_CORE_FT
Adele Arnold Physician [facility]  NEUROSURG [address]  Scheduled Appointment: 02/28/2024     Adele Arnold Physician [facility]  NEUROSURG [address]  Scheduled Appointment: 02/28/2024     Porsha Henderson  [facility]  ELECTROPH [address]  Scheduled Appointment: 01/11/2024    Adele Arnold  [facility] Physician [facility]  NEUROSURG [address]  Scheduled Appointment: 02/28/2024     Porsha Henderson  [facility]  ELECTROPH [address]  Scheduled Appointment: 01/11/2024    Adele Arnold  [facility] Physician [facility]  NEUROSURG [address]  Scheduled Appointment: 02/28/2024

## 2023-12-16 NOTE — DISCHARGE NOTE PROVIDER - NSDCCPTREATMENT_GEN_ALL_CORE_FT
PRINCIPAL PROCEDURE  Procedure: Atrial ablation  Findings and Treatment: - Please start taking pantoprazole 40 mg daily   - You should restart your Eliquis  - You may shower today.  - Do not drive or operate heavy machinery for 3 days.  - Do not submerge in water (example: baths, swimming) for 1 week.  - No squatting, exertional activities, or lifting anything > 10 lbs for 1 week.  - Any sudden swelling, redness, fever, discharge, or severe pain, call the Electrophysiology Office at [phone].     PRINCIPAL PROCEDURE  Procedure: Atrial ablation  Findings and Treatment: - Please start taking pantoprazole 40 mg daily   - You should restart your Eliquis  - You may shower today.  - Do not drive or operate heavy machinery for 3 days.  - Do not submerge in water (example: baths, swimming) for 1 week.  - No squatting, exertional activities, or lifting anything > 10 lbs for 1 week.  - Any sudden swelling, redness, fever, discharge, or severe pain, call the Electrophysiology Office at [phone].

## 2023-12-16 NOTE — DISCHARGE NOTE NURSING/CASE MANAGEMENT/SOCIAL WORK - PATIENT PORTAL LINK FT
You can access the FollowMyHealth Patient Portal offered by [facility] by registering at the following website: [email or web address] By joining [facility]’s FollowMyHealth portal, you will also be able to view your health information using other applications (apps) compatible with our system. You can access the FollowMyHealth Patient Portal offered by [facility] by registering at the following website: [email or web address]. By joining [facility]’s FollowMyHealth portal, you will also be able to view your health information using other applications (apps) compatible with our system.

## 2023-12-16 NOTE — DISCHARGE NOTE PROVIDER - NSDCCPCAREPLAN_GEN_ALL_CORE_FT
PRINCIPAL DISCHARGE DIAGNOSIS  Diagnosis: S/P ablation of atrial fibrillation  Assessment and Plan of Treatment:

## 2023-12-16 NOTE — DISCHARGE NOTE PROVIDER - HOSPITAL COURSE
68 year-old male w PMHx of AFib on Eliquis, ESRD s/p renal transplant, HTN, Lumbar radiculopathy presented for af ablation. Patient tolerated procedure well. Pain well controlled and vascular access site CDI. AC restarted on the day of procedure. Patient loading with amiodarone overnight. 68 year-old male w PMHx of AFib on Eliquis, ESRD s/p renal transplant, HTN, Lumbar radiculopathy presented for af ablation. Patient tolerated procedure well. Pain well controlled and vascular access site CDI. AC restarted on the day of procedure. Patient loading with amiodarone overnight, but remains in Aflutter hr in the 120s. 68 year-old male w PMHx of persistent AFib on Eliquis, ESRD s/p renal transplant, HTN, Lumbar radiculopathy presented for af ablation. Patient tolerated procedure well. Pain well controlled and vascular access site CDI. B/L groin sites intact. No hematoma.  AC restarted on the day of procedure. Patient loading with amiodarone overnight, but remains in Aflutter hr in the 120s.   On 12/17th patient had episode with AF with RVR and also possible SVT in AM. Patient was given additional amiodarone 150mg IV bolus and then started on infusion. Then also tried adenosine 6mg IVP x2 for regular, narrow complex tachycardia at 200 bpm. Did not terminate or slow down arrhythmia. Also given metoprolol tartrate 5mg IVP x2 with then HR response to 130 bpm. On 12/18th, He spontaneously converted to Normal sinus Rhythm. He was switched from IV to amiodarone 200mg po daily and lopressor dose increased to 100mg twice daily. He is stable for discharge and to followup with Dr. Henderson in office.       68 year-old male w PMHx of persistent AFib on Eliquis, ESRD s/p renal transplant, HTN, Lumbar radiculopathy presented for af ablation. ON 12/15th, he underwent Afib/aflutter ablation. Platelet transfused prior to procedure as prophylaxis. Patient tolerated procedure well. Pain well controlled and vascular access site C/D/I. B/L groin sites intact. No hematoma.  Eliquis was restarted on the day of procedure. Patient loading with amiodarone overnight, but remains in Aflutter hr in the 120s.   On 12/17th patient had episode with AF with RVR and also possible SVT in AM. Patient was given additional amiodarone 150mg IV bolus and then restarted on infusion. Then also tried adenosine 6mg IVP x2 for regular, narrow complex tachycardia at 200 bpm. Did not terminate or slow down arrhythmia. Also given metoprolol tartrate 5mg IVP x2 with then HR response to 130 bpm. On 12/18th, He spontaneously converted to Normal sinus Rhythm. He was switched from IV to amiodarone 200mg po daily and lopressor dose increased to 100mg twice daily. He is stable for discharge and to followup with Dr. Henderson in office.

## 2023-12-16 NOTE — DISCHARGE NOTE PROVIDER - NSDCMRMEDTOKEN_GEN_ALL_CORE_FT
aspirin 81 mg oral tablet: 1 tab(s) orally once a day  calcitriol 0.5 mcg oral capsule: 1 cap(s) orally 2 times a day  Eliquis 5 mg oral tablet: 1 tab(s) orally 2 times a day  Flomax 0.4 mg oral capsule: 1 cap(s) orally 2 times a day  furosemide 40 mg oral tablet: 1 tab(s) orally once a day  metoprolol succinate 100 mg oral capsule, extended release: 1 cap(s) orally once a day  mycophenolate mofetil 500 mg oral tablet: 2 tab(s) orally once a day  oxyCODONE 15 mg oral tablet: 1 tab(s) orally as needed for  moderate pain  tacrolimus 1 mg oral capsule: 2 cap(s) orally every 12 hours   amiodarone 200 mg oral tablet: 1 tab(s) orally once a day  aspirin 81 mg oral tablet: 1 tab(s) orally once a day  calcitriol 0.5 mcg oral capsule: 1 cap(s) orally 2 times a day  Eliquis 5 mg oral tablet: 1 tab(s) orally 2 times a day  Flomax 0.4 mg oral capsule: 1 cap(s) orally 2 times a day  furosemide 40 mg oral tablet: 1 tab(s) orally once a day  metoprolol tartrate 100 mg oral tablet: 1 tab(s) orally 2 times a day  mycophenolate mofetil 500 mg oral tablet: 2 tab(s) orally once a day  oxyCODONE 15 mg oral tablet: 1 tab(s) orally prn as needed for  moderate pain home medication  pantoprazole 40 mg oral delayed release tablet: 1 tab(s) orally once a day (before a meal)  tacrolimus 1 mg oral capsule: 3 cap(s) orally once a day (in the morning)  tacrolimus 1 mg oral capsule: 2 cap(s) orally once a day (at bedtime)

## 2023-12-16 NOTE — DISCHARGE NOTE PROVIDER - PROVIDER TOKENS
PROVIDER:[TOKEN:[[id number]]] PROVIDER:[TOKEN:[[id number]]] PROVIDER:[TOKEN:[[id number]],FOLLOWUP:[1 month]] PROVIDER:[TOKEN:[[id number]],FOLLOWUP:[1 month]] PROVIDER:[TOKEN:[[id number]],SCHEDULEDAPPT:[01/11/2024],SCHEDULEDAPPTTIME:[01:30 PM]] PROVIDER:[TOKEN:[[id number]],SCHEDULEDAPPT:[01/11/2024],SCHEDULEDAPPTTIME:[01:30 PM]]

## 2023-12-16 NOTE — PROGRESS NOTE ADULT - ASSESSMENT
Assessment   Patient is a 68 year old male with PMH Afib on eliquis, COPD mild not on home o2, ESRD sp renal transplant 3 years ago, HEP C + cirrhosis, Melanoma R eye sp Laser, Portal hypertension and grade A and B  Varices by egd, CAD sp PCI in the past, Breast cancer gynecomastia, HLD, HTN + Lumbar Radiculopathy who presents to [facility] for Afib ablation. Post op ablation patient remains in Afib, started on IV amio load.     A/P  #Afib/Flutter  - POD1 sp ablation  - BL groin sutures removed on 12/16  - Iv amio started last night to finish at 1 am 12/17  - Start PO amio 200mg BID 1 hour prior to gtt coming down  - Currently back in Aflutter at 110 BPM  - continue Eliquis 5mg BID  - Cont metoprolol tartrate 25mg BID  - Cardizem dcd    #back pain  - PRN home Oxycodone 15mg IR  - Morphine 2mg IVP x1    #GERD   - Protonix 40mg daily   - Maalox prn    #BPH  - Flomax 0.4mg HS    Please contact [phone] with any questions or concerns.       Assessment   Patient is a 68 year old male with PMH Afib on eliquis, COPD mild not on home o2, ESRD sp renal transplant 3 years ago, HEP C + cirrhosis, Melanoma R eye sp Laser, Portal hypertension and grade A and B  Varices by egd, CAD sp PCI in the past, Breast cancer gynecomastia, HLD, HTN + Lumbar Radiculopathy who presents to [facility] for Afib ablation. Post op ablation patient remains in Afib, started on IV amio load.     A/P  #Afib/Flutter  - POD1 sp ablation  - BL groin sutures removed on 12/16  - Iv amio started last night to finish at 1 am 12/17  - Start PO amio 200mg BID 1 hour prior to gtt coming down  - Currently back in Aflutter at 110 BPM  - continue Eliquis 5mg BID  - Cont metoprolol tartrate 25mg BID  - Cardizem dcd    #back pain  - PRN home Oxycodone 15mg IR  - Morphine 2mg IVP x1    #GERD   - Protonix 40mg daily   - Maalox prn    #BPH  - Flomax 0.4mg HS    Please contact [phone] with any questions or concerns.

## 2023-12-16 NOTE — PROGRESS NOTE ADULT - SUBJECTIVE AND OBJECTIVE BOX
Chief complaint: Patient is a 68y old  Male who presents with a chief complaint of AF ablation (16 Dec 2023 06:56)    Interval history: Patient seen and examined at bedside. No overnight issues. Patient remains in Aflutter hr 120s, Amio gtt at 0.5mg/hr.    Review of systems: A complete 10-point review of systems was obtained and is negative except as stated in the interval history.    Vitals:  T(F): 98.3, Max: 99 (12-15 @ 23:30)  HR: 107 (73 - 117)  BP: 153/81 (90/44 - 163/78)  RR: 20 (16 - 20)  SpO2: 96% (95% - 99%)    Ins & outs:     12-15 @ 07:01  -  12-16 @ 07:00  --------------------------------------------------------  IN: 0 mL / OUT: 2050 mL / NET: -2050 mL    12-16 @ 07:01  -  12-16 @ 15:00  --------------------------------------------------------  IN: 116.8 mL / OUT: 0 mL / NET: 116.8 mL      Weight trend:  Weight (kg): 63 (12-15)    Physical exam:  General: No apparent distress  HEENT: Anicteric sclera. Moist mucous membranes. JVP *** cm.   Cardiac: Regular rate and rhythm. No murmurs, rubs, or gallops.   Vascular: Symmetric radial pulses. Dorsalis pedis pulses palpable.   Respiratory: Normal effort. Bibasilar crackles. Clear to ascultation.   Abdomen: Soft, nontender. Audible bowel sounds.   Extremities: Warm with *** edema. No cyanosis or clubbing.   Skin: Warm and dry. No rash.   Neurologic: Grossly normal motor function.   Psychiatric: Oriented to person, place, and time.     Data reviewed:  - Telemetry:   - ECG (date***):   - TTE (date***):   - Chest x-ray (date***):   - Stress test:   - CCTA:  - Cardiac catheterization:  - Cardiac MRI:    - Labs:                        13.3   4.67  )-----------( 82       ( 15 Dec 2023 07:12 )             42.6     12-15    139  |  105  |  28<H>  ----------------------------<  106<H>  4.2   |  23  |  1.7<H>    Ca    10.8<H>      15 Dec 2023 07:12      PT/INR - ( 15 Dec 2023 07:12 )   PT: 12.90 sec;   INR: 1.13 ratio         PTT - ( 15 Dec 2023 07:12 )  PTT:33.0 sec            Urinalysis Basic - ( 15 Dec 2023 07:12 )    Color: x / Appearance: x / SG: x / pH: x  Gluc: 106 mg/dL / Ketone: x  / Bili: x / Urobili: x   Blood: x / Protein: x / Nitrite: x   Leuk Esterase: x / RBC: x / WBC x   Sq Epi: x / Non Sq Epi: x / Bacteria: x        Medications:  apixaban 5 milliGRAM(s) Oral every 12 hours  metoprolol tartrate 25 milliGRAM(s) Oral two times a day  pantoprazole    Tablet 40 milliGRAM(s) Oral before breakfast  tamsulosin 0.4 milliGRAM(s) Oral at bedtime    Drips:  aMIOdarone Infusion 0.5 mG/Min (16.7 mL/Hr) IV Continuous <Continuous>    PRN:     Allergies    Rifuxen (Swelling)  Rituxan (Hives)    Intolerances      Assessment:      Problems discussed and associated plan:      Please contact me with any questions or concerns at [phone]. Chief complaint: Patient is a 68y old  Male who presents with a chief complaint of AF ablation (16 Dec 2023 06:56)    Interval history: Patient seen and examined at bedside. No overnight issues. Patient remains in Aflutter hr 120s, Amio gtt at 0.5mg/hr.    Review of systems: A complete 10-point review of systems was obtained and is negative except as stated in the interval history.    Vitals:  T(F): 98.3, Max: 99 (12-15 @ 23:30)  HR: 107 (73 - 117)  BP: 153/81 (90/44 - 163/78)  RR: 20 (16 - 20)  SpO2: 96% (95% - 99%)    Ins & outs:     12-15 @ 07:01  -  12-16 @ 07:00  --------------------------------------------------------  IN: 0 mL / OUT: 2050 mL / NET: -2050 mL    12-16 @ 07:01  -  12-16 @ 15:00  --------------------------------------------------------  IN: 116.8 mL / OUT: 0 mL / NET: 116.8 mL      Weight trend:  Weight (kg): 63 (12-15)    Physical exam:  General: No apparent distress  HEENT: Anicteric sclera. Moist mucous membranes. JVP *** cm.   Cardiac: Regular rate and rhythm. No murmurs, rubs, or gallops.   Vascular: Symmetric radial pulses. Dorsalis pedis pulses palpable.   Respiratory: Normal effort. Bibasilar crackles. Clear to ascultation.   Abdomen: Soft, nontender. Audible bowel sounds.   Extremities: Warm with *** edema. No cyanosis or clubbing.   Skin: Warm and dry. No rash.   Neurologic: Grossly normal motor function.   Psychiatric: Oriented to person, place, and time.     Data reviewed:  - Telemetry:   - ECG (date***):   - TTE (date***):   - Chest x-ray (date***):   - Stress test:   - CCTA:  - Cardiac catheterization:  - Cardiac MRI:    - Labs:                        13.3   4.67  )-----------( 82       ( 15 Dec 2023 07:12 )             42.6     12-15    139  |  105  |  28<H>  ----------------------------<  106<H>  4.2   |  23  |  1.7<H>    Ca    10.8<H>      15 Dec 2023 07:12      PT/INR - ( 15 Dec 2023 07:12 )   PT: 12.90 sec;   INR: 1.13 ratio         PTT - ( 15 Dec 2023 07:12 )  PTT:33.0 sec            Urinalysis Basic - ( 15 Dec 2023 07:12 )    Color: x / Appearance: x / SG: x / pH: x  Gluc: 106 mg/dL / Ketone: x  / Bili: x / Urobili: x   Blood: x / Protein: x / Nitrite: x   Leuk Esterase: x / RBC: x / WBC x   Sq Epi: x / Non Sq Epi: x / Bacteria: x        Medications:  apixaban 5 milliGRAM(s) Oral every 12 hours  metoprolol tartrate 25 milliGRAM(s) Oral two times a day  pantoprazole    Tablet 40 milliGRAM(s) Oral before breakfast  tamsulosin 0.4 milliGRAM(s) Oral at bedtime    Drips:  aMIOdarone Infusion 0.5 mG/Min (16.7 mL/Hr) IV Continuous <Continuous>    PRN:     Allergies    Rifuxen (Swelling)  Rituxan (Hives)    Intolerances      Assessment:      Problems discussed and associated plan:      Please contact me with any questions or concerns at [phone]. Chief complaint: Patient is a 68y old  Male who presents with a chief complaint of AF ablation (16 Dec 2023 06:56)    Interval history: Patient seen and examined at bedside. No overnight issues. Patient remains in Aflutter hr 120s, Amio gtt at 0.5mg/hr.    Review of systems: A complete 10-point review of systems was obtained and is negative except as stated in the interval history.    Vitals:  T(F): 98.3, Max: 99 (12-15 @ 23:30)  HR: 107 (73 - 117)  BP: 153/81 (90/44 - 163/78)  RR: 20 (16 - 20)  SpO2: 96% (95% - 99%)    Ins & outs:     12-15 @ 07:01  -  12-16 @ 07:00  --------------------------------------------------------  IN: 0 mL / OUT: 2050 mL / NET: -2050 mL    12-16 @ 07:01  -  12-16 @ 15:00  --------------------------------------------------------  IN: 116.8 mL / OUT: 0 mL / NET: 116.8 mL      Weight trend:  Weight (kg): 63 (12-15)    Physical exam:  General: No apparent distress  HEENT: Legally blind, [place], Anicteric sclera. Moist mucous membranes. JVD-  Cardiac: Regular rate and rhythm. No murmurs, rubs, or gallops.   Vascular: Symmetric radial pulses. Dorsalis pedis pulses palpable.   Respiratory: Normal effort. Clear to ascultation.   Abdomen: Soft, nontender. Audible bowel sounds.   Extremities: Warm with no edema. No cyanosis or clubbing.   Skin: Warm and dry. No rash.   Neurologic: Grossly normal motor function.   Psychiatric: Oriented to person, place, and time.     Data reviewed:  - Telemetry: Afib  bpm  - ECG < from: 12 Lead ECG (12.07.23 @ 15:37) >    Diagnosis Line Atrial fibrillation  Voltage criteria for left ventricular hypertrophy  Cannot rule out Septal infarct , age undetermined  Marked ST abnormality, possible inferior subendocardial injury  Abnormal ECG    < end of copied text >    - TTE < from: Transthoracic Echocardiogram (11.09.19 @ 14:57) >    Summary:   1. Mild mitral valve regurgitation.   2. Moderate mitral annular calcification.   3. Structurally normal mitral valve, with normal leaflet excursion.   4. Mild-moderate tricuspid regurgitation.   5. Sclerotic aortic valve with normal opening.   6. Mildly dilated aortic root to 4.1cm.   7. LA volume Index is 59.6 ml/m² ml/m2.    < end of copied text >    - Chest x-ray < from: Xray Chest 2 Views PA/Lat (12.07.23 @ 16:54) >  Findings:    Support devices: Vascular stents are again noted overlying both the right   and left upper lung fields/axillary regions    Cardiac/mediastinum/hilum: Stable cardiomediastinal silhouette. Aorta is   calcified.    Lung parenchyma/Pleura: Within normal limits.    Skeleton/soft tissues: Degenerative change. Soft tissue calcifications   are noted overlying the partially imaged left shoulder    Impression:    No radiographic evidence of acute cardiopulmonary disease.    < end of copied text >    - Stress test: < from: Cardiac Cath Lab - Adult (11.26.19 @ 13:59) >  SUMMARY:         1ST LESION INTERVENTIONS: A successful stent with rotational atherectomy    and balloon angioplasty was performed on the 80 % lesion in the mid RCA.    Following intervention there was an excellent angiographic appearance with    a 0 % residual stenosis.         2ND LESION INTERVENTIONS: A successful stent with rotational atherectomy    and balloon angioplasty was performed on the 80 % lesion in the proximal    RCA. Following intervention there was an excellent angiographicappearance    with a 0 % residual stenosis.         HEMODYNAMICS: Hemodynamic assessment demonstrates mild to moderate systemic    hypertension.         CORONARY CIRCULATION: There was significant 1-vessel coronary artery    disease (RCA).         INDICATIONS: Angina/MI: unstable angina, CCS class IV. Coronary artery    disease.         CLINICAL PRESENTATION: Staged-PCI to proximal and mid-RCA.         < end of copied text >    No recent CCTA, Cardiac catheterization, Cardiac MRI.    - Labs:                        13.3   4.67  )-----------( 82       ( 15 Dec 2023 07:12 )             42.6     12-15    139  |  105  |  28<H>  ----------------------------<  106<H>  4.2   |  23  |  1.7<H>    Ca    10.8<H>      15 Dec 2023 07:12      PT/INR - ( 15 Dec 2023 07:12 )   PT: 12.90 sec;   INR: 1.13 ratio         PTT - ( 15 Dec 2023 07:12 )  PTT:33.0 sec            Urinalysis Basic - ( 15 Dec 2023 07:12 )    Color: x / Appearance: x / SG: x / pH: x  Gluc: 106 mg/dL / Ketone: x  / Bili: x / Urobili: x   Blood: x / Protein: x / Nitrite: x   Leuk Esterase: x / RBC: x / WBC x   Sq Epi: x / Non Sq Epi: x / Bacteria: x        Medications:  apixaban 5 milliGRAM(s) Oral every 12 hours  metoprolol tartrate 25 milliGRAM(s) Oral two times a day  pantoprazole    Tablet 40 milliGRAM(s) Oral before breakfast  tamsulosin 0.4 milliGRAM(s) Oral at bedtime    Drips:  aMIOdarone Infusion 0.5 mG/Min (16.7 mL/Hr) IV Continuous <Continuous>    PRN:     Allergies    Rifuxen (Swelling)  Rituxan (Hives)    Intolerances     Chief complaint: Patient is a 68y old  Male who presents with a chief complaint of AF ablation (16 Dec 2023 06:56)    Interval history: Patient seen and examined at bedside. No overnight issues. Patient remains in Aflutter hr 120s, Amio gtt at 0.5mg/hr.    Review of systems: A complete 10-point review of systems was obtained and is negative except as stated in the interval history.    Vitals:  T(F): 98.3, Max: 99 (12-15 @ 23:30)  HR: 107 (73 - 117)  BP: 153/81 (90/44 - 163/78)  RR: 20 (16 - 20)  SpO2: 96% (95% - 99%)    Ins & outs:     12-15 @ 07:01  -  12-16 @ 07:00  --------------------------------------------------------  IN: 0 mL / OUT: 2050 mL / NET: -2050 mL    12-16 @ 07:01  -  12-16 @ 15:00  --------------------------------------------------------  IN: 116.8 mL / OUT: 0 mL / NET: 116.8 mL      Weight trend:  Weight (kg): 63 (12-15)    Physical exam:  General: No apparent distress  HEENT: Legally blind, [place], Anicteric sclera. Moist mucous membranes. JVD-  Cardiac: Regular rate and rhythm. No murmurs, rubs, or gallops.   Vascular: Symmetric radial pulses. Dorsalis pedis pulses palpable.   Respiratory: Normal effort. Clear to ascultation.   Abdomen: Soft, nontender. Audible bowel sounds.   Extremities: Warm with no edema. No cyanosis or clubbing.   Skin: Warm and dry. No rash.   Neurologic: Grossly normal motor function.   Psychiatric: Oriented to person, place, and time.     Data reviewed:  - Telemetry: Afib  bpm  - ECG < from: 12 Lead ECG (12.07.23 @ 15:37) >    Diagnosis Line Atrial fibrillation  Voltage criteria for left ventricular hypertrophy  Cannot rule out Septal infarct , age undetermined  Marked ST abnormality, possible inferior subendocardial injury  Abnormal ECG    < end of copied text >    - TTE < from: Transthoracic Echocardiogram (11.09.19 @ 14:57) >    Summary:   1. Mild mitral valve regurgitation.   2. Moderate mitral annular calcification.   3. Structurally normal mitral valve, with normal leaflet excursion.   4. Mild-moderate tricuspid regurgitation.   5. Sclerotic aortic valve with normal opening.   6. Mildly dilated aortic root to 4.1cm.   7. LA volume Index is 59.6 ml/m² ml/m2.    < end of copied text >    - Chest x-ray < from: Xray Chest 2 Views PA/Lat (12.07.23 @ 16:54) >  Findings:    Support devices: Vascular stents are again noted overlying both the right   and left upper lung fields/axillary regions    Cardiac/mediastinum/hilum: Stable cardiomediastinal silhouette. Aorta is   calcified.    Lung parenchyma/Pleura: Within normal limits.    Skeleton/soft tissues: Degenerative change. Soft tissue calcifications   are noted overlying the partially imaged left shoulder    Impression:    No radiographic evidence of acute cardiopulmonary disease.    < end of copied text >    - Stress test: < from: Cardiac Cath Lab - Adult (11.26.19 @ 13:59) >  SUMMARY:         1ST LESION INTERVENTIONS: A successful stent with rotational atherectomy    and balloon angioplasty was performed on the 80 % lesion in the mid RCA.    Following intervention there was an excellent angiographic appearance with    a 0 % residual stenosis.         2ND LESION INTERVENTIONS: A successful stent with rotational atherectomy    and balloon angioplasty was performed on the 80 % lesion in the proximal    RCA. Following intervention there was an excellent angiographicappearance    with a 0 % residual stenosis.         HEMODYNAMICS: Hemodynamic assessment demonstrates mild to moderate systemic    hypertension.         CORONARY CIRCULATION: There was significant 1-vessel coronary artery    disease (RCA).         INDICATIONS: Angina/MI: unstable angina, CCS class IV. Coronary artery    disease.         CLINICAL PRESENTATION: Staged-PCI to proximal and mid-RCA.         < end of copied text >    No recent CCTA, Cardiac catheterization, Cardiac MRI.    - Labs:                        13.3   4.67  )-----------( 82       ( 15 Dec 2023 07:12 )             42.6     12-15    139  |  105  |  28<H>  ----------------------------<  106<H>  4.2   |  23  |  1.7<H>    Ca    10.8<H>      15 Dec 2023 07:12      PT/INR - ( 15 Dec 2023 07:12 )   PT: 12.90 sec;   INR: 1.13 ratio         PTT - ( 15 Dec 2023 07:12 )  PTT:33.0 sec            Urinalysis Basic - ( 15 Dec 2023 07:12 )    Color: x / Appearance: x / SG: x / pH: x  Gluc: 106 mg/dL / Ketone: x  / Bili: x / Urobili: x   Blood: x / Protein: x / Nitrite: x   Leuk Esterase: x / RBC: x / WBC x   Sq Epi: x / Non Sq Epi: x / Bacteria: x        Medications:  apixaban 5 milliGRAM(s) Oral every 12 hours  metoprolol tartrate 25 milliGRAM(s) Oral two times a day  pantoprazole    Tablet 40 milliGRAM(s) Oral before breakfast  tamsulosin 0.4 milliGRAM(s) Oral at bedtime    Drips:  aMIOdarone Infusion 0.5 mG/Min (16.7 mL/Hr) IV Continuous <Continuous>    PRN:     Allergies    Rifuxen (Swelling)  Rituxan (Hives)    Intolerances     Chief complaint: Patient is a 68y old  Male who presents with a chief complaint of AF ablation (16 Dec 2023 06:56)    Interval history: Patient seen and examined at bedside. No overnight issues. Patient remains in Aflutter hr 120s, Amio gtt at 0.5mg/hr.    Review of systems: A complete 10-point review of systems was obtained and is negative except as stated in the interval history.    Vitals:  T(F): 98.3, Max: 99 (12-15 @ 23:30)  HR: 107 (73 - 117)  BP: 153/81 (90/44 - 163/78)  RR: 20 (16 - 20)  SpO2: 96% (95% - 99%)    Ins & outs:     12-15 @ 07:01  -  12-16 @ 07:00  --------------------------------------------------------  IN: 0 mL / OUT: 2050 mL / NET: -2050 mL    12-16 @ 07:01  -  12-16 @ 15:00  --------------------------------------------------------  IN: 116.8 mL / OUT: 0 mL / NET: 116.8 mL      Weight trend:  Weight (kg): 63 (12-15)    Physical exam:  General: No apparent distress  HEENT: Legally blind, [place], Anicteric sclera. Moist mucous membranes. JVD-  Cardiac: Iregular rate and rhythm. No murmurs, rubs, or gallops.   Vascular: Symmetric radial pulses. Dorsalis pedis pulses palpable.   Respiratory: Normal effort. Clear to ascultation.   Abdomen: Soft, nontender. Audible bowel sounds.   Extremities: Warm with no edema. No cyanosis or clubbing.   Skin: Warm and dry. No rash.   Neurologic: Grossly normal motor function.   Psychiatric: Oriented to person, place, and time.     Data reviewed:  - Telemetry: Afib  bpm  - ECG < from: 12 Lead ECG (12.07.23 @ 15:37) >    Diagnosis Line Atrial fibrillation  Voltage criteria for left ventricular hypertrophy  Cannot rule out Septal infarct , age undetermined  Marked ST abnormality, possible inferior subendocardial injury  Abnormal ECG    < end of copied text >    - TTE < from: Transthoracic Echocardiogram (11.09.19 @ 14:57) >    Summary:   1. Mild mitral valve regurgitation.   2. Moderate mitral annular calcification.   3. Structurally normal mitral valve, with normal leaflet excursion.   4. Mild-moderate tricuspid regurgitation.   5. Sclerotic aortic valve with normal opening.   6. Mildly dilated aortic root to 4.1cm.   7. LA volume Index is 59.6 ml/m² ml/m2.    < end of copied text >    - Chest x-ray < from: Xray Chest 2 Views PA/Lat (12.07.23 @ 16:54) >  Findings:    Support devices: Vascular stents are again noted overlying both the right   and left upper lung fields/axillary regions    Cardiac/mediastinum/hilum: Stable cardiomediastinal silhouette. Aorta is   calcified.    Lung parenchyma/Pleura: Within normal limits.    Skeleton/soft tissues: Degenerative change. Soft tissue calcifications   are noted overlying the partially imaged left shoulder    Impression:    No radiographic evidence of acute cardiopulmonary disease.    < end of copied text >    - Stress test: < from: Cardiac Cath Lab - Adult (11.26.19 @ 13:59) >  SUMMARY:         1ST LESION INTERVENTIONS: A successful stent with rotational atherectomy    and balloon angioplasty was performed on the 80 % lesion in the mid RCA.    Following intervention there was an excellent angiographic appearance with    a 0 % residual stenosis.         2ND LESION INTERVENTIONS: A successful stent with rotational atherectomy    and balloon angioplasty was performed on the 80 % lesion in the proximal    RCA. Following intervention there was an excellent angiographicappearance    with a 0 % residual stenosis.         HEMODYNAMICS: Hemodynamic assessment demonstrates mild to moderate systemic    hypertension.         CORONARY CIRCULATION: There was significant 1-vessel coronary artery    disease (RCA).         INDICATIONS: Angina/MI: unstable angina, CCS class IV. Coronary artery    disease.         CLINICAL PRESENTATION: Staged-PCI to proximal and mid-RCA.         < end of copied text >    No recent CCTA, Cardiac catheterization, Cardiac MRI.    - Labs:                        13.3   4.67  )-----------( 82       ( 15 Dec 2023 07:12 )             42.6     12-15    139  |  105  |  28<H>  ----------------------------<  106<H>  4.2   |  23  |  1.7<H>    Ca    10.8<H>      15 Dec 2023 07:12      PT/INR - ( 15 Dec 2023 07:12 )   PT: 12.90 sec;   INR: 1.13 ratio         PTT - ( 15 Dec 2023 07:12 )  PTT:33.0 sec            Urinalysis Basic - ( 15 Dec 2023 07:12 )    Color: x / Appearance: x / SG: x / pH: x  Gluc: 106 mg/dL / Ketone: x  / Bili: x / Urobili: x   Blood: x / Protein: x / Nitrite: x   Leuk Esterase: x / RBC: x / WBC x   Sq Epi: x / Non Sq Epi: x / Bacteria: x        Medications:  apixaban 5 milliGRAM(s) Oral every 12 hours  metoprolol tartrate 25 milliGRAM(s) Oral two times a day  pantoprazole    Tablet 40 milliGRAM(s) Oral before breakfast  tamsulosin 0.4 milliGRAM(s) Oral at bedtime    Drips:  aMIOdarone Infusion 0.5 mG/Min (16.7 mL/Hr) IV Continuous <Continuous>    PRN:     Allergies    Rifuxen (Swelling)  Rituxan (Hives)    Intolerances     Chief complaint: Patient is a 68y old  Male who presents with a chief complaint of AF ablation (16 Dec 2023 06:56)    Interval history: Patient seen and examined at bedside. No overnight issues. Patient remains in Aflutter hr 120s, Amio gtt at 0.5mg/hr.    Review of systems: A complete 10-point review of systems was obtained and is negative except as stated in the interval history.    Vitals:  T(F): 98.3, Max: 99 (12-15 @ 23:30)  HR: 107 (73 - 117)  BP: 153/81 (90/44 - 163/78)  RR: 20 (16 - 20)  SpO2: 96% (95% - 99%)    Ins & outs:     12-15 @ 07:01  -  12-16 @ 07:00  --------------------------------------------------------  IN: 0 mL / OUT: 2050 mL / NET: -2050 mL    12-16 @ 07:01  -  12-16 @ 15:00  --------------------------------------------------------  IN: 116.8 mL / OUT: 0 mL / NET: 116.8 mL      Weight trend:  Weight (kg): 63 (12-15)    Physical exam:  General: No apparent distress  HEENT: Legally blind, [place], Anicteric sclera. Moist mucous membranes. JVD-  Cardiac: Iregular rate and rhythm. No murmurs, rubs, or gallops.   Vascular: Symmetric radial pulses. Dorsalis pedis pulses palpable.   Respiratory: Normal effort. Clear to ascultation.   Abdomen: Soft, nontender. Audible bowel sounds.   Extremities: Warm with no edema. No cyanosis or clubbing.   Skin: Warm and dry. No rash.   Neurologic: Grossly normal motor function.   Psychiatric: Oriented to person, place, and time.     Data reviewed:  - Telemetry: Afib  bpm  - ECG < from: 12 Lead ECG (12.07.23 @ 15:37) >    Diagnosis Line Atrial fibrillation  Voltage criteria for left ventricular hypertrophy  Cannot rule out Septal infarct , age undetermined  Marked ST abnormality, possible inferior subendocardial injury  Abnormal ECG    < end of copied text >    - TTE < from: Transthoracic Echocardiogram (11.09.19 @ 14:57) >    Summary:   1. Mild mitral valve regurgitation.   2. Moderate mitral annular calcification.   3. Structurally normal mitral valve, with normal leaflet excursion.   4. Mild-moderate tricuspid regurgitation.   5. Sclerotic aortic valve with normal opening.   6. Mildly dilated aortic root to 4.1cm.   7. LA volume Index is 59.6 ml/m² ml/m2.    < end of copied text >    - Chest x-ray < from: Xray Chest 2 Views PA/Lat (12.07.23 @ 16:54) >  Findings:    Support devices: Vascular stents are again noted overlying both the right   and left upper lung fields/axillary regions    Cardiac/mediastinum/hilum: Stable cardiomediastinal silhouette. Aorta is   calcified.    Lung parenchyma/Pleura: Within normal limits.    Skeleton/soft tissues: Degenerative change. Soft tissue calcifications   are noted overlying the partially imaged left shoulder    Impression:    No radiographic evidence of acute cardiopulmonary disease.    < end of copied text >    - Stress test: < from: Cardiac Cath Lab - Adult (11.26.19 @ 13:59) >  SUMMARY:         1ST LESION INTERVENTIONS: A successful stent with rotational atherectomy    and balloon angioplasty was performed on the 80 % lesion in the mid RCA.    Following intervention there was an excellent angiographic appearance with    a 0 % residual stenosis.         2ND LESION INTERVENTIONS: A successful stent with rotational atherectomy    and balloon angioplasty was performed on the 80 % lesion in the proximal    RCA. Following intervention there was an excellent angiographicappearance    with a 0 % residual stenosis.         HEMODYNAMICS: Hemodynamic assessment demonstrates mild to moderate systemic    hypertension.         CORONARY CIRCULATION: There was significant 1-vessel coronary artery    disease (RCA).         INDICATIONS: Angina/MI: unstable angina, CCS class IV. Coronary artery    disease.         CLINICAL PRESENTATION: Staged-PCI to proximal and mid-RCA.         < end of copied text >    No recent CCTA, Cardiac catheterization, Cardiac MRI.    - Labs:                        13.3   4.67  )-----------( 82       ( 15 Dec 2023 07:12 )             42.6     12-15    139  |  105  |  28<H>  ----------------------------<  106<H>  4.2   |  23  |  1.7<H>    Ca    10.8<H>      15 Dec 2023 07:12      PT/INR - ( 15 Dec 2023 07:12 )   PT: 12.90 sec;   INR: 1.13 ratio         PTT - ( 15 Dec 2023 07:12 )  PTT:33.0 sec            Urinalysis Basic - ( 15 Dec 2023 07:12 )    Color: x / Appearance: x / SG: x / pH: x  Gluc: 106 mg/dL / Ketone: x  / Bili: x / Urobili: x   Blood: x / Protein: x / Nitrite: x   Leuk Esterase: x / RBC: x / WBC x   Sq Epi: x / Non Sq Epi: x / Bacteria: x        Medications:  apixaban 5 milliGRAM(s) Oral every 12 hours  metoprolol tartrate 25 milliGRAM(s) Oral two times a day  pantoprazole    Tablet 40 milliGRAM(s) Oral before breakfast  tamsulosin 0.4 milliGRAM(s) Oral at bedtime    Drips:  aMIOdarone Infusion 0.5 mG/Min (16.7 mL/Hr) IV Continuous <Continuous>    PRN:     Allergies    Rifuxen (Swelling)  Rituxan (Hives)    Intolerances

## 2023-12-16 NOTE — DISCHARGE NOTE PROVIDER - CARE PROVIDER_API CALL
Porsha Henderson  Cardiac Electrophysiology  [address] [place] [address]  Phone: [phone]  Fax: [phone]  Follow Up Time:    Porsha Henderson  Cardiac Electrophysiology  [address] [place] [address]  Phone: [phone]  Fax: [phone]  Follow Up Time:    Porsha Henderson  Cardiac Electrophysiology  [address] [place] [address]  Phone: [phone]  Fax: [phone]  Follow Up Time: 1 month   Porsha Henderson  Cardiac Electrophysiology  [address] [place] [address]  Phone: [phone]  Fax: [phone]  Follow Up Time: 1 month   Porsha Henderson  Cardiac Electrophysiology  [address] [place] [address]  Phone: [phone]  Fax: [phone]  Scheduled Appointment: 01/11/2024 01:30 PM   Porsha Henderson  Cardiac Electrophysiology  [address] [place] [address]  Phone: [phone]  Fax: [phone]  Scheduled Appointment: 01/11/2024 01:30 PM

## 2023-12-16 NOTE — DISCHARGE NOTE PROVIDER - ATTENDING DISCHARGE PHYSICAL EXAMINATION:
86 yo female w/ PMH of PAF on AC, CVA and hx of aneurysm repair, HTN, HLD, mitral valve prolapse, PAD, RA, HFPEF p/w urosepsis    - HIDA with no evidence of acute rox.  s/p ERCP.  s/p multiple stones removal and extraction of occluded stent.  Tolerated procedure well with no cardiac complications.  Per GI, patient is refusing lap rox.  Been asymptomatic  - Last echo 2/2018 with grossly normal LV systolic function and Diastolic dysfunction grade 2.  No need to repeat echo at this time.  - ECG with no evidence of ischemia.    - Monitor and replete electrolytes as necessary. Keep K>4.0 and Mg>2.0.   - HTN better controlled with one isolated elevation this am ().  Continue Hydralazine.  - h/p PAF, rate controlled.  Switch Lopressor to Toprol and Cardizem to long-acting.  No evidence of bradycardia or tachycardia per flowsheet  - Dose Coumadin. Goal INR 2-3.  Needs Daily INRs.   INR remains subtherapeutic.  Today's=1.49, today=1.3.  Coumadin 6 mg ordered by Primary  - s/p PICC.  ABX per ID. Last dose in am and PICC line to be discontinue after abx.  for discharge thereafter.  - cont Aspirin   - Monitor H/H and for clinical signs of bleeding.   - PAD: currently disease is severe and not amenable to revascularization.   - Please continue to maintain strict I/Os, monitor daily weights, Cr.  Avoid nephrotoxic agents.    - Further cardiac workup will depend on clinical course.   - All other workup per primary team.  - Will cont to follow while inhouse    Isabel Quintero NP  Cardiology Irregularly irregular heart rate, with telemetry showing sinus arrhythmia. Bilateral femoral venous access sites are clean, dry, and intact.

## 2023-12-16 NOTE — PROGRESS NOTE ADULT - SUBJECTIVE AND OBJECTIVE BOX
INTERVAL HPI/OVERNIGHT EVENTS:    Patient s/p afib/flutter ablation.   No events over night  Patient is in NSR    MEDICATIONS  (STANDING):  aMIOdarone Infusion 0.5 mG/Min (16.7 mL/Hr) IV Continuous <Continuous>  apixaban 5 milliGRAM(s) Oral every 12 hours  metoprolol tartrate 25 milliGRAM(s) Oral two times a day  pantoprazole    Tablet 40 milliGRAM(s) Oral before breakfast  tamsulosin 0.4 milliGRAM(s) Oral at bedtime    MEDICATIONS  (PRN):      Allergies    Rifuxen (Swelling)  Rituxan (Hives)    Intolerances      Vital Signs Last 24 Hrs  T(C): 36.8 (16 Dec 2023 07:24), Max: 37.2 (15 Dec 2023 23:30)  T(F): 98.3 (16 Dec 2023 07:24), Max: 99 (15 Dec 2023 23:30)  HR: 117 (16 Dec 2023 10:00) (73 - 117)  BP: 142/87 (16 Dec 2023 10:00) (90/44 - 143/66)  BP(mean): 109 (16 Dec 2023 10:00) (64 - 109)  RR: 20 (16 Dec 2023 10:00) (16 - 20)  SpO2: 98% (16 Dec 2023 10:00) (95% - 99%)    Parameters below as of 16 Dec 2023 10:00  Patient On (Oxygen Delivery Method): room air      HEENT ELENITA EOMI  Lung CTAB  Heart RRR normal S1 S2  abd +BS soft  groins No hematoma, no bleeding.  Bilateral groin stitches removed  Ext no C/C/E    LABS:                        13.3   4.67  )-----------( 82       ( 15 Dec 2023 07:12 )             42.6     12-15    139  |  105  |  28<H>  ----------------------------<  106<H>  4.2   |  23  |  1.7<H>    Ca    10.8<H>      15 Dec 2023 07:12      PT/INR - ( 15 Dec 2023 07:12 )   PT: 12.90 sec;   INR: 1.13 ratio         PTT - ( 15 Dec 2023 07:12 )  PTT:33.0 sec  Urinalysis Basic - ( 15 Dec 2023 07:12 )    Color: x / Appearance: x / SG: x / pH: x  Gluc: 106 mg/dL / Ketone: x  / Bili: x / Urobili: x   Blood: x / Protein: x / Nitrite: x   Leuk Esterase: x / RBC: x / WBC x   Sq Epi: x / Non Sq Epi: x / Bacteria: x             INTERVAL HPI/OVERNIGHT EVENTS:    Patient s/p afib/flutter ablation.  Patient is going in and out of Aflutter    MEDICATIONS  (STANDING):  aMIOdarone Infusion 0.5 mG/Min (16.7 mL/Hr) IV Continuous <Continuous>  apixaban 5 milliGRAM(s) Oral every 12 hours  metoprolol tartrate 25 milliGRAM(s) Oral two times a day  pantoprazole    Tablet 40 milliGRAM(s) Oral before breakfast  tamsulosin 0.4 milliGRAM(s) Oral at bedtime    MEDICATIONS  (PRN):      Allergies    Rifuxen (Swelling)  Rituxan (Hives)    Intolerances      Vital Signs Last 24 Hrs  T(C): 36.8 (16 Dec 2023 07:24), Max: 37.2 (15 Dec 2023 23:30)  T(F): 98.3 (16 Dec 2023 07:24), Max: 99 (15 Dec 2023 23:30)  HR: 117 (16 Dec 2023 10:00) (73 - 117)  BP: 142/87 (16 Dec 2023 10:00) (90/44 - 143/66)  BP(mean): 109 (16 Dec 2023 10:00) (64 - 109)  RR: 20 (16 Dec 2023 10:00) (16 - 20)  SpO2: 98% (16 Dec 2023 10:00) (95% - 99%)    Parameters below as of 16 Dec 2023 10:00  Patient On (Oxygen Delivery Method): room air      HEENT ELENITA EOMI  Lung CTAB  Heart RRR normal S1 S2  abd +BS soft  groins No hematoma, no bleeding.  Bilateral groin stitches removed  Ext no C/C/E    LABS:                        13.3   4.67  )-----------( 82       ( 15 Dec 2023 07:12 )             42.6     12-15    139  |  105  |  28<H>  ----------------------------<  106<H>  4.2   |  23  |  1.7<H>    Ca    10.8<H>      15 Dec 2023 07:12      PT/INR - ( 15 Dec 2023 07:12 )   PT: 12.90 sec;   INR: 1.13 ratio         PTT - ( 15 Dec 2023 07:12 )  PTT:33.0 sec  Urinalysis Basic - ( 15 Dec 2023 07:12 )    Color: x / Appearance: x / SG: x / pH: x  Gluc: 106 mg/dL / Ketone: x  / Bili: x / Urobili: x   Blood: x / Protein: x / Nitrite: x   Leuk Esterase: x / RBC: x / WBC x   Sq Epi: x / Non Sq Epi: x / Bacteria: x

## 2023-12-17 PROCEDURE — 93010 ELECTROCARDIOGRAM REPORT: CPT | Mod: 77

## 2023-12-17 PROCEDURE — 93010 ELECTROCARDIOGRAM REPORT: CPT

## 2023-12-17 PROCEDURE — 99233 SBSQ HOSP IP/OBS HIGH 50: CPT

## 2023-12-17 RX ORDER — TACROLIMUS 5 MG/1
2 CAPSULE ORAL AT BEDTIME
Refills: 0 | Status: DISCONTINUED | OUTPATIENT
Start: 2023-12-17 | End: 2023-12-18

## 2023-12-17 RX ORDER — METOPROLOL TARTRATE 50 MG
100 TABLET ORAL
Refills: 0 | Status: DISCONTINUED | OUTPATIENT
Start: 2023-12-17 | End: 2023-12-18

## 2023-12-17 RX ORDER — SODIUM CHLORIDE 9 MG/ML
500 INJECTION INTRAMUSCULAR; INTRAVENOUS; SUBCUTANEOUS
Refills: 0 | Status: DISCONTINUED | OUTPATIENT
Start: 2023-12-17 | End: 2023-12-18

## 2023-12-17 RX ORDER — AMIODARONE HYDROCHLORIDE 400 MG/1
0.5 TABLET ORAL
Qty: 450 | Refills: 0 | Status: DISCONTINUED | OUTPATIENT
Start: 2023-12-17 | End: 2023-12-17

## 2023-12-17 RX ORDER — AMIODARONE HYDROCHLORIDE 400 MG/1
1 TABLET ORAL
Qty: 450 | Refills: 0 | Status: DISCONTINUED | OUTPATIENT
Start: 2023-12-17 | End: 2023-12-17

## 2023-12-17 RX ORDER — MYCOPHENOLATE MOFETIL 250 MG/1
500 CAPSULE ORAL
Refills: 0 | Status: DISCONTINUED | OUTPATIENT
Start: 2023-12-17 | End: 2023-12-18

## 2023-12-17 RX ORDER — AMIODARONE HYDROCHLORIDE 400 MG/1
150 TABLET ORAL ONCE
Refills: 0 | Status: COMPLETED | OUTPATIENT
Start: 2023-12-17 | End: 2023-12-17

## 2023-12-17 RX ORDER — TACROLIMUS 5 MG/1
2 CAPSULE ORAL EVERY 12 HOURS
Refills: 0 | Status: DISCONTINUED | OUTPATIENT
Start: 2023-12-17 | End: 2023-12-17

## 2023-12-17 RX ORDER — ACETAMINOPHEN 500 MG
650 TABLET ORAL EVERY 6 HOURS
Refills: 0 | Status: DISCONTINUED | OUTPATIENT
Start: 2023-12-17 | End: 2023-12-18

## 2023-12-17 RX ORDER — METOPROLOL TARTRATE 50 MG
5 TABLET ORAL ONCE
Refills: 0 | Status: COMPLETED | OUTPATIENT
Start: 2023-12-17 | End: 2023-12-17

## 2023-12-17 RX ORDER — ADENOSINE 3 MG/ML
6 INJECTION INTRAVENOUS ONCE
Refills: 0 | Status: COMPLETED | OUTPATIENT
Start: 2023-12-17 | End: 2023-12-17

## 2023-12-17 RX ORDER — METOPROLOL TARTRATE 50 MG
50 TABLET ORAL ONCE
Refills: 0 | Status: COMPLETED | OUTPATIENT
Start: 2023-12-17 | End: 2023-12-17

## 2023-12-17 RX ORDER — TACROLIMUS 5 MG/1
3 CAPSULE ORAL
Refills: 0 | Status: DISCONTINUED | OUTPATIENT
Start: 2023-12-17 | End: 2023-12-18

## 2023-12-17 RX ADMIN — AMIODARONE HYDROCHLORIDE 16.7 MG/MIN: 400 TABLET ORAL at 18:04

## 2023-12-17 RX ADMIN — Medication 100 MILLIGRAM(S): at 18:06

## 2023-12-17 RX ADMIN — Medication 5 MILLIGRAM(S): at 11:57

## 2023-12-17 RX ADMIN — AMIODARONE HYDROCHLORIDE 200 MILLIGRAM(S): 400 TABLET ORAL at 01:03

## 2023-12-17 RX ADMIN — PANTOPRAZOLE SODIUM 40 MILLIGRAM(S): 20 TABLET, DELAYED RELEASE ORAL at 06:22

## 2023-12-17 RX ADMIN — MYCOPHENOLATE MOFETIL 500 MILLIGRAM(S): 250 CAPSULE ORAL at 17:16

## 2023-12-17 RX ADMIN — ADENOSINE 6 MILLIGRAM(S): 3 INJECTION INTRAVENOUS at 11:56

## 2023-12-17 RX ADMIN — ADENOSINE 6 MILLIGRAM(S): 3 INJECTION INTRAVENOUS at 11:47

## 2023-12-17 RX ADMIN — AMIODARONE HYDROCHLORIDE 600 MILLIGRAM(S): 400 TABLET ORAL at 12:11

## 2023-12-17 RX ADMIN — OXYCODONE HYDROCHLORIDE 15 MILLIGRAM(S): 5 TABLET ORAL at 10:50

## 2023-12-17 RX ADMIN — APIXABAN 5 MILLIGRAM(S): 2.5 TABLET, FILM COATED ORAL at 22:55

## 2023-12-17 RX ADMIN — Medication 50 MILLIGRAM(S): at 06:22

## 2023-12-17 RX ADMIN — APIXABAN 5 MILLIGRAM(S): 2.5 TABLET, FILM COATED ORAL at 11:11

## 2023-12-17 RX ADMIN — TACROLIMUS 2 MILLIGRAM(S): 5 CAPSULE ORAL at 17:16

## 2023-12-17 RX ADMIN — TAMSULOSIN HYDROCHLORIDE 0.4 MILLIGRAM(S): 0.4 CAPSULE ORAL at 22:55

## 2023-12-17 RX ADMIN — Medication 5 MILLIGRAM(S): at 12:05

## 2023-12-17 RX ADMIN — SODIUM CHLORIDE 500 MILLILITER(S): 9 INJECTION INTRAMUSCULAR; INTRAVENOUS; SUBCUTANEOUS at 12:05

## 2023-12-17 RX ADMIN — AMIODARONE HYDROCHLORIDE 33.3 MG/MIN: 400 TABLET ORAL at 12:01

## 2023-12-17 RX ADMIN — SODIUM CHLORIDE 500 MILLILITER(S): 9 INJECTION INTRAMUSCULAR; INTRAVENOUS; SUBCUTANEOUS at 12:04

## 2023-12-17 RX ADMIN — Medication 50 MILLIGRAM(S): at 12:15

## 2023-12-17 RX ADMIN — OXYCODONE HYDROCHLORIDE 15 MILLIGRAM(S): 5 TABLET ORAL at 19:07

## 2023-12-17 NOTE — PROGRESS NOTE ADULT - NS ATTEND AMEND GEN_ALL_CORE FT
Patient seen and examined. Pertinent labs, imaging and telemetry reviewed. I agree with the above:     Patient had episode with AF with RVR and also possible SVT in AM.   -Given additional amio 150mg IV bolus and then started on infusion at 1mg/min. Then also tried adenosine 6mg IVP x2 for regular,narrow complex tachycardia at 200 bpm. Did not terminate or slow down arrhythmia. Also given metoprolol tartrate 5mg IVP x2 with then HR response to 130 bpm.   -Continue with amio infusion for another 24 hour load.   -Repeat EKG with HR 97 and likely rate controlled Aflutter.   -Likely discharge tomorrow.   -B/L groin sites intact. No hematoma.   -Continue AC and metoprolol.   -Further EP recs appreciated.     Discussed with patient, EP and ACP.
complex patient  agree with above
covering Dr. Henderson  complex patient  s/p AF / AFl ablation  early recurrence of atrial flutter  recommend Amiodarone load  will reassess in am
Patient seen and examined. Pertinent labs, imaging and telemetry reviewed. I agree with the above:     Patient feeling well. STill in AF, started on amiodarone infusion.   -Upon discussion with EP, will keep for IV amio load and transition to PO tomorrow.   -Likely discharge tomorrow.   -B/L groin sites intact. No hematoma.   -Continue AC and metoprolol.     Discussed with patient, EP and ACP.

## 2023-12-17 NOTE — PROGRESS NOTE ADULT - SUBJECTIVE AND OBJECTIVE BOX
Chief complaint: Patient is a 68y old  Male who presents with a chief complaint of AF ablation (16 Dec 2023 06:56)    Interval history: Patient seen and examined at bedside. No overnight issues.  rapid AFIB with RVR , 's on tele this Am ~11:30am     Review of systems: A complete 10-point review of systems was obtained and is negative except as stated in the interval history.    Vitals:    T(C): 36.8 (17 Dec 2023 07:34), Max: 37.6 (16 Dec 2023 19:59)  T(F): 98.2 (17 Dec 2023 07:34), Max: 99.6 (16 Dec 2023 19:59)  HR: 99 (17 Dec 2023 12:15) (99 - 211)  BP: 152/93 (17 Dec 2023 12:15) (110/73 - 209/105)  BP(mean): 116 (17 Dec 2023 12:15) (86 - 142)    RR: 19 (17 Dec 2023 12:15) (15 - 41)  SpO2: 98% (17 Dec 2023 12:15) (94% - 100%)    O2 Parameters below as of 17 Dec 2023 12:15  Patient On (Oxygen Delivery Method): room air      Ins & outs:     I&O's Summary    16 Dec 2023 07:01  -  17 Dec 2023 07:00  --------------------------------------------------------  IN: 283.8 mL / OUT: 1625 mL / NET: -1341.2 mL      12-15 @ 07:01  -  12-16 @ 07:00  --------------------------------------------------------  IN: 0 mL / OUT: 2050 mL / NET: -2050 mL    12-16 @ 07:01  -  12-16 @ 15:00  --------------------------------------------------------  IN: 116.8 mL / OUT: 0 mL / NET: 116.8 mL      Weight trend:  Weight (kg): 63 (12-15)    Physical exam:  General: No apparent distress  HEENT: Legally blind, [place], Anicteric sclera. Moist mucous membranes. JVD-  Cardiac: Irregular rate and rhythm. No murmurs, rubs, or gallops.   Vascular: Symmetric radial pulses. Dorsalis pedis pulses palpable.   Respiratory: Normal effort. Clear to ascultation.   Abdomen: Soft, nontender. Audible bowel sounds.   Extremities: Warm with no edema. No cyanosis or clubbing.   Skin: Warm and dry. No rash.   Neurologic: Grossly normal motor function.   Psychiatric: Oriented to person, place, and time.     Data reviewed:  - Telemetry: Afib  bpm  - ECG < from: 12 Lead ECG (12.07.23 @ 15:37) >    Diagnosis Line Atrial fibrillation  Voltage criteria for left ventricular hypertrophy  Cannot rule out Septal infarct , age undetermined  Marked ST abnormality, possible inferior subendocardial injury  Abnormal ECG    < end of copied text >    - TTE < from: Transthoracic Echocardiogram (11.09.19 @ 14:57) >    Summary:   1. Mild mitral valve regurgitation.   2. Moderate mitral annular calcification.   3. Structurally normal mitral valve, with normal leaflet excursion.   4. Mild-moderate tricuspid regurgitation.   5. Sclerotic aortic valve with normal opening.   6. Mildly dilated aortic root to 4.1cm.   7. LA volume Index is 59.6 ml/m² ml/m2.    < end of copied text >    - Chest x-ray < from: Xray Chest 2 Views PA/Lat (12.07.23 @ 16:54) >  Findings:    Support devices: Vascular stents are again noted overlying both the right   and left upper lung fields/axillary regions    Cardiac/mediastinum/hilum: Stable cardiomediastinal silhouette. Aorta is   calcified.    Lung parenchyma/Pleura: Within normal limits.    Skeleton/soft tissues: Degenerative change. Soft tissue calcifications   are noted overlying the partially imaged left shoulder    Impression:    No radiographic evidence of acute cardiopulmonary disease.    < end of copied text >    - Stress test: < from: Cardiac Cath Lab - Adult (11.26.19 @ 13:59) >  SUMMARY:         1ST LESION INTERVENTIONS: A successful stent with rotational atherectomy    and balloon angioplasty was performed on the 80 % lesion in the mid RCA.    Following intervention there was an excellent angiographic appearance with    a 0 % residual stenosis.         2ND LESION INTERVENTIONS: A successful stent with rotational atherectomy    and balloon angioplasty was performed on the 80 % lesion in the proximal    RCA. Following intervention there was an excellent angiographic appearance    with a 0 % residual stenosis.         HEMODYNAMICS: Hemodynamic assessment demonstrates mild to moderate systemic    hypertension.    CORONARY CIRCULATION: There was significant 1-vessel coronary artery    disease (RCA).    INDICATIONS: Angina/MI: unstable angina, CCS class IV. Coronary artery    disease.       CLINICAL PRESENTATION: Staged-PCI to proximal and mid-RCA.         No recent CCTA, Cardiac catheterization, Cardiac MRI.    labs : PENDING       Medications:  apixaban 5 milliGRAM(s) Oral every 12 hours  metoprolol tartrate 25 milliGRAM(s) Oral two times a day  pantoprazole    Tablet 40 milliGRAM(s) Oral before breakfast  tamsulosin 0.4 milliGRAM(s) Oral at bedtime    Drips:  aMIOdarone Infusion 0.5 mG/Min (16.7 mL/Hr) IV Continuous <Continuous>    PRN:     Allergies    Rifuxen (Swelling)  Rituxan (Hives)    Intolerances     Chief complaint: Patient is a 68y old  Male who presents with a chief complaint of AF ablation (16 Dec 2023 06:56)    Interval history: Patient seen and examined at bedside. No overnight issues.  rapid AFIB with RVR , 's on tele this Am ~11:30am     Review of systems: A complete 10-point review of systems was obtained and is negative except as stated in the interval history.    Vitals:    T(C): 36.8 (17 Dec 2023 07:34), Max: 37.6 (16 Dec 2023 19:59)  T(F): 98.2 (17 Dec 2023 07:34), Max: 99.6 (16 Dec 2023 19:59)  HR: 99 (17 Dec 2023 12:15) (99 - 211)  BP: 152/93 (17 Dec 2023 12:15) (110/73 - 209/105)  BP(mean): 116 (17 Dec 2023 12:15) (86 - 142)    RR: 19 (17 Dec 2023 12:15) (15 - 41)  SpO2: 98% (17 Dec 2023 12:15) (94% - 100%)    O2 Parameters below as of 17 Dec 2023 12:15  Patient On (Oxygen Delivery Method): room air      Ins & outs:     I&O's Summary    16 Dec 2023 07:01  -  17 Dec 2023 07:00  --------------------------------------------------------  IN: 283.8 mL / OUT: 1625 mL / NET: -1341.2 mL      12-15 @ 07:01  -  12-16 @ 07:00  --------------------------------------------------------  IN: 0 mL / OUT: 2050 mL / NET: -2050 mL    12-16 @ 07:01  -  12-16 @ 15:00  --------------------------------------------------------  IN: 116.8 mL / OUT: 0 mL / NET: 116.8 mL      Weight trend:  Weight (kg): 63 (12-15)    Physical exam:  General: No apparent distress  HEENT: Legally blind, [place], Anicteric sclera. Moist mucous membranes. JVD-  Cardiac: Irregular rate and rhythm. No murmurs, rubs, or gallops.   Vascular: Symmetric radial pulses. Dorsalis pedis pulses palpable.   Respiratory: Normal effort. Clear to ascultation.   Abdomen: Soft, nontender. Audible bowel sounds.   Extremities: Warm with no edema. No cyanosis or clubbing.   Skin: Warm and dry. No rash.   Neurologic: Grossly normal motor function.   Psychiatric: Oriented to person, place, and time.     Data reviewed:  - Telemetry: Afib  bpm  - ECG < from: 12 Lead ECG (12.07.23 @ 15:37) >    Diagnosis Line Atrial fibrillation  Voltage criteria for left ventricular hypertrophy  Cannot rule out Septal infarct , age undetermined  Marked ST abnormality, possible inferior subendocardial injury  Abnormal ECG    < end of copied text >    - TTE < from: Transthoracic Echocardiogram (11.09.19 @ 14:57) >    Summary:   1. Mild mitral valve regurgitation.   2. Moderate mitral annular calcification.   3. Structurally normal mitral valve, with normal leaflet excursion.   4. Mild-moderate tricuspid regurgitation.   5. Sclerotic aortic valve with normal opening.   6. Mildly dilated aortic root to 4.1cm.   7. LA volume Index is 59.6 ml/m² ml/m2.    < end of copied text >    - Chest x-ray < from: Xray Chest 2 Views PA/Lat (12.07.23 @ 16:54) >  Findings:    Support devices: Vascular stents are again noted overlying both the right   and left upper lung fields/axillary regions    Cardiac/mediastinum/hilum: Stable cardiomediastinal silhouette. Aorta is   calcified.    Lung parenchyma/Pleura: Within normal limits.    Skeleton/soft tissues: Degenerative change. Soft tissue calcifications   are noted overlying the partially imaged left shoulder    Impression:    No radiographic evidence of acute cardiopulmonary disease.    < end of copied text >    - Stress test: < from: Cardiac Cath Lab - Adult (11.26.19 @ 13:59) >  SUMMARY:         1ST LESION INTERVENTIONS: A successful stent with rotational atherectomy    and balloon angioplasty was performed on the 80 % lesion in the mid RCA.    Following intervention there was an excellent angiographic appearance with    a 0 % residual stenosis.         2ND LESION INTERVENTIONS: A successful stent with rotational atherectomy    and balloon angioplasty was performed on the 80 % lesion in the proximal    RCA. Following intervention there was an excellent angiographic appearance    with a 0 % residual stenosis.         HEMODYNAMICS: Hemodynamic assessment demonstrates mild to moderate systemic    hypertension.    CORONARY CIRCULATION: There was significant 1-vessel coronary artery    disease (RCA).    INDICATIONS: Angina/MI: unstable angina, CCS class IV. Coronary artery    disease.       CLINICAL PRESENTATION: Staged-PCI to proximal and mid-RCA.         No recent CCTA, Cardiac catheterization, Cardiac MRI.    labs : PENDING       Medications:  apixaban 5 milliGRAM(s) Oral every 12 hours  metoprolol tartrate 25 milliGRAM(s) Oral two times a day  pantoprazole    Tablet 40 milliGRAM(s) Oral before breakfast  tamsulosin 0.4 milliGRAM(s) Oral at bedtime    Drips:  aMIOdarone Infusion 0.5 mG/Min (16.7 mL/Hr) IV Continuous <Continuous>    PRN:     Allergies    Rifuxen (Swelling)  Rituxan (Hives)    Intolerances

## 2023-12-17 NOTE — PROGRESS NOTE ADULT - SUBJECTIVE AND OBJECTIVE BOX
INTERVAL HPI/OVERNIGHT EVENTS:  Pt has no complaints.  HR still tachy    MEDICATIONS  (STANDING):  aMIOdarone    Tablet 200 milliGRAM(s) Oral two times a day  aMIOdarone Infusion 0.5 mG/Min (16.7 mL/Hr) IV Continuous <Continuous>  apixaban 5 milliGRAM(s) Oral every 12 hours  metoprolol tartrate 50 milliGRAM(s) Oral two times a day  pantoprazole    Tablet 40 milliGRAM(s) Oral before breakfast  tamsulosin 0.4 milliGRAM(s) Oral at bedtime    MEDICATIONS  (PRN):  aluminum hydroxide/magnesium hydroxide/simethicone Suspension 30 milliLiter(s) Oral every 4 hours PRN Dyspepsia  oxyCODONE    IR 15 milliGRAM(s) Oral every 8 hours PRN Moderate Pain (4 - 6)      Allergies    Rifuxen (Swelling)  Rituxan (Hives)    Intolerances      REVIEW OF SYSTEMS  negative    Vital Signs Last 24 Hrs  T(C): 36.8 (17 Dec 2023 07:34), Max: 37.6 (16 Dec 2023 19:59)  T(F): 98.2 (17 Dec 2023 07:34), Max: 99.6 (16 Dec 2023 19:59)  HR: 104 (17 Dec 2023 07:34) (104 - 125)  BP: 110/73 (17 Dec 2023 07:34) (110/73 - 186/86)  BP(mean): 86 (17 Dec 2023 07:34) (86 - 124)  RR: 18 (17 Dec 2023 07:34) (15 - 21)  SpO2: 94% (17 Dec 2023 07:34) (94% - 98%)    Parameters below as of 17 Dec 2023 06:00  Patient On (Oxygen Delivery Method): room air        12-16-23 @ 07:01  -  12-17-23 @ 07:00  --------------------------------------------------------  IN: 283.8 mL / OUT: 1625 mL / NET: -1341.2 mL    Physical Exam    GENERAL: In no apparent distress, well nourished, and hydrated.  HEART: irreg.  tachy at 105  PULMONARY: Clear to auscultation and perfusion.  No rales, wheezing, or rhonchi bilaterally.  ABDOMEN: Soft, Nontender, Nondistended; Bowel sounds present  EXTREMITIES:  2+ Peripheral Pulses, No clubbing, cyanosis, or edema  NEUROLOGICAL: Grossly nonfocal    LABS:

## 2023-12-17 NOTE — PROGRESS NOTE ADULT - ASSESSMENT
Assessment   Patient is a 68 year old male with PMH Afib on Eliquis, COPD mild not on home o2, ESRD sp renal transplant 3 years ago, HEP C + cirrhosis, Melanoma R eye sp Laser, Portal hypertension and grade A and B  Varices by egd, CAD sp PCI in the past, Breast cancer gynecomastia, HLD, HTN + Lumbar Radiculopathy who presents to [facility] for Afib ablation. Post op ablation patient remains in Afib, started on IV Amiodarone load.     A/P  #Afib/Flutter  #hx SVT   - POD2  sp ablation reverted back to Afib/Aflutter   - B/L groin sutures removed on 12/16  - At 1 am 12/17 IV amiodarone  and switched to  PO Amiodarone 200mg BID 1 hour prior to gtt coming down  -12/17th AM at ~ 1130am on telemetry, pt went from rate controlled aflutter  to Afib with -200's   amiodarone 150mg bolus load given, cont on amiodarone drip . PO amiodarone d/c'ed  adenosine 6mg x2 given no effect   metoprolol 5mg iv x2 given, with better rate control. lopressor 50mg po given x1; increased lopressor to 100mg po BID   ml bolus IVF x1  given   EP team made aware of tele events. EP team to followup     #back pain  - PRN home Oxycodone 15mg IR  - Morphine 2mg IVP x1    #GERD   - Protonix 40mg daily   - Maalox prn    #BPH  - Flomax 0.4mg HS    Please contact [phone] with any questions or concerns.       Assessment   Patient is a 68 year old male with PMH Afib on Eliquis, COPD mild not on home o2, ESRD sp renal transplant 3 years ago, HEP C + cirrhosis, Melanoma R eye sp Laser, Portal hypertension and grade A and B  Varices by egd, CAD sp PCI in the past, Breast cancer gynecomastia, HLD, HTN + Lumbar Radiculopathy who presents to [facility] for Afib ablation. Post op ablation patient remains in Afib, started on IV Amiodarone load.     A/P  #Afib/Flutter  #hx SVT   - POD2  sp ablation reverted back to Afib/Aflutter   - B/L groin sutures removed on 12/16  - At 1 am 12/17 IV amiodarone  and switched to  PO Amiodarone 200mg BID 1 hour prior to gtt coming down  -12/17th AM at ~ 1130am on telemetry, pt went from rate controlled aflutter  to Afib with -200's   amiodarone 150mg bolus load given, cont on amiodarone drip . PO amiodarone d/c'ed  adenosine 6mg x2 given no effect   metoprolol 5mg iv x2 given, with better rate control. lopressor 50mg po given x1; increased lopressor to 100mg po BID   ml bolus IVF x1  given   EP team made aware of tele events. EP team to followup     #back pain  - PRN home Oxycodone 15mg IR  - Morphine 2mg IVP x1    #GERD   - Protonix 40mg daily   - Maalox prn    #BPH  - Flomax 0.4mg HS    Please contact [phone] with any questions or concerns.       Assessment   Patient is a 68 year old male with PMH Afib on Eliquis, COPD mild not on home o2, ESRD sp renal transplant 3 years ago, HEP C + cirrhosis, Melanoma R eye sp Laser, Portal hypertension and grade A and B  Varices by egd, CAD sp PCI in the past, Breast cancer gynecomastia, HLD, HTN + Lumbar Radiculopathy who presents to [facility] for Afib ablation. Post op ablation patient remains in Afib, started on IV Amiodarone load.     A/P  Recurrent Afib/Flutter  #hx SVT   - POD2  sp ablation reverted back to Afib/Aflutter   - B/L groin sutures removed on 12/16  - At 1 am 12/17 IV amiodarone  and switched to  PO Amiodarone 200mg BID 1 hour prior to gtt coming down  -12/17th AM at ~ 1130am on telemetry, pt went from rate controlled aflutter  to Afib with -200's   amiodarone 150mg bolus load given, cont on amiodarone drip . PO amiodarone d/c'ed  adenosine 6mg x2 given no effect   metoprolol 5mg iv x2 given, with better rate control. lopressor 50mg po given x1; increased lopressor to 100mg po BID   ml bolus IVF x1  given   EP team made aware of tele events. EP team to followup   f/u labs at 4pm     #back pain  - PRN home Oxycodone 15mg IR  - Morphine 2mg IVP x1    #GERD   - Protonix 40mg daily   - Maalox prn    #BPH  - Flomax 0.4mg HS    Please contact [phone] with any questions or concerns.       Assessment   Patient is a 68 year old male with PMH Afib on Eliquis, COPD mild not on home o2, ESRD sp renal transplant 3 years ago, HEP C + cirrhosis, Melanoma R eye sp Laser, Portal hypertension and grade A and B  Varices by egd, CAD sp PCI in the past, Breast cancer gynecomastia, HLD, HTN + Lumbar Radiculopathy who presents to [facility] for Afib ablation. Post op ablation patient remains in Afib, started on IV Amiodarone load.     A/P  Recurrent Afib/Flutter  #hx SVT   - POD2  sp ablation reverted back to Afib/Aflutter   - B/L groin sutures removed on 12/16  - At 1 am 12/17 IV amiodarone  and switched to  PO Amiodarone 200mg BID 1 hour prior to gtt coming down  -12/17th AM at ~ 1130am on telemetry, pt went from rate controlled aflutter  to Afib with -200's   amiodarone 150mg bolus load given, cont on amiodarone drip . PO amiodarone d/c'ed  adenosine 6mg x2 given no effect   metoprolol 5mg iv x2 given, with better rate control. lopressor 50mg po given x1; increased lopressor to 100mg po BID   ml bolus IVF x1  given   EP team made aware of tele events. EP team to followup   f/u labs at 4pm     #back pain  - PRN home Oxycodone 15mg IR  - Morphine 2mg IVP x1    #GERD   - Protonix 40mg daily   - Maalox prn    #BPH  - Flomax 0.4mg HS    Please contact [phone] with any questions or concerns.

## 2023-12-17 NOTE — PROGRESS NOTE ADULT - ASSESSMENT
67 yo male s/p afib/flutter ablation on 12/15.  Still having episodes of atrial flutter with RVR    IV amiodarone load completed  Cont Amiodarone 200 mg po BID  Cont Eliquis 5 mg BID  Check CBC, LFT, TSH   Cont BB 67 yo male s/p afib/flutter ablation on 12/15.  Still having episodes of atrial flutter with RVR. Earlier, pt had episode of aflutter with RVR to the 170s.  Pt received Adenosine x 2 without effect.  Started back on IV amiodarone and BB was increased.  Currently in aflutter with a HR in the high 90s    COnt IV amiodarone  Cont Eliquis 5 mg BID  Check CBC, LFT, TSH   Cont to titrate BB for HR control  NPO after midnight for possible DCCV tomorrow 69 yo male s/p afib/flutter ablation on 12/15.  Still having episodes of atrial flutter with RVR. Earlier, pt had episode of aflutter with RVR to the 170s.  Pt received Adenosine x 2 without effect.  Started back on IV amiodarone and BB was increased.  Currently in aflutter with a HR in the high 90s    COnt IV amiodarone  Cont Eliquis 5 mg BID  Check CBC, LFT, TSH   Cont to titrate BB for HR control  NPO after midnight for possible DCCV tomorrow

## 2023-12-18 VITALS
OXYGEN SATURATION: 97 % | RESPIRATION RATE: 18 BRPM | TEMPERATURE: 99 F | DIASTOLIC BLOOD PRESSURE: 73 MMHG | HEART RATE: 64 BPM | SYSTOLIC BLOOD PRESSURE: 117 MMHG

## 2023-12-18 LAB
ALBUMIN SERPL ELPH-MCNC: 3.3 G/DL — LOW (ref 3.5–5.2)
ALBUMIN SERPL ELPH-MCNC: 3.3 G/DL — LOW (ref 3.5–5.2)
ALP SERPL-CCNC: 50 U/L — SIGNIFICANT CHANGE UP (ref 30–115)
ALP SERPL-CCNC: 50 U/L — SIGNIFICANT CHANGE UP (ref 30–115)
ALT FLD-CCNC: 10 U/L — SIGNIFICANT CHANGE UP (ref 0–41)
ALT FLD-CCNC: 10 U/L — SIGNIFICANT CHANGE UP (ref 0–41)
ANION GAP SERPL CALC-SCNC: 10 MMOL/L — SIGNIFICANT CHANGE UP (ref 7–14)
ANION GAP SERPL CALC-SCNC: 10 MMOL/L — SIGNIFICANT CHANGE UP (ref 7–14)
AST SERPL-CCNC: 27 U/L — SIGNIFICANT CHANGE UP (ref 0–41)
AST SERPL-CCNC: 27 U/L — SIGNIFICANT CHANGE UP (ref 0–41)
BILIRUB DIRECT SERPL-MCNC: <0.2 MG/DL — SIGNIFICANT CHANGE UP (ref 0–0.3)
BILIRUB DIRECT SERPL-MCNC: <0.2 MG/DL — SIGNIFICANT CHANGE UP (ref 0–0.3)
BILIRUB INDIRECT FLD-MCNC: >0.3 MG/DL — SIGNIFICANT CHANGE UP (ref 0.2–1.2)
BILIRUB INDIRECT FLD-MCNC: >0.3 MG/DL — SIGNIFICANT CHANGE UP (ref 0.2–1.2)
BILIRUB SERPL-MCNC: 0.5 MG/DL — SIGNIFICANT CHANGE UP (ref 0.2–1.2)
BILIRUB SERPL-MCNC: 0.5 MG/DL — SIGNIFICANT CHANGE UP (ref 0.2–1.2)
BUN SERPL-MCNC: 19 MG/DL — SIGNIFICANT CHANGE UP (ref 10–20)
BUN SERPL-MCNC: 19 MG/DL — SIGNIFICANT CHANGE UP (ref 10–20)
CALCIUM SERPL-MCNC: 9.6 MG/DL — SIGNIFICANT CHANGE UP (ref 8.4–10.5)
CALCIUM SERPL-MCNC: 9.6 MG/DL — SIGNIFICANT CHANGE UP (ref 8.4–10.5)
CHLORIDE SERPL-SCNC: 103 MMOL/L — SIGNIFICANT CHANGE UP (ref 98–110)
CHLORIDE SERPL-SCNC: 103 MMOL/L — SIGNIFICANT CHANGE UP (ref 98–110)
CO2 SERPL-SCNC: 23 MMOL/L — SIGNIFICANT CHANGE UP (ref 17–32)
CO2 SERPL-SCNC: 23 MMOL/L — SIGNIFICANT CHANGE UP (ref 17–32)
CREAT SERPL-MCNC: 1.3 MG/DL — SIGNIFICANT CHANGE UP (ref 0.7–1.5)
CREAT SERPL-MCNC: 1.3 MG/DL — SIGNIFICANT CHANGE UP (ref 0.7–1.5)
EGFR: 61 ML/MIN/1.73M2 — SIGNIFICANT CHANGE UP
EGFR: 61 ML/MIN/1.73M2 — SIGNIFICANT CHANGE UP
GLUCOSE SERPL-MCNC: 97 MG/DL — SIGNIFICANT CHANGE UP (ref 70–99)
GLUCOSE SERPL-MCNC: 97 MG/DL — SIGNIFICANT CHANGE UP (ref 70–99)
HCT VFR BLD CALC: 30.8 % — LOW (ref 42–52)
HCT VFR BLD CALC: 30.8 % — LOW (ref 42–52)
HCT VFR BLD CALC: 32.9 % — LOW (ref 42–52)
HCT VFR BLD CALC: 32.9 % — LOW (ref 42–52)
HGB BLD-MCNC: 10.6 G/DL — LOW (ref 14–18)
HGB BLD-MCNC: 10.6 G/DL — LOW (ref 14–18)
HGB BLD-MCNC: 9.9 G/DL — LOW (ref 14–18)
HGB BLD-MCNC: 9.9 G/DL — LOW (ref 14–18)
MAGNESIUM SERPL-MCNC: 1.7 MG/DL — LOW (ref 1.8–2.4)
MAGNESIUM SERPL-MCNC: 1.7 MG/DL — LOW (ref 1.8–2.4)
MCHC RBC-ENTMCNC: 29.6 PG — SIGNIFICANT CHANGE UP (ref 27–31)
MCHC RBC-ENTMCNC: 29.6 PG — SIGNIFICANT CHANGE UP (ref 27–31)
MCHC RBC-ENTMCNC: 29.7 PG — SIGNIFICANT CHANGE UP (ref 27–31)
MCHC RBC-ENTMCNC: 29.7 PG — SIGNIFICANT CHANGE UP (ref 27–31)
MCHC RBC-ENTMCNC: 32.1 G/DL — SIGNIFICANT CHANGE UP (ref 32–37)
MCHC RBC-ENTMCNC: 32.1 G/DL — SIGNIFICANT CHANGE UP (ref 32–37)
MCHC RBC-ENTMCNC: 32.2 G/DL — SIGNIFICANT CHANGE UP (ref 32–37)
MCHC RBC-ENTMCNC: 32.2 G/DL — SIGNIFICANT CHANGE UP (ref 32–37)
MCV RBC AUTO: 92.2 FL — SIGNIFICANT CHANGE UP (ref 80–94)
NRBC # BLD: 0 /100 WBCS — SIGNIFICANT CHANGE UP (ref 0–0)
PLATELET # BLD AUTO: 58 K/UL — LOW (ref 130–400)
PLATELET # BLD AUTO: 58 K/UL — LOW (ref 130–400)
PLATELET # BLD AUTO: 67 K/UL — LOW (ref 130–400)
PLATELET # BLD AUTO: 67 K/UL — LOW (ref 130–400)
PMV BLD: 12.6 FL — HIGH (ref 7.4–10.4)
PMV BLD: 12.6 FL — HIGH (ref 7.4–10.4)
PMV BLD: 12.7 FL — HIGH (ref 7.4–10.4)
PMV BLD: 12.7 FL — HIGH (ref 7.4–10.4)
POTASSIUM SERPL-MCNC: 3.6 MMOL/L — SIGNIFICANT CHANGE UP (ref 3.5–5)
POTASSIUM SERPL-MCNC: 3.6 MMOL/L — SIGNIFICANT CHANGE UP (ref 3.5–5)
POTASSIUM SERPL-SCNC: 3.6 MMOL/L — SIGNIFICANT CHANGE UP (ref 3.5–5)
POTASSIUM SERPL-SCNC: 3.6 MMOL/L — SIGNIFICANT CHANGE UP (ref 3.5–5)
PROT SERPL-MCNC: 5.3 G/DL — LOW (ref 6–8)
PROT SERPL-MCNC: 5.3 G/DL — LOW (ref 6–8)
RBC # BLD: 3.34 M/UL — LOW (ref 4.7–6.1)
RBC # BLD: 3.34 M/UL — LOW (ref 4.7–6.1)
RBC # BLD: 3.57 M/UL — LOW (ref 4.7–6.1)
RBC # BLD: 3.57 M/UL — LOW (ref 4.7–6.1)
RBC # FLD: 15 % — HIGH (ref 11.5–14.5)
SODIUM SERPL-SCNC: 136 MMOL/L — SIGNIFICANT CHANGE UP (ref 135–146)
SODIUM SERPL-SCNC: 136 MMOL/L — SIGNIFICANT CHANGE UP (ref 135–146)
T4 FREE SERPL-MCNC: 1.5 NG/DL — SIGNIFICANT CHANGE UP (ref 0.9–1.8)
T4 FREE SERPL-MCNC: 1.5 NG/DL — SIGNIFICANT CHANGE UP (ref 0.9–1.8)
TSH SERPL-MCNC: 0.49 UIU/ML — SIGNIFICANT CHANGE UP (ref 0.27–4.2)
TSH SERPL-MCNC: 0.49 UIU/ML — SIGNIFICANT CHANGE UP (ref 0.27–4.2)
WBC # BLD: 3.43 K/UL — LOW (ref 4.8–10.8)
WBC # BLD: 3.43 K/UL — LOW (ref 4.8–10.8)
WBC # BLD: 3.71 K/UL — LOW (ref 4.8–10.8)
WBC # BLD: 3.71 K/UL — LOW (ref 4.8–10.8)
WBC # FLD AUTO: 3.43 K/UL — LOW (ref 4.8–10.8)
WBC # FLD AUTO: 3.43 K/UL — LOW (ref 4.8–10.8)
WBC # FLD AUTO: 3.71 K/UL — LOW (ref 4.8–10.8)
WBC # FLD AUTO: 3.71 K/UL — LOW (ref 4.8–10.8)

## 2023-12-18 PROCEDURE — 99239 HOSP IP/OBS DSCHRG MGMT >30: CPT

## 2023-12-18 PROCEDURE — 99233 SBSQ HOSP IP/OBS HIGH 50: CPT

## 2023-12-18 PROCEDURE — 93010 ELECTROCARDIOGRAM REPORT: CPT

## 2023-12-18 RX ORDER — TACROLIMUS 5 MG/1
2 CAPSULE ORAL
Qty: 0 | Refills: 0 | DISCHARGE
Start: 2023-12-18

## 2023-12-18 RX ORDER — AMIODARONE HYDROCHLORIDE 400 MG/1
200 TABLET ORAL DAILY
Refills: 0 | Status: DISCONTINUED | OUTPATIENT
Start: 2023-12-18 | End: 2023-12-18

## 2023-12-18 RX ORDER — AMIODARONE HYDROCHLORIDE 400 MG/1
1 TABLET ORAL
Qty: 30 | Refills: 0
Start: 2023-12-18 | End: 2024-01-16

## 2023-12-18 RX ORDER — TACROLIMUS 5 MG/1
3 CAPSULE ORAL
Qty: 0 | Refills: 0 | DISCHARGE
Start: 2023-12-18

## 2023-12-18 RX ORDER — MAGNESIUM SULFATE 500 MG/ML
2 VIAL (ML) INJECTION ONCE
Refills: 0 | Status: COMPLETED | OUTPATIENT
Start: 2023-12-18 | End: 2023-12-18

## 2023-12-18 RX ORDER — PANTOPRAZOLE SODIUM 20 MG/1
1 TABLET, DELAYED RELEASE ORAL
Qty: 30 | Refills: 0
Start: 2023-12-18 | End: 2024-01-16

## 2023-12-18 RX ORDER — METOPROLOL TARTRATE 50 MG
1 TABLET ORAL
Qty: 60 | Refills: 0 | DISCHARGE
Start: 2023-12-18 | End: 2024-01-16

## 2023-12-18 RX ORDER — METOPROLOL TARTRATE 50 MG
1 TABLET ORAL
Refills: 0 | DISCHARGE

## 2023-12-18 RX ORDER — POTASSIUM CHLORIDE 20 MEQ
40 PACKET (EA) ORAL ONCE
Refills: 0 | Status: COMPLETED | OUTPATIENT
Start: 2023-12-18 | End: 2023-12-18

## 2023-12-18 RX ORDER — METOPROLOL TARTRATE 50 MG
1 TABLET ORAL
Qty: 0 | Refills: 0 | DISCHARGE
Start: 2023-12-18

## 2023-12-18 RX ORDER — TACROLIMUS 5 MG/1
2 CAPSULE ORAL
Qty: 0 | Refills: 0 | DISCHARGE

## 2023-12-18 RX ORDER — METOPROLOL TARTRATE 50 MG
1 TABLET ORAL
Qty: 60 | Refills: 0
Start: 2023-12-18 | End: 2024-01-16

## 2023-12-18 RX ADMIN — Medication 100 MILLIGRAM(S): at 06:40

## 2023-12-18 RX ADMIN — MYCOPHENOLATE MOFETIL 500 MILLIGRAM(S): 250 CAPSULE ORAL at 06:44

## 2023-12-18 RX ADMIN — Medication 40 MILLIEQUIVALENT(S): at 08:26

## 2023-12-18 RX ADMIN — PANTOPRAZOLE SODIUM 40 MILLIGRAM(S): 20 TABLET, DELAYED RELEASE ORAL at 06:41

## 2023-12-18 RX ADMIN — APIXABAN 5 MILLIGRAM(S): 2.5 TABLET, FILM COATED ORAL at 11:01

## 2023-12-18 RX ADMIN — TACROLIMUS 3 MILLIGRAM(S): 5 CAPSULE ORAL at 06:41

## 2023-12-18 RX ADMIN — Medication 25 GRAM(S): at 08:25

## 2023-12-18 NOTE — PROGRESS NOTE ADULT - SUBJECTIVE AND OBJECTIVE BOX
INTERVAL HPI/OVERNIGHT EVENTS:  pt is back in NSR at 62 BPM    MEDICATIONS  (STANDING):  aMIOdarone    Tablet 200 milliGRAM(s) Oral daily  apixaban 5 milliGRAM(s) Oral every 12 hours  metoprolol tartrate 100 milliGRAM(s) Oral two times a day  mycophenolate mofetil 500 milliGRAM(s) Oral two times a day  pantoprazole    Tablet 40 milliGRAM(s) Oral before breakfast  tacrolimus 2 milliGRAM(s) Oral at bedtime  tacrolimus 3 milliGRAM(s) Oral <User Schedule>  tamsulosin 0.4 milliGRAM(s) Oral at bedtime    MEDICATIONS  (PRN):  acetaminophen     Tablet .. 650 milliGRAM(s) Oral every 6 hours PRN Moderate Pain (4 - 6)  oxyCODONE    IR 15 milliGRAM(s) Oral every 8 hours PRN Moderate Pain (4 - 6)    Allergies    Rifuxen (Swelling)  Rituxan (Hives)    Intolerances    REVIEW OF SYSTEMS  negative    Vital Signs Last 24 Hrs  T(C): 37.1 (18 Dec 2023 07:00), Max: 37.5 (17 Dec 2023 20:00)  T(F): 98.7 (18 Dec 2023 07:00), Max: 99.5 (17 Dec 2023 20:00)  HR: 64 (18 Dec 2023 07:00) (60 - 211)  BP: 117/73 (18 Dec 2023 07:00) (106/64 - 209/105)  BP(mean): 87 (18 Dec 2023 07:00) (80 - 142)  RR: 18 (18 Dec 2023 07:00) (16 - 41)  SpO2: 97% (18 Dec 2023 07:00) (96% - 100%)    Parameters below as of 18 Dec 2023 07:00  Patient On (Oxygen Delivery Method): room air        12-17-23 @ 07:01  -  12-18-23 @ 07:00  --------------------------------------------------------  IN: 0 mL / OUT: 800 mL / NET: -800 mL      Physical Exam    GENERAL: In no apparent distress, well nourished, and hydrated.  HEART: Regular rate and rhythm; No murmurs, rubs, or gallops.  PULMONARY: Clear to auscultation and perfusion.  No rales, wheezing, or rhonchi bilaterally.  ABDOMEN: Soft, Nontender, Nondistended; Bowel sounds present  EXTREMITIES:  2+ Peripheral Pulses, No clubbing, cyanosis, or edema  NEUROLOGICAL: Grossly nonfocal    LABS:                        9.9    3.43  )-----------( 58       ( 18 Dec 2023 05:50 )             30.8     12-18    136  |  103  |  19  ----------------------------<  97  3.6   |  23  |  1.3    Ca    9.6      18 Dec 2023 05:50  Mg     1.7     12-18    TPro  5.3<L>  /  Alb  3.3<L>  /  TBili  0.5  /  DBili  <0.2  /  AST  27  /  ALT  10  /  AlkPhos  50  12-18      Urinalysis Basic - ( 18 Dec 2023 05:50 )    Color: x / Appearance: x / SG: x / pH: x  Gluc: 97 mg/dL / Ketone: x  / Bili: x / Urobili: x   Blood: x / Protein: x / Nitrite: x   Leuk Esterase: x / RBC: x / WBC x   Sq Epi: x / Non Sq Epi: x / Bacteria: x        RADIOLOGY & ADDITIONAL TESTS:

## 2023-12-18 NOTE — PROGRESS NOTE ADULT - SUBJECTIVE AND OBJECTIVE BOX
Chief complaint: Patient is a 68y old  Male who presents with a chief complaint of AF ablation (16 Dec 2023 06:56)    Interval history: Patient seen and examined at bedside.   12/17th In AM pt persistent afib/aflutter   -rapid AFIB in 200's  -amiodarone 150mg bolus load given, cont on amiodarone drip  -lopressor 50mg po given x1; increased lopressor to 100mg po BID  -adenosine 6mg x2 given, no effect   -metoprolol 5mg iv x2 given   - ml bolus given   O/N Patient upset, report he was not given cellcept and tacro meds since admission. as per patient he take 3mg PO in the morning and 2mg po at night.   this Am on tele rate controlled aflutter       Review of systems: A complete 10-point review of systems was obtained and is negative except as stated in the interval history.    Vitals:    T(C): 37.1 (18 Dec 2023 07:00), Max: 37.5 (17 Dec 2023 20:00)  T(F): 98.7 (18 Dec 2023 07:00), Max: 99.5 (17 Dec 2023 20:00)  HR: 64 (18 Dec 2023 07:00) (60 - 211)  BP: 117/73 (18 Dec 2023 07:00) (106/64 - 209/105)  BP(mean): 87 (18 Dec 2023 07:00) (80 - 142)    RR: 18 (18 Dec 2023 07:00) (16 - 41)  SpO2: 97% (18 Dec 2023 07:00) (96% - 100%)    O2 Parameters below as of 18 Dec 2023 07:00  Patient On (Oxygen Delivery Method): room air      Ins & outs:     I&O's Summary    16 Dec 2023 07:01  -  17 Dec 2023 07:00  --------------------------------------------------------  IN: 283.8 mL / OUT: 1625 mL / NET: -1341.2 mL      12-15 @ 07:01  -  12-16 @ 07:00  --------------------------------------------------------  IN: 0 mL / OUT: 2050 mL / NET: -2050 mL    12-16 @ 07:01  -  12-16 @ 15:00  --------------------------------------------------------  IN: 116.8 mL / OUT: 0 mL / NET: 116.8 mL      Weight trend:  Weight (kg): 63 (12-15)    Physical exam:  General: No apparent distress  HEENT: Legally blind, [place], Anicteric sclera. Moist mucous membranes. JVD-  Cardiac: Irregular rate and rhythm. No murmurs, rubs, or gallops.   Vascular: Symmetric radial pulses. Dorsalis pedis pulses palpable.   Respiratory: Normal effort. Clear to ascultation.   Abdomen: Soft, nontender. Audible bowel sounds.   Extremities: Warm with no edema. No cyanosis or clubbing.   Skin: Warm and dry. No rash.   Neurologic: Grossly normal motor function.   Psychiatric: Oriented to person, place, and time.     Data reviewed:  - Telemetry: Aflutter  bpm  - ECG < from: 12 Lead ECG (12.07.23 @ 15:37) >    Diagnosis Line Atrial fibrillation  Voltage criteria for left ventricular hypertrophy  Cannot rule out Septal infarct , age undetermined  Marked ST abnormality, possible inferior subendocardial injury  Abnormal ECG    < end of copied text >    - TTE < from: Transthoracic Echocardiogram (11.09.19 @ 14:57) >    Summary:   1. Mild mitral valve regurgitation.   2. Moderate mitral annular calcification.   3. Structurally normal mitral valve, with normal leaflet excursion.   4. Mild-moderate tricuspid regurgitation.   5. Sclerotic aortic valve with normal opening.   6. Mildly dilated aortic root to 4.1cm.   7. LA volume Index is 59.6 ml/m² ml/m2.    < end of copied text >    - Chest x-ray < from: Xray Chest 2 Views PA/Lat (12.07.23 @ 16:54) >  Findings:    Support devices: Vascular stents are again noted overlying both the right   and left upper lung fields/axillary regions    Cardiac/mediastinum/hilum: Stable cardiomediastinal silhouette. Aorta is   calcified.    Lung parenchyma/Pleura: Within normal limits.    Skeleton/soft tissues: Degenerative change. Soft tissue calcifications   are noted overlying the partially imaged left shoulder    Impression:    No radiographic evidence of acute cardiopulmonary disease.    < end of copied text >      No recent CCTA, Cardiac catheterization, Cardiac MRI.      - Labs:                        9.9    3.43  )-----------( 58       ( 18 Dec 2023 05:50 )             30.8     12-18    136  |  103  |  19  ----------------------------<  97  3.6   |  23  |  1.3    Ca    9.6      18 Dec 2023 05:50  Mg     1.7     12-18        Lactate Trend    Urinalysis Basic - ( 18 Dec 2023 05:50 )    Color: x / Appearance: x / SG: x / pH: x  Gluc: 97 mg/dL / Ketone: x  / Bili: x / Urobili: x   Blood: x / Protein: x / Nitrite: x   Leuk Esterase: x / RBC: x / WBC x   Sq Epi: x / Non Sq Epi: x / Bacteria: x          Medications:  apixaban 5 milliGRAM(s) Oral every 12 hours  metoprolol tartrate 25 milliGRAM(s) Oral two times a day  pantoprazole    Tablet 40 milliGRAM(s) Oral before breakfast  tamsulosin 0.4 milliGRAM(s) Oral at bedtime    Drips:  aMIOdarone Infusion 0.5 mG/Min (16.7 mL/Hr) IV Continuous <Continuous>    PRN:     Allergies    Rifuxen (Swelling)  Rituxan (Hives)    Intolerances     Chief complaint: Patient is a 68y old  Male who presents with a chief complaint of AF ablation (16 Dec 2023 06:56)    Interval history: Patient seen and examined at bedside.   12/17th In AM pt persistent afib/aflutter   -rapid AFIB in 200's  -amiodarone 150mg bolus load given, cont on amiodarone drip  -lopressor 50mg po given x1; increased lopressor to 100mg po BID  -adenosine 6mg x2 given, no effect   -metoprolol 5mg iv x2 given   - ml bolus given   O/N Patient upset, report he was not given cellcept and tacro meds since admission. as per patient he take 3mg PO in the morning and 2mg po at night.   this Am on tele rate controlled aflutter       Review of systems: A complete 10-point review of systems was obtained and is negative except as stated in the interval history.    Vitals:    T(C): 37.1 (18 Dec 2023 07:00), Max: 37.5 (17 Dec 2023 20:00)  T(F): 98.7 (18 Dec 2023 07:00), Max: 99.5 (17 Dec 2023 20:00)  HR: 64 (18 Dec 2023 07:00) (60 - 211)  BP: 117/73 (18 Dec 2023 07:00) (106/64 - 209/105)  BP(mean): 87 (18 Dec 2023 07:00) (80 - 142)    RR: 18 (18 Dec 2023 07:00) (16 - 41)  SpO2: 97% (18 Dec 2023 07:00) (96% - 100%)    O2 Parameters below as of 18 Dec 2023 07:00  Patient On (Oxygen Delivery Method): room air      Ins & outs:     I&O's Summary    16 Dec 2023 07:01  -  17 Dec 2023 07:00  --------------------------------------------------------  IN: 283.8 mL / OUT: 1625 mL / NET: -1341.2 mL      12-15 @ 07:01  -  12-16 @ 07:00  --------------------------------------------------------  IN: 0 mL / OUT: 2050 mL / NET: -2050 mL    12-16 @ 07:01  -  12-16 @ 15:00  --------------------------------------------------------  IN: 116.8 mL / OUT: 0 mL / NET: 116.8 mL      Weight trend:  Weight (kg): 63 (12-15)    Physical exam:  General: No apparent distress  HEENT: Legally blind, [place], Anicteric sclera. Moist mucous membranes. JVD-  Cardiac: Irregular rate and rhythm. No murmurs, rubs, or gallops.   Vascular: Symmetric radial pulses. Dorsalis pedis pulses palpable.   Respiratory: Normal effort. Clear to ascultation.   Abdomen: Soft, nontender. Audible bowel sounds.   Extremities: Warm with no edema. No cyanosis or clubbing.   Skin: Warm and dry. No rash.   Neurologic: Grossly normal motor function.   Psychiatric: Oriented to person, place, and time.     Data reviewed:  - Telemetry: Aflutter  bpm  - ECG < from: 12 Lead ECG (12.07.23 @ 15:37) >    Diagnosis Line Atrial fibrillation  Voltage criteria for left ventricular hypertrophy  Cannot rule out Septal infarct , age undetermined  Marked ST abnormality, possible inferior subendocardial injury  Abnormal ECG    < end of copied text >    - TTE < from: Transthoracic Echocardiogram (11.09.19 @ 14:57) >    Summary:   1. Mild mitral valve regurgitation.   2. Moderate mitral annular calcification.   3. Structurally normal mitral valve, with normal leaflet excursion.   4. Mild-moderate tricuspid regurgitation.   5. Sclerotic aortic valve with normal opening.   6. Mildly dilated aortic root to 4.1cm.   7. LA volume Index is 59.6 ml/m² ml/m2.    < end of copied text >    - Chest x-ray < from: Xray Chest 2 Views PA/Lat (12.07.23 @ 16:54) >  Findings:    Support devices: Vascular stents are again noted overlying both the right   and left upper lung fields/axillary regions    Cardiac/mediastinum/hilum: Stable cardiomediastinal silhouette. Aorta is   calcified.    Lung parenchyma/Pleura: Within normal limits.    Skeleton/soft tissues: Degenerative change. Soft tissue calcifications   are noted overlying the partially imaged left shoulder    Impression:    No radiographic evidence of acute cardiopulmonary disease.    < end of copied text >      No recent CCTA, Cardiac catheterization, Cardiac MRI.      - Labs:                        9.9    3.43  )-----------( 58       ( 18 Dec 2023 05:50 )             30.8     12-18    136  |  103  |  19  ----------------------------<  97  3.6   |  23  |  1.3    Ca    9.6      18 Dec 2023 05:50  Mg     1.7     12-18        Lactate Trend    Urinalysis Basic - ( 18 Dec 2023 05:50 )    Color: x / Appearance: x / SG: x / pH: x  Gluc: 97 mg/dL / Ketone: x  / Bili: x / Urobili: x   Blood: x / Protein: x / Nitrite: x   Leuk Esterase: x / RBC: x / WBC x   Sq Epi: x / Non Sq Epi: x / Bacteria: x          Medications:  apixaban 5 milliGRAM(s) Oral every 12 hours  metoprolol tartrate 25 milliGRAM(s) Oral two times a day  pantoprazole    Tablet 40 milliGRAM(s) Oral before breakfast  tamsulosin 0.4 milliGRAM(s) Oral at bedtime    Drips:  aMIOdarone Infusion 0.5 mG/Min (16.7 mL/Hr) IV Continuous <Continuous>    PRN:     Allergies    Rifuxen (Swelling)  Rituxan (Hives)    Intolerances

## 2023-12-18 NOTE — PROGRESS NOTE ADULT - ASSESSMENT
67 yo male s/p afib/flutter ablation on 12/15.  Still having episodes of atrial flutter with RVR. Earlier, pt had episode of aflutter with RVR to the 170s.  Pt received Adenosine x 2 without effect.  Started back on IV amiodarone and BB was increased.  Pt is currently back in NSR at 62 BPM    COnt Lopressor 100 mg BID  Start AMiodarone 200 mg po daily  Cont Eliquis 5 mg BID  Replete magnesium  DCCV not needed since he is back in NSR  Ok to discharge today from EP standpoint

## 2023-12-18 NOTE — PROGRESS NOTE ADULT - ASSESSMENT
Assessment   Patient is a 68 year old male with PMH Afib on Eliquis, COPD mild not on home o2, ESRD sp renal transplant 3 years ago, HEP C + cirrhosis, Melanoma R eye sp Laser, Portal hypertension and grade A and B  Varices by egd, CAD sp PCI in the past, Breast cancer gynecomastia, HLD, HTN + Lumbar Radiculopathy who presents to [facility] for Afib ablation. Post op ablation patient remains in Afib, started on IV Amiodarone load.     A/P  #Recurrent Afib/Flutter  - on POD  sp ablation 12/15TH,  reverted back to Afib/Aflutter   - B/L groin sutures removed on 12/16  - At 1 am 12/17 IV amiodarone  and switched to  PO Amiodarone 200mg BID 1 hour prior to gtt coming down  -12/17th AM at ~ 1130am on telemetry, pt went from rate controlled aflutter  to Afib with -200's   amiodarone 150mg bolus load given, cont on amiodarone drip . PO amiodarone d/c'ed  adenosine 6mg x2 given no effect   metoprolol 5mg iv x2 given, with better rate control. lopressor 50mg po given x1; increased lopressor to 100mg po BID   ml bolus IVF x1  given   -EP on board   -keep NPo for ERIC/DCCV today       #Hx ESRD  HX renal trasnplant ~3 yrs ago  - cont on home dose tacrolimus  - cont on ome dose Cellcept    #CHRONIC back pain  - PRN home Oxycodone 15mg IR  - Morphine 2mg IVP x1    #GERD   - Protonix 40mg daily   - Maalox prn    #BPH  - Flomax 0.4mg HS    Please contact [phone] with any questions or concerns.       Assessment   Patient is a 68 year old male with PMH Afib on Eliquis, COPD mild not on home o2, ESRD sp renal transplant 3 years ago, HEP C + cirrhosis, Melanoma R eye sp Laser, Portal hypertension and grade A and B  Varices by egd, CAD sp PCI in the past, Breast cancer gynecomastia, HLD, HTN + Lumbar Radiculopathy who presents to [facility] for Afib ablation. Post op ablation patient remains in Afib, started on IV Amiodarone load.     A/P  #Recurrent Afib/Flutter  - on POD  sp ablation 12/15TH,  reverted back to Afib/Aflutter   - B/L groin sutures removed on 12/16  - At 1 am 12/17 IV amiodarone  and switched to  PO Amiodarone 200mg BID 1 hour prior to gtt coming down  -12/17th AM at ~ 1130am on telemetry, pt went from rate controlled aflutter  to Afib with -200's   amiodarone 150mg bolus load given, cont on amiodarone drip . PO amiodarone d/c'ed  adenosine 6mg x2 given no effect   metoprolol 5mg iv x2 given, with better rate control. lopressor 50mg po given x1; increased lopressor to 100mg po BID   ml bolus IVF x1  given   -EP on board   -keep NPo for ERIC/DCCV today       #Hx ESRD  HX renal trasnplant ~3 yrs ago  - cont on home dose tacrolimus  - cont on ome dose Cellcept    #CHRONIC back pain  - PRN home Oxycodone 15mg IR  - Morphine 2mg IVP x1    #GERD   - Protonix 40mg daily   - Maalox prn    #BPH  - Flomax 0.4mg HS    Please contact [phone] with any questions or concerns.

## 2023-12-20 DIAGNOSIS — Z91.81 HISTORY OF FALLING: ICD-10-CM

## 2023-12-20 DIAGNOSIS — I48.92 UNSPECIFIED ATRIAL FLUTTER: ICD-10-CM

## 2023-12-20 DIAGNOSIS — M54.16 RADICULOPATHY, LUMBAR REGION: ICD-10-CM

## 2023-12-20 DIAGNOSIS — Z88.1 ALLERGY STATUS TO OTHER ANTIBIOTIC AGENTS STATUS: ICD-10-CM

## 2023-12-20 DIAGNOSIS — Z99.2 DEPENDENCE ON RENAL DIALYSIS: ICD-10-CM

## 2023-12-20 DIAGNOSIS — I12.0 HYPERTENSIVE CHRONIC KIDNEY DISEASE WITH STAGE 5 CHRONIC KIDNEY DISEASE OR END STAGE RENAL DISEASE: ICD-10-CM

## 2023-12-20 DIAGNOSIS — I48.19 OTHER PERSISTENT ATRIAL FIBRILLATION: ICD-10-CM

## 2023-12-20 DIAGNOSIS — N40.0 BENIGN PROSTATIC HYPERPLASIA WITHOUT LOWER URINARY TRACT SYMPTOMS: ICD-10-CM

## 2023-12-20 DIAGNOSIS — Z79.01 LONG TERM (CURRENT) USE OF ANTICOAGULANTS: ICD-10-CM

## 2023-12-20 DIAGNOSIS — K21.9 GASTRO-ESOPHAGEAL REFLUX DISEASE WITHOUT ESOPHAGITIS: ICD-10-CM

## 2023-12-20 DIAGNOSIS — N18.6 END STAGE RENAL DISEASE: ICD-10-CM

## 2023-12-20 DIAGNOSIS — I47.10 SUPRAVENTRICULAR TACHYCARDIA, UNSPECIFIED: ICD-10-CM

## 2024-01-04 ENCOUNTER — INPATIENT (INPATIENT)
Facility: HOSPITAL | Age: 69
LOS: 5 days | Discharge: HOME | DRG: 280 | End: 2024-01-10
Attending: INTERNAL MEDICINE | Admitting: STUDENT IN AN ORGANIZED HEALTH CARE EDUCATION/TRAINING PROGRAM
Payer: MEDICARE

## 2024-01-04 VITALS
HEART RATE: 50 BPM | DIASTOLIC BLOOD PRESSURE: 59 MMHG | SYSTOLIC BLOOD PRESSURE: 104 MMHG | RESPIRATION RATE: 16 BRPM | WEIGHT: 132.94 LBS | TEMPERATURE: 98 F | HEIGHT: 69 IN | OXYGEN SATURATION: 90 %

## 2024-01-04 DIAGNOSIS — Z94.0 KIDNEY TRANSPLANT STATUS: Chronic | ICD-10-CM

## 2024-01-04 DIAGNOSIS — Z98.890 OTHER SPECIFIED POSTPROCEDURAL STATES: Chronic | ICD-10-CM

## 2024-01-04 LAB
BASOPHILS # BLD AUTO: 0.03 K/UL — SIGNIFICANT CHANGE UP (ref 0–0.2)
BASOPHILS # BLD AUTO: 0.03 K/UL — SIGNIFICANT CHANGE UP (ref 0–0.2)
BASOPHILS NFR BLD AUTO: 0.5 % — SIGNIFICANT CHANGE UP (ref 0–1)
BASOPHILS NFR BLD AUTO: 0.5 % — SIGNIFICANT CHANGE UP (ref 0–1)
EOSINOPHIL # BLD AUTO: 0.02 K/UL — SIGNIFICANT CHANGE UP (ref 0–0.7)
EOSINOPHIL # BLD AUTO: 0.02 K/UL — SIGNIFICANT CHANGE UP (ref 0–0.7)
EOSINOPHIL NFR BLD AUTO: 0.4 % — SIGNIFICANT CHANGE UP (ref 0–8)
EOSINOPHIL NFR BLD AUTO: 0.4 % — SIGNIFICANT CHANGE UP (ref 0–8)
HCT VFR BLD CALC: 33.7 % — LOW (ref 42–52)
HCT VFR BLD CALC: 33.7 % — LOW (ref 42–52)
HGB BLD-MCNC: 10.8 G/DL — LOW (ref 14–18)
HGB BLD-MCNC: 10.8 G/DL — LOW (ref 14–18)
IMM GRANULOCYTES NFR BLD AUTO: 1.3 % — HIGH (ref 0.1–0.3)
IMM GRANULOCYTES NFR BLD AUTO: 1.3 % — HIGH (ref 0.1–0.3)
LYMPHOCYTES # BLD AUTO: 0.74 K/UL — LOW (ref 1.2–3.4)
LYMPHOCYTES # BLD AUTO: 0.74 K/UL — LOW (ref 1.2–3.4)
LYMPHOCYTES # BLD AUTO: 13.4 % — LOW (ref 20.5–51.1)
LYMPHOCYTES # BLD AUTO: 13.4 % — LOW (ref 20.5–51.1)
MCHC RBC-ENTMCNC: 29 PG — SIGNIFICANT CHANGE UP (ref 27–31)
MCHC RBC-ENTMCNC: 29 PG — SIGNIFICANT CHANGE UP (ref 27–31)
MCHC RBC-ENTMCNC: 32 G/DL — SIGNIFICANT CHANGE UP (ref 32–37)
MCHC RBC-ENTMCNC: 32 G/DL — SIGNIFICANT CHANGE UP (ref 32–37)
MCV RBC AUTO: 90.6 FL — SIGNIFICANT CHANGE UP (ref 80–94)
MCV RBC AUTO: 90.6 FL — SIGNIFICANT CHANGE UP (ref 80–94)
MONOCYTES # BLD AUTO: 0.2 K/UL — SIGNIFICANT CHANGE UP (ref 0.1–0.6)
MONOCYTES # BLD AUTO: 0.2 K/UL — SIGNIFICANT CHANGE UP (ref 0.1–0.6)
MONOCYTES NFR BLD AUTO: 3.6 % — SIGNIFICANT CHANGE UP (ref 1.7–9.3)
MONOCYTES NFR BLD AUTO: 3.6 % — SIGNIFICANT CHANGE UP (ref 1.7–9.3)
NEUTROPHILS # BLD AUTO: 4.48 K/UL — SIGNIFICANT CHANGE UP (ref 1.4–6.5)
NEUTROPHILS # BLD AUTO: 4.48 K/UL — SIGNIFICANT CHANGE UP (ref 1.4–6.5)
NEUTROPHILS NFR BLD AUTO: 80.8 % — HIGH (ref 42.2–75.2)
NEUTROPHILS NFR BLD AUTO: 80.8 % — HIGH (ref 42.2–75.2)
NRBC # BLD: 0 /100 WBCS — SIGNIFICANT CHANGE UP (ref 0–0)
NRBC # BLD: 0 /100 WBCS — SIGNIFICANT CHANGE UP (ref 0–0)
PLATELET # BLD AUTO: 139 K/UL — SIGNIFICANT CHANGE UP (ref 130–400)
PLATELET # BLD AUTO: 139 K/UL — SIGNIFICANT CHANGE UP (ref 130–400)
PMV BLD: 12.4 FL — HIGH (ref 7.4–10.4)
PMV BLD: 12.4 FL — HIGH (ref 7.4–10.4)
RBC # BLD: 3.72 M/UL — LOW (ref 4.7–6.1)
RBC # BLD: 3.72 M/UL — LOW (ref 4.7–6.1)
RBC # FLD: 14.7 % — HIGH (ref 11.5–14.5)
RBC # FLD: 14.7 % — HIGH (ref 11.5–14.5)
WBC # BLD: 5.54 K/UL — SIGNIFICANT CHANGE UP (ref 4.8–10.8)
WBC # BLD: 5.54 K/UL — SIGNIFICANT CHANGE UP (ref 4.8–10.8)
WBC # FLD AUTO: 5.54 K/UL — SIGNIFICANT CHANGE UP (ref 4.8–10.8)
WBC # FLD AUTO: 5.54 K/UL — SIGNIFICANT CHANGE UP (ref 4.8–10.8)

## 2024-01-04 PROCEDURE — 93010 ELECTROCARDIOGRAM REPORT: CPT

## 2024-01-04 PROCEDURE — 99285 EMERGENCY DEPT VISIT HI MDM: CPT

## 2024-01-04 RX ORDER — SODIUM CHLORIDE 9 MG/ML
1000 INJECTION, SOLUTION INTRAVENOUS ONCE
Refills: 0 | Status: COMPLETED | OUTPATIENT
Start: 2024-01-04 | End: 2024-01-04

## 2024-01-04 RX ORDER — MORPHINE SULFATE 50 MG/1
4 CAPSULE, EXTENDED RELEASE ORAL ONCE
Refills: 0 | Status: DISCONTINUED | OUTPATIENT
Start: 2024-01-04 | End: 2024-01-04

## 2024-01-04 RX ORDER — TACROLIMUS 5 MG/1
2 CAPSULE ORAL ONCE
Refills: 0 | Status: COMPLETED | OUTPATIENT
Start: 2024-01-04 | End: 2024-01-04

## 2024-01-04 RX ADMIN — SODIUM CHLORIDE 1000 MILLILITER(S): 9 INJECTION, SOLUTION INTRAVENOUS at 23:30

## 2024-01-04 RX ADMIN — MORPHINE SULFATE 4 MILLIGRAM(S): 50 CAPSULE, EXTENDED RELEASE ORAL at 23:30

## 2024-01-04 NOTE — ED ADULT TRIAGE NOTE - CHIEF COMPLAINT QUOTE
Patient presents to ED c/o hypotension and weak about 3 hours after taking blood pressure medication, /59 in triage but patient has not yet taken meds tonight. Reports feeling better when he does not take his medication.

## 2024-01-05 DIAGNOSIS — R07.9 CHEST PAIN, UNSPECIFIED: ICD-10-CM

## 2024-01-05 LAB
ALBUMIN SERPL ELPH-MCNC: 3.5 G/DL — SIGNIFICANT CHANGE UP (ref 3.5–5.2)
ALBUMIN SERPL ELPH-MCNC: 3.5 G/DL — SIGNIFICANT CHANGE UP (ref 3.5–5.2)
ALBUMIN SERPL ELPH-MCNC: 3.9 G/DL — SIGNIFICANT CHANGE UP (ref 3.5–5.2)
ALBUMIN SERPL ELPH-MCNC: 3.9 G/DL — SIGNIFICANT CHANGE UP (ref 3.5–5.2)
ALP SERPL-CCNC: 53 U/L — SIGNIFICANT CHANGE UP (ref 30–115)
ALP SERPL-CCNC: 53 U/L — SIGNIFICANT CHANGE UP (ref 30–115)
ALP SERPL-CCNC: 58 U/L — SIGNIFICANT CHANGE UP (ref 30–115)
ALP SERPL-CCNC: 58 U/L — SIGNIFICANT CHANGE UP (ref 30–115)
ALT FLD-CCNC: <5 U/L — SIGNIFICANT CHANGE UP (ref 0–41)
ANION GAP SERPL CALC-SCNC: 14 MMOL/L — SIGNIFICANT CHANGE UP (ref 7–14)
ANION GAP SERPL CALC-SCNC: 14 MMOL/L — SIGNIFICANT CHANGE UP (ref 7–14)
ANION GAP SERPL CALC-SCNC: 16 MMOL/L — HIGH (ref 7–14)
ANION GAP SERPL CALC-SCNC: 16 MMOL/L — HIGH (ref 7–14)
APPEARANCE UR: CLEAR — SIGNIFICANT CHANGE UP
APPEARANCE UR: CLEAR — SIGNIFICANT CHANGE UP
APTT BLD: 35.5 SEC — SIGNIFICANT CHANGE UP (ref 27–39.2)
APTT BLD: 35.5 SEC — SIGNIFICANT CHANGE UP (ref 27–39.2)
AST SERPL-CCNC: 10 U/L — SIGNIFICANT CHANGE UP (ref 0–41)
AST SERPL-CCNC: 10 U/L — SIGNIFICANT CHANGE UP (ref 0–41)
AST SERPL-CCNC: 13 U/L — SIGNIFICANT CHANGE UP (ref 0–41)
AST SERPL-CCNC: 13 U/L — SIGNIFICANT CHANGE UP (ref 0–41)
BILIRUB SERPL-MCNC: 0.6 MG/DL — SIGNIFICANT CHANGE UP (ref 0.2–1.2)
BILIRUB UR-MCNC: NEGATIVE — SIGNIFICANT CHANGE UP
BILIRUB UR-MCNC: NEGATIVE — SIGNIFICANT CHANGE UP
BUN SERPL-MCNC: 50 MG/DL — HIGH (ref 10–20)
BUN SERPL-MCNC: 50 MG/DL — HIGH (ref 10–20)
BUN SERPL-MCNC: 56 MG/DL — HIGH (ref 10–20)
BUN SERPL-MCNC: 56 MG/DL — HIGH (ref 10–20)
CALCIUM SERPL-MCNC: 10.1 MG/DL — SIGNIFICANT CHANGE UP (ref 8.4–10.5)
CALCIUM SERPL-MCNC: 10.1 MG/DL — SIGNIFICANT CHANGE UP (ref 8.4–10.5)
CALCIUM SERPL-MCNC: 10.4 MG/DL — SIGNIFICANT CHANGE UP (ref 8.4–10.5)
CALCIUM SERPL-MCNC: 10.4 MG/DL — SIGNIFICANT CHANGE UP (ref 8.4–10.5)
CHLORIDE SERPL-SCNC: 100 MMOL/L — SIGNIFICANT CHANGE UP (ref 98–110)
CHLORIDE SERPL-SCNC: 100 MMOL/L — SIGNIFICANT CHANGE UP (ref 98–110)
CHLORIDE SERPL-SCNC: 99 MMOL/L — SIGNIFICANT CHANGE UP (ref 98–110)
CHLORIDE SERPL-SCNC: 99 MMOL/L — SIGNIFICANT CHANGE UP (ref 98–110)
CO2 SERPL-SCNC: 19 MMOL/L — SIGNIFICANT CHANGE UP (ref 17–32)
CO2 SERPL-SCNC: 19 MMOL/L — SIGNIFICANT CHANGE UP (ref 17–32)
CO2 SERPL-SCNC: 23 MMOL/L — SIGNIFICANT CHANGE UP (ref 17–32)
CO2 SERPL-SCNC: 23 MMOL/L — SIGNIFICANT CHANGE UP (ref 17–32)
COLOR SPEC: YELLOW — SIGNIFICANT CHANGE UP
COLOR SPEC: YELLOW — SIGNIFICANT CHANGE UP
CREAT SERPL-MCNC: 2.2 MG/DL — HIGH (ref 0.7–1.5)
CREAT SERPL-MCNC: 2.2 MG/DL — HIGH (ref 0.7–1.5)
CREAT SERPL-MCNC: 2.3 MG/DL — HIGH (ref 0.7–1.5)
CREAT SERPL-MCNC: 2.3 MG/DL — HIGH (ref 0.7–1.5)
DIFF PNL FLD: NEGATIVE — SIGNIFICANT CHANGE UP
DIFF PNL FLD: NEGATIVE — SIGNIFICANT CHANGE UP
EGFR: 30 ML/MIN/1.73M2 — LOW
EGFR: 30 ML/MIN/1.73M2 — LOW
EGFR: 32 ML/MIN/1.73M2 — LOW
EGFR: 32 ML/MIN/1.73M2 — LOW
FLUAV AG NPH QL: SIGNIFICANT CHANGE UP
FLUAV AG NPH QL: SIGNIFICANT CHANGE UP
FLUBV AG NPH QL: SIGNIFICANT CHANGE UP
FLUBV AG NPH QL: SIGNIFICANT CHANGE UP
GLUCOSE SERPL-MCNC: 109 MG/DL — HIGH (ref 70–99)
GLUCOSE SERPL-MCNC: 109 MG/DL — HIGH (ref 70–99)
GLUCOSE SERPL-MCNC: 130 MG/DL — HIGH (ref 70–99)
GLUCOSE SERPL-MCNC: 130 MG/DL — HIGH (ref 70–99)
GLUCOSE UR QL: NEGATIVE MG/DL — SIGNIFICANT CHANGE UP
GLUCOSE UR QL: NEGATIVE MG/DL — SIGNIFICANT CHANGE UP
HCT VFR BLD CALC: 32.2 % — LOW (ref 42–52)
HCT VFR BLD CALC: 32.2 % — LOW (ref 42–52)
HGB BLD-MCNC: 10.2 G/DL — LOW (ref 14–18)
HGB BLD-MCNC: 10.2 G/DL — LOW (ref 14–18)
INR BLD: 1.61 RATIO — HIGH (ref 0.65–1.3)
INR BLD: 1.61 RATIO — HIGH (ref 0.65–1.3)
KETONES UR-MCNC: NEGATIVE MG/DL — SIGNIFICANT CHANGE UP
KETONES UR-MCNC: NEGATIVE MG/DL — SIGNIFICANT CHANGE UP
LACTATE SERPL-SCNC: 1.6 MMOL/L — SIGNIFICANT CHANGE UP (ref 0.7–2)
LACTATE SERPL-SCNC: 1.6 MMOL/L — SIGNIFICANT CHANGE UP (ref 0.7–2)
LEUKOCYTE ESTERASE UR-ACNC: NEGATIVE — SIGNIFICANT CHANGE UP
LEUKOCYTE ESTERASE UR-ACNC: NEGATIVE — SIGNIFICANT CHANGE UP
MAGNESIUM SERPL-MCNC: 1.8 MG/DL — SIGNIFICANT CHANGE UP (ref 1.8–2.4)
MAGNESIUM SERPL-MCNC: 1.8 MG/DL — SIGNIFICANT CHANGE UP (ref 1.8–2.4)
MCHC RBC-ENTMCNC: 28.9 PG — SIGNIFICANT CHANGE UP (ref 27–31)
MCHC RBC-ENTMCNC: 28.9 PG — SIGNIFICANT CHANGE UP (ref 27–31)
MCHC RBC-ENTMCNC: 31.7 G/DL — LOW (ref 32–37)
MCHC RBC-ENTMCNC: 31.7 G/DL — LOW (ref 32–37)
MCV RBC AUTO: 91.2 FL — SIGNIFICANT CHANGE UP (ref 80–94)
MCV RBC AUTO: 91.2 FL — SIGNIFICANT CHANGE UP (ref 80–94)
NITRITE UR-MCNC: NEGATIVE — SIGNIFICANT CHANGE UP
NITRITE UR-MCNC: NEGATIVE — SIGNIFICANT CHANGE UP
NRBC # BLD: 0 /100 WBCS — SIGNIFICANT CHANGE UP (ref 0–0)
NRBC # BLD: 0 /100 WBCS — SIGNIFICANT CHANGE UP (ref 0–0)
NT-PROBNP SERPL-SCNC: HIGH PG/ML (ref 0–300)
NT-PROBNP SERPL-SCNC: HIGH PG/ML (ref 0–300)
PH UR: 6 — SIGNIFICANT CHANGE UP (ref 5–8)
PH UR: 6 — SIGNIFICANT CHANGE UP (ref 5–8)
PHOSPHATE SERPL-MCNC: 3.4 MG/DL — SIGNIFICANT CHANGE UP (ref 2.1–4.9)
PHOSPHATE SERPL-MCNC: 3.4 MG/DL — SIGNIFICANT CHANGE UP (ref 2.1–4.9)
PLATELET # BLD AUTO: 110 K/UL — LOW (ref 130–400)
PLATELET # BLD AUTO: 110 K/UL — LOW (ref 130–400)
PMV BLD: 12.1 FL — HIGH (ref 7.4–10.4)
PMV BLD: 12.1 FL — HIGH (ref 7.4–10.4)
POTASSIUM SERPL-MCNC: 4 MMOL/L — SIGNIFICANT CHANGE UP (ref 3.5–5)
POTASSIUM SERPL-SCNC: 4 MMOL/L — SIGNIFICANT CHANGE UP (ref 3.5–5)
PROT SERPL-MCNC: 5.7 G/DL — LOW (ref 6–8)
PROT SERPL-MCNC: 5.7 G/DL — LOW (ref 6–8)
PROT SERPL-MCNC: 6.4 G/DL — SIGNIFICANT CHANGE UP (ref 6–8)
PROT SERPL-MCNC: 6.4 G/DL — SIGNIFICANT CHANGE UP (ref 6–8)
PROT UR-MCNC: NEGATIVE MG/DL — SIGNIFICANT CHANGE UP
PROT UR-MCNC: NEGATIVE MG/DL — SIGNIFICANT CHANGE UP
PROTHROM AB SERPL-ACNC: 18.4 SEC — HIGH (ref 9.95–12.87)
PROTHROM AB SERPL-ACNC: 18.4 SEC — HIGH (ref 9.95–12.87)
RBC # BLD: 3.53 M/UL — LOW (ref 4.7–6.1)
RBC # BLD: 3.53 M/UL — LOW (ref 4.7–6.1)
RBC # FLD: 14.8 % — HIGH (ref 11.5–14.5)
RBC # FLD: 14.8 % — HIGH (ref 11.5–14.5)
RSV RNA NPH QL NAA+NON-PROBE: SIGNIFICANT CHANGE UP
RSV RNA NPH QL NAA+NON-PROBE: SIGNIFICANT CHANGE UP
SARS-COV-2 RNA SPEC QL NAA+PROBE: SIGNIFICANT CHANGE UP
SARS-COV-2 RNA SPEC QL NAA+PROBE: SIGNIFICANT CHANGE UP
SODIUM SERPL-SCNC: 135 MMOL/L — SIGNIFICANT CHANGE UP (ref 135–146)
SODIUM SERPL-SCNC: 135 MMOL/L — SIGNIFICANT CHANGE UP (ref 135–146)
SODIUM SERPL-SCNC: 136 MMOL/L — SIGNIFICANT CHANGE UP (ref 135–146)
SODIUM SERPL-SCNC: 136 MMOL/L — SIGNIFICANT CHANGE UP (ref 135–146)
SP GR SPEC: 1.01 — SIGNIFICANT CHANGE UP (ref 1–1.03)
SP GR SPEC: 1.01 — SIGNIFICANT CHANGE UP (ref 1–1.03)
TROPONIN T, HIGH SENSITIVITY RESULT: 85 NG/L — CRITICAL HIGH (ref 6–21)
TROPONIN T, HIGH SENSITIVITY RESULT: 85 NG/L — CRITICAL HIGH (ref 6–21)
TROPONIN T, HIGH SENSITIVITY RESULT: 95 NG/L — CRITICAL HIGH (ref 6–21)
TROPONIN T, HIGH SENSITIVITY RESULT: 95 NG/L — CRITICAL HIGH (ref 6–21)
UROBILINOGEN FLD QL: 0.2 MG/DL — SIGNIFICANT CHANGE UP (ref 0.2–1)
UROBILINOGEN FLD QL: 0.2 MG/DL — SIGNIFICANT CHANGE UP (ref 0.2–1)
WBC # BLD: 4.27 K/UL — LOW (ref 4.8–10.8)
WBC # BLD: 4.27 K/UL — LOW (ref 4.8–10.8)
WBC # FLD AUTO: 4.27 K/UL — LOW (ref 4.8–10.8)
WBC # FLD AUTO: 4.27 K/UL — LOW (ref 4.8–10.8)

## 2024-01-05 PROCEDURE — 99223 1ST HOSP IP/OBS HIGH 75: CPT

## 2024-01-05 PROCEDURE — 83605 ASSAY OF LACTIC ACID: CPT

## 2024-01-05 PROCEDURE — 80197 ASSAY OF TACROLIMUS: CPT

## 2024-01-05 PROCEDURE — 93005 ELECTROCARDIOGRAM TRACING: CPT

## 2024-01-05 PROCEDURE — 80053 COMPREHEN METABOLIC PANEL: CPT

## 2024-01-05 PROCEDURE — 71045 X-RAY EXAM CHEST 1 VIEW: CPT | Mod: 26

## 2024-01-05 PROCEDURE — 93306 TTE W/DOPPLER COMPLETE: CPT | Mod: 26

## 2024-01-05 PROCEDURE — 83935 ASSAY OF URINE OSMOLALITY: CPT

## 2024-01-05 PROCEDURE — 71250 CT THORAX DX C-: CPT

## 2024-01-05 PROCEDURE — 84100 ASSAY OF PHOSPHORUS: CPT

## 2024-01-05 PROCEDURE — 84540 ASSAY OF URINE/UREA-N: CPT

## 2024-01-05 PROCEDURE — 85730 THROMBOPLASTIN TIME PARTIAL: CPT

## 2024-01-05 PROCEDURE — 76770 US EXAM ABDO BACK WALL COMP: CPT

## 2024-01-05 PROCEDURE — 93010 ELECTROCARDIOGRAM REPORT: CPT

## 2024-01-05 PROCEDURE — 87077 CULTURE AEROBIC IDENTIFY: CPT

## 2024-01-05 PROCEDURE — 80048 BASIC METABOLIC PNL TOTAL CA: CPT

## 2024-01-05 PROCEDURE — 84484 ASSAY OF TROPONIN QUANT: CPT

## 2024-01-05 PROCEDURE — 82962 GLUCOSE BLOOD TEST: CPT

## 2024-01-05 PROCEDURE — 84156 ASSAY OF PROTEIN URINE: CPT

## 2024-01-05 PROCEDURE — 93306 TTE W/DOPPLER COMPLETE: CPT

## 2024-01-05 PROCEDURE — 85610 PROTHROMBIN TIME: CPT

## 2024-01-05 PROCEDURE — 84300 ASSAY OF URINE SODIUM: CPT

## 2024-01-05 PROCEDURE — 82570 ASSAY OF URINE CREATININE: CPT

## 2024-01-05 PROCEDURE — 87086 URINE CULTURE/COLONY COUNT: CPT

## 2024-01-05 PROCEDURE — 76770 US EXAM ABDO BACK WALL COMP: CPT | Mod: 26,XU

## 2024-01-05 PROCEDURE — 93010 ELECTROCARDIOGRAM REPORT: CPT | Mod: 77

## 2024-01-05 PROCEDURE — 81003 URINALYSIS AUTO W/O SCOPE: CPT

## 2024-01-05 PROCEDURE — 83735 ASSAY OF MAGNESIUM: CPT

## 2024-01-05 PROCEDURE — 85025 COMPLETE CBC W/AUTO DIFF WBC: CPT

## 2024-01-05 PROCEDURE — 85027 COMPLETE CBC AUTOMATED: CPT

## 2024-01-05 PROCEDURE — 84133 ASSAY OF URINE POTASSIUM: CPT

## 2024-01-05 PROCEDURE — 76700 US EXAM ABDOM COMPLETE: CPT

## 2024-01-05 PROCEDURE — 71045 X-RAY EXAM CHEST 1 VIEW: CPT

## 2024-01-05 PROCEDURE — 36415 COLL VENOUS BLD VENIPUNCTURE: CPT

## 2024-01-05 PROCEDURE — 87186 SC STD MICRODIL/AGAR DIL: CPT

## 2024-01-05 PROCEDURE — 76700 US EXAM ABDOM COMPLETE: CPT | Mod: 26

## 2024-01-05 RX ORDER — OXYCODONE HYDROCHLORIDE 5 MG/1
5 TABLET ORAL EVERY 8 HOURS
Refills: 0 | Status: DISCONTINUED | OUTPATIENT
Start: 2024-01-05 | End: 2024-01-10

## 2024-01-05 RX ORDER — ACETAMINOPHEN 500 MG
650 TABLET ORAL EVERY 6 HOURS
Refills: 0 | Status: DISCONTINUED | OUTPATIENT
Start: 2024-01-05 | End: 2024-01-10

## 2024-01-05 RX ORDER — APIXABAN 2.5 MG/1
5 TABLET, FILM COATED ORAL EVERY 12 HOURS
Refills: 0 | Status: DISCONTINUED | OUTPATIENT
Start: 2024-01-05 | End: 2024-01-10

## 2024-01-05 RX ORDER — ASPIRIN/CALCIUM CARB/MAGNESIUM 324 MG
81 TABLET ORAL DAILY
Refills: 0 | Status: DISCONTINUED | OUTPATIENT
Start: 2024-01-05 | End: 2024-01-10

## 2024-01-05 RX ORDER — TACROLIMUS 5 MG/1
3 CAPSULE ORAL
Refills: 0 | Status: DISCONTINUED | OUTPATIENT
Start: 2024-01-05 | End: 2024-01-10

## 2024-01-05 RX ORDER — MYCOPHENOLATE MOFETIL 250 MG/1
500 CAPSULE ORAL
Refills: 0 | Status: DISCONTINUED | OUTPATIENT
Start: 2024-01-05 | End: 2024-01-10

## 2024-01-05 RX ORDER — TRAMADOL HYDROCHLORIDE 50 MG/1
50 TABLET ORAL EVERY 8 HOURS
Refills: 0 | Status: DISCONTINUED | OUTPATIENT
Start: 2024-01-05 | End: 2024-01-05

## 2024-01-05 RX ORDER — TAMSULOSIN HYDROCHLORIDE 0.4 MG/1
0.4 CAPSULE ORAL
Refills: 0 | Status: DISCONTINUED | OUTPATIENT
Start: 2024-01-05 | End: 2024-01-10

## 2024-01-05 RX ORDER — FUROSEMIDE 40 MG
40 TABLET ORAL EVERY 12 HOURS
Refills: 0 | Status: DISCONTINUED | OUTPATIENT
Start: 2024-01-05 | End: 2024-01-05

## 2024-01-05 RX ORDER — HYDROMORPHONE HYDROCHLORIDE 2 MG/ML
0.5 INJECTION INTRAMUSCULAR; INTRAVENOUS; SUBCUTANEOUS ONCE
Refills: 0 | Status: DISCONTINUED | OUTPATIENT
Start: 2024-01-05 | End: 2024-01-05

## 2024-01-05 RX ORDER — TACROLIMUS 5 MG/1
2 CAPSULE ORAL AT BEDTIME
Refills: 0 | Status: DISCONTINUED | OUTPATIENT
Start: 2024-01-05 | End: 2024-01-10

## 2024-01-05 RX ORDER — HYDROMORPHONE HYDROCHLORIDE 2 MG/ML
0.5 INJECTION INTRAMUSCULAR; INTRAVENOUS; SUBCUTANEOUS EVERY 6 HOURS
Refills: 0 | Status: DISCONTINUED | OUTPATIENT
Start: 2024-01-05 | End: 2024-01-10

## 2024-01-05 RX ORDER — LANOLIN ALCOHOL/MO/W.PET/CERES
3 CREAM (GRAM) TOPICAL AT BEDTIME
Refills: 0 | Status: DISCONTINUED | OUTPATIENT
Start: 2024-01-05 | End: 2024-01-10

## 2024-01-05 RX ORDER — FUROSEMIDE 40 MG
40 TABLET ORAL ONCE
Refills: 0 | Status: COMPLETED | OUTPATIENT
Start: 2024-01-05 | End: 2024-01-05

## 2024-01-05 RX ORDER — AMIODARONE HYDROCHLORIDE 400 MG/1
200 TABLET ORAL DAILY
Refills: 0 | Status: DISCONTINUED | OUTPATIENT
Start: 2024-01-05 | End: 2024-01-10

## 2024-01-05 RX ORDER — ALBUTEROL 90 UG/1
2 AEROSOL, METERED ORAL EVERY 6 HOURS
Refills: 0 | Status: DISCONTINUED | OUTPATIENT
Start: 2024-01-05 | End: 2024-01-10

## 2024-01-05 RX ORDER — PANTOPRAZOLE SODIUM 20 MG/1
40 TABLET, DELAYED RELEASE ORAL
Refills: 0 | Status: DISCONTINUED | OUTPATIENT
Start: 2024-01-05 | End: 2024-01-10

## 2024-01-05 RX ORDER — OXYCODONE HYDROCHLORIDE 5 MG/1
1 TABLET ORAL
Qty: 0 | Refills: 0 | DISCHARGE

## 2024-01-05 RX ADMIN — PANTOPRAZOLE SODIUM 40 MILLIGRAM(S): 20 TABLET, DELAYED RELEASE ORAL at 06:21

## 2024-01-05 RX ADMIN — Medication 40 MILLIGRAM(S): at 06:20

## 2024-01-05 RX ADMIN — AMIODARONE HYDROCHLORIDE 200 MILLIGRAM(S): 400 TABLET ORAL at 09:24

## 2024-01-05 RX ADMIN — APIXABAN 5 MILLIGRAM(S): 2.5 TABLET, FILM COATED ORAL at 17:06

## 2024-01-05 RX ADMIN — TAMSULOSIN HYDROCHLORIDE 0.4 MILLIGRAM(S): 0.4 CAPSULE ORAL at 06:21

## 2024-01-05 RX ADMIN — TACROLIMUS 2 MILLIGRAM(S): 5 CAPSULE ORAL at 21:09

## 2024-01-05 RX ADMIN — TAMSULOSIN HYDROCHLORIDE 0.4 MILLIGRAM(S): 0.4 CAPSULE ORAL at 17:06

## 2024-01-05 RX ADMIN — APIXABAN 5 MILLIGRAM(S): 2.5 TABLET, FILM COATED ORAL at 06:21

## 2024-01-05 RX ADMIN — MYCOPHENOLATE MOFETIL 500 MILLIGRAM(S): 250 CAPSULE ORAL at 17:06

## 2024-01-05 RX ADMIN — MORPHINE SULFATE 4 MILLIGRAM(S): 50 CAPSULE, EXTENDED RELEASE ORAL at 20:04

## 2024-01-05 RX ADMIN — TACROLIMUS 3 MILLIGRAM(S): 5 CAPSULE ORAL at 06:21

## 2024-01-05 RX ADMIN — HYDROMORPHONE HYDROCHLORIDE 0.5 MILLIGRAM(S): 2 INJECTION INTRAMUSCULAR; INTRAVENOUS; SUBCUTANEOUS at 17:05

## 2024-01-05 RX ADMIN — HYDROMORPHONE HYDROCHLORIDE 0.5 MILLIGRAM(S): 2 INJECTION INTRAMUSCULAR; INTRAVENOUS; SUBCUTANEOUS at 20:04

## 2024-01-05 RX ADMIN — Medication 81 MILLIGRAM(S): at 06:22

## 2024-01-05 RX ADMIN — MYCOPHENOLATE MOFETIL 500 MILLIGRAM(S): 250 CAPSULE ORAL at 06:21

## 2024-01-05 RX ADMIN — HYDROMORPHONE HYDROCHLORIDE 0.5 MILLIGRAM(S): 2 INJECTION INTRAMUSCULAR; INTRAVENOUS; SUBCUTANEOUS at 22:58

## 2024-01-05 RX ADMIN — HYDROMORPHONE HYDROCHLORIDE 0.5 MILLIGRAM(S): 2 INJECTION INTRAMUSCULAR; INTRAVENOUS; SUBCUTANEOUS at 10:23

## 2024-01-05 NOTE — H&P ADULT - HISTORY OF PRESENT ILLNESS
69 year old male with PMHx pAfib/Aflutter(on Eliquis) s/p ablation on 12/15/23, CAD s/p PCI(LAD and RCA) for unstable angina in 2019, mild COPD not on home o2, CKD3, Hx of renal transplant 3 years ago, HEP C, decompensated liver cirrhosis(portal HTN, esophageal varices), Melanoma R eye s/p Laser, Breast cancer gynecomastia, HLD, HTN, lumbar Radiculopathy who presents to the ED with c/o SOB, generalized weakness and fatigue for past few weeks.    Pt. states that since after he was discharged on 12/16 after Afib/Aflutter ablation, he has been feeling SOB on exertion, and has been feeling tired/weak and this is limiting his day to day activities. He also experienced pounding chest pain yesterday night at rest, that lasted for 1 min and resolved on it's own. He denied any chest pain after that. He denied any LE swelling, denied orthopnea but he prefers not to lie flat. He states he has been taking his meds as prescribed. Denies HA, dizziness, NVD, fever, abdominal pain, change in urination and change in bowel habits.    In ED, pt. was hemodynamically stable  Labs significant for BUN 56, Cr 2.3(1.3 in dec), Trop 95, BNP 23K  EKG showed sinus keyonna HR 52, LVH, diffuse T-wave inversions  CXR showed prominent interstitial markings    Pt. received 1L bolus in ED.    Vital Signs Last 24 Hrs  T(F): 98.2 (05 Jan 2024 04:32), Max: 98.2 (05 Jan 2024 04:32)  HR: 58 (05 Jan 2024 04:32) (50 - 58)  BP: 104/55 (05 Jan 2024 04:32) (104/55 - 105/55)  RR: 16 (05 Jan 2024 04:32) (16 - 16)  SpO2: 100% (05 Jan 2024 04:32) (90% - 100%) on room air    Pt. is being admitted to [facility] for NSTEMI and decompensated heart failure.     69 year old male with PMHx pAfib/Aflutter(on Eliquis) s/p ablation on 12/15/23, CAD s/p PCI(LAD and RCA) for unstable angina in 2019, mild COPD not on home o2, CKD3, Hx of renal transplant 3 years ago, HEP C, decompensated liver cirrhosis(portal HTN, esophageal varices), Melanoma R eye s/p Laser, Breast cancer gynecomastia, HLD, HTN, lumbar Radiculopathy who presents to the ED with c/o SOB, generalized weakness and fatigue for past few weeks.    Pt. states that since after he was discharged on 12/16 after Afib/Aflutter ablation, he has been feeling SOB on exertion, and has been feeling tired/weak and this is limiting his day to day activities. He also experienced pounding chest pain yesterday night at rest, that lasted for 1 min and resolved on it's own. He denied any chest pain after that. He denied any LE swelling, denied orthopnea but he prefers not to lie flat. He states he has been taking his meds as prescribed. Denies HA, dizziness, NVD, fever, abdominal pain, change in urination and change in bowel habits.    In ED, pt. was hemodynamically stable  Labs significant for BUN 56, Cr 2.3(1.3 in dec), Trop 95, BNP 23K  EKG showed sinus keyonna HR 52, LVH, diffuse T-wave inversions  CXR showed prominent interstitial markings    Pt. received 1L bolus in ED.    Vital Signs Last 24 Hrs  T(F): 98.2 (05 Jan 2024 04:32), Max: 98.2 (05 Jan 2024 04:32)  HR: 58 (05 Jan 2024 04:32) (50 - 58)  BP: 104/55 (05 Jan 2024 04:32) (104/55 - 105/55)  RR: 16 (05 Jan 2024 04:32) (16 - 16)  SpO2: 100% (05 Jan 2024 04:32) (90% - 100%) on room air    Pt. is being admitted to [facility] for NSTEMI and decompensated heart failure.

## 2024-01-05 NOTE — CONSULT NOTE ADULT - NS ATTEND AMEND GEN_ALL_CORE FT
Long standing persistent AF s/p Ablation  Anemia  Hx of thromboembolic event  SANTA on CKD    Tele  Continue Amiodarone to maintain sinus  Off metoprolol  Eliquis  Lasix as per Nephro/Renal function  Ambulation  Echo  Ok to DC from EP/Card standpoint of view if Echo/BMP back to baseline  OP f-up

## 2024-01-05 NOTE — PATIENT PROFILE ADULT - FALL HARM RISK - HARM RISK INTERVENTIONS
Assistance with ambulation/Assistance OOB with selected safe patient handling equipment/Communicate Risk of Fall with Harm to all staff/Discuss with provider need for PT consult/Monitor gait and stability/Provide patient with walking aids - walker, cane, crutches/Reinforce activity limits and safety measures with patient and family/Tailored Fall Risk Interventions/Visual Cue: Yellow wristband and red socks/Bed in lowest position, wheels locked, appropriate side rails in place/Call bell, personal items and telephone in reach/Instruct patient to call for assistance before getting out of bed or chair/Non-slip footwear when patient is out of bed/[place] to call system/Physically safe environment - no spills, clutter or unnecessary equipment/Purposeful Proactive Rounding/Room/bathroom lighting operational, light cord in reach Assistance with ambulation/Assistance OOB with selected safe patient handling equipment/Communicate Risk of Fall with Harm to all staff/Discuss with provider need for PT consult/Monitor gait and stability/Provide patient with walking aids - walker, cane, crutches/Reinforce activity limits and safety measures with patient and family/Tailored Fall Risk Interventions/Visual Cue: Yellow wristband and red socks/Bed in lowest position, wheels locked, appropriate side rails in place/Call bell, personal items and telephone in reach/Instruct patient to call for assistance before getting out of bed or chair/Non-slip footwear when patient is out of bed/[place] to call system/Physically safe environment - no spills, clutter or unnecessary equipment/Purposeful Proactive Rounding/Room/bathroom lighting operational, light cord in reach

## 2024-01-05 NOTE — H&P ADULT - NSHPPHYSICALEXAM_GEN_ALL_CORE
GENERAL: NAD, lying in bed comfortably  HENT: JVD  CHEST/LUNG: Clear to auscultation bilaterally; No rales, rhonchi, wheezing, or rubs. Unlabored respirations  HEART: Regular rate and rhythm  ABDOMEN: Soft, Nontender, Nondistended  EXTREMITIES: No clubbing, cyanosis, or edema  NERVOUS SYSTEM:  Alert & Oriented X3, speech clear. No deficits

## 2024-01-05 NOTE — PATIENT PROFILE ADULT - FUNCTIONAL ASSESSMENT - BASIC MOBILITY 6.
2-calculated by average/Not able to assess (calculate score using [facility] averaging method)  2-calculated by average/Not able to assess (calculate score using [facility] averaging method)

## 2024-01-05 NOTE — CONSULT NOTE ADULT - ASSESSMENT
69 year old male with PMHx pAfib/Aflutter(on Eliquis) s/p ablation on 12/15/23, CAD s/p PCI(LAD and RCA) for unstable angina in 2019, mild COPD not on home o2, CKD3, Hx of renal transplant 3 years ago, HEP C, decompensated liver cirrhosis(portal HTN, esophageal varices), Melanoma R eye s/p Laser, Breast cancer gynecomastia, HLD, HTN, lumbar Radiculopathy who presents to the ED with c/o SOB, generalized weakness and fatigue for past few weeks.    - decompensated HF/ fluid overloaded 69 year old male with PMHx pAfib/ Aflutter(on Eliquis) s/p ablation on 12/15/23, CAD s/p PCI(LAD and RCA) for unstable angina in 2019, mild COPD not on home o2, CKD3, Hx of renal transplant 3 years ago, HEP C, decompensated liver cirrhosis(portal HTN, esophageal varices), Melanoma R eye s/p Laser, Breast cancer gynecomastia, HLD, HTN, lumbar Radiculopathy who presents to the ED with c/o SOB, generalized weakness and fatigue for past few weeks.  Telemetry reviewed: sinus keyonna 50s bpm, no AF noted.     - decompensated HF/ fluid overloaded    Please obtain TTE  trend troponin  continue diuresis  daily BMP for renal function  consider nephrology consult for medications adjustment  telemetry monitoring

## 2024-01-05 NOTE — ED PROVIDER NOTE - CLINICAL SUMMARY MEDICAL DECISION MAKING FREE TEXT BOX
69-year-old male history of hypertension A-fib on Eliquis, status post ablation, ESRD s/p renal transplant on Prograf CellCept, multiple Flip, presenting for evaluation of generalized weakness, dizziness, lightheadedness, chest pain and shortness of breath, dyspnea on exertion.  Patient also states that he has had decreased oral intake.  Patient with recent ablation December, states that since discharge and adjustment of his blood pressure medications has noted intermittent hypotension that resolved when holding his blood pressure medications.  Today felt worse, had episode of chest pain, now resolved.  Also notes decreased oral intake, no nausea or vomiting.  No abdominal pain, diarrhea.  Chronically ill appearing, NAD, non toxic. NCAT PERRLA EOMI dry mucous membranes neck supple non tender normal wob cta bl rrr abdomen s nt nd no rebound no guarding WWPx4 neuro non focal.  Labs EKG imaging reviewed.  Patient found to have SANTA, elevated troponin, EKG unchanged from prior.  Will admit for further evaluation.

## 2024-01-05 NOTE — CONSULT NOTE ADULT - SUBJECTIVE AND OBJECTIVE BOX
HPI:  69 year old male with PMHx pAfib/Aflutter(on Eliquis) s/p ablation on 12/15/23, CAD s/p PCI(LAD and RCA) for unstable angina in 2019, mild COPD not on home o2, CKD3, Hx of renal transplant 3 years ago, HEP C, decompensated liver cirrhosis(portal HTN, esophageal varices), Melanoma R eye s/p Laser, Breast cancer gynecomastia, HLD, HTN, lumbar Radiculopathy who presents to the ED with c/o SOB, generalized weakness and fatigue for past few weeks.  Pt. states that since after he was discharged on  after Afib/Aflutter ablation, he has been feeling SOB on exertion, and has been feeling tired/weak and this is limiting his day to day activities. He also experienced pounding chest pain yesterday night at rest, that lasted for 1 min and resolved on it's own. He denied any chest pain after that. He denied any LE swelling, denied orthopnea but he prefers not to lie flat. He states he said that for the last 5 days he was skipping a few doses of diuretics due to "peeing too much". Denies HA, dizziness, NVD, fever, abdominal pain, change in urination and change in bowel habits. Pt. is being admitted to [facility] for NSTEMI and decompensated heart failure.  Telemetry: sinus bradycardia 50 bpm, no AF noted.     Vital Signs Last 24 Hrs  T(F): 98.2 (2024 04:32), Max: 98.2 (2024 04:32)  HR: 58 (2024 04:32) (50 - 58)  BP: 104/55 (2024 04:32) (104/55 - 105/55)  RR: 16 (2024 04:32) (16 - 16)  SpO2: 100% (2024 04:32) (90% - 100%) on room air      REVIEW OF SYSTEMS  [x] A ten-point review of systems was otherwise negative except as noted.  [ ] Due to altered mental status/intubation, subjective information were not able to be obtained from the patient. History was obtained, to the extent possible, from review of the chart and collateral sources of information.    PAST MEDICAL & SURGICAL HISTORY:  CKD (chronic kidney disease)  ESRD (end stage renal disease)  HTN (hypertension)  HLD (hyperlipidemia)  Atrial fibrillation  on eliquis  COPD, mild  not on O2  Peripheral neuropathy  unclear cause  Lymphoma  ?questionable, not treated, not followed  Melanoma  R eye, s/p laser  Cirrhosis  possibly related to hepC  Breast cancer  Legally blind  Stroke  History of kidney transplant  3 years ago  S/P arteriovenous (AV) fistula creation  prior old fistula in R arm  S/P lumpectomy, right breast  History of back surgery  s/p Left L5-S1 endoscopic laminoforaminotomy    Home Medications:  aspirin 81 mg oral tablet: 1 tab(s) orally once a day (15 Dec 2023 07:16)  calcitriol 0.5 mcg oral capsule: 1 cap(s) orally 2 times a day (15 Dec 2023 07:16)  Eliquis 5 mg oral tablet: 1 tab(s) orally 2 times a day (15 Dec 2023 07:16)  Flomax 0.4 mg oral capsule: 1 cap(s) orally 2 times a day (15 Dec 2023 07:16)  furosemide 40 mg oral tablet: 1 tab(s) orally once a day (15 Dec 2023 07:16)  metoprolol tartrate 100 mg oral tablet: 1 tab(s) orally once a day (2024 05:31)  mycophenolate mofetil 500 mg oral tablet: 2 tab(s) orally once a day (15 Dec 2023 07:16)  tacrolimus 1 mg oral capsule: 3 cap(s) orally once a day (in the morning) (18 Dec 2023 10:05)  tacrolimus 1 mg oral capsule: 2 cap(s) orally once a day (at bedtime) (18 Dec 2023 10:05)      Allergies:  Rituxan (Hives)  Rifuxen (Swelling)      FAMILY HISTORY:  FH: dementia  mother, alive    FHx: breast cancer  sister ( in 30s)        SOCIAL HISTORY:    CIGARETTES:  ALCOHOL:  MARIJUANA:  ILLICIT DRUGS:        PREVIOUS DIAGNOSTIC TESTING:      ECHO  FINDINGS:    STRESS  FINDINGS:    CATHETERIZATION  FINDINGS:    ELECTROPHYSIOLOGY STUDY  FINDINGS:    CAROTID ULTRASOUND:  FINDINGS    VENOUS DUPLEX SCAN:  FINDINGS:    CHEST CT PULMONARY ANGIO with IV Contrast:  FINDINGS:      MEDICATIONS  (STANDING):  aMIOdarone    Tablet 200 milliGRAM(s) Oral daily  apixaban 5 milliGRAM(s) Oral every 12 hours  aspirin  chewable 81 milliGRAM(s) Oral daily  furosemide   Injectable 40 milliGRAM(s) IV Push every 12 hours  mycophenolate mofetil 500 milliGRAM(s) Oral two times a day  pantoprazole    Tablet 40 milliGRAM(s) Oral before breakfast  tacrolimus 2 milliGRAM(s) Oral at bedtime  tacrolimus 3 milliGRAM(s) Oral <User Schedule>  tamsulosin 0.4 milliGRAM(s) Oral two times a day    MEDICATIONS  (PRN):  acetaminophen     Tablet .. 650 milliGRAM(s) Oral every 6 hours PRN Temp greater or equal to 38C (100.4F), Mild Pain (1 - 3)  albuterol    90 MICROgram(s) HFA Inhaler 2 Puff(s) Inhalation every 6 hours PRN Shortness of Breath and/or Wheezing  HYDROmorphone  Injectable 0.5 milliGRAM(s) IV Push every 6 hours PRN Severe Pain (7 - 10)  melatonin 3 milliGRAM(s) Oral at bedtime PRN Insomnia  oxyCODONE    IR 5 milliGRAM(s) Oral every 8 hours PRN Severe Pain (7 - 10)      Vital Signs Last 24 Hrs  T(C): 36.8 (2024 04:32), Max: 36.8 (2024 04:32)  T(F): 98.2 (2024 04:32), Max: 98.2 (2024 04:32)  HR: 49 (2024 06:05) (49 - 58)  BP: 111/58 (2024 06:05) (104/55 - 111/58)  BP(mean): --  RR: 16 (2024 04:32) (16 - 16)  SpO2: 100% (2024 04:32) (90% - 100%)    Parameters below as of 2024 04:32  Patient On (Oxygen Delivery Method): room air      PHYSICAL EXAM:  GENERAL: In no apparent distress, well nourished, and hydrated.  EYES: EOMI, PERRLA  NECK: Supple and normal thyroid.    HEART: Regular rate and rhythm  PULMONARY: Clear to auscultation and perfusion.  EXTREMITIES:  2+ Peripheral Pulses, No clubbing, cyanosis, or edema      INTERPRETATION OF TELEMETRY:  EC Lead ECG:   Ventricular Rate 49 BPM  Atrial Rate 49 BPM  P-R Interval 148 ms  QRS Duration 88 ms  Q-T Interval 516 ms  QTC Calculation(Bazett) 466 ms  P Axis 74 degrees  R Axis 69 degrees  T Axis 167 degrees  Diagnosis Line Sinus bradycardia  Left ventricular hypertrophy with repolarization abnormality  Cannot rule out Septal infarct , age undetermined  Abnormal ECG  Confirmed by LAURA FRANK MD [id number]) on 2024 7:10:34 AM (24 @ 05:08)    12 Lead ECG:   Ventricular Rate 50 BPM  Atrial Rate 50 BPM  P-R Interval 168 ms  QRS Duration 86 ms  Q-T Interval 512 ms  QTC Calculation(Bazett) 466 ms  P Axis 68 degrees  R Axis 77 degrees  T Axis -88 degrees  Diagnosis Line Sinus bradycardia  Minimal voltage criteria for LVH, may be normal variant ( Sokolow-Nathan )  Septal infarct (cited on or before 07-DEC-2023  Confirmed by LAURA FRANK MD ([id number]) on 2024 7:13:07 AM (24 @ 23:54)      LABS:                        10.2   4.27  )-----------( 110      ( 2024 12:47 )             32.2     -    135  |  100  |  50<H>  ----------------------------<  130<H>  4.0   |  19  |  2.2<H>    Ca    10.1      2024 12:47  Phos  3.4     -  Mg     1.8     -    TPro  5.7<L>  /  Alb  3.5  /  TBili  0.6  /  DBili  x   /  AST  10  /  ALT  <5  /  AlkPhos  53  -05        PT/INR - ( 2024 12:47 )   PT: 18.40 sec;   INR: 1.61 ratio         PTT - ( 2024 12:47 )  PTT:35.5 sec  Urinalysis Basic - ( 2024 12:47 )    Color: x / Appearance: x / SG: x / pH: x  Gluc: 130 mg/dL / Ketone: x  / Bili: x / Urobili: x   Blood: x / Protein: x / Nitrite: x   Leuk Esterase: x / RBC: x / WBC x   Sq Epi: x / Non Sq Epi: x / Bacteria: x      BNP      RADIOLOGY & ADDITIONAL STUDIES:  EXAM:  XR CHEST PORTABLE URGENT 1V   ORDERED BY:      PROCEDURE DATE:  2024    INTERPRETATION:  Clinical History / Reason for exam: Chest pain.  Comparison : Chest radiograph dated 2023.  Technique/Positioning: Portable frontal.    Findings:  Support devices: None.  Cardiac/mediastinum/hilum: Stable.  Lung parenchyma/Pleura: No acute pulmonary consolidation, pleural   effusion or pneumothorax.  Skeleton/soft tissues: Stable.    Impression:  No radiographic evidence of acute cardiopulmonary disease.       HPI:  69 year old male with PMHx pAfib/Aflutter(on Eliquis) s/p ablation on 12/15/23, CAD s/p PCI(LAD and RCA) for unstable angina in 2019, mild COPD not on home o2, CKD3, Hx of renal transplant 3 years ago, HEP C, decompensated liver cirrhosis(portal HTN, esophageal varices), Melanoma R eye s/p Laser, Breast cancer gynecomastia, HLD, HTN, lumbar Radiculopathy who presents to the ED with c/o SOB, generalized weakness and fatigue for past few weeks.  Pt. states that since after he was discharged on  after Afib/Aflutter ablation, he has been feeling SOB on exertion, and has been feeling tired/weak and this is limiting his day to day activities. He also experienced pounding chest pain yesterday night at rest, that lasted for 1 min and resolved on it's own. He denied any chest pain after that. He denied any LE swelling, denied orthopnea but he prefers not to lie flat. He states he said that for the last 5 days he was skipping a few doses of diuretics due to "peeing too much". Denies HA, dizziness, NVD, fever, abdominal pain, change in urination and change in bowel habits. Pt. is being admitted to [facility] for NSTEMI and decompensated heart failure.  Telemetry: sinus bradycardia 50 bpm, no AF noted.     Vital Signs Last 24 Hrs  T(F): 98.2 (2024 04:32), Max: 98.2 (2024 04:32)  HR: 58 (2024 04:32) (50 - 58)  BP: 104/55 (2024 04:32) (104/55 - 105/55)  RR: 16 (2024 04:32) (16 - 16)  SpO2: 100% (2024 04:32) (90% - 100%) on room air      REVIEW OF SYSTEMS  [x] A ten-point review of systems was otherwise negative except as noted.  [ ] Due to altered mental status/intubation, subjective information were not able to be obtained from the patient. History was obtained, to the extent possible, from review of the chart and collateral sources of information.    PAST MEDICAL & SURGICAL HISTORY:  CKD (chronic kidney disease)  ESRD (end stage renal disease)  HTN (hypertension)  HLD (hyperlipidemia)  Atrial fibrillation  on eliquis  COPD, mild  not on O2  Peripheral neuropathy  unclear cause  Lymphoma  ?questionable, not treated, not followed  Melanoma  R eye, s/p laser  Cirrhosis  possibly related to hepC  Breast cancer  Legally blind  Stroke  History of kidney transplant  3 years ago  S/P arteriovenous (AV) fistula creation  prior old fistula in R arm  S/P lumpectomy, right breast  History of back surgery  s/p Left L5-S1 endoscopic laminoforaminotomy    Home Medications:  aspirin 81 mg oral tablet: 1 tab(s) orally once a day (15 Dec 2023 07:16)  calcitriol 0.5 mcg oral capsule: 1 cap(s) orally 2 times a day (15 Dec 2023 07:16)  Eliquis 5 mg oral tablet: 1 tab(s) orally 2 times a day (15 Dec 2023 07:16)  Flomax 0.4 mg oral capsule: 1 cap(s) orally 2 times a day (15 Dec 2023 07:16)  furosemide 40 mg oral tablet: 1 tab(s) orally once a day (15 Dec 2023 07:16)  metoprolol tartrate 100 mg oral tablet: 1 tab(s) orally once a day (2024 05:31)  mycophenolate mofetil 500 mg oral tablet: 2 tab(s) orally once a day (15 Dec 2023 07:16)  tacrolimus 1 mg oral capsule: 3 cap(s) orally once a day (in the morning) (18 Dec 2023 10:05)  tacrolimus 1 mg oral capsule: 2 cap(s) orally once a day (at bedtime) (18 Dec 2023 10:05)      Allergies:  Rituxan (Hives)  Rifuxen (Swelling)      FAMILY HISTORY:  FH: dementia  mother, alive    FHx: breast cancer  sister ( in 30s)        SOCIAL HISTORY:    CIGARETTES:  ALCOHOL:  MARIJUANA:  ILLICIT DRUGS:        PREVIOUS DIAGNOSTIC TESTING:      ECHO  FINDINGS:    STRESS  FINDINGS:    CATHETERIZATION  FINDINGS:    ELECTROPHYSIOLOGY STUDY  FINDINGS:    CAROTID ULTRASOUND:  FINDINGS    VENOUS DUPLEX SCAN:  FINDINGS:    CHEST CT PULMONARY ANGIO with IV Contrast:  FINDINGS:      MEDICATIONS  (STANDING):  aMIOdarone    Tablet 200 milliGRAM(s) Oral daily  apixaban 5 milliGRAM(s) Oral every 12 hours  aspirin  chewable 81 milliGRAM(s) Oral daily  furosemide   Injectable 40 milliGRAM(s) IV Push every 12 hours  mycophenolate mofetil 500 milliGRAM(s) Oral two times a day  pantoprazole    Tablet 40 milliGRAM(s) Oral before breakfast  tacrolimus 2 milliGRAM(s) Oral at bedtime  tacrolimus 3 milliGRAM(s) Oral <User Schedule>  tamsulosin 0.4 milliGRAM(s) Oral two times a day    MEDICATIONS  (PRN):  acetaminophen     Tablet .. 650 milliGRAM(s) Oral every 6 hours PRN Temp greater or equal to 38C (100.4F), Mild Pain (1 - 3)  albuterol    90 MICROgram(s) HFA Inhaler 2 Puff(s) Inhalation every 6 hours PRN Shortness of Breath and/or Wheezing  HYDROmorphone  Injectable 0.5 milliGRAM(s) IV Push every 6 hours PRN Severe Pain (7 - 10)  melatonin 3 milliGRAM(s) Oral at bedtime PRN Insomnia  oxyCODONE    IR 5 milliGRAM(s) Oral every 8 hours PRN Severe Pain (7 - 10)      Vital Signs Last 24 Hrs  T(C): 36.8 (2024 04:32), Max: 36.8 (2024 04:32)  T(F): 98.2 (2024 04:32), Max: 98.2 (2024 04:32)  HR: 49 (2024 06:05) (49 - 58)  BP: 111/58 (2024 06:05) (104/55 - 111/58)  BP(mean): --  RR: 16 (2024 04:32) (16 - 16)  SpO2: 100% (2024 04:32) (90% - 100%)    Parameters below as of 2024 04:32  Patient On (Oxygen Delivery Method): room air      PHYSICAL EXAM:  GENERAL: In no apparent distress, well nourished, and hydrated.  EYES: EOMI, PERRLA  NECK: Supple and normal thyroid.    HEART: Regular rate and rhythm  PULMONARY: Clear to auscultation and perfusion.  EXTREMITIES:  2+ Peripheral Pulses, No clubbing, cyanosis, or edema      INTERPRETATION OF TELEMETRY:  EC Lead ECG:   Ventricular Rate 49 BPM  Atrial Rate 49 BPM  P-R Interval 148 ms  QRS Duration 88 ms  Q-T Interval 516 ms  QTC Calculation(Bazett) 466 ms  P Axis 74 degrees  R Axis 69 degrees  T Axis 167 degrees  Diagnosis Line Sinus bradycardia  Left ventricular hypertrophy with repolarization abnormality  Cannot rule out Septal infarct , age undetermined  Abnormal ECG  Confirmed by LAURA FRANK MD ([id number]) on 2024 7:10:34 AM (24 @ 05:08)    12 Lead ECG:   Ventricular Rate 50 BPM  Atrial Rate 50 BPM  P-R Interval 168 ms  QRS Duration 86 ms  Q-T Interval 512 ms  QTC Calculation(Bazett) 466 ms  P Axis 68 degrees  R Axis 77 degrees  T Axis -88 degrees  Diagnosis Line Sinus bradycardia  Minimal voltage criteria for LVH, may be normal variant ( Sokolow-Nathan )  Septal infarct (cited on or before 07-DEC-2023  Confirmed by LAURA FRANK MD [id number]) on 2024 7:13:07 AM (24 @ 23:54)      LABS:                        10.2   4.27  )-----------( 110      ( 2024 12:47 )             32.2     -    135  |  100  |  50<H>  ----------------------------<  130<H>  4.0   |  19  |  2.2<H>    Ca    10.1      2024 12:47  Phos  3.4     -  Mg     1.8     -    TPro  5.7<L>  /  Alb  3.5  /  TBili  0.6  /  DBili  x   /  AST  10  /  ALT  <5  /  AlkPhos  53  -05        PT/INR - ( 2024 12:47 )   PT: 18.40 sec;   INR: 1.61 ratio         PTT - ( 2024 12:47 )  PTT:35.5 sec  Urinalysis Basic - ( 2024 12:47 )    Color: x / Appearance: x / SG: x / pH: x  Gluc: 130 mg/dL / Ketone: x  / Bili: x / Urobili: x   Blood: x / Protein: x / Nitrite: x   Leuk Esterase: x / RBC: x / WBC x   Sq Epi: x / Non Sq Epi: x / Bacteria: x      BNP      RADIOLOGY & ADDITIONAL STUDIES:  EXAM:  XR CHEST PORTABLE URGENT 1V   ORDERED BY:      PROCEDURE DATE:  2024    INTERPRETATION:  Clinical History / Reason for exam: Chest pain.  Comparison : Chest radiograph dated 2023.  Technique/Positioning: Portable frontal.    Findings:  Support devices: None.  Cardiac/mediastinum/hilum: Stable.  Lung parenchyma/Pleura: No acute pulmonary consolidation, pleural   effusion or pneumothorax.  Skeleton/soft tissues: Stable.    Impression:  No radiographic evidence of acute cardiopulmonary disease.

## 2024-01-05 NOTE — ED PROVIDER NOTE - OBJECTIVE STATEMENT
Pt is 68 year-old male w PMHx of persistent AFib on Eliquis, ESRD s/p renal transplant, HTN, Lymphoma, breast cancer, who presents to the ED with chief complaint of hypotension. Pt states that since discharge from the hospital on December 19th for ablation of afib was discharged on amiodarone and increase dosage of metoprolol which he states has made him feel lightheaded and generalized weakness. Pt endorses chest discomfort and SOB earlier today prior to arrival. Denies any current fever, chills, nausea, abdominal pain vomiting, diarrhea, dysuria hematuria.

## 2024-01-05 NOTE — H&P ADULT - ASSESSMENT
69 year old male with PMHx pAfib/Aflutter(on Eliquis) s/p ablation on 12/15/23, CAD s/p PCI(LAD and RCA) for unstable angina in 2019, mild COPD not on home o2, CKD3, Hx of renal transplant 3 years ago, HEP C, decompensated liver cirrhosis(portal HTN, esophageal varices), Melanoma R eye s/p Laser, Breast cancer gynecomastia, HLD, HTN, lumbar Radiculopathy who presents to the ED with c/o SOB, generalized weakness and fatigue for past few weeks.    #Decompensated heart failure  #NSTEMI type 1 vs 2  #Sinus bradycardia  #Hx of CAD s/p PCI(LAD) and staged PCI(RCA) in 2019 for unstable angina  #Paroxysmal Afib/Aflutter s/p ablation 12/15/23>> Aflutter HR 170s after ablation>>then back to sinus 12/16  - Currently hemodynamically stable, asymptomatic, on room air  - BNP 23K, JVD, CXR congested  - Trop 95, Trend trop  - EKG sinus keyonna HR 52, LVH, diffuse T wave inversion, repeat EKG  - Admit to tele  - c/w ASA, Eliquis, Amio  - Hold Toprol 100mg for now  - Give stat IV Lasix 40mg x1, c/w IV Lasix 40mg BID  - Strict Is and Os, fluid restriction  - Pt. not on statin due to ?Cirrhosis  - Last TTE in 2019 normal, Will get repeat TTE  - Check TSH  - Cardio consult(Dr. Low), EP consult(Dr. Henderson)    #SANTA on CKD- likely 2/2 CHF  #Hx of renal transplant 3 yrs ago complicated by ESRD was on HD in the past  - Monitor Cr  - c/w Tacrolimus and MMF  - Nephro consult  - F/u RBUS    #HTN/HLD  - c/w home meds    #Hep C  #Decompensated Liver cirrhosis(pHTN, eso varices)  - Will check Ammonia levels  - Abd US  - OP Hepatology f/u        #Misc  -DVT prophylaxis: Eliquis  -GI prophylaxis: PPI  -Diet: DASH/renal  -Activity: AAT  -Dispo: Tele    -Med rec confirmed with the patient.       69 year old male with PMHx pAfib/Aflutter(on Eliquis) s/p ablation on 12/15/23, CAD s/p PCI(LAD and RCA) for unstable angina in 2019, mild COPD not on home o2, CKD3, Hx of renal transplant 3 years ago, HEP C, decompensated liver cirrhosis(portal HTN, esophageal varices), Melanoma R eye s/p Laser, Breast cancer gynecomastia, HLD, HTN, lumbar Radiculopathy who presents to the ED with c/o SOB, generalized weakness and fatigue for past few weeks.    #Decompensated heart failure  #NSTEMI type 1 vs 2  #?Symptomatic sinus bradycardia  #Hx of CAD s/p PCI(LAD) and staged PCI(RCA) in 2019 for unstable angina  #Paroxysmal Afib/Aflutter s/p ablation 12/15/23>> Aflutter HR 170s after ablation>>then back to sinus 12/16  - Currently hemodynamically stable, asymptomatic, on room air  - BNP 23K, JVD, CXR congested  - Trop 95, Trend trop  - EKG sinus keyonna HR 52, LVH, diffuse T wave inversion, repeat EKG  - Admit to tele  - c/w ASA, Eliquis, Amio  - Hold Toprol 100mg for now  - Give stat IV Lasix 40mg x1, c/w IV Lasix 40mg BID  - Strict Is and Os, fluid restriction  - Pt. not on statin due to ?Cirrhosis  - Last TTE in 2019 normal, Will get repeat TTE  - Check TSH  - Cardio consult(Dr. Low), EP consult(Dr. Henderson)    #SANTA on CKD- likely 2/2 CHF  #Hx of renal transplant 3 yrs ago complicated by ESRD was on HD in the past  #BPH  - Monitor Cr  - c/w Tacrolimus and MMF  - c/w Flomax  - Nephro consult  - F/u RBUS    #HTN/HLD  - c/w home meds    #Hep C  #Decompensated Liver cirrhosis(pHTN, eso varices)  - Will check Ammonia levels  - Abd US  - OP Hepatology f/u        #Misc  -DVT prophylaxis: Eliquis  -GI prophylaxis: PPI  -Diet: DASH/renal  -Activity: AAT  -Dispo: Tele    -Med rec confirmed with the patient.       69 year old male with PMHx pAfib/Aflutter(on Eliquis) s/p ablation on 12/15/23, CAD s/p PCI(LAD and RCA) for unstable angina in 2019, mild COPD not on home o2, CKD3, Hx of renal transplant 3 years ago, HEP C, decompensated liver cirrhosis(portal HTN, esophageal varices), Melanoma R eye s/p Laser, Breast cancer gynecomastia, HLD, HTN, lumbar Radiculopathy who presents to the ED with c/o SOB, generalized weakness and fatigue for past few weeks.    #Decompensated heart failure  #NSTEMI   #?Symptomatic sinus bradycardia  #Hx of CAD s/p PCI(LAD) and staged PCI(RCA) in 2019 for unstable angina  #Paroxysmal Afib/Aflutter s/p ablation 12/15/23>> Aflutter HR 170s after ablation>>then back to sinus 12/16  #HTN/HLD  - Currently hemodynamically stable, asymptomatic, on room air  - BNP 23K, JVD, CXR congested  - Trop 95, Trend trop  - EKG sinus keyonna HR 52, LVH, diffuse T wave inversion, repeat EKG  - Admit to tele  - c/w ASA, Eliquis 5mg BID, Amio 200mg qd  - Hold Toprol 100mg for now  - Give stat IV Lasix 40mg x1, c/w IV Lasix 40mg BID  - Check Lactate  - Strict Is and Os, fluid restriction  - Pt. not on statin due to ?Cirrhosis  - Last TTE in 2019 normal, Will get repeat TTE  - Check TSH  - Cardio consult(Dr. Low), EP consult(Dr. Henderson)    #SANTA on CKD- likely 2/2 CHF  #Hx of renal transplant 3 yrs ago complicated by ESRD was on HD few months ago  #BPH  - Monitor Cr, urine OP  - c/w Tacrolimus and MMF  - c/w Flomax  - Nephro consult  - F/u RBUS    #Hx of mild COPD, Former smoker(quit yrs ago)  - Not on any inhalers at home  - No wheezing on exam  - c/w albuterol prn    #Hep C  #Decompensated Liver cirrhosis(pHTN, eso varices)  - Will check Ammonia levels  - Abd US  - OP Hepatology f/u    #Lumbar radiculopathy, chronic back pain  - c/w prn Oxycodone      #Misc  -DVT prophylaxis: Eliquis  -GI prophylaxis: PPI  -Diet: DASH/renal  -Activity: AAT  -Dispo: Tele    -Med rec confirmed with the patient.

## 2024-01-05 NOTE — CONSULT NOTE ADULT - ASSESSMENT
Status post  donor Kidney transplant at [facility] 6 years ago  SANTA, uncertain etiology ?prerenal azotemia  Dyspnea on exertion: CXR clear, no edema  Pancytopenia  CAD  Atrial fibrillation  HTN  Cirrhosis    Plan    Patient received IV fluids in ER and now on IV Lasix  No more IV fluids, hold Lasix and monitor patient clinically  Case D/W [facility] team, they do not want patient transferred to their facility at this time  Continue tacrolimus  Continue mycophenolate  Check tacrolimus level  Will follow with you   Status post  donor Kidney transplant at [facility] 6 years ago  SANTA, uncertain etiology ?prerenal azotemia  Dyspnea on exertion: CXR clear, no edema  Pancytopenia  CAD  Atrial fibrillation  HTN  Cirrhosis    Plan    Patient received IV fluids in ER and now on IV Lasix  No more IV fluids, hold Lasix and monitor patient clinically  Case D/W [facility] team, they do not want patient transferred to their facility at this time  Continue tacrolimus  Continue mycophenolate  Check tacrolimus level  Will follow with you

## 2024-01-05 NOTE — CONSULT NOTE ADULT - SUBJECTIVE AND OBJECTIVE BOX
[facility] INITIAL CONSULT NOTE  --------------------------------------------------------------------------------  HPI:  69 year old male with PMHx pAfib/Aflutter(on Eliquis) s/p ablation on 12/15/23, CAD s/p PCI(LAD and RCA) for unstable angina in 2019, mild COPD not on home o2, CKD3, Hx of renal transplant 3 years ago, HEP C, decompensated liver cirrhosis(portal HTN, esophageal varices), Melanoma R eye s/p Laser, Breast cancer gynecomastia, HLD, HTN, lumbar Radiculopathy who presents to the ED with c/o SOB, generalized weakness and fatigue for past few weeks.    Pt. states that since after he was discharged on  after Afib/Aflutter ablation, he has been feeling SOB on exertion, and has been feeling tired/weak and this is limiting his day to day activities. He also experienced pounding chest pain yesterday night at rest, that lasted for 1 min and resolved on it's own. He denied any chest pain after that. He denied any LE swelling, denied orthopnea but he prefers not to lie flat. He states he has been taking his meds as prescribed. Denies HA, dizziness, NVD, fever, abdominal pain, change in urination and change in bowel habits.    Pt. received 1L bolus in ED.    PAST HISTORY  --------------------------------------------------------------------------------  PAST MEDICAL & SURGICAL HISTORY:  ESRD (end stage renal disease)  HTN (hypertension)  HLD (hyperlipidemia)  Atrial fibrillation on eliquis  COPD, mild not on O2  Peripheral neuropathy  Lymphoma ?questionable, not treated, not followed  Melanoma R eye, s/p laser  Cirrhosis possibly related to hepC  Breast cancer  Legally blind  Stroke  History of kidney transplant 6 years ago  S/P arteriovenous (AV) fistula creation prior old fistula in R arm  S/P lumpectomy, right breast  History of back surgery  s/p Left L5-S1 endoscopic laminoforaminotomy    FAMILY HISTORY:  FH: dementia  mother, alive  FHx: breast cancer  sister ( in 30s)    SOCIAL HISTORY:  Denies current ETOH, tobacco, and illicit drug use    ALLERGIES & MEDICATIONS  --------------------------------------------------------------------------------  Allergies    Rituxan (Hives)  Rifuxen (Swelling)    Standing Inpatient Medications  aMIOdarone    Tablet 200 milliGRAM(s) Oral daily  apixaban 5 milliGRAM(s) Oral every 12 hours  aspirin  chewable 81 milliGRAM(s) Oral daily  furosemide   Injectable 40 milliGRAM(s) IV Push every 12 hours  mycophenolate mofetil 500 milliGRAM(s) Oral two times a day  pantoprazole    Tablet 40 milliGRAM(s) Oral before breakfast  tacrolimus 2 milliGRAM(s) Oral at bedtime  tacrolimus 3 milliGRAM(s) Oral <User Schedule>  tamsulosin 0.4 milliGRAM(s) Oral two times a day    PRN Inpatient Medications  acetaminophen     Tablet .. 650 milliGRAM(s) Oral every 6 hours PRN  albuterol    90 MICROgram(s) HFA Inhaler 2 Puff(s) Inhalation every 6 hours PRN  HYDROmorphone  Injectable 0.5 milliGRAM(s) IV Push every 6 hours PRN  melatonin 3 milliGRAM(s) Oral at bedtime PRN  oxyCODONE    IR 5 milliGRAM(s) Oral every 8 hours PRN    REVIEW OF SYSTEMS  --------------------------------------------------------------------------------  Gen: No fevers/chills  Skin: No rashes  Head/Eyes/Ears/Mouth: No headache; No sinus pain/discomfort, sore throat  Respiratory: No dyspnea, cough, wheezing, hemoptysis  CV: No chest pain, PND, orthopnea  GI: No abdominal pain, diarrhea, constipation, nausea, vomiting, melena, hematochezia  : No increased frequency, dysuria, hematuria, nocturia  All other systems were reviewed and are negative, except as noted.    VITALS/PHYSICAL EXAM  --------------------------------------------------------------------------------  T(C): 36.8 (24 @ 04:32), Max: 36.8 (24 @ 04:32)  HR: 49 (24 @ 06:05) (49 - 58)  BP: 111/58 (24 @ 06:05) (104/55 - 111/58)  RR: 16 (24 04:32) (16 - 16)  SpO2: 100% (24 @ 04:32) (90% - 100%)  Height (cm): 175.3 (24 @ 19:10)  Weight (kg): 60.3 (24 @ 19:10)  BMI (kg/m2): 19.6 (24 @ 19:10)  BSA (m2): 1.74 (24 @ 19:10)    24 @ 07:01  -  24 @ 15:23  --------------------------------------------------------  IN: 0 mL / OUT: 300 mL / NET: -300 mL    Physical Exam:  	Gen: NAD, laying flat in bed  	Pulm: CTA B/L  	CV: RRR, S1S2  	Back: No CVA tenderness; no sacral edema  	Abd: +BS, soft, nontender/nondistended  	: No suprapubic tenderness  	UE: Warm, no asterixis  	LE: Warm, no edema  	Neuro: AAO x3    LABS/STUDIES  --------------------------------------------------------------------------------              10.2   4.27  >-----------<  110      [24 12:47]              32.2     135  |  100  |  50  ----------------------------<  130      [24:47]  4.0   |  19  |  2.2        Ca     10.1     [24:47]      Mg     1.8     [24:47]      Phos  3.4     [24:47]    TPro  5.7  /  Alb  3.5  /  TBili  0.6  /  DBili  x   /  AST  10  /  ALT  <5  /  AlkPhos  53  [24 12:47]    PT/INR: PT 18.40, INR 1.61       [24 12:47]  PTT: 35.5       [24 12:47]    Creatinine Trend:  SCr 2.2 [:47]  SCr 2.3 [ 23:43]  SCr 1.3 [ 05:50]  SCr 1.7 [12-15 @ 07:12]  SCr 1.5 [ 15:16]    Urinalysis - [24:47]      Color  / Appearance  / SG  / pH       Gluc 130 / Ketone   / Bili  / Urobili        Blood  / Protein  / Leuk Est  / Nitrite       RBC  / WBC  / Hyaline  / Gran  / Sq Epi  / Non Sq Epi  / Bacteria     HbA1c 5.1      [19 @ 15:00]  TSH 0.49      [12-18-23 @ 08:36]    HBsAg Nonreact      [22 @ 04:30]  HCV 11.34, Reactive      [22 @ 04:30]  HIV Nonreact      [22 @ 04:30]    Immunofixation Serum:   IgM Kappa Band Identified    Reference Range: None Detected      [22 @ 04:30]  SPEP Interpretation: Gamma-Migrating Paraprotein Identified      [22 @ 04:30]  Immunofixation Urine: Reference Range: None Detected      [22 @ 12:58]  UPEP Interpretation: Normal Electrophoresis Pattern      [22 @ 12:58]  Cryoglobulin: Positive      [22 @ 04:30] [facility] INITIAL CONSULT NOTE  --------------------------------------------------------------------------------  HPI:  69 year old male with PMHx pAfib/Aflutter(on Eliquis) s/p ablation on 12/15/23, CAD s/p PCI(LAD and RCA) for unstable angina in 2019, mild COPD not on home o2, CKD3, Hx of renal transplant 3 years ago, HEP C, decompensated liver cirrhosis(portal HTN, esophageal varices), Melanoma R eye s/p Laser, Breast cancer gynecomastia, HLD, HTN, lumbar Radiculopathy who presents to the ED with c/o SOB, generalized weakness and fatigue for past few weeks.    Pt. states that since after he was discharged on  after Afib/Aflutter ablation, he has been feeling SOB on exertion, and has been feeling tired/weak and this is limiting his day to day activities. He also experienced pounding chest pain yesterday night at rest, that lasted for 1 min and resolved on it's own. He denied any chest pain after that. He denied any LE swelling, denied orthopnea but he prefers not to lie flat. He states he has been taking his meds as prescribed. Denies HA, dizziness, NVD, fever, abdominal pain, change in urination and change in bowel habits.    Pt. received 1L bolus in ED.    PAST HISTORY  --------------------------------------------------------------------------------  PAST MEDICAL & SURGICAL HISTORY:  ESRD (end stage renal disease)  HTN (hypertension)  HLD (hyperlipidemia)  Atrial fibrillation on eliquis  COPD, mild not on O2  Peripheral neuropathy  Lymphoma ?questionable, not treated, not followed  Melanoma R eye, s/p laser  Cirrhosis possibly related to hepC  Breast cancer  Legally blind  Stroke  History of kidney transplant 6 years ago  S/P arteriovenous (AV) fistula creation prior old fistula in R arm  S/P lumpectomy, right breast  History of back surgery  s/p Left L5-S1 endoscopic laminoforaminotomy    FAMILY HISTORY:  FH: dementia  mother, alive  FHx: breast cancer  sister ( in 30s)    SOCIAL HISTORY:  Denies current ETOH, tobacco, and illicit drug use    ALLERGIES & MEDICATIONS  --------------------------------------------------------------------------------  Allergies    Rituxan (Hives)  Rifuxen (Swelling)    Standing Inpatient Medications  aMIOdarone    Tablet 200 milliGRAM(s) Oral daily  apixaban 5 milliGRAM(s) Oral every 12 hours  aspirin  chewable 81 milliGRAM(s) Oral daily  furosemide   Injectable 40 milliGRAM(s) IV Push every 12 hours  mycophenolate mofetil 500 milliGRAM(s) Oral two times a day  pantoprazole    Tablet 40 milliGRAM(s) Oral before breakfast  tacrolimus 2 milliGRAM(s) Oral at bedtime  tacrolimus 3 milliGRAM(s) Oral <User Schedule>  tamsulosin 0.4 milliGRAM(s) Oral two times a day    PRN Inpatient Medications  acetaminophen     Tablet .. 650 milliGRAM(s) Oral every 6 hours PRN  albuterol    90 MICROgram(s) HFA Inhaler 2 Puff(s) Inhalation every 6 hours PRN  HYDROmorphone  Injectable 0.5 milliGRAM(s) IV Push every 6 hours PRN  melatonin 3 milliGRAM(s) Oral at bedtime PRN  oxyCODONE    IR 5 milliGRAM(s) Oral every 8 hours PRN    REVIEW OF SYSTEMS  --------------------------------------------------------------------------------  Gen: No fevers/chills  Skin: No rashes  Head/Eyes/Ears/Mouth: No headache; No sinus pain/discomfort, sore throat  Respiratory: No dyspnea, cough, wheezing, hemoptysis  CV: No chest pain, PND, orthopnea  GI: No abdominal pain, diarrhea, constipation, nausea, vomiting, melena, hematochezia  : No increased frequency, dysuria, hematuria, nocturia  All other systems were reviewed and are negative, except as noted.    VITALS/PHYSICAL EXAM  --------------------------------------------------------------------------------  T(C): 36.8 (24 @ 04:32), Max: 36.8 (24 @ 04:32)  HR: 49 (24 @ 06:05) (49 - 58)  BP: 111/58 (24 @ 06:05) (104/55 - 111/58)  RR: 16 (24 04:32) (16 - 16)  SpO2: 100% (24 @ 04:32) (90% - 100%)  Height (cm): 175.3 (24 @ 19:10)  Weight (kg): 60.3 (24 @ 19:10)  BMI (kg/m2): 19.6 (24 @ 19:10)  BSA (m2): 1.74 (24 @ 19:10)    24 @ 07:01  -  24 @ 15:23  --------------------------------------------------------  IN: 0 mL / OUT: 300 mL / NET: -300 mL    Physical Exam:  	Gen: NAD, laying flat in bed  	Pulm: CTA B/L  	CV: RRR, S1S2  	Back: No CVA tenderness; no sacral edema  	Abd: +BS, soft, nontender/nondistended  	: No suprapubic tenderness  	UE: Warm, no asterixis  	LE: Warm, no edema  	Neuro: AAO x3    LABS/STUDIES  --------------------------------------------------------------------------------              10.2   4.27  >-----------<  110      [24 12:47]              32.2     135  |  100  |  50  ----------------------------<  130      [24:47]  4.0   |  19  |  2.2        Ca     10.1     [24:47]      Mg     1.8     [24:47]      Phos  3.4     [24:47]    TPro  5.7  /  Alb  3.5  /  TBili  0.6  /  DBili  x   /  AST  10  /  ALT  <5  /  AlkPhos  53  [24 12:47]    PT/INR: PT 18.40, INR 1.61       [24 12:47]  PTT: 35.5       [24 12:47]    Creatinine Trend:  SCr 2.2 [:47]  SCr 2.3 [ 23:43]  SCr 1.3 [ 05:50]  SCr 1.7 [12-15 @ 07:12]  SCr 1.5 [ 15:16]    Urinalysis - [24:47]      Color  / Appearance  / SG  / pH       Gluc 130 / Ketone   / Bili  / Urobili        Blood  / Protein  / Leuk Est  / Nitrite       RBC  / WBC  / Hyaline  / Gran  / Sq Epi  / Non Sq Epi  / Bacteria     HbA1c 5.1      [19 @ 15:00]  TSH 0.49      [12-18-23 @ 08:36]    HBsAg Nonreact      [22 @ 04:30]  HCV 11.34, Reactive      [22 @ 04:30]  HIV Nonreact      [22 @ 04:30]    Immunofixation Serum:   IgM Kappa Band Identified    Reference Range: None Detected      [22 @ 04:30]  SPEP Interpretation: Gamma-Migrating Paraprotein Identified      [22 @ 04:30]  Immunofixation Urine: Reference Range: None Detected      [22 @ 12:58]  UPEP Interpretation: Normal Electrophoresis Pattern      [22 @ 12:58]  Cryoglobulin: Positive      [22 @ 04:30] other

## 2024-01-05 NOTE — ED PROVIDER NOTE - PHYSICAL EXAMINATION
CONSTITUTIONAL: NAD, cachetic appearing   SKIN: Warm dry  HEAD: NCAT  ENT: MMM  NECK: Supple; non tender.  CARD: RRR  RESP: CTAB  ABD: S/NT no R/G  BACK: nontender  EXT: no pedal edema  NEURO: Grossly unremarkable  PSYCH: Cooperative, appropriate.

## 2024-01-05 NOTE — H&P ADULT - ATTENDING COMMENTS
***My note supersedes any discrepancies that may be above in the resident's note***    69 year old male with PMHx pAfib/Aflutter(on Eliquis) s/p ablation on 12/15/23, CAD s/p PCI(LAD and RCA) for unstable angina in 2019, mild COPD not on home o2, CKD3, Hx of renal transplant 3 years ago, HEP C, decompensated liver cirrhosis(portal HTN, esophageal varices), Melanoma R eye s/p Laser, Breast cancer gynecomastia, HLD, HTN, lumbar Radiculopathy who presents to the ED with c/o SOB, generalized weakness and fatigue for past few weeks.    #Newly diagnosed Decompensated heart failure  #NSTEMI   #?Symptomatic sinus bradycardia  #Hx of CAD s/p PCI(LAD) and staged PCI(RCA) in 2019 for unstable angina  #Paroxysmal Afib/Aflutter s/p ablation 12/15/23>> Aflutter HR 170s after ablation>>then back to sinus 12/16  #HTN/HLD  - Currently hemodynamically stable, asymptomatic, on room air  - BNP 23K, JVD, CXR congested  - Trop 95, Trend trop  - EKG sinus keyonna HR 52, LVH, diffuse T wave inversion, repeat EKG  - Admit to tele  - c/w ASA, Eliquis 5mg BID, Amio 200mg qd  - Hold Toprol 100mg for now  - s/p IV Lasix 40mg x1  - c/w IV Lasix 40mg BID  - Strict Is and Os, fluid restriction  - Last TTE in 2019 normal, Will get repeat TTE  - Check TSH  - Cardio consult(Dr. Low), EP consult(Dr. Henderson)    #SANTA on CKD  #Hx of renal transplant 3 yrs ago complicated by ESRD was on HD few months ago  #BPH  - suspect pre-renal etiology-->cardiorenal syndrome  - diurese as above  - Monitor Cr, urine OP  - get tacrolimus level  - c/w Tacrolimus and MMF  - c/w Flomax  - Nephro consult  - F/u RBUS    #Hx of mild COPD, Former smoker(quit yrs ago)  - Not on any inhalers at home  - No wheezing on exam  - c/w albuterol prn    #Hep C  # Liver cirrhosis(pHTN, eso varices)  - not acutely decompensated: no ascites, no variceal bleeding, no encephalopathy  - OP Hepatology f/u    #Lumbar radiculopathy, chronic back pain  - stopped oxycodone given renal insufficiency and liver dz  - will give IV dilaudid 0.5mg q4hrs PRN severe pain  - chronic pain c/s      #Misc  -DVT prophylaxis: Eliquis  -GI prophylaxis: PPI  -Diet: DASH/renal  -Activity: AAT  -Dispo: Tele    #Progress Note Handoff  Pending (specify):  TTE, Cardiology c/s, Neph c/s, Pain management  Family discussion: updated  Disposition:  Unknown at this time________

## 2024-01-06 LAB
ALBUMIN SERPL ELPH-MCNC: 3.4 G/DL — LOW (ref 3.5–5.2)
ALBUMIN SERPL ELPH-MCNC: 3.4 G/DL — LOW (ref 3.5–5.2)
ALP SERPL-CCNC: 50 U/L — SIGNIFICANT CHANGE UP (ref 30–115)
ALP SERPL-CCNC: 50 U/L — SIGNIFICANT CHANGE UP (ref 30–115)
ALT FLD-CCNC: <5 U/L — SIGNIFICANT CHANGE UP (ref 0–41)
ALT FLD-CCNC: <5 U/L — SIGNIFICANT CHANGE UP (ref 0–41)
ANION GAP SERPL CALC-SCNC: 12 MMOL/L — SIGNIFICANT CHANGE UP (ref 7–14)
ANION GAP SERPL CALC-SCNC: 12 MMOL/L — SIGNIFICANT CHANGE UP (ref 7–14)
AST SERPL-CCNC: 10 U/L — SIGNIFICANT CHANGE UP (ref 0–41)
AST SERPL-CCNC: 10 U/L — SIGNIFICANT CHANGE UP (ref 0–41)
BASOPHILS # BLD AUTO: 0.01 K/UL — SIGNIFICANT CHANGE UP (ref 0–0.2)
BASOPHILS # BLD AUTO: 0.01 K/UL — SIGNIFICANT CHANGE UP (ref 0–0.2)
BASOPHILS NFR BLD AUTO: 0.3 % — SIGNIFICANT CHANGE UP (ref 0–1)
BASOPHILS NFR BLD AUTO: 0.3 % — SIGNIFICANT CHANGE UP (ref 0–1)
BILIRUB SERPL-MCNC: 0.7 MG/DL — SIGNIFICANT CHANGE UP (ref 0.2–1.2)
BILIRUB SERPL-MCNC: 0.7 MG/DL — SIGNIFICANT CHANGE UP (ref 0.2–1.2)
BUN SERPL-MCNC: 42 MG/DL — HIGH (ref 10–20)
BUN SERPL-MCNC: 42 MG/DL — HIGH (ref 10–20)
CALCIUM SERPL-MCNC: 9.8 MG/DL — SIGNIFICANT CHANGE UP (ref 8.4–10.5)
CALCIUM SERPL-MCNC: 9.8 MG/DL — SIGNIFICANT CHANGE UP (ref 8.4–10.5)
CHLORIDE SERPL-SCNC: 101 MMOL/L — SIGNIFICANT CHANGE UP (ref 98–110)
CHLORIDE SERPL-SCNC: 101 MMOL/L — SIGNIFICANT CHANGE UP (ref 98–110)
CO2 SERPL-SCNC: 23 MMOL/L — SIGNIFICANT CHANGE UP (ref 17–32)
CO2 SERPL-SCNC: 23 MMOL/L — SIGNIFICANT CHANGE UP (ref 17–32)
CREAT SERPL-MCNC: 2.1 MG/DL — HIGH (ref 0.7–1.5)
CREAT SERPL-MCNC: 2.1 MG/DL — HIGH (ref 0.7–1.5)
EGFR: 34 ML/MIN/1.73M2 — LOW
EGFR: 34 ML/MIN/1.73M2 — LOW
EOSINOPHIL # BLD AUTO: 0.01 K/UL — SIGNIFICANT CHANGE UP (ref 0–0.7)
EOSINOPHIL # BLD AUTO: 0.01 K/UL — SIGNIFICANT CHANGE UP (ref 0–0.7)
EOSINOPHIL NFR BLD AUTO: 0.3 % — SIGNIFICANT CHANGE UP (ref 0–8)
EOSINOPHIL NFR BLD AUTO: 0.3 % — SIGNIFICANT CHANGE UP (ref 0–8)
GLUCOSE SERPL-MCNC: 128 MG/DL — HIGH (ref 70–99)
GLUCOSE SERPL-MCNC: 128 MG/DL — HIGH (ref 70–99)
HCT VFR BLD CALC: 29 % — LOW (ref 42–52)
HCT VFR BLD CALC: 29 % — LOW (ref 42–52)
HGB BLD-MCNC: 9.4 G/DL — LOW (ref 14–18)
HGB BLD-MCNC: 9.4 G/DL — LOW (ref 14–18)
IMM GRANULOCYTES NFR BLD AUTO: 1.3 % — HIGH (ref 0.1–0.3)
IMM GRANULOCYTES NFR BLD AUTO: 1.3 % — HIGH (ref 0.1–0.3)
LYMPHOCYTES # BLD AUTO: 0.39 K/UL — LOW (ref 1.2–3.4)
LYMPHOCYTES # BLD AUTO: 0.39 K/UL — LOW (ref 1.2–3.4)
LYMPHOCYTES # BLD AUTO: 10.4 % — LOW (ref 20.5–51.1)
LYMPHOCYTES # BLD AUTO: 10.4 % — LOW (ref 20.5–51.1)
MAGNESIUM SERPL-MCNC: 1.7 MG/DL — LOW (ref 1.8–2.4)
MAGNESIUM SERPL-MCNC: 1.7 MG/DL — LOW (ref 1.8–2.4)
MCHC RBC-ENTMCNC: 29.5 PG — SIGNIFICANT CHANGE UP (ref 27–31)
MCHC RBC-ENTMCNC: 29.5 PG — SIGNIFICANT CHANGE UP (ref 27–31)
MCHC RBC-ENTMCNC: 32.4 G/DL — SIGNIFICANT CHANGE UP (ref 32–37)
MCHC RBC-ENTMCNC: 32.4 G/DL — SIGNIFICANT CHANGE UP (ref 32–37)
MCV RBC AUTO: 90.9 FL — SIGNIFICANT CHANGE UP (ref 80–94)
MCV RBC AUTO: 90.9 FL — SIGNIFICANT CHANGE UP (ref 80–94)
MONOCYTES # BLD AUTO: 0.28 K/UL — SIGNIFICANT CHANGE UP (ref 0.1–0.6)
MONOCYTES # BLD AUTO: 0.28 K/UL — SIGNIFICANT CHANGE UP (ref 0.1–0.6)
MONOCYTES NFR BLD AUTO: 7.4 % — SIGNIFICANT CHANGE UP (ref 1.7–9.3)
MONOCYTES NFR BLD AUTO: 7.4 % — SIGNIFICANT CHANGE UP (ref 1.7–9.3)
NEUTROPHILS # BLD AUTO: 3.02 K/UL — SIGNIFICANT CHANGE UP (ref 1.4–6.5)
NEUTROPHILS # BLD AUTO: 3.02 K/UL — SIGNIFICANT CHANGE UP (ref 1.4–6.5)
NEUTROPHILS NFR BLD AUTO: 80.3 % — HIGH (ref 42.2–75.2)
NEUTROPHILS NFR BLD AUTO: 80.3 % — HIGH (ref 42.2–75.2)
NRBC # BLD: 0 /100 WBCS — SIGNIFICANT CHANGE UP (ref 0–0)
NRBC # BLD: 0 /100 WBCS — SIGNIFICANT CHANGE UP (ref 0–0)
PHOSPHATE SERPL-MCNC: 2.9 MG/DL — SIGNIFICANT CHANGE UP (ref 2.1–4.9)
PHOSPHATE SERPL-MCNC: 2.9 MG/DL — SIGNIFICANT CHANGE UP (ref 2.1–4.9)
PLATELET # BLD AUTO: 86 K/UL — LOW (ref 130–400)
PLATELET # BLD AUTO: 86 K/UL — LOW (ref 130–400)
PMV BLD: 12.3 FL — HIGH (ref 7.4–10.4)
PMV BLD: 12.3 FL — HIGH (ref 7.4–10.4)
POTASSIUM SERPL-MCNC: 3.9 MMOL/L — SIGNIFICANT CHANGE UP (ref 3.5–5)
POTASSIUM SERPL-MCNC: 3.9 MMOL/L — SIGNIFICANT CHANGE UP (ref 3.5–5)
POTASSIUM SERPL-SCNC: 3.9 MMOL/L — SIGNIFICANT CHANGE UP (ref 3.5–5)
POTASSIUM SERPL-SCNC: 3.9 MMOL/L — SIGNIFICANT CHANGE UP (ref 3.5–5)
PROT SERPL-MCNC: 5.3 G/DL — LOW (ref 6–8)
PROT SERPL-MCNC: 5.3 G/DL — LOW (ref 6–8)
RBC # BLD: 3.19 M/UL — LOW (ref 4.7–6.1)
RBC # BLD: 3.19 M/UL — LOW (ref 4.7–6.1)
RBC # FLD: 14.7 % — HIGH (ref 11.5–14.5)
RBC # FLD: 14.7 % — HIGH (ref 11.5–14.5)
SODIUM SERPL-SCNC: 136 MMOL/L — SIGNIFICANT CHANGE UP (ref 135–146)
SODIUM SERPL-SCNC: 136 MMOL/L — SIGNIFICANT CHANGE UP (ref 135–146)
TACROLIMUS SERPL-MCNC: 11.5 NG/ML — SIGNIFICANT CHANGE UP
TACROLIMUS SERPL-MCNC: 11.5 NG/ML — SIGNIFICANT CHANGE UP
TACROLIMUS SERPL-MCNC: 6.6 NG/ML — SIGNIFICANT CHANGE UP
TACROLIMUS SERPL-MCNC: 6.6 NG/ML — SIGNIFICANT CHANGE UP
WBC # BLD: 3.76 K/UL — LOW (ref 4.8–10.8)
WBC # BLD: 3.76 K/UL — LOW (ref 4.8–10.8)
WBC # FLD AUTO: 3.76 K/UL — LOW (ref 4.8–10.8)
WBC # FLD AUTO: 3.76 K/UL — LOW (ref 4.8–10.8)

## 2024-01-06 PROCEDURE — 99232 SBSQ HOSP IP/OBS MODERATE 35: CPT

## 2024-01-06 PROCEDURE — 71250 CT THORAX DX C-: CPT | Mod: 26

## 2024-01-06 PROCEDURE — 71045 X-RAY EXAM CHEST 1 VIEW: CPT | Mod: 26

## 2024-01-06 RX ORDER — SODIUM CHLORIDE 9 MG/ML
1000 INJECTION, SOLUTION INTRAVENOUS
Refills: 0 | Status: DISCONTINUED | OUTPATIENT
Start: 2024-01-06 | End: 2024-01-07

## 2024-01-06 RX ORDER — ATORVASTATIN CALCIUM 80 MG/1
40 TABLET, FILM COATED ORAL AT BEDTIME
Refills: 0 | Status: DISCONTINUED | OUTPATIENT
Start: 2024-01-06 | End: 2024-01-10

## 2024-01-06 RX ADMIN — PANTOPRAZOLE SODIUM 40 MILLIGRAM(S): 20 TABLET, DELAYED RELEASE ORAL at 06:00

## 2024-01-06 RX ADMIN — HYDROMORPHONE HYDROCHLORIDE 0.5 MILLIGRAM(S): 2 INJECTION INTRAMUSCULAR; INTRAVENOUS; SUBCUTANEOUS at 06:00

## 2024-01-06 RX ADMIN — AMIODARONE HYDROCHLORIDE 200 MILLIGRAM(S): 400 TABLET ORAL at 06:01

## 2024-01-06 RX ADMIN — TAMSULOSIN HYDROCHLORIDE 0.4 MILLIGRAM(S): 0.4 CAPSULE ORAL at 17:25

## 2024-01-06 RX ADMIN — HYDROMORPHONE HYDROCHLORIDE 0.5 MILLIGRAM(S): 2 INJECTION INTRAMUSCULAR; INTRAVENOUS; SUBCUTANEOUS at 23:08

## 2024-01-06 RX ADMIN — HYDROMORPHONE HYDROCHLORIDE 0.5 MILLIGRAM(S): 2 INJECTION INTRAMUSCULAR; INTRAVENOUS; SUBCUTANEOUS at 17:24

## 2024-01-06 RX ADMIN — APIXABAN 5 MILLIGRAM(S): 2.5 TABLET, FILM COATED ORAL at 06:00

## 2024-01-06 RX ADMIN — MYCOPHENOLATE MOFETIL 500 MILLIGRAM(S): 250 CAPSULE ORAL at 06:01

## 2024-01-06 RX ADMIN — TACROLIMUS 2 MILLIGRAM(S): 5 CAPSULE ORAL at 23:02

## 2024-01-06 RX ADMIN — SODIUM CHLORIDE 75 MILLILITER(S): 9 INJECTION, SOLUTION INTRAVENOUS at 16:40

## 2024-01-06 RX ADMIN — MYCOPHENOLATE MOFETIL 500 MILLIGRAM(S): 250 CAPSULE ORAL at 18:38

## 2024-01-06 RX ADMIN — TACROLIMUS 3 MILLIGRAM(S): 5 CAPSULE ORAL at 06:00

## 2024-01-06 RX ADMIN — Medication 81 MILLIGRAM(S): at 12:17

## 2024-01-06 RX ADMIN — APIXABAN 5 MILLIGRAM(S): 2.5 TABLET, FILM COATED ORAL at 17:25

## 2024-01-06 RX ADMIN — TAMSULOSIN HYDROCHLORIDE 0.4 MILLIGRAM(S): 0.4 CAPSULE ORAL at 06:00

## 2024-01-06 RX ADMIN — HYDROMORPHONE HYDROCHLORIDE 0.5 MILLIGRAM(S): 2 INJECTION INTRAMUSCULAR; INTRAVENOUS; SUBCUTANEOUS at 00:28

## 2024-01-06 RX ADMIN — ATORVASTATIN CALCIUM 40 MILLIGRAM(S): 80 TABLET, FILM COATED ORAL at 23:02

## 2024-01-06 NOTE — PROGRESS NOTE ADULT - SUBJECTIVE AND OBJECTIVE BOX
DAVONTE CHOU  69y  Male      Patient is a 69y old  Male who presents with a chief complaint of shortness of breath (06 Jan 2024 09:21)      INTERVAL HPI/OVERNIGHT EVENTS:  He feels mld SOB, dry cough, no chest pain, no fever.   Vital Signs Last 24 Hrs  T(C): 36.6 (06 Jan 2024 13:40), Max: 37 (06 Jan 2024 00:58)  T(F): 97.8 (06 Jan 2024 13:40), Max: 98.6 (06 Jan 2024 00:58)  HR: 62 (06 Jan 2024 13:40) (57 - 132)  BP: 117/59 (06 Jan 2024 13:40) (107/55 - 136/65)  BP(mean): 74 (05 Jan 2024 16:11) (74 - 74)  RR: 18 (06 Jan 2024 13:40) (17 - 18)  SpO2: 97% (05 Jan 2024 16:11) (97% - 97%)    Parameters below as of 05 Jan 2024 16:11  Patient On (Oxygen Delivery Method): room air          01-05-24 @ 07:01  -  01-06-24 @ 07:00  --------------------------------------------------------  IN: 0 mL / OUT: 410 mL / NET: -410 mL            Consultant(s) Notes Reviewed:  [x ] YES  [ ] NO          MEDICATIONS  (STANDING):  aMIOdarone    Tablet 200 milliGRAM(s) Oral daily  apixaban 5 milliGRAM(s) Oral every 12 hours  aspirin  chewable 81 milliGRAM(s) Oral daily  atorvastatin 40 milliGRAM(s) Oral at bedtime  mycophenolate mofetil 500 milliGRAM(s) Oral two times a day  pantoprazole    Tablet 40 milliGRAM(s) Oral before breakfast  tacrolimus 3 milliGRAM(s) Oral <User Schedule>  tacrolimus 2 milliGRAM(s) Oral at bedtime  tamsulosin 0.4 milliGRAM(s) Oral two times a day    MEDICATIONS  (PRN):  acetaminophen     Tablet .. 650 milliGRAM(s) Oral every 6 hours PRN Temp greater or equal to 38C (100.4F), Mild Pain (1 - 3)  albuterol    90 MICROgram(s) HFA Inhaler 2 Puff(s) Inhalation every 6 hours PRN Shortness of Breath and/or Wheezing  HYDROmorphone  Injectable 0.5 milliGRAM(s) IV Push every 6 hours PRN Severe Pain (7 - 10)  melatonin 3 milliGRAM(s) Oral at bedtime PRN Insomnia  oxyCODONE    IR 5 milliGRAM(s) Oral every 8 hours PRN Severe Pain (7 - 10)      LABS                          9.4    3.76  )-----------( 86       ( 06 Jan 2024 07:04 )             29.0     01-06    136  |  101  |  42<H>  ----------------------------<  128<H>  3.9   |  23  |  2.1<H>    Ca    9.8      06 Jan 2024 07:04  Phos  2.9     01-06  Mg     1.7     01-06    TPro  5.3<L>  /  Alb  3.4<L>  /  TBili  0.7  /  DBili  x   /  AST  10  /  ALT  <5  /  AlkPhos  50  01-06      Urinalysis Basic - ( 06 Jan 2024 07:04 )    Color: x / Appearance: x / SG: x / pH: x  Gluc: 128 mg/dL / Ketone: x  / Bili: x / Urobili: x   Blood: x / Protein: x / Nitrite: x   Leuk Esterase: x / RBC: x / WBC x   Sq Epi: x / Non Sq Epi: x / Bacteria: x      PT/INR - ( 05 Jan 2024 12:47 )   PT: 18.40 sec;   INR: 1.61 ratio         PTT - ( 05 Jan 2024 12:47 )  PTT:35.5 sec  Lactate Trend  01-05 @ 12:47 Lactate:1.6         CAPILLARY BLOOD GLUCOSE            RADIOLOGY & ADDITIONAL TESTS:    Imaging Personally Reviewed:  [ ] YES  [ ] NO    HEALTH ISSUES - PROBLEM Dx:          PHYSICAL EXAM:  GENERAL: NAD, well-developed.  HEAD:  Atraumatic, Normocephalic.  EYES: EOMI, PERRLA, conjunctiva and sclera clear.  NECK: Supple, elevated JVP  CHEST/LUNG: Clear to auscultation bilaterally; No wheeze.  HEART: Regular rate and rhythm; S1 S2.   ABDOMEN: Soft, Nontender, Nondistended; Bowel sounds present.  EXTREMITIES:  2+ Peripheral Pulses, No clubbing, cyanosis, or edema.  PSYCH: AAOx3.  NEUROLOGY: non-focal.  SKIN: No rashes or lesions.

## 2024-01-06 NOTE — PROGRESS NOTE ADULT - ASSESSMENT
69 year old male with PMHx pAfib/Aflutter(on Eliquis) s/p ablation on 12/15/23, CAD s/p PCI(LAD and RCA) for unstable angina in 2019, mild COPD not on home o2, CKD3, Hx of renal transplant 3 years ago, HEP C, decompensated liver cirrhosis(portal HTN, esophageal varices), Melanoma R eye s/p Laser, Breast cancer gynecomastia, HLD, HTN, lumbar Radiculopathy who presents to the ED with c/o SOB, generalized weakness and fatigue for past few weeks.    A/P:   Acute HFpEF:   NSTEMI   CAD s/p PCI(LAD) and staged PCI(RCA) in 2019 for unstable angina  Patient with SOB, cough, Pro-BNp 23K, elevated JVP.   CXR showed no acute infiltrates.   Troponin 95>85  EKG showed new ST and T waves abnormalities on inferior and lateral leads.   Echo showed Hyperdynamic global left ventricular systolic function LVEF 76%, severe Left atrial enlargement, mod concentric LVH, Grade III diastolic dysfunction, mod MR, mild TR, mild AR,  consistent with borderline pulmonary hypertension. Small secundum atrial septal defect with predominantly left to right shunting across the atrial septum.  s/p Lasix IV, start on Lasix 20mg po daily. Still with elevated JVP.   Continue ASA, Eliquis and Lipitor. Off Metoprolol due to bradycardia  Cardiology consulted.     Acute Kidney Injury:   History of renal transplant.   Possibly cardiorenal, less likely hepatorenal.   Send Urine studies.   Nephrology follow up.   Avoid Nephrotoxic agents,   Continue Tacrolimus and Mycophenolate Mofetil 500mg BID. Tacrolimus level 11    Paroxysmal Atrial Fibrillation /Flutter s/p ablation  HR is stable.   Metoprolol held per EP due to bradycardia  Continue Amiodarone and Eliquis.       COPD, Former smoker(quit yrs ago)  No wheezing on exam  - c/w albuterol prn  Patient with cough, RVP negative  Send CT chest non con    Chronic Compensated Liver Cirrhosis:   History of hepatitis C  No ascites on exam, PLT 86, INR 1.6 on eliquis,   hepatology follow up outpatient.     Lumbar radiculopathy, chronic back pain   Oxycodone prn    DVT prophylaxis: Eliquis  GI prophylaxis: PPI  #Progress Note Handoff:  Pending (specify):  improving Acute Kidney Injury, CT chest, Cardiology follow up.  Family discussion:  Disposition: Home.

## 2024-01-06 NOTE — PROGRESS NOTE ADULT - ASSESSMENT
Status post  donor Kidney transplant at [facility] 6 years ago  SANTA, uncertain etiology ?prerenal azotemia  Dyspnea on exertion: CXR clear, no edema  Pancytopenia  CAD  Atrial fibrillation  HTN  Cirrhosis    Plan    Suspect prerenal azotemia; trial of IVF. Start LR 75mL/hr x 1 L  Case D/W [facility] team, they do not want patient transferred to their facility at this time  Continue tacrolimus  Continue mycophenolate  Check tacrolimus level  Will follow with you   Status post  donor Kidney transplant at [facility] 6 years ago  SANTA, uncertain etiology ?prerenal azotemia  Dyspnea on exertion: CXR clear, no edema  Pancytopenia  CAD  Atrial fibrillation  HTN  Cirrhosis    Plan    Suspect prerenal azotemia; trial of IVF. Start LR 75mL/hr x 1 L  Case D/W [facility] team, they do not want patient transferred to their facility at this time  Continue tacrolimus  Continue mycophenolate  Check tacrolimus level  Will follow with you

## 2024-01-06 NOTE — PROGRESS NOTE ADULT - ASSESSMENT
69 year old male with PMHx pAfib/Aflutter(on Eliquis) s/p ablation on 12/15/23, CAD s/p PCI(LAD and RCA) for unstable angina in 2019, mild COPD not on home o2, CKD3, Hx of renal transplant 3 years ago, HEP C, decompensated liver cirrhosis(portal HTN, esophageal varices), Melanoma R eye s/p Laser, Breast cancer gynecomastia, HLD, HTN, lumbar Radiculopathy who presents to the ED with c/o SOB, generalized weakness and fatigue for past few weeks.    #Decompensated heart failure  #NSTEMI   #Symptomatic sinus bradycardia  #Hx of CAD s/p PCI(LAD) and staged PCI(RCA) in 2019 for unstable angina  #Paroxysmal Afib/Aflutter s/p ablation 12/15/23>> Aflutter HR 170s after ablation>>then back to sinus 12/16  #HTN/HLD  - Currently hemodynamically stable, asymptomatic, on room air  - BNP 23K, JVD, CXR congested  - Trop 95, trop decreasing - 85  - EKG sinus keyonna HR 52, LVH, diffuse T wave inversion  - c/w ASA, Eliquis 5mg BID, Amio 200mg qd  - Give stat IV Lasix 40mg x1, c/w IV Lasix 40mg BID  - Lactate -1.6  - Strict Is and Os, fluid restriction  - statin resumed  - TTE EF: 76% G3DD  - Cardio recs appreciated - No cardiac cath due to SANTA, c/w ASA, metoprolol and statin.  - EP recs appreciated - continue amio, ok to dc from EP standpoint    #SANTA on CKD- likely 2/2 CHF  #Hx of renal transplant 3 yrs ago complicated by ESRD was on HD few months ago  #BPH  - Monitor Cr, urine OP  - c/w Tacrolimus and MMF  - c/w Flomax  - Nephro following  - RBUS - no hydronephrosis    #Hx of mild COPD, Former smoker(quit yrs ago)  - Not on any inhalers at home  - No wheezing on exam  - c/w albuterol prn    #Hep C  #Decompensated Liver cirrhosis(pHTN, eso varices)  - f/u Ammonia levels  - Abd US  - OP Hepatology f/u    #Lumbar radiculopathy, chronic back pain  - c/w prn Oxycodone      #Misc  -DVT prophylaxis: Eliquis  -GI prophylaxis: PPI  -Diet: DASH/renal  -Activity: AAT  -Dispo: Tele   69 year old male with PMHx pAfib/Aflutter(on Eliquis) s/p ablation on 12/15/23, CAD s/p PCI(LAD and RCA) for unstable angina in 2019, mild COPD not on home o2, CKD3, Hx of renal transplant 3 years ago, HEP C, decompensated liver cirrhosis(portal HTN, esophageal varices), Melanoma R eye s/p Laser, Breast cancer gynecomastia, HLD, HTN, lumbar Radiculopathy who presents to the ED with c/o SOB, generalized weakness and fatigue for past few weeks.    #Decompensated heart failure  #NSTEMI   #Symptomatic sinus bradycardia  #Hx of CAD s/p PCI(LAD) and staged PCI(RCA) in 2019 for unstable angina  #Paroxysmal Afib/Aflutter s/p ablation 12/15/23>> Aflutter HR 170s after ablation>>then back to sinus 12/16  #HTN/HLD  - Currently hemodynamically stable, asymptomatic, on room air  - BNP 23K, JVD, CXR congested  - Trop 95, trop decreasing - 85  - EKG sinus keyonna HR 52, LVH, diffuse T wave inversion  - c/w ASA, Eliquis 5mg BID, Amio 200mg qd  - Give stat IV Lasix 40mg x1, c/w IV Lasix 40mg BID  - Lactate -1.6  - Strict Is and Os, fluid restriction  - statin resumed  - TTE EF: 76% G3DD  - Cardio recs appreciated - No cardiac cath due to SANTA, c/w ASA, metoprolol and statin.  - EP recs appreciated - continue amio, ok to dc from EP standpoint  - rpt cxr today    #SANTA on CKD- likely 2/2 CHF  #Hx of renal transplant 3 yrs ago complicated by ESRD was on HD few months ago  #BPH  - Monitor Cr, urine OP  - c/w Tacrolimus and MMF  - c/w Flomax  - Nephro following  - RBUS - no hydronephrosis    #Hx of mild COPD, Former smoker(quit yrs ago)  - Not on any inhalers at home  - No wheezing on exam  - c/w albuterol prn    #Hep C  #Decompensated Liver cirrhosis(pHTN, eso varices)  - f/u Ammonia levels  - Abd US  - OP Hepatology f/u    #Lumbar radiculopathy, chronic back pain  - c/w prn Oxycodone      #Misc  -DVT prophylaxis: Eliquis  -GI prophylaxis: PPI  -Diet: DASH/renal  -Activity: AAT  -Dispo: Tele    Pending: Rpt CXR, clinical improvement

## 2024-01-06 NOTE — PROGRESS NOTE ADULT - SUBJECTIVE AND OBJECTIVE BOX
24H events:    Patient is a 69y old Male who presents with a chief complaint of shortness of breath (06 Jan 2024 09:21)    Primary diagnosis of Chest pain    Today is hospital day 1d. This morning patient was seen and examined at bedside, resting comfortably in bed.    No acute or major events overnight.    PAST MEDICAL & SURGICAL HISTORY  CKD (chronic kidney disease)    ESRD (end stage renal disease)    HTN (hypertension)    HLD (hyperlipidemia)    Atrial fibrillation  on eliquis    COPD, mild  not on O2    Peripheral neuropathy  unclear cause    Lymphoma  ?questionable, not treated, not followed    Melanoma  R eye, s/p laser    Cirrhosis  possibly related to hepC    Breast cancer    Legally blind    Stroke    History of kidney transplant  3 years ago    S/P arteriovenous (AV) fistula creation  prior old fistula in R arm    S/P lumpectomy, right breast    History of back surgery  s/p Left L5-S1 endoscopic laminoforaminotomy      SOCIAL HISTORY:  Social History:      ALLERGIES:  Rituxan (Hives)  Rifuxen (Swelling)    MEDICATIONS:  STANDING MEDICATIONS  aMIOdarone    Tablet 200 milliGRAM(s) Oral daily  apixaban 5 milliGRAM(s) Oral every 12 hours  aspirin  chewable 81 milliGRAM(s) Oral daily  atorvastatin 40 milliGRAM(s) Oral at bedtime  mycophenolate mofetil 500 milliGRAM(s) Oral two times a day  pantoprazole    Tablet 40 milliGRAM(s) Oral before breakfast  tacrolimus 2 milliGRAM(s) Oral at bedtime  tacrolimus 3 milliGRAM(s) Oral <User Schedule>  tamsulosin 0.4 milliGRAM(s) Oral two times a day    PRN MEDICATIONS  acetaminophen     Tablet .. 650 milliGRAM(s) Oral every 6 hours PRN  albuterol    90 MICROgram(s) HFA Inhaler 2 Puff(s) Inhalation every 6 hours PRN  HYDROmorphone  Injectable 0.5 milliGRAM(s) IV Push every 6 hours PRN  melatonin 3 milliGRAM(s) Oral at bedtime PRN  oxyCODONE    IR 5 milliGRAM(s) Oral every 8 hours PRN    VITALS:   T(F): 98.4  HR: 58  BP: 108/61  RR: 18  SpO2: 97%    PHYSICAL EXAM:  GENERAL:   ( x ) NAD, lying in bed comfortably     (  ) obtunded     (  ) lethargic     (  ) somnolent    HEAD:   ( x ) Atraumatic     (  ) hematoma     (  ) laceration (specify location:       )     NECK:  ( x ) Supple     (  ) neck stiffness     (  ) nuchal rigidity     (  )  no JVD     (  ) JVD present ( -- cm)    HEART:  Rate -->     ( x ) normal rate     (  ) bradycardic     (  ) tachycardic  Rhythm -->     ( x ) regular     (  ) regularly irregular     (  ) irregularly irregular  Murmurs -->     ( x ) normal s1s2     (  ) systolic murmur     (  ) diastolic murmur     (  ) continuous murmur      (  ) S3 present     (  ) S4 present    LUNGS:   ( x )Unlabored respirations     (  ) tachypnea  ( x ) B/L air entry     (  ) decreased breath sounds in:  (location     )    (  ) no adventitious sound     ( x ) crackles     (  ) wheezing      (  ) rhonchi      (specify location:       )  (  ) chest wall tenderness (specify location:       )    ABDOMEN:   ( x ) Soft     (  ) tense   |   ( x ) nondistended     (  ) distended   |   (  ) +BS     (  ) hypoactive bowel sounds     (  ) hyperactive bowel sounds  (  ) nontender     (  ) RUQ tenderness     (  ) RLQ tenderness     (  ) LLQ tenderness     (  ) epigastric tenderness     (  ) diffuse tenderness  (  ) Splenomegaly      (  ) Hepatomegaly      (  ) Jaundice     (  ) ecchymosis     EXTREMITIES:  ( x ) Normal     (  ) Rash     (  ) ecchymosis     (  ) varicose veins      (  ) pitting edema     (  ) non-pitting edema   (  ) ulceration     (  ) gangrene:     (location:     )    NERVOUS SYSTEM:    ( x ) A&Ox3     (  ) confused     (  ) lethargic  CN II-XII:     (  ) Intact     (  ) deficits found     (Specify:     )   Upper extremities:     (  ) no sensorimotor deficits     (  ) weakness     (  ) loss of proprioception/vibration     (  ) loss of touch/temperature (specify:    )  Lower extremities:     (  ) no sensorimotor deficits     (  ) weakness     (  ) loss of proprioception/vibration     (  ) loss of touch/temperature (specify:    )    (  ) Indwelling Garcia Catheter:   Date insterted:    Reason (  ) Critical illness     (  ) urinary retention    (  ) Accurate Ins/Outs Monitoring     (  ) CMO patient    (  ) Central Line:   Date inserted:  Location: (  ) Right IJ     (  ) Left IJ     (  ) Right Fem     (  ) Left Fem    (  ) SPC        (  ) pigtail       (  ) PEG tube       (  ) colostomy       (  ) jejunostomy  (  ) U-Dall    LABS:                        9.4    3.76  )-----------( 86       ( 06 Jan 2024 07:04 )             29.0     01-06    136  |  101  |  42<H>  ----------------------------<  128<H>  3.9   |  23  |  2.1<H>    Ca    9.8      06 Jan 2024 07:04  Phos  2.9     01-06  Mg     1.7     01-06    TPro  5.3<L>  /  Alb  3.4<L>  /  TBili  0.7  /  DBili  x   /  AST  10  /  ALT  <5  /  AlkPhos  50  01-06    PT/INR - ( 05 Jan 2024 12:47 )   PT: 18.40 sec;   INR: 1.61 ratio         PTT - ( 05 Jan 2024 12:47 )  PTT:35.5 sec  Urinalysis Basic - ( 06 Jan 2024 07:04 )    Color: x / Appearance: x / SG: x / pH: x  Gluc: 128 mg/dL / Ketone: x  / Bili: x / Urobili: x   Blood: x / Protein: x / Nitrite: x   Leuk Esterase: x / RBC: x / WBC x   Sq Epi: x / Non Sq Epi: x / Bacteria: x        Lactate, Blood: 1.6 mmol/L (01-05-24 @ 12:47)          RADIOLOGY:

## 2024-01-06 NOTE — CONSULT NOTE ADULT - SUBJECTIVE AND OBJECTIVE BOX
PAIN MANAGEMENT CONSULT NOTE    Chief Complaint:    HPI:  69 year old male with PMHx pAfib/Aflutter(on Eliquis) s/p ablation on 12/15/23, CAD s/p PCI(LAD and RCA) for unstable angina in 2019, mild COPD not on home o2, CKD3, Hx of renal transplant 3 years ago, HEP C, decompensated liver cirrhosis(portal HTN, esophageal varices), Melanoma R eye s/p Laser, Breast cancer gynecomastia, HLD, HTN, lumbar Radiculopathy who presents to the ED with c/o SOB, generalized weakness and fatigue for past few weeks.    Pt. states that since after he was discharged on  after Afib/Aflutter ablation, he has been feeling SOB on exertion, and has been feeling tired/weak and this is limiting his day to day activities. He also experienced pounding chest pain yesterday night at rest, that lasted for 1 min and resolved on it's own. He denied any chest pain after that. He denied any LE swelling, denied orthopnea but he prefers not to lie flat. He states he has been taking his meds as prescribed. Denies HA, dizziness, NVD, fever, abdominal pain, change in urination and change in bowel habits.    +LBP w/ radiating symptoms down b/l lower extremity        PAST MEDICAL & SURGICAL HISTORY:  CKD (chronic kidney disease)      ESRD (end stage renal disease)      HTN (hypertension)      HLD (hyperlipidemia)      Atrial fibrillation  on eliquis      COPD, mild  not on O2      Peripheral neuropathy  unclear cause      Lymphoma  ?questionable, not treated, not followed      Melanoma  R eye, s/p laser      Cirrhosis  possibly related to hepC      Breast cancer      Legally blind      Stroke      History of kidney transplant  3 years ago      S/P arteriovenous (AV) fistula creation  prior old fistula in R arm      S/P lumpectomy, right breast      History of back surgery  s/p Left L5-S1 endoscopic laminoforaminotomy          FAMILY HISTORY:  FH: dementia  mother, alive    FHx: breast cancer  sister ( in 30s)        SOCIAL HISTORY:  [ ] Denies Smoking, Alcohol, or Drug Use    HOME MEDICATIONS:   Please refer to initial HNP    PAIN HOME MEDICATIONS:    Allergies    Rituxan (Hives)  Rifuxen (Swelling)    Intolerances        PAIN MEDICATIONS:  acetaminophen     Tablet .. 650 milliGRAM(s) Oral every 6 hours PRN  HYDROmorphone  Injectable 0.5 milliGRAM(s) IV Push every 6 hours PRN  melatonin 3 milliGRAM(s) Oral at bedtime PRN  oxyCODONE    IR 5 milliGRAM(s) Oral every 8 hours PRN    Heme:  apixaban 5 milliGRAM(s) Oral every 12 hours  aspirin  chewable 81 milliGRAM(s) Oral daily    Antibiotics:    Cardiovascular:  aMIOdarone    Tablet 200 milliGRAM(s) Oral daily    GI:  pantoprazole    Tablet 40 milliGRAM(s) Oral before breakfast    Endocrine:  atorvastatin 40 milliGRAM(s) Oral at bedtime    All Other Medications:  mycophenolate mofetil 500 milliGRAM(s) Oral two times a day  tacrolimus 2 milliGRAM(s) Oral at bedtime  tacrolimus 3 milliGRAM(s) Oral <User Schedule>  tamsulosin 0.4 milliGRAM(s) Oral two times a day      Vital Signs Last 24 Hrs  T(C): 36.6 (2024 13:40), Max: 37 (2024 00:58)  T(F): 97.8 (2024 13:40), Max: 98.6 (2024 00:58)  HR: 62 (2024 13:40) (57 - 132)  BP: 117/59 (2024 13:40) (107/55 - 136/65)  BP(mean): 74 (2024 16:11) (74 - 74)  RR: 18 (2024 13:40) (17 - 18)  SpO2: 97% (2024 16:11) (97% - 97%)    Parameters below as of 2024 16:11  Patient On (Oxygen Delivery Method): room air        LABS:                        9.4    3.76  )-----------( 86       ( 2024 07:04 )             29.0     01-06    136  |  101  |  42<H>  ----------------------------<  128<H>  3.9   |  23  |  2.1<H>    Ca    9.8      2024 07:04  Phos  2.9     01-06  Mg     1.7     01-06    TPro  5.3<L>  /  Alb  3.4<L>  /  TBili  0.7  /  DBili  x   /  AST  10  /  ALT  <5  /  AlkPhos  50  -    PT/INR - ( 2024 12:47 )   PT: 18.40 sec;   INR: 1.61 ratio         PTT - ( 2024 12:47 )  PTT:35.5 sec  Urinalysis Basic - ( 2024 07:04 )    Color: x / Appearance: x / SG: x / pH: x  Gluc: 128 mg/dL / Ketone: x  / Bili: x / Urobili: x   Blood: x / Protein: x / Nitrite: x   Leuk Esterase: x / RBC: x / WBC x   Sq Epi: x / Non Sq Epi: x / Bacteria: x        PHYSICAL EXAM  GENERAL: Laying in bed  Motor exam: 5/5 b/l UE. 5/ b/l LE  Sensation intact to LT in UE/LE   CHEST/LUNG: Clear to auscultation bilaterally  ABDOMEN: Soft, Nontender, Nondistended  EXTREMITIES:  2+ Peripheral Pulses      ASSESSMENT:   HPI:  69 year old male with PMHx pAfib/Aflutter(on Eliquis) s/p ablation on 12/15/23, CAD s/p PCI(LAD and RCA) for unstable angina in 2019, mild COPD not on home o2, CKD3, Hx of renal transplant 3 years ago, HEP C, decompensated liver cirrhosis(portal HTN, esophageal varices), Melanoma R eye s/p Laser, Breast cancer gynecomastia, HLD, HTN, lumbar Radiculopathy who presents to the ED with c/o SOB, generalized weakness and fatigue for past few weeks.    Pt. states that since after he was discharged on  after Afib/Aflutter ablation, he has been feeling SOB on exertion, and has been feeling tired/weak and this is limiting his day to day activities. He also experienced pounding chest pain yesterday night at rest, that lasted for 1 min and resolved on it's own. He denied any chest pain after that. He denied any LE swelling, denied orthopnea but he prefers not to lie flat. He states he has been taking his meds as prescribed. Denies HA, dizziness, NVD, fever, abdominal pain, change in urination and change in bowel habits.      Low back pain  Lumbar radiculitis    Recs  1. Acetaminophen 1g q8h standing  2. Gabapentin 300mg qHS   3. Methocarbamol 500mg q8h standing  4. Oxycodone 5mg q6h PRN for breakthrough pain  5. IV hydromorphone 0.5mg q12h PRN for severe breakthrough pain despite PO oxycodone    Bowel regimen: miralax / colace  Nausea ppx: zofran standing  Physical therapy      PAIN MANAGEMENT CONSULT NOTE    Chief Complaint:    HPI:  69 year old male with PMHx pAfib/Aflutter(on Eliquis) s/p ablation on 12/15/23, CAD s/p PCI(LAD and RCA) for unstable angina in 2019, mild COPD not on home o2, CKD3, Hx of renal transplant 3 years ago, HEP C, decompensated liver cirrhosis(portal HTN, esophageal varices), Melanoma R eye s/p Laser, Breast cancer gynecomastia, HLD, HTN, lumbar Radiculopathy who presents to the ED with c/o SOB, generalized weakness and fatigue for past few weeks.    Pt. states that since after he was discharged on  after Afib/Aflutter ablation, he has been feeling SOB on exertion, and has been feeling tired/weak and this is limiting his day to day activities. He also experienced pounding chest pain yesterday night at rest, that lasted for 1 min and resolved on it's own. He denied any chest pain after that. He denied any LE swelling, denied orthopnea but he prefers not to lie flat. He states he has been taking his meds as prescribed. Denies HA, dizziness, NVD, fever, abdominal pain, change in urination and change in bowel habits.    +LBP w/ radiating symptoms down b/l lower extremity        PAST MEDICAL & SURGICAL HISTORY:  CKD (chronic kidney disease)      ESRD (end stage renal disease)      HTN (hypertension)      HLD (hyperlipidemia)      Atrial fibrillation  on eliquis      COPD, mild  not on O2      Peripheral neuropathy  unclear cause      Lymphoma  ?questionable, not treated, not followed      Melanoma  R eye, s/p laser      Cirrhosis  possibly related to hepC      Breast cancer      Legally blind      Stroke      History of kidney transplant  3 years ago      S/P arteriovenous (AV) fistula creation  prior old fistula in R arm      S/P lumpectomy, right breast      History of back surgery  s/p Left L5-S1 endoscopic laminoforaminotomy          FAMILY HISTORY:  FH: dementia  mother, alive    FHx: breast cancer  sister ( in 30s)        SOCIAL HISTORY:  [ ] Denies Smoking, Alcohol, or Drug Use    HOME MEDICATIONS:   Please refer to initial HNP    PAIN HOME MEDICATIONS:    Allergies    Rituxan (Hives)  Rifuxen (Swelling)    Intolerances        PAIN MEDICATIONS:  acetaminophen     Tablet .. 650 milliGRAM(s) Oral every 6 hours PRN  HYDROmorphone  Injectable 0.5 milliGRAM(s) IV Push every 6 hours PRN  melatonin 3 milliGRAM(s) Oral at bedtime PRN  oxyCODONE    IR 5 milliGRAM(s) Oral every 8 hours PRN    Heme:  apixaban 5 milliGRAM(s) Oral every 12 hours  aspirin  chewable 81 milliGRAM(s) Oral daily    Antibiotics:    Cardiovascular:  aMIOdarone    Tablet 200 milliGRAM(s) Oral daily    GI:  pantoprazole    Tablet 40 milliGRAM(s) Oral before breakfast    Endocrine:  atorvastatin 40 milliGRAM(s) Oral at bedtime    All Other Medications:  mycophenolate mofetil 500 milliGRAM(s) Oral two times a day  tacrolimus 2 milliGRAM(s) Oral at bedtime  tacrolimus 3 milliGRAM(s) Oral <User Schedule>  tamsulosin 0.4 milliGRAM(s) Oral two times a day      Vital Signs Last 24 Hrs  T(C): 36.6 (2024 13:40), Max: 37 (2024 00:58)  T(F): 97.8 (2024 13:40), Max: 98.6 (2024 00:58)  HR: 62 (2024 13:40) (57 - 132)  BP: 117/59 (2024 13:40) (107/55 - 136/65)  BP(mean): 74 (2024 16:11) (74 - 74)  RR: 18 (2024 13:40) (17 - 18)  SpO2: 97% (2024 16:11) (97% - 97%)    Parameters below as of 2024 16:11  Patient On (Oxygen Delivery Method): room air        LABS:                        9.4    3.76  )-----------( 86       ( 2024 07:04 )             29.0     01-06    136  |  101  |  42<H>  ----------------------------<  128<H>  3.9   |  23  |  2.1<H>    Ca    9.8      2024 07:04  Phos  2.9     01-06  Mg     1.7     01-06    TPro  5.3<L>  /  Alb  3.4<L>  /  TBili  0.7  /  DBili  x   /  AST  10  /  ALT  <5  /  AlkPhos  50  -    PT/INR - ( 2024 12:47 )   PT: 18.40 sec;   INR: 1.61 ratio         PTT - ( 2024 12:47 )  PTT:35.5 sec  Urinalysis Basic - ( 2024 07:04 )    Color: x / Appearance: x / SG: x / pH: x  Gluc: 128 mg/dL / Ketone: x  / Bili: x / Urobili: x   Blood: x / Protein: x / Nitrite: x   Leuk Esterase: x / RBC: x / WBC x   Sq Epi: x / Non Sq Epi: x / Bacteria: x        PHYSICAL EXAM  GENERAL: Laying in bed  Motor exam: 5/5 b/l UE. 5/ b/l LE  Sensation intact to LT in UE/LE   CHEST/LUNG: Clear to auscultation bilaterally  ABDOMEN: Soft, Nontender, Nondistended  EXTREMITIES:  2+ Peripheral Pulses      ASSESSMENT:   HPI:  69 year old male with PMHx pAfib/Aflutter(on Eliquis) s/p ablation on 12/15/23, CAD s/p PCI(LAD and RCA) for unstable angina in 2019, mild COPD not on home o2, CKD3, Hx of renal transplant 3 years ago, HEP C, decompensated liver cirrhosis(portal HTN, esophageal varices), Melanoma R eye s/p Laser, Breast cancer gynecomastia, HLD, HTN, lumbar Radiculopathy who presents to the ED with c/o SOB, generalized weakness and fatigue for past few weeks.    Pt. states that since after he was discharged on  after Afib/Aflutter ablation, he has been feeling SOB on exertion, and has been feeling tired/weak and this is limiting his day to day activities. He also experienced pounding chest pain yesterday night at rest, that lasted for 1 min and resolved on it's own. He denied any chest pain after that. He denied any LE swelling, denied orthopnea but he prefers not to lie flat. He states he has been taking his meds as prescribed. Denies HA, dizziness, NVD, fever, abdominal pain, change in urination and change in bowel habits.      Low back pain  Lumbar radiculitis    Recs  1. Acetaminophen 650mg q12h standing  2. Gabapentin 300mg qHS   3. Methocarbamol 500mg q8h standing  4. Oxycodone 5mg q6h PRN for breakthrough pain  5. IV hydromorphone 0.5mg q12h PRN for severe breakthrough pain despite PO oxycodone    Bowel regimen: miralax / colace  Nausea ppx: zofran standing  Physical therapy

## 2024-01-06 NOTE — CONSULT NOTE ADULT - SUBJECTIVE AND OBJECTIVE BOX
Date of Admission:    CHIEF COMPLAINT:    HISTORY OF PRESENT ILLNESS:  69 year old male with PMHx pAfib/Aflutter(on Eliquis) s/p ablation on 12/15/23, CAD s/p PCI(LAD and RCA) for unstable angina in 2019, mild COPD not on home o2, CKD3, Hx of renal transplant 3 years ago, HEP C, decompensated liver cirrhosis(portal HTN, esophageal varices), Melanoma R eye s/p Laser, Breast cancer gynecomastia, HLD, HTN, lumbar Radiculopathy who presents to the ED with c/o SOB, generalized weakness and fatigue for past few weeks.    Pt. states that since after he was discharged on  after Afib/Aflutter ablation, he has been feeling SOB on exertion, and has been feeling tired/weak and this is limiting his day to day activities. He also experienced pounding chest pain yesterday night at rest, that lasted for 1 min and resolved on it's own. He denied any chest pain after that. He denied any LE swelling, denied orthopnea but he prefers not to lie flat. He states he has been taking his meds as prescribed. Denies HA, dizziness, NVD, fever, abdominal pain, change in urination and change in bowel habits.    In ED, pt. was hemodynamically stable  Labs significant for BUN 56, Cr 2.3(1.3 in dec), Trop 95, BNP 23K  EKG showed sinus keyonna HR 52, LVH, diffuse T-wave inversions  CXR showed prominent interstitial markings    Pt. received 1L bolus in ED.      PAST MEDICAL & SURGICAL HISTORY:  CKD (chronic kidney disease)      ESRD (end stage renal disease)      HTN (hypertension)      HLD (hyperlipidemia)      Atrial fibrillation  on eliquis      COPD, mild  not on O2      Peripheral neuropathy  unclear cause      Lymphoma  ?questionable, not treated, not followed      Melanoma  R eye, s/p laser      Cirrhosis  possibly related to hepC      Breast cancer      Legally blind      Stroke      History of kidney transplant  3 years ago      S/P arteriovenous (AV) fistula creation  prior old fistula in R arm      S/P lumpectomy, right breast      History of back surgery  s/p Left L5-S1 endoscopic laminoforaminotomy        HEALTH ISSUES - PROBLEM Dx:        FAMILY HISTORY:  FH: dementia  mother, alive    FHx: breast cancer  sister ( in 30s)      Allergies    Rituxan (Hives)  Rifuxen (Swelling)    Intolerances    	  Home Medications:  aspirin 81 mg oral tablet: 1 tab(s) orally once a day (15 Dec 2023 07:16)  calcitriol 0.5 mcg oral capsule: 1 cap(s) orally 2 times a day (15 Dec 2023 07:16)  Eliquis 5 mg oral tablet: 1 tab(s) orally 2 times a day (15 Dec 2023 07:16)  Flomax 0.4 mg oral capsule: 1 cap(s) orally 2 times a day (15 Dec 2023 07:16)  furosemide 40 mg oral tablet: 1 tab(s) orally once a day (15 Dec 2023 07:16)  metoprolol tartrate 100 mg oral tablet: 1 tab(s) orally once a day (2024 05:31)  mycophenolate mofetil 500 mg oral tablet: 2 tab(s) orally once a day (15 Dec 2023 07:16)  tacrolimus 1 mg oral capsule: 3 cap(s) orally once a day (in the morning) (18 Dec 2023 10:05)  tacrolimus 1 mg oral capsule: 2 cap(s) orally once a day (at bedtime) (18 Dec 2023 10:05)    MEDICATIONS  (STANDING):  aMIOdarone    Tablet 200 milliGRAM(s) Oral daily  apixaban 5 milliGRAM(s) Oral every 12 hours  aspirin  chewable 81 milliGRAM(s) Oral daily  atorvastatin 40 milliGRAM(s) Oral at bedtime  mycophenolate mofetil 500 milliGRAM(s) Oral two times a day  pantoprazole    Tablet 40 milliGRAM(s) Oral before breakfast  tacrolimus 3 milliGRAM(s) Oral <User Schedule>  tacrolimus 2 milliGRAM(s) Oral at bedtime  tamsulosin 0.4 milliGRAM(s) Oral two times a day    MEDICATIONS  (PRN):  acetaminophen     Tablet .. 650 milliGRAM(s) Oral every 6 hours PRN Temp greater or equal to 38C (100.4F), Mild Pain (1 - 3)  albuterol    90 MICROgram(s) HFA Inhaler 2 Puff(s) Inhalation every 6 hours PRN Shortness of Breath and/or Wheezing  HYDROmorphone  Injectable 0.5 milliGRAM(s) IV Push every 6 hours PRN Severe Pain (7 - 10)  melatonin 3 milliGRAM(s) Oral at bedtime PRN Insomnia  oxyCODONE    IR 5 milliGRAM(s) Oral every 8 hours PRN Severe Pain (7 - 10)    SOCIAL HISTORY:    [ ] Non-smoker  [ ] Smoker  [ ] Alcohol      REVIEW OF SYSTEMS:  CONSTITUTIONAL: No fever, weight loss, or fatigue  CARDIOLOGY: PAtient denies chest pain, shortness of breath or syncopal episodes.   RESPIRATORY: denies shortness of breath, wheezing.   NEUROLOGICAL: NO weakness, no focal deficits to report.   GI: no BRBPR, no Vomiting, no diarrhea.    PSYCHIATRY: normal mood and affect  HEENT: no nose bleed,     PHYSICAL EXAM:  T(C): 36.9 (24 @ 05:11), Max: 37 (24 @ 00:58)  HR: 58 (24 @ 05:11) (57 - 132)  BP: 108/61 (24 @ 05:11) (107/55 - 136/65)  RR: 18 (24 @ 05:11) (17 - 18)  SpO2: 97% (24 @ 16:11) (97% - 97%)  Wt(kg): --  I&O's Summary    2024 07:01  -  2024 07:00  --------------------------------------------------------  IN: 0 mL / OUT: 410 mL / NET: -410 mL      Daily Height in cm: 175.26 (2024 00:58)    Daily Weight in k.2 (2024 05:11)    General Appearance: Normal	  Cardiovascular: Normal S1 S2, No JVD, No murmurs, No edema  Respiratory: Lungs clear to auscultation	  Psychiatry: A & O x 3, Mood & affect appropriate  Gastrointestinal:  Soft, Non-tender  Skin: No rashes, No ecchymoses, No cyanosis	  Neurologic: Non-focal  Extremities: Normal range of motion, No clubbing, cyanosis or edema  Vascular: Peripheral pulses palpable 2+ bilaterally        LABS:	 	                          9.4    3.76  )-----------( 86       ( 2024 07:04 )             29.0         136  |  101  |  42<H>  ----------------------------<  128<H>  3.9   |  23  |  2.1<H>    Ca    9.8      2024 07:04  Phos  2.9       Mg     1.7         TPro  5.3<L>  /  Alb  3.4<L>  /  TBili  0.7  /  DBili  x   /  AST  10  /  ALT  <5  /  AlkPhos  50          PT/INR - ( 2024 12:47 )   PT: 18.40 sec;   INR: 1.61 ratio         PTT - ( 2024 12:47 )  PTT:35.5 sec        ASSESSMENT/PLAN: 	    1)CAD S/P PCI of RCA and LAD in  stable.     Troponin borderline high probably due to SANTA and afib with RVR during admission.     Stable on medical treatment,     Continue ASA, metoprolol and statin.     No cardiac cath at this time due to SANTA.     Continue medical treatment.      2)Afib/Aflutter, S/P ablation     Presently in sinus rhythm.    Decrease metoprolol to 50 mg daily if HR remain <60 BPM.    Continue eliquis.    3)CKD S/P Transplant, SANTA     Continue hydration     F/U renal.    4)COPD stable    5)HTN controlled on treatment.

## 2024-01-06 NOTE — PROGRESS NOTE ADULT - SUBJECTIVE AND OBJECTIVE BOX
[place] [facility] FOLLOW UP NOTE  --------------------------------------------------------------------------------  24 hour events/subjective: Patient examined. Appears comfortable.    PAST HISTORY  --------------------------------------------------------------------------------  No significant changes to PMH, PSH, FHx, SHx, unless otherwise noted    ALLERGIES & MEDICATIONS  --------------------------------------------------------------------------------  Allergies    Rituxan (Hives)  Rifuxen (Swelling)    Standing Inpatient Medications  aMIOdarone    Tablet 200 milliGRAM(s) Oral daily  apixaban 5 milliGRAM(s) Oral every 12 hours  aspirin  chewable 81 milliGRAM(s) Oral daily  atorvastatin 40 milliGRAM(s) Oral at bedtime  mycophenolate mofetil 500 milliGRAM(s) Oral two times a day  pantoprazole    Tablet 40 milliGRAM(s) Oral before breakfast  tacrolimus 3 milliGRAM(s) Oral <User Schedule>  tacrolimus 2 milliGRAM(s) Oral at bedtime  tamsulosin 0.4 milliGRAM(s) Oral two times a day    PRN Inpatient Medications  acetaminophen     Tablet .. 650 milliGRAM(s) Oral every 6 hours PRN  albuterol    90 MICROgram(s) HFA Inhaler 2 Puff(s) Inhalation every 6 hours PRN  HYDROmorphone  Injectable 0.5 milliGRAM(s) IV Push every 6 hours PRN  melatonin 3 milliGRAM(s) Oral at bedtime PRN  oxyCODONE    IR 5 milliGRAM(s) Oral every 8 hours PRN    VITALS/PHYSICAL EXAM  --------------------------------------------------------------------------------  T(C): 36.6 (01-06-24 @ 13:40), Max: 37 (01-06-24 @ 00:58)  HR: 62 (01-06-24 @ 13:40) (57 - 132)  BP: 117/59 (01-06-24 @ 13:40) (107/55 - 136/65)  RR: 18 (01-06-24 @ 13:40) (17 - 18)  SpO2: 97% (01-05-24 @ 16:11) (97% - 97%)  Height (cm): 175.3 (01-06-24 @ 00:58)  Weight (kg): 59.1 (01-06-24 @ 00:58)  BMI (kg/m2): 19.2 (01-06-24 @ 00:58)  BSA (m2): 1.72 (01-06-24 @ 00:58)    01-05-24 @ 07:01  -  01-06-24 @ 07:00  --------------------------------------------------------  IN: 0 mL / OUT: 410 mL / NET: -410 mL    01-06-24 @ 07:01  -  01-06-24 @ 14:51  --------------------------------------------------------  IN: 780 mL / OUT: 575 mL / NET: 205 mL    Physical Exam:  	Gen: NAD  	Pulm: CTA B/L  	CV: RRR, S1S2  	Abd: +BS, soft, nontender/nondistended  	: No suprapubic tenderness  	LE: Warm,  no edema    LABS/STUDIES  --------------------------------------------------------------------------------              9.4    3.76  >-----------<  86       [01-06-24 @ 07:04]              29.0     136  |  101  |  42  ----------------------------<  128      [01-06-24 @ 07:04]  3.9   |  23  |  2.1        Ca     9.8     [01-06-24 @ 07:04]      Mg     1.7     [01-06-24 @ 07:04]      Phos  2.9     [01-06-24 @ 07:04]    TPro  5.3  /  Alb  3.4  /  TBili  0.7  /  DBili  x   /  AST  10  /  ALT  <5  /  AlkPhos  50  [01-06-24 @ 07:04]    PT/INR: PT 18.40, INR 1.61       [01-05-24 @ 12:47]  PTT: 35.5       [01-05-24 @ 12:47]    Creatinine Trend:  SCr 2.1 [01-06 @ 07:04]  SCr 2.2 [01-05 @ 12:47]  SCr 2.3 [01-04 @ 23:43]  SCr 1.3 [12-18 @ 05:50]  SCr 1.7 [12-15 @ 07:12]    Urinalysis - [01-06-24 @ 07:04]      Color  / Appearance  / SG  / pH       Gluc 128 / Ketone   / Bili  / Urobili        Blood  / Protein  / Leuk Est  / Nitrite       RBC  / WBC  / Hyaline  / Gran  / Sq Epi  / Non Sq Epi  / Bacteria     HbA1c 5.1      [11-06-19 @ 15:00]  TSH 0.49      [12-18-23 @ 08:36] [facility] FOLLOW UP NOTE  --------------------------------------------------------------------------------  24 hour events/subjective: Patient examined. Appears comfortable.    PAST HISTORY  --------------------------------------------------------------------------------  No significant changes to PMH, PSH, FHx, SHx, unless otherwise noted    ALLERGIES & MEDICATIONS  --------------------------------------------------------------------------------  Allergies    Rituxan (Hives)  Rifuxen (Swelling)    Standing Inpatient Medications  aMIOdarone    Tablet 200 milliGRAM(s) Oral daily  apixaban 5 milliGRAM(s) Oral every 12 hours  aspirin  chewable 81 milliGRAM(s) Oral daily  atorvastatin 40 milliGRAM(s) Oral at bedtime  mycophenolate mofetil 500 milliGRAM(s) Oral two times a day  pantoprazole    Tablet 40 milliGRAM(s) Oral before breakfast  tacrolimus 3 milliGRAM(s) Oral <User Schedule>  tacrolimus 2 milliGRAM(s) Oral at bedtime  tamsulosin 0.4 milliGRAM(s) Oral two times a day    PRN Inpatient Medications  acetaminophen     Tablet .. 650 milliGRAM(s) Oral every 6 hours PRN  albuterol    90 MICROgram(s) HFA Inhaler 2 Puff(s) Inhalation every 6 hours PRN  HYDROmorphone  Injectable 0.5 milliGRAM(s) IV Push every 6 hours PRN  melatonin 3 milliGRAM(s) Oral at bedtime PRN  oxyCODONE    IR 5 milliGRAM(s) Oral every 8 hours PRN    VITALS/PHYSICAL EXAM  --------------------------------------------------------------------------------  T(C): 36.6 (01-06-24 @ 13:40), Max: 37 (01-06-24 @ 00:58)  HR: 62 (01-06-24 @ 13:40) (57 - 132)  BP: 117/59 (01-06-24 @ 13:40) (107/55 - 136/65)  RR: 18 (01-06-24 @ 13:40) (17 - 18)  SpO2: 97% (01-05-24 @ 16:11) (97% - 97%)  Height (cm): 175.3 (01-06-24 @ 00:58)  Weight (kg): 59.1 (01-06-24 @ 00:58)  BMI (kg/m2): 19.2 (01-06-24 @ 00:58)  BSA (m2): 1.72 (01-06-24 @ 00:58)    01-05-24 @ 07:01  -  01-06-24 @ 07:00  --------------------------------------------------------  IN: 0 mL / OUT: 410 mL / NET: -410 mL    01-06-24 @ 07:01  -  01-06-24 @ 14:51  --------------------------------------------------------  IN: 780 mL / OUT: 575 mL / NET: 205 mL    Physical Exam:  	Gen: NAD  	Pulm: CTA B/L  	CV: RRR, S1S2  	Abd: +BS, soft, nontender/nondistended  	: No suprapubic tenderness  	LE: Warm,  no edema    LABS/STUDIES  --------------------------------------------------------------------------------              9.4    3.76  >-----------<  86       [01-06-24 @ 07:04]              29.0     136  |  101  |  42  ----------------------------<  128      [01-06-24 @ 07:04]  3.9   |  23  |  2.1        Ca     9.8     [01-06-24 @ 07:04]      Mg     1.7     [01-06-24 @ 07:04]      Phos  2.9     [01-06-24 @ 07:04]    TPro  5.3  /  Alb  3.4  /  TBili  0.7  /  DBili  x   /  AST  10  /  ALT  <5  /  AlkPhos  50  [01-06-24 @ 07:04]    PT/INR: PT 18.40, INR 1.61       [01-05-24 @ 12:47]  PTT: 35.5       [01-05-24 @ 12:47]    Creatinine Trend:  SCr 2.1 [01-06 @ 07:04]  SCr 2.2 [01-05 @ 12:47]  SCr 2.3 [01-04 @ 23:43]  SCr 1.3 [12-18 @ 05:50]  SCr 1.7 [12-15 @ 07:12]    Urinalysis - [01-06-24 @ 07:04]      Color  / Appearance  / SG  / pH       Gluc 128 / Ketone   / Bili  / Urobili        Blood  / Protein  / Leuk Est  / Nitrite       RBC  / WBC  / Hyaline  / Gran  / Sq Epi  / Non Sq Epi  / Bacteria     HbA1c 5.1      [11-06-19 @ 15:00]  TSH 0.49      [12-18-23 @ 08:36]

## 2024-01-07 LAB
ALBUMIN SERPL ELPH-MCNC: 3.2 G/DL — LOW (ref 3.5–5.2)
ALBUMIN SERPL ELPH-MCNC: 3.2 G/DL — LOW (ref 3.5–5.2)
ALP SERPL-CCNC: 48 U/L — SIGNIFICANT CHANGE UP (ref 30–115)
ALP SERPL-CCNC: 48 U/L — SIGNIFICANT CHANGE UP (ref 30–115)
ALT FLD-CCNC: <5 U/L — SIGNIFICANT CHANGE UP (ref 0–41)
ALT FLD-CCNC: <5 U/L — SIGNIFICANT CHANGE UP (ref 0–41)
ANION GAP SERPL CALC-SCNC: 10 MMOL/L — SIGNIFICANT CHANGE UP (ref 7–14)
ANION GAP SERPL CALC-SCNC: 10 MMOL/L — SIGNIFICANT CHANGE UP (ref 7–14)
AST SERPL-CCNC: 10 U/L — SIGNIFICANT CHANGE UP (ref 0–41)
AST SERPL-CCNC: 10 U/L — SIGNIFICANT CHANGE UP (ref 0–41)
BASOPHILS # BLD AUTO: 0.02 K/UL — SIGNIFICANT CHANGE UP (ref 0–0.2)
BASOPHILS # BLD AUTO: 0.02 K/UL — SIGNIFICANT CHANGE UP (ref 0–0.2)
BASOPHILS NFR BLD AUTO: 0.6 % — SIGNIFICANT CHANGE UP (ref 0–1)
BASOPHILS NFR BLD AUTO: 0.6 % — SIGNIFICANT CHANGE UP (ref 0–1)
BILIRUB SERPL-MCNC: 0.8 MG/DL — SIGNIFICANT CHANGE UP (ref 0.2–1.2)
BILIRUB SERPL-MCNC: 0.8 MG/DL — SIGNIFICANT CHANGE UP (ref 0.2–1.2)
BUN SERPL-MCNC: 28 MG/DL — HIGH (ref 10–20)
BUN SERPL-MCNC: 28 MG/DL — HIGH (ref 10–20)
CALCIUM SERPL-MCNC: 9.8 MG/DL — SIGNIFICANT CHANGE UP (ref 8.4–10.5)
CALCIUM SERPL-MCNC: 9.8 MG/DL — SIGNIFICANT CHANGE UP (ref 8.4–10.5)
CHLORIDE SERPL-SCNC: 100 MMOL/L — SIGNIFICANT CHANGE UP (ref 98–110)
CHLORIDE SERPL-SCNC: 100 MMOL/L — SIGNIFICANT CHANGE UP (ref 98–110)
CO2 SERPL-SCNC: 24 MMOL/L — SIGNIFICANT CHANGE UP (ref 17–32)
CO2 SERPL-SCNC: 24 MMOL/L — SIGNIFICANT CHANGE UP (ref 17–32)
CREAT SERPL-MCNC: 1.8 MG/DL — HIGH (ref 0.7–1.5)
CREAT SERPL-MCNC: 1.8 MG/DL — HIGH (ref 0.7–1.5)
EGFR: 41 ML/MIN/1.73M2 — LOW
EGFR: 41 ML/MIN/1.73M2 — LOW
EOSINOPHIL # BLD AUTO: 0 K/UL — SIGNIFICANT CHANGE UP (ref 0–0.7)
EOSINOPHIL # BLD AUTO: 0 K/UL — SIGNIFICANT CHANGE UP (ref 0–0.7)
EOSINOPHIL NFR BLD AUTO: 0 % — SIGNIFICANT CHANGE UP (ref 0–8)
EOSINOPHIL NFR BLD AUTO: 0 % — SIGNIFICANT CHANGE UP (ref 0–8)
GLUCOSE SERPL-MCNC: 119 MG/DL — HIGH (ref 70–99)
GLUCOSE SERPL-MCNC: 119 MG/DL — HIGH (ref 70–99)
HCT VFR BLD CALC: 25.7 % — LOW (ref 42–52)
HCT VFR BLD CALC: 25.7 % — LOW (ref 42–52)
HGB BLD-MCNC: 8.5 G/DL — LOW (ref 14–18)
HGB BLD-MCNC: 8.5 G/DL — LOW (ref 14–18)
IMM GRANULOCYTES NFR BLD AUTO: 1.4 % — HIGH (ref 0.1–0.3)
IMM GRANULOCYTES NFR BLD AUTO: 1.4 % — HIGH (ref 0.1–0.3)
LYMPHOCYTES # BLD AUTO: 0.24 K/UL — LOW (ref 1.2–3.4)
LYMPHOCYTES # BLD AUTO: 0.24 K/UL — LOW (ref 1.2–3.4)
LYMPHOCYTES # BLD AUTO: 6.7 % — LOW (ref 20.5–51.1)
LYMPHOCYTES # BLD AUTO: 6.7 % — LOW (ref 20.5–51.1)
MAGNESIUM SERPL-MCNC: 1.6 MG/DL — LOW (ref 1.8–2.4)
MAGNESIUM SERPL-MCNC: 1.6 MG/DL — LOW (ref 1.8–2.4)
MCHC RBC-ENTMCNC: 29.3 PG — SIGNIFICANT CHANGE UP (ref 27–31)
MCHC RBC-ENTMCNC: 29.3 PG — SIGNIFICANT CHANGE UP (ref 27–31)
MCHC RBC-ENTMCNC: 33.1 G/DL — SIGNIFICANT CHANGE UP (ref 32–37)
MCHC RBC-ENTMCNC: 33.1 G/DL — SIGNIFICANT CHANGE UP (ref 32–37)
MCV RBC AUTO: 88.6 FL — SIGNIFICANT CHANGE UP (ref 80–94)
MCV RBC AUTO: 88.6 FL — SIGNIFICANT CHANGE UP (ref 80–94)
MONOCYTES # BLD AUTO: 0.28 K/UL — SIGNIFICANT CHANGE UP (ref 0.1–0.6)
MONOCYTES # BLD AUTO: 0.28 K/UL — SIGNIFICANT CHANGE UP (ref 0.1–0.6)
MONOCYTES NFR BLD AUTO: 7.8 % — SIGNIFICANT CHANGE UP (ref 1.7–9.3)
MONOCYTES NFR BLD AUTO: 7.8 % — SIGNIFICANT CHANGE UP (ref 1.7–9.3)
NEUTROPHILS # BLD AUTO: 3 K/UL — SIGNIFICANT CHANGE UP (ref 1.4–6.5)
NEUTROPHILS # BLD AUTO: 3 K/UL — SIGNIFICANT CHANGE UP (ref 1.4–6.5)
NEUTROPHILS NFR BLD AUTO: 83.5 % — HIGH (ref 42.2–75.2)
NEUTROPHILS NFR BLD AUTO: 83.5 % — HIGH (ref 42.2–75.2)
NRBC # BLD: 0 /100 WBCS — SIGNIFICANT CHANGE UP (ref 0–0)
NRBC # BLD: 0 /100 WBCS — SIGNIFICANT CHANGE UP (ref 0–0)
PHOSPHATE SERPL-MCNC: 1.9 MG/DL — LOW (ref 2.1–4.9)
PHOSPHATE SERPL-MCNC: 1.9 MG/DL — LOW (ref 2.1–4.9)
PLATELET # BLD AUTO: 79 K/UL — LOW (ref 130–400)
PLATELET # BLD AUTO: 79 K/UL — LOW (ref 130–400)
PMV BLD: 12.7 FL — HIGH (ref 7.4–10.4)
PMV BLD: 12.7 FL — HIGH (ref 7.4–10.4)
POTASSIUM SERPL-MCNC: 3.8 MMOL/L — SIGNIFICANT CHANGE UP (ref 3.5–5)
POTASSIUM SERPL-MCNC: 3.8 MMOL/L — SIGNIFICANT CHANGE UP (ref 3.5–5)
POTASSIUM SERPL-SCNC: 3.8 MMOL/L — SIGNIFICANT CHANGE UP (ref 3.5–5)
POTASSIUM SERPL-SCNC: 3.8 MMOL/L — SIGNIFICANT CHANGE UP (ref 3.5–5)
PROT SERPL-MCNC: 5.2 G/DL — LOW (ref 6–8)
PROT SERPL-MCNC: 5.2 G/DL — LOW (ref 6–8)
RBC # BLD: 2.9 M/UL — LOW (ref 4.7–6.1)
RBC # BLD: 2.9 M/UL — LOW (ref 4.7–6.1)
RBC # FLD: 15 % — HIGH (ref 11.5–14.5)
RBC # FLD: 15 % — HIGH (ref 11.5–14.5)
SODIUM SERPL-SCNC: 134 MMOL/L — LOW (ref 135–146)
SODIUM SERPL-SCNC: 134 MMOL/L — LOW (ref 135–146)
WBC # BLD: 3.59 K/UL — LOW (ref 4.8–10.8)
WBC # BLD: 3.59 K/UL — LOW (ref 4.8–10.8)
WBC # FLD AUTO: 3.59 K/UL — LOW (ref 4.8–10.8)
WBC # FLD AUTO: 3.59 K/UL — LOW (ref 4.8–10.8)

## 2024-01-07 PROCEDURE — 93010 ELECTROCARDIOGRAM REPORT: CPT

## 2024-01-07 PROCEDURE — 99232 SBSQ HOSP IP/OBS MODERATE 35: CPT

## 2024-01-07 RX ORDER — POLYETHYLENE GLYCOL 3350 17 G/17G
17 POWDER, FOR SOLUTION ORAL DAILY
Refills: 0 | Status: DISCONTINUED | OUTPATIENT
Start: 2024-01-07 | End: 2024-01-10

## 2024-01-07 RX ORDER — SENNA PLUS 8.6 MG/1
2 TABLET ORAL AT BEDTIME
Refills: 0 | Status: DISCONTINUED | OUTPATIENT
Start: 2024-01-07 | End: 2024-01-10

## 2024-01-07 RX ADMIN — HYDROMORPHONE HYDROCHLORIDE 0.5 MILLIGRAM(S): 2 INJECTION INTRAMUSCULAR; INTRAVENOUS; SUBCUTANEOUS at 11:52

## 2024-01-07 RX ADMIN — Medication 600 MILLIGRAM(S): at 20:42

## 2024-01-07 RX ADMIN — AMIODARONE HYDROCHLORIDE 200 MILLIGRAM(S): 400 TABLET ORAL at 05:31

## 2024-01-07 RX ADMIN — ATORVASTATIN CALCIUM 40 MILLIGRAM(S): 80 TABLET, FILM COATED ORAL at 23:00

## 2024-01-07 RX ADMIN — HYDROMORPHONE HYDROCHLORIDE 0.5 MILLIGRAM(S): 2 INJECTION INTRAMUSCULAR; INTRAVENOUS; SUBCUTANEOUS at 01:00

## 2024-01-07 RX ADMIN — TAMSULOSIN HYDROCHLORIDE 0.4 MILLIGRAM(S): 0.4 CAPSULE ORAL at 17:15

## 2024-01-07 RX ADMIN — Medication 81 MILLIGRAM(S): at 11:52

## 2024-01-07 RX ADMIN — APIXABAN 5 MILLIGRAM(S): 2.5 TABLET, FILM COATED ORAL at 05:31

## 2024-01-07 RX ADMIN — PANTOPRAZOLE SODIUM 40 MILLIGRAM(S): 20 TABLET, DELAYED RELEASE ORAL at 05:32

## 2024-01-07 RX ADMIN — HYDROMORPHONE HYDROCHLORIDE 0.5 MILLIGRAM(S): 2 INJECTION INTRAMUSCULAR; INTRAVENOUS; SUBCUTANEOUS at 05:37

## 2024-01-07 RX ADMIN — OXYCODONE HYDROCHLORIDE 5 MILLIGRAM(S): 5 TABLET ORAL at 14:04

## 2024-01-07 RX ADMIN — OXYCODONE HYDROCHLORIDE 5 MILLIGRAM(S): 5 TABLET ORAL at 15:01

## 2024-01-07 RX ADMIN — TAMSULOSIN HYDROCHLORIDE 0.4 MILLIGRAM(S): 0.4 CAPSULE ORAL at 05:32

## 2024-01-07 RX ADMIN — HYDROMORPHONE HYDROCHLORIDE 0.5 MILLIGRAM(S): 2 INJECTION INTRAMUSCULAR; INTRAVENOUS; SUBCUTANEOUS at 23:02

## 2024-01-07 RX ADMIN — POLYETHYLENE GLYCOL 3350 17 GRAM(S): 17 POWDER, FOR SOLUTION ORAL at 20:42

## 2024-01-07 RX ADMIN — MYCOPHENOLATE MOFETIL 500 MILLIGRAM(S): 250 CAPSULE ORAL at 17:15

## 2024-01-07 RX ADMIN — TACROLIMUS 3 MILLIGRAM(S): 5 CAPSULE ORAL at 05:31

## 2024-01-07 RX ADMIN — MYCOPHENOLATE MOFETIL 500 MILLIGRAM(S): 250 CAPSULE ORAL at 05:31

## 2024-01-07 RX ADMIN — HYDROMORPHONE HYDROCHLORIDE 0.5 MILLIGRAM(S): 2 INJECTION INTRAMUSCULAR; INTRAVENOUS; SUBCUTANEOUS at 19:58

## 2024-01-07 RX ADMIN — TACROLIMUS 2 MILLIGRAM(S): 5 CAPSULE ORAL at 23:00

## 2024-01-07 RX ADMIN — APIXABAN 5 MILLIGRAM(S): 2.5 TABLET, FILM COATED ORAL at 17:15

## 2024-01-07 RX ADMIN — HYDROMORPHONE HYDROCHLORIDE 0.5 MILLIGRAM(S): 2 INJECTION INTRAMUSCULAR; INTRAVENOUS; SUBCUTANEOUS at 07:17

## 2024-01-07 NOTE — PROGRESS NOTE ADULT - SUBJECTIVE AND OBJECTIVE BOX
DAVONTE CHOU  69y  Male      Patient is a 69y old  Male who presents with a chief complaint of afib (07 Jan 2024 12:14)      INTERVAL HPI/OVERNIGHT EVENTS:  He feels better, mild cough, no chest pain  Vital Signs Last 24 Hrs  T(C): 37.7 (07 Jan 2024 05:35), Max: 37.7 (07 Jan 2024 05:35)  T(F): 99.8 (07 Jan 2024 05:35), Max: 99.8 (07 Jan 2024 05:35)  HR: 70 (07 Jan 2024 05:35) (70 - 70)  BP: 134/62 (07 Jan 2024 05:35) (134/62 - 147/67)  BP(mean): --  RR: 18 (07 Jan 2024 05:35) (18 - 18)  SpO2: --          01-06-24 @ 07:01  -  01-07-24 @ 07:00  --------------------------------------------------------  IN: 2431 mL / OUT: 1075 mL / NET: 1356 mL    01-07-24 @ 07:01 - 01-07-24 @ 13:42  --------------------------------------------------------  IN: 670 mL / OUT: 275 mL / NET: 395 mL            Consultant(s) Notes Reviewed:  [x ] YES  [ ] NO          MEDICATIONS  (STANDING):  aMIOdarone    Tablet 200 milliGRAM(s) Oral daily  apixaban 5 milliGRAM(s) Oral every 12 hours  aspirin  chewable 81 milliGRAM(s) Oral daily  atorvastatin 40 milliGRAM(s) Oral at bedtime  lactated ringers. 1000 milliLiter(s) (75 mL/Hr) IV Continuous <Continuous>  mycophenolate mofetil 500 milliGRAM(s) Oral two times a day  pantoprazole    Tablet 40 milliGRAM(s) Oral before breakfast  tacrolimus 3 milliGRAM(s) Oral <User Schedule>  tacrolimus 2 milliGRAM(s) Oral at bedtime  tamsulosin 0.4 milliGRAM(s) Oral two times a day    MEDICATIONS  (PRN):  acetaminophen     Tablet .. 650 milliGRAM(s) Oral every 6 hours PRN Temp greater or equal to 38C (100.4F), Mild Pain (1 - 3)  albuterol    90 MICROgram(s) HFA Inhaler 2 Puff(s) Inhalation every 6 hours PRN Shortness of Breath and/or Wheezing  HYDROmorphone  Injectable 0.5 milliGRAM(s) IV Push every 6 hours PRN Severe Pain (7 - 10)  melatonin 3 milliGRAM(s) Oral at bedtime PRN Insomnia  oxyCODONE    IR 5 milliGRAM(s) Oral every 8 hours PRN Severe Pain (7 - 10)      LABS                          8.5    3.59  )-----------( 79       ( 07 Jan 2024 06:34 )             25.7     01-07    134<L>  |  100  |  28<H>  ----------------------------<  119<H>  3.8   |  24  |  1.8<H>    Ca    9.8      07 Jan 2024 06:34  Phos  1.9     01-07  Mg     1.6     01-07    TPro  5.2<L>  /  Alb  3.2<L>  /  TBili  0.8  /  DBili  x   /  AST  10  /  ALT  <5  /  AlkPhos  48  01-07      Urinalysis Basic - ( 07 Jan 2024 06:34 )    Color: x / Appearance: x / SG: x / pH: x  Gluc: 119 mg/dL / Ketone: x  / Bili: x / Urobili: x   Blood: x / Protein: x / Nitrite: x   Leuk Esterase: x / RBC: x / WBC x   Sq Epi: x / Non Sq Epi: x / Bacteria: x        Lactate Trend  01-05 @ 12:47 Lactate:1.6         CAPILLARY BLOOD GLUCOSE          Culture - Urine (collected 01-05-24 @ 10:23)  Source: Clean Catch Clean Catch (Midstream)  Preliminary Report (01-07-24 @ 10:35):    >100,000 CFU/ml Gram positive organisms        RADIOLOGY & ADDITIONAL TESTS:    Imaging Personally Reviewed:  [ ] YES  [ ] NO    HEALTH ISSUES - PROBLEM Dx:          PHYSICAL EXAM:  GENERAL: NAD, well-developed.  HEAD:  Atraumatic, Normocephalic.  EYES: EOMI, PERRLA, conjunctiva and sclera clear.  NECK: Supple, elevated JVP  CHEST/LUNG: Clear to auscultation bilaterally; No wheeze.  HEART: Regular rate and rhythm; S1 S2.   ABDOMEN: Soft, Nontender, Nondistended; Bowel sounds present.  EXTREMITIES:  2+ Peripheral Pulses, No clubbing, cyanosis, or edema.  PSYCH: AAOx3.  NEUROLOGY: non-focal.  SKIN: No rashes or lesions.

## 2024-01-07 NOTE — PROGRESS NOTE ADULT - SUBJECTIVE AND OBJECTIVE BOX
SUBJ:  Patient seen and examined    MEDICATIONS  (STANDING):  aMIOdarone    Tablet 200 milliGRAM(s) Oral daily  apixaban 5 milliGRAM(s) Oral every 12 hours  aspirin  chewable 81 milliGRAM(s) Oral daily  atorvastatin 40 milliGRAM(s) Oral at bedtime  lactated ringers. 1000 milliLiter(s) (75 mL/Hr) IV Continuous <Continuous>  mycophenolate mofetil 500 milliGRAM(s) Oral two times a day  pantoprazole    Tablet 40 milliGRAM(s) Oral before breakfast  tacrolimus 2 milliGRAM(s) Oral at bedtime  tacrolimus 3 milliGRAM(s) Oral <User Schedule>  tamsulosin 0.4 milliGRAM(s) Oral two times a day    MEDICATIONS  (PRN):  acetaminophen     Tablet .. 650 milliGRAM(s) Oral every 6 hours PRN Temp greater or equal to 38C (100.4F), Mild Pain (1 - 3)  albuterol    90 MICROgram(s) HFA Inhaler 2 Puff(s) Inhalation every 6 hours PRN Shortness of Breath and/or Wheezing  HYDROmorphone  Injectable 0.5 milliGRAM(s) IV Push every 6 hours PRN Severe Pain (7 - 10)  melatonin 3 milliGRAM(s) Oral at bedtime PRN Insomnia  oxyCODONE    IR 5 milliGRAM(s) Oral every 8 hours PRN Severe Pain (7 - 10)      Vital Signs Last 24 Hrs  T(C): 37.7 (07 Jan 2024 05:35), Max: 37.7 (07 Jan 2024 05:35)  T(F): 99.8 (07 Jan 2024 05:35), Max: 99.8 (07 Jan 2024 05:35)  HR: 70 (07 Jan 2024 05:35) (62 - 70)  BP: 134/62 (07 Jan 2024 05:35) (117/59 - 147/67)  BP(mean): --  RR: 18 (07 Jan 2024 05:35) (18 - 18)  SpO2: --         REVIEW OF SYSTEMS:  CONSTITUTIONAL: No fever, weight loss, or fatigue  CARDIOLOGY: Patient denies chest pain, shortness of breath or syncopal episodes and leg edema.   RESPIRATORY: denies shortness of breath, wheezing.   NEUROLOGICAL: NO weakness, no focal deficits to report.  GI: no BRBPR, no Nausea, no Vomiting, no diarrhea.          PHYSICAL EXAM:  · CONSTITUTIONAL:	Well-developed, well nourished    BMI-  ·RESPIRATORY:   airway patent; breath sounds equal; good air movement; respirations non-labored; clear to auscultation bilaterally; no chest wall tenderness; no intercostal retractions; no rales, rhonchi or wheeze  · CARDIOVASCULAR	regular rate and rhythm  no rub  no murmur  normal PMI  · EXTREMITIES: No cyanosis, clubbing or edema  · VASCULAR: 	Equal and normal pulses (carotid, femoral, dorsalis pedis)  	  TELEMETRY:    ECG:    TTE:    LABS:                        8.5    3.59  )-----------( 79       ( 07 Jan 2024 06:34 )             25.7     01-07    134<L>  |  100  |  28<H>  ----------------------------<  119<H>  3.8   |  24  |  1.8<H>    Ca    9.8      07 Jan 2024 06:34  Phos  1.9     01-07  Mg     1.6     01-07    TPro  5.2<L>  /  Alb  3.2<L>  /  TBili  0.8  /  DBili  x   /  AST  10  /  ALT  <5  /  AlkPhos  48  01-07        PT/INR - ( 05 Jan 2024 12:47 )   PT: 18.40 sec;   INR: 1.61 ratio         PTT - ( 05 Jan 2024 12:47 )  PTT:35.5 sec    I&O's Summary    06 Jan 2024 07:01  -  07 Jan 2024 07:00  --------------------------------------------------------  IN: 2431 mL / OUT: 1075 mL / NET: 1356 mL    07 Jan 2024 07:01  -  07 Jan 2024 12:15  --------------------------------------------------------  IN: 670 mL / OUT: 275 mL / NET: 395 mL      BNP  RADIOLOGY & ADDITIONAL STUDIES:    IMPRESSION AND PLAN:    1)CAD S/P PCI of RCA and LAD in 2019 stable.     Troponin borderline high probably due to SANTA and afib with RVR during admission.     Stable on medical treatment,     Continue ASA, metoprolol and statin.     No cardiac cath at this time due to SANTA.     Continue medical treatment.    2)Afib/Aflutter, S/P ablation     Presently in sinus rhythm.    Decrease metoprolol to 50 mg daily if HR remain <60 BPM.    Continue eliquis.    3)CKD S/P Transplant, SANTA     Continue hydration     F/U renal.    4)COPD stable    5)HTN controlled on treatment.      No further cardiac workup needed at this time.

## 2024-01-07 NOTE — PROGRESS NOTE ADULT - SUBJECTIVE AND OBJECTIVE BOX
[place] [facility] FOLLOW UP NOTE  --------------------------------------------------------------------------------  24 hour events/subjective: Patient examined. Appears comfortable.    PAST HISTORY  --------------------------------------------------------------------------------  No significant changes to PMH, PSH, FHx, SHx, unless otherwise noted    ALLERGIES & MEDICATIONS  --------------------------------------------------------------------------------  Allergies    Rituxan (Hives)  Rifuxen (Swelling)    Standing Inpatient Medications  aMIOdarone    Tablet 200 milliGRAM(s) Oral daily  apixaban 5 milliGRAM(s) Oral every 12 hours  aspirin  chewable 81 milliGRAM(s) Oral daily  atorvastatin 40 milliGRAM(s) Oral at bedtime  lactated ringers 1000 milliLiter(s) IV Continuous <Continuous>  mycophenolate mofetil 500 milliGRAM(s) Oral two times a day  pantoprazole    Tablet 40 milliGRAM(s) Oral before breakfast  tacrolimus 2 milliGRAM(s) Oral at bedtime  tacrolimus 3 milliGRAM(s) Oral <User Schedule>  tamsulosin 0.4 milliGRAM(s) Oral two times a day    PRN Inpatient Medications  acetaminophen     Tablet .. 650 milliGRAM(s) Oral every 6 hours PRN  albuterol    90 MICROgram(s) HFA Inhaler 2 Puff(s) Inhalation every 6 hours PRN  HYDROmorphone  Injectable 0.5 milliGRAM(s) IV Push every 6 hours PRN  melatonin 3 milliGRAM(s) Oral at bedtime PRN  oxyCODONE    IR 5 milliGRAM(s) Oral every 8 hours PRN    VITALS/PHYSICAL EXAM  --------------------------------------------------------------------------------  T(C): 36 (01-07-24 @ 14:19), Max: 37.7 (01-07-24 @ 05:35)  HR: 69 (01-07-24 @ 14:19) (69 - 70)  BP: 113/56 (01-07-24 @ 14:19) (113/56 - 147/67)  RR: 18 (01-07-24 @ 14:19) (18 - 18)  Height (cm): 175.3 (01-06-24 @ 00:58)  Weight (kg): 59.1 (01-06-24 @ 00:58)  BMI (kg/m2): 19.2 (01-06-24 @ 00:58)  BSA (m2): 1.72 (01-06-24 @ 00:58)    01-06-24 @ 07:01  -  01-07-24 @ 07:00  --------------------------------------------------------  IN: 2431 mL / OUT: 1075 mL / NET: 1356 mL    01-07-24 @ 07:01  -  01-07-24 @ 14:58  --------------------------------------------------------  IN: 670 mL / OUT: 275 mL / NET: 395 mL      Physical Exam  	Gen: NAD  	Pulm: CTA B/L  	CV: RRR, S1S2  	Abd: +BS, soft, nontender/nondistended  	: No suprapubic tenderness  	LE: Warm, no edema    LABS/STUDIES  --------------------------------------------------------------------------------              8.5    3.59  >-----------<  79       [01-07-24 @ 06:34]              25.7     134  |  100  |  28  ----------------------------<  119      [01-07-24 @ 06:34]  3.8   |  24  |  1.8        Ca     9.8     [01-07-24 @ 06:34]      Mg     1.6     [01-07-24 @ 06:34]      Phos  1.9     [01-07-24 @ 06:34]    TPro  5.2  /  Alb  3.2  /  TBili  0.8  /  DBili  x   /  AST  10  /  ALT  <5  /  AlkPhos  48  [01-07-24 @ 06:34]    Creatinine Trend:  SCr 1.8 [01-07 @ 06:34]  SCr 2.1 [01-06 @ 07:04]  SCr 2.2 [01-05 @ 12:47]  SCr 2.3 [01-04 @ 23:43]  SCr 1.3 [12-18 @ 05:50]    Urinalysis - [01-07-24 @ 06:34]      Color  / Appearance  / SG  / pH       Gluc 119 / Ketone   / Bili  / Urobili        Blood  / Protein  / Leuk Est  / Nitrite       RBC  / WBC  / Hyaline  / Gran  / Sq Epi  / Non Sq Epi  / Bacteria     HbA1c 5.1      [11-06-19 @ 15:00]  TSH 0.49      [12-18-23 @ 08:36] [place] [facility] FOLLOW UP NOTE  --------------------------------------------------------------------------------  24 hour events/subjective: Patient examined. Appears comfortable.    PAST HISTORY  --------------------------------------------------------------------------------  No significant changes to PMH, PSH, FHx, SHx, unless otherwise noted    ALLERGIES & MEDICATIONS  --------------------------------------------------------------------------------  Allergies    Rituxan (Hives)  Rifuxen (Swelling)    Standing Inpatient Medications  aMIOdarone    Tablet 200 milliGRAM(s) Oral daily  apixaban 5 milliGRAM(s) Oral every 12 hours  aspirin  chewable 81 milliGRAM(s) Oral daily  atorvastatin 40 milliGRAM(s) Oral at bedtime  lactated ringers 1000 milliLiter(s) IV Continuous <Continuous>  mycophenolate mofetil 500 milliGRAM(s) Oral two times a day  pantoprazole    Tablet 40 milliGRAM(s) Oral before breakfast  tacrolimus 2 milliGRAM(s) Oral at bedtime  tacrolimus 3 milliGRAM(s) Oral <User Schedule>  tamsulosin 0.4 milliGRAM(s) Oral two times a day    PRN Inpatient Medications  acetaminophen     Tablet .. 650 milliGRAM(s) Oral every 6 hours PRN  albuterol    90 MICROgram(s) HFA Inhaler 2 Puff(s) Inhalation every 6 hours PRN  HYDROmorphone  Injectable 0.5 milliGRAM(s) IV Push every 6 hours PRN  melatonin 3 milliGRAM(s) Oral at bedtime PRN  oxyCODONE    IR 5 milliGRAM(s) Oral every 8 hours PRN    VITALS/PHYSICAL EXAM  --------------------------------------------------------------------------------  T(C): 36 (01-07-24 @ 14:19), Max: 37.7 (01-07-24 @ 05:35)  HR: 69 (01-07-24 @ 14:19) (69 - 70)  BP: 113/56 (01-07-24 @ 14:19) (113/56 - 147/67)  RR: 18 (01-07-24 @ 14:19) (18 - 18)  Height (cm): 175.3 (01-06-24 @ 00:58)  Weight (kg): 59.1 (01-06-24 @ 00:58)  BMI (kg/m2): 19.2 (01-06-24 @ 00:58)  BSA (m2): 1.72 (01-06-24 @ 00:58)    01-06-24 @ 07:01  -  01-07-24 @ 07:00  --------------------------------------------------------  IN: 2431 mL / OUT: 1075 mL / NET: 1356 mL    01-07-24 @ 07:01  -  01-07-24 @ 14:58  --------------------------------------------------------  IN: 670 mL / OUT: 275 mL / NET: 395 mL      Physical Exam  	Gen: NAD  	Pulm: CTA B/L  	CV: RRR, S1S2  	Abd: +BS, soft, nontender/nondistended  	: No suprapubic tenderness  	LE: Warm, no edema    LABS/STUDIES  --------------------------------------------------------------------------------              8.5    3.59  >-----------<  79       [01-07-24 @ 06:34]              25.7     134  |  100  |  28  ----------------------------<  119      [01-07-24 @ 06:34]  3.8   |  24  |  1.8        Ca     9.8     [01-07-24 @ 06:34]      Mg     1.6     [01-07-24 @ 06:34]      Phos  1.9     [01-07-24 @ 06:34]    TPro  5.2  /  Alb  3.2  /  TBili  0.8  /  DBili  x   /  AST  10  /  ALT  <5  /  AlkPhos  48  [01-07-24 @ 06:34]    Creatinine Trend:  SCr 1.8 [01-07 @ 06:34]  SCr 2.1 [01-06 @ 07:04]  SCr 2.2 [01-05 @ 12:47]  SCr 2.3 [01-04 @ 23:43]  SCr 1.3 [12-18 @ 05:50]    Urinalysis - [01-07-24 @ 06:34]      Color  / Appearance  / SG  / pH       Gluc 119 / Ketone   / Bili  / Urobili        Blood  / Protein  / Leuk Est  / Nitrite       RBC  / WBC  / Hyaline  / Gran  / Sq Epi  / Non Sq Epi  / Bacteria     HbA1c 5.1      [11-06-19 @ 15:00]  TSH 0.49      [12-18-23 @ 08:36]

## 2024-01-07 NOTE — PROGRESS NOTE ADULT - ASSESSMENT
69 year old male with PMHx pAfib/Aflutter(on Eliquis) s/p ablation on 12/15/23, CAD s/p PCI(LAD and RCA) for unstable angina in 2019, mild COPD not on home o2, CKD3, Hx of renal transplant 3 years ago, HEP C, decompensated liver cirrhosis(portal HTN, esophageal varices), Melanoma R eye s/p Laser, Breast cancer gynecomastia, HLD, HTN, lumbar Radiculopathy who presents to the ED with c/o SOB, generalized weakness and fatigue for past few weeks.    A/P:   Acute HFpEF:   NSTEMI   CAD s/p PCI(LAD) and staged PCI(RCA) in 2019 for unstable angina  Patient with SOB, cough, Pro-BNp 23K, elevated JVP.   CXR showed no acute infiltrates.   Troponin 95>85  EKG showed new ST and T waves abnormalities on inferior and lateral leads.   Echo showed Hyperdynamic global left ventricular systolic function LVEF 76%, severe Left atrial enlargement, mod concentric LVH, Grade III diastolic dysfunction, mod MR, mild TR, mild AR,  consistent with borderline pulmonary hypertension. Small secundum atrial septal defect with predominantly left to right shunting across the atrial septum.  s/p Lasix IV, hold IV diuresis as patient looks euvolemic, JVP improved.   Continue ASA, Eliquis and Lipitor. Off Metoprolol due to bradycardia  Cardiology recommended medical therapy. No plan for cardiac cath due to Acute Kidney Injury.     Acute Kidney Injury:   History of renal transplant.   Possibly Pre-renal.   UA was negative. Cr is improving to 1.8, while on IV fluid, Caution with IV fluid, will hold it tomorrow.   Nephrology follow up.   Avoid Nephrotoxic agents,   Continue Tacrolimus and Mycophenolate Mofetil 500mg BID. Tacrolimus level 11    Paroxysmal Atrial Fibrillation /Flutter s/p ablation  HR is stable.   Metoprolol held per EP due to bradycardia  Continue Amiodarone and Eliquis.     COPD, Former smoker(quit yrs ago)  No wheezing on exam  Continue albuterol prn  Patient with cough, RVP negative  CT chest non con showed No acute intrathoracic pathology. Upper lobe predominant emphysematous changes. Left lower lobe 0.5 cm nodule, unchanged.  Cardiomegaly, unchanged. Dilated ascending thoracic aorta measuring 4.2 cm, unchanged    Chronic Compensated Liver Cirrhosis:   History of hepatitis C  No ascites on exam, PLT 86, INR 1.6 on eliquis,   hepatology follow up outpatient.     Lumbar radiculopathy, chronic back pain   Oxycodone prn    DVT prophylaxis: Eliquis  GI prophylaxis: PPI  #Progress Note Handoff:  Pending (specify):  improving Acute Kidney Injury, Cardiology follow up.  Family discussion:  Disposition: Home.

## 2024-01-07 NOTE — PROGRESS NOTE ADULT - ASSESSMENT
Status post  donor Kidney transplant at [place] 6 years ago  SANTA, uncertain etiology ?prerenal azotemia  Dyspnea on exertion: CXR clear, no edema  Pancytopenia  CAD  Atrial fibrillation  HTN  Cirrhosis    Plan    Creatinine improved with IVF  Stop IVF  Encourage oral intake  OK for DC from renal standpoint  F/U with [facility] team  Continue tacrolimus  Continue mycophenolate  Check tacrolimus level   Status post  donor Kidney transplant at [place] 6 years ago  SANTA, uncertain etiology ?prerenal azotemia  Dyspnea on exertion: CXR clear, no edema  Pancytopenia  CAD  Atrial fibrillation  HTN  Cirrhosis    Plan    Creatinine improved with IVF  Stop IVF  Encourage oral intake  OK for DC from renal standpoint  F/U with [place] [facility] team  Continue tacrolimus  Continue mycophenolate  Check tacrolimus level

## 2024-01-08 ENCOUNTER — TRANSCRIPTION ENCOUNTER (OUTPATIENT)
Age: 69
End: 2024-01-08

## 2024-01-08 LAB
ALBUMIN SERPL ELPH-MCNC: 3.3 G/DL — LOW (ref 3.5–5.2)
ALBUMIN SERPL ELPH-MCNC: 3.3 G/DL — LOW (ref 3.5–5.2)
ALP SERPL-CCNC: 46 U/L — SIGNIFICANT CHANGE UP (ref 30–115)
ALP SERPL-CCNC: 46 U/L — SIGNIFICANT CHANGE UP (ref 30–115)
ALT FLD-CCNC: <5 U/L — SIGNIFICANT CHANGE UP (ref 0–41)
ALT FLD-CCNC: <5 U/L — SIGNIFICANT CHANGE UP (ref 0–41)
ANION GAP SERPL CALC-SCNC: 9 MMOL/L — SIGNIFICANT CHANGE UP (ref 7–14)
ANION GAP SERPL CALC-SCNC: 9 MMOL/L — SIGNIFICANT CHANGE UP (ref 7–14)
AST SERPL-CCNC: 12 U/L — SIGNIFICANT CHANGE UP (ref 0–41)
AST SERPL-CCNC: 12 U/L — SIGNIFICANT CHANGE UP (ref 0–41)
BASOPHILS # BLD AUTO: 0.02 K/UL — SIGNIFICANT CHANGE UP (ref 0–0.2)
BASOPHILS # BLD AUTO: 0.02 K/UL — SIGNIFICANT CHANGE UP (ref 0–0.2)
BASOPHILS NFR BLD AUTO: 0.5 % — SIGNIFICANT CHANGE UP (ref 0–1)
BASOPHILS NFR BLD AUTO: 0.5 % — SIGNIFICANT CHANGE UP (ref 0–1)
BILIRUB SERPL-MCNC: 1.3 MG/DL — HIGH (ref 0.2–1.2)
BILIRUB SERPL-MCNC: 1.3 MG/DL — HIGH (ref 0.2–1.2)
BUN SERPL-MCNC: 20 MG/DL — SIGNIFICANT CHANGE UP (ref 10–20)
BUN SERPL-MCNC: 20 MG/DL — SIGNIFICANT CHANGE UP (ref 10–20)
CALCIUM SERPL-MCNC: 10.3 MG/DL — SIGNIFICANT CHANGE UP (ref 8.4–10.4)
CALCIUM SERPL-MCNC: 10.3 MG/DL — SIGNIFICANT CHANGE UP (ref 8.4–10.4)
CHLORIDE SERPL-SCNC: 99 MMOL/L — SIGNIFICANT CHANGE UP (ref 98–110)
CHLORIDE SERPL-SCNC: 99 MMOL/L — SIGNIFICANT CHANGE UP (ref 98–110)
CO2 SERPL-SCNC: 24 MMOL/L — SIGNIFICANT CHANGE UP (ref 17–32)
CO2 SERPL-SCNC: 24 MMOL/L — SIGNIFICANT CHANGE UP (ref 17–32)
CREAT SERPL-MCNC: 1.6 MG/DL — HIGH (ref 0.7–1.5)
CREAT SERPL-MCNC: 1.6 MG/DL — HIGH (ref 0.7–1.5)
EGFR: 47 ML/MIN/1.73M2 — LOW
EGFR: 47 ML/MIN/1.73M2 — LOW
EOSINOPHIL # BLD AUTO: 0 K/UL — SIGNIFICANT CHANGE UP (ref 0–0.7)
EOSINOPHIL # BLD AUTO: 0 K/UL — SIGNIFICANT CHANGE UP (ref 0–0.7)
EOSINOPHIL NFR BLD AUTO: 0 % — SIGNIFICANT CHANGE UP (ref 0–8)
EOSINOPHIL NFR BLD AUTO: 0 % — SIGNIFICANT CHANGE UP (ref 0–8)
GLUCOSE SERPL-MCNC: 112 MG/DL — HIGH (ref 70–99)
GLUCOSE SERPL-MCNC: 112 MG/DL — HIGH (ref 70–99)
HCT VFR BLD CALC: 26.3 % — LOW (ref 42–52)
HCT VFR BLD CALC: 26.3 % — LOW (ref 42–52)
HGB BLD-MCNC: 8.5 G/DL — LOW (ref 14–18)
HGB BLD-MCNC: 8.5 G/DL — LOW (ref 14–18)
IMM GRANULOCYTES NFR BLD AUTO: 1.3 % — HIGH (ref 0.1–0.3)
IMM GRANULOCYTES NFR BLD AUTO: 1.3 % — HIGH (ref 0.1–0.3)
LYMPHOCYTES # BLD AUTO: 0.48 K/UL — LOW (ref 1.2–3.4)
LYMPHOCYTES # BLD AUTO: 0.48 K/UL — LOW (ref 1.2–3.4)
LYMPHOCYTES # BLD AUTO: 12.6 % — LOW (ref 20.5–51.1)
LYMPHOCYTES # BLD AUTO: 12.6 % — LOW (ref 20.5–51.1)
MAGNESIUM SERPL-MCNC: 1.6 MG/DL — LOW (ref 1.8–2.4)
MAGNESIUM SERPL-MCNC: 1.6 MG/DL — LOW (ref 1.8–2.4)
MCHC RBC-ENTMCNC: 29 PG — SIGNIFICANT CHANGE UP (ref 27–31)
MCHC RBC-ENTMCNC: 29 PG — SIGNIFICANT CHANGE UP (ref 27–31)
MCHC RBC-ENTMCNC: 32.3 G/DL — SIGNIFICANT CHANGE UP (ref 32–37)
MCHC RBC-ENTMCNC: 32.3 G/DL — SIGNIFICANT CHANGE UP (ref 32–37)
MCV RBC AUTO: 89.8 FL — SIGNIFICANT CHANGE UP (ref 80–94)
MCV RBC AUTO: 89.8 FL — SIGNIFICANT CHANGE UP (ref 80–94)
MONOCYTES # BLD AUTO: 0.34 K/UL — SIGNIFICANT CHANGE UP (ref 0.1–0.6)
MONOCYTES # BLD AUTO: 0.34 K/UL — SIGNIFICANT CHANGE UP (ref 0.1–0.6)
MONOCYTES NFR BLD AUTO: 8.9 % — SIGNIFICANT CHANGE UP (ref 1.7–9.3)
MONOCYTES NFR BLD AUTO: 8.9 % — SIGNIFICANT CHANGE UP (ref 1.7–9.3)
NEUTROPHILS # BLD AUTO: 2.91 K/UL — SIGNIFICANT CHANGE UP (ref 1.4–6.5)
NEUTROPHILS # BLD AUTO: 2.91 K/UL — SIGNIFICANT CHANGE UP (ref 1.4–6.5)
NEUTROPHILS NFR BLD AUTO: 76.7 % — HIGH (ref 42.2–75.2)
NEUTROPHILS NFR BLD AUTO: 76.7 % — HIGH (ref 42.2–75.2)
NRBC # BLD: 0 /100 WBCS — SIGNIFICANT CHANGE UP (ref 0–0)
NRBC # BLD: 0 /100 WBCS — SIGNIFICANT CHANGE UP (ref 0–0)
PHOSPHATE SERPL-MCNC: 1.8 MG/DL — LOW (ref 2.1–4.9)
PHOSPHATE SERPL-MCNC: 1.8 MG/DL — LOW (ref 2.1–4.9)
PLATELET # BLD AUTO: 69 K/UL — LOW (ref 130–400)
PLATELET # BLD AUTO: 69 K/UL — LOW (ref 130–400)
PMV BLD: 12.4 FL — HIGH (ref 7.4–10.4)
PMV BLD: 12.4 FL — HIGH (ref 7.4–10.4)
POTASSIUM SERPL-MCNC: 4.6 MMOL/L — SIGNIFICANT CHANGE UP (ref 3.5–5)
POTASSIUM SERPL-MCNC: 4.6 MMOL/L — SIGNIFICANT CHANGE UP (ref 3.5–5)
POTASSIUM SERPL-SCNC: 4.6 MMOL/L — SIGNIFICANT CHANGE UP (ref 3.5–5)
POTASSIUM SERPL-SCNC: 4.6 MMOL/L — SIGNIFICANT CHANGE UP (ref 3.5–5)
PROT SERPL-MCNC: 5.3 G/DL — LOW (ref 6–8)
PROT SERPL-MCNC: 5.3 G/DL — LOW (ref 6–8)
RBC # BLD: 2.93 M/UL — LOW (ref 4.7–6.1)
RBC # BLD: 2.93 M/UL — LOW (ref 4.7–6.1)
RBC # FLD: 15 % — HIGH (ref 11.5–14.5)
RBC # FLD: 15 % — HIGH (ref 11.5–14.5)
SODIUM SERPL-SCNC: 132 MMOL/L — LOW (ref 135–146)
SODIUM SERPL-SCNC: 132 MMOL/L — LOW (ref 135–146)
WBC # BLD: 3.8 K/UL — LOW (ref 4.8–10.8)
WBC # BLD: 3.8 K/UL — LOW (ref 4.8–10.8)
WBC # FLD AUTO: 3.8 K/UL — LOW (ref 4.8–10.8)
WBC # FLD AUTO: 3.8 K/UL — LOW (ref 4.8–10.8)

## 2024-01-08 PROCEDURE — 99232 SBSQ HOSP IP/OBS MODERATE 35: CPT

## 2024-01-08 PROCEDURE — 93010 ELECTROCARDIOGRAM REPORT: CPT

## 2024-01-08 RX ORDER — SODIUM,POTASSIUM PHOSPHATES 278-250MG
1 POWDER IN PACKET (EA) ORAL THREE TIMES A DAY
Refills: 0 | Status: COMPLETED | OUTPATIENT
Start: 2024-01-08 | End: 2024-01-10

## 2024-01-08 RX ORDER — FUROSEMIDE 40 MG
1 TABLET ORAL
Refills: 0 | DISCHARGE

## 2024-01-08 RX ORDER — ATORVASTATIN CALCIUM 80 MG/1
1 TABLET, FILM COATED ORAL
Qty: 30 | Refills: 0
Start: 2024-01-08 | End: 2024-02-06

## 2024-01-08 RX ORDER — FUROSEMIDE 40 MG
1 TABLET ORAL
Qty: 30 | Refills: 0
Start: 2024-01-08

## 2024-01-08 RX ORDER — HYDROMORPHONE HYDROCHLORIDE 2 MG/ML
1 INJECTION INTRAMUSCULAR; INTRAVENOUS; SUBCUTANEOUS ONCE
Refills: 0 | Status: DISCONTINUED | OUTPATIENT
Start: 2024-01-08 | End: 2024-01-08

## 2024-01-08 RX ORDER — GUAIFENESIN/DEXTROMETHORPHAN 600MG-30MG
10 TABLET, EXTENDED RELEASE 12 HR ORAL
Refills: 0 | Status: DISCONTINUED | OUTPATIENT
Start: 2024-01-08 | End: 2024-01-10

## 2024-01-08 RX ADMIN — Medication 81 MILLIGRAM(S): at 11:48

## 2024-01-08 RX ADMIN — MYCOPHENOLATE MOFETIL 500 MILLIGRAM(S): 250 CAPSULE ORAL at 17:07

## 2024-01-08 RX ADMIN — TACROLIMUS 2 MILLIGRAM(S): 5 CAPSULE ORAL at 21:27

## 2024-01-08 RX ADMIN — OXYCODONE HYDROCHLORIDE 5 MILLIGRAM(S): 5 TABLET ORAL at 00:00

## 2024-01-08 RX ADMIN — AMIODARONE HYDROCHLORIDE 200 MILLIGRAM(S): 400 TABLET ORAL at 06:04

## 2024-01-08 RX ADMIN — TAMSULOSIN HYDROCHLORIDE 0.4 MILLIGRAM(S): 0.4 CAPSULE ORAL at 17:07

## 2024-01-08 RX ADMIN — HYDROMORPHONE HYDROCHLORIDE 0.5 MILLIGRAM(S): 2 INJECTION INTRAMUSCULAR; INTRAVENOUS; SUBCUTANEOUS at 07:06

## 2024-01-08 RX ADMIN — HYDROMORPHONE HYDROCHLORIDE 0.5 MILLIGRAM(S): 2 INJECTION INTRAMUSCULAR; INTRAVENOUS; SUBCUTANEOUS at 05:58

## 2024-01-08 RX ADMIN — HYDROMORPHONE HYDROCHLORIDE 0.5 MILLIGRAM(S): 2 INJECTION INTRAMUSCULAR; INTRAVENOUS; SUBCUTANEOUS at 12:00

## 2024-01-08 RX ADMIN — Medication 1 PACKET(S): at 21:37

## 2024-01-08 RX ADMIN — MYCOPHENOLATE MOFETIL 500 MILLIGRAM(S): 250 CAPSULE ORAL at 05:59

## 2024-01-08 RX ADMIN — TACROLIMUS 3 MILLIGRAM(S): 5 CAPSULE ORAL at 05:59

## 2024-01-08 RX ADMIN — HYDROMORPHONE HYDROCHLORIDE 1 MILLIGRAM(S): 2 INJECTION INTRAMUSCULAR; INTRAVENOUS; SUBCUTANEOUS at 21:12

## 2024-01-08 RX ADMIN — HYDROMORPHONE HYDROCHLORIDE 0.5 MILLIGRAM(S): 2 INJECTION INTRAMUSCULAR; INTRAVENOUS; SUBCUTANEOUS at 11:48

## 2024-01-08 RX ADMIN — OXYCODONE HYDROCHLORIDE 5 MILLIGRAM(S): 5 TABLET ORAL at 21:36

## 2024-01-08 RX ADMIN — Medication 1 PACKET(S): at 15:20

## 2024-01-08 RX ADMIN — OXYCODONE HYDROCHLORIDE 5 MILLIGRAM(S): 5 TABLET ORAL at 01:00

## 2024-01-08 RX ADMIN — TAMSULOSIN HYDROCHLORIDE 0.4 MILLIGRAM(S): 0.4 CAPSULE ORAL at 06:00

## 2024-01-08 RX ADMIN — PANTOPRAZOLE SODIUM 40 MILLIGRAM(S): 20 TABLET, DELAYED RELEASE ORAL at 06:00

## 2024-01-08 RX ADMIN — HYDROMORPHONE HYDROCHLORIDE 0.5 MILLIGRAM(S): 2 INJECTION INTRAMUSCULAR; INTRAVENOUS; SUBCUTANEOUS at 19:01

## 2024-01-08 RX ADMIN — HYDROMORPHONE HYDROCHLORIDE 1 MILLIGRAM(S): 2 INJECTION INTRAMUSCULAR; INTRAVENOUS; SUBCUTANEOUS at 21:18

## 2024-01-08 RX ADMIN — APIXABAN 5 MILLIGRAM(S): 2.5 TABLET, FILM COATED ORAL at 06:00

## 2024-01-08 RX ADMIN — ATORVASTATIN CALCIUM 40 MILLIGRAM(S): 80 TABLET, FILM COATED ORAL at 21:27

## 2024-01-08 RX ADMIN — APIXABAN 5 MILLIGRAM(S): 2.5 TABLET, FILM COATED ORAL at 17:06

## 2024-01-08 NOTE — DISCHARGE NOTE NURSING/CASE MANAGEMENT/SOCIAL WORK - PATIENT PORTAL LINK FT
You can access the FollowMyHealth Patient Portal offered by [facility] by registering at the following website: [email or web address]. By joining [facility] ([facility])’s FollowMyHealth portal, you will also be able to view your health information using other applications (apps) compatible with our system. You can access the FollowMyHealth Patient Portal offered by [facility] by registering at the following website: [email or web address]. By joining [facility]’s FollowMyHealth portal, you will also be able to view your health information using other applications (apps) compatible with our system.

## 2024-01-08 NOTE — PROGRESS NOTE ADULT - ASSESSMENT
69 year old male with PMHx pAfib/Aflutter(on Eliquis) s/p ablation on 12/15/23, CAD s/p PCI(LAD and RCA) for unstable angina in 2019, mild COPD not on home o2, CKD3, Hx of renal transplant 3 years ago, HEP C, decompensated liver cirrhosis(portal HTN, esophageal varices), Melanoma R eye s/p Laser, Breast cancer gynecomastia, HLD, HTN, lumbar Radiculopathy who presents to the ED with c/o SOB, generalized weakness and fatigue for past few weeks.    A/P:   Acute HFpEF:   NSTEMI   CAD s/p PCI(LAD) and staged PCI(RCA) in 2019 for unstable angina  Patient with SOB, cough, Pro-BNp 23K, elevated JVP.   CXR showed no acute infiltrates.   Troponin 95>85  EKG showed new ST and T waves abnormalities on inferior and lateral leads.   Echo showed Hyperdynamic global left ventricular systolic function LVEF 76%, severe Left atrial enlargement, mod concentric LVH, Grade III diastolic dysfunction, mod MR, mild TR, mild AR,  consistent with borderline pulmonary hypertension. Small secundum atrial septal defect with predominantly left to right shunting across the atrial septum.  s/p Lasix IV, hold IV diuresis as patient looks euvolemic, JVP improved.   Continue ASA, Eliquis and Lipitor. Off Metoprolol due to bradycardia  Cardiology recommended medical therapy. No plan for cardiac cath due to Acute Kidney Injury.     Acute Kidney Injury:   History of renal transplant.   Possibly Pre-renal.   UA was negative. Cr is improving to 1.6,s/p IV fluid, encourage oral intake.   Nephrology follow up.   Avoid Nephrotoxic agents,   Continue Tacrolimus and Mycophenolate Mofetil 500mg BID. Tacrolimus level 11    Paroxysmal Atrial Fibrillation /Flutter s/p ablation  HR is stable.   Metoprolol held per EP due to bradycardia  Continue Amiodarone and Eliquis.     Worsening Anemia:   Hb 8.5 was 10.5 on admission, 12 in Dec 2023.   He had recent EGD and Colonoscopy s/p multiple polypectomy.   Patient with constipation, no recent bowel movement, he denies melena or hematochezia.   QI is negative, discussed with GI, recommended outpatient follow up for possible capsule endoscopy.     COPD, Former smoker(quit yrs ago)  No wheezing on exam  Continue albuterol prn  Patient with cough, RVP negative  CT chest non con showed No acute intrathoracic pathology. Upper lobe predominant emphysematous changes. Left lower lobe 0.5 cm nodule, unchanged.  Cardiomegaly, unchanged. Dilated ascending thoracic aorta measuring 4.2 cm, unchanged    Chronic Compensated Liver Cirrhosis:   History of hepatitis C  No ascites on exam, PLT 86, INR 1.6 on eliquis,   hepatology follow up outpatient.     Lumbar radiculopathy, chronic back pain  Oxycodone prn    DVT prophylaxis: Eliquis  GI prophylaxis: PPI  #Progress Note Handoff:  Pending (specify):  monitor Hb  Family discussion:  Disposition: Home in 24 hrs if Hb is stable.

## 2024-01-08 NOTE — PROGRESS NOTE ADULT - SUBJECTIVE AND OBJECTIVE BOX
DAVONTE CHOU  69y  Male      Patient is a 69y old  Male who presents with a chief complaint of afib (07 Jan 2024 12:14)      INTERVAL HPI/OVERNIGHT EVENTS:  He feels weak, no chest pain,   Vital Signs Last 24 Hrs  T(C): 36.7 (08 Jan 2024 13:41), Max: 37.6 (08 Jan 2024 05:15)  T(F): 98 (08 Jan 2024 13:41), Max: 99.7 (08 Jan 2024 05:15)  HR: 76 (08 Jan 2024 13:41) (75 - 76)  BP: 146/65 (08 Jan 2024 13:41) (146/65 - 152/76)  BP(mean): --  RR: 18 (08 Jan 2024 13:41) (18 - 19)  SpO2: 100% (08 Jan 2024 05:15) (100% - 100%)    Parameters below as of 08 Jan 2024 05:15  Patient On (Oxygen Delivery Method): room air          01-07-24 @ 07:01 - 01-08-24 @ 07:00  --------------------------------------------------------  IN: 1270 mL / OUT: 675 mL / NET: 595 mL    01-08-24 @ 07:01 - 01-08-24 @ 17:37  --------------------------------------------------------  IN: 1142 mL / OUT: 600 mL / NET: 542 mL            Consultant(s) Notes Reviewed:  [x ] YES  [ ] NO          MEDICATIONS  (STANDING):  aMIOdarone    Tablet 200 milliGRAM(s) Oral daily  apixaban 5 milliGRAM(s) Oral every 12 hours  aspirin  chewable 81 milliGRAM(s) Oral daily  atorvastatin 40 milliGRAM(s) Oral at bedtime  mycophenolate mofetil 500 milliGRAM(s) Oral two times a day  pantoprazole    Tablet 40 milliGRAM(s) Oral before breakfast  polyethylene glycol 3350 17 Gram(s) Oral daily  potassium phosphate / sodium phosphate Powder (PHOS-NaK) 1 Packet(s) Oral three times a day  senna 2 Tablet(s) Oral at bedtime  tacrolimus 2 milliGRAM(s) Oral at bedtime  tacrolimus 3 milliGRAM(s) Oral <User Schedule>  tamsulosin 0.4 milliGRAM(s) Oral two times a day    MEDICATIONS  (PRN):  acetaminophen     Tablet .. 650 milliGRAM(s) Oral every 6 hours PRN Temp greater or equal to 38C (100.4F), Mild Pain (1 - 3)  albuterol    90 MICROgram(s) HFA Inhaler 2 Puff(s) Inhalation every 6 hours PRN Shortness of Breath and/or Wheezing  guaifenesin/dextromethorphan Oral Liquid 200 milliLiter(s) Oral four times a day PRN Cough  HYDROmorphone  Injectable 0.5 milliGRAM(s) IV Push every 6 hours PRN Severe Pain (7 - 10)  melatonin 3 milliGRAM(s) Oral at bedtime PRN Insomnia  oxyCODONE    IR 5 milliGRAM(s) Oral every 8 hours PRN Severe Pain (7 - 10)      LABS                          8.5    3.80  )-----------( 69       ( 08 Jan 2024 05:46 )             26.3     01-08    132<L>  |  99  |  20  ----------------------------<  112<H>  4.6   |  24  |  1.6<H>    Ca    10.3      08 Jan 2024 05:46  Phos  1.8     01-08  Mg     1.6     01-08    TPro  5.3<L>  /  Alb  3.3<L>  /  TBili  1.3<H>  /  DBili  x   /  AST  12  /  ALT  <5  /  AlkPhos  46  01-08      Urinalysis Basic - ( 08 Jan 2024 05:46 )    Color: x / Appearance: x / SG: x / pH: x  Gluc: 112 mg/dL / Ketone: x  / Bili: x / Urobili: x   Blood: x / Protein: x / Nitrite: x   Leuk Esterase: x / RBC: x / WBC x   Sq Epi: x / Non Sq Epi: x / Bacteria: x        Lactate Trend  01-05 @ 12:47 Lactate:1.6         CAPILLARY BLOOD GLUCOSE          Culture - Urine (collected 01-05-24 @ 10:23)  Source: Clean Catch Clean Catch (Midstream)  Preliminary Report (01-07-24 @ 10:35):    >100,000 CFU/ml Gram positive organisms        RADIOLOGY & ADDITIONAL TESTS:    Imaging Personally Reviewed:  [ ] YES  [ ] NO    HEALTH ISSUES - PROBLEM Dx:          PHYSICAL EXAM:  GENERAL: NAD, well-developed.  HEAD:  Atraumatic, Normocephalic.  EYES: EOMI, PERRLA, conjunctiva and sclera clear.  NECK: Supple, elevated JVP  CHEST/LUNG: Clear to auscultation bilaterally; No wheeze.  HEART: Regular rate and rhythm; S1 S2.   ABDOMEN: Soft, Nontender, Nondistended; Bowel sounds present.  EXTREMITIES:  2+ Peripheral Pulses, No clubbing, cyanosis, or edema.  PSYCH: AAOx3.  NEUROLOGY: non-focal.  SKIN: No rashes or lesions.

## 2024-01-08 NOTE — PROGRESS NOTE ADULT - SUBJECTIVE AND OBJECTIVE BOX
[facility] NEPHROLOGY FOLLOW UP NOTE  --------------------------------------------------------------------------------  24 hour events/subjective: Patient examined. Appears comfortable.    PAST HISTORY  --------------------------------------------------------------------------------  No significant changes to PMH, PSH, FHx, SHx, unless otherwise noted    ALLERGIES & MEDICATIONS  --------------------------------------------------------------------------------  Allergies    Rituxan (Hives)  Rifuxen (Swelling)    Standing Inpatient Medications  aMIOdarone    Tablet 200 milliGRAM(s) Oral daily  apixaban 5 milliGRAM(s) Oral every 12 hours  aspirin  chewable 81 milliGRAM(s) Oral daily  atorvastatin 40 milliGRAM(s) Oral at bedtime  mycophenolate mofetil 500 milliGRAM(s) Oral two times a day  pantoprazole    Tablet 40 milliGRAM(s) Oral before breakfast  polyethylene glycol 3350 17 Gram(s) Oral daily  potassium phosphate / sodium phosphate Powder (PHOS-NaK) 1 Packet(s) Oral three times a day  senna 2 Tablet(s) Oral at bedtime  tacrolimus 2 milliGRAM(s) Oral at bedtime  tacrolimus 3 milliGRAM(s) Oral <User Schedule>  tamsulosin 0.4 milliGRAM(s) Oral two times a day    PRN Inpatient Medications  acetaminophen     Tablet .. 650 milliGRAM(s) Oral every 6 hours PRN  albuterol    90 MICROgram(s) HFA Inhaler 2 Puff(s) Inhalation every 6 hours PRN  guaifenesin/dextromethorphan Oral Liquid 200 milliLiter(s) Oral four times a day PRN  HYDROmorphone  Injectable 0.5 milliGRAM(s) IV Push every 6 hours PRN  melatonin 3 milliGRAM(s) Oral at bedtime PRN  oxyCODONE    IR 5 milliGRAM(s) Oral every 8 hours PRN    VITALS/PHYSICAL EXAM  --------------------------------------------------------------------------------  T(C): 37.6 (01-08-24 @ 05:15), Max: 37.6 (01-08-24 @ 05:15)  HR: 76 (01-08-24 @ 05:15) (69 - 76)  BP: 147/67 (01-08-24 @ 05:15) (113/56 - 152/76)  RR: 19 (01-08-24 @ 05:15) (18 - 19)  SpO2: 100% (01-08-24 @ 05:15) (100% - 100%)    01-07-24 @ 07:01  -  01-08-24 @ 07:00  --------------------------------------------------------  IN: 1270 mL / OUT: 675 mL / NET: 595 mL    01-08-24 @ 07:01  -  01-08-24 @ 12:35  --------------------------------------------------------  IN: 810 mL / OUT: 300 mL / NET: 510 mL    Physical Exam:  	Gen: NAD  	Pulm: CTA B/L  	CV: RRR, S1S2  	Abd: +BS, soft, nontender/nondistended  	: No suprapubic tenderness  	LE: Warm,  no edema    LABS/STUDIES  --------------------------------------------------------------------------------              8.5    3.80  >-----------<  69       [01-08-24 @ 05:46]              26.3     132  |  99  |  20  ----------------------------<  112      [01-08-24 @ 05:46]  4.6   |  24  |  1.6        Ca     10.3     [01-08-24 @ 05:46]      Mg     1.6     [01-08-24 @ 05:46]      Phos  1.8     [01-08-24 @ 05:46]    TPro  5.3  /  Alb  3.3  /  TBili  1.3  /  DBili  x   /  AST  12  /  ALT  <5  /  AlkPhos  46  [01-08-24 @ 05:46]    Creatinine Trend:  SCr 1.6 [01-08 @ 05:46]  SCr 1.8 [01-07 @ 06:34]  SCr 2.1 [01-06 @ 07:04]  SCr 2.2 [01-05 @ 12:47]  SCr 2.3 [01-04 @ 23:43]    Urinalysis - [01-08-24 @ 05:46]      Color  / Appearance  / SG  / pH       Gluc 112 / Ketone   / Bili  / Urobili        Blood  / Protein  / Leuk Est  / Nitrite       RBC  / WBC  / Hyaline  / Gran  / Sq Epi  / Non Sq Epi  / Bacteria     HbA1c 5.1      [11-06-19 @ 15:00]  TSH 0.49      [12-18-23 @ 08:36] [facility] NEPHROLOGY FOLLOW UP NOTE  --------------------------------------------------------------------------------  24 hour events/subjective: Patient examined. Appears comfortable.    PAST HISTORY  --------------------------------------------------------------------------------  No significant changes to PMH, PSH, FHx, SHx, unless otherwise noted    ALLERGIES & MEDICATIONS  --------------------------------------------------------------------------------  Allergies    Rituxan (Hives)  Rifuxen (Swelling)    Standing Inpatient Medications  aMIOdarone    Tablet 200 milliGRAM(s) Oral daily  apixaban 5 milliGRAM(s) Oral every 12 hours  aspirin  chewable 81 milliGRAM(s) Oral daily  atorvastatin 40 milliGRAM(s) Oral at bedtime  mycophenolate mofetil 500 milliGRAM(s) Oral two times a day  pantoprazole    Tablet 40 milliGRAM(s) Oral before breakfast  polyethylene glycol 3350 17 Gram(s) Oral daily  potassium phosphate / sodium phosphate Powder (PHOS-NaK) 1 Packet(s) Oral three times a day  senna 2 Tablet(s) Oral at bedtime  tacrolimus 2 milliGRAM(s) Oral at bedtime  tacrolimus 3 milliGRAM(s) Oral <User Schedule>  tamsulosin 0.4 milliGRAM(s) Oral two times a day    PRN Inpatient Medications  acetaminophen     Tablet .. 650 milliGRAM(s) Oral every 6 hours PRN  albuterol    90 MICROgram(s) HFA Inhaler 2 Puff(s) Inhalation every 6 hours PRN  guaifenesin/dextromethorphan Oral Liquid 200 milliLiter(s) Oral four times a day PRN  HYDROmorphone  Injectable 0.5 milliGRAM(s) IV Push every 6 hours PRN  melatonin 3 milliGRAM(s) Oral at bedtime PRN  oxyCODONE    IR 5 milliGRAM(s) Oral every 8 hours PRN    VITALS/PHYSICAL EXAM  --------------------------------------------------------------------------------  T(C): 37.6 (01-08-24 @ 05:15), Max: 37.6 (01-08-24 @ 05:15)  HR: 76 (01-08-24 @ 05:15) (69 - 76)  BP: 147/67 (01-08-24 @ 05:15) (113/56 - 152/76)  RR: 19 (01-08-24 @ 05:15) (18 - 19)  SpO2: 100% (01-08-24 @ 05:15) (100% - 100%)    01-07-24 @ 07:01  -  01-08-24 @ 07:00  --------------------------------------------------------  IN: 1270 mL / OUT: 675 mL / NET: 595 mL    01-08-24 @ 07:01  -  01-08-24 @ 12:35  --------------------------------------------------------  IN: 810 mL / OUT: 300 mL / NET: 510 mL    Physical Exam:  	Gen: NAD  	Pulm: CTA B/L  	CV: RRR, S1S2  	Abd: +BS, soft, nontender/nondistended  	: No suprapubic tenderness  	LE: Warm,  no edema    LABS/STUDIES  --------------------------------------------------------------------------------              8.5    3.80  >-----------<  69       [01-08-24 @ 05:46]              26.3     132  |  99  |  20  ----------------------------<  112      [01-08-24 @ 05:46]  4.6   |  24  |  1.6        Ca     10.3     [01-08-24 @ 05:46]      Mg     1.6     [01-08-24 @ 05:46]      Phos  1.8     [01-08-24 @ 05:46]    TPro  5.3  /  Alb  3.3  /  TBili  1.3  /  DBili  x   /  AST  12  /  ALT  <5  /  AlkPhos  46  [01-08-24 @ 05:46]    Creatinine Trend:  SCr 1.6 [01-08 @ 05:46]  SCr 1.8 [01-07 @ 06:34]  SCr 2.1 [01-06 @ 07:04]  SCr 2.2 [01-05 @ 12:47]  SCr 2.3 [01-04 @ 23:43]    Urinalysis - [01-08-24 @ 05:46]      Color  / Appearance  / SG  / pH       Gluc 112 / Ketone   / Bili  / Urobili        Blood  / Protein  / Leuk Est  / Nitrite       RBC  / WBC  / Hyaline  / Gran  / Sq Epi  / Non Sq Epi  / Bacteria     HbA1c 5.1      [11-06-19 @ 15:00]  TSH 0.49      [12-18-23 @ 08:36]

## 2024-01-08 NOTE — PROGRESS NOTE ADULT - SUBJECTIVE AND OBJECTIVE BOX
24H events:    Patient is a 69y old Male who presents with a chief complaint of afib (07 Jan 2024 12:14)    Primary diagnosis of Chest pain      Day 1-2:   treated for nstemi, no cardiac cath due to SANTA, treating with medical therapy    Today is 3d of hospitalization. This morning patient was seen and examined at bedside, resting comfortably in bed.    No acute or major events overnight. Reports low appetite for the past year and has lost 80 pounds.  Noted to have downtrending hgb.  GI performed QI which did not show signs of bleed.    Code Status: full code      PAST MEDICAL & SURGICAL HISTORY  CKD (chronic kidney disease)    ESRD (end stage renal disease)    HTN (hypertension)    HLD (hyperlipidemia)    Atrial fibrillation  on eliquis    COPD, mild  not on O2    Peripheral neuropathy  unclear cause    Lymphoma  ?questionable, not treated, not followed    Melanoma  R eye, s/p laser    Cirrhosis  possibly related to hepC    Breast cancer    Legally blind    Stroke    History of kidney transplant  3 years ago    S/P arteriovenous (AV) fistula creation  prior old fistula in R arm    S/P lumpectomy, right breast    History of back surgery  s/p Left L5-S1 endoscopic laminoforaminotomy      SOCIAL HISTORY:  Social History:      ALLERGIES:  Rituxan (Hives)  Rifuxen (Swelling)    MEDICATIONS:  STANDING MEDICATIONS  aMIOdarone    Tablet 200 milliGRAM(s) Oral daily  apixaban 5 milliGRAM(s) Oral every 12 hours  aspirin  chewable 81 milliGRAM(s) Oral daily  atorvastatin 40 milliGRAM(s) Oral at bedtime  mycophenolate mofetil 500 milliGRAM(s) Oral two times a day  pantoprazole    Tablet 40 milliGRAM(s) Oral before breakfast  polyethylene glycol 3350 17 Gram(s) Oral daily  potassium phosphate / sodium phosphate Powder (PHOS-NaK) 1 Packet(s) Oral three times a day  senna 2 Tablet(s) Oral at bedtime  tacrolimus 3 milliGRAM(s) Oral <User Schedule>  tacrolimus 2 milliGRAM(s) Oral at bedtime  tamsulosin 0.4 milliGRAM(s) Oral two times a day    PRN MEDICATIONS  acetaminophen     Tablet .. 650 milliGRAM(s) Oral every 6 hours PRN  albuterol    90 MICROgram(s) HFA Inhaler 2 Puff(s) Inhalation every 6 hours PRN  guaifenesin/dextromethorphan Oral Liquid 200 milliLiter(s) Oral four times a day PRN  HYDROmorphone  Injectable 0.5 milliGRAM(s) IV Push every 6 hours PRN  melatonin 3 milliGRAM(s) Oral at bedtime PRN  oxyCODONE    IR 5 milliGRAM(s) Oral every 8 hours PRN    VITALS:   T(F): 99.7  HR: 76  BP: 147/67  RR: 19  SpO2: 100%    PHYSICAL EXAM:  GENERAL:   ( x) NAD, lying in bed comfortably     (  ) obtunded     (  ) lethargic     (  ) somnolent    HEAD:   ( x) Atraumatic     (  ) hematoma     (  ) laceration (specify location:       )     NECK:  (x) Supple     (  ) neck stiffness     (  ) nuchal rigidity     (  )  no JVD     (  ) JVD present ( -- cm)    HEART:  Rate -->     (x) normal rate     (  ) bradycardic     (  ) tachycardic  Rhythm -->     (x) regular     (  ) regularly irregular     (  ) irregularly irregular  Murmurs -->     (x) normal s1s2     (  ) systolic murmur     (  ) diastolic murmur     (  ) continuous murmur      (  ) S3 present     (  ) S4 present    LUNGS:   ( x)Unlabored respirations     (  ) tachypnea  ( x) B/L air entry     (  ) decreased breath sounds in:  (location     )    ( x) no adventitious sound     (  ) crackles     (  ) wheezing      (  ) rhonchi      (specify location:       )  (  ) chest wall tenderness (specify location:       )    ABDOMEN:   ( x) Soft     (  ) tense   |   (  ) nondistended     (  ) distended   |   (  ) +BS     (  ) hypoactive bowel sounds     (  ) hyperactive bowel sounds  ( x) nontender     (  ) RUQ tenderness     (  ) RLQ tenderness     (  ) LLQ tenderness     (  ) epigastric tenderness     (  ) diffuse tenderness  (  ) Splenomegaly      (  ) Hepatomegaly      (  ) Jaundice     (  ) ecchymosis     EXTREMITIES:  ( x) Normal     (  ) Rash     (  ) ecchymosis     (  ) varicose veins      (  ) pitting edema     (  ) non-pitting edema   (  ) ulceration     (  ) gangrene:     (location:     )    NERVOUS SYSTEM:    ( x) A&Ox3     (  ) confused     (  ) lethargic  CN II-XII:     ( x) Intact     (  ) deficits found     (Specify:     )   Upper extremities:     (  ) no sensorimotor deficits     (  ) weakness     (  ) loss of proprioception/vibration     (  ) loss of touch/temperature (specify:    )  Lower extremities:     (  ) no sensorimotor deficits     (  ) weakness     (  ) loss of proprioception/vibration     (  ) loss of touch/temperature (specify:    )    SKIN:   (  ) No rashes or lesions     (  ) maculopapular rash     (  ) pustules     (  ) vesicles     (  ) ulcer     (  ) ecchymosis     (specify location:     )    AMPAC score :    (  ) Indwelling Garcia Catheter:   Date insterted:    Reason (  ) Critical illness     (  ) urinary retention    (  ) Accurate Ins/Outs Monitoring     (  ) CMO patient    (  ) Central Line:   Date inserted:  Location: (  ) Right IJ     (  ) Left IJ     (  ) Right Fem     (  ) Left Fem    (  ) SPC        (  ) pigtail       (  ) PEG tube       (  ) colostomy       (  ) jejunostomy  (  ) U-Dall    LABS:                        8.5    3.80  )-----------( 69       ( 08 Jan 2024 05:46 )             26.3     01-08    132<L>  |  99  |  20  ----------------------------<  112<H>  4.6   |  24  |  1.6<H>    Ca    10.3      08 Jan 2024 05:46  Phos  1.8     01-08  Mg     1.6     01-08    TPro  5.3<L>  /  Alb  3.3<L>  /  TBili  1.3<H>  /  DBili  x   /  AST  12  /  ALT  <5  /  AlkPhos  46  01-08      Urinalysis Basic - ( 08 Jan 2024 05:46 )    Color: x / Appearance: x / SG: x / pH: x  Gluc: 112 mg/dL / Ketone: x  / Bili: x / Urobili: x   Blood: x / Protein: x / Nitrite: x   Leuk Esterase: x / RBC: x / WBC x   Sq Epi: x / Non Sq Epi: x / Bacteria: x                RADIOLOGY:    ASSESSMENT AND PLAN  DAVONTE CHOU is a 69f-kuem-ewe Male with a history significant for pAfib/Aflutter(on Eliquis) s/p ablation on 12/15/23, CAD s/p PCI(LAD and RCA) for unstable angina in 2019, mild COPD not on home o2, CKD3, Hx of renal transplant 3 years ago, HEP C, decompensated liver cirrhosis(portal HTN, esophageal varices), Melanoma R eye s/p Laser, Breast cancer gynecomastia, HLD, HTN, lumbar Radiculopathy who presents to the ED with c/o SOB, generalized weakness and fatigue for past few weeks.    A/P:   Acute HFpEF:   NSTEMI   CAD s/p PCI(LAD) and staged PCI(RCA) in 2019 for unstable angina  Patient with SOB, cough, Pro-BNp 23K, elevated JVP.   CXR showed no acute infiltrates.   Troponin 95>85  EKG showed new ST and T waves abnormalities on inferior and lateral leads.   Echo showed Hyperdynamic global left ventricular systolic function LVEF 76%, severe Left atrial enlargement, mod concentric LVH, Grade III diastolic dysfunction, mod MR, mild TR, mild AR,  consistent with borderline pulmonary hypertension. Small secundum atrial septal defect with predominantly left to right shunting across the atrial septum.  s/p Lasix IV, hold IV diuresis as patient looks euvolemic, JVP improved.   Continue ASA, Eliquis and Lipitor. Off Metoprolol due to bradycardia  Cardiology recommended medical therapy. No plan for cardiac cath due to Acute Kidney Injury.     #Anemia  - no sign of bleed, QI negative  - f/u iron panel  - monitor for signs of bleed    Acute Kidney Injury:   History of renal transplant.   Possibly Pre-renal.   UA was negative. Cr is improving to 1.8, while on IV fluid, Caution with IV fluid, will hold it tomorrow.   Nephrology follow up.   Avoid Nephrotoxic agents,   Continue Tacrolimus and Mycophenolate Mofetil 500mg BID. Tacrolimus level 11    Paroxysmal Atrial Fibrillation /Flutter s/p ablation  HR is stable.   Metoprolol held per EP due to bradycardia  Continue Amiodarone and Eliquis.     COPD, Former smoker(quit yrs ago)  No wheezing on exam  Continue albuterol prn  Patient with cough, RVP negative  CT chest non con showed No acute intrathoracic pathology. Upper lobe predominant emphysematous changes. Left lower lobe 0.5 cm nodule, unchanged.  Cardiomegaly, unchanged. Dilated ascending thoracic aorta measuring 4.2 cm, unchanged    Chronic Compensated Liver Cirrhosis:   History of hepatitis C  No ascites on exam, PLT 86, INR 1.6 on eliquis,   hepatology follow up outpatient.     Lumbar radiculopathy, chronic back pain   Oxycodone prn      -------------------------------------------------------------------------------------------------------------------------------------  #DVT PPX: eliquis    #GI PPX: pantoprazole    #Diet: dash fluid restrict with Ensure    #Activity Order: increase as tolerated     #Handoff  - monitor hgb, trend creatinine             24H events:    Patient is a 69y old Male who presents with a chief complaint of afib (07 Jan 2024 12:14)    Primary diagnosis of Chest pain      Day 1-2:   treated for nstemi, no cardiac cath due to SANTA, treating with medical therapy    Today is 3d of hospitalization. This morning patient was seen and examined at bedside, resting comfortably in bed.    No acute or major events overnight. Reports low appetite for the past year and has lost 80 pounds.  Noted to have downtrending hgb.  GI performed QI which did not show signs of bleed.    Code Status: full code      PAST MEDICAL & SURGICAL HISTORY  CKD (chronic kidney disease)    ESRD (end stage renal disease)    HTN (hypertension)    HLD (hyperlipidemia)    Atrial fibrillation  on eliquis    COPD, mild  not on O2    Peripheral neuropathy  unclear cause    Lymphoma  ?questionable, not treated, not followed    Melanoma  R eye, s/p laser    Cirrhosis  possibly related to hepC    Breast cancer    Legally blind    Stroke    History of kidney transplant  3 years ago    S/P arteriovenous (AV) fistula creation  prior old fistula in R arm    S/P lumpectomy, right breast    History of back surgery  s/p Left L5-S1 endoscopic laminoforaminotomy      SOCIAL HISTORY:  Social History:      ALLERGIES:  Rituxan (Hives)  Rifuxen (Swelling)    MEDICATIONS:  STANDING MEDICATIONS  aMIOdarone    Tablet 200 milliGRAM(s) Oral daily  apixaban 5 milliGRAM(s) Oral every 12 hours  aspirin  chewable 81 milliGRAM(s) Oral daily  atorvastatin 40 milliGRAM(s) Oral at bedtime  mycophenolate mofetil 500 milliGRAM(s) Oral two times a day  pantoprazole    Tablet 40 milliGRAM(s) Oral before breakfast  polyethylene glycol 3350 17 Gram(s) Oral daily  potassium phosphate / sodium phosphate Powder (PHOS-NaK) 1 Packet(s) Oral three times a day  senna 2 Tablet(s) Oral at bedtime  tacrolimus 3 milliGRAM(s) Oral <User Schedule>  tacrolimus 2 milliGRAM(s) Oral at bedtime  tamsulosin 0.4 milliGRAM(s) Oral two times a day    PRN MEDICATIONS  acetaminophen     Tablet .. 650 milliGRAM(s) Oral every 6 hours PRN  albuterol    90 MICROgram(s) HFA Inhaler 2 Puff(s) Inhalation every 6 hours PRN  guaifenesin/dextromethorphan Oral Liquid 200 milliLiter(s) Oral four times a day PRN  HYDROmorphone  Injectable 0.5 milliGRAM(s) IV Push every 6 hours PRN  melatonin 3 milliGRAM(s) Oral at bedtime PRN  oxyCODONE    IR 5 milliGRAM(s) Oral every 8 hours PRN    VITALS:   T(F): 99.7  HR: 76  BP: 147/67  RR: 19  SpO2: 100%    PHYSICAL EXAM:  GENERAL:   ( x) NAD, lying in bed comfortably     (  ) obtunded     (  ) lethargic     (  ) somnolent    HEAD:   ( x) Atraumatic     (  ) hematoma     (  ) laceration (specify location:       )     NECK:  (x) Supple     (  ) neck stiffness     (  ) nuchal rigidity     (  )  no JVD     (  ) JVD present ( -- cm)    HEART:  Rate -->     (x) normal rate     (  ) bradycardic     (  ) tachycardic  Rhythm -->     (x) regular     (  ) regularly irregular     (  ) irregularly irregular  Murmurs -->     (x) normal s1s2     (  ) systolic murmur     (  ) diastolic murmur     (  ) continuous murmur      (  ) S3 present     (  ) S4 present    LUNGS:   ( x)Unlabored respirations     (  ) tachypnea  ( x) B/L air entry     (  ) decreased breath sounds in:  (location     )    ( x) no adventitious sound     (  ) crackles     (  ) wheezing      (  ) rhonchi      (specify location:       )  (  ) chest wall tenderness (specify location:       )    ABDOMEN:   ( x) Soft     (  ) tense   |   (  ) nondistended     (  ) distended   |   (  ) +BS     (  ) hypoactive bowel sounds     (  ) hyperactive bowel sounds  ( x) nontender     (  ) RUQ tenderness     (  ) RLQ tenderness     (  ) LLQ tenderness     (  ) epigastric tenderness     (  ) diffuse tenderness  (  ) Splenomegaly      (  ) Hepatomegaly      (  ) Jaundice     (  ) ecchymosis     EXTREMITIES:  ( x) Normal     (  ) Rash     (  ) ecchymosis     (  ) varicose veins      (  ) pitting edema     (  ) non-pitting edema   (  ) ulceration     (  ) gangrene:     (location:     )    NERVOUS SYSTEM:    ( x) A&Ox3     (  ) confused     (  ) lethargic  CN II-XII:     ( x) Intact     (  ) deficits found     (Specify:     )   Upper extremities:     (  ) no sensorimotor deficits     (  ) weakness     (  ) loss of proprioception/vibration     (  ) loss of touch/temperature (specify:    )  Lower extremities:     (  ) no sensorimotor deficits     (  ) weakness     (  ) loss of proprioception/vibration     (  ) loss of touch/temperature (specify:    )    SKIN:   (  ) No rashes or lesions     (  ) maculopapular rash     (  ) pustules     (  ) vesicles     (  ) ulcer     (  ) ecchymosis     (specify location:     )    AMPAC score :    (  ) Indwelling Garcia Catheter:   Date insterted:    Reason (  ) Critical illness     (  ) urinary retention    (  ) Accurate Ins/Outs Monitoring     (  ) CMO patient    (  ) Central Line:   Date inserted:  Location: (  ) Right IJ     (  ) Left IJ     (  ) Right Fem     (  ) Left Fem    (  ) SPC        (  ) pigtail       (  ) PEG tube       (  ) colostomy       (  ) jejunostomy  (  ) U-Dall    LABS:                        8.5    3.80  )-----------( 69       ( 08 Jan 2024 05:46 )             26.3     01-08    132<L>  |  99  |  20  ----------------------------<  112<H>  4.6   |  24  |  1.6<H>    Ca    10.3      08 Jan 2024 05:46  Phos  1.8     01-08  Mg     1.6     01-08    TPro  5.3<L>  /  Alb  3.3<L>  /  TBili  1.3<H>  /  DBili  x   /  AST  12  /  ALT  <5  /  AlkPhos  46  01-08      Urinalysis Basic - ( 08 Jan 2024 05:46 )    Color: x / Appearance: x / SG: x / pH: x  Gluc: 112 mg/dL / Ketone: x  / Bili: x / Urobili: x   Blood: x / Protein: x / Nitrite: x   Leuk Esterase: x / RBC: x / WBC x   Sq Epi: x / Non Sq Epi: x / Bacteria: x                RADIOLOGY:    ASSESSMENT AND PLAN  DAVONTE CHOU is a [id number] Male with a history significant for pAfib/Aflutter(on Eliquis) s/p ablation on 12/15/23, CAD s/p PCI(LAD and RCA) for unstable angina in 2019, mild COPD not on home o2, CKD3, Hx of renal transplant 3 years ago, HEP C, decompensated liver cirrhosis(portal HTN, esophageal varices), Melanoma R eye s/p Laser, Breast cancer gynecomastia, HLD, HTN, lumbar Radiculopathy who presents to the ED with c/o SOB, generalized weakness and fatigue for past few weeks.    A/P:   Acute HFpEF:   NSTEMI   CAD s/p PCI(LAD) and staged PCI(RCA) in 2019 for unstable angina  Patient with SOB, cough, Pro-BNp 23K, elevated JVP.   CXR showed no acute infiltrates.   Troponin 95>85  EKG showed new ST and T waves abnormalities on inferior and lateral leads.   Echo showed Hyperdynamic global left ventricular systolic function LVEF 76%, severe Left atrial enlargement, mod concentric LVH, Grade III diastolic dysfunction, mod MR, mild TR, mild AR,  consistent with borderline pulmonary hypertension. Small secundum atrial septal defect with predominantly left to right shunting across the atrial septum.  s/p Lasix IV, hold IV diuresis as patient looks euvolemic, JVP improved.   Continue ASA, Eliquis and Lipitor. Off Metoprolol due to bradycardia  Cardiology recommended medical therapy. No plan for cardiac cath due to Acute Kidney Injury.     #Anemia  - no sign of bleed, QI negative  - f/u iron panel  - monitor for signs of bleed    Acute Kidney Injury:   History of renal transplant.   Possibly Pre-renal.   UA was negative. Cr is improving to 1.8, while on IV fluid, Caution with IV fluid, will hold it tomorrow.   Nephrology follow up.   Avoid Nephrotoxic agents,   Continue Tacrolimus and Mycophenolate Mofetil 500mg BID. Tacrolimus level 11    Paroxysmal Atrial Fibrillation /Flutter s/p ablation  HR is stable.   Metoprolol held per EP due to bradycardia  Continue Amiodarone and Eliquis.     COPD, Former smoker(quit yrs ago)  No wheezing on exam  Continue albuterol prn  Patient with cough, RVP negative  CT chest non con showed No acute intrathoracic pathology. Upper lobe predominant emphysematous changes. Left lower lobe 0.5 cm nodule, unchanged.  Cardiomegaly, unchanged. Dilated ascending thoracic aorta measuring 4.2 cm, unchanged    Chronic Compensated Liver Cirrhosis:   History of hepatitis C  No ascites on exam, PLT 86, INR 1.6 on eliquis,   hepatology follow up outpatient.     Lumbar radiculopathy, chronic back pain   Oxycodone prn      -------------------------------------------------------------------------------------------------------------------------------------  #DVT PPX: eliquis    #GI PPX: pantoprazole    #Diet: dash fluid restrict with Ensure    #Activity Order: increase as tolerated     #Handoff  - monitor hgb, trend creatinine

## 2024-01-08 NOTE — PROGRESS NOTE ADULT - ASSESSMENT
1] CAD S/P PTCA/Stent      Troponinemia - Type 2 MI  - Continue Aspirin  - No planned cardiac catheterization    2] S/P Ablation of AFib  - Continue OAC with Eliquis.    3] SANTA       S/P Renal Transplant  - Renal input appreciated    4] Hyperlipidemia  - On statin therapy    5] Chronic Pain  - On pain medications    Aram Hernandez MD (covering for Dr. Kin Low)  [phone] Office  On TEAMS 1] CAD S/P PTCA/Stent      Troponinemia - Type 2 MI  - Continue Aspirin  - No planned cardiac catheterization    2] S/P Ablation of AFib  - Continue OAC with Eliquis.    3] SANTA       S/P Renal Transplant  - Renal input appreciated    4] Hyperlipidemia  - On statin therapy    5] Chronic Pain  - On pain medications    Aram Hernandez MD (covering for Dr. Kin Low)  [phone] Office  On TEAMS

## 2024-01-08 NOTE — DISCHARGE NOTE NURSING/CASE MANAGEMENT/SOCIAL WORK - NSDCPEFALRISK_GEN_ALL_CORE
For information on Fall & Injury Prevention, visit: [email or web address] OR  [email or web address] OR  https://www.cdc.gov/steadi/patient.html For information on Fall & Injury Prevention, visit: [email or web address] OR  [email or web address] OR  https://www.cdc.gov/steadi/patient.html

## 2024-01-08 NOTE — DISCHARGE NOTE NURSING/CASE MANAGEMENT/SOCIAL WORK - NSSCNAMETXT_GEN_ALL_CORE
Home [facility] AT [facility] AT [place]

## 2024-01-08 NOTE — PROGRESS NOTE ADULT - ASSESSMENT
Status post  donor Kidney transplant at [place] 6 years ago  SANTA, uncertain etiology ?prerenal azotemia  Dyspnea on exertion: CXR clear, no edema  Pancytopenia  CAD  Atrial fibrillation  HTN  Cirrhosis  Hypophosphatemia    Plan    No more IVF  Phos supplementation  Encourage oral intake  OK for DC from renal standpoint  F/U with [facility] team  Continue tacrolimus  Continue mycophenolate   Status post  donor Kidney transplant at [facility] 6 years ago  SANTA, uncertain etiology ?prerenal azotemia  Dyspnea on exertion: CXR clear, no edema  Pancytopenia  CAD  Atrial fibrillation  HTN  Cirrhosis  Hypophosphatemia    Plan    No more IVF  Phos supplementation  Encourage oral intake  OK for DC from renal standpoint  F/U with [facility] team  Continue tacrolimus  Continue mycophenolate

## 2024-01-09 ENCOUNTER — TRANSCRIPTION ENCOUNTER (OUTPATIENT)
Age: 69
End: 2024-01-09

## 2024-01-09 LAB
-  AMPICILLIN: SIGNIFICANT CHANGE UP
-  AMPICILLIN: SIGNIFICANT CHANGE UP
-  CIPROFLOXACIN: SIGNIFICANT CHANGE UP
-  CIPROFLOXACIN: SIGNIFICANT CHANGE UP
-  LEVOFLOXACIN: SIGNIFICANT CHANGE UP
-  LEVOFLOXACIN: SIGNIFICANT CHANGE UP
-  NITROFURANTOIN: SIGNIFICANT CHANGE UP
-  NITROFURANTOIN: SIGNIFICANT CHANGE UP
-  TETRACYCLINE: SIGNIFICANT CHANGE UP
-  TETRACYCLINE: SIGNIFICANT CHANGE UP
-  VANCOMYCIN: SIGNIFICANT CHANGE UP
-  VANCOMYCIN: SIGNIFICANT CHANGE UP
ALBUMIN SERPL ELPH-MCNC: 3.1 G/DL — LOW (ref 3.5–5.2)
ALBUMIN SERPL ELPH-MCNC: 3.1 G/DL — LOW (ref 3.5–5.2)
ALP SERPL-CCNC: 44 U/L — SIGNIFICANT CHANGE UP (ref 30–115)
ALP SERPL-CCNC: 44 U/L — SIGNIFICANT CHANGE UP (ref 30–115)
ALT FLD-CCNC: <5 U/L — SIGNIFICANT CHANGE UP (ref 0–41)
ALT FLD-CCNC: <5 U/L — SIGNIFICANT CHANGE UP (ref 0–41)
ANION GAP SERPL CALC-SCNC: 12 MMOL/L — SIGNIFICANT CHANGE UP (ref 7–14)
AST SERPL-CCNC: 10 U/L — SIGNIFICANT CHANGE UP (ref 0–41)
AST SERPL-CCNC: 10 U/L — SIGNIFICANT CHANGE UP (ref 0–41)
BASOPHILS # BLD AUTO: 0.02 K/UL — SIGNIFICANT CHANGE UP (ref 0–0.2)
BASOPHILS # BLD AUTO: 0.02 K/UL — SIGNIFICANT CHANGE UP (ref 0–0.2)
BASOPHILS NFR BLD AUTO: 0.7 % — SIGNIFICANT CHANGE UP (ref 0–1)
BASOPHILS NFR BLD AUTO: 0.7 % — SIGNIFICANT CHANGE UP (ref 0–1)
BILIRUB SERPL-MCNC: 0.7 MG/DL — SIGNIFICANT CHANGE UP (ref 0.2–1.2)
BILIRUB SERPL-MCNC: 0.7 MG/DL — SIGNIFICANT CHANGE UP (ref 0.2–1.2)
BUN SERPL-MCNC: 19 MG/DL — SIGNIFICANT CHANGE UP (ref 10–20)
BUN SERPL-MCNC: 19 MG/DL — SIGNIFICANT CHANGE UP (ref 10–20)
BUN SERPL-MCNC: 20 MG/DL — SIGNIFICANT CHANGE UP (ref 10–20)
BUN SERPL-MCNC: 20 MG/DL — SIGNIFICANT CHANGE UP (ref 10–20)
CALCIUM SERPL-MCNC: 9.7 MG/DL — SIGNIFICANT CHANGE UP (ref 8.4–10.5)
CALCIUM SERPL-MCNC: 9.7 MG/DL — SIGNIFICANT CHANGE UP (ref 8.4–10.5)
CALCIUM SERPL-MCNC: 9.9 MG/DL — SIGNIFICANT CHANGE UP (ref 8.4–10.5)
CALCIUM SERPL-MCNC: 9.9 MG/DL — SIGNIFICANT CHANGE UP (ref 8.4–10.5)
CHLORIDE SERPL-SCNC: 99 MMOL/L — SIGNIFICANT CHANGE UP (ref 98–110)
CO2 SERPL-SCNC: 22 MMOL/L — SIGNIFICANT CHANGE UP (ref 17–32)
CO2 SERPL-SCNC: 22 MMOL/L — SIGNIFICANT CHANGE UP (ref 17–32)
CO2 SERPL-SCNC: 23 MMOL/L — SIGNIFICANT CHANGE UP (ref 17–32)
CO2 SERPL-SCNC: 23 MMOL/L — SIGNIFICANT CHANGE UP (ref 17–32)
CREAT ?TM UR-MCNC: 112 MG/DL — SIGNIFICANT CHANGE UP
CREAT ?TM UR-MCNC: 112 MG/DL — SIGNIFICANT CHANGE UP
CREAT SERPL-MCNC: 1.7 MG/DL — HIGH (ref 0.7–1.5)
CREAT SERPL-MCNC: 1.7 MG/DL — HIGH (ref 0.7–1.5)
CREAT SERPL-MCNC: 1.8 MG/DL — HIGH (ref 0.7–1.5)
CREAT SERPL-MCNC: 1.8 MG/DL — HIGH (ref 0.7–1.5)
CULTURE RESULTS: ABNORMAL
CULTURE RESULTS: ABNORMAL
EGFR: 41 ML/MIN/1.73M2 — LOW
EGFR: 41 ML/MIN/1.73M2 — LOW
EGFR: 44 ML/MIN/1.73M2 — LOW
EGFR: 44 ML/MIN/1.73M2 — LOW
EOSINOPHIL # BLD AUTO: 0.02 K/UL — SIGNIFICANT CHANGE UP (ref 0–0.7)
EOSINOPHIL # BLD AUTO: 0.02 K/UL — SIGNIFICANT CHANGE UP (ref 0–0.7)
EOSINOPHIL NFR BLD AUTO: 0.7 % — SIGNIFICANT CHANGE UP (ref 0–8)
EOSINOPHIL NFR BLD AUTO: 0.7 % — SIGNIFICANT CHANGE UP (ref 0–8)
GLUCOSE SERPL-MCNC: 115 MG/DL — HIGH (ref 70–99)
GLUCOSE SERPL-MCNC: 115 MG/DL — HIGH (ref 70–99)
GLUCOSE SERPL-MCNC: 139 MG/DL — HIGH (ref 70–99)
GLUCOSE SERPL-MCNC: 139 MG/DL — HIGH (ref 70–99)
HCT VFR BLD CALC: 24.6 % — LOW (ref 42–52)
HCT VFR BLD CALC: 24.6 % — LOW (ref 42–52)
HGB BLD-MCNC: 7.8 G/DL — LOW (ref 14–18)
HGB BLD-MCNC: 7.8 G/DL — LOW (ref 14–18)
IMM GRANULOCYTES NFR BLD AUTO: 1.6 % — HIGH (ref 0.1–0.3)
IMM GRANULOCYTES NFR BLD AUTO: 1.6 % — HIGH (ref 0.1–0.3)
LYMPHOCYTES # BLD AUTO: 0.48 K/UL — LOW (ref 1.2–3.4)
LYMPHOCYTES # BLD AUTO: 0.48 K/UL — LOW (ref 1.2–3.4)
LYMPHOCYTES # BLD AUTO: 15.7 % — LOW (ref 20.5–51.1)
LYMPHOCYTES # BLD AUTO: 15.7 % — LOW (ref 20.5–51.1)
MAGNESIUM SERPL-MCNC: 1.7 MG/DL — LOW (ref 1.8–2.4)
MAGNESIUM SERPL-MCNC: 1.7 MG/DL — LOW (ref 1.8–2.4)
MCHC RBC-ENTMCNC: 28.8 PG — SIGNIFICANT CHANGE UP (ref 27–31)
MCHC RBC-ENTMCNC: 28.8 PG — SIGNIFICANT CHANGE UP (ref 27–31)
MCHC RBC-ENTMCNC: 31.7 G/DL — LOW (ref 32–37)
MCHC RBC-ENTMCNC: 31.7 G/DL — LOW (ref 32–37)
MCV RBC AUTO: 90.8 FL — SIGNIFICANT CHANGE UP (ref 80–94)
MCV RBC AUTO: 90.8 FL — SIGNIFICANT CHANGE UP (ref 80–94)
METHOD TYPE: SIGNIFICANT CHANGE UP
METHOD TYPE: SIGNIFICANT CHANGE UP
MONOCYTES # BLD AUTO: 0.22 K/UL — SIGNIFICANT CHANGE UP (ref 0.1–0.6)
MONOCYTES # BLD AUTO: 0.22 K/UL — SIGNIFICANT CHANGE UP (ref 0.1–0.6)
MONOCYTES NFR BLD AUTO: 7.2 % — SIGNIFICANT CHANGE UP (ref 1.7–9.3)
MONOCYTES NFR BLD AUTO: 7.2 % — SIGNIFICANT CHANGE UP (ref 1.7–9.3)
NEUTROPHILS # BLD AUTO: 2.26 K/UL — SIGNIFICANT CHANGE UP (ref 1.4–6.5)
NEUTROPHILS # BLD AUTO: 2.26 K/UL — SIGNIFICANT CHANGE UP (ref 1.4–6.5)
NEUTROPHILS NFR BLD AUTO: 74.1 % — SIGNIFICANT CHANGE UP (ref 42.2–75.2)
NEUTROPHILS NFR BLD AUTO: 74.1 % — SIGNIFICANT CHANGE UP (ref 42.2–75.2)
NRBC # BLD: 0 /100 WBCS — SIGNIFICANT CHANGE UP (ref 0–0)
NRBC # BLD: 0 /100 WBCS — SIGNIFICANT CHANGE UP (ref 0–0)
ORGANISM # SPEC MICROSCOPIC CNT: ABNORMAL
ORGANISM # SPEC MICROSCOPIC CNT: ABNORMAL
ORGANISM # SPEC MICROSCOPIC CNT: SIGNIFICANT CHANGE UP
ORGANISM # SPEC MICROSCOPIC CNT: SIGNIFICANT CHANGE UP
OSMOLALITY UR: 309 MOS/KG — SIGNIFICANT CHANGE UP (ref 50–1200)
OSMOLALITY UR: 309 MOS/KG — SIGNIFICANT CHANGE UP (ref 50–1200)
PLATELET # BLD AUTO: 68 K/UL — LOW (ref 130–400)
PLATELET # BLD AUTO: 68 K/UL — LOW (ref 130–400)
PMV BLD: 12.6 FL — HIGH (ref 7.4–10.4)
PMV BLD: 12.6 FL — HIGH (ref 7.4–10.4)
POTASSIUM SERPL-MCNC: 4 MMOL/L — SIGNIFICANT CHANGE UP (ref 3.5–5)
POTASSIUM SERPL-MCNC: 4 MMOL/L — SIGNIFICANT CHANGE UP (ref 3.5–5)
POTASSIUM SERPL-MCNC: 4.3 MMOL/L — SIGNIFICANT CHANGE UP (ref 3.5–5)
POTASSIUM SERPL-MCNC: 4.3 MMOL/L — SIGNIFICANT CHANGE UP (ref 3.5–5)
POTASSIUM SERPL-SCNC: 4 MMOL/L — SIGNIFICANT CHANGE UP (ref 3.5–5)
POTASSIUM SERPL-SCNC: 4 MMOL/L — SIGNIFICANT CHANGE UP (ref 3.5–5)
POTASSIUM SERPL-SCNC: 4.3 MMOL/L — SIGNIFICANT CHANGE UP (ref 3.5–5)
POTASSIUM SERPL-SCNC: 4.3 MMOL/L — SIGNIFICANT CHANGE UP (ref 3.5–5)
POTASSIUM UR-SCNC: 30 MMOL/L — SIGNIFICANT CHANGE UP
POTASSIUM UR-SCNC: 30 MMOL/L — SIGNIFICANT CHANGE UP
PROT ?TM UR-MCNC: 25 MG/DLG/24H — SIGNIFICANT CHANGE UP
PROT ?TM UR-MCNC: 25 MG/DLG/24H — SIGNIFICANT CHANGE UP
PROT SERPL-MCNC: 4.9 G/DL — LOW (ref 6–8)
PROT SERPL-MCNC: 4.9 G/DL — LOW (ref 6–8)
PROT/CREAT UR-RTO: 0.2 RATIO — SIGNIFICANT CHANGE UP (ref 0–0.2)
PROT/CREAT UR-RTO: 0.2 RATIO — SIGNIFICANT CHANGE UP (ref 0–0.2)
RBC # BLD: 2.71 M/UL — LOW (ref 4.7–6.1)
RBC # BLD: 2.71 M/UL — LOW (ref 4.7–6.1)
RBC # FLD: 15.2 % — HIGH (ref 11.5–14.5)
RBC # FLD: 15.2 % — HIGH (ref 11.5–14.5)
SODIUM SERPL-SCNC: 133 MMOL/L — LOW (ref 135–146)
SODIUM SERPL-SCNC: 133 MMOL/L — LOW (ref 135–146)
SODIUM SERPL-SCNC: 134 MMOL/L — LOW (ref 135–146)
SODIUM SERPL-SCNC: 134 MMOL/L — LOW (ref 135–146)
SODIUM UR-SCNC: 34 MMOL/L — SIGNIFICANT CHANGE UP
SODIUM UR-SCNC: 34 MMOL/L — SIGNIFICANT CHANGE UP
SPECIMEN SOURCE: SIGNIFICANT CHANGE UP
SPECIMEN SOURCE: SIGNIFICANT CHANGE UP
UUN UR-MCNC: 422 MG/DL — SIGNIFICANT CHANGE UP
UUN UR-MCNC: 422 MG/DL — SIGNIFICANT CHANGE UP
WBC # BLD: 3.05 K/UL — LOW (ref 4.8–10.8)
WBC # BLD: 3.05 K/UL — LOW (ref 4.8–10.8)
WBC # FLD AUTO: 3.05 K/UL — LOW (ref 4.8–10.8)
WBC # FLD AUTO: 3.05 K/UL — LOW (ref 4.8–10.8)

## 2024-01-09 PROCEDURE — 99232 SBSQ HOSP IP/OBS MODERATE 35: CPT

## 2024-01-09 RX ORDER — SODIUM CHLORIDE 9 MG/ML
1000 INJECTION, SOLUTION INTRAVENOUS ONCE
Refills: 0 | Status: COMPLETED | OUTPATIENT
Start: 2024-01-09 | End: 2024-01-09

## 2024-01-09 RX ORDER — MAGNESIUM SULFATE 500 MG/ML
2 VIAL (ML) INJECTION ONCE
Refills: 0 | Status: COMPLETED | OUTPATIENT
Start: 2024-01-09 | End: 2024-01-09

## 2024-01-09 RX ADMIN — OXYCODONE HYDROCHLORIDE 5 MILLIGRAM(S): 5 TABLET ORAL at 06:54

## 2024-01-09 RX ADMIN — HYDROMORPHONE HYDROCHLORIDE 0.5 MILLIGRAM(S): 2 INJECTION INTRAMUSCULAR; INTRAVENOUS; SUBCUTANEOUS at 22:30

## 2024-01-09 RX ADMIN — TACROLIMUS 3 MILLIGRAM(S): 5 CAPSULE ORAL at 05:11

## 2024-01-09 RX ADMIN — Medication 1 PACKET(S): at 21:14

## 2024-01-09 RX ADMIN — OXYCODONE HYDROCHLORIDE 5 MILLIGRAM(S): 5 TABLET ORAL at 19:15

## 2024-01-09 RX ADMIN — TAMSULOSIN HYDROCHLORIDE 0.4 MILLIGRAM(S): 0.4 CAPSULE ORAL at 17:08

## 2024-01-09 RX ADMIN — HYDROMORPHONE HYDROCHLORIDE 0.5 MILLIGRAM(S): 2 INJECTION INTRAMUSCULAR; INTRAVENOUS; SUBCUTANEOUS at 16:10

## 2024-01-09 RX ADMIN — OXYCODONE HYDROCHLORIDE 5 MILLIGRAM(S): 5 TABLET ORAL at 18:45

## 2024-01-09 RX ADMIN — Medication 25 GRAM(S): at 15:54

## 2024-01-09 RX ADMIN — SODIUM CHLORIDE 333.33 MILLILITER(S): 9 INJECTION, SOLUTION INTRAVENOUS at 09:23

## 2024-01-09 RX ADMIN — MYCOPHENOLATE MOFETIL 500 MILLIGRAM(S): 250 CAPSULE ORAL at 05:12

## 2024-01-09 RX ADMIN — AMIODARONE HYDROCHLORIDE 200 MILLIGRAM(S): 400 TABLET ORAL at 05:12

## 2024-01-09 RX ADMIN — TACROLIMUS 2 MILLIGRAM(S): 5 CAPSULE ORAL at 21:14

## 2024-01-09 RX ADMIN — Medication 1 PACKET(S): at 05:11

## 2024-01-09 RX ADMIN — HYDROMORPHONE HYDROCHLORIDE 0.5 MILLIGRAM(S): 2 INJECTION INTRAMUSCULAR; INTRAVENOUS; SUBCUTANEOUS at 15:54

## 2024-01-09 RX ADMIN — Medication 81 MILLIGRAM(S): at 13:03

## 2024-01-09 RX ADMIN — MYCOPHENOLATE MOFETIL 500 MILLIGRAM(S): 250 CAPSULE ORAL at 17:07

## 2024-01-09 RX ADMIN — HYDROMORPHONE HYDROCHLORIDE 0.5 MILLIGRAM(S): 2 INJECTION INTRAMUSCULAR; INTRAVENOUS; SUBCUTANEOUS at 22:01

## 2024-01-09 RX ADMIN — ATORVASTATIN CALCIUM 40 MILLIGRAM(S): 80 TABLET, FILM COATED ORAL at 21:14

## 2024-01-09 RX ADMIN — APIXABAN 5 MILLIGRAM(S): 2.5 TABLET, FILM COATED ORAL at 05:12

## 2024-01-09 RX ADMIN — APIXABAN 5 MILLIGRAM(S): 2.5 TABLET, FILM COATED ORAL at 17:08

## 2024-01-09 RX ADMIN — PANTOPRAZOLE SODIUM 40 MILLIGRAM(S): 20 TABLET, DELAYED RELEASE ORAL at 05:12

## 2024-01-09 RX ADMIN — TAMSULOSIN HYDROCHLORIDE 0.4 MILLIGRAM(S): 0.4 CAPSULE ORAL at 05:12

## 2024-01-09 NOTE — DISCHARGE NOTE PROVIDER - NSDCFUADDINST_GEN_ALL_CORE_FT
Please call and make appt with the [facility] team to properly manage your kidney disease and hx of renal transplant.  Please call and make appt with the [facility] team to properly manage your kidney disease and hx of renal transplant.

## 2024-01-09 NOTE — PROGRESS NOTE ADULT - ASSESSMENT
Status post  donor Kidney transplant at [place] 6 years ago  SANTA, uncertain etiology ?prerenal azotemia  Dyspnea on exertion: CXR clear, no edema  Pancytopenia  CAD  Atrial fibrillation  HTN  Cirrhosis  Hypophosphatemia    Plan    No more IVF  Encourage oral intake  OK for DC from renal standpoint  F/U with [facility] team [phone]  Continue tacrolimus  Continue mycophenolate   Status post  donor Kidney transplant at [facility] 6 years ago  SANTA, uncertain etiology ?prerenal azotemia  Dyspnea on exertion: CXR clear, no edema  Pancytopenia  CAD  Atrial fibrillation  HTN  Cirrhosis  Hypophosphatemia    Plan    No more IVF  Encourage oral intake  OK for DC from renal standpoint  F/U with [facility] team [phone]  Continue tacrolimus  Continue mycophenolate

## 2024-01-09 NOTE — DISCHARGE NOTE PROVIDER - NPI NUMBER (FOR SYSADMIN USE ONLY) :
[[id number] [[id number]] [[id number]],[[id number]],[[id number]] [[id number]],[UNKNOWN]

## 2024-01-09 NOTE — CONSULT NOTE ADULT - CONSULT REASON
Recent AF ablation, coming in with SOB/weakness symptoms
SANTA, Kidney transplant
Afib
ESRD, HF, Liver failure, sciatica, pain recs appreciated
need better pain control

## 2024-01-09 NOTE — CONSULT NOTE ADULT - SUBJECTIVE AND OBJECTIVE BOX
PAIN MANAGEMENT f/u NOTE    Chief Complaint: sciatica    HPI:  69 year old male with PMHx pAfib/Aflutter(on Eliquis) s/p ablation on 12/15/23, CAD s/p PCI(LAD and RCA) for unstable angina in 2019, mild COPD not on home o2, CKD3, Hx of renal transplant 3 years ago, HEP C, decompensated liver cirrhosis(portal HTN, esophageal varices), Melanoma R eye s/p Laser, Breast cancer gynecomastia, HLD, HTN, lumbar Radiculopathy who presents to the ED with c/o SOB, generalized weakness and fatigue for past few weeks.    Pt. states that since after he was discharged on  after Afib/Aflutter ablation, he has been feeling SOB on exertion, and has been feeling tired/weak and this is limiting his day to day activities. He also experienced pounding chest pain yesterday night at rest, that lasted for 1 min and resolved on it's own. He denied any chest pain after that. He denied any LE swelling, denied orthopnea but he prefers not to lie flat. He states he has been taking his meds as prescribed. Denies HA, dizziness, NVD, fever, abdominal pain, change in urination and change in bowel habits. Pain goes down his left posterior thigh and leg that is sharp and burning in nature. he has had injections with Dr. Gordon with minimal relief and is his pain physician.     In ED, pt. was hemodynamically stable  Labs significant for BUN 56, Cr 2.3(1.3 in dec), Trop 95, BNP 23K  EKG showed sinus keyonna HR 52, LVH, diffuse T-wave inversions  CXR showed prominent interstitial markings    Pt. received 1L bolus in ED.    Vital Signs Last 24 Hrs  T(F): 98.2 (2024 04:32), Max: 98.2 (2024 04:32)  HR: 58 (2024 04:32) (50 - 58)  BP: 104/55 (2024 04:32) (104/55 - 105/55)  RR: 16 (2024 04:32) (16 - 16)  SpO2: 100% (2024 04:32) (90% - 100%) on room air    Pt. is being admitted to [facility] for NSTEMI and decompensated heart failure.     (2024 04:22)      PAST MEDICAL & SURGICAL HISTORY:  CKD (chronic kidney disease)      ESRD (end stage renal disease)      HTN (hypertension)      HLD (hyperlipidemia)      Atrial fibrillation  on eliquis      COPD, mild  not on O2      Peripheral neuropathy  unclear cause      Lymphoma  ?questionable, not treated, not followed      Melanoma  R eye, s/p laser      Cirrhosis  possibly related to hepC      Breast cancer      Legally blind      Stroke      History of kidney transplant  3 years ago      S/P arteriovenous (AV) fistula creation  prior old fistula in R arm      S/P lumpectomy, right breast      History of back surgery  s/p Left L5-S1 endoscopic laminoforaminotomy          FAMILY HISTORY:  FH: dementia  mother, alive    FHx: breast cancer  sister ( in 30s)        SOCIAL HISTORY:  [x ] Denies Smoking, Alcohol, or Drug Use    HOME MEDICATIONS:   Please refer to initial HNP    PAIN HOME MEDICATIONS:    Allergies    Rituxan (Hives)  Rifuxen (Swelling)    Intolerances        PAIN MEDICATIONS:  acetaminophen     Tablet .. 650 milliGRAM(s) Oral every 6 hours PRN  HYDROmorphone  Injectable 0.5 milliGRAM(s) IV Push every 6 hours PRN  melatonin 3 milliGRAM(s) Oral at bedtime PRN  oxyCODONE    IR 5 milliGRAM(s) Oral every 8 hours PRN    Heme:  apixaban 5 milliGRAM(s) Oral every 12 hours  aspirin  chewable 81 milliGRAM(s) Oral daily    Antibiotics:    Cardiovascular:  aMIOdarone    Tablet 200 milliGRAM(s) Oral daily    GI:  pantoprazole    Tablet 40 milliGRAM(s) Oral before breakfast  polyethylene glycol 3350 17 Gram(s) Oral daily  senna 2 Tablet(s) Oral at bedtime    Endocrine:  atorvastatin 40 milliGRAM(s) Oral at bedtime    All Other Medications:  mycophenolate mofetil 500 milliGRAM(s) Oral two times a day  potassium phosphate / sodium phosphate Powder (PHOS-NaK) 1 Packet(s) Oral three times a day  tacrolimus 3 milliGRAM(s) Oral <User Schedule>  tacrolimus 2 milliGRAM(s) Oral at bedtime  tamsulosin 0.4 milliGRAM(s) Oral two times a day      Vital Signs Last 24 Hrs  T(C): 36.6 (2024 20:16), Max: 37 (2024 20:55)  T(F): 97.9 (2024 20:16), Max: 98.6 (2024 20:55)  HR: 69 (2024 20:16) (69 - 72)  BP: 132/57 (2024 20:16) (90/54 - 139/63)  BP(mean): --  RR: 18 (2024 20:16) (17 - 18)  SpO2: --        LABS:                        7.8    3.05  )-----------( 68       ( 2024 04:53 )             24.6     01-09    133<L>  |  99  |  19  ----------------------------<  139<H>  4.3   |  22  |  1.7<H>    Ca    9.9      2024 17:45  Phos  1.8     01-08  Mg     1.7     09    TPro  4.9<L>  /  Alb  3.1<L>  /  TBili  0.7  /  DBili  x   /  AST  10  /  ALT  <5  /  AlkPhos  44  01-09      Urinalysis Basic - ( 2024 17:45 )    Color: x / Appearance: x / SG: x / pH: x  Gluc: 139 mg/dL / Ketone: x  / Bili: x / Urobili: x   Blood: x / Protein: x / Nitrite: x   Leuk Esterase: x / RBC: x / WBC x   Sq Epi: x / Non Sq Epi: x / Bacteria: x        RADIOLOGY:        REVIEW OF SYSTEMS:  see hpi    PHYSICAL EXAM  GENERAL: Laying in bed, NAD  Neuro:  EOMI  Cranial nerves grossly intact  CHEST/LUNG: no audible wheeze, no accessory muscle usage  HEART: Regular rate and rhythm; No edema  ABDOMEN: Soft, Nontender  EXTREMITIES: No clubbing, cyanosis  SKIN: No rashes or lesions      ASSESSMENT:   lumbar radiculopathy  low back pain    PLAN:   - Pain:    can increase the oxycodone to 10mg TID  prn. he states he takes 15mg at home when pain is severe which he sees Dr. Gordon  change tylenol to 975q8h  c/w dilaudid 0.5mg q6hr prn  can start methocarbomal 750 q8hr  can start duloxetine 30mg at bedtime for neuropathic pain PAIN MANAGEMENT f/u NOTE    Chief Complaint: sciatica    HPI:  69 year old male with PMHx pAfib/Aflutter(on Eliquis) s/p ablation on 12/15/23, CAD s/p PCI(LAD and RCA) for unstable angina in 2019, mild COPD not on home o2, CKD3, Hx of renal transplant 3 years ago, HEP C, decompensated liver cirrhosis(portal HTN, esophageal varices), Melanoma R eye s/p Laser, Breast cancer gynecomastia, HLD, HTN, lumbar Radiculopathy who presents to the ED with c/o SOB, generalized weakness and fatigue for past few weeks.    Pt. states that since after he was discharged on  after Afib/Aflutter ablation, he has been feeling SOB on exertion, and has been feeling tired/weak and this is limiting his day to day activities. He also experienced pounding chest pain yesterday night at rest, that lasted for 1 min and resolved on it's own. He denied any chest pain after that. He denied any LE swelling, denied orthopnea but he prefers not to lie flat. He states he has been taking his meds as prescribed. Denies HA, dizziness, NVD, fever, abdominal pain, change in urination and change in bowel habits. Pain goes down his left posterior thigh and leg that is sharp and burning in nature. he has had injections with Dr. Gordon with minimal relief and is his pain physician.     In ED, pt. was hemodynamically stable  Labs significant for BUN 56, Cr 2.3(1.3 in dec), Trop 95, BNP 23K  EKG showed sinus keyonna HR 52, LVH, diffuse T-wave inversions  CXR showed prominent interstitial markings    Pt. received 1L bolus in ED.    Vital Signs Last 24 Hrs  T(F): 98.2 (2024 04:32), Max: 98.2 (2024 04:32)  HR: 58 (2024 04:32) (50 - 58)  BP: 104/55 (2024 04:32) (104/55 - 105/55)  RR: 16 (2024 04:32) (16 - 16)  SpO2: 100% (2024 04:32) (90% - 100%) on room air    Pt. is being admitted to [facility] for NSTEMI and decompensated heart failure.     (2024 04:22)      PAST MEDICAL & SURGICAL HISTORY:  CKD (chronic kidney disease)      ESRD (end stage renal disease)      HTN (hypertension)      HLD (hyperlipidemia)      Atrial fibrillation  on eliquis      COPD, mild  not on O2      Peripheral neuropathy  unclear cause      Lymphoma  ?questionable, not treated, not followed      Melanoma  R eye, s/p laser      Cirrhosis  possibly related to hepC      Breast cancer      Legally blind      Stroke      History of kidney transplant  3 years ago      S/P arteriovenous (AV) fistula creation  prior old fistula in R arm      S/P lumpectomy, right breast      History of back surgery  s/p Left L5-S1 endoscopic laminoforaminotomy          FAMILY HISTORY:  FH: dementia  mother, alive    FHx: breast cancer  sister ( in 30s)        SOCIAL HISTORY:  [x ] Denies Smoking, Alcohol, or Drug Use    HOME MEDICATIONS:   Please refer to initial HNP    PAIN HOME MEDICATIONS:    Allergies    Rituxan (Hives)  Rifuxen (Swelling)    Intolerances        PAIN MEDICATIONS:  acetaminophen     Tablet .. 650 milliGRAM(s) Oral every 6 hours PRN  HYDROmorphone  Injectable 0.5 milliGRAM(s) IV Push every 6 hours PRN  melatonin 3 milliGRAM(s) Oral at bedtime PRN  oxyCODONE    IR 5 milliGRAM(s) Oral every 8 hours PRN    Heme:  apixaban 5 milliGRAM(s) Oral every 12 hours  aspirin  chewable 81 milliGRAM(s) Oral daily    Antibiotics:    Cardiovascular:  aMIOdarone    Tablet 200 milliGRAM(s) Oral daily    GI:  pantoprazole    Tablet 40 milliGRAM(s) Oral before breakfast  polyethylene glycol 3350 17 Gram(s) Oral daily  senna 2 Tablet(s) Oral at bedtime    Endocrine:  atorvastatin 40 milliGRAM(s) Oral at bedtime    All Other Medications:  mycophenolate mofetil 500 milliGRAM(s) Oral two times a day  potassium phosphate / sodium phosphate Powder (PHOS-NaK) 1 Packet(s) Oral three times a day  tacrolimus 3 milliGRAM(s) Oral <User Schedule>  tacrolimus 2 milliGRAM(s) Oral at bedtime  tamsulosin 0.4 milliGRAM(s) Oral two times a day      Vital Signs Last 24 Hrs  T(C): 36.6 (2024 20:16), Max: 37 (2024 20:55)  T(F): 97.9 (2024 20:16), Max: 98.6 (2024 20:55)  HR: 69 (2024 20:16) (69 - 72)  BP: 132/57 (2024 20:16) (90/54 - 139/63)  BP(mean): --  RR: 18 (2024 20:16) (17 - 18)  SpO2: --        LABS:                        7.8    3.05  )-----------( 68       ( 2024 04:53 )             24.6     01-09    133<L>  |  99  |  19  ----------------------------<  139<H>  4.3   |  22  |  1.7<H>    Ca    9.9      2024 17:45  Phos  1.8     01-08  Mg     1.7     09    TPro  4.9<L>  /  Alb  3.1<L>  /  TBili  0.7  /  DBili  x   /  AST  10  /  ALT  <5  /  AlkPhos  44  01-09      Urinalysis Basic - ( 2024 17:45 )    Color: x / Appearance: x / SG: x / pH: x  Gluc: 139 mg/dL / Ketone: x  / Bili: x / Urobili: x   Blood: x / Protein: x / Nitrite: x   Leuk Esterase: x / RBC: x / WBC x   Sq Epi: x / Non Sq Epi: x / Bacteria: x        RADIOLOGY:        REVIEW OF SYSTEMS:  see hpi    PHYSICAL EXAM  GENERAL: Laying in bed, NAD  Neuro:  EOMI  Cranial nerves grossly intact  CHEST/LUNG: no audible wheeze, no accessory muscle usage  HEART: Regular rate and rhythm; No edema  ABDOMEN: Soft, Nontender  EXTREMITIES: No clubbing, cyanosis  SKIN: No rashes or lesions      ASSESSMENT:   lumbar radiculopathy  low back pain    PLAN:   - Pain:    can increase the oxycodone to 10mg TID  prn. he states he takes 15mg at home when pain is severe which he sees Dr. Gordon  change tylenol to 975q8h  c/w dilaudid 0.5mg q6hr prn  can start methocarbomal 750 q8hr  can start duloxetine 30mg at bedtime for neuropathic pain

## 2024-01-09 NOTE — DISCHARGE NOTE PROVIDER - NSDCPNSUBOBJ_GEN_ALL_CORE
Pt seen and examined at bedside. Patient feels well. Pt with stable pancytopenia at baseline-likely due to cirrhosis. May need to consider hematology eval as out pt. Pt feels well and is stable for dc home.

## 2024-01-09 NOTE — DISCHARGE NOTE PROVIDER - NSDCQMSTAIRS_GEN_ALL_CORE
No I have personally performed a history and physical exam on this patient and personally directed the management of the patient. Patient is a 55-year-old female presents for evaluation of bilateral foot pain and swelling she has a past medical history of COPD substance abuse bilateral lower extremity edema and swelling for the past several weeks with difficulty ambulation secondary to pain she denies any chest pain shortness of breath abdominal pain nausea vomiting diaphoresis fevers or numbness    On physical exam patient is normocephalic atraumatic pupils equal round reactive light accommodation extraocular muscles intact oropharynx clear chest clear to auscultation bilaterally regular rate and rhythm noted S1-S2 noted no murmurs noted abdomen soft nontender nondistended bowel sounds positive no guarding or rebound extremities revealed bilateral lower extremity edema with reddish/purplish discoloration proximal to the bilateral ankles however no evidence of vascular compromise pedal pulses 2+ no temperature discrepancy between the extremities    Assessment plan patient presents for evaluation of bilateral lower extremity swelling although the patient has no evidence of vascular compromise as pedal pulses are normal capillary refills normal considering her history of substance abuse given IV antibiotics empirically I do not appreciate murmurs on the cardiac exam at this time not consistent with endocarditis however patient treated with antibiotics I will admit for further evaluation at this time patient was updated and agreed with the plan of care    Assessment plan

## 2024-01-09 NOTE — DISCHARGE NOTE PROVIDER - CARE PROVIDER_API CALL
Aram Hernandez  Cardiovascular Disease  [address] [facility], [address]  [place] [address]  Phone: [phone]  Fax: [phone]  Follow Up Time: 2 weeks    Masood Campos  Gastroenterology  [address] [place] [id number]  Phone: [phone]  Fax: ()-  Established Patient  Follow Up Time: 2 weeks   Aram Hernandez  Cardiovascular Disease  [address]  [place] [address]  Phone: [phone]  Fax: [phone]  Follow Up Time: 2 weeks    Masood Campos  Gastroenterology  [address] [place] [address]  Phone: [phone]  Fax: ()-  Established Patient  Follow Up Time: 2 weeks   Aram Hernandez  Cardiovascular Disease  [address] [facility], [address]  [place] [address]  Phone: [phone]  Fax: [phone]  Follow Up Time: 2 weeks    Masood Campos  Gastroenterology  [address] [place] [address]  Phone: [phone]  Fax: ()-  Established Patient  Follow Up Time: 2 weeks    [facility],   Phone: (   )    -  Fax: (   )    -  Follow Up Time: 1 week   Aram Hernandez  Cardiovascular Disease  [address] [facility], [address]  [place] [address]  Phone: [phone]  Fax: [phone]  Follow Up Time: 2 weeks    Masood Campos  Gastroenterology  [address] [place] [address]  Phone: [phone]  Fax: ()-  Established Patient  Follow Up Time: 2 weeks    [facility] team,   Phone: (   )    -  Fax: (   )    -  Follow Up Time: 1 week

## 2024-01-09 NOTE — PROGRESS NOTE ADULT - SUBJECTIVE AND OBJECTIVE BOX
DAVONTE CHOU  69y  Male      Patient is a 69y old  Male who presents with a chief complaint of CHF (08 Jan 2024 22:55)      INTERVAL HPI/OVERNIGHT EVENTS:  Patient is still with weakness, he reports worsening sciatica pain and asking for IV opioids, oral percocet is not helping, no bowel movement, no melena.   Vital Signs Last 24 Hrs  T(C): 36.8 (09 Jan 2024 13:32), Max: 37 (08 Jan 2024 20:55)  T(F): 98.2 (09 Jan 2024 13:32), Max: 98.6 (08 Jan 2024 20:55)  HR: 72 (09 Jan 2024 13:32) (70 - 76)  BP: 90/54 (09 Jan 2024 13:32) (90/54 - 146/65)  BP(mean): --  RR: 17 (09 Jan 2024 13:32) (17 - 18)  SpO2: --          01-08-24 @ 07:01  -  01-09-24 @ 07:00  --------------------------------------------------------  IN: 1142 mL / OUT: 800 mL / NET: 342 mL    01-09-24 @ 07:01 - 01-09-24 @ 13:34  --------------------------------------------------------  IN: 1240 mL / OUT: 150 mL / NET: 1090 mL            Consultant(s) Notes Reviewed:  [x ] YES  [ ] NO          MEDICATIONS  (STANDING):  aMIOdarone    Tablet 200 milliGRAM(s) Oral daily  apixaban 5 milliGRAM(s) Oral every 12 hours  aspirin  chewable 81 milliGRAM(s) Oral daily  atorvastatin 40 milliGRAM(s) Oral at bedtime  mycophenolate mofetil 500 milliGRAM(s) Oral two times a day  pantoprazole    Tablet 40 milliGRAM(s) Oral before breakfast  polyethylene glycol 3350 17 Gram(s) Oral daily  potassium phosphate / sodium phosphate Powder (PHOS-NaK) 1 Packet(s) Oral three times a day  senna 2 Tablet(s) Oral at bedtime  tacrolimus 3 milliGRAM(s) Oral <User Schedule>  tacrolimus 2 milliGRAM(s) Oral at bedtime  tamsulosin 0.4 milliGRAM(s) Oral two times a day    MEDICATIONS  (PRN):  acetaminophen     Tablet .. 650 milliGRAM(s) Oral every 6 hours PRN Temp greater or equal to 38C (100.4F), Mild Pain (1 - 3)  albuterol    90 MICROgram(s) HFA Inhaler 2 Puff(s) Inhalation every 6 hours PRN Shortness of Breath and/or Wheezing  guaifenesin/dextromethorphan Oral Liquid 200 milliLiter(s) Oral four times a day PRN Cough  HYDROmorphone  Injectable 0.5 milliGRAM(s) IV Push every 6 hours PRN Severe Pain (7 - 10)  melatonin 3 milliGRAM(s) Oral at bedtime PRN Insomnia  oxyCODONE    IR 5 milliGRAM(s) Oral every 8 hours PRN Severe Pain (7 - 10)      LABS                          7.8    3.05  )-----------( 68       ( 09 Jan 2024 04:53 )             24.6     01-09    134<L>  |  99  |  20  ----------------------------<  115<H>  4.0   |  23  |  1.8<H>    Ca    9.7      09 Jan 2024 04:53  Phos  1.8     01-08  Mg     1.7     01-09    TPro  4.9<L>  /  Alb  3.1<L>  /  TBili  0.7  /  DBili  x   /  AST  10  /  ALT  <5  /  AlkPhos  44  01-09      Urinalysis Basic - ( 09 Jan 2024 04:53 )    Color: x / Appearance: x / SG: x / pH: x  Gluc: 115 mg/dL / Ketone: x  / Bili: x / Urobili: x   Blood: x / Protein: x / Nitrite: x   Leuk Esterase: x / RBC: x / WBC x   Sq Epi: x / Non Sq Epi: x / Bacteria: x        Lactate Trend  01-05 @ 12:47 Lactate:1.6         CAPILLARY BLOOD GLUCOSE          Culture - Urine (collected 01-05-24 @ 10:23)  Source: Clean Catch Clean Catch (Midstream)  Preliminary Report (01-08-24 @ 20:22):    >100,000 CFU/ml Enterococcus faecalis        RADIOLOGY & ADDITIONAL TESTS:    Imaging Personally Reviewed:  [ ] YES  [ ] NO    HEALTH ISSUES - PROBLEM Dx:          PHYSICAL EXAM:  GENERAL: NAD, well-developed.  HEAD:  Atraumatic, Normocephalic.  EYES: EOMI, PERRLA, conjunctiva and sclera clear.  NECK: Supple, elevated JVP  CHEST/LUNG: Clear to auscultation bilaterally; No wheeze.  HEART: Regular rate and rhythm; S1 S2.   ABDOMEN: Soft, Nontender, Nondistended; Bowel sounds present.  EXTREMITIES:  2+ Peripheral Pulses, No clubbing, cyanosis, or edema.  PSYCH: AAOx3.  NEUROLOGY: non-focal.  SKIN: No rashes or lesions.

## 2024-01-09 NOTE — DISCHARGE NOTE PROVIDER - NSDCCAREPROVSEEN_GEN_ALL_CORE_FT
[facility] internal medicine team [facility] internal medicine team

## 2024-01-09 NOTE — DISCHARGE NOTE PROVIDER - DISCHARGING ATTENDING PHYSICIAN:
Ongoing SW/CM Assessment/Plan of Care Note     See SW/CM flowsheets for goals and other objective data.    Patient/Family discharge goal (s):  Goal #1: Discharge to other institution(s) arranged          PT Recommendation:  Recommendation for Discharge Support: PT [place]: 24 Hour assist       OT Recommendation:  Recommendation for Discharge Support: OT [place]: 24 Hour assist, Assistance with IADLs       SLP Recommendation:       Disposition:  SNF    Progress note:   Discharge Planning.  Skilled Nursing Facility (SNF) for rehab needed. Patient resides with her mother.  Pt with colorectal cancer.  Pt was admitted with recurrent SBO.  Pt has a venting G tube.  Pt is on TPN and full liquid diet. Noted pt declining out of bed activity with therapy and to get up with unit staff due to diarrhea and fatigue/tiredness.  See progress notes. Writer did discuss SNF with pt earlier in stay and pt agreeable to this for rehab. Writer looked into long term acute care hospital (LTAC) but LTAC unable to consider due to no current plan to get pt off TPN.  Writer also looked into SNF's pt was interested in but sites unable to manage care needs. Unable to plan for SNF while pt on TPN as SNF's unable to accommodate. Aware GI and colorectal surgery were consulted as pt with diarrhea & per orders KUB suggestive of ileus. Aware palliative care following.  SW to follow pt's plan of care and progress & assist with SNF placement when appropriate.           Dr. Ruiz

## 2024-01-09 NOTE — DISCHARGE NOTE PROVIDER - NSDCMRMEDTOKEN_GEN_ALL_CORE_FT
amiodarone 200 mg oral tablet: 1 tab(s) orally once a day  aspirin 81 mg oral tablet: 1 tab(s) orally once a day  atorvastatin 40 mg oral tablet: 1 tab(s) orally once a day (at bedtime)  calcitriol 0.5 mcg oral capsule: 1 cap(s) orally 2 times a day  Eliquis 5 mg oral tablet: 1 tab(s) orally 2 times a day  Flomax 0.4 mg oral capsule: 1 cap(s) orally 2 times a day  Lasix 20 mg oral tablet: 1 tab(s) orally 3 times a week  metoprolol tartrate 100 mg oral tablet: 1 tab(s) orally once a day  mycophenolate mofetil 500 mg oral tablet: 2 tab(s) orally once a day  tacrolimus 1 mg oral capsule: 3 cap(s) orally once a day (in the morning)  tacrolimus 1 mg oral capsule: 2 cap(s) orally once a day (at bedtime)   amiodarone 200 mg oral tablet: 1 tab(s) orally once a day  aspirin 81 mg oral tablet: 1 tab(s) orally once a day  atorvastatin 40 mg oral tablet: 1 tab(s) orally once a day (at bedtime)  calcitriol 0.5 mcg oral capsule: 1 cap(s) orally 2 times a day  Eliquis 5 mg oral tablet: 1 tab(s) orally 2 times a day  Flomax 0.4 mg oral capsule: 1 cap(s) orally 2 times a day  Lasix 20 mg oral tablet: 1 tab(s) orally 3 times a week  mycophenolate mofetil 500 mg oral tablet: 2 tab(s) orally once a day  tacrolimus 1 mg oral capsule: 3 cap(s) orally once a day (in the morning)  tacrolimus 1 mg oral capsule: 2 cap(s) orally once a day (at bedtime)

## 2024-01-09 NOTE — DISCHARGE NOTE PROVIDER - NSDCFUADDAPPT_GEN_ALL_CORE_FT
APPTS ARE READY TO BE MADE: [x ] YES    Best Family or Patient Contact (if needed):    Additional Information about above appointments (if needed):    1: GI Dr Campos pls have pt follow up within 2-4 weeks  2:   3:     Other comments or requests:

## 2024-01-09 NOTE — DISCHARGE NOTE PROVIDER - PROVIDER TOKENS
PROVIDER:[TOKEN:[[id number]],FOLLOWUP:[2 weeks]],PROVIDER:[TOKEN:[[id number]],FOLLOWUP:[2 weeks],ESTABLISHEDPATIENT:[T]] PROVIDER:[TOKEN:[[id number]],FOLLOWUP:[2 weeks]],PROVIDER:[TOKEN:[[id number]],FOLLOWUP:[2 weeks],ESTABLISHEDPATIENT:[T]] PROVIDER:[TOKEN:[[id number]],FOLLOWUP:[2 weeks]],PROVIDER:[TOKEN:[[id number]],FOLLOWUP:[2 weeks],ESTABLISHEDPATIENT:[T]],FREE:[LAST:[[facility]],PHONE:[(   )    -],FAX:[(   )    -],FOLLOWUP:[1 week]] PROVIDER:[TOKEN:[[id number]],FOLLOWUP:[2 weeks]],PROVIDER:[TOKEN:[[id number]],FOLLOWUP:[2 weeks],ESTABLISHEDPATIENT:[T]],FREE:[LAST:[[facility]],PHONE:[(   )    -],FAX:[(   )    -],FOLLOWUP:[1 week]]

## 2024-01-09 NOTE — DISCHARGE NOTE PROVIDER - NSDCFUSCHEDAPPT_GEN_ALL_CORE_FT
Adele Arnold Physician Counts include 234 beds at the [facility]  NEUROSURG [address] [facility]  Scheduled Appointment: 02/28/2024     Adele Arnold Physician [facility]  NEUROSURG [address] [facility]  Scheduled Appointment: 02/28/2024

## 2024-01-09 NOTE — DISCHARGE NOTE PROVIDER - CARE PROVIDERS DIRECT ADDRESSES
,[email or web address],DirectAddress_Unknown ,[email or web address],DirectAddress_Unknown ,[email or web address],DirectAddress_Unknown,[email or web address] ,[email or web address],DirectAddress_Unknown,DirectAddress_Unknown

## 2024-01-09 NOTE — DISCHARGE NOTE PROVIDER - NSDCCPCAREPLAN_GEN_ALL_CORE_FT
PRINCIPAL DISCHARGE DIAGNOSIS  Diagnosis: Chest pain  Assessment and Plan of Treatment:       SECONDARY DISCHARGE DIAGNOSES  Diagnosis: SANTA (acute kidney injury)  Assessment and Plan of Treatment:      PRINCIPAL DISCHARGE DIAGNOSIS  Diagnosis: Acute on chronic diastolic congestive heart failure  Assessment and Plan of Treatment: What else can I do on my own to protect my heart?  To help feel better and reduce the chances that you will need to go to the hospital, you can do the following:  ?Call your doctor or nurse if you are dizzy or weak, or if you lose or gain weight suddenly. Weigh yourself every morning after you urinate but before you eat breakfast. Try to wear about the same amount of clothing every time.  ?Follow the action plan your doctor gives you. An action plan is a list of instructions that tells you what to do if your symptoms change. To use an action plan, watch your symptoms closely and weigh yourself every day. If you do not feel well or if you lose or gain weight suddenly, look at your action plan to see what to do (figure 1 and figure 2).  ?Call for an ambulance (in the [place] and [place], call 9-1-1) if you have chest pain or other signs of a heart attack (figure 3)  ?Lose weight, if you are overweight  ?Stop smoking  ?Limit the amount of alcohol you drink. For females, this means no more than 1 drink a day. For males, it means no more than 2 drinks a day.        SECONDARY DISCHARGE DIAGNOSES  Diagnosis: SANTA (acute kidney injury)  Assessment and Plan of Treatment: What are the symptoms of acute kidney injury?  Some people do not have any symptoms at first. People who are in the hospital might learn that they have acute kidney injury after they have blood tests for another reason.  When people do have symptoms, the symptoms can include:  ?Urinating less, or not urinating at all  ?Blood in the urine, or urine that is red or brown  ?Swelling, especially in the legs or feet  ?Vomiting, or not feeling hungry  ?Feeling weak, or getting tired easily  ?Acting confused, or not acting like themselves  ?Seizures – Seizures are waves of abnormal electrical activity in the brain. They can make people pass out, or move or behave strangely.  ?Shortness of breath  Should I call my doctor or nurse?  Call your doctor or nurse if you have any of the above symptoms. If you are already in the hospital, let your doctor or nurse know if you have any of these symptoms.     PRINCIPAL DISCHARGE DIAGNOSIS  Diagnosis: Acute on chronic diastolic congestive heart failure  Assessment and Plan of Treatment: What else can I do on my own to protect my heart?  To help feel better and reduce the chances that you will need to go to the hospital, you can do the following:  ?Call your doctor or nurse if you are dizzy or weak, or if you lose or gain weight suddenly. Weigh yourself every morning after you urinate but before you eat breakfast. Try to wear about the same amount of clothing every time.  ?Follow the action plan your doctor gives you. An action plan is a list of instructions that tells you what to do if your symptoms change. To use an action plan, watch your symptoms closely and weigh yourself every day. If you do not feel well or if you lose or gain weight suddenly, look at your action plan to see what to do (figure 1 and figure 2).  ?Call for an ambulance (in the [place] and [place], call 9-1-1) if you have chest pain or other signs of a heart attack (figure 3)  ?Lose weight, if you are overweight  ?Stop smoking  ?Limit the amount of alcohol you drink. For females, this means no more than 1 drink a day. For males, it means no more than 2 drinks a day.        SECONDARY DISCHARGE DIAGNOSES  Diagnosis: SANTA (acute kidney injury)  Assessment and Plan of Treatment: What are the symptoms of acute kidney injury?  Some people do not have any symptoms at first. People who are in the hospital might learn that they have acute kidney injury after they have blood tests for another reason.  When people do have symptoms, the symptoms can include:  ?Urinating less, or not urinating at all  ?Blood in the urine, or urine that is red or brown  ?Swelling, especially in the legs or feet  ?Vomiting, or not feeling hungry  ?Feeling weak, or getting tired easily  ?Acting confused, or not acting like themselves  ?Seizures – Seizures are waves of abnormal electrical activity in the brain. They can make people pass out, or move or behave strangely.  ?Shortness of breath  Should I call my doctor or nurse?  Call your doctor or nurse if you have any of the above symptoms. If you are already in the hospital, let your doctor or nurse know if you have any of these symptoms.    Diagnosis: Pancytopenia  Assessment and Plan of Treatment: You were found to have low red and white blood cells. This is likely due to your liver cirrhosis. Please follow up with your primary doctor. If no changes you may need to see a hematologist for a possible bone marrow biopsy.

## 2024-01-09 NOTE — PROGRESS NOTE ADULT - ASSESSMENT
69 year old male with PMHx Afib/Aflutter(on Eliquis) s/p ablation on 12/15/23, CAD s/p PCI(LAD and RCA) for unstable angina in 2019, mild COPD not on home o2, CKD3, Hx of renal transplant 3 years ago, HEP C, decompensated liver cirrhosis(portal HTN, esophageal varices), Melanoma R eye s/p Laser, Breast cancer gynecomastia, HLD, HTN, lumbar Radiculopathy who presents to the ED with c/o SOB, generalized weakness and fatigue for past few weeks.    A/P:   Weakness and fatigue:   from Acute Kidney Injury, anemia, NSTEMI.     Worsening Anemia:   Hb 7.8 dropping, was 10.5 on admission, 12 in Dec 2023.   He had recent EGD and Colonoscopy s/p multiple polypectomy.   Patient with constipation, no recent bowel movement, he denies melena or hematochezia.   QI done by GI fellow was negative, discussed with GI, recommended outpatient follow up for possible capsule endoscopy.     NSTEMI   CAD s/p PCI(LAD) and staged PCI(RCA) in 2019 for unstable angina  Chronic HFpEF: acute exacerbation ruled out.   Patient with SOB, cough, Pro-BNp 23K,  CXR showed no acute infiltrates.   Troponin 95>85  EKG showed new ST and T waves abnormalities on inferior and lateral leads.   Echo showed Hyperdynamic global left ventricular systolic function LVEF 76%, severe Left atrial enlargement, mod concentric LVH, Grade III diastolic dysfunction, mod MR, mild TR, mild AR,  consistent with borderline pulmonary hypertension. Small secundum atrial septal defect with predominantly left to right shunting across the atrial septum.  s/p Lasix IV, hold IV diuresis as patient looks euvolemic, JVP normal.  Continue ASA, Eliquis and Lipitor. Off Metoprolol due to bradycardia  Cardiology recommended medical therapy. No plan for cardiac cath due to Acute Kidney Injury.     Acute Kidney Injury:   History of renal transplant.   Possibly Pre-renal.   UA was negative. Cr improved to 1.6, again 1.8 today, s/p IV fluid, encourage oral intake. Will give another 1 Liter RL.   Nephrology follow up.   Avoid Nephrotoxic agents,   Continue Tacrolimus and Mycophenolate Mofetil 500mg BID. Tacrolimus level 11    Paroxysmal Atrial Fibrillation /Flutter s/p ablation  HR is stable.   Metoprolol held per EP due to bradycardia  Continue Amiodarone and Eliquis.     COPD, Former smoker(quit yrs ago)  No wheezing on exam  Continue albuterol prn  Patient with cough, RVP negative  CT chest non con showed No acute intrathoracic pathology. Upper lobe predominant emphysematous changes. Left lower lobe 0.5 cm nodule, unchanged.  Cardiomegaly, unchanged. Dilated ascending thoracic aorta measuring 4.2 cm, unchanged    Chronic Compensated Liver Cirrhosis:   History of hepatitis C  No ascites on exam, PLT 86, INR 1.6 on Eliquis   hepatology follow up outpatient.     Lumbar radiculopathy, chronic back pain  Oxycodone prn, avoid IV opioids, pain management consult.     DVT prophylaxis: Eliquis  GI prophylaxis: PPI  #Progress Note Handoff:  Pending (specify):  monitor Hb  Family discussion:  Disposition: Home in 24 hrs if Hb is stable.

## 2024-01-09 NOTE — PROGRESS NOTE ADULT - SUBJECTIVE AND OBJECTIVE BOX
24H events:    Patient is a 69y old Male who presents with a chief complaint of afib (07 Jan 2024 12:14)    Primary diagnosis of Chest pain      Day 1-2:   treated for nstemi, no cardiac cath due to SANTA, treating with medical therapy  Day 3: Noted to have downtredning hgb; QI did not show signs of bleed. Started Ensure for nutrition supplementation    Today is 4d of hospitalization. This morning patient was seen and examined at bedside, resting comfortably in bed.    No acute or major events overnight. No dizziness, HA, or signs of bleed, no cp, sob    Code Status: full code      PAST MEDICAL & SURGICAL HISTORY  CKD (chronic kidney disease)    ESRD (end stage renal disease)    HTN (hypertension)    HLD (hyperlipidemia)    Atrial fibrillation  on eliquis    COPD, mild  not on O2    Peripheral neuropathy  unclear cause    Lymphoma  ?questionable, not treated, not followed    Melanoma  R eye, s/p laser    Cirrhosis  possibly related to hepC    Breast cancer    Legally blind    Stroke    History of kidney transplant  3 years ago    S/P arteriovenous (AV) fistula creation  prior old fistula in R arm    S/P lumpectomy, right breast    History of back surgery  s/p Left L5-S1 endoscopic laminoforaminotomy      SOCIAL HISTORY:  Social History:      ALLERGIES:  Rituxan (Hives)  Rifuxen (Swelling)    MEDICATIONS:  MEDICATIONS  (STANDING):  aMIOdarone    Tablet 200 milliGRAM(s) Oral daily  apixaban 5 milliGRAM(s) Oral every 12 hours  aspirin  chewable 81 milliGRAM(s) Oral daily  atorvastatin 40 milliGRAM(s) Oral at bedtime  mycophenolate mofetil 500 milliGRAM(s) Oral two times a day  pantoprazole    Tablet 40 milliGRAM(s) Oral before breakfast  polyethylene glycol 3350 17 Gram(s) Oral daily  potassium phosphate / sodium phosphate Powder (PHOS-NaK) 1 Packet(s) Oral three times a day  senna 2 Tablet(s) Oral at bedtime  tacrolimus 3 milliGRAM(s) Oral <User Schedule>  tacrolimus 2 milliGRAM(s) Oral at bedtime  tamsulosin 0.4 milliGRAM(s) Oral two times a day    MEDICATIONS  (PRN):  acetaminophen     Tablet .. 650 milliGRAM(s) Oral every 6 hours PRN Temp greater or equal to 38C (100.4F), Mild Pain (1 - 3)  albuterol    90 MICROgram(s) HFA Inhaler 2 Puff(s) Inhalation every 6 hours PRN Shortness of Breath and/or Wheezing  guaifenesin/dextromethorphan Oral Liquid 200 milliLiter(s) Oral four times a day PRN Cough  HYDROmorphone  Injectable 0.5 milliGRAM(s) IV Push every 6 hours PRN Severe Pain (7 - 10)  melatonin 3 milliGRAM(s) Oral at bedtime PRN Insomnia  oxyCODONE    IR 5 milliGRAM(s) Oral every 8 hours PRN Severe Pain (7 - 10)      VITALS:   T(F): 99.7  HR: 76  BP: 147/67  RR: 19  SpO2: 100%    PHYSICAL EXAM:  GENERAL:   ( x) NAD, lying in bed comfortably     (  ) obtunded     (  ) lethargic     (  ) somnolent    HEAD:   ( x) Atraumatic     (  ) hematoma     (  ) laceration (specify location:       )     NECK:  (x) Supple     (  ) neck stiffness     (  ) nuchal rigidity     (  )  no JVD     (  ) JVD present ( -- cm)    HEART:  Rate -->     (x) normal rate     (  ) bradycardic     (  ) tachycardic  Rhythm -->     (x) regular     (  ) regularly irregular     (  ) irregularly irregular  Murmurs -->     (x) normal s1s2     (  ) systolic murmur     (  ) diastolic murmur     (  ) continuous murmur      (  ) S3 present     (  ) S4 present    LUNGS:   ( x)Unlabored respirations     (  ) tachypnea  ( x) B/L air entry     (  ) decreased breath sounds in:  (location     )    ( x) no adventitious sound     (  ) crackles     (  ) wheezing      (  ) rhonchi      (specify location:       )  (  ) chest wall tenderness (specify location:       )    ABDOMEN:   ( x) Soft     (  ) tense   |   (  ) nondistended     (  ) distended   |   (  ) +BS     (  ) hypoactive bowel sounds     (  ) hyperactive bowel sounds  ( x) nontender     (  ) RUQ tenderness     (  ) RLQ tenderness     (  ) LLQ tenderness     (  ) epigastric tenderness     (  ) diffuse tenderness  (  ) Splenomegaly      (  ) Hepatomegaly      (  ) Jaundice     (  ) ecchymosis     EXTREMITIES:  ( x) Normal     (  ) Rash     (  ) ecchymosis     (  ) varicose veins      (  ) pitting edema     (  ) non-pitting edema   (  ) ulceration     (  ) gangrene:     (location:     )    NERVOUS SYSTEM:    ( x) A&Ox3     (  ) confused     (  ) lethargic  CN II-XII:     ( x) Intact     (  ) deficits found     (Specify:     )   Upper extremities:     (  ) no sensorimotor deficits     (  ) weakness     (  ) loss of proprioception/vibration     (  ) loss of touch/temperature (specify:    )  Lower extremities:     (  ) no sensorimotor deficits     (  ) weakness     (  ) loss of proprioception/vibration     (  ) loss of touch/temperature (specify:    )    SKIN:   (  ) No rashes or lesions     (  ) maculopapular rash     (  ) pustules     (  ) vesicles     (  ) ulcer     (  ) ecchymosis     (specify location:     )    [facility] score :      LABS:    WBC:  3.05 *L* (01-09 @ 04:53)  3.80 *L* (01-08 @ 05:46)  3.59 *L* (01-07 @ 06:34)    Hgb:  7.8 *L* (01-09 @ 04:53)  8.5 *L* (01-08 @ 05:46)  8.5 *L* (01-07 @ 06:34)    Latest Lytes (Na/K/Mg/Phos):  134<L> / 4.0 / 1.7<L> / -- 01-09 @ 04:53  132<L> / 4.6 / 1.6<L> / 1.8<L> 01-08 @ 05:46  134<L> / 3.8 / 1.6<L> / 1.9<L> 01-07 @ 06:34  136 / 3.9 / 1.7<L> / 2.9 01-06 @ 07:04  135 / 4.0 / 1.8 / 3.4 01-05 @ 12:47    BUN/Cr/eGFR:  20 / 1.8 / 41 (01-09 @ 04:53)  20 / 1.6 / 47 (01-08 @ 05:46)  28 / 1.8 / 41 (01-07 @ 06:34)    Lactate:  1.6 (01-05-24 @ 12:47)  1.1 (11-11-19 @ 17:26)    Procal:    CK:  28 (02-25-22 @ 04:30)      Dimer:    TSH:  0.49 [0.27 - 4.20] (12-18-23 @ 08:36)  4.27 *H* [0.27 - 4.20] (02-20-22 @ 04:30)    Cultures:    Culture - Urine (collected 01-05 @ 10:23)  Source: Clean Catch Clean Catch (Midstream)  Preliminary Report (01-08 @ 20:22):    >100,000 CFU/ml Enterococcus faecalis                              8.5    3.80  )-----------( 69       ( 08 Jan 2024 05:46 )             26.3     01-08    132<L>  |  99  |  20  ----------------------------<  112<H>  4.6   |  24  |  1.6<H>    Ca    10.3      08 Jan 2024 05:46  Phos  1.8     01-08  Mg     1.6     01-08    TPro  5.3<L>  /  Alb  3.3<L>  /  TBili  1.3<H>  /  DBili  x   /  AST  12  /  ALT  <5  /  AlkPhos  46  01-08      Urinalysis Basic - ( 08 Jan 2024 05:46 )    Color: x / Appearance: x / SG: x / pH: x  Gluc: 112 mg/dL / Ketone: x  / Bili: x / Urobili: x   Blood: x / Protein: x / Nitrite: x   Leuk Esterase: x / RBC: x / WBC x   Sq Epi: x / Non Sq Epi: x / Bacteria: x                RADIOLOGY:    ASSESSMENT AND PLAN  DAVONTE CHOU is a 13l-osls-afe Male with a history significant for pAfib/Aflutter(on Eliquis) s/p ablation on 12/15/23, CAD s/p PCI(LAD and RCA) for unstable angina in 2019, mild COPD not on home o2, CKD3, Hx of renal transplant 3 years ago, HEP C, decompensated liver cirrhosis(portal HTN, esophageal varices), Melanoma R eye s/p Laser, Breast cancer gynecomastia, HLD, HTN, lumbar Radiculopathy who presents to the ED with c/o SOB, generalized weakness and fatigue for past few weeks.    A/P:   Acute HFpEF:   NSTEMI   CAD s/p PCI(LAD) and staged PCI(RCA) in 2019 for unstable angina  Patient with SOB, cough, Pro-BNp 23K, elevated JVP.   CXR showed no acute infiltrates.   Troponin 95>85  EKG showed new ST and T waves abnormalities on inferior and lateral leads.   Echo showed Hyperdynamic global left ventricular systolic function LVEF 76%, severe Left atrial enlargement, mod concentric LVH, Grade III diastolic dysfunction, mod MR, mild TR, mild AR,  consistent with borderline pulmonary hypertension. Small secundum atrial septal defect with predominantly left to right shunting across the atrial septum.  s/p Lasix IV, hold IV diuresis as patient looks euvolemic, JVP improved.   Continue ASA, Eliquis and Lipitor. Off Metoprolol due to bradycardia  Cardiology recommended medical therapy. No plan for cardiac cath due to Acute Kidney Injury.     #Anemia  - no sign of bleed, QI negative  - f/u iron panel  - monitor for signs of bleed    Acute Kidney Injury:   History of renal transplant.   Possibly Pre-renal.   UA was negative. Cr is improving to 1.8, while on IV fluid, Caution with IV fluid, will hold it tomorrow.   Nephrology follow up.   Avoid Nephrotoxic agents,   Continue Tacrolimus and Mycophenolate Mofetil 500mg BID. Tacrolimus level 11    Paroxysmal Atrial Fibrillation /Flutter s/p ablation  HR is stable.   Metoprolol held per EP due to bradycardia  Continue Amiodarone and Eliquis.     COPD, Former smoker(quit yrs ago)  No wheezing on exam  Continue albuterol prn  Patient with cough, RVP negative  CT chest non con showed No acute intrathoracic pathology. Upper lobe predominant emphysematous changes. Left lower lobe 0.5 cm nodule, unchanged.  Cardiomegaly, unchanged. Dilated ascending thoracic aorta measuring 4.2 cm, unchanged    Chronic Compensated Liver Cirrhosis:   History of hepatitis C  No ascites on exam, PLT 86, INR 1.6 on eliquis,   hepatology follow up outpatient.     Lumbar radiculopathy, chronic back pain   Oxycodone prn      -------------------------------------------------------------------------------------------------------------------------------------  #DVT PPX: eliquis    #GI PPX: pantoprazole    #Diet: dash fluid restrict with Ensure    #Activity Order: increase as tolerated     #Handoff  - monitor hgb, trend creatinine   24H events:    Patient is a 69y old Male who presents with a chief complaint of afib (07 Jan 2024 12:14)    Primary diagnosis of Chest pain      Day 1-2:   treated for nstemi, no cardiac cath due to SANTA, treating with medical therapy  Day 3: Noted to have downtredning hgb; QI did not show signs of bleed. Started Ensure for nutrition supplementation    Today is 4d of hospitalization. This morning patient was seen and examined at bedside, resting comfortably in bed.    No acute or major events overnight. No dizziness, HA, or signs of bleed, no cp, sob    Code Status: full code      PAST MEDICAL & SURGICAL HISTORY  CKD (chronic kidney disease)    ESRD (end stage renal disease)    HTN (hypertension)    HLD (hyperlipidemia)    Atrial fibrillation  on eliquis    COPD, mild  not on O2    Peripheral neuropathy  unclear cause    Lymphoma  ?questionable, not treated, not followed    Melanoma  R eye, s/p laser    Cirrhosis  possibly related to hepC    Breast cancer    Legally blind    Stroke    History of kidney transplant  3 years ago    S/P arteriovenous (AV) fistula creation  prior old fistula in R arm    S/P lumpectomy, right breast    History of back surgery  s/p Left L5-S1 endoscopic laminoforaminotomy      SOCIAL HISTORY:  Social History:      ALLERGIES:  Rituxan (Hives)  Rifuxen (Swelling)    MEDICATIONS:  MEDICATIONS  (STANDING):  aMIOdarone    Tablet 200 milliGRAM(s) Oral daily  apixaban 5 milliGRAM(s) Oral every 12 hours  aspirin  chewable 81 milliGRAM(s) Oral daily  atorvastatin 40 milliGRAM(s) Oral at bedtime  mycophenolate mofetil 500 milliGRAM(s) Oral two times a day  pantoprazole    Tablet 40 milliGRAM(s) Oral before breakfast  polyethylene glycol 3350 17 Gram(s) Oral daily  potassium phosphate / sodium phosphate Powder (PHOS-NaK) 1 Packet(s) Oral three times a day  senna 2 Tablet(s) Oral at bedtime  tacrolimus 3 milliGRAM(s) Oral <User Schedule>  tacrolimus 2 milliGRAM(s) Oral at bedtime  tamsulosin 0.4 milliGRAM(s) Oral two times a day    MEDICATIONS  (PRN):  acetaminophen     Tablet .. 650 milliGRAM(s) Oral every 6 hours PRN Temp greater or equal to 38C (100.4F), Mild Pain (1 - 3)  albuterol    90 MICROgram(s) HFA Inhaler 2 Puff(s) Inhalation every 6 hours PRN Shortness of Breath and/or Wheezing  guaifenesin/dextromethorphan Oral Liquid 200 milliLiter(s) Oral four times a day PRN Cough  HYDROmorphone  Injectable 0.5 milliGRAM(s) IV Push every 6 hours PRN Severe Pain (7 - 10)  melatonin 3 milliGRAM(s) Oral at bedtime PRN Insomnia  oxyCODONE    IR 5 milliGRAM(s) Oral every 8 hours PRN Severe Pain (7 - 10)      VITALS:   T(F): 99.7  HR: 76  BP: 147/67  RR: 19  SpO2: 100%    PHYSICAL EXAM:  GENERAL:   ( x) NAD, lying in bed comfortably     (  ) obtunded     (  ) lethargic     (  ) somnolent    HEAD:   ( x) Atraumatic     (  ) hematoma     (  ) laceration (specify location:       )     NECK:  (x) Supple     (  ) neck stiffness     (  ) nuchal rigidity     (  )  no JVD     (  ) JVD present ( -- cm)    HEART:  Rate -->     (x) normal rate     (  ) bradycardic     (  ) tachycardic  Rhythm -->     (x) regular     (  ) regularly irregular     (  ) irregularly irregular  Murmurs -->     (x) normal s1s2     (  ) systolic murmur     (  ) diastolic murmur     (  ) continuous murmur      (  ) S3 present     (  ) S4 present    LUNGS:   ( x)Unlabored respirations     (  ) tachypnea  ( x) B/L air entry     (  ) decreased breath sounds in:  (location     )    ( x) no adventitious sound     (  ) crackles     (  ) wheezing      (  ) rhonchi      (specify location:       )  (  ) chest wall tenderness (specify location:       )    ABDOMEN:   ( x) Soft     (  ) tense   |   (  ) nondistended     (  ) distended   |   (  ) +BS     (  ) hypoactive bowel sounds     (  ) hyperactive bowel sounds  ( x) nontender     (  ) RUQ tenderness     (  ) RLQ tenderness     (  ) LLQ tenderness     (  ) epigastric tenderness     (  ) diffuse tenderness  (  ) Splenomegaly      (  ) Hepatomegaly      (  ) Jaundice     (  ) ecchymosis     EXTREMITIES:  ( x) Normal     (  ) Rash     (  ) ecchymosis     (  ) varicose veins      (  ) pitting edema     (  ) non-pitting edema   (  ) ulceration     (  ) gangrene:     (location:     )    NERVOUS SYSTEM:    ( x) A&Ox3     (  ) confused     (  ) lethargic  CN II-XII:     ( x) Intact     (  ) deficits found     (Specify:     )   Upper extremities:     (  ) no sensorimotor deficits     (  ) weakness     (  ) loss of proprioception/vibration     (  ) loss of touch/temperature (specify:    )  Lower extremities:     (  ) no sensorimotor deficits     (  ) weakness     (  ) loss of proprioception/vibration     (  ) loss of touch/temperature (specify:    )    SKIN:   (  ) No rashes or lesions     (  ) maculopapular rash     (  ) pustules     (  ) vesicles     (  ) ulcer     (  ) ecchymosis     (specify location:     )    [facility] score :      LABS:    WBC:  3.05 *L* (01-09 @ 04:53)  3.80 *L* (01-08 @ 05:46)  3.59 *L* (01-07 @ 06:34)    Hgb:  7.8 *L* (01-09 @ 04:53)  8.5 *L* (01-08 @ 05:46)  8.5 *L* (01-07 @ 06:34)    Latest Lytes (Na/K/Mg/Phos):  134<L> / 4.0 / 1.7<L> / -- 01-09 @ 04:53  132<L> / 4.6 / 1.6<L> / 1.8<L> 01-08 @ 05:46  134<L> / 3.8 / 1.6<L> / 1.9<L> 01-07 @ 06:34  136 / 3.9 / 1.7<L> / 2.9 01-06 @ 07:04  135 / 4.0 / 1.8 / 3.4 01-05 @ 12:47    BUN/Cr/eGFR:  20 / 1.8 / 41 (01-09 @ 04:53)  20 / 1.6 / 47 (01-08 @ 05:46)  28 / 1.8 / 41 (01-07 @ 06:34)    Lactate:  1.6 (01-05-24 @ 12:47)  1.1 (11-11-19 @ 17:26)    Procal:    CK:  28 (02-25-22 @ 04:30)      Dimer:    TSH:  0.49 [0.27 - 4.20] (12-18-23 @ 08:36)  4.27 *H* [0.27 - 4.20] (02-20-22 @ 04:30)    Cultures:    Culture - Urine (collected 01-05 @ 10:23)  Source: Clean Catch Clean Catch (Midstream)  Preliminary Report (01-08 @ 20:22):    >100,000 CFU/ml Enterococcus faecalis                              8.5    3.80  )-----------( 69       ( 08 Jan 2024 05:46 )             26.3     01-08    132<L>  |  99  |  20  ----------------------------<  112<H>  4.6   |  24  |  1.6<H>    Ca    10.3      08 Jan 2024 05:46  Phos  1.8     01-08  Mg     1.6     01-08    TPro  5.3<L>  /  Alb  3.3<L>  /  TBili  1.3<H>  /  DBili  x   /  AST  12  /  ALT  <5  /  AlkPhos  46  01-08      Urinalysis Basic - ( 08 Jan 2024 05:46 )    Color: x / Appearance: x / SG: x / pH: x  Gluc: 112 mg/dL / Ketone: x  / Bili: x / Urobili: x   Blood: x / Protein: x / Nitrite: x   Leuk Esterase: x / RBC: x / WBC x   Sq Epi: x / Non Sq Epi: x / Bacteria: x                RADIOLOGY:    ASSESSMENT AND PLAN  DAVONTE CHOU is a 32w-xznu-ist Male with a history significant for pAfib/Aflutter(on Eliquis) s/p ablation on 12/15/23, CAD s/p PCI(LAD and RCA) for unstable angina in 2019, mild COPD not on home o2, CKD3, Hx of renal transplant 3 years ago, HEP C, decompensated liver cirrhosis(portal HTN, esophageal varices), Melanoma R eye s/p Laser, Breast cancer gynecomastia, HLD, HTN, lumbar Radiculopathy who presents to the ED with c/o SOB, generalized weakness and fatigue for past few weeks.    A/P:   Acute HFpEF:   NSTEMI   CAD s/p PCI(LAD) and staged PCI(RCA) in 2019 for unstable angina  Patient with SOB, cough, Pro-BNp 23K, elevated JVP.   CXR showed no acute infiltrates.   Troponin 95>85  EKG showed new ST and T waves abnormalities on inferior and lateral leads.   Echo showed Hyperdynamic global left ventricular systolic function LVEF 76%, severe Left atrial enlargement, mod concentric LVH, Grade III diastolic dysfunction, mod MR, mild TR, mild AR,  consistent with borderline pulmonary hypertension. Small secundum atrial septal defect with predominantly left to right shunting across the atrial septum.  s/p Lasix IV, hold IV diuresis as patient looks euvolemic, JVP improved.   Continue ASA, Eliquis and Lipitor. Off Metoprolol due to bradycardia  Cardiology recommended medical therapy. No plan for cardiac cath due to Acute Kidney Injury.     #Anemia  - no sign of bleed, QI negative  - f/u iron panel  - monitor for signs of bleed    Acute Kidney Injury:   History of renal transplant.   Possibly Pre-renal.   UA was negative. Cr is improving to 1.8, while on IV fluid, Caution with IV fluid, will hold it tomorrow.   Nephrology follow up.   Avoid Nephrotoxic agents,   Continue Tacrolimus and Mycophenolate Mofetil 500mg BID. Tacrolimus level 11    Paroxysmal Atrial Fibrillation /Flutter s/p ablation  HR is stable.   Metoprolol held per EP due to bradycardia  Continue Amiodarone and Eliquis.     COPD, Former smoker(quit yrs ago)  No wheezing on exam  Continue albuterol prn  Patient with cough, RVP negative  CT chest non con showed No acute intrathoracic pathology. Upper lobe predominant emphysematous changes. Left lower lobe 0.5 cm nodule, unchanged.  Cardiomegaly, unchanged. Dilated ascending thoracic aorta measuring 4.2 cm, unchanged    Chronic Compensated Liver Cirrhosis:   History of hepatitis C  No ascites on exam, PLT 86, INR 1.6 on eliquis,   hepatology follow up outpatient.     Lumbar radiculopathy, chronic back pain   Oxycodone prn      -------------------------------------------------------------------------------------------------------------------------------------  #DVT PPX: eliquis    #GI PPX: pantoprazole    #Diet: dash fluid restrict with Ensure    #Activity Order: increase as tolerated     #Handoff  - monitor hgb, trend creatinine   24H events:    Patient is a 69y old Male who presents with a chief complaint of afib (07 Jan 2024 12:14)    Primary diagnosis of Chest pain      Day 1-2:   treated for nstemi, no cardiac cath due to SANTA, treating with medical therapy  Day 3: Noted to have downtredning hgb; QI did not show signs of bleed. Started Ensure for nutrition supplementation    Today is 4d of hospitalization. This morning patient was seen and examined at bedside, resting comfortably in bed.    No acute or major events overnight. No dizziness, HA, or signs of bleed, no cp, sob    Code Status: full code      PAST MEDICAL & SURGICAL HISTORY  CKD (chronic kidney disease)    ESRD (end stage renal disease)    HTN (hypertension)    HLD (hyperlipidemia)    Atrial fibrillation  on eliquis    COPD, mild  not on O2    Peripheral neuropathy  unclear cause    Lymphoma  ?questionable, not treated, not followed    Melanoma  R eye, s/p laser    Cirrhosis  possibly related to hepC    Breast cancer    Legally blind    Stroke    History of kidney transplant  3 years ago    S/P arteriovenous (AV) fistula creation  prior old fistula in R arm    S/P lumpectomy, right breast    History of back surgery  s/p Left L5-S1 endoscopic laminoforaminotomy      SOCIAL HISTORY:  Social History:      ALLERGIES:  Rituxan (Hives)  Rifuxen (Swelling)    MEDICATIONS:  MEDICATIONS  (STANDING):  aMIOdarone    Tablet 200 milliGRAM(s) Oral daily  apixaban 5 milliGRAM(s) Oral every 12 hours  aspirin  chewable 81 milliGRAM(s) Oral daily  atorvastatin 40 milliGRAM(s) Oral at bedtime  mycophenolate mofetil 500 milliGRAM(s) Oral two times a day  pantoprazole    Tablet 40 milliGRAM(s) Oral before breakfast  polyethylene glycol 3350 17 Gram(s) Oral daily  potassium phosphate / sodium phosphate Powder (PHOS-NaK) 1 Packet(s) Oral three times a day  senna 2 Tablet(s) Oral at bedtime  tacrolimus 3 milliGRAM(s) Oral <User Schedule>  tacrolimus 2 milliGRAM(s) Oral at bedtime  tamsulosin 0.4 milliGRAM(s) Oral two times a day    MEDICATIONS  (PRN):  acetaminophen     Tablet .. 650 milliGRAM(s) Oral every 6 hours PRN Temp greater or equal to 38C (100.4F), Mild Pain (1 - 3)  albuterol    90 MICROgram(s) HFA Inhaler 2 Puff(s) Inhalation every 6 hours PRN Shortness of Breath and/or Wheezing  guaifenesin/dextromethorphan Oral Liquid 200 milliLiter(s) Oral four times a day PRN Cough  HYDROmorphone  Injectable 0.5 milliGRAM(s) IV Push every 6 hours PRN Severe Pain (7 - 10)  melatonin 3 milliGRAM(s) Oral at bedtime PRN Insomnia  oxyCODONE    IR 5 milliGRAM(s) Oral every 8 hours PRN Severe Pain (7 - 10)      VITALS:   T(F): 99.7  HR: 76  BP: 147/67  RR: 19  SpO2: 100%    PHYSICAL EXAM:  GENERAL:   ( x) NAD, lying in bed comfortably     (  ) obtunded     (  ) lethargic     (  ) somnolent    HEAD:   ( x) Atraumatic     (  ) hematoma     (  ) laceration (specify location:       )     NECK:  (x) Supple     (  ) neck stiffness     (  ) nuchal rigidity     (  )  no JVD     (  ) JVD present ( -- cm)    HEART:  Rate -->     (x) normal rate     (  ) bradycardic     (  ) tachycardic  Rhythm -->     (x) regular     (  ) regularly irregular     (  ) irregularly irregular  Murmurs -->     (x) normal s1s2     (  ) systolic murmur     (  ) diastolic murmur     (  ) continuous murmur      (  ) S3 present     (  ) S4 present    LUNGS:   ( x)Unlabored respirations     (  ) tachypnea  ( x) B/L air entry     (  ) decreased breath sounds in:  (location     )    ( x) no adventitious sound     (  ) crackles     (  ) wheezing      (  ) rhonchi      (specify location:       )  (  ) chest wall tenderness (specify location:       )    ABDOMEN:   ( x) Soft     (  ) tense   |   (  ) nondistended     (  ) distended   |   (  ) +BS     (  ) hypoactive bowel sounds     (  ) hyperactive bowel sounds  ( x) nontender     (  ) RUQ tenderness     (  ) RLQ tenderness     (  ) LLQ tenderness     (  ) epigastric tenderness     (  ) diffuse tenderness  (  ) Splenomegaly      (  ) Hepatomegaly      (  ) Jaundice     (  ) ecchymosis     EXTREMITIES:  ( x) Normal     (  ) Rash     (  ) ecchymosis     (  ) varicose veins      (  ) pitting edema     (  ) non-pitting edema   (  ) ulceration     (  ) gangrene:     (location:     )    NERVOUS SYSTEM:    ( x) A&Ox3     (  ) confused     (  ) lethargic  CN II-XII:     ( x) Intact     (  ) deficits found     (Specify:     )   Upper extremities:     (  ) no sensorimotor deficits     (  ) weakness     (  ) loss of proprioception/vibration     (  ) loss of touch/temperature (specify:    )  Lower extremities:     (  ) no sensorimotor deficits     (  ) weakness     (  ) loss of proprioception/vibration     (  ) loss of touch/temperature (specify:    )    SKIN:   (  ) No rashes or lesions     (  ) maculopapular rash     (  ) pustules     (  ) vesicles     (  ) ulcer     (  ) ecchymosis     (specify location:     )    [facility] score :      LABS:    WBC:  3.05 *L* (01-09 @ 04:53)  3.80 *L* (01-08 @ 05:46)  3.59 *L* (01-07 @ 06:34)    Hgb:  7.8 *L* (01-09 @ 04:53)  8.5 *L* (01-08 @ 05:46)  8.5 *L* (01-07 @ 06:34)    Latest Lytes (Na/K/Mg/Phos):  134<L> / 4.0 / 1.7<L> / -- 01-09 @ 04:53  132<L> / 4.6 / 1.6<L> / 1.8<L> 01-08 @ 05:46  134<L> / 3.8 / 1.6<L> / 1.9<L> 01-07 @ 06:34  136 / 3.9 / 1.7<L> / 2.9 01-06 @ 07:04  135 / 4.0 / 1.8 / 3.4 01-05 @ 12:47    BUN/Cr/eGFR:  20 / 1.8 / 41 (01-09 @ 04:53)  20 / 1.6 / 47 (01-08 @ 05:46)  28 / 1.8 / 41 (01-07 @ 06:34)    Lactate:  1.6 (01-05-24 @ 12:47)  1.1 (11-11-19 @ 17:26)    Procal:    CK:  28 (02-25-22 @ 04:30)      Dimer:    TSH:  0.49 [0.27 - 4.20] (12-18-23 @ 08:36)  4.27 *H* [0.27 - 4.20] (02-20-22 @ 04:30)    Cultures:    Culture - Urine (collected 01-05 @ 10:23)  Source: Clean Catch Clean Catch (Midstream)  Preliminary Report (01-08 @ 20:22):    >100,000 CFU/ml Enterococcus faecalis                              8.5    3.80  )-----------( 69       ( 08 Jan 2024 05:46 )             26.3     01-08    132<L>  |  99  |  20  ----------------------------<  112<H>  4.6   |  24  |  1.6<H>    Ca    10.3      08 Jan 2024 05:46  Phos  1.8     01-08  Mg     1.6     01-08    TPro  5.3<L>  /  Alb  3.3<L>  /  TBili  1.3<H>  /  DBili  x   /  AST  12  /  ALT  <5  /  AlkPhos  46  01-08      Urinalysis Basic - ( 08 Jan 2024 05:46 )    Color: x / Appearance: x / SG: x / pH: x  Gluc: 112 mg/dL / Ketone: x  / Bili: x / Urobili: x   Blood: x / Protein: x / Nitrite: x   Leuk Esterase: x / RBC: x / WBC x   Sq Epi: x / Non Sq Epi: x / Bacteria: x                RADIOLOGY:    ASSESSMENT AND PLAN  DAVONTE CHOU is a 36l-dyhv-agj Male with a history significant for pAfib/Aflutter(on Eliquis) s/p ablation on 12/15/23, CAD s/p PCI(LAD and RCA) for unstable angina in 2019, mild COPD not on home o2, CKD3, Hx of renal transplant 3 years ago, HEP C, decompensated liver cirrhosis(portal HTN, esophageal varices), Melanoma R eye s/p Laser, Breast cancer gynecomastia, HLD, HTN, lumbar Radiculopathy who presents to the ED with c/o SOB, generalized weakness and fatigue for past few weeks.    A/P:   Acute HFpEF:   NSTEMI   CAD s/p PCI(LAD) and staged PCI(RCA) in 2019 for unstable angina  Patient with SOB, cough, Pro-BNp 23K, elevated JVP.   CXR showed no acute infiltrates.   Troponin 95>85  EKG showed new ST and T waves abnormalities on inferior and lateral leads.   Echo showed Hyperdynamic global left ventricular systolic function LVEF 76%, severe Left atrial enlargement, mod concentric LVH, Grade III diastolic dysfunction, mod MR, mild TR, mild AR,  consistent with borderline pulmonary hypertension. Small secundum atrial septal defect with predominantly left to right shunting across the atrial septum.  s/p Lasix IV, hold IV diuresis as patient looks euvolemic, JVP improved.   Continue ASA, Eliquis and Lipitor. Off Metoprolol due to bradycardia  Cardiology recommended medical therapy. No plan for cardiac cath due to Acute Kidney Injury.     #Anemia  - no sign of bleed, QI negative  - f/u iron panel  - monitor for signs of bleed    Acute Kidney Injury:   History of renal transplant.   Possibly Pre-renal.   UA was negative. Cr is improving to 1.8, while on IV fluid, Caution with IV fluid, will hold it tomorrow.   Nephrology follow up.   Avoid Nephrotoxic agents,   Continue Tacrolimus and Mycophenolate Mofetil 500mg BID. Tacrolimus level 11  Trial of void    Paroxysmal Atrial Fibrillation /Flutter s/p ablation  HR is stable.   Metoprolol held per EP due to bradycardia  Continue Amiodarone and Eliquis.     COPD, Former smoker(quit yrs ago)  No wheezing on exam  Continue albuterol prn  Patient with cough, RVP negative  CT chest non con showed No acute intrathoracic pathology. Upper lobe predominant emphysematous changes. Left lower lobe 0.5 cm nodule, unchanged.  Cardiomegaly, unchanged. Dilated ascending thoracic aorta measuring 4.2 cm, unchanged    Chronic Compensated Liver Cirrhosis:   History of hepatitis C  No ascites on exam, PLT 86, INR 1.6 on eliquis,   hepatology follow up outpatient.     Lumbar radiculopathy, chronic back pain   Oxycodone prn    Suspected urine culture contaminant  - UA negative  - urine culture showed e faecalis  - asymptomatic afebrile, no leukocytosis      -------------------------------------------------------------------------------------------------------------------------------------  #DVT PPX: eliquis    #GI PPX: pantoprazole    #Diet: dash fluid restrict with Ensure    #Activity Order: increase as tolerated     #Handoff  - monitor hgb, trend creatinine   24H events:    Patient is a 69y old Male who presents with a chief complaint of afib (07 Jan 2024 12:14)    Primary diagnosis of Chest pain      Day 1-2:   treated for nstemi, no cardiac cath due to SANTA, treating with medical therapy  Day 3: Noted to have downtredning hgb; QI did not show signs of bleed. Started Ensure for nutrition supplementation    Today is 4d of hospitalization. This morning patient was seen and examined at bedside, resting comfortably in bed.    No acute or major events overnight. No dizziness, HA, or signs of bleed, no cp, sob    Code Status: full code      PAST MEDICAL & SURGICAL HISTORY  CKD (chronic kidney disease)    ESRD (end stage renal disease)    HTN (hypertension)    HLD (hyperlipidemia)    Atrial fibrillation  on eliquis    COPD, mild  not on O2    Peripheral neuropathy  unclear cause    Lymphoma  ?questionable, not treated, not followed    Melanoma  R eye, s/p laser    Cirrhosis  possibly related to hepC    Breast cancer    Legally blind    Stroke    History of kidney transplant  3 years ago    S/P arteriovenous (AV) fistula creation  prior old fistula in R arm    S/P lumpectomy, right breast    History of back surgery  s/p Left L5-S1 endoscopic laminoforaminotomy      SOCIAL HISTORY:  Social History:      ALLERGIES:  Rituxan (Hives)  Rifuxen (Swelling)    MEDICATIONS:  MEDICATIONS  (STANDING):  aMIOdarone    Tablet 200 milliGRAM(s) Oral daily  apixaban 5 milliGRAM(s) Oral every 12 hours  aspirin  chewable 81 milliGRAM(s) Oral daily  atorvastatin 40 milliGRAM(s) Oral at bedtime  mycophenolate mofetil 500 milliGRAM(s) Oral two times a day  pantoprazole    Tablet 40 milliGRAM(s) Oral before breakfast  polyethylene glycol 3350 17 Gram(s) Oral daily  potassium phosphate / sodium phosphate Powder (PHOS-NaK) 1 Packet(s) Oral three times a day  senna 2 Tablet(s) Oral at bedtime  tacrolimus 3 milliGRAM(s) Oral <User Schedule>  tacrolimus 2 milliGRAM(s) Oral at bedtime  tamsulosin 0.4 milliGRAM(s) Oral two times a day    MEDICATIONS  (PRN):  acetaminophen     Tablet .. 650 milliGRAM(s) Oral every 6 hours PRN Temp greater or equal to 38C (100.4F), Mild Pain (1 - 3)  albuterol    90 MICROgram(s) HFA Inhaler 2 Puff(s) Inhalation every 6 hours PRN Shortness of Breath and/or Wheezing  guaifenesin/dextromethorphan Oral Liquid 200 milliLiter(s) Oral four times a day PRN Cough  HYDROmorphone  Injectable 0.5 milliGRAM(s) IV Push every 6 hours PRN Severe Pain (7 - 10)  melatonin 3 milliGRAM(s) Oral at bedtime PRN Insomnia  oxyCODONE    IR 5 milliGRAM(s) Oral every 8 hours PRN Severe Pain (7 - 10)      VITALS:   T(F): 99.7  HR: 76  BP: 147/67  RR: 19  SpO2: 100%    PHYSICAL EXAM:  GENERAL:   ( x) NAD, lying in bed comfortably     (  ) obtunded     (  ) lethargic     (  ) somnolent    HEAD:   ( x) Atraumatic     (  ) hematoma     (  ) laceration (specify location:       )     NECK:  (x) Supple     (  ) neck stiffness     (  ) nuchal rigidity     (  )  no JVD     (  ) JVD present ( -- cm)    HEART:  Rate -->     (x) normal rate     (  ) bradycardic     (  ) tachycardic  Rhythm -->     (x) regular     (  ) regularly irregular     (  ) irregularly irregular  Murmurs -->     (x) normal s1s2     (  ) systolic murmur     (  ) diastolic murmur     (  ) continuous murmur      (  ) S3 present     (  ) S4 present    LUNGS:   ( x)Unlabored respirations     (  ) tachypnea  ( x) B/L air entry     (  ) decreased breath sounds in:  (location     )    ( x) no adventitious sound     (  ) crackles     (  ) wheezing      (  ) rhonchi      (specify location:       )  (  ) chest wall tenderness (specify location:       )    ABDOMEN:   ( x) Soft     (  ) tense   |   (  ) nondistended     (  ) distended   |   (  ) +BS     (  ) hypoactive bowel sounds     (  ) hyperactive bowel sounds  ( x) nontender     (  ) RUQ tenderness     (  ) RLQ tenderness     (  ) LLQ tenderness     (  ) epigastric tenderness     (  ) diffuse tenderness  (  ) Splenomegaly      (  ) Hepatomegaly      (  ) Jaundice     (  ) ecchymosis     EXTREMITIES:  ( x) Normal     (  ) Rash     (  ) ecchymosis     (  ) varicose veins      (  ) pitting edema     (  ) non-pitting edema   (  ) ulceration     (  ) gangrene:     (location:     )    NERVOUS SYSTEM:    ( x) A&Ox3     (  ) confused     (  ) lethargic  CN II-XII:     ( x) Intact     (  ) deficits found     (Specify:     )   Upper extremities:     (  ) no sensorimotor deficits     (  ) weakness     (  ) loss of proprioception/vibration     (  ) loss of touch/temperature (specify:    )  Lower extremities:     (  ) no sensorimotor deficits     (  ) weakness     (  ) loss of proprioception/vibration     (  ) loss of touch/temperature (specify:    )    SKIN:   (  ) No rashes or lesions     (  ) maculopapular rash     (  ) pustules     (  ) vesicles     (  ) ulcer     (  ) ecchymosis     (specify location:     )    [facility] score :      LABS:    WBC:  3.05 *L* (01-09 @ 04:53)  3.80 *L* (01-08 @ 05:46)  3.59 *L* (01-07 @ 06:34)    Hgb:  7.8 *L* (01-09 @ 04:53)  8.5 *L* (01-08 @ 05:46)  8.5 *L* (01-07 @ 06:34)    Latest Lytes (Na/K/Mg/Phos):  134<L> / 4.0 / 1.7<L> / -- 01-09 @ 04:53  132<L> / 4.6 / 1.6<L> / 1.8<L> 01-08 @ 05:46  134<L> / 3.8 / 1.6<L> / 1.9<L> 01-07 @ 06:34  136 / 3.9 / 1.7<L> / 2.9 01-06 @ 07:04  135 / 4.0 / 1.8 / 3.4 01-05 @ 12:47    BUN/Cr/eGFR:  20 / 1.8 / 41 (01-09 @ 04:53)  20 / 1.6 / 47 (01-08 @ 05:46)  28 / 1.8 / 41 (01-07 @ 06:34)    Lactate:  1.6 (01-05-24 @ 12:47)  1.1 (11-11-19 @ 17:26)    Procal:    CK:  28 (02-25-22 @ 04:30)      Dimer:    TSH:  0.49 [0.27 - 4.20] (12-18-23 @ 08:36)  4.27 *H* [0.27 - 4.20] (02-20-22 @ 04:30)    Cultures:    Culture - Urine (collected 01-05 @ 10:23)  Source: Clean Catch Clean Catch (Midstream)  Preliminary Report (01-08 @ 20:22):    >100,000 CFU/ml Enterococcus faecalis                              8.5    3.80  )-----------( 69       ( 08 Jan 2024 05:46 )             26.3     01-08    132<L>  |  99  |  20  ----------------------------<  112<H>  4.6   |  24  |  1.6<H>    Ca    10.3      08 Jan 2024 05:46  Phos  1.8     01-08  Mg     1.6     01-08    TPro  5.3<L>  /  Alb  3.3<L>  /  TBili  1.3<H>  /  DBili  x   /  AST  12  /  ALT  <5  /  AlkPhos  46  01-08      Urinalysis Basic - ( 08 Jan 2024 05:46 )    Color: x / Appearance: x / SG: x / pH: x  Gluc: 112 mg/dL / Ketone: x  / Bili: x / Urobili: x   Blood: x / Protein: x / Nitrite: x   Leuk Esterase: x / RBC: x / WBC x   Sq Epi: x / Non Sq Epi: x / Bacteria: x                RADIOLOGY:    ASSESSMENT AND PLAN  DAVONTE CHOU is a 25h-nqkh-izi Male with a history significant for pAfib/Aflutter(on Eliquis) s/p ablation on 12/15/23, CAD s/p PCI(LAD and RCA) for unstable angina in 2019, mild COPD not on home o2, CKD3, Hx of renal transplant 3 years ago, HEP C, decompensated liver cirrhosis(portal HTN, esophageal varices), Melanoma R eye s/p Laser, Breast cancer gynecomastia, HLD, HTN, lumbar Radiculopathy who presents to the ED with c/o SOB, generalized weakness and fatigue for past few weeks.    A/P:   Acute HFpEF:   NSTEMI   CAD s/p PCI(LAD) and staged PCI(RCA) in 2019 for unstable angina  Patient with SOB, cough, Pro-BNp 23K, elevated JVP.   CXR showed no acute infiltrates.   Troponin 95>85  EKG showed new ST and T waves abnormalities on inferior and lateral leads.   Echo showed Hyperdynamic global left ventricular systolic function LVEF 76%, severe Left atrial enlargement, mod concentric LVH, Grade III diastolic dysfunction, mod MR, mild TR, mild AR,  consistent with borderline pulmonary hypertension. Small secundum atrial septal defect with predominantly left to right shunting across the atrial septum.  s/p Lasix IV, hold IV diuresis as patient looks euvolemic, JVP improved.   Continue ASA, Eliquis and Lipitor. Off Metoprolol due to bradycardia  Cardiology recommended medical therapy. No plan for cardiac cath due to Acute Kidney Injury.     #Anemia  - no sign of bleed, QI negative  - f/u iron panel  - monitor for signs of bleed    Acute Kidney Injury:   History of renal transplant.   Possibly Pre-renal.   UA was negative. Cr is improving to 1.8, while on IV fluid, Caution with IV fluid, will hold it tomorrow.   Nephrology follow up.   Avoid Nephrotoxic agents,   Continue Tacrolimus and Mycophenolate Mofetil 500mg BID. Tacrolimus level 11  Trial of void    Paroxysmal Atrial Fibrillation /Flutter s/p ablation  HR is stable.   Metoprolol held per EP due to bradycardia  Continue Amiodarone and Eliquis.     COPD, Former smoker(quit yrs ago)  No wheezing on exam  Continue albuterol prn  Patient with cough, RVP negative  CT chest non con showed No acute intrathoracic pathology. Upper lobe predominant emphysematous changes. Left lower lobe 0.5 cm nodule, unchanged.  Cardiomegaly, unchanged. Dilated ascending thoracic aorta measuring 4.2 cm, unchanged    Chronic Compensated Liver Cirrhosis:   History of hepatitis C  No ascites on exam, PLT 86, INR 1.6 on eliquis,   hepatology follow up outpatient.     Lumbar radiculopathy, chronic back pain   Oxycodone prn    Suspected urine culture contaminant  - UA negative  - urine culture showed e faecalis  - asymptomatic afebrile, no leukocytosis      -------------------------------------------------------------------------------------------------------------------------------------  #DVT PPX: eliquis    #GI PPX: pantoprazole    #Diet: dash fluid restrict with Ensure    #Activity Order: increase as tolerated     #Handoff  - monitor hgb, trend creatinine

## 2024-01-09 NOTE — PROGRESS NOTE ADULT - SUBJECTIVE AND OBJECTIVE BOX
[place] [facility] FOLLOW UP NOTE  --------------------------------------------------------------------------------  24 hour events/subjective: Patient examined. Appears comfortable.    PAST HISTORY  --------------------------------------------------------------------------------  No significant changes to PMH, PSH, FHx, SHx, unless otherwise noted    ALLERGIES & MEDICATIONS  --------------------------------------------------------------------------------  Allergies    Rituxan (Hives)  Rifuxen (Swelling)    Standing Inpatient Medications  aMIOdarone    Tablet 200 milliGRAM(s) Oral daily  apixaban 5 milliGRAM(s) Oral every 12 hours  aspirin  chewable 81 milliGRAM(s) Oral daily  atorvastatin 40 milliGRAM(s) Oral at bedtime  magnesium sulfate  IVPB 2 Gram(s) IV Intermittent once  mycophenolate mofetil 500 milliGRAM(s) Oral two times a day  pantoprazole    Tablet 40 milliGRAM(s) Oral before breakfast  polyethylene glycol 3350 17 Gram(s) Oral daily  potassium phosphate / sodium phosphate Powder (PHOS-NaK) 1 Packet(s) Oral three times a day  senna 2 Tablet(s) Oral at bedtime  tacrolimus 3 milliGRAM(s) Oral <User Schedule>  tacrolimus 2 milliGRAM(s) Oral at bedtime  tamsulosin 0.4 milliGRAM(s) Oral two times a day    PRN Inpatient Medications  acetaminophen     Tablet .. 650 milliGRAM(s) Oral every 6 hours PRN  albuterol    90 MICROgram(s) HFA Inhaler 2 Puff(s) Inhalation every 6 hours PRN  guaifenesin/dextromethorphan Oral Liquid 200 milliLiter(s) Oral four times a day PRN  HYDROmorphone  Injectable 0.5 milliGRAM(s) IV Push every 6 hours PRN  melatonin 3 milliGRAM(s) Oral at bedtime PRN  oxyCODONE    IR 5 milliGRAM(s) Oral every 8 hours PRN    VITALS/PHYSICAL EXAM  --------------------------------------------------------------------------------  T(C): 36.8 (01-09-24 @ 13:32), Max: 37 (01-08-24 @ 20:55)  HR: 72 (01-09-24 @ 13:32) (70 - 72)  BP: 90/54 (01-09-24 @ 13:32) (90/54 - 139/63)  RR: 17 (01-09-24 @ 13:32) (17 - 18)    01-08-24 @ 07:01  -  01-09-24 @ 07:00  --------------------------------------------------------  IN: 1142 mL / OUT: 800 mL / NET: 342 mL    01-09-24 @ 07:01  -  01-09-24 @ 14:00  --------------------------------------------------------  IN: 1240 mL / OUT: 150 mL / NET: 1090 mL    Physical Exam:  	Gen: NAD  	Pulm: CTA B/L  	CV: RRR, S1S2  	Abd: +BS, soft, nontender/nondistended  	: No suprapubic tenderness  	LE: Warm,  no edema    LABS/STUDIES  --------------------------------------------------------------------------------              7.8    3.05  >-----------<  68       [01-09-24 @ 04:53]              24.6     134  |  99  |  20  ----------------------------<  115      [01-09-24 @ 04:53]  4.0   |  23  |  1.8        Ca     9.7     [01-09-24 @ 04:53]      Mg     1.7     [01-09-24 @ 04:53]      Phos  1.8     [01-08-24 @ 05:46]    TPro  4.9  /  Alb  3.1  /  TBili  0.7  /  DBili  x   /  AST  10  /  ALT  <5  /  AlkPhos  44  [01-09-24 @ 04:53]    Creatinine Trend:  SCr 1.8 [01-09 @ 04:53]  SCr 1.6 [01-08 @ 05:46]  SCr 1.8 [01-07 @ 06:34]  SCr 2.1 [01-06 @ 07:04]  SCr 2.2 [01-05 @ 12:47]    Urinalysis - [01-09-24 @ 04:53]      Color  / Appearance  / SG  / pH       Gluc 115 / Ketone   / Bili  / Urobili        Blood  / Protein  / Leuk Est  / Nitrite       RBC  / WBC  / Hyaline  / Gran  / Sq Epi  / Non Sq Epi  / Bacteria     HbA1c 5.1      [11-06-19 @ 15:00]  TSH 0.49      [12-18-23 @ 08:36] [place] [facility] FOLLOW UP NOTE  --------------------------------------------------------------------------------  24 hour events/subjective: Patient examined. Appears comfortable.    PAST HISTORY  --------------------------------------------------------------------------------  No significant changes to PMH, PSH, FHx, SHx, unless otherwise noted    ALLERGIES & MEDICATIONS  --------------------------------------------------------------------------------  Allergies    Rituxan (Hives)  Rifuxen (Swelling)    Standing Inpatient Medications  aMIOdarone    Tablet 200 milliGRAM(s) Oral daily  apixaban 5 milliGRAM(s) Oral every 12 hours  aspirin  chewable 81 milliGRAM(s) Oral daily  atorvastatin 40 milliGRAM(s) Oral at bedtime  magnesium sulfate  IVPB 2 Gram(s) IV Intermittent once  mycophenolate mofetil 500 milliGRAM(s) Oral two times a day  pantoprazole    Tablet 40 milliGRAM(s) Oral before breakfast  polyethylene glycol 3350 17 Gram(s) Oral daily  potassium phosphate / sodium phosphate Powder (PHOS-NaK) 1 Packet(s) Oral three times a day  senna 2 Tablet(s) Oral at bedtime  tacrolimus 3 milliGRAM(s) Oral <User Schedule>  tacrolimus 2 milliGRAM(s) Oral at bedtime  tamsulosin 0.4 milliGRAM(s) Oral two times a day    PRN Inpatient Medications  acetaminophen     Tablet .. 650 milliGRAM(s) Oral every 6 hours PRN  albuterol    90 MICROgram(s) HFA Inhaler 2 Puff(s) Inhalation every 6 hours PRN  guaifenesin/dextromethorphan Oral Liquid 200 milliLiter(s) Oral four times a day PRN  HYDROmorphone  Injectable 0.5 milliGRAM(s) IV Push every 6 hours PRN  melatonin 3 milliGRAM(s) Oral at bedtime PRN  oxyCODONE    IR 5 milliGRAM(s) Oral every 8 hours PRN    VITALS/PHYSICAL EXAM  --------------------------------------------------------------------------------  T(C): 36.8 (01-09-24 @ 13:32), Max: 37 (01-08-24 @ 20:55)  HR: 72 (01-09-24 @ 13:32) (70 - 72)  BP: 90/54 (01-09-24 @ 13:32) (90/54 - 139/63)  RR: 17 (01-09-24 @ 13:32) (17 - 18)    01-08-24 @ 07:01  -  01-09-24 @ 07:00  --------------------------------------------------------  IN: 1142 mL / OUT: 800 mL / NET: 342 mL    01-09-24 @ 07:01  -  01-09-24 @ 14:00  --------------------------------------------------------  IN: 1240 mL / OUT: 150 mL / NET: 1090 mL    Physical Exam:  	Gen: NAD  	Pulm: CTA B/L  	CV: RRR, S1S2  	Abd: +BS, soft, nontender/nondistended  	: No suprapubic tenderness  	LE: Warm,  no edema    LABS/STUDIES  --------------------------------------------------------------------------------              7.8    3.05  >-----------<  68       [01-09-24 @ 04:53]              24.6     134  |  99  |  20  ----------------------------<  115      [01-09-24 @ 04:53]  4.0   |  23  |  1.8        Ca     9.7     [01-09-24 @ 04:53]      Mg     1.7     [01-09-24 @ 04:53]      Phos  1.8     [01-08-24 @ 05:46]    TPro  4.9  /  Alb  3.1  /  TBili  0.7  /  DBili  x   /  AST  10  /  ALT  <5  /  AlkPhos  44  [01-09-24 @ 04:53]    Creatinine Trend:  SCr 1.8 [01-09 @ 04:53]  SCr 1.6 [01-08 @ 05:46]  SCr 1.8 [01-07 @ 06:34]  SCr 2.1 [01-06 @ 07:04]  SCr 2.2 [01-05 @ 12:47]    Urinalysis - [01-09-24 @ 04:53]      Color  / Appearance  / SG  / pH       Gluc 115 / Ketone   / Bili  / Urobili        Blood  / Protein  / Leuk Est  / Nitrite       RBC  / WBC  / Hyaline  / Gran  / Sq Epi  / Non Sq Epi  / Bacteria     HbA1c 5.1      [11-06-19 @ 15:00]  TSH 0.49      [12-18-23 @ 08:36]

## 2024-01-09 NOTE — DISCHARGE NOTE PROVIDER - NSDCCPGOAL_GEN_ALL_CORE_FT
As per ESTEE Stone draw am labs as per activation order.
NP will order meds for b/p and temp, urine specimen, blood cultures, chest xray
Orders carried out as written. Will continue to monitor closely.
To get better and follow your care plan as instructed.

## 2024-01-09 NOTE — DISCHARGE NOTE PROVIDER - HOSPITAL COURSE
68 yo M with CAD s/p PCI, COPD, CKD3 s/p Renal tx, afib/flutter s/p ablation, liver cirrhosis, Hx R eye melanoma and Breast Ca, HLD, HTN, lumbalgia here for progressive weakness, fatigue and sob after having his ablation in december likely due to decompensated HFpEF.  pt was treated for type 2 MI, HFpEF decompensation and SANTA  pt was volume overloaded improved after IV diuresis  pt was found to have worsening anemia, no obvious signs of GI bleed, had recent EGD/colonoscopy did not detect varices. GI curbside recommended o/p video capsule endoscopy  Followed by cardiology and nephrology.   Plan was to avoid cardiac cath d/t presence of SANTA, medical management initiated.  Pt symptoms improved, vitals and labs stable. Clear for discharge to home with home care. Pt will need close o/p follow ups with cardio, and his nephrology transplant team.

## 2024-01-10 VITALS
OXYGEN SATURATION: 100 % | SYSTOLIC BLOOD PRESSURE: 137 MMHG | TEMPERATURE: 98 F | HEART RATE: 95 BPM | DIASTOLIC BLOOD PRESSURE: 85 MMHG

## 2024-01-10 LAB
GLUCOSE BLDC GLUCOMTR-MCNC: 169 MG/DL — HIGH (ref 70–99)
GLUCOSE BLDC GLUCOMTR-MCNC: 169 MG/DL — HIGH (ref 70–99)
HCT VFR BLD CALC: 26 % — LOW (ref 42–52)
HCT VFR BLD CALC: 26 % — LOW (ref 42–52)
HGB BLD-MCNC: 8.3 G/DL — LOW (ref 14–18)
HGB BLD-MCNC: 8.3 G/DL — LOW (ref 14–18)
MCHC RBC-ENTMCNC: 28.7 PG — SIGNIFICANT CHANGE UP (ref 27–31)
MCHC RBC-ENTMCNC: 28.7 PG — SIGNIFICANT CHANGE UP (ref 27–31)
MCHC RBC-ENTMCNC: 31.9 G/DL — LOW (ref 32–37)
MCHC RBC-ENTMCNC: 31.9 G/DL — LOW (ref 32–37)
MCV RBC AUTO: 90 FL — SIGNIFICANT CHANGE UP (ref 80–94)
MCV RBC AUTO: 90 FL — SIGNIFICANT CHANGE UP (ref 80–94)
NRBC # BLD: 0 /100 WBCS — SIGNIFICANT CHANGE UP (ref 0–0)
NRBC # BLD: 0 /100 WBCS — SIGNIFICANT CHANGE UP (ref 0–0)
PLATELET # BLD AUTO: 68 K/UL — LOW (ref 130–400)
PLATELET # BLD AUTO: 68 K/UL — LOW (ref 130–400)
PMV BLD: 11.7 FL — HIGH (ref 7.4–10.4)
PMV BLD: 11.7 FL — HIGH (ref 7.4–10.4)
RBC # BLD: 2.89 M/UL — LOW (ref 4.7–6.1)
RBC # BLD: 2.89 M/UL — LOW (ref 4.7–6.1)
RBC # FLD: 15.3 % — HIGH (ref 11.5–14.5)
RBC # FLD: 15.3 % — HIGH (ref 11.5–14.5)
WBC # BLD: 2.54 K/UL — LOW (ref 4.8–10.8)
WBC # BLD: 2.54 K/UL — LOW (ref 4.8–10.8)
WBC # FLD AUTO: 2.54 K/UL — LOW (ref 4.8–10.8)
WBC # FLD AUTO: 2.54 K/UL — LOW (ref 4.8–10.8)

## 2024-01-10 PROCEDURE — 99239 HOSP IP/OBS DSCHRG MGMT >30: CPT

## 2024-01-10 RX ORDER — METHOCARBAMOL 500 MG/1
1 TABLET, FILM COATED ORAL
Qty: 90 | Refills: 0
Start: 2024-01-10 | End: 2024-02-08

## 2024-01-10 RX ORDER — METHOCARBAMOL 500 MG/1
750 TABLET, FILM COATED ORAL EVERY 8 HOURS
Refills: 0 | Status: DISCONTINUED | OUTPATIENT
Start: 2024-01-10 | End: 2024-01-10

## 2024-01-10 RX ORDER — ACETAMINOPHEN 500 MG
3 TABLET ORAL
Qty: 0 | Refills: 0 | DISCHARGE
Start: 2024-01-10

## 2024-01-10 RX ORDER — DULOXETINE HYDROCHLORIDE 30 MG/1
30 CAPSULE, DELAYED RELEASE ORAL AT BEDTIME
Refills: 0 | Status: DISCONTINUED | OUTPATIENT
Start: 2024-01-10 | End: 2024-01-10

## 2024-01-10 RX ORDER — ACETAMINOPHEN 500 MG
975 TABLET ORAL EVERY 8 HOURS
Refills: 0 | Status: DISCONTINUED | OUTPATIENT
Start: 2024-01-10 | End: 2024-01-10

## 2024-01-10 RX ORDER — DULOXETINE HYDROCHLORIDE 30 MG/1
1 CAPSULE, DELAYED RELEASE ORAL
Qty: 30 | Refills: 0
Start: 2024-01-10

## 2024-01-10 RX ORDER — OXYCODONE HYDROCHLORIDE 5 MG/1
10 TABLET ORAL EVERY 8 HOURS
Refills: 0 | Status: DISCONTINUED | OUTPATIENT
Start: 2024-01-10 | End: 2024-01-10

## 2024-01-10 RX ORDER — OXYCODONE HYDROCHLORIDE 5 MG/1
1 TABLET ORAL
Qty: 0 | Refills: 0 | DISCHARGE
Start: 2024-01-10

## 2024-01-10 RX ADMIN — TAMSULOSIN HYDROCHLORIDE 0.4 MILLIGRAM(S): 0.4 CAPSULE ORAL at 05:39

## 2024-01-10 RX ADMIN — MYCOPHENOLATE MOFETIL 500 MILLIGRAM(S): 250 CAPSULE ORAL at 17:25

## 2024-01-10 RX ADMIN — TACROLIMUS 3 MILLIGRAM(S): 5 CAPSULE ORAL at 05:38

## 2024-01-10 RX ADMIN — APIXABAN 5 MILLIGRAM(S): 2.5 TABLET, FILM COATED ORAL at 05:39

## 2024-01-10 RX ADMIN — OXYCODONE HYDROCHLORIDE 5 MILLIGRAM(S): 5 TABLET ORAL at 10:22

## 2024-01-10 RX ADMIN — TAMSULOSIN HYDROCHLORIDE 0.4 MILLIGRAM(S): 0.4 CAPSULE ORAL at 17:26

## 2024-01-10 RX ADMIN — APIXABAN 5 MILLIGRAM(S): 2.5 TABLET, FILM COATED ORAL at 17:25

## 2024-01-10 RX ADMIN — AMIODARONE HYDROCHLORIDE 200 MILLIGRAM(S): 400 TABLET ORAL at 05:38

## 2024-01-10 RX ADMIN — PANTOPRAZOLE SODIUM 40 MILLIGRAM(S): 20 TABLET, DELAYED RELEASE ORAL at 05:39

## 2024-01-10 RX ADMIN — Medication 1 PACKET(S): at 05:38

## 2024-01-10 RX ADMIN — METHOCARBAMOL 750 MILLIGRAM(S): 500 TABLET, FILM COATED ORAL at 17:25

## 2024-01-10 RX ADMIN — OXYCODONE HYDROCHLORIDE 10 MILLIGRAM(S): 5 TABLET ORAL at 12:19

## 2024-01-10 RX ADMIN — Medication 10 MILLILITER(S): at 12:19

## 2024-01-10 RX ADMIN — MYCOPHENOLATE MOFETIL 500 MILLIGRAM(S): 250 CAPSULE ORAL at 05:39

## 2024-01-10 RX ADMIN — Medication 81 MILLIGRAM(S): at 11:21

## 2024-01-10 NOTE — CHART NOTE - NSCHARTNOTEFT_GEN_A_CORE
After medical evaluation and examination with physical therapy it is medically necessary for the patient to receive a shower chair due to an inability to stand from pain due to sciatica.

## 2024-01-10 NOTE — PROGRESS NOTE ADULT - SUBJECTIVE AND OBJECTIVE BOX
[facility]PHR[facility]Y FOLLOW UP NOTE  --------------------------------------------------------------------------------  24 hour events/subjective: Patient examined. Appears comfortable.    PAST HISTORY  --------------------------------------------------------------------------------  No significant changes to PMH, PSH, FHx, SHx, unless otherwise noted    ALLERGIES & MEDICATIONS  --------------------------------------------------------------------------------  Allergies    Rituxan (Hives)  Rifuxen (Swelling)    Standing Inpatient Medications  acetaminophen     Tablet .. 975 milliGRAM(s) Oral every 8 hours  aMIOdarone    Tablet 200 milliGRAM(s) Oral daily  apixaban 5 milliGRAM(s) Oral every 12 hours  aspirin  chewable 81 milliGRAM(s) Oral daily  atorvastatin 40 milliGRAM(s) Oral at bedtime  DULoxetine 30 milliGRAM(s) Oral at bedtime  methocarbamol 750 milliGRAM(s) Oral every 8 hours  mycophenolate mofetil 500 milliGRAM(s) Oral two times a day  pantoprazole    Tablet 40 milliGRAM(s) Oral before breakfast  polyethylene glycol 3350 17 Gram(s) Oral daily  senna 2 Tablet(s) Oral at bedtime  tacrolimus 3 milliGRAM(s) Oral <User Schedule>  tacrolimus 2 milliGRAM(s) Oral at bedtime  tamsulosin 0.4 milliGRAM(s) Oral two times a day    PRN Inpatient Medications  albuterol    90 MICROgram(s) HFA Inhaler 2 Puff(s) Inhalation every 6 hours PRN  guaifenesin/dextromethorphan Oral Liquid 10 milliLiter(s) Oral four times a day PRN  melatonin 3 milliGRAM(s) Oral at bedtime PRN  oxyCODONE    IR 10 milliGRAM(s) Oral every 8 hours PRN    VITALS/PHYSICAL EXAM  --------------------------------------------------------------------------------  T(C): 36.1 (01-10-24 @ 05:16), Max: 36.8 (01-09-24 @ 13:32)  HR: 67 (01-10-24 @ 05:16) (67 - 72)  BP: 116/55 (01-10-24 @ 05:16) (90/54 - 132/57)  RR: 18 (01-10-24 @ 05:16) (17 - 18)    01-09-24 @ 07:01  -  01-10-24 @ 07:00  --------------------------------------------------------  IN: 1480 mL / OUT: 400 mL / NET: 1080 mL    01-10-24 @ 07:01  -  01-10-24 @ 11:29  --------------------------------------------------------  IN: 118 mL / OUT: 0 mL / NET: 118 mL    Physical Exam:  	Gen: NAD  	Pulm: CTA B/L  	CV: RRR, S1S2  	Abd: +BS, soft, nontender/nondistended  	: No suprapubic tenderness  	LE: Warm, no edema    LABS/STUDIES  --------------------------------------------------------------------------------              7.8    3.05  >-----------<  68       [01-09-24 @ 04:53]              24.6     133  |  99  |  19  ----------------------------<  139      [01-09-24 @ 17:45]  4.3   |  22  |  1.7        Ca     9.9     [01-09-24 @ 17:45]      Mg     1.7     [01-09-24 @ 04:53]    TPro  4.9  /  Alb  3.1  /  TBili  0.7  /  DBili  x   /  AST  10  /  ALT  <5  /  AlkPhos  44  [01-09-24 @ 04:53]    Creatinine Trend:  SCr 1.7 [01-09 @ 17:45]  SCr 1.8 [01-09 @ 04:53]  SCr 1.6 [01-08 @ 05:46]  SCr 1.8 [01-07 @ 06:34]  SCr 2.1 [01-06 @ 07:04]    Urinalysis - [01-09-24 @ 17:45]      Color  / Appearance  / SG  / pH       Gluc 139 / Ketone   / Bili  / Urobili        Blood  / Protein  / Leuk Est  / Nitrite       RBC  / WBC  / Hyaline  / Gran  / Sq Epi  / Non Sq Epi  / Bacteria     Urine Creatinine 112      [01-09-24 @ 16:25]  Urine Protein 25      [01-09-24 @ 16:25]  Urine Sodium 34.0      [01-09-24 @ 16:25]  Urine Urea Nitrogen 422      [01-09-24 @ 16:25]  Urine Potassium 30      [01-09-24 @ 16:25]  Urine Osmolality 309      [01-09-24 @ 16:25]    HbA1c 5.1      [11-06-19 @ 15:00]  TSH 0.49      [12-18-23 @ 08:36] [facility] NEPHROLOGY FOLLOW UP NOTE  --------------------------------------------------------------------------------  24 hour events/subjective: Patient examined. Appears comfortable.    PAST HISTORY  --------------------------------------------------------------------------------  No significant changes to PMH, PSH, FHx, SHx, unless otherwise noted    ALLERGIES & MEDICATIONS  --------------------------------------------------------------------------------  Allergies    Rituxan (Hives)  Rifuxen (Swelling)    Standing Inpatient Medications  acetaminophen     Tablet .. 975 milliGRAM(s) Oral every 8 hours  aMIOdarone    Tablet 200 milliGRAM(s) Oral daily  apixaban 5 milliGRAM(s) Oral every 12 hours  aspirin  chewable 81 milliGRAM(s) Oral daily  atorvastatin 40 milliGRAM(s) Oral at bedtime  DULoxetine 30 milliGRAM(s) Oral at bedtime  methocarbamol 750 milliGRAM(s) Oral every 8 hours  mycophenolate mofetil 500 milliGRAM(s) Oral two times a day  pantoprazole    Tablet 40 milliGRAM(s) Oral before breakfast  polyethylene glycol 3350 17 Gram(s) Oral daily  senna 2 Tablet(s) Oral at bedtime  tacrolimus 3 milliGRAM(s) Oral <User Schedule>  tacrolimus 2 milliGRAM(s) Oral at bedtime  tamsulosin 0.4 milliGRAM(s) Oral two times a day    PRN Inpatient Medications  albuterol    90 MICROgram(s) HFA Inhaler 2 Puff(s) Inhalation every 6 hours PRN  guaifenesin/dextromethorphan Oral Liquid 10 milliLiter(s) Oral four times a day PRN  melatonin 3 milliGRAM(s) Oral at bedtime PRN  oxyCODONE    IR 10 milliGRAM(s) Oral every 8 hours PRN    VITALS/PHYSICAL EXAM  --------------------------------------------------------------------------------  T(C): 36.1 (01-10-24 @ 05:16), Max: 36.8 (01-09-24 @ 13:32)  HR: 67 (01-10-24 @ 05:16) (67 - 72)  BP: 116/55 (01-10-24 @ 05:16) (90/54 - 132/57)  RR: 18 (01-10-24 @ 05:16) (17 - 18)    01-09-24 @ 07:01  -  01-10-24 @ 07:00  --------------------------------------------------------  IN: 1480 mL / OUT: 400 mL / NET: 1080 mL    01-10-24 @ 07:01  -  01-10-24 @ 11:29  --------------------------------------------------------  IN: 118 mL / OUT: 0 mL / NET: 118 mL    Physical Exam:  	Gen: NAD  	Pulm: CTA B/L  	CV: RRR, S1S2  	Abd: +BS, soft, nontender/nondistended  	: No suprapubic tenderness  	LE: Warm, no edema    LABS/STUDIES  --------------------------------------------------------------------------------              7.8    3.05  >-----------<  68       [01-09-24 @ 04:53]              24.6     133  |  99  |  19  ----------------------------<  139      [01-09-24 @ 17:45]  4.3   |  22  |  1.7        Ca     9.9     [01-09-24 @ 17:45]      Mg     1.7     [01-09-24 @ 04:53]    TPro  4.9  /  Alb  3.1  /  TBili  0.7  /  DBili  x   /  AST  10  /  ALT  <5  /  AlkPhos  44  [01-09-24 @ 04:53]    Creatinine Trend:  SCr 1.7 [01-09 @ 17:45]  SCr 1.8 [01-09 @ 04:53]  SCr 1.6 [01-08 @ 05:46]  SCr 1.8 [01-07 @ 06:34]  SCr 2.1 [01-06 @ 07:04]    Urinalysis - [01-09-24 @ 17:45]      Color  / Appearance  / SG  / pH       Gluc 139 / Ketone   / Bili  / Urobili        Blood  / Protein  / Leuk Est  / Nitrite       RBC  / WBC  / Hyaline  / Gran  / Sq Epi  / Non Sq Epi  / Bacteria     Urine Creatinine 112      [01-09-24 @ 16:25]  Urine Protein 25      [01-09-24 @ 16:25]  Urine Sodium 34.0      [01-09-24 @ 16:25]  Urine Urea Nitrogen 422      [01-09-24 @ 16:25]  Urine Potassium 30      [01-09-24 @ 16:25]  Urine Osmolality 309      [01-09-24 @ 16:25]    HbA1c 5.1      [11-06-19 @ 15:00]  TSH 0.49      [12-18-23 @ 08:36]

## 2024-01-10 NOTE — PROGRESS NOTE ADULT - PROVIDER SPECIALTY LIST ADULT
Cardiology
Hospitalist
Internal Medicine
Nephrology
Hospitalist
Nephrology
Cardiology
Hospitalist
Hospitalist
Nephrology

## 2024-01-10 NOTE — PROGRESS NOTE ADULT - ASSESSMENT
Status post  donor Kidney transplant at [facility] 6 years ago  SANTA, uncertain etiology ?prerenal azotemia  Dyspnea on exertion: CXR clear, no edema  Pancytopenia  CAD  Atrial fibrillation  HTN  Cirrhosis  Hypophosphatemia    Plan    Encourage oral intake  OK for DC from renal standpoint  F/U with [facility] team [phone]  Continue tacrolimus  Continue mycophenolate   Status post  donor Kidney transplant at [facility] 6 years ago  SANTA, uncertain etiology ?prerenal azotemia  Dyspnea on exertion: CXR clear, no edema  Pancytopenia  CAD  Atrial fibrillation  HTN  Cirrhosis  Hypophosphatemia    Plan    Encourage oral intake  OK for DC from renal standpoint  F/U with [facility] team [phone]  Continue tacrolimus  Continue mycophenolate

## 2024-01-10 NOTE — PROGRESS NOTE ADULT - SUBJECTIVE AND OBJECTIVE BOX
24H events:    Patient is a 69y old Male who presents with a chief complaint of afib (07 Jan 2024 12:14)    Primary diagnosis of Chest pain      Day 1-2:   treated for nstemi, no cardiac cath due to SANTA, treating with medical therapy  Day 3-4: Noted to have downtredning hgb; QI did not show signs of bleed. Started Ensure for nutrition supplementation    Today is 5d of hospitalization. This morning patient was seen and examined at bedside, resting comfortably in bed.    No acute or major events overnight. No dizziness, HA, or signs of bleed, no cp, sob    Code Status: full code      PAST MEDICAL & SURGICAL HISTORY  CKD (chronic kidney disease)    ESRD (end stage renal disease)    HTN (hypertension)    HLD (hyperlipidemia)    Atrial fibrillation  on eliquis    COPD, mild  not on O2    Peripheral neuropathy  unclear cause    Lymphoma  ?questionable, not treated, not followed    Melanoma  R eye, s/p laser    Cirrhosis  possibly related to hepC    Breast cancer    Legally blind    Stroke    History of kidney transplant  3 years ago    S/P arteriovenous (AV) fistula creation  prior old fistula in R arm    S/P lumpectomy, right breast    History of back surgery  s/p Left L5-S1 endoscopic laminoforaminotomy      SOCIAL HISTORY:  Social History:      ALLERGIES:  Rituxan (Hives)  Rifuxen (Swelling)    MEDICATIONS:  MEDICATIONS  (STANDING):  aMIOdarone    Tablet 200 milliGRAM(s) Oral daily  apixaban 5 milliGRAM(s) Oral every 12 hours  aspirin  chewable 81 milliGRAM(s) Oral daily  atorvastatin 40 milliGRAM(s) Oral at bedtime  mycophenolate mofetil 500 milliGRAM(s) Oral two times a day  pantoprazole    Tablet 40 milliGRAM(s) Oral before breakfast  polyethylene glycol 3350 17 Gram(s) Oral daily  potassium phosphate / sodium phosphate Powder (PHOS-NaK) 1 Packet(s) Oral three times a day  senna 2 Tablet(s) Oral at bedtime  tacrolimus 3 milliGRAM(s) Oral <User Schedule>  tacrolimus 2 milliGRAM(s) Oral at bedtime  tamsulosin 0.4 milliGRAM(s) Oral two times a day    MEDICATIONS  (PRN):  acetaminophen     Tablet .. 650 milliGRAM(s) Oral every 6 hours PRN Temp greater or equal to 38C (100.4F), Mild Pain (1 - 3)  albuterol    90 MICROgram(s) HFA Inhaler 2 Puff(s) Inhalation every 6 hours PRN Shortness of Breath and/or Wheezing  guaifenesin/dextromethorphan Oral Liquid 200 milliLiter(s) Oral four times a day PRN Cough  HYDROmorphone  Injectable 0.5 milliGRAM(s) IV Push every 6 hours PRN Severe Pain (7 - 10)  melatonin 3 milliGRAM(s) Oral at bedtime PRN Insomnia  oxyCODONE    IR 5 milliGRAM(s) Oral every 8 hours PRN Severe Pain (7 - 10)      VITALS:   T(F): 99.7  HR: 76  BP: 147/67  RR: 19  SpO2: 100%    PHYSICAL EXAM:  GENERAL:   ( x) NAD, lying in bed comfortably     (  ) obtunded     (  ) lethargic     (  ) somnolent    HEAD:   ( x) Atraumatic     (  ) hematoma     (  ) laceration (specify location:       )     NECK:  (x) Supple     (  ) neck stiffness     (  ) nuchal rigidity     (  )  no JVD     (  ) JVD present ( -- cm)    HEART:  Rate -->     (x) normal rate     (  ) bradycardic     (  ) tachycardic  Rhythm -->     (x) regular     (  ) regularly irregular     (  ) irregularly irregular  Murmurs -->     (x) normal s1s2     (  ) systolic murmur     (  ) diastolic murmur     (  ) continuous murmur      (  ) S3 present     (  ) S4 present    LUNGS:   ( x)Unlabored respirations     (  ) tachypnea  ( x) B/L air entry     (  ) decreased breath sounds in:  (location     )    ( x) no adventitious sound     (  ) crackles     (  ) wheezing      (  ) rhonchi      (specify location:       )  (  ) chest wall tenderness (specify location:       )    ABDOMEN:   ( x) Soft     (  ) tense   |   (  ) nondistended     (  ) distended   |   (  ) +BS     (  ) hypoactive bowel sounds     (  ) hyperactive bowel sounds  ( x) nontender     (  ) RUQ tenderness     (  ) RLQ tenderness     (  ) LLQ tenderness     (  ) epigastric tenderness     (  ) diffuse tenderness  (  ) Splenomegaly      (  ) Hepatomegaly      (  ) Jaundice     (  ) ecchymosis     EXTREMITIES:  ( x) Normal     (  ) Rash     (  ) ecchymosis     (  ) varicose veins      (  ) pitting edema     (  ) non-pitting edema   (  ) ulceration     (  ) gangrene:     (location:     )    NERVOUS SYSTEM:    ( x) A&Ox3     (  ) confused     (  ) lethargic  CN II-XII:     ( x) Intact     (  ) deficits found     (Specify:     )   Upper extremities:     (  ) no sensorimotor deficits     (  ) weakness     (  ) loss of proprioception/vibration     (  ) loss of touch/temperature (specify:    )  Lower extremities:     (  ) no sensorimotor deficits     (  ) weakness     (  ) loss of proprioception/vibration     (  ) loss of touch/temperature (specify:    )    SKIN:   (  ) No rashes or lesions     (  ) maculopapular rash     (  ) pustules     (  ) vesicles     (  ) ulcer     (  ) ecchymosis     (specify location:     )    [facility] score :      LABS:  WBC:  2.54 *L* (01-10 @ 11:01)  3.05 *L* (01-09 @ 04:53)  3.80 *L* (01-08 @ 05:46)    Hgb:  8.3 *L* (01-10 @ 11:01)  7.8 *L* (01-09 @ 04:53)  8.5 *L* (01-08 @ 05:46)    Latest Lytes (Na/K/Mg/Phos):  133<L> / 4.3 / -- / -- 01-09 @ 17:45  134<L> / 4.0 / 1.7<L> / -- 01-09 @ 04:53  132<L> / 4.6 / 1.6<L> / 1.8<L> 01-08 @ 05:46  134<L> / 3.8 / 1.6<L> / 1.9<L> 01-07 @ 06:34  136 / 3.9 / 1.7<L> / 2.9 01-06 @ 07:04    BUN/Cr/eGFR:  19 / 1.7 / 44 (01-09 @ 17:45)  20 / 1.8 / 41 (01-09 @ 04:53)  20 / 1.6 / 47 (01-08 @ 05:46)    Lactate:  1.6 (01-05-24 @ 12:47)  1.1 (11-11-19 @ 17:26)    Procal:    CK:  28 (02-25-22 @ 04:30)    Troponin:    Dimer:    TSH:  0.49 [0.27 - 4.20] (12-18-23 @ 08:36)  4.27 *H* [0.27 - 4.20] (02-20-22 @ 04:30)    Cultures:    Culture - Urine (collected 01-05 @ 10:23)  Source: Clean Catch Clean Catch (Midstream)  Final Report (01-09 @ 14:04):    >100,000 CFU/ml Enterococcus faecalis  Organism: Enterococcus faecalis (01-09 @ 14:04)  Organism: Enterococcus faecalis (01-09 @ 14:04)      -  Levofloxacin: S 1      -  Nitrofurantoin: S <=32 Should not be used to treat pyelonephritis.      -  Vancomycin: S 2      -  Ciprofloxacin: S <=1      -  Ampicillin: S <=2 Predicts results to ampicillin/sulbactam, amoxacillin-clavulanate and  piperacillin-tazobactam.      -  Tetracycline: S <=1      Method Type: LESLEE                    RADIOLOGY:    ASSESSMENT AND PLAN  DAVONTE CHOU is a [id number] Male with a history significant for pAfib/Aflutter(on Eliquis) s/p ablation on 12/15/23, CAD s/p PCI(LAD and RCA) for unstable angina in 2019, mild COPD not on home o2, CKD3, Hx of renal transplant 3 years ago, HEP C, decompensated liver cirrhosis(portal HTN, esophageal varices), Melanoma R eye s/p Laser, Breast cancer gynecomastia, HLD, HTN, lumbar Radiculopathy who presents to the ED with c/o SOB, generalized weakness and fatigue for past few weeks.    A/P:   Acute HFpEF:   NSTEMI   CAD s/p PCI(LAD) and staged PCI(RCA) in 2019 for unstable angina  Patient with SOB, cough, Pro-BNp 23K, elevated JVP.   CXR showed no acute infiltrates.   Troponin 95>85  EKG showed new ST and T waves abnormalities on inferior and lateral leads.   Echo showed Hyperdynamic global left ventricular systolic function LVEF 76%, severe Left atrial enlargement, mod concentric LVH, Grade III diastolic dysfunction, mod MR, mild TR, mild AR,  consistent with borderline pulmonary hypertension. Small secundum atrial septal defect with predominantly left to right shunting across the atrial septum.  s/p Lasix IV, hold IV diuresis as patient looks euvolemic, JVP improved.   Continue ASA, Eliquis and Lipitor. Off Metoprolol due to bradycardia  Cardiology recommended medical therapy. No plan for cardiac cath due to Acute Kidney Injury.     #Anemia  - no sign of bleed, QI negative  - f/u iron panel  - monitor for signs of bleed    Acute Kidney Injury:   History of renal transplant.   Possibly Pre-renal.   UA was negative. Cr is improving to 1.8, while on IV fluid, Caution with IV fluid, will hold it tomorrow.   Nephrology follow up.   Avoid Nephrotoxic agents,   Continue Tacrolimus and Mycophenolate Mofetil 500mg BID. Tacrolimus level 11  Trial of void    Paroxysmal Atrial Fibrillation /Flutter s/p ablation  HR is stable.   Metoprolol held per EP due to bradycardia  Continue Amiodarone and Eliquis.     COPD, Former smoker(quit yrs ago)  No wheezing on exam  Continue albuterol prn  Patient with cough, RVP negative  CT chest non con showed No acute intrathoracic pathology. Upper lobe predominant emphysematous changes. Left lower lobe 0.5 cm nodule, unchanged.  Cardiomegaly, unchanged. Dilated ascending thoracic aorta measuring 4.2 cm, unchanged    Chronic Compensated Liver Cirrhosis:   History of hepatitis C  No ascites on exam, PLT 86, INR 1.6 on eliquis,   hepatology follow up outpatient.     Lumbar radiculopathy, chronic back pain   Oxycodone prn    Suspected urine culture contaminant  - UA negative  - urine culture showed e faecalis  - asymptomatic afebrile, no leukocytosis      -------------------------------------------------------------------------------------------------------------------------------------  #DVT PPX: eliquis    #GI PPX: pantoprazole    #Diet: dash fluid restrict with Ensure    #Activity Order: increase as tolerated     #Handoff  - monitor hgb, trend creatinine   24H events:    Patient is a 69y old Male who presents with a chief complaint of afib (07 Jan 2024 12:14)    Primary diagnosis of Chest pain      Day 1-2:   treated for nstemi, no cardiac cath due to SANTA, treating with medical therapy  Day 3-4: Noted to have downtredning hgb; QI did not show signs of bleed. Started Ensure for nutrition supplementation    Today is 5d of hospitalization. This morning patient was seen and examined at bedside, resting comfortably in bed.    No acute or major events overnight. No dizziness, HA, or signs of bleed, no cp, sob    Code Status: full code      PAST MEDICAL & SURGICAL HISTORY  CKD (chronic kidney disease)    ESRD (end stage renal disease)    HTN (hypertension)    HLD (hyperlipidemia)    Atrial fibrillation  on eliquis    COPD, mild  not on O2    Peripheral neuropathy  unclear cause    Lymphoma  ?questionable, not treated, not followed    Melanoma  R eye, s/p laser    Cirrhosis  possibly related to hepC    Breast cancer    Legally blind    Stroke    History of kidney transplant  3 years ago    S/P arteriovenous (AV) fistula creation  prior old fistula in R arm    S/P lumpectomy, right breast    History of back surgery  s/p Left L5-S1 endoscopic laminoforaminotomy      SOCIAL HISTORY:  Social History:      ALLERGIES:  Rituxan (Hives)  Rifuxen (Swelling)    MEDICATIONS:  MEDICATIONS  (STANDING):  aMIOdarone    Tablet 200 milliGRAM(s) Oral daily  apixaban 5 milliGRAM(s) Oral every 12 hours  aspirin  chewable 81 milliGRAM(s) Oral daily  atorvastatin 40 milliGRAM(s) Oral at bedtime  mycophenolate mofetil 500 milliGRAM(s) Oral two times a day  pantoprazole    Tablet 40 milliGRAM(s) Oral before breakfast  polyethylene glycol 3350 17 Gram(s) Oral daily  potassium phosphate / sodium phosphate Powder (PHOS-NaK) 1 Packet(s) Oral three times a day  senna 2 Tablet(s) Oral at bedtime  tacrolimus 3 milliGRAM(s) Oral <User Schedule>  tacrolimus 2 milliGRAM(s) Oral at bedtime  tamsulosin 0.4 milliGRAM(s) Oral two times a day    MEDICATIONS  (PRN):  acetaminophen     Tablet .. 650 milliGRAM(s) Oral every 6 hours PRN Temp greater or equal to 38C (100.4F), Mild Pain (1 - 3)  albuterol    90 MICROgram(s) HFA Inhaler 2 Puff(s) Inhalation every 6 hours PRN Shortness of Breath and/or Wheezing  guaifenesin/dextromethorphan Oral Liquid 200 milliLiter(s) Oral four times a day PRN Cough  HYDROmorphone  Injectable 0.5 milliGRAM(s) IV Push every 6 hours PRN Severe Pain (7 - 10)  melatonin 3 milliGRAM(s) Oral at bedtime PRN Insomnia  oxyCODONE    IR 5 milliGRAM(s) Oral every 8 hours PRN Severe Pain (7 - 10)      VITALS:   T(F): 99.7  HR: 76  BP: 147/67  RR: 19  SpO2: 100%    PHYSICAL EXAM:  GENERAL:   ( x) NAD, lying in bed comfortably     (  ) obtunded     (  ) lethargic     (  ) somnolent    HEAD:   ( x) Atraumatic     (  ) hematoma     (  ) laceration (specify location:       )     NECK:  (x) Supple     (  ) neck stiffness     (  ) nuchal rigidity     (  )  no JVD     (  ) JVD present ( -- cm)    HEART:  Rate -->     (x) normal rate     (  ) bradycardic     (  ) tachycardic  Rhythm -->     (x) regular     (  ) regularly irregular     (  ) irregularly irregular  Murmurs -->     (x) normal s1s2     (  ) systolic murmur     (  ) diastolic murmur     (  ) continuous murmur      (  ) S3 present     (  ) S4 present    LUNGS:   ( x)Unlabored respirations     (  ) tachypnea  ( x) B/L air entry     (  ) decreased breath sounds in:  (location     )    ( x) no adventitious sound     (  ) crackles     (  ) wheezing      (  ) rhonchi      (specify location:       )  (  ) chest wall tenderness (specify location:       )    ABDOMEN:   ( x) Soft     (  ) tense   |   (  ) nondistended     (  ) distended   |   (  ) +BS     (  ) hypoactive bowel sounds     (  ) hyperactive bowel sounds  ( x) nontender     (  ) RUQ tenderness     (  ) RLQ tenderness     (  ) LLQ tenderness     (  ) epigastric tenderness     (  ) diffuse tenderness  (  ) Splenomegaly      (  ) Hepatomegaly      (  ) Jaundice     (  ) ecchymosis     EXTREMITIES:  ( x) Normal     (  ) Rash     (  ) ecchymosis     (  ) varicose veins      (  ) pitting edema     (  ) non-pitting edema   (  ) ulceration     (  ) gangrene:     (location:     )    NERVOUS SYSTEM:    ( x) A&Ox3     (  ) confused     (  ) lethargic  CN II-XII:     ( x) Intact     (  ) deficits found     (Specify:     )   Upper extremities:     (  ) no sensorimotor deficits     (  ) weakness     (  ) loss of proprioception/vibration     (  ) loss of touch/temperature (specify:    )  Lower extremities:     (  ) no sensorimotor deficits     (  ) weakness     (  ) loss of proprioception/vibration     (  ) loss of touch/temperature (specify:    )    SKIN:   (  ) No rashes or lesions     (  ) maculopapular rash     (  ) pustules     (  ) vesicles     (  ) ulcer     (  ) ecchymosis     (specify location:     )    [facility] score :      LABS:  WBC:  2.54 *L* (01-10 @ 11:01)  3.05 *L* (01-09 @ 04:53)  3.80 *L* (01-08 @ 05:46)    Hgb:  8.3 *L* (01-10 @ 11:01)  7.8 *L* (01-09 @ 04:53)  8.5 *L* (01-08 @ 05:46)    Latest Lytes (Na/K/Mg/Phos):  133<L> / 4.3 / -- / -- 01-09 @ 17:45  134<L> / 4.0 / 1.7<L> / -- 01-09 @ 04:53  132<L> / 4.6 / 1.6<L> / 1.8<L> 01-08 @ 05:46  134<L> / 3.8 / 1.6<L> / 1.9<L> 01-07 @ 06:34  136 / 3.9 / 1.7<L> / 2.9 01-06 @ 07:04    BUN/Cr/eGFR:  19 / 1.7 / 44 (01-09 @ 17:45)  20 / 1.8 / 41 (01-09 @ 04:53)  20 / 1.6 / 47 (01-08 @ 05:46)    Lactate:  1.6 (01-05-24 @ 12:47)  1.1 (11-11-19 @ 17:26)    Procal:    CK:  28 (02-25-22 @ 04:30)    Troponin:    Dimer:    TSH:  0.49 [0.27 - 4.20] (12-18-23 @ 08:36)  4.27 *H* [0.27 - 4.20] (02-20-22 @ 04:30)    Cultures:    Culture - Urine (collected 01-05 @ 10:23)  Source: Clean Catch Clean Catch (Midstream)  Final Report (01-09 @ 14:04):    >100,000 CFU/ml Enterococcus faecalis  Organism: Enterococcus faecalis (01-09 @ 14:04)  Organism: Enterococcus faecalis (01-09 @ 14:04)      -  Levofloxacin: S 1      -  Nitrofurantoin: S <=32 Should not be used to treat pyelonephritis.      -  Vancomycin: S 2      -  Ciprofloxacin: S <=1      -  Ampicillin: S <=2 Predicts results to ampicillin/sulbactam, amoxacillin-clavulanate and  piperacillin-tazobactam.      -  Tetracycline: S <=1      Method Type: LESLEE                    RADIOLOGY:    ASSESSMENT AND PLAN  DAVONTE CHOU is a 86y-[id number]-[id number] Male with a history significant for pAfib/Aflutter(on Eliquis) s/p ablation on 12/15/23, CAD s/p PCI(LAD and RCA) for unstable angina in 2019, mild COPD not on home o2, CKD3, Hx of renal transplant 3 years ago, HEP C, decompensated liver cirrhosis(portal HTN, esophageal varices), Melanoma R eye s/p Laser, Breast cancer gynecomastia, HLD, HTN, lumbar Radiculopathy who presents to the ED with c/o SOB, generalized weakness and fatigue for past few weeks.    A/P:   Acute HFpEF:   NSTEMI   CAD s/p PCI(LAD) and staged PCI(RCA) in 2019 for unstable angina  Patient with SOB, cough, Pro-BNp 23K, elevated JVP.   CXR showed no acute infiltrates.   Troponin 95>85  EKG showed new ST and T waves abnormalities on inferior and lateral leads.   Echo showed Hyperdynamic global left ventricular systolic function LVEF 76%, severe Left atrial enlargement, mod concentric LVH, Grade III diastolic dysfunction, mod MR, mild TR, mild AR,  consistent with borderline pulmonary hypertension. Small secundum atrial septal defect with predominantly left to right shunting across the atrial septum.  s/p Lasix IV, hold IV diuresis as patient looks euvolemic, JVP improved.   Continue ASA, Eliquis and Lipitor. Off Metoprolol due to bradycardia  Cardiology recommended medical therapy. No plan for cardiac cath due to Acute Kidney Injury.     #Anemia  - no sign of bleed, QI negative  - f/u iron panel  - monitor for signs of bleed    Acute Kidney Injury:   History of renal transplant.   Possibly Pre-renal.   UA was negative. Cr is improving to 1.8, while on IV fluid, Caution with IV fluid, will hold it tomorrow.   Nephrology follow up.   Avoid Nephrotoxic agents,   Continue Tacrolimus and Mycophenolate Mofetil 500mg BID. Tacrolimus level 11  Trial of void    Paroxysmal Atrial Fibrillation /Flutter s/p ablation  HR is stable.   Metoprolol held per EP due to bradycardia  Continue Amiodarone and Eliquis.     COPD, Former smoker(quit yrs ago)  No wheezing on exam  Continue albuterol prn  Patient with cough, RVP negative  CT chest non con showed No acute intrathoracic pathology. Upper lobe predominant emphysematous changes. Left lower lobe 0.5 cm nodule, unchanged.  Cardiomegaly, unchanged. Dilated ascending thoracic aorta measuring 4.2 cm, unchanged    Chronic Compensated Liver Cirrhosis:   History of hepatitis C  No ascites on exam, PLT 86, INR 1.6 on eliquis,   hepatology follow up outpatient.     Lumbar radiculopathy, chronic back pain   Oxycodone prn    Suspected urine culture contaminant  - UA negative  - urine culture showed e faecalis  - asymptomatic afebrile, no leukocytosis      -------------------------------------------------------------------------------------------------------------------------------------  #DVT PPX: eliquis    #GI PPX: pantoprazole    #Diet: dash fluid restrict with Ensure    #Activity Order: increase as tolerated     #Handoff  - monitor hgb, trend creatinine

## 2024-01-16 DIAGNOSIS — I21.A1 MYOCARDIAL INFARCTION TYPE 2: ICD-10-CM

## 2024-01-16 DIAGNOSIS — D64.9 ANEMIA, UNSPECIFIED: ICD-10-CM

## 2024-01-16 DIAGNOSIS — M51.16 INTERVERTEBRAL DISC DISORDERS WITH RADICULOPATHY, LUMBAR REGION: ICD-10-CM

## 2024-01-16 DIAGNOSIS — N40.0 BENIGN PROSTATIC HYPERPLASIA WITHOUT LOWER URINARY TRACT SYMPTOMS: ICD-10-CM

## 2024-01-16 DIAGNOSIS — M54.16 RADICULOPATHY, LUMBAR REGION: ICD-10-CM

## 2024-01-16 DIAGNOSIS — I13.0 HYPERTENSIVE HEART AND CHRONIC KIDNEY DISEASE WITH HEART FAILURE AND STAGE 1 THROUGH STAGE 4 CHRONIC KIDNEY DISEASE, OR UNSPECIFIED CHRONIC KIDNEY DISEASE: ICD-10-CM

## 2024-01-16 DIAGNOSIS — N18.30 CHRONIC KIDNEY DISEASE, STAGE 3 UNSPECIFIED: ICD-10-CM

## 2024-01-16 DIAGNOSIS — I25.10 ATHEROSCLEROTIC HEART DISEASE OF NATIVE CORONARY ARTERY WITHOUT ANGINA PECTORIS: ICD-10-CM

## 2024-01-16 DIAGNOSIS — E83.39 OTHER DISORDERS OF PHOSPHORUS METABOLISM: ICD-10-CM

## 2024-01-16 DIAGNOSIS — J44.9 CHRONIC OBSTRUCTIVE PULMONARY DISEASE, UNSPECIFIED: ICD-10-CM

## 2024-01-16 DIAGNOSIS — R00.1 BRADYCARDIA, UNSPECIFIED: ICD-10-CM

## 2024-01-16 DIAGNOSIS — G62.9 POLYNEUROPATHY, UNSPECIFIED: ICD-10-CM

## 2024-01-16 DIAGNOSIS — Z87.891 PERSONAL HISTORY OF NICOTINE DEPENDENCE: ICD-10-CM

## 2024-01-16 DIAGNOSIS — Z94.0 KIDNEY TRANSPLANT STATUS: ICD-10-CM

## 2024-01-16 DIAGNOSIS — K74.60 UNSPECIFIED CIRRHOSIS OF LIVER: ICD-10-CM

## 2024-01-16 DIAGNOSIS — I50.33 ACUTE ON CHRONIC DIASTOLIC (CONGESTIVE) HEART FAILURE: ICD-10-CM

## 2024-01-16 DIAGNOSIS — I48.0 PAROXYSMAL ATRIAL FIBRILLATION: ICD-10-CM

## 2024-01-16 DIAGNOSIS — Z85.3 PERSONAL HISTORY OF MALIGNANT NEOPLASM OF BREAST: ICD-10-CM

## 2024-01-16 DIAGNOSIS — N17.9 ACUTE KIDNEY FAILURE, UNSPECIFIED: ICD-10-CM

## 2024-01-16 DIAGNOSIS — D61.818 OTHER PANCYTOPENIA: ICD-10-CM

## 2024-01-16 DIAGNOSIS — E78.5 HYPERLIPIDEMIA, UNSPECIFIED: ICD-10-CM

## 2024-01-16 DIAGNOSIS — B19.20 UNSPECIFIED VIRAL HEPATITIS C WITHOUT HEPATIC COMA: ICD-10-CM

## 2024-01-24 ENCOUNTER — INPATIENT (INPATIENT)
Facility: HOSPITAL | Age: 69
LOS: 7 days | Discharge: HOME HEALTH | DRG: 260 | End: 2024-02-01
Attending: INTERNAL MEDICINE | Admitting: INTERNAL MEDICINE
Payer: MEDICARE

## 2024-01-24 VITALS — HEIGHT: 69 IN

## 2024-01-24 DIAGNOSIS — I47.20 VENTRICULAR TACHYCARDIA, UNSPECIFIED: ICD-10-CM

## 2024-01-24 DIAGNOSIS — Z98.890 OTHER SPECIFIED POSTPROCEDURAL STATES: Chronic | ICD-10-CM

## 2024-01-24 DIAGNOSIS — Z94.0 KIDNEY TRANSPLANT STATUS: Chronic | ICD-10-CM

## 2024-01-24 LAB
ALBUMIN SERPL ELPH-MCNC: 4.2 G/DL — SIGNIFICANT CHANGE UP (ref 3.5–5.2)
ALP SERPL-CCNC: 51 U/L — SIGNIFICANT CHANGE UP (ref 30–115)
ALT FLD-CCNC: 8 U/L — SIGNIFICANT CHANGE UP (ref 0–41)
ANION GAP SERPL CALC-SCNC: 14 MMOL/L — SIGNIFICANT CHANGE UP (ref 7–14)
APTT BLD: 37 SEC — SIGNIFICANT CHANGE UP (ref 27–39.2)
APTT BLD: 43.8 SEC — HIGH (ref 27–39.2)
AST SERPL-CCNC: 31 U/L — SIGNIFICANT CHANGE UP (ref 0–41)
BASE EXCESS BLDV CALC-SCNC: -1.2 MMOL/L — SIGNIFICANT CHANGE UP (ref -2–3)
BASOPHILS # BLD AUTO: 0.02 K/UL — SIGNIFICANT CHANGE UP (ref 0–0.2)
BASOPHILS NFR BLD AUTO: 0.5 % — SIGNIFICANT CHANGE UP (ref 0–1)
BILIRUB SERPL-MCNC: 0.5 MG/DL — SIGNIFICANT CHANGE UP (ref 0.2–1.2)
BUN SERPL-MCNC: 54 MG/DL — HIGH (ref 10–20)
CA-I SERPL-SCNC: 1.39 MMOL/L — HIGH (ref 1.15–1.33)
CALCIUM SERPL-MCNC: 10.3 MG/DL — SIGNIFICANT CHANGE UP (ref 8.4–10.5)
CHLORIDE SERPL-SCNC: 100 MMOL/L — SIGNIFICANT CHANGE UP (ref 98–110)
CO2 SERPL-SCNC: 22 MMOL/L — SIGNIFICANT CHANGE UP (ref 17–32)
CREAT SERPL-MCNC: 2.2 MG/DL — HIGH (ref 0.7–1.5)
EGFR: 32 ML/MIN/1.73M2 — LOW
EOSINOPHIL # BLD AUTO: 0.01 K/UL — SIGNIFICANT CHANGE UP (ref 0–0.7)
EOSINOPHIL NFR BLD AUTO: 0.2 % — SIGNIFICANT CHANGE UP (ref 0–8)
GAS PNL BLDV: 130 MMOL/L — LOW (ref 136–145)
GAS PNL BLDV: SIGNIFICANT CHANGE UP
GAS PNL BLDV: SIGNIFICANT CHANGE UP
GLUCOSE SERPL-MCNC: 154 MG/DL — HIGH (ref 70–99)
HCO3 BLDV-SCNC: 24 MMOL/L — SIGNIFICANT CHANGE UP (ref 22–29)
HCT VFR BLD CALC: 30.8 % — LOW (ref 42–52)
HCT VFR BLD CALC: 32.5 % — LOW (ref 42–52)
HCT VFR BLDA CALC: 32 % — LOW (ref 39–51)
HGB BLD CALC-MCNC: 10.5 G/DL — LOW (ref 12.6–17.4)
HGB BLD-MCNC: 10.4 G/DL — LOW (ref 14–18)
HGB BLD-MCNC: 9.8 G/DL — LOW (ref 14–18)
IMM GRANULOCYTES NFR BLD AUTO: 0.9 % — HIGH (ref 0.1–0.3)
LACTATE BLDV-MCNC: 1.2 MMOL/L — SIGNIFICANT CHANGE UP (ref 0.5–2)
LYMPHOCYTES # BLD AUTO: 0.53 K/UL — LOW (ref 1.2–3.4)
LYMPHOCYTES # BLD AUTO: 11.9 % — LOW (ref 20.5–51.1)
MAGNESIUM SERPL-MCNC: 1.6 MG/DL — LOW (ref 1.8–2.4)
MCHC RBC-ENTMCNC: 29.3 PG — SIGNIFICANT CHANGE UP (ref 27–31)
MCHC RBC-ENTMCNC: 29.6 PG — SIGNIFICANT CHANGE UP (ref 27–31)
MCHC RBC-ENTMCNC: 31.8 G/DL — LOW (ref 32–37)
MCHC RBC-ENTMCNC: 32 G/DL — SIGNIFICANT CHANGE UP (ref 32–37)
MCV RBC AUTO: 91.9 FL — SIGNIFICANT CHANGE UP (ref 80–94)
MCV RBC AUTO: 92.6 FL — SIGNIFICANT CHANGE UP (ref 80–94)
MONOCYTES # BLD AUTO: 0.31 K/UL — SIGNIFICANT CHANGE UP (ref 0.1–0.6)
MONOCYTES NFR BLD AUTO: 7 % — SIGNIFICANT CHANGE UP (ref 1.7–9.3)
NEUTROPHILS # BLD AUTO: 3.53 K/UL — SIGNIFICANT CHANGE UP (ref 1.4–6.5)
NEUTROPHILS NFR BLD AUTO: 79.5 % — HIGH (ref 42.2–75.2)
NRBC # BLD: 0 /100 WBCS — SIGNIFICANT CHANGE UP (ref 0–0)
NRBC # BLD: 0 /100 WBCS — SIGNIFICANT CHANGE UP (ref 0–0)
NT-PROBNP SERPL-SCNC: HIGH PG/ML (ref 0–300)
OSMOLALITY UR: 369 MOS/KG — SIGNIFICANT CHANGE UP (ref 50–1200)
PCO2 BLDV: 39 MMHG — LOW (ref 42–55)
PH BLDV: 7.39 — SIGNIFICANT CHANGE UP (ref 7.32–7.43)
PHOSPHATE SERPL-MCNC: 2.3 MG/DL — SIGNIFICANT CHANGE UP (ref 2.1–4.9)
PLATELET # BLD AUTO: 79 K/UL — LOW (ref 130–400)
PLATELET # BLD AUTO: 99 K/UL — LOW (ref 130–400)
PMV BLD: 11.5 FL — HIGH (ref 7.4–10.4)
PMV BLD: 11.9 FL — HIGH (ref 7.4–10.4)
PO2 BLDV: 25 MMHG — SIGNIFICANT CHANGE UP (ref 25–45)
POTASSIUM BLDV-SCNC: 3.8 MMOL/L — SIGNIFICANT CHANGE UP (ref 3.5–5.1)
POTASSIUM SERPL-MCNC: 4 MMOL/L — SIGNIFICANT CHANGE UP (ref 3.5–5)
POTASSIUM SERPL-SCNC: 4 MMOL/L — SIGNIFICANT CHANGE UP (ref 3.5–5)
PROT SERPL-MCNC: 6.6 G/DL — SIGNIFICANT CHANGE UP (ref 6–8)
RBC # BLD: 3.35 M/UL — LOW (ref 4.7–6.1)
RBC # BLD: 3.51 M/UL — LOW (ref 4.7–6.1)
RBC # FLD: 17.8 % — HIGH (ref 11.5–14.5)
RBC # FLD: 17.9 % — HIGH (ref 11.5–14.5)
SAO2 % BLDV: 34.2 % — LOW (ref 67–88)
SODIUM SERPL-SCNC: 136 MMOL/L — SIGNIFICANT CHANGE UP (ref 135–146)
SODIUM UR-SCNC: 38 MMOL/L — SIGNIFICANT CHANGE UP
TROPONIN SAMPLING TIME: 1647 — SIGNIFICANT CHANGE UP
TROPONIN T, HIGH SENSITIVITY RESULT: 1550 NG/L — CRITICAL HIGH (ref 6–21)
TROPONIN T, HIGH SENSITIVITY RESULT: 207 NG/L — CRITICAL HIGH (ref 6–21)
WBC # BLD: 2.78 K/UL — LOW (ref 4.8–10.8)
WBC # BLD: 4.44 K/UL — LOW (ref 4.8–10.8)
WBC # FLD AUTO: 2.78 K/UL — LOW (ref 4.8–10.8)
WBC # FLD AUTO: 4.44 K/UL — LOW (ref 4.8–10.8)

## 2024-01-24 PROCEDURE — 93458 L HRT ARTERY/VENTRICLE ANGIO: CPT

## 2024-01-24 PROCEDURE — 84156 ASSAY OF PROTEIN URINE: CPT

## 2024-01-24 PROCEDURE — 33285 INSJ SUBQ CAR RHYTHM MNTR: CPT

## 2024-01-24 PROCEDURE — 82962 GLUCOSE BLOOD TEST: CPT

## 2024-01-24 PROCEDURE — 76770 US EXAM ABDO BACK WALL COMP: CPT

## 2024-01-24 PROCEDURE — 84484 ASSAY OF TROPONIN QUANT: CPT

## 2024-01-24 PROCEDURE — 93010 ELECTROCARDIOGRAM REPORT: CPT | Mod: 76,77

## 2024-01-24 PROCEDURE — C1894: CPT

## 2024-01-24 PROCEDURE — 97166 OT EVAL MOD COMPLEX 45 MIN: CPT | Mod: GO

## 2024-01-24 PROCEDURE — 93308 TTE F-UP OR LMTD: CPT | Mod: 26

## 2024-01-24 PROCEDURE — 85730 THROMBOPLASTIN TIME PARTIAL: CPT

## 2024-01-24 PROCEDURE — A9500: CPT

## 2024-01-24 PROCEDURE — 93312 ECHO TRANSESOPHAGEAL: CPT | Mod: 26,59

## 2024-01-24 PROCEDURE — 78452 HT MUSCLE IMAGE SPECT MULT: CPT

## 2024-01-24 PROCEDURE — 80053 COMPREHEN METABOLIC PANEL: CPT

## 2024-01-24 PROCEDURE — 99291 CRITICAL CARE FIRST HOUR: CPT

## 2024-01-24 PROCEDURE — 82570 ASSAY OF URINE CREATININE: CPT

## 2024-01-24 PROCEDURE — 85025 COMPLETE CBC W/AUTO DIFF WBC: CPT

## 2024-01-24 PROCEDURE — C1764: CPT

## 2024-01-24 PROCEDURE — 86850 RBC ANTIBODY SCREEN: CPT

## 2024-01-24 PROCEDURE — 86901 BLOOD TYPING SEROLOGIC RH(D): CPT

## 2024-01-24 PROCEDURE — C1769: CPT

## 2024-01-24 PROCEDURE — 97162 PT EVAL MOD COMPLEX 30 MIN: CPT | Mod: GP

## 2024-01-24 PROCEDURE — 93320 DOPPLER ECHO COMPLETE: CPT | Mod: 26

## 2024-01-24 PROCEDURE — 36415 COLL VENOUS BLD VENIPUNCTURE: CPT

## 2024-01-24 PROCEDURE — 97110 THERAPEUTIC EXERCISES: CPT | Mod: GP

## 2024-01-24 PROCEDURE — 99053 MED SERV 10PM-8AM 24 HR FAC: CPT

## 2024-01-24 PROCEDURE — C1887: CPT

## 2024-01-24 PROCEDURE — 86900 BLOOD TYPING SEROLOGIC ABO: CPT

## 2024-01-24 PROCEDURE — 93325 DOPPLER ECHO COLOR FLOW MAPG: CPT | Mod: 26

## 2024-01-24 PROCEDURE — 93005 ELECTROCARDIOGRAM TRACING: CPT

## 2024-01-24 PROCEDURE — 93307 TTE W/O DOPPLER COMPLETE: CPT

## 2024-01-24 PROCEDURE — 84300 ASSAY OF URINE SODIUM: CPT

## 2024-01-24 PROCEDURE — 93017 CV STRESS TEST TRACING ONLY: CPT

## 2024-01-24 PROCEDURE — 85027 COMPLETE CBC AUTOMATED: CPT

## 2024-01-24 PROCEDURE — 83735 ASSAY OF MAGNESIUM: CPT

## 2024-01-24 PROCEDURE — 97116 GAIT TRAINING THERAPY: CPT | Mod: GP

## 2024-01-24 PROCEDURE — 80048 BASIC METABOLIC PNL TOTAL CA: CPT

## 2024-01-24 PROCEDURE — 83935 ASSAY OF URINE OSMOLALITY: CPT

## 2024-01-24 PROCEDURE — 80197 ASSAY OF TACROLIMUS: CPT

## 2024-01-24 PROCEDURE — 93010 ELECTROCARDIOGRAM REPORT: CPT

## 2024-01-24 PROCEDURE — 85610 PROTHROMBIN TIME: CPT

## 2024-01-24 PROCEDURE — 71045 X-RAY EXAM CHEST 1 VIEW: CPT | Mod: 26

## 2024-01-24 RX ORDER — TAMSULOSIN HYDROCHLORIDE 0.4 MG/1
0.4 CAPSULE ORAL EVERY 12 HOURS
Refills: 0 | Status: DISCONTINUED | OUTPATIENT
Start: 2024-01-24 | End: 2024-02-01

## 2024-01-24 RX ORDER — LIDOCAINE HCL 20 MG/ML
1 VIAL (ML) INJECTION
Qty: 2 | Refills: 0 | Status: DISCONTINUED | OUTPATIENT
Start: 2024-01-24 | End: 2024-01-24

## 2024-01-24 RX ORDER — MYCOPHENOLATE MOFETIL 250 MG/1
1000 CAPSULE ORAL DAILY
Refills: 0 | Status: DISCONTINUED | OUTPATIENT
Start: 2024-01-24 | End: 2024-01-30

## 2024-01-24 RX ORDER — MAGNESIUM SULFATE 500 MG/ML
2 VIAL (ML) INJECTION ONCE
Refills: 0 | Status: COMPLETED | OUTPATIENT
Start: 2024-01-24 | End: 2024-01-24

## 2024-01-24 RX ORDER — AMIODARONE HYDROCHLORIDE 400 MG/1
1 TABLET ORAL
Qty: 450 | Refills: 0 | Status: DISCONTINUED | OUTPATIENT
Start: 2024-01-24 | End: 2024-01-24

## 2024-01-24 RX ORDER — TACROLIMUS 5 MG/1
2 CAPSULE ORAL AT BEDTIME
Refills: 0 | Status: DISCONTINUED | OUTPATIENT
Start: 2024-01-24 | End: 2024-02-01

## 2024-01-24 RX ORDER — TACROLIMUS 5 MG/1
3 CAPSULE ORAL
Refills: 0 | Status: DISCONTINUED | OUTPATIENT
Start: 2024-01-24 | End: 2024-02-01

## 2024-01-24 RX ORDER — HEPARIN SODIUM 5000 [USP'U]/ML
INJECTION INTRAVENOUS; SUBCUTANEOUS
Qty: 25000 | Refills: 0 | Status: DISCONTINUED | OUTPATIENT
Start: 2024-01-24 | End: 2024-01-27

## 2024-01-24 RX ORDER — ASPIRIN/CALCIUM CARB/MAGNESIUM 324 MG
81 TABLET ORAL DAILY
Refills: 0 | Status: DISCONTINUED | OUTPATIENT
Start: 2024-01-24 | End: 2024-02-01

## 2024-01-24 RX ORDER — MORPHINE SULFATE 50 MG/1
4 CAPSULE, EXTENDED RELEASE ORAL ONCE
Refills: 0 | Status: DISCONTINUED | OUTPATIENT
Start: 2024-01-24 | End: 2024-01-24

## 2024-01-24 RX ORDER — PANTOPRAZOLE SODIUM 20 MG/1
40 TABLET, DELAYED RELEASE ORAL
Refills: 0 | Status: DISCONTINUED | OUTPATIENT
Start: 2024-01-25 | End: 2024-02-01

## 2024-01-24 RX ORDER — FUROSEMIDE 40 MG
20 TABLET ORAL
Refills: 0 | Status: DISCONTINUED | OUTPATIENT
Start: 2024-01-24 | End: 2024-01-28

## 2024-01-24 RX ORDER — AMIODARONE HYDROCHLORIDE 400 MG/1
1 TABLET ORAL
Qty: 450 | Refills: 0 | Status: ACTIVE | OUTPATIENT
Start: 2024-01-24 | End: 2024-01-24

## 2024-01-24 RX ORDER — CALCITRIOL 0.5 UG/1
0.5 CAPSULE ORAL EVERY 12 HOURS
Refills: 0 | Status: DISCONTINUED | OUTPATIENT
Start: 2024-01-24 | End: 2024-01-29

## 2024-01-24 RX ORDER — ATORVASTATIN CALCIUM 80 MG/1
40 TABLET, FILM COATED ORAL AT BEDTIME
Refills: 0 | Status: DISCONTINUED | OUTPATIENT
Start: 2024-01-24 | End: 2024-02-01

## 2024-01-24 RX ORDER — AMIODARONE HYDROCHLORIDE 400 MG/1
0.5 TABLET ORAL
Qty: 450 | Refills: 0 | Status: ACTIVE | OUTPATIENT
Start: 2024-01-24 | End: 2024-01-25

## 2024-01-24 RX ADMIN — Medication 150 GRAM(S): at 08:06

## 2024-01-24 RX ADMIN — AMIODARONE HYDROCHLORIDE 33.3 MG/MIN: 400 TABLET ORAL at 09:42

## 2024-01-24 RX ADMIN — MYCOPHENOLATE MOFETIL 1000 MILLIGRAM(S): 250 CAPSULE ORAL at 14:14

## 2024-01-24 RX ADMIN — Medication 81 MILLIGRAM(S): at 13:37

## 2024-01-24 RX ADMIN — TACROLIMUS 3 MILLIGRAM(S): 5 CAPSULE ORAL at 13:37

## 2024-01-24 RX ADMIN — Medication 7.5 MG/MIN: at 09:42

## 2024-01-24 RX ADMIN — HEPARIN SODIUM 700 UNIT(S)/HR: 5000 INJECTION INTRAVENOUS; SUBCUTANEOUS at 15:41

## 2024-01-24 RX ADMIN — TACROLIMUS 2 MILLIGRAM(S): 5 CAPSULE ORAL at 22:33

## 2024-01-24 RX ADMIN — TAMSULOSIN HYDROCHLORIDE 0.4 MILLIGRAM(S): 0.4 CAPSULE ORAL at 17:05

## 2024-01-24 RX ADMIN — AMIODARONE HYDROCHLORIDE 33.3 MG/MIN: 400 TABLET ORAL at 12:27

## 2024-01-24 RX ADMIN — ATORVASTATIN CALCIUM 40 MILLIGRAM(S): 80 TABLET, FILM COATED ORAL at 22:33

## 2024-01-24 RX ADMIN — AMIODARONE HYDROCHLORIDE 16.7 MG/MIN: 400 TABLET ORAL at 18:30

## 2024-01-24 RX ADMIN — MORPHINE SULFATE 4 MILLIGRAM(S): 50 CAPSULE, EXTENDED RELEASE ORAL at 08:12

## 2024-01-24 RX ADMIN — AMIODARONE HYDROCHLORIDE 33.3 MG/MIN: 400 TABLET ORAL at 16:41

## 2024-01-24 RX ADMIN — HEPARIN SODIUM 800 UNIT(S)/HR: 5000 INJECTION INTRAVENOUS; SUBCUTANEOUS at 23:03

## 2024-01-24 RX ADMIN — CALCITRIOL 0.5 MICROGRAM(S): 0.5 CAPSULE ORAL at 17:05

## 2024-01-24 NOTE — H&P ADULT - NSHPLABSRESULTS_GEN_ALL_CORE
10.4   4.44  )-----------( 99       ( 24 Jan 2024 07:02 )             32.5       01-24    136  |  100  |  54<H>  ----------------------------<  154<H>  4.0   |  22  |  2.2<H>    Ca    10.3      24 Jan 2024 07:02  Phos  2.3     01-24  Mg     1.6     01-24    TPro  6.6  /  Alb  4.2  /  TBili  0.5  /  DBili  x   /  AST  31  /  ALT  8   /  AlkPhos  51  01-24              Urinalysis Basic - ( 24 Jan 2024 07:02 )    Color: x / Appearance: x / SG: x / pH: x  Gluc: 154 mg/dL / Ketone: x  / Bili: x / Urobili: x   Blood: x / Protein: x / Nitrite: x   Leuk Esterase: x / RBC: x / WBC x   Sq Epi: x / Non Sq Epi: x / Bacteria: x            Lactate Trend            CAPILLARY BLOOD GLUCOSE            Culture Results:   >100,000 CFU/ml Enterococcus faecalis (01-05 @ 10:23)

## 2024-01-24 NOTE — PATIENT PROFILE ADULT - FALL HARM RISK - HARM RISK INTERVENTIONS

## 2024-01-24 NOTE — ED PROVIDER NOTE - CLINICAL SUMMARY MEDICAL DECISION MAKING FREE TEXT BOX
69-year-old male presents to the emergency department by EMS for chest pain and difficulty breathing this morning.  EMS found the patient to be in monomorphic ventricular tachycardia treated with weight-based doses of lidocaine and then amiodarone after reoccurrence.  In the emergency department patient presented well, hemodynamically stable, ill but not toxic and with no runs of abnormal rhythm.  Monitor revealed normal sinus rhythm, EKG revealed lateral T wave inversions which were compared to old and demonstrated as intermittent.  Patient received supportive care, screening labs, screening imaging and consults to cardiology with plan for intervention today and admission to CCU service.  I discussed the case with attending cardiologist's Dr. Hari Hay and Dr. Ceferino Grande directly with recommendations provided..

## 2024-01-24 NOTE — ED PROVIDER NOTE - CRITICAL CARE ATTENDING CONTRIBUTION TO CARE
I personally evaluated the patient. I reviewed the Resident´s or Physician Assistant´s note (as assigned above), and agree with the findings and plan except as documented in my note.    69-year-old male with multiple medical comorbidities presents to the ED by EMS for evaluation of chest pain and shortness of breath with abdominal discomfort prior to arrival.  EMS provided evaluation on scene and identified the patient as being in monomorphic ventricular tachycardia which was treated with sequential rounds of antidysrhythmic's including parenteral lidocaine 62 mg and amiodarone 150 mg IV with improvement in rhythm on the monitor after second administration.  Patient was with a pulse throughout the entire event and had moderate clinical improvement after administration of antidysrhythmic's and rhythm change noted on their sequential monitoring strips.      Medical comorbidities include atrial fibrillation flutter and liver cirrhosis, breast cancer, hypertension and COPD, renal transplant prior PCI, CKD.  Not on dialysis.     GENERAL: male in no distress. appears ill but not toxic  HEENT: right eye dysconjugate. no pallor.  CHEST: increased work of breathing noted  CV: pulses intact no tachycardia regular  EXTR: FROM no edema  NEURO: AAO 3 no focal deficits  SKIN: normal no pallor   PSYCH: normal mood & mentation    Impression: Ventricular tachycardia    Plan: monitoring cardiology consult IV labs imaging supportive care and reevaluation

## 2024-01-24 NOTE — ED PROVIDER NOTE - OBJECTIVE STATEMENT
69-year-old male with past med history of A-fib/a flutter on Eliquis, CAD with stents, COPD, CKD status post kidney transplant, hep C, cirrhosis, hyperlipidemia, and hypertension who presents to the ED with chest pain or shortness of breath.  Reports that symptoms started around 2:00 AM this morning.  Chest pain is in the left side of the chest, dull, nonradiating, and intermittent.  Also endorses shortness of breath and palpitation along with the chest pain.  Denies fever, nausea, vomiting, abdominal pain, urinary symptoms, and change with bowel movement. 69-year-old male with past med history of A-fib/a flutter on Eliquis, CAD with stents, COPD, CKD status post kidney transplant, hep C, cirrhosis, hyperlipidemia, and hypertension who presents to the ED with chest pain or shortness of breath.  Reports that symptoms started around 2:00 AM this morning.  Chest pain is in the right side of the chest, dull, nonradiating, and intermittent.  Also endorses shortness of breath and palpitation along with the chest pain.  Denies fever, nausea, vomiting, abdominal pain, urinary symptoms, and change with bowel movement.

## 2024-01-24 NOTE — ED PROVIDER NOTE - ADDITIONAL HISTORY OBTAINED FROM [facility]
Pt call to address UTI/Kidney infection symptoms since last Thursday. Pt is experiencing pelvic pain radiating to lower back, urine frequency and itching in genitals.     Please call pt for recommendations. Thanks!    Preferred phone number is ok to leave detailed messages.    EMS

## 2024-01-24 NOTE — H&P ADULT - NSICDXPASTSURGICALHX_GEN_ALL_CORE_FT
PAST SURGICAL HISTORY:  History of back surgery s/p Left L5-S1 endoscopic laminoforaminotomy    History of kidney transplant 3 years ago    S/P arteriovenous (AV) fistula creation prior old fistula in R arm    S/P lumpectomy, right breast

## 2024-01-24 NOTE — ED PROVIDER NOTE - PROGRESS NOTE DETAILS
Spoke with requested patient to be admitted to CCU. Dr. Balbuena - EMS rhythm strip with monomorphic ventricular tachycardia at 0557 am  EMS 12 lead EKG with NSR at 84 and TWI laterally with LVH at 0603 am

## 2024-01-24 NOTE — ED PROVIDER NOTE - DIFFERENTIAL DIAGNOSIS
Differential Diagnosis STEMI, non-STEMI, COPD exacerbation, electrolyte abnormalities, rhythm disturbances, ventricular tachycardia

## 2024-01-24 NOTE — H&P ADULT - NSHPPHYSICALEXAM_GEN_ALL_CORE
PHYSICAL EXAM:  GENERAL: NAD, resting comfortably in bed, conversive  HEAD:  Atraumatic, Normocephalic  EYES: EOMI, PERRLA, conjunctiva and sclera clear  ENMT: No tonsillar erythema, exudates, or enlargement; Moist mucous membranes  NECK: Supple, No JVD, Normal thyroid  HEART: Regular rate and rhythm; No murmurs, rubs, or gallops  RESPIRATORY: CTA B/L, No W/R/R  ABDOMEN: Soft, Nontender, Nondistended; Bowel sounds present  NEUROLOGY: A&Ox3, nonfocal, moving all extremities  EXTREMITIES:  no edema

## 2024-01-24 NOTE — H&P ADULT - HISTORY OF PRESENT ILLNESS
70 yo M with CAD s/p PCI, COPD, CKD3 s/p Renal tx, afib/flutter s/p ablation, HFpEF,  liver cirrhosis, Hx R eye melanoma and Breast Ca, HLD, HTN who presents due to chest pain.  On AM of 1/24 patient woke up at 2AM with chest pain.  Chest pain is described as dull, located in the central and right chest with radiation to the throat.  The patient had the chest pain for 2-3 hours and called 911.  Patient has had this chest pain before, he states he had the chest pain prior to his ablation in December 2023.  When EMS arrived to patient's home he was found to be in monomorphic ventricular tachycardia and he was treated with lidocaine and amiodarone IV.  Once in the ED patient was in sinus rhythm, EKG demonstrated lateral t wave inversions.      Trop 207, Cr 2.2 (baseline around 1.8), BNP 77244  Chest x ray negative for acute cardiopulmonary disease    In the ED patient was started on amiodarone drip

## 2024-01-24 NOTE — ED PROVIDER NOTE - CONSIDERATION OF ADMISSION OBSERVATION
Consideration of Admission/Observation Patient will be admitted to monitored setting for runs of monomorphic ventricular tachycardia in the prehospital setting documented on rhythm strip and placed in the chart.

## 2024-01-24 NOTE — ED PROVIDER NOTE - STUDIES
EKG - see results section for interpretation/Xray Image(s) - see wet read section for interpretation EKG - see results section for interpretation/Xray Image(s) - see wet read section for interpretation/Rhythm Strip

## 2024-01-24 NOTE — ED PROVIDER NOTE - NS ED ATTENDING STATEMENT MOD
This was a shared visit with the JESUS ALBERTO. I reviewed and verified the documentation. I have personally provided the amount of critical care time documented below excluding time spent on separate procedures.

## 2024-01-24 NOTE — ED PROVIDER NOTE - ATTENDING APP SHARED VISIT CONTRIBUTION OF CARE
70 yo M with CAD s/p PCI, COPD, CKD3 s/p Renal tx, afib/flutter s/p ablation, liver cirrhosis, Hx R eye melanoma and Breast Ca, HLD, HTN, lumbalgia I personally evaluated the patient. I reviewed the Resident´s or Physician Assistant´s note (as assigned above), and agree with the findings and plan except as documented in my note.    69-year-old male with multiple medical comorbidities presents to the ED by EMS for evaluation of chest pain and shortness of breath with abdominal discomfort prior to arrival.  EMS provided evaluation on scene and identified the patient as being in monomorphic ventricular tachycardia which was treated with sequential rounds of antidysrhythmic's including parenteral lidocaine 62 mg and amiodarone 150 mg IV with improvement in rhythm on the monitor after second administration.  Patient was with a pulse throughout the entire event and had moderate clinical improvement after administration of antidysrhythmic's and rhythm change noted on their sequential monitoring strips.      Medical comorbidities include atrial fibrillation flutter and liver cirrhosis, breast cancer, hypertension and COPD, renal transplant prior PCI, CKD.  Not on dialysis.     GENERAL: male in no distress. appears ill but not toxic  HEENT: right eye dysconjugate. no pallor.  CHEST: increased work of breathing noted  CV: pulses intact no tachycardia regular  EXTR: FROM no edema  NEURO: AAO 3 no focal deficits  SKIN: normal no pallor   PSYCH: normal mood & mentation    Impression: Ventricular tachycardia    Plan: monitoring cardiology consult IV labs imaging supportive care and reevaluation

## 2024-01-24 NOTE — H&P ADULT - ASSESSMENT
70 yo M with CAD s/p PCI, COPD, HFpEF, CKD3 s/p Renal tx, afib/flutter s/p ablation, liver cirrhosis, Hx R eye melanoma and Breast Ca, HLD, HTN who presents due to chest pain.      #Chest pain, ventricular tachycardia  #a-fib/flutter s/p ablation  #CAD s/p PCI, HFpEF  -1/24 patient woke up at 2AM with chest pain  -EMS found patient to be in monomorphic ventricular tachycardia   - s/p lidocaine and amiodarone IV by EMS  - Once in the ED patient was in sinus rhythm, EKG demonstrated lateral t wave inversions  -trop 207  -Chest x ray negative for acute cardiopulmonary disease  -In the ED patient was started on amiodarone drip  -EP consulted and saw patient, recommended: NPO for cath today, Cont Amio drip, when completed start Amio PO 200mg BID, Daily EKG  -start heparin drip  -trend trop, follow up 4 PM trop  -EKG in AM  -admit to cardiac stepdown    #CKD3 s/p renal transplant  #SANTA on CKD  -c/w home mycophenolate and tacrolimus  -Cr on admission 2.2, baseline around 1.8  -obtain urine studies and continue to trend creatinine    #HLD  -c/w home atorvastatin     DVT proph: heparin drip  GI proph: PPI

## 2024-01-24 NOTE — ED PROVIDER NOTE - EMS DETAILS FREE TEXT FOR MDM ADDL HISTORY OBTAINED FROM QUESTION
See EKG and rhythm strip documentation.  Therapies provided by EMS verbally handed off and charted in my note

## 2024-01-24 NOTE — CONSULT NOTE ADULT - NS ATTEND AMEND GEN_ALL_CORE FT
NCT- AFL vs VT  Persistent AF s/p ablation    Reviewed EMS strip, EKG, Tele interpreted independently, which I think is NCT- likely AFL. Doubt VT due to rate/morphology/recent Hx  Cont heparin for now if LHC is needed  No plan for LHC today  Cardio- Dr. Low to review office records and based upon that we will decide further w-up.   Continue with Amiodarone drip. Then switch to amiodarone 200 mg q12 for 1-week, followed by 200 mg PO q24h  Possible MCOT vs ILR- ILR is preferred- continued discussion with pt.  Tele  PT/OT assessment: Patients biggest c/o  Baseline TSH/LFT

## 2024-01-24 NOTE — PATIENT PROFILE ADULT - LIFE CHALLENGES - DETAILS
Called & spoke to patient  Bilat Knee Euflexxa has been ordered as discussed during last office visit with Dr Fatou Lay       # [phone] no support system

## 2024-01-24 NOTE — ED ADULT NURSE REASSESSMENT NOTE - NS ED NURSE REASSESS COMMENT FT1
Patient assessed, Aox4 in no acute distress- patient resting in bed comfortably, denies any CP a this time

## 2024-01-24 NOTE — ED PROVIDER NOTE - WHICH SHOWED
Monomorphic ventricular tachycardia at 05 57 this morning, normal sinus rhythm with nonspecific ST changes and T wave versions laterally at 060 3 AM EMS rhythm strip with monomorphic ventricular tachycardia at 0557 am  EMS 12 lead EKG with NSR at 84 and TWI laterally with LVH at 0603 am

## 2024-01-25 LAB
ALBUMIN SERPL ELPH-MCNC: 3.7 G/DL — SIGNIFICANT CHANGE UP (ref 3.5–5.2)
ALP SERPL-CCNC: 48 U/L — SIGNIFICANT CHANGE UP (ref 30–115)
ALT FLD-CCNC: 9 U/L — SIGNIFICANT CHANGE UP (ref 0–41)
ANION GAP SERPL CALC-SCNC: 11 MMOL/L — SIGNIFICANT CHANGE UP (ref 7–14)
APTT BLD: 47 SEC — HIGH (ref 27–39.2)
APTT BLD: 56.7 SEC — HIGH (ref 27–39.2)
APTT BLD: 73.5 SEC — CRITICAL HIGH (ref 27–39.2)
AST SERPL-CCNC: 44 U/L — HIGH (ref 0–41)
BASOPHILS # BLD AUTO: 0.02 K/UL — SIGNIFICANT CHANGE UP (ref 0–0.2)
BASOPHILS NFR BLD AUTO: 0.7 % — SIGNIFICANT CHANGE UP (ref 0–1)
BILIRUB SERPL-MCNC: 0.6 MG/DL — SIGNIFICANT CHANGE UP (ref 0.2–1.2)
BUN SERPL-MCNC: 42 MG/DL — HIGH (ref 10–20)
CALCIUM SERPL-MCNC: 10.1 MG/DL — SIGNIFICANT CHANGE UP (ref 8.4–10.4)
CHLORIDE SERPL-SCNC: 100 MMOL/L — SIGNIFICANT CHANGE UP (ref 98–110)
CO2 SERPL-SCNC: 22 MMOL/L — SIGNIFICANT CHANGE UP (ref 17–32)
CREAT ?TM UR-MCNC: 58 MG/DL — SIGNIFICANT CHANGE UP
CREAT SERPL-MCNC: 2 MG/DL — HIGH (ref 0.7–1.5)
EGFR: 36 ML/MIN/1.73M2 — LOW
EOSINOPHIL # BLD AUTO: 0.01 K/UL — SIGNIFICANT CHANGE UP (ref 0–0.7)
EOSINOPHIL NFR BLD AUTO: 0.3 % — SIGNIFICANT CHANGE UP (ref 0–8)
GLUCOSE SERPL-MCNC: 111 MG/DL — HIGH (ref 70–99)
HCT VFR BLD CALC: 29.3 % — LOW (ref 42–52)
HCT VFR BLD CALC: 29.3 % — LOW (ref 42–52)
HGB BLD-MCNC: 9.3 G/DL — LOW (ref 14–18)
HGB BLD-MCNC: 9.4 G/DL — LOW (ref 14–18)
IMM GRANULOCYTES NFR BLD AUTO: 0.7 % — HIGH (ref 0.1–0.3)
LYMPHOCYTES # BLD AUTO: 0.61 K/UL — LOW (ref 1.2–3.4)
LYMPHOCYTES # BLD AUTO: 20.5 % — SIGNIFICANT CHANGE UP (ref 20.5–51.1)
MAGNESIUM SERPL-MCNC: 2 MG/DL — SIGNIFICANT CHANGE UP (ref 1.8–2.4)
MCHC RBC-ENTMCNC: 28.8 PG — SIGNIFICANT CHANGE UP (ref 27–31)
MCHC RBC-ENTMCNC: 29.2 PG — SIGNIFICANT CHANGE UP (ref 27–31)
MCHC RBC-ENTMCNC: 31.7 G/DL — LOW (ref 32–37)
MCHC RBC-ENTMCNC: 32.1 G/DL — SIGNIFICANT CHANGE UP (ref 32–37)
MCV RBC AUTO: 90.7 FL — SIGNIFICANT CHANGE UP (ref 80–94)
MCV RBC AUTO: 91 FL — SIGNIFICANT CHANGE UP (ref 80–94)
MONOCYTES # BLD AUTO: 0.21 K/UL — SIGNIFICANT CHANGE UP (ref 0.1–0.6)
MONOCYTES NFR BLD AUTO: 7.1 % — SIGNIFICANT CHANGE UP (ref 1.7–9.3)
NEUTROPHILS # BLD AUTO: 2.1 K/UL — SIGNIFICANT CHANGE UP (ref 1.4–6.5)
NEUTROPHILS NFR BLD AUTO: 70.7 % — SIGNIFICANT CHANGE UP (ref 42.2–75.2)
NRBC # BLD: 0 /100 WBCS — SIGNIFICANT CHANGE UP (ref 0–0)
NRBC # BLD: 0 /100 WBCS — SIGNIFICANT CHANGE UP (ref 0–0)
PLATELET # BLD AUTO: 78 K/UL — LOW (ref 130–400)
PLATELET # BLD AUTO: 89 K/UL — LOW (ref 130–400)
PMV BLD: 11.1 FL — HIGH (ref 7.4–10.4)
PMV BLD: 12.4 FL — HIGH (ref 7.4–10.4)
POTASSIUM SERPL-MCNC: 4 MMOL/L — SIGNIFICANT CHANGE UP (ref 3.5–5)
POTASSIUM SERPL-SCNC: 4 MMOL/L — SIGNIFICANT CHANGE UP (ref 3.5–5)
PROT ?TM UR-MCNC: 19 MG/DLG/24H — SIGNIFICANT CHANGE UP
PROT SERPL-MCNC: 5.8 G/DL — LOW (ref 6–8)
PROT/CREAT UR-RTO: 0.3 RATIO — HIGH (ref 0–0.2)
RBC # BLD: 3.22 M/UL — LOW (ref 4.7–6.1)
RBC # BLD: 3.23 M/UL — LOW (ref 4.7–6.1)
RBC # FLD: 17.4 % — HIGH (ref 11.5–14.5)
RBC # FLD: 17.7 % — HIGH (ref 11.5–14.5)
SODIUM SERPL-SCNC: 133 MMOL/L — LOW (ref 135–146)
TROPONIN T, HIGH SENSITIVITY RESULT: 1026 NG/L — CRITICAL HIGH (ref 6–21)
TROPONIN T, HIGH SENSITIVITY RESULT: 1046 NG/L — CRITICAL HIGH (ref 6–21)
TROPONIN T, HIGH SENSITIVITY RESULT: 1061 NG/L — CRITICAL HIGH (ref 6–21)
WBC # BLD: 2.97 K/UL — LOW (ref 4.8–10.8)
WBC # BLD: 3.23 K/UL — LOW (ref 4.8–10.8)
WBC # FLD AUTO: 2.97 K/UL — LOW (ref 4.8–10.8)
WBC # FLD AUTO: 3.23 K/UL — LOW (ref 4.8–10.8)

## 2024-01-25 PROCEDURE — 93010 ELECTROCARDIOGRAM REPORT: CPT

## 2024-01-25 PROCEDURE — 99232 SBSQ HOSP IP/OBS MODERATE 35: CPT

## 2024-01-25 RX ORDER — AMIODARONE HYDROCHLORIDE 400 MG/1
200 TABLET ORAL
Refills: 0 | Status: DISCONTINUED | OUTPATIENT
Start: 2024-01-25 | End: 2024-02-01

## 2024-01-25 RX ORDER — HYDROMORPHONE HYDROCHLORIDE 2 MG/ML
1 INJECTION INTRAMUSCULAR; INTRAVENOUS; SUBCUTANEOUS ONCE
Refills: 0 | Status: DISCONTINUED | OUTPATIENT
Start: 2024-01-25 | End: 2024-01-25

## 2024-01-25 RX ORDER — OXYCODONE HYDROCHLORIDE 5 MG/1
15 TABLET ORAL THREE TIMES A DAY
Refills: 0 | Status: DISCONTINUED | OUTPATIENT
Start: 2024-01-25 | End: 2024-01-28

## 2024-01-25 RX ORDER — REGADENOSON 0.08 MG/ML
0.4 INJECTION, SOLUTION INTRAVENOUS ONCE
Refills: 0 | Status: DISCONTINUED | OUTPATIENT
Start: 2024-01-25 | End: 2024-02-01

## 2024-01-25 RX ORDER — OXYCODONE HYDROCHLORIDE 5 MG/1
10 TABLET ORAL EVERY 8 HOURS
Refills: 0 | Status: DISCONTINUED | OUTPATIENT
Start: 2024-01-25 | End: 2024-01-25

## 2024-01-25 RX ORDER — CHLORHEXIDINE GLUCONATE 213 G/1000ML
1 SOLUTION TOPICAL
Refills: 0 | Status: DISCONTINUED | OUTPATIENT
Start: 2024-01-25 | End: 2024-02-01

## 2024-01-25 RX ORDER — ACETAMINOPHEN 500 MG
650 TABLET ORAL EVERY 6 HOURS
Refills: 0 | Status: DISCONTINUED | OUTPATIENT
Start: 2024-01-25 | End: 2024-02-01

## 2024-01-25 RX ORDER — MORPHINE SULFATE 50 MG/1
1 CAPSULE, EXTENDED RELEASE ORAL ONCE
Refills: 0 | Status: DISCONTINUED | OUTPATIENT
Start: 2024-01-25 | End: 2024-01-25

## 2024-01-25 RX ORDER — HYDROMORPHONE HYDROCHLORIDE 2 MG/ML
1 INJECTION INTRAMUSCULAR; INTRAVENOUS; SUBCUTANEOUS EVERY 4 HOURS
Refills: 0 | Status: DISCONTINUED | OUTPATIENT
Start: 2024-01-25 | End: 2024-01-25

## 2024-01-25 RX ADMIN — CALCITRIOL 0.5 MICROGRAM(S): 0.5 CAPSULE ORAL at 17:45

## 2024-01-25 RX ADMIN — PANTOPRAZOLE SODIUM 40 MILLIGRAM(S): 20 TABLET, DELAYED RELEASE ORAL at 05:33

## 2024-01-25 RX ADMIN — HEPARIN SODIUM 850 UNIT(S)/HR: 5000 INJECTION INTRAVENOUS; SUBCUTANEOUS at 20:29

## 2024-01-25 RX ADMIN — OXYCODONE HYDROCHLORIDE 15 MILLIGRAM(S): 5 TABLET ORAL at 18:46

## 2024-01-25 RX ADMIN — AMIODARONE HYDROCHLORIDE 200 MILLIGRAM(S): 400 TABLET ORAL at 17:45

## 2024-01-25 RX ADMIN — TACROLIMUS 3 MILLIGRAM(S): 5 CAPSULE ORAL at 08:13

## 2024-01-25 RX ADMIN — HYDROMORPHONE HYDROCHLORIDE 1 MILLIGRAM(S): 2 INJECTION INTRAMUSCULAR; INTRAVENOUS; SUBCUTANEOUS at 13:00

## 2024-01-25 RX ADMIN — OXYCODONE HYDROCHLORIDE 15 MILLIGRAM(S): 5 TABLET ORAL at 19:46

## 2024-01-25 RX ADMIN — HEPARIN SODIUM 750 UNIT(S)/HR: 5000 INJECTION INTRAVENOUS; SUBCUTANEOUS at 13:48

## 2024-01-25 RX ADMIN — HEPARIN SODIUM 800 UNIT(S)/HR: 5000 INJECTION INTRAVENOUS; SUBCUTANEOUS at 05:39

## 2024-01-25 RX ADMIN — TACROLIMUS 2 MILLIGRAM(S): 5 CAPSULE ORAL at 21:17

## 2024-01-25 RX ADMIN — CALCITRIOL 0.5 MICROGRAM(S): 0.5 CAPSULE ORAL at 05:34

## 2024-01-25 RX ADMIN — HYDROMORPHONE HYDROCHLORIDE 1 MILLIGRAM(S): 2 INJECTION INTRAMUSCULAR; INTRAVENOUS; SUBCUTANEOUS at 02:37

## 2024-01-25 RX ADMIN — HYDROMORPHONE HYDROCHLORIDE 1 MILLIGRAM(S): 2 INJECTION INTRAMUSCULAR; INTRAVENOUS; SUBCUTANEOUS at 12:33

## 2024-01-25 RX ADMIN — ATORVASTATIN CALCIUM 40 MILLIGRAM(S): 80 TABLET, FILM COATED ORAL at 21:17

## 2024-01-25 RX ADMIN — Medication 20 MILLIGRAM(S): at 10:08

## 2024-01-25 RX ADMIN — TAMSULOSIN HYDROCHLORIDE 0.4 MILLIGRAM(S): 0.4 CAPSULE ORAL at 17:45

## 2024-01-25 RX ADMIN — TAMSULOSIN HYDROCHLORIDE 0.4 MILLIGRAM(S): 0.4 CAPSULE ORAL at 05:34

## 2024-01-25 NOTE — PROGRESS NOTE ADULT - ASSESSMENT
68 yo M with CAD s/p PCI, COPD, HFpEF, CKD3 s/p Renal tx, afib/flutter s/p ablation, liver cirrhosis, Hx R eye melanoma and Breast Ca, HLD, HTN who presents due to chest pain.      #Chest pain, ventricular tachycardia  #a-fib/flutter s/p ablation  #CAD s/p PCI, HFpEF  -EMS found patient to be in monomorphic ventricular tachycardia   - s/p lidocaine and amiodarone IV by EMS  - Once in the ED patient was in sinus rhythm, EKG demonstrated lateral t wave inversions  -Chest x ray negative for acute cardiopulmonary disease  -In the ED patient was started on amiodarone drip  -EP consulted and saw patient, recommended: NPO for cath today, Cont Amio drip, when completed start Amio PO 200mg BID  - Daily EKG  - pharmacologic stress test ordered  -start heparin drip  -trend trop 200 -> 1550 -> 1026 -> 1046    #CKD3 s/p renal transplant  #SANTA on CKD  -c/w home mycophenolate and tacrolimus  -Cr on admission 2.2, baseline around 1.8  -obtain urine studies and continue to trend creatinine    #HLD  -c/w home atorvastatin     DVT proph: heparin drip  GI proph: PPI  Diet: NPO for procedure  code: full code  Pend: daily EKGs, stress test    68 yo M with CAD s/p PCI, COPD, HFpEF, CKD3 s/p Renal tx, afib/flutter s/p ablation, liver cirrhosis, Hx R eye melanoma and Breast Ca, HLD, HTN who presents due to chest pain.      #Chest pain, ventricular tachycardia  #a-fib/flutter s/p ablation  #CAD s/p PCI, HFpEF  -EMS found patient to be in monomorphic ventricular tachycardia   - s/p lidocaine and amiodarone IV by EMS  - Once in the ED patient was in sinus rhythm, EKG demonstrated lateral t wave inversions  -Chest x ray negative for acute cardiopulmonary disease  -In the ED patient was started on amiodarone drip  -EP consulted and saw patient- Cont Amio drip, when completed start Amio PO 200mg BID  - Daily EKG  - pharmacologic stress test ordered  -start heparin drip  -trend trop 200 -> 1550 -> 1026 -> 1046    #CKD3 s/p renal transplant  #SANTA on CKD  -c/w home mycophenolate and tacrolimus  -Cr on admission 2.2, baseline around 1.8  -obtain urine studies and continue to trend creatinine    #HLD  -c/w home atorvastatin     DVT proph: heparin drip  GI proph: PPI  Diet: NPO for procedure  code: full code  Pend: daily EKGs, stress test

## 2024-01-26 LAB
ALBUMIN SERPL ELPH-MCNC: 3.7 G/DL — SIGNIFICANT CHANGE UP (ref 3.5–5.2)
ALP SERPL-CCNC: 49 U/L — SIGNIFICANT CHANGE UP (ref 30–115)
ALT FLD-CCNC: 8 U/L — SIGNIFICANT CHANGE UP (ref 0–41)
ANION GAP SERPL CALC-SCNC: 12 MMOL/L — SIGNIFICANT CHANGE UP (ref 7–14)
APTT BLD: 55.1 SEC — HIGH (ref 27–39.2)
APTT BLD: 56.4 SEC — HIGH (ref 27–39.2)
APTT BLD: 58.5 SEC — HIGH (ref 27–39.2)
AST SERPL-CCNC: 27 U/L — SIGNIFICANT CHANGE UP (ref 0–41)
BILIRUB SERPL-MCNC: 0.6 MG/DL — SIGNIFICANT CHANGE UP (ref 0.2–1.2)
BUN SERPL-MCNC: 40 MG/DL — HIGH (ref 10–20)
CALCIUM SERPL-MCNC: 10.3 MG/DL — SIGNIFICANT CHANGE UP (ref 8.4–10.5)
CHLORIDE SERPL-SCNC: 102 MMOL/L — SIGNIFICANT CHANGE UP (ref 98–110)
CO2 SERPL-SCNC: 21 MMOL/L — SIGNIFICANT CHANGE UP (ref 17–32)
CREAT SERPL-MCNC: 2.5 MG/DL — HIGH (ref 0.7–1.5)
EGFR: 27 ML/MIN/1.73M2 — LOW
GLUCOSE SERPL-MCNC: 107 MG/DL — HIGH (ref 70–99)
HCT VFR BLD CALC: 28.4 % — LOW (ref 42–52)
HGB BLD-MCNC: 9.2 G/DL — LOW (ref 14–18)
INR BLD: 1.11 RATIO — SIGNIFICANT CHANGE UP (ref 0.65–1.3)
MAGNESIUM SERPL-MCNC: 2 MG/DL — SIGNIFICANT CHANGE UP (ref 1.8–2.4)
MCHC RBC-ENTMCNC: 29.5 PG — SIGNIFICANT CHANGE UP (ref 27–31)
MCHC RBC-ENTMCNC: 32.4 G/DL — SIGNIFICANT CHANGE UP (ref 32–37)
MCV RBC AUTO: 91 FL — SIGNIFICANT CHANGE UP (ref 80–94)
NRBC # BLD: 0 /100 WBCS — SIGNIFICANT CHANGE UP (ref 0–0)
PLATELET # BLD AUTO: 81 K/UL — LOW (ref 130–400)
PMV BLD: 11.7 FL — HIGH (ref 7.4–10.4)
POTASSIUM SERPL-MCNC: 4.4 MMOL/L — SIGNIFICANT CHANGE UP (ref 3.5–5)
POTASSIUM SERPL-SCNC: 4.4 MMOL/L — SIGNIFICANT CHANGE UP (ref 3.5–5)
PROT SERPL-MCNC: 5.6 G/DL — LOW (ref 6–8)
PROTHROM AB SERPL-ACNC: 12.7 SEC — SIGNIFICANT CHANGE UP (ref 9.95–12.87)
RBC # BLD: 3.12 M/UL — LOW (ref 4.7–6.1)
RBC # FLD: 17.6 % — HIGH (ref 11.5–14.5)
SODIUM SERPL-SCNC: 135 MMOL/L — SIGNIFICANT CHANGE UP (ref 135–146)
WBC # BLD: 3.02 K/UL — LOW (ref 4.8–10.8)
WBC # FLD AUTO: 3.02 K/UL — LOW (ref 4.8–10.8)

## 2024-01-26 PROCEDURE — 93018 CV STRESS TEST I&R ONLY: CPT

## 2024-01-26 PROCEDURE — 93016 CV STRESS TEST SUPVJ ONLY: CPT

## 2024-01-26 PROCEDURE — 99233 SBSQ HOSP IP/OBS HIGH 50: CPT | Mod: 25

## 2024-01-26 PROCEDURE — 33285 INSJ SUBQ CAR RHYTHM MNTR: CPT

## 2024-01-26 PROCEDURE — 78452 HT MUSCLE IMAGE SPECT MULT: CPT | Mod: 26

## 2024-01-26 PROCEDURE — 93010 ELECTROCARDIOGRAM REPORT: CPT

## 2024-01-26 RX ORDER — HYDROMORPHONE HYDROCHLORIDE 2 MG/ML
0.5 INJECTION INTRAMUSCULAR; INTRAVENOUS; SUBCUTANEOUS EVERY 6 HOURS
Refills: 0 | Status: DISCONTINUED | OUTPATIENT
Start: 2024-01-26 | End: 2024-01-27

## 2024-01-26 RX ORDER — CEPHALEXIN 500 MG
500 CAPSULE ORAL ONCE
Refills: 0 | Status: COMPLETED | OUTPATIENT
Start: 2024-01-26 | End: 2024-01-26

## 2024-01-26 RX ORDER — HYDROMORPHONE HYDROCHLORIDE 2 MG/ML
2 INJECTION INTRAMUSCULAR; INTRAVENOUS; SUBCUTANEOUS ONCE
Refills: 0 | Status: DISCONTINUED | OUTPATIENT
Start: 2024-01-26 | End: 2024-01-26

## 2024-01-26 RX ORDER — DULOXETINE HYDROCHLORIDE 30 MG/1
30 CAPSULE, DELAYED RELEASE ORAL AT BEDTIME
Refills: 0 | Status: DISCONTINUED | OUTPATIENT
Start: 2024-01-26 | End: 2024-01-28

## 2024-01-26 RX ORDER — METHOCARBAMOL 500 MG/1
500 TABLET, FILM COATED ORAL EVERY 8 HOURS
Refills: 0 | Status: DISCONTINUED | OUTPATIENT
Start: 2024-01-26 | End: 2024-02-01

## 2024-01-26 RX ADMIN — TACROLIMUS 2 MILLIGRAM(S): 5 CAPSULE ORAL at 22:00

## 2024-01-26 RX ADMIN — MYCOPHENOLATE MOFETIL 1000 MILLIGRAM(S): 250 CAPSULE ORAL at 12:24

## 2024-01-26 RX ADMIN — HEPARIN SODIUM 850 UNIT(S)/HR: 5000 INJECTION INTRAVENOUS; SUBCUTANEOUS at 03:00

## 2024-01-26 RX ADMIN — Medication 81 MILLIGRAM(S): at 12:24

## 2024-01-26 RX ADMIN — HYDROMORPHONE HYDROCHLORIDE 0.5 MILLIGRAM(S): 2 INJECTION INTRAMUSCULAR; INTRAVENOUS; SUBCUTANEOUS at 23:48

## 2024-01-26 RX ADMIN — OXYCODONE HYDROCHLORIDE 15 MILLIGRAM(S): 5 TABLET ORAL at 17:54

## 2024-01-26 RX ADMIN — METHOCARBAMOL 500 MILLIGRAM(S): 500 TABLET, FILM COATED ORAL at 22:00

## 2024-01-26 RX ADMIN — HYDROMORPHONE HYDROCHLORIDE 0.5 MILLIGRAM(S): 2 INJECTION INTRAMUSCULAR; INTRAVENOUS; SUBCUTANEOUS at 09:52

## 2024-01-26 RX ADMIN — HYDROMORPHONE HYDROCHLORIDE 0.5 MILLIGRAM(S): 2 INJECTION INTRAMUSCULAR; INTRAVENOUS; SUBCUTANEOUS at 10:20

## 2024-01-26 RX ADMIN — OXYCODONE HYDROCHLORIDE 15 MILLIGRAM(S): 5 TABLET ORAL at 09:57

## 2024-01-26 RX ADMIN — TAMSULOSIN HYDROCHLORIDE 0.4 MILLIGRAM(S): 0.4 CAPSULE ORAL at 05:50

## 2024-01-26 RX ADMIN — DULOXETINE HYDROCHLORIDE 30 MILLIGRAM(S): 30 CAPSULE, DELAYED RELEASE ORAL at 22:00

## 2024-01-26 RX ADMIN — OXYCODONE HYDROCHLORIDE 15 MILLIGRAM(S): 5 TABLET ORAL at 18:25

## 2024-01-26 RX ADMIN — CHLORHEXIDINE GLUCONATE 1 APPLICATION(S): 213 SOLUTION TOPICAL at 05:52

## 2024-01-26 RX ADMIN — PANTOPRAZOLE SODIUM 40 MILLIGRAM(S): 20 TABLET, DELAYED RELEASE ORAL at 05:50

## 2024-01-26 RX ADMIN — HYDROMORPHONE HYDROCHLORIDE 2 MILLIGRAM(S): 2 INJECTION INTRAMUSCULAR; INTRAVENOUS; SUBCUTANEOUS at 14:05

## 2024-01-26 RX ADMIN — METHOCARBAMOL 500 MILLIGRAM(S): 500 TABLET, FILM COATED ORAL at 15:46

## 2024-01-26 RX ADMIN — AMIODARONE HYDROCHLORIDE 200 MILLIGRAM(S): 400 TABLET ORAL at 17:27

## 2024-01-26 RX ADMIN — TACROLIMUS 3 MILLIGRAM(S): 5 CAPSULE ORAL at 12:23

## 2024-01-26 RX ADMIN — HYDROMORPHONE HYDROCHLORIDE 2 MILLIGRAM(S): 2 INJECTION INTRAMUSCULAR; INTRAVENOUS; SUBCUTANEOUS at 13:50

## 2024-01-26 RX ADMIN — HEPARIN SODIUM 850 UNIT(S)/HR: 5000 INJECTION INTRAVENOUS; SUBCUTANEOUS at 09:45

## 2024-01-26 RX ADMIN — ATORVASTATIN CALCIUM 40 MILLIGRAM(S): 80 TABLET, FILM COATED ORAL at 22:00

## 2024-01-26 RX ADMIN — TAMSULOSIN HYDROCHLORIDE 0.4 MILLIGRAM(S): 0.4 CAPSULE ORAL at 17:27

## 2024-01-26 RX ADMIN — CALCITRIOL 0.5 MICROGRAM(S): 0.5 CAPSULE ORAL at 05:50

## 2024-01-26 RX ADMIN — HYDROMORPHONE HYDROCHLORIDE 0.5 MILLIGRAM(S): 2 INJECTION INTRAMUSCULAR; INTRAVENOUS; SUBCUTANEOUS at 18:35

## 2024-01-26 RX ADMIN — HYDROMORPHONE HYDROCHLORIDE 0.5 MILLIGRAM(S): 2 INJECTION INTRAMUSCULAR; INTRAVENOUS; SUBCUTANEOUS at 17:28

## 2024-01-26 RX ADMIN — OXYCODONE HYDROCHLORIDE 15 MILLIGRAM(S): 5 TABLET ORAL at 10:20

## 2024-01-26 RX ADMIN — HYDROMORPHONE HYDROCHLORIDE 0.5 MILLIGRAM(S): 2 INJECTION INTRAMUSCULAR; INTRAVENOUS; SUBCUTANEOUS at 23:33

## 2024-01-26 RX ADMIN — AMIODARONE HYDROCHLORIDE 200 MILLIGRAM(S): 400 TABLET ORAL at 05:51

## 2024-01-26 RX ADMIN — Medication 500 MILLIGRAM(S): at 11:40

## 2024-01-26 RX ADMIN — CALCITRIOL 0.5 MICROGRAM(S): 0.5 CAPSULE ORAL at 17:27

## 2024-01-26 NOTE — CHART NOTE - NSCHARTNOTEFT_GEN_A_CORE
Electrophysiology Brief Post-Op Note    I have personally seen and examined the patient.  I agree with the history and physical which I have reviewed and noted any changes below.  01-26-24 @ 11:32    PRE-OP DIAGNOSIS: AF    POST-OP DIAGNOSIS: AF    PROCEDURE: Loop Implant    Physician: Porsha Henderson MD   Assistant: GUSTAVO More     ESTIMATED BLOOD LOSS:  1  mL    ANESTHESIA TYPE:  [  ]General Anesthesia  [  ] Sedation  [X  ] Local/Regional    CONDITION  [  ] Critical  [  ] Serious  [  ]Fair  [ X ]Good    SPECIMENS REMOVED (IF APPLICABLE):  none    IMPLANTS (IF APPLICABLE)  Loop Recorder ([employer])  [id number]  R-wave: 0.64mV    FINDINGS  PLAN OF CARE  - F/U 3-4 weeks  - May shower in 48 hours  - Do not get site wet for 7 days  - Remove band aid in 1 week

## 2024-01-26 NOTE — PROGRESS NOTE ADULT - ASSESSMENT
68 yo M with CAD s/p PCI, COPD, HFpEF, CKD3 s/p Renal tx, afib/flutter s/p ablation, liver cirrhosis, Hx R eye melanoma and Breast Ca, HLD, HTN who presents due to chest pain.      #Chest pain, ventricular tachycardia  #a-fib/flutter s/p ablation  #CAD s/p PCI, HFpEF  -EMS found patient to be in monomorphic ventricular tachycardia   - s/p lidocaine and amiodarone IV by EMS  - Once in the ED patient was in sinus rhythm, EKG demonstrated lateral t wave inversions  -Chest x ray negative for acute cardiopulmonary disease  -In the ED patient was started on amiodarone drip  -EP consulted and saw patient- Cont Amio drip, when completed start Amio PO 200mg BID  - Daily EKG  - pharmacologic stress test ordered  -start heparin drip  -trend trop 200 -> 1550 -> 1026 -> 1046  - 01/26 - ILR placement with EP; stress test pend    #CKD3 s/p renal transplant  #SANTA on CKD  -c/w home mycophenolate and tacrolimus  -Cr on admission 2.2, baseline around 1.8  -obtain urine studies and continue to trend creatinine    #HLD  -c/w home atorvastatin     #lumbar radiculopathy   - c/w home oxycodone 15mg TID  - started duloxetine 30mg qHS  - pain management consult placed    DVT proph: heparin drip  GI proph: PPI  Diet: NPO for procedure  code: full code  Pend: stress test

## 2024-01-26 NOTE — CDI QUERY NOTE - NSCDIOTHERTXTBX_GEN_ALL_CORE_HH
Based on your professional judgement and the clinical indicators, can elevated troponin be further specified as:    •Elevated troponin associated with NSTEMI type II due to supply/demand mismatch, suspected to be caused by cardiac arrhythmia  •Elevated troponin associated with Demand ischemia, suspected to be caused by cardiac arrhythmia  •Elevated troponin without a type II NSTEMI or Demand ischemia  •Other(please specify):  •Clinically unable to further specify elevated troponin    CLINICAL INDICATORS:    Order to admit to inpatient: diagnosis: V tach; elevated troponin    1/26 Progress Note Adult-CCU Resident- A/P- #Chest pain, ventricular tachycardia -EMS found patient to be in monomorphic ventricular tachycardia - s/p lidocaine and amiodarone IV by EMS - Once in the ED patient was in sinus rhythm, EKG demonstrated lateral t wave inversions  -start heparin drip -trend trop 200 -> 1550 -> 1026 -> 1046 - 01/26 - ILR placement with EP; stress test pend #CKD3 s/p renal transplant #SANTA on CKD    Troponin: Prior to this admission: 1/04- 95; 1/05- 85;   During this admission: 1/24- 207, 1550; 1/25- 1026, 1061, 1046    1/24 @0702- NSR, LV hypertrophy with repolarization abnormality, Cannot rule out Septal infarct, age undetermined, Abnormal ECG    1/24 @1922- SR w/Premature atrial complexes, Moderate voltage criteria for LVH, may be normal variant (Sokolow-Nathan,  product) Marked ST abnormality, possible inferolateral subendocardial injury, Abnormal ECG    1/24 @1923- NSR, Moderate voltage criteria for LVH, may be normal variant (Sokolow-Nathan,  product) Septal infarct, age undetermined, Marked ST abnormality, possible inferolateral subendocardial injury, Abnormal ECG    1/25 @0717- NSR with sinus arrhythmia, Voltage criteria for LV hypertrophy, ST & T wave abnormality, consider inferior ischemia, Prolonged QT Abnormal ECG    1/24 TTE Echo- Diagnosis: I42.9 - Cardiomyopathy, unspecified; Summary:  1. LV EF, by visual estimation, is 70 to 75%.  2. Hyperdynamic global LV systolic function.  3. Severely enlarged left atrium.  4. Moderately increased LV wall thickness.  5. Spectral Doppler shows pseudo-normal pattern of LV myocardial filling (Grade II diastolic dysfunction).  6. Normal right atrial size.      Meds administered: Heparin gtt 1/25-1/26; Lidocaine gtt 1/24; Amiodarone gtt 1/24    Thank you,  Farida Harper, RN  [phone].

## 2024-01-26 NOTE — PROGRESS NOTE ADULT - ASSESSMENT
NCT: AFL vs VT. Likely AFL    Continue Amiodarone 200 mg PO q12 for 1 week, followed by 200 mg PO q24  Baseline TSH, LFT  Nuclear stress test. Have discussed possibility of LHC. However, considering high possibility of AFL, symptoms severity (no LOC/fall/syncope) with  NCT, CKD-3; the risks are significantly higher. D/w primary team.  Discussed long term monitoring considering this event. After detailed discussion, we decided to proceed with ILR.  If stress is negative, patient can be discharged from EP standpoint of view  OP f-up  d/w primary team, Outside cardio

## 2024-01-26 NOTE — OCCUPATIONAL THERAPY INITIAL EVALUATION ADULT - SPECIFY REASON(S)
Event Note/Dermatology Resident
Event Note/NICU attending note
Event Note/dermatology
Nutrition Services
Attempted to see pt for OT evaluation, pt unavailable 2* to ILD procedure, will hold and follow up

## 2024-01-27 LAB
ANION GAP SERPL CALC-SCNC: 12 MMOL/L — SIGNIFICANT CHANGE UP (ref 7–14)
APTT BLD: 48.8 SEC — HIGH (ref 27–39.2)
APTT BLD: 49.1 SEC — HIGH (ref 27–39.2)
APTT BLD: 50.6 SEC — HIGH (ref 27–39.2)
BUN SERPL-MCNC: 41 MG/DL — HIGH (ref 10–20)
CALCIUM SERPL-MCNC: 10.6 MG/DL — HIGH (ref 8.4–10.5)
CHLORIDE SERPL-SCNC: 100 MMOL/L — SIGNIFICANT CHANGE UP (ref 98–110)
CO2 SERPL-SCNC: 22 MMOL/L — SIGNIFICANT CHANGE UP (ref 17–32)
CREAT SERPL-MCNC: 2.6 MG/DL — HIGH (ref 0.7–1.5)
EGFR: 26 ML/MIN/1.73M2 — LOW
GLUCOSE SERPL-MCNC: 108 MG/DL — HIGH (ref 70–99)
HCT VFR BLD CALC: 26.6 % — LOW (ref 42–52)
HCT VFR BLD CALC: 27.8 % — LOW (ref 42–52)
HGB BLD-MCNC: 8.2 G/DL — LOW (ref 14–18)
HGB BLD-MCNC: 8.9 G/DL — LOW (ref 14–18)
INR BLD: 1.07 RATIO — SIGNIFICANT CHANGE UP (ref 0.65–1.3)
MAGNESIUM SERPL-MCNC: 2 MG/DL — SIGNIFICANT CHANGE UP (ref 1.8–2.4)
MCHC RBC-ENTMCNC: 29.3 PG — SIGNIFICANT CHANGE UP (ref 27–31)
MCHC RBC-ENTMCNC: 29.4 PG — SIGNIFICANT CHANGE UP (ref 27–31)
MCHC RBC-ENTMCNC: 30.8 G/DL — LOW (ref 32–37)
MCHC RBC-ENTMCNC: 32 G/DL — SIGNIFICANT CHANGE UP (ref 32–37)
MCV RBC AUTO: 91.7 FL — SIGNIFICANT CHANGE UP (ref 80–94)
MCV RBC AUTO: 95 FL — HIGH (ref 80–94)
NRBC # BLD: 0 /100 WBCS — SIGNIFICANT CHANGE UP (ref 0–0)
NRBC # BLD: 0 /100 WBCS — SIGNIFICANT CHANGE UP (ref 0–0)
PLATELET # BLD AUTO: 65 K/UL — LOW (ref 130–400)
PLATELET # BLD AUTO: 75 K/UL — LOW (ref 130–400)
PMV BLD: 11.9 FL — HIGH (ref 7.4–10.4)
PMV BLD: 12.3 FL — HIGH (ref 7.4–10.4)
POTASSIUM SERPL-MCNC: 4.2 MMOL/L — SIGNIFICANT CHANGE UP (ref 3.5–5)
POTASSIUM SERPL-SCNC: 4.2 MMOL/L — SIGNIFICANT CHANGE UP (ref 3.5–5)
PROTHROM AB SERPL-ACNC: 12.2 SEC — SIGNIFICANT CHANGE UP (ref 9.95–12.87)
RBC # BLD: 2.8 M/UL — LOW (ref 4.7–6.1)
RBC # BLD: 3.03 M/UL — LOW (ref 4.7–6.1)
RBC # FLD: 17.7 % — HIGH (ref 11.5–14.5)
RBC # FLD: 17.9 % — HIGH (ref 11.5–14.5)
SODIUM SERPL-SCNC: 134 MMOL/L — LOW (ref 135–146)
WBC # BLD: 2.73 K/UL — LOW (ref 4.8–10.8)
WBC # BLD: 4.73 K/UL — LOW (ref 4.8–10.8)
WBC # FLD AUTO: 2.73 K/UL — LOW (ref 4.8–10.8)
WBC # FLD AUTO: 4.73 K/UL — LOW (ref 4.8–10.8)

## 2024-01-27 PROCEDURE — 99232 SBSQ HOSP IP/OBS MODERATE 35: CPT | Mod: GC

## 2024-01-27 PROCEDURE — 99223 1ST HOSP IP/OBS HIGH 75: CPT

## 2024-01-27 RX ORDER — HEPARIN SODIUM 5000 [USP'U]/ML
3500 INJECTION INTRAVENOUS; SUBCUTANEOUS EVERY 6 HOURS
Refills: 0 | Status: DISCONTINUED | OUTPATIENT
Start: 2024-01-27 | End: 2024-01-30

## 2024-01-27 RX ORDER — SODIUM CHLORIDE 9 MG/ML
1000 INJECTION, SOLUTION INTRAVENOUS
Refills: 0 | Status: DISCONTINUED | OUTPATIENT
Start: 2024-01-27 | End: 2024-01-27

## 2024-01-27 RX ORDER — ONDANSETRON 8 MG/1
4 TABLET, FILM COATED ORAL THREE TIMES A DAY
Refills: 0 | Status: DISCONTINUED | OUTPATIENT
Start: 2024-01-27 | End: 2024-02-01

## 2024-01-27 RX ORDER — SODIUM CHLORIDE 9 MG/ML
1000 INJECTION, SOLUTION INTRAVENOUS
Refills: 0 | Status: DISCONTINUED | OUTPATIENT
Start: 2024-01-27 | End: 2024-01-28

## 2024-01-27 RX ORDER — HEPARIN SODIUM 5000 [USP'U]/ML
850 INJECTION INTRAVENOUS; SUBCUTANEOUS
Qty: 25000 | Refills: 0 | Status: DISCONTINUED | OUTPATIENT
Start: 2024-01-27 | End: 2024-01-27

## 2024-01-27 RX ORDER — ONDANSETRON 8 MG/1
4 TABLET, FILM COATED ORAL ONCE
Refills: 0 | Status: COMPLETED | OUTPATIENT
Start: 2024-01-27 | End: 2024-01-27

## 2024-01-27 RX ORDER — HYDROMORPHONE HYDROCHLORIDE 2 MG/ML
0.5 INJECTION INTRAMUSCULAR; INTRAVENOUS; SUBCUTANEOUS EVERY 6 HOURS
Refills: 0 | Status: DISCONTINUED | OUTPATIENT
Start: 2024-01-27 | End: 2024-01-28

## 2024-01-27 RX ADMIN — AMIODARONE HYDROCHLORIDE 200 MILLIGRAM(S): 400 TABLET ORAL at 05:43

## 2024-01-27 RX ADMIN — ATORVASTATIN CALCIUM 40 MILLIGRAM(S): 80 TABLET, FILM COATED ORAL at 22:18

## 2024-01-27 RX ADMIN — OXYCODONE HYDROCHLORIDE 15 MILLIGRAM(S): 5 TABLET ORAL at 08:33

## 2024-01-27 RX ADMIN — TAMSULOSIN HYDROCHLORIDE 0.4 MILLIGRAM(S): 0.4 CAPSULE ORAL at 05:42

## 2024-01-27 RX ADMIN — CALCITRIOL 0.5 MICROGRAM(S): 0.5 CAPSULE ORAL at 05:42

## 2024-01-27 RX ADMIN — CHLORHEXIDINE GLUCONATE 1 APPLICATION(S): 213 SOLUTION TOPICAL at 05:46

## 2024-01-27 RX ADMIN — SODIUM CHLORIDE 60 MILLILITER(S): 9 INJECTION, SOLUTION INTRAVENOUS at 18:12

## 2024-01-27 RX ADMIN — HYDROMORPHONE HYDROCHLORIDE 0.5 MILLIGRAM(S): 2 INJECTION INTRAMUSCULAR; INTRAVENOUS; SUBCUTANEOUS at 12:09

## 2024-01-27 RX ADMIN — PANTOPRAZOLE SODIUM 40 MILLIGRAM(S): 20 TABLET, DELAYED RELEASE ORAL at 05:48

## 2024-01-27 RX ADMIN — AMIODARONE HYDROCHLORIDE 200 MILLIGRAM(S): 400 TABLET ORAL at 17:10

## 2024-01-27 RX ADMIN — HYDROMORPHONE HYDROCHLORIDE 0.5 MILLIGRAM(S): 2 INJECTION INTRAMUSCULAR; INTRAVENOUS; SUBCUTANEOUS at 05:42

## 2024-01-27 RX ADMIN — OXYCODONE HYDROCHLORIDE 15 MILLIGRAM(S): 5 TABLET ORAL at 17:10

## 2024-01-27 RX ADMIN — Medication 81 MILLIGRAM(S): at 12:09

## 2024-01-27 RX ADMIN — TAMSULOSIN HYDROCHLORIDE 0.4 MILLIGRAM(S): 0.4 CAPSULE ORAL at 17:10

## 2024-01-27 RX ADMIN — HYDROMORPHONE HYDROCHLORIDE 0.5 MILLIGRAM(S): 2 INJECTION INTRAMUSCULAR; INTRAVENOUS; SUBCUTANEOUS at 12:23

## 2024-01-27 RX ADMIN — METHOCARBAMOL 500 MILLIGRAM(S): 500 TABLET, FILM COATED ORAL at 14:04

## 2024-01-27 RX ADMIN — DULOXETINE HYDROCHLORIDE 30 MILLIGRAM(S): 30 CAPSULE, DELAYED RELEASE ORAL at 22:18

## 2024-01-27 RX ADMIN — ONDANSETRON 4 MILLIGRAM(S): 8 TABLET, FILM COATED ORAL at 00:35

## 2024-01-27 RX ADMIN — CALCITRIOL 0.5 MICROGRAM(S): 0.5 CAPSULE ORAL at 17:10

## 2024-01-27 RX ADMIN — HEPARIN SODIUM 1050 UNIT(S)/HR: 5000 INJECTION INTRAVENOUS; SUBCUTANEOUS at 23:08

## 2024-01-27 RX ADMIN — TACROLIMUS 3 MILLIGRAM(S): 5 CAPSULE ORAL at 08:33

## 2024-01-27 RX ADMIN — HEPARIN SODIUM 950 UNIT(S)/HR: 5000 INJECTION INTRAVENOUS; SUBCUTANEOUS at 15:19

## 2024-01-27 RX ADMIN — HEPARIN SODIUM 850 UNIT(S)/HR: 5000 INJECTION INTRAVENOUS; SUBCUTANEOUS at 00:35

## 2024-01-27 RX ADMIN — METHOCARBAMOL 500 MILLIGRAM(S): 500 TABLET, FILM COATED ORAL at 05:42

## 2024-01-27 RX ADMIN — MYCOPHENOLATE MOFETIL 1000 MILLIGRAM(S): 250 CAPSULE ORAL at 12:09

## 2024-01-27 RX ADMIN — HYDROMORPHONE HYDROCHLORIDE 0.5 MILLIGRAM(S): 2 INJECTION INTRAMUSCULAR; INTRAVENOUS; SUBCUTANEOUS at 05:57

## 2024-01-27 RX ADMIN — HYDROMORPHONE HYDROCHLORIDE 0.5 MILLIGRAM(S): 2 INJECTION INTRAMUSCULAR; INTRAVENOUS; SUBCUTANEOUS at 18:12

## 2024-01-27 RX ADMIN — OXYCODONE HYDROCHLORIDE 15 MILLIGRAM(S): 5 TABLET ORAL at 09:00

## 2024-01-27 RX ADMIN — METHOCARBAMOL 500 MILLIGRAM(S): 500 TABLET, FILM COATED ORAL at 22:19

## 2024-01-27 RX ADMIN — HEPARIN SODIUM 850 UNIT(S)/HR: 5000 INJECTION INTRAVENOUS; SUBCUTANEOUS at 08:32

## 2024-01-27 NOTE — DIETITIAN INITIAL EVALUATION ADULT - PERTINENT MEDS FT
MEDICATIONS  (STANDING):  aMIOdarone    Tablet 200 milliGRAM(s) Oral two times a day  aspirin enteric coated 81 milliGRAM(s) Oral daily  atorvastatin 40 milliGRAM(s) Oral at bedtime  calcitriol   Capsule 0.5 MICROGram(s) Oral every 12 hours  chlorhexidine 2% Cloths 1 Application(s) Topical <User Schedule>  DULoxetine 30 milliGRAM(s) Oral at bedtime  furosemide    Tablet 20 milliGRAM(s) Oral <User Schedule>  heparin  Infusion. 850 Unit(s)/Hr (8.5 mL/Hr) IV Continuous <Continuous>  lactated ringers. 1000 milliLiter(s) (60 mL/Hr) IV Continuous <Continuous>  methocarbamol 500 milliGRAM(s) Oral every 8 hours  mycophenolate mofetil 1000 milliGRAM(s) Oral daily  pantoprazole    Tablet 40 milliGRAM(s) Oral before breakfast  regadenoson Injectable 0.4 milliGRAM(s) IV Push once  tacrolimus 3 milliGRAM(s) Oral with breakfast  tacrolimus 2 milliGRAM(s) Oral at bedtime  tamsulosin 0.4 milliGRAM(s) Oral every 12 hours    MEDICATIONS  (PRN):  acetaminophen     Tablet .. 650 milliGRAM(s) Oral every 6 hours PRN Mild Pain (1 - 3)  heparin   Injectable 3500 Unit(s) IV Push every 6 hours PRN For aPTT less than 40  HYDROmorphone  Injectable 0.5 milliGRAM(s) IV Push every 6 hours PRN Severe Pain (7 - 10)  ondansetron Injectable 4 milliGRAM(s) IV Push three times a day PRN Nausea and/or Vomiting  oxyCODONE    IR 15 milliGRAM(s) Oral three times a day PRN Severe Pain (7 - 10)

## 2024-01-27 NOTE — DIETITIAN INITIAL EVALUATION ADULT - REASON
The patient declined to participate at this time secondary to feeling cold and is currently covered with a blanket. Once medically feasible, a nutrition focused physical examination will be completed.

## 2024-01-27 NOTE — DIETITIAN INITIAL EVALUATION ADULT - PERTINENT LABORATORY DATA
01-27    134<L>  |  100  |  41<H>  ----------------------------<  108<H>  4.2   |  22  |  2.6<H>    Ca    10.6<H>      27 Jan 2024 05:33  Mg     2.0     01-27    TPro  5.6<L>  /  Alb  3.7  /  TBili  0.6  /  DBili  x   /  AST  27  /  ALT  8   /  AlkPhos  49  01-26

## 2024-01-27 NOTE — PHYSICAL THERAPY INITIAL EVALUATION ADULT - GENERAL OBSERVATIONS, REHAB EVAL
Pt was approached for PT IE. Pt s/p stress test, unable to participate. PT will follow up.
15:33. Pt currently off unit at stress test. Will f/u with PT as appropriate.
13:40-14:02; PT encountered in bed, + tele, + IV. Agreeable to PT.

## 2024-01-27 NOTE — PROGRESS NOTE ADULT - ASSESSMENT
70 yo M with CAD s/p PCI, COPD, HFpEF, CKD3 s/p Renal tx, afib/flutter s/p ablation, liver cirrhosis, Hx R eye melanoma and Breast Ca, HLD, HTN who presents due to chest pain.      #Chest pain, ventricular tachycardia  #a-fib/flutter s/p ablation  #CAD s/p PCI, HFpEF  -EMS found patient to be in monomorphic ventricular tachycardia   - s/p lidocaine and amiodarone IV by EMS  - Once in the ED patient was in sinus rhythm, EKG demonstrated lateral t wave inversions  -Chest x ray negative for acute cardiopulmonary disease  -In the ED patient was started on amiodarone drip  -EP consulted and saw patient- Cont Amio drip, when completed start Amio PO 200mg BID  - Daily EKG  - pharmacologic stress test ordered  - c/w heparin drip, pt needs it for hx of Afib/flutter, if ok by renal can do LVX  - trend trop 200 -> 1550 -> 1026 -> 1046  - 01/26 - ILR placement with EP  - Stress test done: Moderate to large size reversible defect in the anterior wall extending to apex of the left ventricle consistent with ischemia.  - f/u Dr. Low for possible Cath on Monday  - Needs nephro cs for clearance as pt has hx of kidney transplant and crea trending up  - IVC done 1/27: dry -> 500 cc bolus ordered       #CKD3 s/p renal transplant  #SANTA on CKD  -c/w home mycophenolate and tacrolimus  -Cr on admission 2.2, baseline around 1.8  -obtain urine studies and continue to trend creatinine  - f/u tacrolimus level     #HLD  -c/w home atorvastatin     #lumbar radiculopathy   - c/w home oxycodone 15mg TID  - started duloxetine 30mg qHS  - pain management consult placed    DVT proph: heparin drip  GI proph: PPI  Diet: Renal/DASH  code: full code  Pend: possible cath on monday, nephro clearance, pain mngt f/u

## 2024-01-27 NOTE — PROGRESS NOTE ADULT - ATTENDING COMMENTS
Plan as outlined above.    (Date of Service: 01/27/2024)
Plan as outlined above.    (Date of Service: 01/26/2024)
Plan as outlined above.  Plan for pharm NST to evaluate for coronary ischemia.    (Date of Service: 01/25/2024)

## 2024-01-27 NOTE — CHART NOTE - NSCHARTNOTEFT_GEN_A_CORE
Called by nurse for bleeding from loop recorder incision site. Bright blood from incision site seen, tenderness around bleeding area. Will check CBC and PTT. Hold heparin drip for 2 hours, apply hemostatic dressing, Recheck to see if bleeding stops.

## 2024-01-27 NOTE — DIETITIAN INITIAL EVALUATION ADULT - NAME AND PHONE
Intervention: 1.Meals and Snacks 2.Medical Food Supplement 3.Nutrition Related Medication  Monitor/Evaluate: Diet order, energy intake, nutrition focused physical findings, Na, renal and anemia profile

## 2024-01-27 NOTE — DIETITIAN INITIAL EVALUATION ADULT - OTHER CALCULATIONS
Estimated Calorie Needs: 1825-2190kcal/day (25-30kcal/kg of IBW) -Due to CKD and PCM  Estimated Protein Needs: 73-88grams/day (1-1.2grams/kg of IBW) -Due to PCM but with consideration for low GFR  Estimated Fluid Needs: 1825-2190mL/day (1mL/kcal)

## 2024-01-27 NOTE — PHYSICAL THERAPY INITIAL EVALUATION ADULT - GAIT TRAINING, PT EVAL
Goal: 150' with RW mod I by D/C. Stair Training Goal: 1 step with appropriate HR with supervision by D/C

## 2024-01-27 NOTE — PHYSICAL THERAPY INITIAL EVALUATION ADULT - PERTINENT HX OF CURRENT PROBLEM, REHAB EVAL
68 yo M with CAD s/p PCI, COPD, CKD3 s/p Renal tx, afib/flutter s/p ablation, HFpEF,  liver cirrhosis, Hx R eye melanoma and Breast Ca, HLD, HTN who presents due to chest pain.  On AM of 1/24 patient woke up at 2AM with chest pain.  Chest pain is described as dull, located in the central and right chest with radiation to the throat.  The patient had the chest pain for 2-3 hours and called 911.  Patient has had this chest pain before, he states he had the chest pain prior to his ablation in December 2023.  When EMS arrived to patient's home he was found to be in monomorphic ventricular tachycardia and he was treated with lidocaine and amiodarone IV.  Once in the ED patient was in sinus rhythm, EKG demonstrated lateral t wave inversions.

## 2024-01-27 NOTE — DIETITIAN INITIAL EVALUATION ADULT - NSFNSGIIOFT_GEN_A_CORE
Dx: 68y/o male with h/o CAD, PCI, COPD, HFpEF, Stage III CKD, renal transplant, AFib, AFutter, Ablation, liver cirrhosis, right eye melanoma and breast cancer, HLD and HTN, presented due to chest pain. Admitted with ventricular tachycardia. Hospital course is complicated by SANTA on CKD and lumbar radiculopathy.

## 2024-01-27 NOTE — DIETITIAN NUTRITION RISK NOTIFICATION - TREATMENT: THE FOLLOWING DIET HAS BEEN RECOMMENDED
Diet, Regular:   Low Sodium  Supplement Feeding Modality:  Oral  Ensure Plus High Protein Cans or Servings Per Day:  1       Frequency:  Three Times a day (01-27-24 @ 19:43) [Pending Verification By Attending]  Diet, NPO after Midnight:      NPO Start Date: 25-Jan-2024,   NPO Start Time: 23:59 (01-25-24 @ 17:09) [Active]  Diet, DASH/TLC:   Sodium & Cholesterol Restricted  For patients receiving Renal Replacement - No Protein Restr, No Conc K, No Conc Phos, Low Sodium (01-25-24 @ 17:00) [Active]

## 2024-01-27 NOTE — DIETITIAN INITIAL EVALUATION ADULT - ORAL INTAKE PTA/DIET HISTORY
The patient reports following a regular diet at home; consumed three meals at <50% secondary to early satiety.

## 2024-01-28 LAB
ANION GAP SERPL CALC-SCNC: 8 MMOL/L — SIGNIFICANT CHANGE UP (ref 7–14)
APTT BLD: 29.7 SEC — SIGNIFICANT CHANGE UP (ref 27–39.2)
APTT BLD: 32.9 SEC — SIGNIFICANT CHANGE UP (ref 27–39.2)
APTT BLD: 41.2 SEC — HIGH (ref 27–39.2)
BUN SERPL-MCNC: 33 MG/DL — HIGH (ref 10–20)
CALCIUM SERPL-MCNC: 10.7 MG/DL — HIGH (ref 8.4–10.5)
CHLORIDE SERPL-SCNC: 100 MMOL/L — SIGNIFICANT CHANGE UP (ref 98–110)
CO2 SERPL-SCNC: 25 MMOL/L — SIGNIFICANT CHANGE UP (ref 17–32)
CREAT SERPL-MCNC: 2.5 MG/DL — HIGH (ref 0.7–1.5)
EGFR: 27 ML/MIN/1.73M2 — LOW
GLUCOSE BLDC GLUCOMTR-MCNC: 137 MG/DL — HIGH (ref 70–99)
GLUCOSE SERPL-MCNC: 111 MG/DL — HIGH (ref 70–99)
HCT VFR BLD CALC: 25.7 % — LOW (ref 42–52)
HCT VFR BLD CALC: 26.6 % — LOW (ref 42–52)
HGB BLD-MCNC: 8.2 G/DL — LOW (ref 14–18)
HGB BLD-MCNC: 8.4 G/DL — LOW (ref 14–18)
INR BLD: 1.08 RATIO — SIGNIFICANT CHANGE UP (ref 0.65–1.3)
MAGNESIUM SERPL-MCNC: 1.8 MG/DL — SIGNIFICANT CHANGE UP (ref 1.8–2.4)
MCHC RBC-ENTMCNC: 29.2 PG — SIGNIFICANT CHANGE UP (ref 27–31)
MCHC RBC-ENTMCNC: 29.3 PG — SIGNIFICANT CHANGE UP (ref 27–31)
MCHC RBC-ENTMCNC: 31.6 G/DL — LOW (ref 32–37)
MCHC RBC-ENTMCNC: 31.9 G/DL — LOW (ref 32–37)
MCV RBC AUTO: 91.5 FL — SIGNIFICANT CHANGE UP (ref 80–94)
MCV RBC AUTO: 92.7 FL — SIGNIFICANT CHANGE UP (ref 80–94)
NRBC # BLD: 0 /100 WBCS — SIGNIFICANT CHANGE UP (ref 0–0)
NRBC # BLD: 0 /100 WBCS — SIGNIFICANT CHANGE UP (ref 0–0)
PLATELET # BLD AUTO: 61 K/UL — LOW (ref 130–400)
PLATELET # BLD AUTO: 61 K/UL — LOW (ref 130–400)
PMV BLD: 12.5 FL — HIGH (ref 7.4–10.4)
PMV BLD: 12.5 FL — HIGH (ref 7.4–10.4)
POTASSIUM SERPL-MCNC: 4.2 MMOL/L — SIGNIFICANT CHANGE UP (ref 3.5–5)
POTASSIUM SERPL-SCNC: 4.2 MMOL/L — SIGNIFICANT CHANGE UP (ref 3.5–5)
PROTHROM AB SERPL-ACNC: 12.3 SEC — SIGNIFICANT CHANGE UP (ref 9.95–12.87)
RBC # BLD: 2.81 M/UL — LOW (ref 4.7–6.1)
RBC # BLD: 2.87 M/UL — LOW (ref 4.7–6.1)
RBC # FLD: 17.5 % — HIGH (ref 11.5–14.5)
RBC # FLD: 17.5 % — HIGH (ref 11.5–14.5)
SODIUM SERPL-SCNC: 133 MMOL/L — LOW (ref 135–146)
WBC # BLD: 3.36 K/UL — LOW (ref 4.8–10.8)
WBC # BLD: 3.48 K/UL — LOW (ref 4.8–10.8)
WBC # FLD AUTO: 3.36 K/UL — LOW (ref 4.8–10.8)
WBC # FLD AUTO: 3.48 K/UL — LOW (ref 4.8–10.8)

## 2024-01-28 PROCEDURE — 99233 SBSQ HOSP IP/OBS HIGH 50: CPT

## 2024-01-28 RX ORDER — OXYCODONE HYDROCHLORIDE 5 MG/1
10 TABLET ORAL EVERY 12 HOURS
Refills: 0 | Status: DISCONTINUED | OUTPATIENT
Start: 2024-01-28 | End: 2024-01-28

## 2024-01-28 RX ORDER — HYDROMORPHONE HYDROCHLORIDE 2 MG/ML
0.5 INJECTION INTRAMUSCULAR; INTRAVENOUS; SUBCUTANEOUS EVERY 6 HOURS
Refills: 0 | Status: DISCONTINUED | OUTPATIENT
Start: 2024-01-28 | End: 2024-01-28

## 2024-01-28 RX ORDER — OXYCODONE HYDROCHLORIDE 5 MG/1
20 TABLET ORAL EVERY 12 HOURS
Refills: 0 | Status: DISCONTINUED | OUTPATIENT
Start: 2024-01-28 | End: 2024-02-01

## 2024-01-28 RX ORDER — SODIUM CHLORIDE 9 MG/ML
1000 INJECTION INTRAMUSCULAR; INTRAVENOUS; SUBCUTANEOUS
Refills: 0 | Status: DISCONTINUED | OUTPATIENT
Start: 2024-01-28 | End: 2024-01-29

## 2024-01-28 RX ORDER — LIDOCAINE HYDROCHLORIDE AND EPINEPHRINE 10; 10 MG/ML; UG/ML
5 INJECTION, SOLUTION INFILTRATION; PERINEURAL ONCE
Refills: 0 | Status: DISCONTINUED | OUTPATIENT
Start: 2024-01-28 | End: 2024-02-01

## 2024-01-28 RX ORDER — DULOXETINE HYDROCHLORIDE 30 MG/1
30 CAPSULE, DELAYED RELEASE ORAL
Refills: 0 | Status: DISCONTINUED | OUTPATIENT
Start: 2024-01-28 | End: 2024-02-01

## 2024-01-28 RX ADMIN — SODIUM CHLORIDE 100 MILLILITER(S): 9 INJECTION INTRAMUSCULAR; INTRAVENOUS; SUBCUTANEOUS at 16:34

## 2024-01-28 RX ADMIN — HYDROMORPHONE HYDROCHLORIDE 0.5 MILLIGRAM(S): 2 INJECTION INTRAMUSCULAR; INTRAVENOUS; SUBCUTANEOUS at 11:40

## 2024-01-28 RX ADMIN — METHOCARBAMOL 500 MILLIGRAM(S): 500 TABLET, FILM COATED ORAL at 05:25

## 2024-01-28 RX ADMIN — TACROLIMUS 2 MILLIGRAM(S): 5 CAPSULE ORAL at 01:22

## 2024-01-28 RX ADMIN — Medication 81 MILLIGRAM(S): at 11:40

## 2024-01-28 RX ADMIN — CHLORHEXIDINE GLUCONATE 1 APPLICATION(S): 213 SOLUTION TOPICAL at 05:25

## 2024-01-28 RX ADMIN — MYCOPHENOLATE MOFETIL 1000 MILLIGRAM(S): 250 CAPSULE ORAL at 11:40

## 2024-01-28 RX ADMIN — AMIODARONE HYDROCHLORIDE 200 MILLIGRAM(S): 400 TABLET ORAL at 05:24

## 2024-01-28 RX ADMIN — CALCITRIOL 0.5 MICROGRAM(S): 0.5 CAPSULE ORAL at 18:19

## 2024-01-28 RX ADMIN — CALCITRIOL 0.5 MICROGRAM(S): 0.5 CAPSULE ORAL at 05:23

## 2024-01-28 RX ADMIN — OXYCODONE HYDROCHLORIDE 15 MILLIGRAM(S): 5 TABLET ORAL at 10:37

## 2024-01-28 RX ADMIN — HYDROMORPHONE HYDROCHLORIDE 0.5 MILLIGRAM(S): 2 INJECTION INTRAMUSCULAR; INTRAVENOUS; SUBCUTANEOUS at 20:21

## 2024-01-28 RX ADMIN — TAMSULOSIN HYDROCHLORIDE 0.4 MILLIGRAM(S): 0.4 CAPSULE ORAL at 05:24

## 2024-01-28 RX ADMIN — TAMSULOSIN HYDROCHLORIDE 0.4 MILLIGRAM(S): 0.4 CAPSULE ORAL at 18:19

## 2024-01-28 RX ADMIN — HYDROMORPHONE HYDROCHLORIDE 0.5 MILLIGRAM(S): 2 INJECTION INTRAMUSCULAR; INTRAVENOUS; SUBCUTANEOUS at 00:19

## 2024-01-28 RX ADMIN — ATORVASTATIN CALCIUM 40 MILLIGRAM(S): 80 TABLET, FILM COATED ORAL at 22:29

## 2024-01-28 RX ADMIN — PANTOPRAZOLE SODIUM 40 MILLIGRAM(S): 20 TABLET, DELAYED RELEASE ORAL at 05:24

## 2024-01-28 RX ADMIN — TACROLIMUS 3 MILLIGRAM(S): 5 CAPSULE ORAL at 08:00

## 2024-01-28 RX ADMIN — METHOCARBAMOL 500 MILLIGRAM(S): 500 TABLET, FILM COATED ORAL at 22:29

## 2024-01-28 RX ADMIN — OXYCODONE HYDROCHLORIDE 15 MILLIGRAM(S): 5 TABLET ORAL at 05:23

## 2024-01-28 RX ADMIN — AMIODARONE HYDROCHLORIDE 200 MILLIGRAM(S): 400 TABLET ORAL at 18:19

## 2024-01-28 RX ADMIN — TACROLIMUS 2 MILLIGRAM(S): 5 CAPSULE ORAL at 22:29

## 2024-01-28 NOTE — PROGRESS NOTE ADULT - ASSESSMENT
68 yo M with CAD s/p PCI, COPD, HFpEF, CKD3 s/p Renal tx, afib/flutter s/p ablation, liver cirrhosis, Hx R eye melanoma and Breast Ca, HLD, HTN who presents due to chest pain.      Chest pain, monomorphic ventricular tachycardia   with moderate to severe ischemia  #CAD s/p PCI, HFpEF  #a-fib/flutter s/p ablation    EKG NSR with lateral t wave inversions  -Chest x ray negative for acute cardiopulmonary disease  Now on Amio PO 200mg BID  - Daily EKG  - pharmacologic stress test with moderate to severe anterior wall ischemia.  - c/w heparin drip, pt needs it for hx of Afib/flutter, if ok by renal can do LVX  - trend trop 200 -> 1550 -> 1026 -> 1046  - 01/26 - ILR placement with EP    NPO after breakfast for Cath tomorrow.  - Needs nephro cs for clearance as pt has hx of kidney transplant and crea trending up  - IVC done 1/27: dry -> 500 cc bolus ordered       #CKD3 s/p renal transplant  #SANTA on CKD now stable eGFR.  -c/w home mycophenolate and tacrolimus  -Cr on admission 2.2, baseline around 1.8  -obtain urine studies and continue to trend creatinine  - f/u tacrolimus level     #HLD  -c/w home atorvastatin     #lumbar radiculopathy   - c/w home oxycodone 15mg TID  - started duloxetine 30mg qHS  - pain management consult placed    DVT proph: heparin drip  GI proph: PPI  Diet: Renal/DASH  code: full code  Pend: possible cath on monday, nephro clearance, pain mngt f/u

## 2024-01-28 NOTE — CONSULT NOTE ADULT - SUBJECTIVE AND OBJECTIVE BOX
PAIN MANAGEMENT CONSULT NOTE    Chief Complaint: sciatica    HPI:  68 yo M with CAD s/p PCI, COPD, CKD3 s/p Renal tx, afib/flutter s/p ablation, HFpEF,  liver cirrhosis, Hx R eye melanoma and Breast Ca, HLD, HTN who presents due to chest pain.  On AM of  patient woke up at 2AM with chest pain.  Chest pain is described as dull, located in the central and right chest with radiation to the throat.  The patient had the chest pain for 2-3 hours and called 911.  Patient has had this chest pain before, he states he had the chest pain prior to his ablation in 2023.  When EMS arrived to patient's home he was found to be in monomorphic ventricular tachycardia and he was treated with lidocaine and amiodarone IV.  Once in the ED patient was in sinus rhythm, EKG demonstrated lateral t wave inversions.      Trop 207, Cr 2.2 (baseline around 1.8), BNP 99204  Chest x ray negative for acute cardiopulmonary disease    In the ED patient was started on amiodarone drip (2024 13:05)      PAST MEDICAL & SURGICAL HISTORY:  CKD (chronic kidney disease)      ESRD (end stage renal disease)      HTN (hypertension)      HLD (hyperlipidemia)      Atrial fibrillation  on eliquis      COPD, mild  not on O2      Peripheral neuropathy  unclear cause      Lymphoma  ?questionable, not treated, not followed      Melanoma  R eye, s/p laser      Cirrhosis  possibly related to hepC      Breast cancer      Legally blind      Stroke      History of kidney transplant  3 years ago      S/P arteriovenous (AV) fistula creation  prior old fistula in R arm      S/P lumpectomy, right breast      History of back surgery  s/p Left L5-S1 endoscopic laminoforaminotomy          FAMILY HISTORY:  FH: dementia  mother, alive    FHx: breast cancer  sister ( in 30s)        SOCIAL HISTORY:  [x ] Denies Smoking, Alcohol, or Drug Use    HOME MEDICATIONS:   Please refer to initial HNP    PAIN HOME MEDICATIONS:    Allergies    Rifuxen (Swelling)  Rituxan (Hives)    Intolerances        PAIN MEDICATIONS:  acetaminophen     Tablet .. 650 milliGRAM(s) Oral every 6 hours PRN  DULoxetine 30 milliGRAM(s) Oral at bedtime  HYDROmorphone  Injectable 0.5 milliGRAM(s) IV Push every 6 hours PRN  methocarbamol 500 milliGRAM(s) Oral every 8 hours  ondansetron Injectable 4 milliGRAM(s) IV Push three times a day PRN  oxyCODONE    IR 15 milliGRAM(s) Oral three times a day PRN    Heme:  aspirin enteric coated 81 milliGRAM(s) Oral daily  heparin   Injectable 3500 Unit(s) IV Push every 6 hours PRN  heparin  Infusion. 850 Unit(s)/Hr IV Continuous <Continuous>    Antibiotics:    Cardiovascular:  aMIOdarone    Tablet 200 milliGRAM(s) Oral two times a day  furosemide    Tablet 20 milliGRAM(s) Oral <User Schedule>    GI:  pantoprazole    Tablet 40 milliGRAM(s) Oral before breakfast    Endocrine:  atorvastatin 40 milliGRAM(s) Oral at bedtime    All Other Medications:  calcitriol   Capsule 0.5 MICROGram(s) Oral every 12 hours  chlorhexidine 2% Cloths 1 Application(s) Topical <User Schedule>  lactated ringers. 1000 milliLiter(s) IV Continuous <Continuous>  mycophenolate mofetil 1000 milliGRAM(s) Oral daily  regadenoson Injectable 0.4 milliGRAM(s) IV Push once  tacrolimus 3 milliGRAM(s) Oral with breakfast  tacrolimus 2 milliGRAM(s) Oral at bedtime  tamsulosin 0.4 milliGRAM(s) Oral every 12 hours      Vital Signs Last 24 Hrs  T(C): 36.4 (2024 12:07), Max: 36.8 (2024 03:54)  T(F): 97.6 (2024 12:07), Max: 98.2 (2024 03:54)  HR: 69 (2024 12:07) (69 - 77)  BP: 145/67 (2024 12:07) (113/56 - 145/75)  BP(mean): 96 (2024 12:07) (74 - 100)  RR: 19 (2024 12:07) (18 - 22)  SpO2: 100% (2024 19:40) (100% - 100%)    Parameters below as of 2024 19:40  Patient On (Oxygen Delivery Method): room air        LABS:                        8.9    2.73  )-----------( 75       ( 2024 05:33 )             27.8         134<L>  |  100  |  41<H>  ----------------------------<  108<H>  4.2   |  22  |  2.6<H>    Ca    10.6<H>      2024 05:33  Mg     2.0         TPro  5.6<L>  /  Alb  3.7  /  TBili  0.6  /  DBili  x   /  AST  27  /  ALT  8   /  AlkPhos  49      PT/INR - ( 2024 05:33 )   PT: 12.20 sec;   INR: 1.07 ratio         PTT - ( 2024 14:16 )  PTT:49.1 sec  Urinalysis Basic - ( 2024 05:33 )    Color: x / Appearance: x / SG: x / pH: x  Gluc: 108 mg/dL / Ketone: x  / Bili: x / Urobili: x   Blood: x / Protein: x / Nitrite: x   Leuk Esterase: x / RBC: x / WBC x   Sq Epi: x / Non Sq Epi: x / Bacteria: x      REVIEW OF SYSTEMS:  see hpi    PHYSICAL EXAM  GENERAL: Laying in bed, NAD  cardio: chest swelling  CHEST/LUNG: no audible wheeze, no accessory muscle usage  EXTREMITIES: No clubbing, cyanosis  SKIN: No rashes or lesions      ASSESSMENT:   lumbar radiculopathy  chest pain    PLAN:   - Pain:  start gabapentin 100mg TID for neuropathic pain. would not increase due to SANTA on CKD  start oxycontin 20 mg BID standing, d/c oxycodone 15mg TID prn  increase duloxetine 30mg BID for neuropathic pain  can try medrol dose pack PO for 6 days to alleviate radiculopathy. but due to his recent VTACH, sometimes patient can have tachycardia with steroids and should use with caution  can c/w IV dilaudid PRN    
[facility]  INITIAL CONSULT NOTE  --------------------------------------------------------------------------------  HPI:  68 yo admitted with ventricular arrythmia and found to have an abnormal stress test.  He has a hx of CKD seconday to HTN and was maintained on HD for 13 years prior to renal transplant in 2017.  He also has a hx of A fibb, CVA, cirrhosis with pancytopenia, CAD - s/p PCI, COPD, lymphoma, eye ca, breast CA  and neuropathy  Accoring to Dr Heart's office notes Cr variable from high 1's to low 2's  Of note - tacrolimus dose was increased in December    Currently pt off diuretics and receiving IVF  He admits to not feeing well and not eating much prior to presentation    PAST HISTORY  --------------------------------------------------------------------------------  PAST MEDICAL & SURGICAL HISTORY:  CKD (chronic kidney disease)      ESRD (end stage renal disease)      HTN (hypertension)      HLD (hyperlipidemia)      Atrial fibrillation  on eliquis      COPD, mild  not on O2      Peripheral neuropathy  unclear cause      Lymphoma  ?questionable, not treated, not followed      Melanoma  R eye, s/p laser      Cirrhosis  possibly related to hepC      Breast cancer      Legally blind      Stroke      History of kidney transplant  3 years ago      S/P arteriovenous (AV) fistula creation  prior old fistula in R arm      S/P lumpectomy, right breast      History of back surgery  s/p Left L5-S1 endoscopic laminoforaminotomy        FAMILY HISTORY:  FH: dementia  mother, alive    FHx: breast cancer  sister ( in 30s)      PAST SOCIAL HISTORY:    ALLERGIES & MEDICATIONS  --------------------------------------------------------------------------------  Allergies    Rifuxen (Swelling)  Rituxan (Hives)    Intolerances      Standing Inpatient Medications  aMIOdarone    Tablet 200 milliGRAM(s) Oral two times a day  aspirin enteric coated 81 milliGRAM(s) Oral daily  atorvastatin 40 milliGRAM(s) Oral at bedtime  calcitriol   Capsule 0.5 MICROGram(s) Oral every 12 hours  chlorhexidine 2% Cloths 1 Application(s) Topical <User Schedule>  DULoxetine 30 milliGRAM(s) Oral at bedtime  lactated ringers. 1000 milliLiter(s) IV Continuous <Continuous>  lidocaine 2%/epinephrine 1:200,000 Inj 5 milliLiter(s) Local Injection once  methocarbamol 500 milliGRAM(s) Oral every 8 hours  mycophenolate mofetil 1000 milliGRAM(s) Oral daily  pantoprazole    Tablet 40 milliGRAM(s) Oral before breakfast  regadenoson Injectable 0.4 milliGRAM(s) IV Push once  tacrolimus 3 milliGRAM(s) Oral with breakfast  tacrolimus 2 milliGRAM(s) Oral at bedtime  tamsulosin 0.4 milliGRAM(s) Oral every 12 hours    PRN Inpatient Medications  acetaminophen     Tablet .. 650 milliGRAM(s) Oral every 6 hours PRN  heparin   Injectable 3500 Unit(s) IV Push every 6 hours PRN  ondansetron Injectable 4 milliGRAM(s) IV Push three times a day PRN  oxyCODONE    IR 15 milliGRAM(s) Oral three times a day PRN      REVIEW OF SYSTEMS  --------------------------------------------------------------------------------  All other systems were reviewed and are negative, except as noted.    VITALS/PHYSICAL EXAM  --------------------------------------------------------------------------------  T(C): 36.1 (24 @ 13:42), Max: 36.8 (24 @ 20:00)  HR: 70 (24 @ 13:42) (70 - 72)  BP: 114/56 (24 @ 13:42) (114/56 - 131/60)  RR: 18 (24 @ 13:42) (18 - 19)  SpO2: 97% (24 @ 20:00) (97% - 97%)  Wt(kg): --        24 @ 07:01  -  24 @ 07:00  --------------------------------------------------------  IN: 1047 mL / OUT: 850 mL / NET: 197 mL    24 @ 07:01  -  24 @ 15:53  --------------------------------------------------------  IN: 450 mL / OUT: 200 mL / NET: 250 mL      Physical Exam:  	Gen: NAD  	HEENT: No JVD  	Pulm: CTA B/L  	CV: RRR,  	Abd: +BS, soft, nontender/nondistended              renal transplant LLQ, NT              No edema      	LABS/STUDIES  --------------------------------------------------------------------------------              8.4    3.36  >-----------<  61       [24 @ 06:25]              26.6     133  |  100  |  33  ----------------------------<  111      [24 06:25]  4.2   |  25  |  2.5        Ca     10.7     [24 06:25]      Mg     1.8     [24 06:25]    PT/INR: PT 12.30, INR 1.08       [24 06:25]  PTT: 29.7       [24 06:25]    Creatinine Trend:  SCr 2.5 [:25]  SCr 2.6 [ 05:33]  SCr 2.5 [ 05:12]  SCr 2.0 [ 05:14]  SCr 2.2 [ 07:02]    Urinalysis - [24 06:25]      Color  / Appearance  / SG  / pH       Gluc 111 / Ketone   / Bili  / Urobili        Blood  / Protein  / Leuk Est  / Nitrite       RBC  / WBC  / Hyaline  / Gran  / Sq Epi  / Non Sq Epi  / Bacteria     Urine Creatinine 58      [24 13:35]  Urine Protein 19      [24 @ 13:35]  Urine Sodium 38.0      [24 13:35]  Urine Osmolality 369      [24 @ 20:06]    
HPI:  68 yo M with CAD s/p PCI, COPD, CKD3 s/p Renal tx, afib/flutter s/p ablation, HFpEF,  liver cirrhosis, Hx R eye melanoma and Breast Ca, HLD, HTN who presents due to chest pain.  On AM of  patient woke up at 2AM with chest pain.  Chest pain is described as dull, located in the central and right chest with radiation to the throat.  The patient had the chest pain for 2-3 hours and called 911.  Patient has had this chest pain before, he states he had the chest pain prior to his ablation in 2023.  When EMS arrived to patient's home he was found to be in monomorphic ventricular tachycardia and he was treated with lidocaine and amiodarone IV.  Once in the ED patient was in sinus rhythm, EKG demonstrated lateral t wave inversions.      Trop 207, Cr 2.2 (baseline around 1.8), BNP 40838  Chest x ray negative for acute cardiopulmonary disease    In the ED patient was started on amiodarone drip     #Chest pain, ventricular tachycardia  #a-fib/flutter s/p ablation  #CAD s/p PCI, HFpEF  -EMS found patient to be in monomorphic ventricular tachycardia   - s/p lidocaine and amiodarone IV by EMS  - Once in the ED patient was in sinus rhythm, EKG demonstrated lateral t wave inversions  -Chest x ray negative for acute cardiopulmonary disease  -In the ED patient was started on amiodarone drip  -EP consulted and saw patient- Cont Amio drip, when completed start Amio PO 200mg BID    Pend: daily EKGs, stress test       PAST MEDICAL & SURGICAL HISTORY:  CKD (chronic kidney disease)      ESRD (end stage renal disease)      HTN (hypertension)      HLD (hyperlipidemia)      Atrial fibrillation  on eliquis      COPD, mild  not on O2      Peripheral neuropathy  unclear cause      Lymphoma  ?questionable, not treated, not followed      Melanoma  R eye, s/p laser      Cirrhosis  possibly related to hepC      Breast cancer      Legally blind      Stroke      History of kidney transplant  3 years ago      S/P arteriovenous (AV) fistula creation  prior old fistula in R arm      S/P lumpectomy, right breast      History of back surgery  s/p Left L5-S1 endoscopic laminoforaminotomy          Hospital Course:    TODAY'S SUBJECTIVE & REVIEW OF SYMPTOMS:     Constitutional WNL   Cardio chest pain    Resp WNL   GI WNL  Heme WNL  Endo WNL  Skin WNL  MSK WNL  Neuro WNL  Cognitive WNL  Psych WNL      MEDICATIONS  (STANDING):  aMIOdarone    Tablet 200 milliGRAM(s) Oral two times a day  aspirin enteric coated 81 milliGRAM(s) Oral daily  atorvastatin 40 milliGRAM(s) Oral at bedtime  calcitriol   Capsule 0.5 MICROGram(s) Oral every 12 hours  chlorhexidine 2% Cloths 1 Application(s) Topical <User Schedule>  furosemide    Tablet 20 milliGRAM(s) Oral <User Schedule>  heparin  Infusion.  Unit(s)/Hr (7 mL/Hr) IV Continuous <Continuous>  mycophenolate mofetil 1000 milliGRAM(s) Oral daily  pantoprazole    Tablet 40 milliGRAM(s) Oral before breakfast  regadenoson Injectable 0.4 milliGRAM(s) IV Push once  tacrolimus 3 milliGRAM(s) Oral with breakfast  tacrolimus 2 milliGRAM(s) Oral at bedtime  tamsulosin 0.4 milliGRAM(s) Oral every 12 hours    MEDICATIONS  (PRN):  acetaminophen     Tablet .. 650 milliGRAM(s) Oral every 6 hours PRN Mild Pain (1 - 3)  oxyCODONE    IR 10 milliGRAM(s) Oral every 8 hours PRN Severe Pain (7 - 10)      FAMILY HISTORY:  FH: dementia  mother, alive    FHx: breast cancer  sister ( in 30s)        Allergies    Rifuxen (Swelling)  Rituxan (Hives)    Intolerances        SOCIAL HISTORY:    [  ] Etoh  [  ] Smoking  [  ] Substance abuse     Home Environment:  [ x  ] Home Alone  [   ] Lives with Family  [  x ] Home Health Aid - 6hr/day     Dwelling:  [   ] Apartment  [ x  ] Private House  [   ] Adult Home  [   ] Skilled Nursing Facility      [   ] Short Term  [   ] Long Term  [ x  ] Stairs       Elevator [   ]    FUNCTIONAL STATUS PTA: (Check all that apply)  Ambulation: [ x   ]Independent    [   ] Dependent     [   ] Non-Ambulatory  Assistive Device: [  x ] SA Cane  [   ]  Q Cane  [   ] Walker  [   ]  Wheelchair  ADL : [   ] Independent  [ x   ]  Dependent       Vital Signs Last 24 Hrs  T(C): 36.4 (2024 07:41), Max: 36.4 (2024 23:56)  T(F): 97.5 (2024 07:41), Max: 97.5 (2024 23:56)  HR: 64 (2024 12:00) (63 - 68)  BP: 118/73 (2024 12:00) (100/66 - 162/68)  BP(mean): 91 (2024 12:00) (79 - 98)  RR: 16 (2024 12:00) (16 - 20)  SpO2: 99% (2024 12:00) (95% - 100%)    Parameters below as of 2024 12:00  Patient On (Oxygen Delivery Method): room air          PHYSICAL EXAM: Awake & Alert  GENERAL: NAD  HEAD:  Normocephalic  CHEST/LUNG: Clear   HEART: S1S2+  ABDOMEN: Soft, Nontender  EXTREMITIES:  no calf tenderness    NERVOUS SYSTEM:  Cranial Nerves 2-12 intact [   ] Abnormal  [   ]  ROM: WFL all extremities [   ]  Abnormal [   ]able to move all ext - limited participation   Motor Strength: WFL all extremities  [   ]  Abnormal [   ]  Sensation: intact to light touch [   ] Abnormal [   ]    FUNCTIONAL STATUS:  Bed Mobility: Independent [   ]  Supervision [   ]  Needs Assistance [ x  ]  N/A [   ]  Transfers: Independent [   ]  Supervision [   ]  Needs Assistance [   ]  N/A [   ]   Ambulation: Independent [   ]  Supervision [   ]  Needs Assistance [   ]  N/A [   ]  ADL: Independent [   ] Requires Assistance [   ] N/A [   ]      LABS:                        9.4    2.97  )-----------( 89       ( 2024 05:14 )             29.3     01-25    133<L>  |  100  |  42<H>  ----------------------------<  111<H>  4.0   |  22  |  2.0<H>    Ca    10.1      2024 05:14  Phos  2.3     01-24  Mg     2.0     -25    TPro  5.8<L>  /  Alb  3.7  /  TBili  0.6  /  DBili  x   /  AST  44<H>  /  ALT  9   /  AlkPhos  48  01-25    PTT - ( 2024 11:40 )  PTT:73.5 sec  Urinalysis Basic - ( 2024 05:14 )    Color: x / Appearance: x / SG: x / pH: x  Gluc: 111 mg/dL / Ketone: x  / Bili: x / Urobili: x   Blood: x / Protein: x / Nitrite: x   Leuk Esterase: x / RBC: x / WBC x   Sq Epi: x / Non Sq Epi: x / Bacteria: x        RADIOLOGY & ADDITIONAL STUDIES:  
Patient is a 69y old  Male who presents with a chief complaint of chest pain and palpitations    HPI: Patient is a 69-year-old male with past med history of A-fib/a flutter sp ablation on Eliquis, CAD with stents, COPD, CKD status post kidney transplant, hep C, cirrhosis, hyperlipidemia, and hypertension who presents to the ED with chest pain or shortness of breath.  Reports that symptoms started around 2:00 AM this morning.  Chest pain is in the right side of the chest, dull, nonradiating, and intermittent.  Also endorses shortness of breath and palpitation along with the chest pain.  Denies fever, nausea, vomiting, abdominal pain, urinary symptoms, and change with bowel movement. Patient found to be in VT by EMS, no external shock delivered. As per patient, EMS gave medications and VT broke. No syncope.      PAST MEDICAL & SURGICAL HISTORY:  CKD (chronic kidney disease)      ESRD (end stage renal disease)      HTN (hypertension)      HLD (hyperlipidemia)      Atrial fibrillation  on eliquis      COPD, mild  not on O2      Peripheral neuropathy  unclear cause      Lymphoma  ?questionable, not treated, not followed      Melanoma  R eye, s/p laser      Cirrhosis  possibly related to hepC      Breast cancer      Legally blind      Stroke      History of kidney transplant  3 years ago      S/P arteriovenous (AV) fistula creation  prior old fistula in R arm      S/P lumpectomy, right breast      History of back surgery  s/p Left L5-S1 endoscopic laminoforaminotomy    PREVIOUS DIAGNOSTIC TESTING:      ECHO  FINDINGS:  < from: TTE Echo Complete w/o Contrast w/ Doppler (24 @ 12:58) >  Summary:   1. Hyperdynamic global left ventricular systolic function.   2. Severely enlarged left atrium.   3. LV Ejection Fraction by Ramos's Method with a biplane EF of 76 %.   4. Elevated mean left atrial pressure.   5. Moderate concentric left ventricular hypertrophy involving the basal   and septal walls.   6. Spectral Doppler shows restrictive pattern of left ventricular   myocardial filling (Grade III diastolic dysfunction).   7. Mildly enlarged right atrium.   8. Mitral annular calcification.   9. Moderate mitral valve regurgitation.  10. Thickening of the anterior and posterior mitral valve leaflets.  11. Mild tricuspid regurgitation.  12. Mild aortic regurgitation.  13. Sclerotic aortic valve with normal opening.  14. Estimated pulmonary artery systolic pressure is 35.9 mmHg assuming a   right atrial pressure of 3 mmHg, which is consistent with borderline   pulmonary hypertension.  15. Small secundum atrial septal defect with predominantly left to right   shunting across the atrial septum.    < end of copied text >      STRESS  FINDINGS:    CATHETERIZATION  FINDINGS:  < from: Cardiac Cath Lab - Adult (19 @ 13:59) >  SUMMARY:         1ST LESION INTERVENTIONS: A successful stent with rotational atherectomy    and balloon angioplasty was performed on the 80 % lesion in the mid RCA.    Following intervention there was an excellent angiographic appearance with    a 0 % residual stenosis.         2ND LESION INTERVENTIONS: A successful stent with rotational atherectomy    and balloon angioplasty was performed on the 80 % lesion in the proximal    RCA. Following intervention there was an excellent angiographicappearance    with a 0 % residual stenosis.         HEMODYNAMICS: Hemodynamic assessment demonstrates mild to moderate systemic    hypertension.         CORONARY CIRCULATION: There was significant 1-vessel coronary artery    disease (RCA).    < end of copied text >    ELECTROPHYSIOLOGY STUDY  FINDINGS:    CAROTID ULTRASOUND:  FINDINGS    VENOUS DUPLEX SCAN:  FINDINGS:    CHEST CT PULMONARY ANGIO with IV Contrast:  FINDINGS:    MEDICATIONS  (STANDING):  aMIOdarone Infusion 1 mG/Min (33.3 mL/Hr) IV Continuous <Continuous>  · 	DULoxetine 30 mg oral delayed release capsule: 1 cap(s) orally once a day (at bedtime)  · 	methocarbamol 750 mg oral tablet: 1 tab(s) orally every 8 hours  · 	oxyCODONE 10 mg oral tablet: 1 tab(s) orally every 8 hours as needed for Severe Pain (7 - 10)  · 	Tycolene 325 mg oral tablet: 3 tab(s) orally every 8 hours  · 	atorvastatin 40 mg oral tablet: 1 tab(s) orally once a day (at bedtime)  · 	Lasix 20 mg oral tablet: 1 tab(s) orally 3 times a week  · 	amiodarone 200 mg oral tablet: 1 tab(s) orally once a day  · 	tacrolimus 1 mg oral capsule: 2 cap(s) orally once a day (at bedtime)  · 	tacrolimus 1 mg oral capsule: 3 cap(s) orally once a day (in the morning)  · 	aspirin 81 mg oral tablet: 1 tab(s) orally once a day  · 	calcitriol 0.5 mcg oral capsule: 1 cap(s) orally 2 times a day  · 	mycophenolate mofetil 500 mg oral tablet: 2 tab(s) orally once a day  · 	Flomax 0.4 mg oral capsule: 1 cap(s) orally 2 times a day  · 	Eliquis 5 mg oral tablet: 1 tab(s) orally 2 times a day    MEDICATIONS  (PRN):      FAMILY HISTORY:  FH: dementia  mother, alive    FHx: breast cancer  sister ( in 30s)    SOCIAL HISTORY: No smoking, ETOH or illicit drug use    Past Surgical History: see above    Allergies:  Rifuxen (Swelling)  Rituxan (Hives)      REVIEW OF SYSTEMS:  CONSTITUTIONAL: No fever, weight loss, chills, shakes, or fatigue  RESPIRATORY: No cough, wheezing, hemoptysis, + shortness of breath  CARDIOVASCULAR: + chest pain, dyspnea, palpitations No dizziness, syncope, paroxysmal nocturnal dyspnea, orthopnea, or arm or leg swelling  GASTROINTESTINAL: No abdominal  or epigastric pain, nausea, vomiting, hematemesis, diarrhea, constipation, melena or bright red blood.  NEUROLOGICAL: No headaches, memory loss, slurred speech, limb weakness, loss of strength, numbness, or tremors  MUSCULOSKELETAL: No joint pain or swelling, muscle, back, or extremity pain      Vital Signs Last 24 Hrs  T(C): --  T(F): --  HR: 69 (2024 10:08) (69 - 84)  BP: 97/59 (2024 10:08) (97/59 - 118/75)  BP(mean): 70 (2024 10:08) (70 - 90)  RR: 18 (2024 10:08) (18 - 18)  SpO2: 98% (2024 10:08) (98% - 100%)    Parameters below as of 2024 10:08  Patient On (Oxygen Delivery Method): room air        PHYSICAL EXAM:  GENERAL: In no apparent distress, well nourished, and hydrated.  NECK: Supple, No JVD   HEART: Regular rate and rhythm; No murmurs, rubs, or gallops.  PULMONARY: Clear to auscultation and perfusion.  No rales, wheezing, or rhonchi bilaterally.  EXTREMITIES:  2+ Peripheral Pulses, no LE edema BL  NEUROLOGICAL: Grossly nonfocal      INTERPRETATION OF TELEMETRY:    ECG:  < from: 12 Lead ECG (24 @ 07:28) >  Ventricular Rate 73 BPM    Atrial Rate 73 BPM    P-R Interval 144 ms    QRS Duration 90 ms    Q-T Interval 448 ms    QTC Calculation(Bazett) 493 ms    P Axis 91 degrees    R Axis 71 degrees    T Axis 130 degrees    Diagnosis Line Normal sinus rhythm  Left ventricular hypertrophy with repolarization abnormality  Cannot rule out Septal infarct , age undetermined  Marked ST abnormality, possible lateral subendocardial injury  Abnormal ECG    Confirmed by Brittany Chavarria MD ([id number]) on 2024 8:17:52 AM    < end of copied text >      I&O's Detail      LABS:                        10.4   4.44  )-----------( 99       ( 2024 07:02 )             32.5         136  |  100  |  54<H>  ----------------------------<  154<H>  4.0   |  22  |  2.2<H>    Ca    10.3      2024 07:02  Phos  2.3       Mg     1.6         TPro  6.6  /  Alb  4.2  /  TBili  0.5  /  DBili  x   /  AST  31  /  ALT  8   /  AlkPhos  51            Urinalysis Basic - ( 2024 07:02 )    Color: x / Appearance: x / SG: x / pH: x  Gluc: 154 mg/dL / Ketone: x  / Bili: x / Urobili: x   Blood: x / Protein: x / Nitrite: x   Leuk Esterase: x / RBC: x / WBC x   Sq Epi: x / Non Sq Epi: x / Bacteria: x      BNP  I&O's Detail    Daily Height in cm: 175.26 (2024 07:03)    Daily     RADIOLOGY & ADDITIONAL STUDIES:

## 2024-01-28 NOTE — CHART NOTE - NSCHARTNOTEFT_GEN_A_CORE
Consult received and chart reviewed. Full consult to follow. Suggest d/c lasix and IVF at 60 cc/hr in preparation for possible cardiac procedure in AM.

## 2024-01-28 NOTE — PROGRESS NOTE ADULT - ASSESSMENT
68 yo M CAD s/p PCI, COPD, chronic HFpEF, CKD III s/p renal tx, chronic afib/flutter s/p ablation, liver cirrhosis, Hx R eye melanoma and Breast Ca, HLD, HTN who presented for evaluation of chest pain. While in EMS pt found to be in monomorphic VTACH and received lidocaine and amiodarone. Pt was admitted to CCU. On 1/26 he underwent ILR. He was started on heparin for his afib/flutter and downgraded to 3c telemetry. Pt's ILR incision site bleeding, saturating dressing. Area of incision is now swollen. Last night heparin was stopped.    Chest pain  ventricular tachycardia  a-fib/flutter s/p ablation  CAD s/p PCI, HFpEF  -EMS found patient to be in monomorphic ventricular tachycardia   - s/p lidocaine and amiodarone IV by EMS  - Once in the ED patient was in sinus rhythm, EKG demonstrated lateral t wave inversions (old_  - Chest x ray negative for acute cardiopulmonary disease  - In the ED patient was started on amiodarone drip, now on PO amio  - Stress test done: Moderate to large size reversible defect in the anterior wall extending to apex of the left ventricle consistent with ischemia.  - pt is being planned now for cardiac cath (likely tomorrow). I spoke with Dr. Hernandez who will speak with Dr. Covarrubias--cath likely to be postponed due to bleeding    Bleeding  - Dressing changed around 8 am. When I saw patient about an hour later dressing was saturated  - there is swelling at ILR site  - I spoke with EP GUSTAVO Hernandez who placed steristrips at site as there is presently no active bleeding  - cardiology team aware  - monitor  - hgb decreased, likely blood loss  - will hold heparin for now    SANTA on CKD III s/p renal transplant  -c/w home mycophenolate and tacrolimus  -Cr on admission 2.2, baseline around 1.8  -obtain urine studies and continue to trend creatinine  - f/u tacrolimus level   - pt started on fluids for possible cardiac cath tomorrow    HLD  -c/w home atorvastatin     Lumbar radiculopathy   - c/w home oxycodone 15mg TID  - started duloxetine 30mg qHS    #Progress Note Handoff:  Pending (specify):  Cath, monitor ILR site  Family discussion: As above with patient  Disposition: Home__x_/SNF___/Other________/Unknown at this time________

## 2024-01-28 NOTE — CONSULT NOTE ADULT - ASSESSMENT
EP: Dr Henderson  Cardiologist: Dr Low    69-year-old male with past med history of A-fib/a flutter sp ablation on Eliquis, CAD with stents, COPD, CKD status post kidney transplant, hep C, cirrhosis, hyperlipidemia, and hypertension who presents to the ED with chest pain or shortness of breath. Found to have VT, broke with medication.    Impression:  VT  Chest Pain   AF/AFL sp Ablation  CAD sp PCI  COPD  CKD sp Kidney Transplant  Hep C  Liver Cirrhosis  HLD  HTN    Plan:  - Admit to CCU  - NPO for cath today  - Cont Amio drip, when completed start Amio PO 200mg BID  - Cont tele monitoring  - Monitor electrolytes, maintain WNL  - Monitor LFTs  - Daily EKG  - Will follow
IMPRESSION: Rehab of gait dysfunction / Chest pain, ventricular tachycardia / CAD s/p PCI, COPD, HFpEF, CKD3 s/p Renal tx, afib/flutter s/p ablation, liver cirrhosis, Hx R eye melanoma and Breast Ca, HLD, HTN    PRECAUTIONS: [   ] Cardiac  [   ] Respiratory  [   ] Seizures [   ] Contact Isolation  [   ] Droplet Isolation  [   ] Other    Weight Bearing Status:     RECOMMENDATION:    Out of Bed to Chair     DVT/Decubiti Prophylaxis    REHAB PLAN:     [  x  ] Bedside P/T 3-5 times a week   [    ]   Bedside O/T  2-3 times a week             [    ] Speech Therapy               [    ]  No Rehab Therapy Indicated   Conditioning/ROM                                    ADL  Bed Mobility                                               Conditioning/ROM  Transfers                                                     Bed Mobility  Sitting /Standing Balance                         Transfers                                        Gait Training                                               Sitting/Standing Balance  Stair Training [   ]Applicable                    Home equipment Eval                                                                        Splinting  [   ] Only      GOALS:   ADL   [    ]   Independent                    Transfers  [  x  ] Independent                          Ambulation  [ x   ] Independent     [ x    ] With device                            [    ]  CG                                                         [    ]  CG                                                                  [    ] CG                            [    ] Min A                                                   [    ] Min A                                                              [    ] Min  A          DISCHARGE PLAN:   [    ]  Good candidate for Intensive Rehabilitation/Hospital based                                             Will tolerate 3hrs Intensive Rehab Daily                                       [   x  ]  Short Term Rehab in Skilled Nursing Facility                               vs        [    x ]  Home with Outpatient or VN services                                         [     ]  Possible Candidate for Intensive Hospital based Rehab                                       
Impression:  CKD - s/p renal transplant  SANTA secondary to dehydration  ischemic heart disease    Suggest:  d/c lasix  increase IVF to 100 cc/hr overnight  labs in AM  If Cr does not improve obtain US of the renal transplant  await tacrolimus level which was drawn yesterday  will discuss timing of cath with cardiology

## 2024-01-29 LAB
ANION GAP SERPL CALC-SCNC: 7 MMOL/L — SIGNIFICANT CHANGE UP (ref 7–14)
APTT BLD: 28.7 SEC — SIGNIFICANT CHANGE UP (ref 27–39.2)
BUN SERPL-MCNC: 29 MG/DL — HIGH (ref 10–20)
CALCIUM SERPL-MCNC: 10.3 MG/DL — SIGNIFICANT CHANGE UP (ref 8.4–10.5)
CHLORIDE SERPL-SCNC: 103 MMOL/L — SIGNIFICANT CHANGE UP (ref 98–110)
CO2 SERPL-SCNC: 24 MMOL/L — SIGNIFICANT CHANGE UP (ref 17–32)
CREAT SERPL-MCNC: 2.1 MG/DL — HIGH (ref 0.7–1.5)
EGFR: 34 ML/MIN/1.73M2 — LOW
GLUCOSE SERPL-MCNC: 101 MG/DL — HIGH (ref 70–99)
HCT VFR BLD CALC: 26 % — LOW (ref 42–52)
HGB BLD-MCNC: 8.1 G/DL — LOW (ref 14–18)
INR BLD: 1.06 RATIO — SIGNIFICANT CHANGE UP (ref 0.65–1.3)
MAGNESIUM SERPL-MCNC: 1.6 MG/DL — LOW (ref 1.8–2.4)
MCHC RBC-ENTMCNC: 29.2 PG — SIGNIFICANT CHANGE UP (ref 27–31)
MCHC RBC-ENTMCNC: 31.2 G/DL — LOW (ref 32–37)
MCV RBC AUTO: 93.9 FL — SIGNIFICANT CHANGE UP (ref 80–94)
NRBC # BLD: 0 /100 WBCS — SIGNIFICANT CHANGE UP (ref 0–0)
PLATELET # BLD AUTO: 54 K/UL — LOW (ref 130–400)
PMV BLD: 12.9 FL — HIGH (ref 7.4–10.4)
POTASSIUM SERPL-MCNC: 4.4 MMOL/L — SIGNIFICANT CHANGE UP (ref 3.5–5)
POTASSIUM SERPL-SCNC: 4.4 MMOL/L — SIGNIFICANT CHANGE UP (ref 3.5–5)
PROTHROM AB SERPL-ACNC: 12.1 SEC — SIGNIFICANT CHANGE UP (ref 9.95–12.87)
RBC # BLD: 2.77 M/UL — LOW (ref 4.7–6.1)
RBC # FLD: 18 % — HIGH (ref 11.5–14.5)
SODIUM SERPL-SCNC: 134 MMOL/L — LOW (ref 135–146)
TACROLIMUS SERPL-MCNC: 10.6 NG/ML — SIGNIFICANT CHANGE UP
TACROLIMUS SERPL-MCNC: 8.9 NG/ML — SIGNIFICANT CHANGE UP
WBC # BLD: 2.35 K/UL — LOW (ref 4.8–10.8)
WBC # FLD AUTO: 2.35 K/UL — LOW (ref 4.8–10.8)

## 2024-01-29 PROCEDURE — 93458 L HRT ARTERY/VENTRICLE ANGIO: CPT | Mod: 26

## 2024-01-29 PROCEDURE — 76770 US EXAM ABDO BACK WALL COMP: CPT | Mod: 26

## 2024-01-29 PROCEDURE — 99232 SBSQ HOSP IP/OBS MODERATE 35: CPT

## 2024-01-29 RX ORDER — SODIUM CHLORIDE 9 MG/ML
250 INJECTION INTRAMUSCULAR; INTRAVENOUS; SUBCUTANEOUS ONCE
Refills: 0 | Status: COMPLETED | OUTPATIENT
Start: 2024-01-29 | End: 2024-01-29

## 2024-01-29 RX ORDER — SODIUM CHLORIDE 9 MG/ML
1000 INJECTION INTRAMUSCULAR; INTRAVENOUS; SUBCUTANEOUS
Refills: 0 | Status: DISCONTINUED | OUTPATIENT
Start: 2024-01-29 | End: 2024-01-29

## 2024-01-29 RX ORDER — MAGNESIUM SULFATE 500 MG/ML
2 VIAL (ML) INJECTION
Refills: 0 | Status: COMPLETED | OUTPATIENT
Start: 2024-01-29 | End: 2024-01-29

## 2024-01-29 RX ORDER — MORPHINE SULFATE 50 MG/1
2 CAPSULE, EXTENDED RELEASE ORAL ONCE
Refills: 0 | Status: DISCONTINUED | OUTPATIENT
Start: 2024-01-29 | End: 2024-01-29

## 2024-01-29 RX ADMIN — TAMSULOSIN HYDROCHLORIDE 0.4 MILLIGRAM(S): 0.4 CAPSULE ORAL at 18:26

## 2024-01-29 RX ADMIN — MORPHINE SULFATE 2 MILLIGRAM(S): 50 CAPSULE, EXTENDED RELEASE ORAL at 17:32

## 2024-01-29 RX ADMIN — ONDANSETRON 4 MILLIGRAM(S): 8 TABLET, FILM COATED ORAL at 06:02

## 2024-01-29 RX ADMIN — MYCOPHENOLATE MOFETIL 1000 MILLIGRAM(S): 250 CAPSULE ORAL at 11:51

## 2024-01-29 RX ADMIN — AMIODARONE HYDROCHLORIDE 200 MILLIGRAM(S): 400 TABLET ORAL at 06:00

## 2024-01-29 RX ADMIN — Medication 25 GRAM(S): at 11:50

## 2024-01-29 RX ADMIN — AMIODARONE HYDROCHLORIDE 200 MILLIGRAM(S): 400 TABLET ORAL at 18:26

## 2024-01-29 RX ADMIN — OXYCODONE HYDROCHLORIDE 20 MILLIGRAM(S): 5 TABLET ORAL at 18:58

## 2024-01-29 RX ADMIN — OXYCODONE HYDROCHLORIDE 20 MILLIGRAM(S): 5 TABLET ORAL at 18:28

## 2024-01-29 RX ADMIN — METHOCARBAMOL 500 MILLIGRAM(S): 500 TABLET, FILM COATED ORAL at 21:27

## 2024-01-29 RX ADMIN — DULOXETINE HYDROCHLORIDE 30 MILLIGRAM(S): 30 CAPSULE, DELAYED RELEASE ORAL at 05:58

## 2024-01-29 RX ADMIN — Medication 81 MILLIGRAM(S): at 11:52

## 2024-01-29 RX ADMIN — CALCITRIOL 0.5 MICROGRAM(S): 0.5 CAPSULE ORAL at 05:59

## 2024-01-29 RX ADMIN — Medication 25 GRAM(S): at 13:07

## 2024-01-29 RX ADMIN — DULOXETINE HYDROCHLORIDE 30 MILLIGRAM(S): 30 CAPSULE, DELAYED RELEASE ORAL at 18:26

## 2024-01-29 RX ADMIN — METHOCARBAMOL 500 MILLIGRAM(S): 500 TABLET, FILM COATED ORAL at 12:52

## 2024-01-29 RX ADMIN — TACROLIMUS 2 MILLIGRAM(S): 5 CAPSULE ORAL at 21:27

## 2024-01-29 RX ADMIN — OXYCODONE HYDROCHLORIDE 20 MILLIGRAM(S): 5 TABLET ORAL at 05:58

## 2024-01-29 RX ADMIN — TAMSULOSIN HYDROCHLORIDE 0.4 MILLIGRAM(S): 0.4 CAPSULE ORAL at 05:59

## 2024-01-29 RX ADMIN — TACROLIMUS 3 MILLIGRAM(S): 5 CAPSULE ORAL at 08:41

## 2024-01-29 RX ADMIN — ATORVASTATIN CALCIUM 40 MILLIGRAM(S): 80 TABLET, FILM COATED ORAL at 21:27

## 2024-01-29 RX ADMIN — PANTOPRAZOLE SODIUM 40 MILLIGRAM(S): 20 TABLET, DELAYED RELEASE ORAL at 05:59

## 2024-01-29 RX ADMIN — SODIUM CHLORIDE 250 MILLILITER(S): 9 INJECTION INTRAMUSCULAR; INTRAVENOUS; SUBCUTANEOUS at 15:24

## 2024-01-29 RX ADMIN — METHOCARBAMOL 500 MILLIGRAM(S): 500 TABLET, FILM COATED ORAL at 06:00

## 2024-01-29 RX ADMIN — CHLORHEXIDINE GLUCONATE 1 APPLICATION(S): 213 SOLUTION TOPICAL at 06:00

## 2024-01-29 NOTE — CHART NOTE - NSCHARTNOTEFT_GEN_A_CORE
PREOPERATIVE DAY OF PROCEDURE EVALUATION:  I have personally seen and examined the patient.  I agree with the history and physical which I have reviewed and noted any changes below.  (Signed electronically by __________)  01-29-24 @ 14:57    70 yo M with CAD s/p PCI 2019, COPD, HFpEF, CKD3 s/p Renal tx (was previously on hemodialysis prior), afib/flutter s/p ablation, liver cirrhosis, Hx R eye melanoma and Breast Ca, HLD, HTN who presents due to chest pain. Troponin elevated with peak of 1550 and currently trending down.... Also,  Patient had loop recorder placed 1/26/24 that since has been bleeding in which heparin was stopped. Patient states that his chest area since has been feeling better and bleeding subsided.  Had stress test 1/26/24 that showed moderate to large reversible defect to anterior wall extending to apex of left ventricle which is consistent with ischemia.   Patient presents to cardiology department for [facility] with possible intervention.       LEFT HEART CATHETERIZATION    11/7/2019                              Left main normal  LAD:    Prox LAD: heavy calcified no significant lesion. MId LAD 60% lesion, Distal: minor irregularities.                     Diag: patent  Left Circumflex: mild to moderate disease   OM: small patent   Right Coronary Artery: heavy calcified vessel, Prox 50- 60% diffuse disease, MId 80% eccentric lesion , distal 80% disease  RPDA patent   DOMINANCE: Right                                                INTERVENTION                                                               iFR mid LAD lesion , non significant disease         Cath 11/26/2019  IMPLANTS:  3.5 x 18 mm Cobra stent x 2 (prox and mid non overlapping)    FINDINGS  RCA heavily calcified stenotic lesions.                          INTERVENTIONS: JR4 guide, heparin, aspirin, plavix.   Right CFA 6 fr sheath. Right CFV 5 fr sheath with US access. 5 Fr TVP placed in RV.  1.25 Rotablator Juma and 1.75 Juma. Multiple passes.   Run through wire and Hernán Blue wire.  3.5 x 18 mm Cobra stent x 2 (prox and mid non overlapping)  Post dilated with 3.75 x 12 mm NC balloon x multiple inflations.   Excellent angiographic results obtained.   TVP removed  sheaths sutured in place.        < from: NM Nuclear Stress Pharmacologic Multiple (01.26.24 @ 14:58) >  Impression:  Moderate to large size reversible defect in the anterior wall extending   to apex of the left ventricle consistent with ischemia.    Normal left ventricular wall motion and wall thickening.     Left ventricle ejection fraction calculated as 74% which is within the   range of normal.        < from: TTE Echo Complete w/ Contrast w/o Doppler (01.24.24 @ 15:59) >    Summary:   1. Left ventricular ejection fraction, by visual estimation, is 70 to   75%.   2. Hyperdynamic global left ventricular systolic function.   3. Severely enlarged left atrium.   4. Moderately increased LV wall thickness.   5. Spectral Doppler shows pseudonormal pattern of left ventricular   myocardial filling (Grade II diastolic dysfunction).   6. Normal right atrial size.   7. Mild to moderate mitral valve regurgitation.   8. Moderate thickening of the anterior and posterior mitral valve   leaflets.   9. Mild tricuspid regurgitation.  10. Mild aortic regurgitation.  11. Mild dilatation of the ascending aorta.  12. Estimated pulmonary artery systolic pressure is 40.3 mmHg assuming a   right atrial pressure of 8 mmHg, which is consistent with mild pulmonary   hypertension.            Cath Bleeding Risk: 8.5%    Prehydration:  ml bolus x 1 hr prior to cardiac cath    Patient will be groin access for procedure.

## 2024-01-29 NOTE — CHART NOTE - NSCHARTNOTEFT_GEN_A_CORE
PRE-OP DIAGNOSIS:    Ventricular Tachycardia, Positive Stress Test    PROCEDURE:     [x] Coronary Angiogram     [x] LHC     [] LVG     [] RHC     [] Intervention (see below)         PHYSICIAN:  Dr Rodgers    ASSISTANT: Dr TARAH Munoz     PROCEDURE DESCRIPTION:     Consent:      [x] Patient     [] Family Member     []  Used        Anesthesia:     [] General     [x] Sedation     [x] Local        Access & Closure:     [x] 5 Fr Right Femoral Artery (Manual Compression)    [] Fr Femoral Vein     [] Fr Brachial Vein       IV Contrast: 30 mL        Intervention: None      Implants: None       FINDINGS:     Coronary Dominance: Right      LM: Mild disease    LAD: Mid LAD 60% stenosis at the site of bifurcation with D1  D1 ostial 80% stenosis supplying small vascular territory     CX: Prox Cx 60% stenosis  OM1 Mild disease    RCA: Patent stents, mild disease  RPDA mild disease     LVEDP: 39 mmHg        ESTIMATED BLOOD LOSS: < 10 mL        CONDITION:     [x] Good     [] Fair     [] Critical        SPECIMEN REMOVED: N/A       POST-OP DIAGNOSIS:      [x] Non Obstructive Coronary Artery Disease        PLAN OF CARE:     [x] Return to In-patient bed     [x] Medications: Aspirin, statins and BB. C/w Amio. EP f/u    [x] IV Fluids: No IV Fluids due to elevated LVEDP PRE-OP DIAGNOSIS:    Ventricular Tachycardia, Positive Stress Test    PROCEDURE:     [x] Coronary Angiogram     [x] LHC     [] LVG     [] RHC     [] Intervention (see below)         PHYSICIAN:  Dr Rodgers    ASSISTANT: Dr TARAH Munoz     PROCEDURE DESCRIPTION:     Consent:      [x] Patient     [] Family Member     []  Used        Anesthesia:     [] General     [x] Sedation     [x] Local        Access & Closure:     [x] 5 Fr Right Femoral Artery (Manual Compression)    [] Fr Femoral Vein     [] Fr Brachial Vein       IV Contrast: 30 mL        Intervention: None      Implants: None       FINDINGS:     Coronary Dominance: Right      LM: Mild disease    LAD: Mid LAD 70% stenosis at the site of bifurcation with D1  D1 ostial 80% stenosis supplying small vascular territory     CX: Prox Cx 60% stenosis  OM1 Mild disease    RCA: Patent stents, mild disease  RPDA mild disease     LVEDP: 39 mmHg        ESTIMATED BLOOD LOSS: < 10 mL        CONDITION:     [x] Good     [] Fair     [] Critical        SPECIMEN REMOVED: N/A       POST-OP DIAGNOSIS:      [x] Non Obstructive Coronary Artery Disease        PLAN OF CARE:     [x] Return to In-patient bed     [x] Medications: Aspirin, statins and BB. C/w Amio. EP f/u    [x] IV Fluids: No IV Fluids due to elevated LVEDP diarrhea/nausea

## 2024-01-29 NOTE — PROGRESS NOTE ADULT - ASSESSMENT
68 yo M CAD s/p PCI, COPD, chronic HFpEF, CKD III s/p renal tx, chronic afib/flutter s/p ablation, liver cirrhosis, Hx R eye melanoma and Breast Ca, HLD, HTN who presented for evaluation of chest pain. While in EMS pt found to be in monomorphic VTACH and received lidocaine and amiodarone. Pt was admitted to CCU. On 1/26 he underwent ILR. He was started on heparin for his afib/flutter and downgraded to 3c telemetry. Pt's ILR incision site bleeding, saturating dressing. Area of incision is now swollen. Last night heparin was stopped.    Chest pain  ventricular tachycardia  a-fib/flutter s/p ablation  CAD s/p PCI, HFpEF  -EMS found patient to be in monomorphic ventricular tachycardia   - s/p lidocaine and amiodarone IV by EMS  - Once in the ED patient was in sinus rhythm, EKG demonstrated lateral t wave inversions (old_  - Chest x ray negative for acute cardiopulmonary disease  - In the ED patient was started on amiodarone drip, now on PO amio  - Stress test done: Moderate to large size reversible defect in the anterior wall extending to apex of the left ventricle consistent with ischemia.  - pt is being planned now for cardiac cath (likely tomorrow). I spoke with Dr. Hernandez who will speak with Dr. Covarrubias--cath likely to be postponed due to bleeding    Bleeding  - yesterday Dressing changed around 8 am. When I saw patient about an hour later dressing was saturated and there was swelling at ILR site  - bleeding stopped, swelling resolved  - likely restart a/c after cath    SANTA on CKD III s/p renal transplant  -c/w home mycophenolate and tacrolimus  -Cr on admission 2.2, baseline around 1.8  -obtain urine studies and continue to trend creatinine  - f/u tacrolimus level - pending  - pt started on fluids for possible cardiac cath today    HLD  -c/w home atorvastatin     Lumbar radiculopathy   - c/w home oxycodone 15mg TID  - started duloxetine 30mg qHS    #Progress Note Handoff:  Pending (specify):  Cath, monitor ILR site  Family discussion: As above with patient  Disposition: Home__x_/SNF___/Other________/Unknown at this time________

## 2024-01-29 NOTE — PROGRESS NOTE ADULT - ASSESSMENT
Status post  donor Kidney transplant at [facility] 6 years ago  SANTA likely prerenal azotemia  Abnormal stress test - plan for cardiac cath  Pancytopenia  CAD  Atrial fibrillation  HTN  Cirrhosis  Hypophosphatemia  Mild hypercalcemia    Plan    Continue IVF until tomorrow  Encourage oral intake  DC calcitriol  Continue tacrolimus  Continue mycophenolate  Cardio f/u

## 2024-01-29 NOTE — PROGRESS NOTE ADULT - ASSESSMENT
1] ASHD      Abnormal Nuclear Stress Test      Monomorphic VT  - Patient on schedule for cardiac catheterization today  - NPO except po medications     1] CAD S/P PTCA/Stent      Abnormal Nuclear Stress Test      Monomorphic VT  - Patient on schedule for cardiac catheterization today  - NPO except po medications    2] S/P Atrial Fib/ATrial Flutter Ablation  - Off OAC for planned cardiac catheterization  - Continue Amiodarone    3] Hyperlipidemia  - On statin therapy    Will follow  Plan discussed with patient.    Aram Hernandez MD (covering for Dr. Kin Low)  [phone] Office  On TEAMS

## 2024-01-30 LAB
ALBUMIN SERPL ELPH-MCNC: 3.4 G/DL — LOW (ref 3.5–5.2)
ALP SERPL-CCNC: 43 U/L — SIGNIFICANT CHANGE UP (ref 30–115)
ALT FLD-CCNC: 7 U/L — SIGNIFICANT CHANGE UP (ref 0–41)
ANION GAP SERPL CALC-SCNC: 10 MMOL/L — SIGNIFICANT CHANGE UP (ref 7–14)
ANION GAP SERPL CALC-SCNC: 6 MMOL/L — LOW (ref 7–14)
APTT BLD: 28.8 SEC — SIGNIFICANT CHANGE UP (ref 27–39.2)
AST SERPL-CCNC: 13 U/L — SIGNIFICANT CHANGE UP (ref 0–41)
BASOPHILS # BLD AUTO: 0.01 K/UL — SIGNIFICANT CHANGE UP (ref 0–0.2)
BASOPHILS NFR BLD AUTO: 0.3 % — SIGNIFICANT CHANGE UP (ref 0–1)
BILIRUB SERPL-MCNC: 0.7 MG/DL — SIGNIFICANT CHANGE UP (ref 0.2–1.2)
BLD GP AB SCN SERPL QL: SIGNIFICANT CHANGE UP
BUN SERPL-MCNC: 19 MG/DL — SIGNIFICANT CHANGE UP (ref 10–20)
BUN SERPL-MCNC: 20 MG/DL — SIGNIFICANT CHANGE UP (ref 10–20)
CALCIUM SERPL-MCNC: 10.2 MG/DL — SIGNIFICANT CHANGE UP (ref 8.4–10.5)
CALCIUM SERPL-MCNC: 9.9 MG/DL — SIGNIFICANT CHANGE UP (ref 8.4–10.5)
CHLORIDE SERPL-SCNC: 105 MMOL/L — SIGNIFICANT CHANGE UP (ref 98–110)
CHLORIDE SERPL-SCNC: 106 MMOL/L — SIGNIFICANT CHANGE UP (ref 98–110)
CO2 SERPL-SCNC: 21 MMOL/L — SIGNIFICANT CHANGE UP (ref 17–32)
CO2 SERPL-SCNC: 22 MMOL/L — SIGNIFICANT CHANGE UP (ref 17–32)
CREAT SERPL-MCNC: 1.6 MG/DL — HIGH (ref 0.7–1.5)
CREAT SERPL-MCNC: 1.8 MG/DL — HIGH (ref 0.7–1.5)
EGFR: 41 ML/MIN/1.73M2 — LOW
EGFR: 47 ML/MIN/1.73M2 — LOW
EOSINOPHIL # BLD AUTO: 0.02 K/UL — SIGNIFICANT CHANGE UP (ref 0–0.7)
EOSINOPHIL NFR BLD AUTO: 0.5 % — SIGNIFICANT CHANGE UP (ref 0–8)
GLUCOSE BLDC GLUCOMTR-MCNC: 131 MG/DL — HIGH (ref 70–99)
GLUCOSE BLDC GLUCOMTR-MCNC: 132 MG/DL — HIGH (ref 70–99)
GLUCOSE BLDC GLUCOMTR-MCNC: 144 MG/DL — HIGH (ref 70–99)
GLUCOSE BLDC GLUCOMTR-MCNC: 158 MG/DL — HIGH (ref 70–99)
GLUCOSE SERPL-MCNC: 121 MG/DL — HIGH (ref 70–99)
GLUCOSE SERPL-MCNC: 125 MG/DL — HIGH (ref 70–99)
HCT VFR BLD CALC: 23.3 % — LOW (ref 42–52)
HCT VFR BLD CALC: 25 % — LOW (ref 42–52)
HGB BLD-MCNC: 7.3 G/DL — LOW (ref 14–18)
HGB BLD-MCNC: 7.7 G/DL — LOW (ref 14–18)
IMM GRANULOCYTES NFR BLD AUTO: 0.8 % — HIGH (ref 0.1–0.3)
LYMPHOCYTES # BLD AUTO: 0.24 K/UL — LOW (ref 1.2–3.4)
LYMPHOCYTES # BLD AUTO: 6.2 % — LOW (ref 20.5–51.1)
MAGNESIUM SERPL-MCNC: 2.1 MG/DL — SIGNIFICANT CHANGE UP (ref 1.8–2.4)
MCHC RBC-ENTMCNC: 29.3 PG — SIGNIFICANT CHANGE UP (ref 27–31)
MCHC RBC-ENTMCNC: 29.8 PG — SIGNIFICANT CHANGE UP (ref 27–31)
MCHC RBC-ENTMCNC: 30.8 G/DL — LOW (ref 32–37)
MCHC RBC-ENTMCNC: 31.3 G/DL — LOW (ref 32–37)
MCV RBC AUTO: 95.1 FL — HIGH (ref 80–94)
MCV RBC AUTO: 95.1 FL — HIGH (ref 80–94)
MONOCYTES # BLD AUTO: 0.21 K/UL — SIGNIFICANT CHANGE UP (ref 0.1–0.6)
MONOCYTES NFR BLD AUTO: 5.4 % — SIGNIFICANT CHANGE UP (ref 1.7–9.3)
NEUTROPHILS # BLD AUTO: 3.39 K/UL — SIGNIFICANT CHANGE UP (ref 1.4–6.5)
NEUTROPHILS NFR BLD AUTO: 86.8 % — HIGH (ref 42.2–75.2)
NRBC # BLD: 0 /100 WBCS — SIGNIFICANT CHANGE UP (ref 0–0)
NRBC # BLD: 0 /100 WBCS — SIGNIFICANT CHANGE UP (ref 0–0)
PLATELET # BLD AUTO: 54 K/UL — LOW (ref 130–400)
PLATELET # BLD AUTO: 60 K/UL — LOW (ref 130–400)
PMV BLD: 12 FL — HIGH (ref 7.4–10.4)
PMV BLD: 12.4 FL — HIGH (ref 7.4–10.4)
POTASSIUM SERPL-MCNC: 4.5 MMOL/L — SIGNIFICANT CHANGE UP (ref 3.5–5)
POTASSIUM SERPL-MCNC: 4.8 MMOL/L — SIGNIFICANT CHANGE UP (ref 3.5–5)
POTASSIUM SERPL-SCNC: 4.5 MMOL/L — SIGNIFICANT CHANGE UP (ref 3.5–5)
POTASSIUM SERPL-SCNC: 4.8 MMOL/L — SIGNIFICANT CHANGE UP (ref 3.5–5)
PROT SERPL-MCNC: 5.2 G/DL — LOW (ref 6–8)
RBC # BLD: 2.45 M/UL — LOW (ref 4.7–6.1)
RBC # BLD: 2.63 M/UL — LOW (ref 4.7–6.1)
RBC # FLD: 18.3 % — HIGH (ref 11.5–14.5)
RBC # FLD: 18.4 % — HIGH (ref 11.5–14.5)
SODIUM SERPL-SCNC: 133 MMOL/L — LOW (ref 135–146)
SODIUM SERPL-SCNC: 137 MMOL/L — SIGNIFICANT CHANGE UP (ref 135–146)
WBC # BLD: 3.54 K/UL — LOW (ref 4.8–10.8)
WBC # BLD: 3.9 K/UL — LOW (ref 4.8–10.8)
WBC # FLD AUTO: 3.54 K/UL — LOW (ref 4.8–10.8)
WBC # FLD AUTO: 3.9 K/UL — LOW (ref 4.8–10.8)

## 2024-01-30 PROCEDURE — 93010 ELECTROCARDIOGRAM REPORT: CPT

## 2024-01-30 PROCEDURE — 99232 SBSQ HOSP IP/OBS MODERATE 35: CPT

## 2024-01-30 RX ORDER — METOPROLOL TARTRATE 50 MG
12.5 TABLET ORAL
Refills: 0 | Status: DISCONTINUED | OUTPATIENT
Start: 2024-01-30 | End: 2024-02-01

## 2024-01-30 RX ORDER — MYCOPHENOLATE MOFETIL 250 MG/1
500 CAPSULE ORAL
Refills: 0 | Status: DISCONTINUED | OUTPATIENT
Start: 2024-01-30 | End: 2024-02-01

## 2024-01-30 RX ORDER — METOPROLOL TARTRATE 50 MG
5 TABLET ORAL ONCE
Refills: 0 | Status: DISCONTINUED | OUTPATIENT
Start: 2024-01-30 | End: 2024-01-30

## 2024-01-30 RX ORDER — SENNA PLUS 8.6 MG/1
2 TABLET ORAL AT BEDTIME
Refills: 0 | Status: DISCONTINUED | OUTPATIENT
Start: 2024-01-30 | End: 2024-02-01

## 2024-01-30 RX ORDER — APIXABAN 2.5 MG/1
5 TABLET, FILM COATED ORAL
Refills: 0 | Status: DISCONTINUED | OUTPATIENT
Start: 2024-01-30 | End: 2024-01-30

## 2024-01-30 RX ORDER — FUROSEMIDE 40 MG
20 TABLET ORAL DAILY
Refills: 0 | Status: DISCONTINUED | OUTPATIENT
Start: 2024-01-30 | End: 2024-02-01

## 2024-01-30 RX ORDER — METOPROLOL TARTRATE 50 MG
5 TABLET ORAL ONCE
Refills: 0 | Status: COMPLETED | OUTPATIENT
Start: 2024-01-30 | End: 2024-01-30

## 2024-01-30 RX ORDER — APIXABAN 2.5 MG/1
5 TABLET, FILM COATED ORAL
Refills: 0 | Status: DISCONTINUED | OUTPATIENT
Start: 2024-01-30 | End: 2024-02-01

## 2024-01-30 RX ORDER — POLYETHYLENE GLYCOL 3350 17 G/17G
17 POWDER, FOR SOLUTION ORAL DAILY
Refills: 0 | Status: DISCONTINUED | OUTPATIENT
Start: 2024-01-30 | End: 2024-02-01

## 2024-01-30 RX ADMIN — MYCOPHENOLATE MOFETIL 500 MILLIGRAM(S): 250 CAPSULE ORAL at 17:50

## 2024-01-30 RX ADMIN — Medication 12.5 MILLIGRAM(S): at 14:14

## 2024-01-30 RX ADMIN — AMIODARONE HYDROCHLORIDE 200 MILLIGRAM(S): 400 TABLET ORAL at 17:50

## 2024-01-30 RX ADMIN — Medication 20 MILLIGRAM(S): at 17:51

## 2024-01-30 RX ADMIN — METHOCARBAMOL 500 MILLIGRAM(S): 500 TABLET, FILM COATED ORAL at 14:14

## 2024-01-30 RX ADMIN — Medication 5 MILLIGRAM(S): at 15:35

## 2024-01-30 RX ADMIN — TACROLIMUS 2 MILLIGRAM(S): 5 CAPSULE ORAL at 21:04

## 2024-01-30 RX ADMIN — TAMSULOSIN HYDROCHLORIDE 0.4 MILLIGRAM(S): 0.4 CAPSULE ORAL at 17:50

## 2024-01-30 RX ADMIN — DULOXETINE HYDROCHLORIDE 30 MILLIGRAM(S): 30 CAPSULE, DELAYED RELEASE ORAL at 05:31

## 2024-01-30 RX ADMIN — CHLORHEXIDINE GLUCONATE 1 APPLICATION(S): 213 SOLUTION TOPICAL at 05:37

## 2024-01-30 RX ADMIN — MYCOPHENOLATE MOFETIL 1000 MILLIGRAM(S): 250 CAPSULE ORAL at 12:21

## 2024-01-30 RX ADMIN — METHOCARBAMOL 500 MILLIGRAM(S): 500 TABLET, FILM COATED ORAL at 05:32

## 2024-01-30 RX ADMIN — ATORVASTATIN CALCIUM 40 MILLIGRAM(S): 80 TABLET, FILM COATED ORAL at 21:04

## 2024-01-30 RX ADMIN — PANTOPRAZOLE SODIUM 40 MILLIGRAM(S): 20 TABLET, DELAYED RELEASE ORAL at 05:31

## 2024-01-30 RX ADMIN — APIXABAN 5 MILLIGRAM(S): 2.5 TABLET, FILM COATED ORAL at 17:49

## 2024-01-30 RX ADMIN — DULOXETINE HYDROCHLORIDE 30 MILLIGRAM(S): 30 CAPSULE, DELAYED RELEASE ORAL at 17:50

## 2024-01-30 RX ADMIN — OXYCODONE HYDROCHLORIDE 20 MILLIGRAM(S): 5 TABLET ORAL at 06:07

## 2024-01-30 RX ADMIN — OXYCODONE HYDROCHLORIDE 20 MILLIGRAM(S): 5 TABLET ORAL at 18:05

## 2024-01-30 RX ADMIN — OXYCODONE HYDROCHLORIDE 20 MILLIGRAM(S): 5 TABLET ORAL at 17:48

## 2024-01-30 RX ADMIN — AMIODARONE HYDROCHLORIDE 200 MILLIGRAM(S): 400 TABLET ORAL at 05:31

## 2024-01-30 RX ADMIN — SENNA PLUS 2 TABLET(S): 8.6 TABLET ORAL at 22:25

## 2024-01-30 RX ADMIN — Medication 81 MILLIGRAM(S): at 12:19

## 2024-01-30 RX ADMIN — OXYCODONE HYDROCHLORIDE 20 MILLIGRAM(S): 5 TABLET ORAL at 05:31

## 2024-01-30 RX ADMIN — TACROLIMUS 3 MILLIGRAM(S): 5 CAPSULE ORAL at 08:19

## 2024-01-30 RX ADMIN — TAMSULOSIN HYDROCHLORIDE 0.4 MILLIGRAM(S): 0.4 CAPSULE ORAL at 05:38

## 2024-01-30 RX ADMIN — METHOCARBAMOL 500 MILLIGRAM(S): 500 TABLET, FILM COATED ORAL at 22:26

## 2024-01-30 NOTE — OCCUPATIONAL THERAPY INITIAL EVALUATION ADULT - GENERAL OBSERVATIONS, REHAB EVAL
Pt encountered semi ventura in bed in NAD +IV locked +tele. Pt agreed to OT eval with reported no pain/dizziness/discomfort throughout OT session. Pt left semi ventura in bed(stated that he is tired and refused to sit in b/s recliner at this time despite max encouragement), MARC Carpio aware.

## 2024-01-30 NOTE — PROGRESS NOTE ADULT - ASSESSMENT
1] CAD S/P PTCA/Stent      Abnormal Nuclear Stress Test      Monomorphic VT vs SVT with aberrancy  - Case discussed with EP.  - Findings on cardiac catheterization reviewed and discussed with the patient.  - Continue Aspirin 81 mg tablet daily  - Add Imdur 30 mg tablet daily    2] S/P Atrial Fib/ATrial Flutter Ablation  - Continue Amiodarone  - Restart Eliquis.    3] HFpEF  - Patient has diastolic heart failure  - Will need to be on Furosemide  - Caution on overdiuresis    4] Hyperlipidemia  - On statin therapy    Will follow    Plan discussed with Medical Attending Dr. Ruiz and House STaff during morning rounds    Aram Hernandez MD (covering for Dr. Kin Low)  [phone] Office  On TEAMS

## 2024-01-30 NOTE — PROGRESS NOTE ADULT - ASSESSMENT
Status post  donor Kidney transplant at [place] 6 years ago  SANTA likely prerenal azotemia  Abnormal stress test - plan for cardiac cath  Pancytopenia  CAD  Atrial fibrillation  HTN  Cirrhosis  Hypophosphatemia  Mild hypercalcemia    Plan    No more IVF  Change mycophenolate to 500mg PO BID  Encourage oral intake  Continue tacrolimus  Cardio f/u

## 2024-01-30 NOTE — PROGRESS NOTE ADULT - ASSESSMENT
70 yo M CAD s/p PCI, COPD, chronic HFpEF, CKD III s/p renal tx, chronic afib/flutter s/p ablation, liver cirrhosis, Hx R eye melanoma and Breast Ca, HLD, HTN who presented for evaluation of chest pain. While in EMS pt found to be in monomorphic VTACH and received lidocaine and amiodarone. Pt was admitted to CCU. On 1/26 he underwent ILR. He was started on heparin for his afib/flutter and downgraded to 3c telemetry. Pt's ILR incision site bleeding, saturating dressing. Area of incision is now swollen. Last night heparin was stopped.    Chest pain  ventricular tachycardia  a-fib/flutter s/p ablation  CAD s/p PCI, HFpEF  -EMS found patient to be in monomorphic ventricular tachycardia   - s/p lidocaine and amiodarone IV by EMS  - Once in the ED patient was in sinus rhythm, EKG demonstrated lateral t wave inversions (old_  - Chest x ray negative for acute cardiopulmonary disease  - In the ED patient was started on amiodarone drip, now on PO amio  - Stress test done: Moderate to large size reversible defect in the anterior wall extending to apex of the left ventricle consistent with ischemia.  - s/p cardiac cath yesterday - showed LAD: Mid LAD 70% stenosis at the site of bifurcation with D, D1 ostial 80% stenosis supplying small vascular territory. No stents placed due difficult location  - fluids not resumed after cath yesterday due to elevated LVEDP. Scr downtrended so start lasix if BP stable    Bleeding  - 1/28 Dressing changed around 8 am. When I saw patient about an hour later dressing was saturated and there was swelling at ILR site  - bleeding stopped, swelling resolved  - restart Eliquis today and monitor for bleeding    SANTA on CKD III s/p renal transplant  -c/w home mycophenolate and tacrolimus  -Cr on admission 2.2, baseline around 1.8  -obtain urine studies and continue to trend creatinine  - f/u tacrolimus level - pending  - restart lasix today if bp stable    HLD  -c/w home atorvastatin     Lumbar radiculopathy   - c/w home oxycodone 15mg TID  - started duloxetine 30mg qHS    #Progress Note Handoff:  Pending (specify):  Monitor SCr, monitor bleeding on eliquis  Family discussion: As above with patient  Disposition: Home__x_/SNF___/Other________/Unknown at this time________

## 2024-01-30 NOTE — OCCUPATIONAL THERAPY INITIAL EVALUATION ADULT - PERTINENT HX OF CURRENT PROBLEM, REHAB EVAL
Pt is a 70 yo M with CAD s/p PCI, COPD, CKD3 s/p Renal tx, afib/flutter s/p ablation, HFpEF,  liver cirrhosis, Hx R eye melanoma and Breast Ca, HLD, HTN who presents due to chest pain.  On AM of 1/24 patient woke up at 2AM with chest pain.  Chest pain is described as dull, located in the central and right chest with radiation to the throat.  The patient had the chest pain for 2-3 hours and called 911.  Patient has had this chest pain before, he states he had the chest pain prior to his ablation in December 2023.  When EMS arrived to patient's home he was found to be in monomorphic ventricular tachycardia and he was treated with lidocaine and amiodarone IV.  Once in the ED patient was in sinus rhythm, EKG demonstrated lateral t wave inversions.

## 2024-01-30 NOTE — OCCUPATIONAL THERAPY INITIAL EVALUATION ADULT - ADL RETRAINING, OT EVAL
-- DO NOT REPLY / DO NOT REPLY ALL --  -- Message is from the [facility]--    COVID-19 Universal Screening: N/A - Not about scheduling    General Patient Message      Reason for Call: Patient missed a call from someone from the office. Please contact the patient.     Caller Information       Type Contact Phone    01/12/2021 01:08 PM CST Phone (Incoming) Gay Kline (Self) [phone] (M)          Alternative phone number: none    Turnaround time given to caller:   \"This message will be sent to [state Provider's name]. The clinical team will fulfill your request as soon as they review your message.\"     Pt will perform UB dressing task with independent by dc. Pt will perform LB dressing task with independent by dc.

## 2024-01-30 NOTE — OCCUPATIONAL THERAPY INITIAL EVALUATION ADULT - RANGE OF MOTION EXAMINATION, UPPER EXTREMITY
reported both hands shaking(right worse than left hand, has been a while)/bilateral UE Active ROM was WFL  (within functional limits)

## 2024-01-30 NOTE — CHART NOTE - NSCHARTNOTEFT_GEN_A_CORE
Registered Dietitian Follow-Up     Patient Profile Reviewed                           Yes [x]   No []     Nutrition History Previously Obtained        Yes [x]  No []       Pertinent Subjective Information: Pt reports poor po/appetite, consuming <25% of all meals. Discussed nutrition supplements, willing to trail. Previously recommended diet order not active.      Pertinent Medical Interventions:  --ventricular tachycardia  --a-fib/flutter s/p ablation  --- pt is being planned now for cardiac cath (likely tomorrow).     Diet order:  Diet, DASH/TLC:   Sodium & Cholesterol Restricted  For patients receiving Renal Replacement - No Protein Restr, No Conc K, No Conc Phos, Low Sodium (24 @ 17:00) [Active]    Anthropometrics:  Height (cm): 174 (24 @ 05:57)  Weight (kg): 57.8 (24 @ 05:57)  BMI (kg/m2): 19.1 (24 @ 05:57)  IBW: 73kg    Daily Weight in k (), Weight in k.2 (), Weight in k (), Weight in k.8 ()    MEDICATIONS  (STANDING):  aMIOdarone    Tablet 200 milliGRAM(s) Oral two times a day  aspirin enteric coated 81 milliGRAM(s) Oral daily  atorvastatin 40 milliGRAM(s) Oral at bedtime  methocarbamol 500 milliGRAM(s) Oral every 8 hours  mycophenolate mofetil 1000 milliGRAM(s) Oral daily  oxyCODONE  ER Tablet 20 milliGRAM(s) Oral every 12 hours  pantoprazole    Tablet 40 milliGRAM(s) Oral before breakfast  regadenoson Injectable 0.4 milliGRAM(s) IV Push once  tacrolimus 3 milliGRAM(s) Oral with breakfast  tacrolimus 2 milliGRAM(s) Oral at bedtime  tamsulosin 0.4 milliGRAM(s) Oral every 12 hours    MEDICATIONS  (PRN):  heparin   Injectable 3500 Unit(s) IV Push every 6 hours PRN For aPTT less than 40  ondansetron Injectable 4 milliGRAM(s) IV Push three times a day PRN Nausea and/or Vomiting    Pertinent Labs:  @ 07:23: Na 137, BUN 20, Cr 1.8<H>, <H>, K+ 4.5, Phos --, Mg 2.1, Alk Phos 43, ALT/SGPT 7, AST/SGOT 13, HbA1c --    Finger Sticks:  POCT Blood Glucose.: 131 mg/dL ( @ 07:32)    Physical Findings:  - Appearance: WDL; no edema  - GI function: No discomfort, LBM 5 days ago   - Tubes: n/a   - Oral/Mouth cavity: none  - Skin: surgical incision      Nutrition Requirements: per initial RD assessment   Weight Used: IBW 73kg      Estimated Energy Needs    Continue [x]  Adjust []  1825-2190kcal/day (25-30kcal/kg of IBW) -Due to CKD and PCM     Estimated Protein Needs    Continue [x]  Adjust []  73-88grams/day (1-1.2grams/kg of IBW) -Due to PCM but with consideration for low GFR     Estimated Fluid Needs        Continue [x]  Adjust []  1825-2190mL/day (1mL/kcal)     Nutrient Intake: <25%      [] Previous Nutrition Diagnosis: Severe protein calorie malnutrition in the context of chronic illness            [x] Ongoing          [] Resolved     Nutrition Intervention:meals and snacks,medical food supplements      Goal/Expected Outcome: Goal/Expected Outcome: Pt to consume and tolerate >75% of meals/snacks and PO supplements in 3-5 days.     Nutrition Monitoring:diet order,energy intake,body composition,NFPF, renal profile      Recommendation:  1. Add Nepro BID (960kcal/38g PRO)   2. Add magic Cup BID (580kcal/18g PRO)   3. no need for renal restriction as pt not on HD and Phos/K are WDL   4. cont w/ DASH modifier   5. encourage and assist with PO intake     high risk f/u in 3-5 days   RD remains available: Vilma Sim, RDN [phone] Registered Dietitian Follow-Up     Patient Profile Reviewed                           Yes [x]   No []     Nutrition History Previously Obtained        Yes [x]  No []       Pertinent Subjective Information: Pt reports poor po/appetite, consuming <25% of all meals. Discussed nutrition supplements, willing to trail. Previously recommended diet order not active.      Pertinent Medical Interventions:  --ventricular tachycardia  --a-fib/flutter s/p ablation  --- pt is being planned now for cardiac cath (likely tomorrow).     Diet order:  Diet, DASH/TLC:   Sodium & Cholesterol Restricted  For patients receiving Renal Replacement - No Protein Restr, No Conc K, No Conc Phos, Low Sodium (24 @ 17:00) [Active]    Anthropometrics:  Height (cm): 174 (24 @ 05:57)  Weight (kg): 57.8 (24 @ 05:57)  BMI (kg/m2): 19.1 (24 @ 05:57)  IBW: 73kg    Daily Weight in k (), Weight in k.2 (), Weight in k (), Weight in k.8 ()    MEDICATIONS  (STANDING):  aMIOdarone    Tablet 200 milliGRAM(s) Oral two times a day  aspirin enteric coated 81 milliGRAM(s) Oral daily  atorvastatin 40 milliGRAM(s) Oral at bedtime  methocarbamol 500 milliGRAM(s) Oral every 8 hours  mycophenolate mofetil 1000 milliGRAM(s) Oral daily  oxyCODONE  ER Tablet 20 milliGRAM(s) Oral every 12 hours  pantoprazole    Tablet 40 milliGRAM(s) Oral before breakfast  regadenoson Injectable 0.4 milliGRAM(s) IV Push once  tacrolimus 3 milliGRAM(s) Oral with breakfast  tacrolimus 2 milliGRAM(s) Oral at bedtime  tamsulosin 0.4 milliGRAM(s) Oral every 12 hours    MEDICATIONS  (PRN):  heparin   Injectable 3500 Unit(s) IV Push every 6 hours PRN For aPTT less than 40  ondansetron Injectable 4 milliGRAM(s) IV Push three times a day PRN Nausea and/or Vomiting    Pertinent Labs:  @ 07:23: Na 137, BUN 20, Cr 1.8<H>, <H>, K+ 4.5, Phos --, Mg 2.1, Alk Phos 43, ALT/SGPT 7, AST/SGOT 13, HbA1c --    Finger Sticks:  POCT Blood Glucose.: 131 mg/dL ( @ 07:32)    Physical Findings:  - Appearance: WDL; no edema  - GI function: No discomfort, LBM 5 days ago   - Tubes: n/a   - Oral/Mouth cavity: none  - Skin: surgical incision      Nutrition Requirements: per initial RD assessment   Weight Used: IBW 73kg      Estimated Energy Needs    Continue [x]  Adjust []  1825-2190kcal/day (25-30kcal/kg of IBW) -Due to CKD and PCM     Estimated Protein Needs    Continue [x]  Adjust []  73-88grams/day (1-1.2grams/kg of IBW) -Due to PCM but with consideration for low GFR     Estimated Fluid Needs        Continue [x]  Adjust []  1825-2190mL/day (1mL/kcal)     Nutrient Intake: <25%      [] Previous Nutrition Diagnosis: Severe protein calorie malnutrition in the context of chronic illness            [x] Ongoing          [] Resolved     Nutrition Intervention:meals and snacks,medical food supplements      Goal/Expected Outcome: Goal/Expected Outcome: Pt to consume and tolerate >75% of meals/snacks and PO supplements in 3-5 days.     Nutrition Monitoring:diet order,energy intake,body composition,NFPF, renal profile      Recommendation:  1. Add Nepro BID (960kcal/38g PRO)   2. Add magic Cup BID (580kcal/18g PRO)   3. no need for renal restriction as pt not on HD and Phos/K are WDL   4. cont w/ DASH modifier   5. order bowel regimen   6. encourage and assist with PO intake     high risk f/u in 3-5 days   RD remains available: Vilma Sim, RDN [phone]

## 2024-01-30 NOTE — CHART NOTE - NSCHARTNOTESELECT_GEN_ALL_CORE
Brief Op Post Cath Note/Event Note
Event Note
Electrophysiology
Event Note
Nutrition/Event Note
Pre cath note/Event Note

## 2024-01-30 NOTE — OCCUPATIONAL THERAPY INITIAL EVALUATION ADULT - VISUAL ACUITY
+pt reported that right eye vision loss been years, if covers the left eye, he can not see from his right eye +reading glasses doesn't work

## 2024-01-30 NOTE — OCCUPATIONAL THERAPY INITIAL EVALUATION ADULT - FINE MOTOR COORDINATION, RIGHT HAND, FINGER TO NOSE, OT EVAL
decreased distal coordination, dysmetria observed, right hand shaking reported worse than left hand/moderate impairment

## 2024-01-30 NOTE — OCCUPATIONAL THERAPY INITIAL EVALUATION ADULT - SHORT TERM MEMORY, REHAB EVAL
able to repeat 3/3 words immediately; able to recall 2/3 words with 2 options provided post ~10 minutes, minimal impairment/impaired

## 2024-01-30 NOTE — OCCUPATIONAL THERAPY INITIAL EVALUATION ADULT - BATHING, PREVIOUS LEVEL OF FUNCTION, OT EVAL
+shower chair and grab bar, reported sometimes he would require assistance from HHA, but most of time, he would be able to perform the task independently/independent

## 2024-01-30 NOTE — OCCUPATIONAL THERAPY INITIAL EVALUATION ADULT - ADDITIONAL COMMENTS
+pt reported that he had 6 hours/6 days HHA to assist him IADLs(cleaning, cooking, laundry) +pt stated that he was independent prior using sc at home, independent in BADLs(except bathing activity sometimes required assistance from HHA), and independent in transfers

## 2024-01-30 NOTE — OCCUPATIONAL THERAPY INITIAL EVALUATION ADULT - PATIENT PROFILE REVIEW, REHAB EVAL
Henrique Attempted: 12:09am pt chart thoroughly reviewed prior to OT evaluation/yes
Pt's chart reviewed prior to OT eval. Eval time: 09:20-09:50/yes

## 2024-01-31 ENCOUNTER — TRANSCRIPTION ENCOUNTER (OUTPATIENT)
Age: 69
End: 2024-01-31

## 2024-01-31 LAB
ALBUMIN SERPL ELPH-MCNC: 3.2 G/DL — LOW (ref 3.5–5.2)
ALP SERPL-CCNC: 42 U/L — SIGNIFICANT CHANGE UP (ref 30–115)
ALT FLD-CCNC: 7 U/L — SIGNIFICANT CHANGE UP (ref 0–41)
ANION GAP SERPL CALC-SCNC: 9 MMOL/L — SIGNIFICANT CHANGE UP (ref 7–14)
AST SERPL-CCNC: 14 U/L — SIGNIFICANT CHANGE UP (ref 0–41)
BASOPHILS # BLD AUTO: 0.01 K/UL — SIGNIFICANT CHANGE UP (ref 0–0.2)
BASOPHILS NFR BLD AUTO: 0.3 % — SIGNIFICANT CHANGE UP (ref 0–1)
BILIRUB SERPL-MCNC: 0.7 MG/DL — SIGNIFICANT CHANGE UP (ref 0.2–1.2)
BUN SERPL-MCNC: 19 MG/DL — SIGNIFICANT CHANGE UP (ref 10–20)
CALCIUM SERPL-MCNC: 10 MG/DL — SIGNIFICANT CHANGE UP (ref 8.4–10.5)
CHLORIDE SERPL-SCNC: 100 MMOL/L — SIGNIFICANT CHANGE UP (ref 98–110)
CO2 SERPL-SCNC: 21 MMOL/L — SIGNIFICANT CHANGE UP (ref 17–32)
CREAT SERPL-MCNC: 1.8 MG/DL — HIGH (ref 0.7–1.5)
EGFR: 41 ML/MIN/1.73M2 — LOW
EOSINOPHIL # BLD AUTO: 0.01 K/UL — SIGNIFICANT CHANGE UP (ref 0–0.7)
EOSINOPHIL NFR BLD AUTO: 0.3 % — SIGNIFICANT CHANGE UP (ref 0–8)
GLUCOSE BLDC GLUCOMTR-MCNC: 120 MG/DL — HIGH (ref 70–99)
GLUCOSE BLDC GLUCOMTR-MCNC: 121 MG/DL — HIGH (ref 70–99)
GLUCOSE BLDC GLUCOMTR-MCNC: 131 MG/DL — HIGH (ref 70–99)
GLUCOSE BLDC GLUCOMTR-MCNC: 132 MG/DL — HIGH (ref 70–99)
GLUCOSE SERPL-MCNC: 104 MG/DL — HIGH (ref 70–99)
HCT VFR BLD CALC: 22.7 % — LOW (ref 42–52)
HGB BLD-MCNC: 7.2 G/DL — LOW (ref 14–18)
IMM GRANULOCYTES NFR BLD AUTO: 1 % — HIGH (ref 0.1–0.3)
LYMPHOCYTES # BLD AUTO: 0.36 K/UL — LOW (ref 1.2–3.4)
LYMPHOCYTES # BLD AUTO: 12 % — LOW (ref 20.5–51.1)
MAGNESIUM SERPL-MCNC: 1.7 MG/DL — LOW (ref 1.8–2.4)
MCHC RBC-ENTMCNC: 29.8 PG — SIGNIFICANT CHANGE UP (ref 27–31)
MCHC RBC-ENTMCNC: 31.7 G/DL — LOW (ref 32–37)
MCV RBC AUTO: 93.8 FL — SIGNIFICANT CHANGE UP (ref 80–94)
MONOCYTES # BLD AUTO: 0.21 K/UL — SIGNIFICANT CHANGE UP (ref 0.1–0.6)
MONOCYTES NFR BLD AUTO: 7 % — SIGNIFICANT CHANGE UP (ref 1.7–9.3)
NEUTROPHILS # BLD AUTO: 2.39 K/UL — SIGNIFICANT CHANGE UP (ref 1.4–6.5)
NEUTROPHILS NFR BLD AUTO: 79.4 % — HIGH (ref 42.2–75.2)
NRBC # BLD: 0 /100 WBCS — SIGNIFICANT CHANGE UP (ref 0–0)
PLATELET # BLD AUTO: 59 K/UL — LOW (ref 130–400)
PMV BLD: 13 FL — HIGH (ref 7.4–10.4)
POTASSIUM SERPL-MCNC: 4.6 MMOL/L — SIGNIFICANT CHANGE UP (ref 3.5–5)
POTASSIUM SERPL-SCNC: 4.6 MMOL/L — SIGNIFICANT CHANGE UP (ref 3.5–5)
PROT SERPL-MCNC: 4.9 G/DL — LOW (ref 6–8)
RBC # BLD: 2.42 M/UL — LOW (ref 4.7–6.1)
RBC # FLD: 18.7 % — HIGH (ref 11.5–14.5)
SODIUM SERPL-SCNC: 130 MMOL/L — LOW (ref 135–146)
WBC # BLD: 3.01 K/UL — LOW (ref 4.8–10.8)
WBC # FLD AUTO: 3.01 K/UL — LOW (ref 4.8–10.8)

## 2024-01-31 PROCEDURE — 99232 SBSQ HOSP IP/OBS MODERATE 35: CPT

## 2024-01-31 RX ORDER — ISOSORBIDE MONONITRATE 60 MG/1
30 TABLET, EXTENDED RELEASE ORAL DAILY
Refills: 0 | Status: DISCONTINUED | OUTPATIENT
Start: 2024-01-31 | End: 2024-02-01

## 2024-01-31 RX ORDER — MAGNESIUM SULFATE 500 MG/ML
2 VIAL (ML) INJECTION ONCE
Refills: 0 | Status: COMPLETED | OUTPATIENT
Start: 2024-01-31 | End: 2024-01-31

## 2024-01-31 RX ADMIN — AMIODARONE HYDROCHLORIDE 200 MILLIGRAM(S): 400 TABLET ORAL at 17:05

## 2024-01-31 RX ADMIN — Medication 25 GRAM(S): at 09:33

## 2024-01-31 RX ADMIN — DULOXETINE HYDROCHLORIDE 30 MILLIGRAM(S): 30 CAPSULE, DELAYED RELEASE ORAL at 05:09

## 2024-01-31 RX ADMIN — POLYETHYLENE GLYCOL 3350 17 GRAM(S): 17 POWDER, FOR SOLUTION ORAL at 12:10

## 2024-01-31 RX ADMIN — TACROLIMUS 3 MILLIGRAM(S): 5 CAPSULE ORAL at 09:32

## 2024-01-31 RX ADMIN — METHOCARBAMOL 500 MILLIGRAM(S): 500 TABLET, FILM COATED ORAL at 05:13

## 2024-01-31 RX ADMIN — ATORVASTATIN CALCIUM 40 MILLIGRAM(S): 80 TABLET, FILM COATED ORAL at 22:39

## 2024-01-31 RX ADMIN — TAMSULOSIN HYDROCHLORIDE 0.4 MILLIGRAM(S): 0.4 CAPSULE ORAL at 17:05

## 2024-01-31 RX ADMIN — OXYCODONE HYDROCHLORIDE 20 MILLIGRAM(S): 5 TABLET ORAL at 17:11

## 2024-01-31 RX ADMIN — Medication 20 MILLIGRAM(S): at 05:09

## 2024-01-31 RX ADMIN — APIXABAN 5 MILLIGRAM(S): 2.5 TABLET, FILM COATED ORAL at 17:08

## 2024-01-31 RX ADMIN — OXYCODONE HYDROCHLORIDE 20 MILLIGRAM(S): 5 TABLET ORAL at 05:09

## 2024-01-31 RX ADMIN — Medication 12.5 MILLIGRAM(S): at 05:09

## 2024-01-31 RX ADMIN — Medication 81 MILLIGRAM(S): at 12:07

## 2024-01-31 RX ADMIN — TAMSULOSIN HYDROCHLORIDE 0.4 MILLIGRAM(S): 0.4 CAPSULE ORAL at 05:09

## 2024-01-31 RX ADMIN — AMIODARONE HYDROCHLORIDE 200 MILLIGRAM(S): 400 TABLET ORAL at 05:09

## 2024-01-31 RX ADMIN — METHOCARBAMOL 500 MILLIGRAM(S): 500 TABLET, FILM COATED ORAL at 12:13

## 2024-01-31 RX ADMIN — CHLORHEXIDINE GLUCONATE 1 APPLICATION(S): 213 SOLUTION TOPICAL at 05:08

## 2024-01-31 RX ADMIN — DULOXETINE HYDROCHLORIDE 30 MILLIGRAM(S): 30 CAPSULE, DELAYED RELEASE ORAL at 17:06

## 2024-01-31 RX ADMIN — SENNA PLUS 2 TABLET(S): 8.6 TABLET ORAL at 22:38

## 2024-01-31 RX ADMIN — MYCOPHENOLATE MOFETIL 500 MILLIGRAM(S): 250 CAPSULE ORAL at 05:08

## 2024-01-31 RX ADMIN — TACROLIMUS 2 MILLIGRAM(S): 5 CAPSULE ORAL at 22:38

## 2024-01-31 RX ADMIN — APIXABAN 5 MILLIGRAM(S): 2.5 TABLET, FILM COATED ORAL at 05:09

## 2024-01-31 RX ADMIN — MYCOPHENOLATE MOFETIL 500 MILLIGRAM(S): 250 CAPSULE ORAL at 17:06

## 2024-01-31 RX ADMIN — METHOCARBAMOL 500 MILLIGRAM(S): 500 TABLET, FILM COATED ORAL at 22:38

## 2024-01-31 RX ADMIN — OXYCODONE HYDROCHLORIDE 20 MILLIGRAM(S): 5 TABLET ORAL at 05:57

## 2024-01-31 RX ADMIN — Medication 12.5 MILLIGRAM(S): at 17:06

## 2024-01-31 RX ADMIN — PANTOPRAZOLE SODIUM 40 MILLIGRAM(S): 20 TABLET, DELAYED RELEASE ORAL at 05:08

## 2024-01-31 NOTE — PROGRESS NOTE ADULT - SUBJECTIVE AND OBJECTIVE BOX
PATIENT:  DAVONTE CHOU  [id number]    CHIEF COMPLAINT:  Patient is a 69y old  Male who presents with a chief complaint of chest pain , ventricular tachycardia (26 Jan 2024 12:46)      INTERVAL HISTORY/OVERNIGHT EVENTS:      REVIEW OF SYSTEMS:    Constitutional:     [x ] negative [ ] fevers [ ] chills [ ] weight loss [ ] weight gain  HEENT:                  [x ] negative [ ] dry eyes [ ] eye irritation [ ] postnasal drip [ ] nasal congestion  CV:                         [ ] negative  [x ] chest pain over LR site [ ] orthopnea [ ] palpitations [ ] murmur  Resp:                     [ x] negative [ ] cough [ ] shortness of breath [ ] dyspnea [ ] wheezing [ ] sputum [ ] hemoptysis  GI:                          [ ] negative [ ] nausea [x ] vomiting [ ] diarrhea [ ] constipation [ ] abd pain [x ] dysphagia   :                        [x ] negative [ ] dysuria [ ] nocturia [ ] hematuria [ ] increased urinary frequency  Musculoskeletal: [x ] negative [ ] back pain [ ] myalgias [ ] arthralgias [ ] fracture  Skin:                       [x ] negative [ ] rash [ ] itch    MEDICATIONS:  MEDICATIONS  (STANDING):  aMIOdarone    Tablet 200 milliGRAM(s) Oral two times a day  aspirin enteric coated 81 milliGRAM(s) Oral daily  atorvastatin 40 milliGRAM(s) Oral at bedtime  calcitriol   Capsule 0.5 MICROGram(s) Oral every 12 hours  chlorhexidine 2% Cloths 1 Application(s) Topical <User Schedule>  DULoxetine 30 milliGRAM(s) Oral at bedtime  furosemide    Tablet 20 milliGRAM(s) Oral <User Schedule>  heparin  Infusion. 850 Unit(s)/Hr (8.5 mL/Hr) IV Continuous <Continuous>  methocarbamol 500 milliGRAM(s) Oral every 8 hours  mycophenolate mofetil 1000 milliGRAM(s) Oral daily  pantoprazole    Tablet 40 milliGRAM(s) Oral before breakfast  regadenoson Injectable 0.4 milliGRAM(s) IV Push once  tacrolimus 3 milliGRAM(s) Oral with breakfast  tacrolimus 2 milliGRAM(s) Oral at bedtime  tamsulosin 0.4 milliGRAM(s) Oral every 12 hours    MEDICATIONS  (PRN):  acetaminophen     Tablet .. 650 milliGRAM(s) Oral every 6 hours PRN Mild Pain (1 - 3)  heparin   Injectable 3500 Unit(s) IV Push every 6 hours PRN For aPTT less than 40  HYDROmorphone  Injectable 0.5 milliGRAM(s) IV Push every 6 hours PRN Severe Pain (7 - 10)  ondansetron Injectable 4 milliGRAM(s) IV Push three times a day PRN Nausea and/or Vomiting  oxyCODONE    IR 15 milliGRAM(s) Oral three times a day PRN Severe Pain (7 - 10)      ALLERGIES:  Allergies    Rifuxen (Swelling)  Rituxan (Hives)    Intolerances        OBJECTIVE:  ICU Vital Signs Last 24 Hrs  T(C): 36.6 (27 Jan 2024 07:46), Max: 36.8 (27 Jan 2024 03:54)  T(F): 97.9 (27 Jan 2024 07:46), Max: 98.2 (27 Jan 2024 03:54)  HR: 73 (27 Jan 2024 07:46) (70 - 77)  BP: 113/56 (27 Jan 2024 07:46) (113/56 - 145/75)  BP(mean): 81 (27 Jan 2024 07:46) (74 - 100)  ABP: --  ABP(mean): --  RR: 20 (27 Jan 2024 07:46) (18 - 22)  SpO2: 100% (26 Jan 2024 19:40) (100% - 100%)    O2 Parameters below as of 26 Jan 2024 19:40  Patient On (Oxygen Delivery Method): room air        I&O's Summary    26 Jan 2024 07:01  -  27 Jan 2024 07:00  --------------------------------------------------------  IN: 427 mL / OUT: 675 mL / NET: -248 mL    27 Jan 2024 07:01  -  27 Jan 2024 10:35  --------------------------------------------------------  IN: 137 mL / OUT: 350 mL / NET: -213 mL      Daily     Daily     GENERAL: NAD, HEAD: NCAD,   NECK: Supple  HEART: Irregular rhythm, normal S1/S2,  LUNGS: No acute respiratory distress, clear b/l breath sounds,  ABDOMEN:  soft, non-tender, non-distented, + BS,   EXTREMITIES: no rashes, extremities warm/dry,   NERVOUS SYSTEM:  A&Ox3, CNII-XII intact, follows commands, answers questions appropriately   SKIN: No rashes or lesions       LABS:                          8.9    2.73  )-----------( 75       ( 27 Jan 2024 05:33 )             27.8     01-27    134<L>  |  100  |  41<H>  ----------------------------<  108<H>  4.2   |  22  |  2.6<H>    Ca    10.6<H>      27 Jan 2024 05:33  Mg     2.0     01-27    TPro  5.6<L>  /  Alb  3.7  /  TBili  0.6  /  DBili  x   /  AST  27  /  ALT  8   /  AlkPhos  49  01-26    LIVER FUNCTIONS - ( 26 Jan 2024 05:12 )  Alb: 3.7 g/dL / Pro: 5.6 g/dL / ALK PHOS: 49 U/L / ALT: 8 U/L / AST: 27 U/L / GGT: x           PT/INR - ( 27 Jan 2024 05:33 )   PT: 12.20 sec;   INR: 1.07 ratio         PTT - ( 27 Jan 2024 05:33 )  PTT:50.6 sec        Urinalysis Basic - ( 27 Jan 2024 05:33 )    Color: x / Appearance: x / SG: x / pH: x  Gluc: 108 mg/dL / Ketone: x  / Bili: x / Urobili: x   Blood: x / Protein: x / Nitrite: x   Leuk Esterase: x / RBC: x / WBC x   Sq Epi: x / Non Sq Epi: x / Bacteria: x    
CHIEF COMPLAINT:    Patient is a 69y old  Male who presents with a chief complaint of CAD and ventricular tachycardia    INTERVAL HPI/OVERNIGHT EVENTS:    Patient seen and examined at bedside. No acute overnight events occurred.    ROS: Denies SOB, chest pain. All other systems are negative.    Medications:  Standing  aMIOdarone    Tablet 200 milliGRAM(s) Oral two times a day  aspirin enteric coated 81 milliGRAM(s) Oral daily  atorvastatin 40 milliGRAM(s) Oral at bedtime  calcitriol   Capsule 0.5 MICROGram(s) Oral every 12 hours  chlorhexidine 2% Cloths 1 Application(s) Topical <User Schedule>  DULoxetine 30 milliGRAM(s) Oral two times a day  lidocaine 2%/epinephrine 1:200,000 Inj 5 milliLiter(s) Local Injection once  methocarbamol 500 milliGRAM(s) Oral every 8 hours  mycophenolate mofetil 1000 milliGRAM(s) Oral daily  oxyCODONE  ER Tablet 20 milliGRAM(s) Oral every 12 hours  pantoprazole    Tablet 40 milliGRAM(s) Oral before breakfast  regadenoson Injectable 0.4 milliGRAM(s) IV Push once  sodium chloride 0.9%. 1000 milliLiter(s) IV Continuous <Continuous>  tacrolimus 3 milliGRAM(s) Oral with breakfast  tacrolimus 2 milliGRAM(s) Oral at bedtime  tamsulosin 0.4 milliGRAM(s) Oral every 12 hours    PRN Meds  acetaminophen     Tablet .. 650 milliGRAM(s) Oral every 6 hours PRN  heparin   Injectable 3500 Unit(s) IV Push every 6 hours PRN  ondansetron Injectable 4 milliGRAM(s) IV Push three times a day PRN        Vital Signs:    T(F): 97.4 (24 @ 04:00), Max: 97.7 (24 @ 20:48)  HR: 69 (24 @ 04:00) (69 - 76)  BP: 120/63 (24 @ 04:00) (114/56 - 143/83)  RR: 18 (24 @ 04:00) (18 - 18)  SpO2: 98% (24 @ 20:48) (98% - 98%)  I&O's Summary    2024 07:01  -  2024 07:00  --------------------------------------------------------  IN: 1790 mL / OUT: 200 mL / NET: 1590 mL    2024 07:01  -  2024 13:22  --------------------------------------------------------  IN: 1010 mL / OUT: 250 mL / NET: 760 mL      Daily     Daily Weight in k.2 (2024 04:00)  CAPILLARY BLOOD GLUCOSE      POCT Blood Glucose.: 137 mg/dL (2024 21:36)      PHYSICAL EXAM:  GENERAL:  NAD  SKIN: No rashes or lesions. Chest wall swelling decreased  HEENT: Atraumatic. Normocephalic. Anicteric  NECK:  No JVD.   PULMONARY: Clear to ausculation bilaterally. No wheezing. No rales  CVS: Normal S1, S2. Regular rate and rhythm. No murmurs.  ABDOMEN/GI: Soft, Nontender, Nondistended; Bowel sounds are present  EXTREMITIES:  No edema B/L LE.  NEUROLOGIC:  No motor deficit.  PSYCH: Alert & oriented x 3, normal affect      LABS:                        8.1    2.35  )-----------( 54       ( 2024 06:49 )             26.0         134<L>  |  103  |  29<H>  ----------------------------<  101<H>  4.4   |  24  |  2.1<H>    Ca    10.3      2024 06:49  Mg     1.6           PT/INR - ( 2024 06:49 )   PT: 12.10 sec;   INR: 1.06 ratio         PTT - ( 2024 06:49 )  PTT:28.7 sec          RADIOLOGY & ADDITIONAL TESTS:  Imaging or report Personally Reviewed:  [ ] YES  [ ] NO -->no new images    Telemetry reviewed independently - NSR, no acute events  EKG reviewed independently -->no new EKGs    Consultant(s) Notes Reviewed:  [ ] YES  [ ] NO  Care Discussed with Consultants/Other Providers [ ] YES  [ ] NO    Case discussed with resident  Care discussed with pt        
[facility] FOLLOW UP NOTE  --------------------------------------------------------------------------------  24 hour events/subjective: Patient examined. Appears comfortable.    PAST HISTORY  --------------------------------------------------------------------------------  No significant changes to PMH, PSH, FHx, SHx, unless otherwise noted    ALLERGIES & MEDICATIONS  --------------------------------------------------------------------------------  Allergies    Rifuxen (Swelling)  Rituxan (Hives)    Standing Inpatient Medications  aMIOdarone    Tablet 200 milliGRAM(s) Oral two times a day  aspirin enteric coated 81 milliGRAM(s) Oral daily  atorvastatin 40 milliGRAM(s) Oral at bedtime  calcitriol   Capsule 0.5 MICROGram(s) Oral every 12 hours  chlorhexidine 2% Cloths 1 Application(s) Topical <User Schedule>  DULoxetine 30 milliGRAM(s) Oral two times a day  lidocaine 2%/epinephrine 1:200,000 Inj 5 milliLiter(s) Local Injection once  methocarbamol 500 milliGRAM(s) Oral every 8 hours  mycophenolate mofetil 1000 milliGRAM(s) Oral daily  oxyCODONE  ER Tablet 20 milliGRAM(s) Oral every 12 hours  pantoprazole    Tablet 40 milliGRAM(s) Oral before breakfast  regadenoson Injectable 0.4 milliGRAM(s) IV Push once  sodium chloride 0.9%. 1000 milliLiter(s) IV Continuous <Continuous>  tacrolimus 3 milliGRAM(s) Oral with breakfast  tacrolimus 2 milliGRAM(s) Oral at bedtime  tamsulosin 0.4 milliGRAM(s) Oral every 12 hours    PRN Inpatient Medications  acetaminophen     Tablet .. 650 milliGRAM(s) Oral every 6 hours PRN  heparin   Injectable 3500 Unit(s) IV Push every 6 hours PRN  ondansetron Injectable 4 milliGRAM(s) IV Push three times a day PRN    VITALS/PHYSICAL EXAM  --------------------------------------------------------------------------------  T(C): 35.8 (01-29-24 @ 13:43), Max: 36.5 (01-28-24 @ 20:48)  HR: 73 (01-29-24 @ 13:43) (69 - 76)  BP: 134/67 (01-29-24 @ 13:43) (120/63 - 143/83)  RR: 18 (01-29-24 @ 13:43) (18 - 18)  SpO2: 98% (01-28-24 @ 20:48) (98% - 98%)    01-28-24 @ 07:01  -  01-29-24 @ 07:00  --------------------------------------------------------  IN: 1790 mL / OUT: 200 mL / NET: 1590 mL    01-29-24 @ 07:01  -  01-29-24 @ 13:56  --------------------------------------------------------  IN: 1010 mL / OUT: 450 mL / NET: 560 mL    Physical Exam:  	Gen: NAD  	Pulm: CTA B/L  	CV: RRR, S1S2  	Abd: +BS, soft, nontender/nondistended  	: No suprapubic tenderness  	LE: Warm, no edema    LABS/STUDIES  --------------------------------------------------------------------------------              8.1    2.35  >-----------<  54       [01-29-24 @ 06:49]              26.0     134  |  103  |  29  ----------------------------<  101      [01-29-24 @ 06:49]  4.4   |  24  |  2.1        Ca     10.3     [01-29-24 @ 06:49]      Mg     1.6     [01-29-24 @ 06:49]    PT/INR: PT 12.10, INR 1.06       [01-29-24 @ 06:49]  PTT: 28.7       [01-29-24 @ 06:49]    Creatinine Trend:  SCr 2.1 [01-29 @ 06:49]  SCr 2.5 [01-28 @ 06:25]  SCr 2.6 [01-27 @ 05:33]  SCr 2.5 [01-26 @ 05:12]  SCr 2.0 [01-25 @ 05:14]    Urinalysis - [01-29-24 @ 06:49]      Color  / Appearance  / SG  / pH       Gluc 101 / Ketone   / Bili  / Urobili        Blood  / Protein  / Leuk Est  / Nitrite       RBC  / WBC  / Hyaline  / Gran  / Sq Epi  / Non Sq Epi  / Bacteria     Urine Creatinine 58      [01-24-24 @ 13:35]  Urine Protein 19      [01-24-24 @ 13:35]  Urine Sodium 38.0      [01-24-24 @ 13:35]  Urine Osmolality 369      [01-24-24 @ 20:06]    HbA1c 5.1      [11-06-19 @ 15:00]  TSH 0.49      [12-18-23 @ 08:36]    
24H events:    Patient is a 69y old Male who presents with a chief complaint of   Primary diagnosis of V tach      Today is hospital day 1d. This morning patient was seen and examined at bedside, resting comfortably in bed.    No acute or major events overnight. Hemodynamically stable, tolerating oral diet, voiding appropriately with appropriate bowel movements.     Code Status: full code       PAST MEDICAL & SURGICAL HISTORY  CKD (chronic kidney disease)    ESRD (end stage renal disease)    HTN (hypertension)    HLD (hyperlipidemia)    Atrial fibrillation  on eliquis    COPD, mild  not on O2    Peripheral neuropathy  unclear cause    Lymphoma  ?questionable, not treated, not followed    Melanoma  R eye, s/p laser    Cirrhosis  possibly related to hepC    Breast cancer    Legally blind    Stroke    History of kidney transplant  3 years ago    S/P arteriovenous (AV) fistula creation  prior old fistula in R arm    S/P lumpectomy, right breast    History of back surgery  s/p Left L5-S1 endoscopic laminoforaminotomy      SOCIAL HISTORY:  Social History:      ALLERGIES:  Rifuxen (Swelling)  Rituxan (Hives)    MEDICATIONS:  STANDING MEDICATIONS  aMIOdarone Infusion 0.999 mG/Min IV Continuous <Continuous>  aMIOdarone Infusion 0.5 mG/Min IV Continuous <Continuous>  aspirin enteric coated 81 milliGRAM(s) Oral daily  atorvastatin 40 milliGRAM(s) Oral at bedtime  calcitriol   Capsule 0.5 MICROGram(s) Oral every 12 hours  chlorhexidine 2% Cloths 1 Application(s) Topical <User Schedule>  furosemide    Tablet 20 milliGRAM(s) Oral <User Schedule>  heparin  Infusion.  Unit(s)/Hr IV Continuous <Continuous>  mycophenolate mofetil 1000 milliGRAM(s) Oral daily  pantoprazole    Tablet 40 milliGRAM(s) Oral before breakfast  regadenoson Injectable 0.4 milliGRAM(s) IV Push once  tacrolimus 3 milliGRAM(s) Oral with breakfast  tacrolimus 2 milliGRAM(s) Oral at bedtime  tamsulosin 0.4 milliGRAM(s) Oral every 12 hours    PRN MEDICATIONS  acetaminophen     Tablet .. 650 milliGRAM(s) Oral every 6 hours PRN    VITALS:   T(F): 97.5  HR: 64  BP: 111/59  RR: 18  SpO2: 96%    PHYSICAL EXAM:  GENERAL: NAD, HEAD: NCAD,   NECK: Supple  HEART: Irregular rhythm, normal S1/S2,  LUNGS: No acute respiratory distress, clear b/l breath sounds,  ABDOMEN:  soft, non-tender, non-distented, + BS,   EXTREMITIES: no rashes, extremities warm/dry,   NERVOUS SYSTEM:  A&Ox3, CNII-XII intact, follows commands, answers questions appropriately   SKIN: No rashes or lesions       LABS:                        9.4    2.97  )-----------( 89       ( 25 Jan 2024 05:14 )             29.3     01-25    133<L>  |  100  |  42<H>  ----------------------------<  111<H>  4.0   |  22  |  2.0<H>    Ca    10.1      25 Jan 2024 05:14  Phos  2.3     01-24  Mg     2.0     01-25    TPro  5.8<L>  /  Alb  3.7  /  TBili  0.6  /  DBili  x   /  AST  44<H>  /  ALT  9   /  AlkPhos  48  01-25    PTT - ( 25 Jan 2024 05:18 )  PTT:56.7 sec  Urinalysis Basic - ( 25 Jan 2024 05:14 )    Color: x / Appearance: x / SG: x / pH: x  Gluc: 111 mg/dL / Ketone: x  / Bili: x / Urobili: x   Blood: x / Protein: x / Nitrite: x   Leuk Esterase: x / RBC: x / WBC x   Sq Epi: x / Non Sq Epi: x / Bacteria: x                RADIOLOGY:    RADIOLOGY    
24H events:    Patient is a 69y old Male who presents with a chief complaint of Tachycardia (26 Jan 2024 12:23)    Primary diagnosis of V tach    Today is hospital day 2d. This morning patient was seen and examined at bedside, resting comfortably in bed.    No acute or major events overnight. Hemodynamically stable, tolerating oral diet, voiding appropriately with appropriate bowel movements.     Code Status: full code      PAST MEDICAL & SURGICAL HISTORY  CKD (chronic kidney disease)    ESRD (end stage renal disease)    HTN (hypertension)    HLD (hyperlipidemia)    Atrial fibrillation  on eliquis    COPD, mild  not on O2    Peripheral neuropathy  unclear cause    Lymphoma  ?questionable, not treated, not followed    Melanoma  R eye, s/p laser    Cirrhosis  possibly related to hepC    Breast cancer    Legally blind    Stroke    History of kidney transplant  3 years ago    S/P arteriovenous (AV) fistula creation  prior old fistula in R arm    S/P lumpectomy, right breast    History of back surgery  s/p Left L5-S1 endoscopic laminoforaminotomy      SOCIAL HISTORY:  Social History:      ALLERGIES:  Rifuxen (Swelling)  Rituxan (Hives)    MEDICATIONS:  STANDING MEDICATIONS  aMIOdarone    Tablet 200 milliGRAM(s) Oral two times a day  aspirin enteric coated 81 milliGRAM(s) Oral daily  atorvastatin 40 milliGRAM(s) Oral at bedtime  calcitriol   Capsule 0.5 MICROGram(s) Oral every 12 hours  chlorhexidine 2% Cloths 1 Application(s) Topical <User Schedule>  DULoxetine 30 milliGRAM(s) Oral at bedtime  furosemide    Tablet 20 milliGRAM(s) Oral <User Schedule>  heparin  Infusion.  Unit(s)/Hr IV Continuous <Continuous>  methocarbamol 500 milliGRAM(s) Oral every 8 hours  mycophenolate mofetil 1000 milliGRAM(s) Oral daily  pantoprazole    Tablet 40 milliGRAM(s) Oral before breakfast  regadenoson Injectable 0.4 milliGRAM(s) IV Push once  tacrolimus 3 milliGRAM(s) Oral with breakfast  tacrolimus 2 milliGRAM(s) Oral at bedtime  tamsulosin 0.4 milliGRAM(s) Oral every 12 hours    PRN MEDICATIONS  acetaminophen     Tablet .. 650 milliGRAM(s) Oral every 6 hours PRN  HYDROmorphone  Injectable 2 milliGRAM(s) IV Push once PRN  HYDROmorphone  Injectable 0.5 milliGRAM(s) IV Push every 6 hours PRN  oxyCODONE    IR 15 milliGRAM(s) Oral three times a day PRN    VITALS:   T(F): 98.8  HR: 69  BP: 154/49  RR: 16  SpO2: 100%    PHYSICAL EXAM:  GENERAL: NAD, HEAD: NCAD,   NECK: Supple  HEART: Irregular rhythm, normal S1/S2,  LUNGS: No acute respiratory distress, clear b/l breath sounds,  ABDOMEN:  soft, non-tender, non-distented, + BS,   EXTREMITIES: no rashes, extremities warm/dry,   NERVOUS SYSTEM:  A&Ox3, CNII-XII intact, follows commands, answers questions appropriately   SKIN: No rashes or lesions         LABS:                        9.2    3.02  )-----------( 81       ( 26 Jan 2024 05:12 )             28.4     01-26    135  |  102  |  40<H>  ----------------------------<  107<H>  4.4   |  21  |  2.5<H>    Ca    10.3      26 Jan 2024 05:12  Mg     2.0     01-26    TPro  5.6<L>  /  Alb  3.7  /  TBili  0.6  /  DBili  x   /  AST  27  /  ALT  8   /  AlkPhos  49  01-26    PT/INR - ( 26 Jan 2024 05:12 )   PT: 12.70 sec;   INR: 1.11 ratio         PTT - ( 26 Jan 2024 08:46 )  PTT:56.4 sec  Urinalysis Basic - ( 26 Jan 2024 05:12 )    Color: x / Appearance: x / SG: x / pH: x  Gluc: 107 mg/dL / Ketone: x  / Bili: x / Urobili: x   Blood: x / Protein: x / Nitrite: x   Leuk Esterase: x / RBC: x / WBC x   Sq Epi: x / Non Sq Epi: x / Bacteria: x                RADIOLOGY:    RADIOLOGY    
[facility] FOLLOW UP NOTE  --------------------------------------------------------------------------------  24 hour events/subjective: Patient examined. Appears comfortable.    PAST HISTORY  --------------------------------------------------------------------------------  No significant changes to PMH, PSH, FHx, SHx, unless otherwise noted    ALLERGIES & MEDICATIONS  --------------------------------------------------------------------------------  Allergies    Rifuxen (Swelling)  Rituxan (Hives)    Standing Inpatient Medications  aMIOdarone    Tablet 200 milliGRAM(s) Oral two times a day  apixaban 5 milliGRAM(s) Oral two times a day  aspirin enteric coated 81 milliGRAM(s) Oral daily  atorvastatin 40 milliGRAM(s) Oral at bedtime  chlorhexidine 2% Cloths 1 Application(s) Topical <User Schedule>  DULoxetine 30 milliGRAM(s) Oral two times a day  furosemide    Tablet 20 milliGRAM(s) Oral daily  lidocaine 2%/epinephrine 1:200,000 Inj 5 milliLiter(s) Local Injection once  methocarbamol 500 milliGRAM(s) Oral every 8 hours  metoprolol tartrate 12.5 milliGRAM(s) Oral two times a day  mycophenolate mofetil 1000 milliGRAM(s) Oral daily  oxyCODONE  ER Tablet 20 milliGRAM(s) Oral every 12 hours  pantoprazole    Tablet 40 milliGRAM(s) Oral before breakfast  polyethylene glycol 3350 17 Gram(s) Oral daily  regadenoson Injectable 0.4 milliGRAM(s) IV Push once  senna 2 Tablet(s) Oral at bedtime  tacrolimus 3 milliGRAM(s) Oral with breakfast  tacrolimus 2 milliGRAM(s) Oral at bedtime  tamsulosin 0.4 milliGRAM(s) Oral every 12 hours    PRN Inpatient Medications  acetaminophen     Tablet .. 650 milliGRAM(s) Oral every 6 hours PRN  ondansetron Injectable 4 milliGRAM(s) IV Push three times a day PRN    VITALS/PHYSICAL EXAM  --------------------------------------------------------------------------------  T(C): 36.4 (01-30-24 @ 12:53), Max: 37.3 (01-30-24 @ 04:56)  HR: 148 (01-30-24 @ 13:29) (70 - 148)  BP: 111/69 (01-30-24 @ 13:29) (96/52 - 169/74)  RR: 18 (01-30-24 @ 13:29) (14 - 22)  SpO2: 91% (01-29-24 @ 18:15) (91% - 95%)    01-29-24 @ 07:01  -  01-30-24 @ 07:00  --------------------------------------------------------  IN: 1660 mL / OUT: 1605 mL / NET: 55 mL    01-30-24 @ 07:01  -  01-30-24 @ 14:56  --------------------------------------------------------  IN: 720 mL / OUT: 620 mL / NET: 100 mL    Physical Exam:  	Gen: NAD  	Pulm: CTA B/L  	CV: RRR, S1S2  	Abd: +BS, soft, nontender/nondistended  	: No suprapubic tenderness  	LE: Warm, no edema    LABS/STUDIES  --------------------------------------------------------------------------------              7.3    3.54  >-----------<  54       [01-30-24 @ 11:07]              23.3     133  |  105  |  19  ----------------------------<  125      [01-30-24 @ 11:07]  4.8   |  22  |  1.6        Ca     9.9     [01-30-24 @ 11:07]      Mg     2.1     [01-30-24 @ 07:23]    TPro  5.2  /  Alb  3.4  /  TBili  0.7  /  DBili  x   /  AST  13  /  ALT  7   /  AlkPhos  43  [01-30-24 @ 07:23]    PT/INR: PT 12.10, INR 1.06       [01-29-24 @ 06:49]  PTT: 28.8       [01-30-24 @ 07:23]    Creatinine Trend:  SCr 1.6 [01-30 @ 11:07]  SCr 1.8 [01-30 @ 07:23]  SCr 2.1 [01-29 @ 06:49]  SCr 2.5 [01-28 @ 06:25]  SCr 2.6 [01-27 @ 05:33]    Urinalysis - [01-30-24 @ 11:07]      Color  / Appearance  / SG  / pH       Gluc 125 / Ketone   / Bili  / Urobili        Blood  / Protein  / Leuk Est  / Nitrite       RBC  / WBC  / Hyaline  / Gran  / Sq Epi  / Non Sq Epi  / Bacteria     Urine Creatinine 58      [01-24-24 @ 13:35]  Urine Protein 19      [01-24-24 @ 13:35]  Urine Sodium 38.0      [01-24-24 @ 13:35]  Urine Osmolality 369      [01-24-24 @ 20:06]    HbA1c 5.1      [11-06-19 @ 15:00]  TSH 0.49      [12-18-23 @ 08:36]
CHIEF COMPLAINT:    Patient is a 69y old  Male who presents with a chief complaint of Chest Pain     INTERVAL HPI/OVERNIGHT EVENTS:    Patient seen and examined at bedside. No acute overnight events occurred.    ROS: Denies chest pain, bleeding. All other systems are negative.    Medications:  Standing  aMIOdarone    Tablet 200 milliGRAM(s) Oral two times a day  apixaban 5 milliGRAM(s) Oral two times a day  aspirin enteric coated 81 milliGRAM(s) Oral daily  atorvastatin 40 milliGRAM(s) Oral at bedtime  chlorhexidine 2% Cloths 1 Application(s) Topical <User Schedule>  DULoxetine 30 milliGRAM(s) Oral two times a day  lidocaine 2%/epinephrine 1:200,000 Inj 5 milliLiter(s) Local Injection once  methocarbamol 500 milliGRAM(s) Oral every 8 hours  mycophenolate mofetil 1000 milliGRAM(s) Oral daily  oxyCODONE  ER Tablet 20 milliGRAM(s) Oral every 12 hours  pantoprazole    Tablet 40 milliGRAM(s) Oral before breakfast  regadenoson Injectable 0.4 milliGRAM(s) IV Push once  tacrolimus 3 milliGRAM(s) Oral with breakfast  tacrolimus 2 milliGRAM(s) Oral at bedtime  tamsulosin 0.4 milliGRAM(s) Oral every 12 hours    PRN Meds  acetaminophen     Tablet .. 650 milliGRAM(s) Oral every 6 hours PRN  ondansetron Injectable 4 milliGRAM(s) IV Push three times a day PRN        Vital Signs:    T(F): 99.1 (24 @ 04:56), Max: 99.1 (24 @ 04:56)  HR: 75 (24 @ 04:56) (70 - 77)  BP: 96/52 (24 @ 04:56) (96/52 - 169/74)  RR: 18 (24 @ 04:56) (14 - 22)  SpO2: 91% (24 @ 18:15) (91% - 95%)  I&O's Summary    2024 07:01  -  2024 07:00  --------------------------------------------------------  IN: 1660 mL / OUT: 1605 mL / NET: 55 mL    2024 07:01  -  2024 11:39  --------------------------------------------------------  IN: 360 mL / OUT: 200 mL / NET: 160 mL      Daily     Daily Weight in k (2024 04:56)  CAPILLARY BLOOD GLUCOSE      POCT Blood Glucose.: 144 mg/dL (2024 11:32)  POCT Blood Glucose.: 131 mg/dL (2024 07:32)      PHYSICAL EXAM:  GENERAL:  NAD  SKIN: No rashes or lesions  HEENT: Atraumatic. Normocephalic. Anicteric  NECK:  No JVD.   PULMONARY: Clear to ausculation bilaterally. No wheezing. No rales  CVS: Normal S1, S2. Regular rate and rhythm. No murmurs.  ABDOMEN/GI: Soft, Nontender, Nondistended; Bowel sounds are present  EXTREMITIES:  No edema B/L LE.  NEUROLOGIC:  No motor deficit.  PSYCH: Alert & oriented x 3, normal affect      LABS:                        7.7    3.90  )-----------( 60       ( 2024 07:23 )             25.0         137  |  106  |  20  ----------------------------<  121<H>  4.5   |  21  |  1.8<H>    Ca    10.2      2024 07:23  Mg     2.1         TPro  5.2<L>  /  Alb  3.4<L>  /  TBili  0.7  /  DBili  x   /  AST  13  /  ALT  7   /  AlkPhos  43      PT/INR - ( 2024 06:49 )   PT: 12.10 sec;   INR: 1.06 ratio         PTT - ( 2024 07:23 )  PTT:28.8 sec          RADIOLOGY & ADDITIONAL TESTS:  Imaging or report Personally Reviewed:  [ ] YES  [ ] NO -->no new images    Telemetry reviewed independently - NSR, no acute events  EKG reviewed independently -->no new EKGs    Consultant(s) Notes Reviewed:  [ ] YES  [ ] NO  Care Discussed with Consultants/Other Providers [ ] YES  [ ] NO    Case discussed with resident  Care discussed with pt        
[place] [facility] FOLLOW UP NOTE  --------------------------------------------------------------------------------  24 hour events/subjective: Patient examined. Appears comfortable.    PAST HISTORY  --------------------------------------------------------------------------------  No significant changes to PMH, PSH, FHx, SHx, unless otherwise noted    ALLERGIES & MEDICATIONS  --------------------------------------------------------------------------------  Allergies    Rifuxen (Swelling)  Rituxan (Hives)    Standing Inpatient Medications  aMIOdarone    Tablet 200 milliGRAM(s) Oral two times a day  apixaban 5 milliGRAM(s) Oral two times a day  aspirin enteric coated 81 milliGRAM(s) Oral daily  atorvastatin 40 milliGRAM(s) Oral at bedtime  chlorhexidine 2% Cloths 1 Application(s) Topical <User Schedule>  DULoxetine 30 milliGRAM(s) Oral two times a day  furosemide    Tablet 20 milliGRAM(s) Oral daily  lidocaine 2%/epinephrine 1:200,000 Inj 5 milliLiter(s) Local Injection once  methocarbamol 500 milliGRAM(s) Oral every 8 hours  metoprolol tartrate 12.5 milliGRAM(s) Oral two times a day  mycophenolate mofetil 500 milliGRAM(s) Oral two times a day  oxyCODONE  ER Tablet 20 milliGRAM(s) Oral every 12 hours  pantoprazole    Tablet 40 milliGRAM(s) Oral before breakfast  polyethylene glycol 3350 17 Gram(s) Oral daily  regadenoson Injectable 0.4 milliGRAM(s) IV Push once  senna 2 Tablet(s) Oral at bedtime  tacrolimus 3 milliGRAM(s) Oral with breakfast  tacrolimus 2 milliGRAM(s) Oral at bedtime  tamsulosin 0.4 milliGRAM(s) Oral every 12 hours    PRN Inpatient Medications  acetaminophen     Tablet .. 650 milliGRAM(s) Oral every 6 hours PRN  ondansetron Injectable 4 milliGRAM(s) IV Push three times a day PRN    VITALS/PHYSICAL EXAM  --------------------------------------------------------------------------------  T(C): 36.4 (01-31-24 @ 04:45), Max: 37 (01-30-24 @ 20:23)  HR: 70 (01-31-24 @ 04:45) (67 - 154)  BP: 125/64 (01-31-24 @ 04:45) (111/69 - 146/59)  RR: 18 (01-31-24 @ 04:45) (18 - 18)  SpO2: 98% (01-30-24 @ 15:14) (98% - 98%)    01-30-24 @ 07:01 - 01-31-24 @ 07:00  --------------------------------------------------------  IN: 1420 mL / OUT: 1395 mL / NET: 25 mL    01-31-24 @ 07:01 - 01-31-24 @ 11:58  --------------------------------------------------------  IN: 330 mL / OUT: 300 mL / NET: 30 mL    Physical Exam:  	Gen: NAD  	Pulm: CTA B/L  	CV: RRR, S1S2  	Abd: +BS, soft, nontender/nondistended  	: No suprapubic tenderness  	LE: Warm,no edema    LABS/STUDIES  --------------------------------------------------------------------------------              7.2    3.01  >-----------<  59       [01-31-24 @ 07:18]              22.7     130  |  100  |  19  ----------------------------<  104      [01-31-24 @ 07:18]  4.6   |  21  |  1.8        Ca     10.0     [01-31-24 @ 07:18]      Mg     1.7     [01-31-24 @ 07:18]    TPro  4.9  /  Alb  3.2  /  TBili  0.7  /  DBili  x   /  AST  14  /  ALT  7   /  AlkPhos  42  [01-31-24 @ 07:18]    PTT: 28.8       [01-30-24 @ 07:23]    Creatinine Trend:  SCr 1.8 [01-31 @ 07:18]  SCr 1.6 [01-30 @ 11:07]  SCr 1.8 [01-30 @ 07:23]  SCr 2.1 [01-29 @ 06:49]  SCr 2.5 [01-28 @ 06:25]    Urinalysis - [01-31-24 @ 07:18]      Color  / Appearance  / SG  / pH       Gluc 104 / Ketone   / Bili  / Urobili        Blood  / Protein  / Leuk Est  / Nitrite       RBC  / WBC  / Hyaline  / Gran  / Sq Epi  / Non Sq Epi  / Bacteria     Urine Creatinine 58      [01-24-24 @ 13:35]  Urine Protein 19      [01-24-24 @ 13:35]  Urine Sodium 38.0      [01-24-24 @ 13:35]  Urine Osmolality 369      [01-24-24 @ 20:06]    HbA1c 5.1      [11-06-19 @ 15:00]  TSH 0.49      [12-18-23 @ 08:36]
No new events    Appreciate consult follow ups    Resting comfortably this morning      MEDICATIONS:  MEDICATIONS  (STANDING):  aMIOdarone    Tablet 200 milliGRAM(s) Oral two times a day  apixaban 5 milliGRAM(s) Oral two times a day  aspirin enteric coated 81 milliGRAM(s) Oral daily  atorvastatin 40 milliGRAM(s) Oral at bedtime  chlorhexidine 2% Cloths 1 Application(s) Topical <User Schedule>  DULoxetine 30 milliGRAM(s) Oral two times a day  furosemide    Tablet 20 milliGRAM(s) Oral daily  isosorbide   mononitrate ER Tablet (IMDUR) 30 milliGRAM(s) Oral daily  lidocaine 2%/epinephrine 1:200,000 Inj 5 milliLiter(s) Local Injection once  methocarbamol 500 milliGRAM(s) Oral every 8 hours  metoprolol tartrate 12.5 milliGRAM(s) Oral two times a day  mycophenolate mofetil 500 milliGRAM(s) Oral two times a day  oxyCODONE  ER Tablet 20 milliGRAM(s) Oral every 12 hours  pantoprazole    Tablet 40 milliGRAM(s) Oral before breakfast  polyethylene glycol 3350 17 Gram(s) Oral daily  regadenoson Injectable 0.4 milliGRAM(s) IV Push once  senna 2 Tablet(s) Oral at bedtime  tacrolimus 3 milliGRAM(s) Oral with breakfast  tacrolimus 2 milliGRAM(s) Oral at bedtime  tamsulosin 0.4 milliGRAM(s) Oral every 12 hours    MEDICATIONS  (PRN):  acetaminophen     Tablet .. 650 milliGRAM(s) Oral every 6 hours PRN Mild Pain (1 - 3)  ondansetron Injectable 4 milliGRAM(s) IV Push three times a day PRN Nausea and/or Vomiting      Allergy: Rifuxen (Swelling)  Rituxan (Hives)        VITALS:  T(F): 97.9 (01-31-24 @ 13:40), Max: 98.6 (01-30-24 @ 20:23)  HR: 67 (01-31-24 @ 13:40)  BP: 115/53 (01-31-24 @ 13:40) (112/60 - 125/64)  RR: 18 (01-31-24 @ 04:45)  SpO2: 99% (01-31-24 @ 14:46)    cta b/l  rrr s1s2                      7.2    3.01  )-----------( 59       ( 31 Jan 2024 07:18 )             22.7     PTT - ( 30 Jan 2024 07:23 )  PTT:28.8 sec  01-31    130<L>  |  100  |  19  ----------------------------<  104<H>  4.6   |  21  |  1.8<H>    Ca    10.0      31 Jan 2024 07:18  Mg     1.7     01-31    TPro  4.9<L>  /  Alb  3.2<L>  /  TBili  0.7  /  DBili  x   /  AST  14  /  ALT  7   /  AlkPhos  42  01-31    LIVER FUNCTIONS - ( 31 Jan 2024 07:18 )  Alb: 3.2 g/dL / Pro: 4.9 g/dL / ALK PHOS: 42 U/L / ALT: 7 U/L / AST: 14 U/L / GGT: x           a/p    Ventricular tachycardia  P. a-fib/flutter s/p ablation  CAD s/p PCI, HFpEF  now on PO amio  - Stress test done: Moderate to large size reversible defect in the anterior wall extending to apex of the left ventricle consistent with ischemia.  - s/p cardiac cath yesterday - showed LAD: Mid LAD 70% stenosis at the site of bifurcation with D, D1 ostial 80% stenosis supplying small vascular territory. No stents placed due difficult location  - fluids not resumed after cath yesterday due to elevated LVEDP. Scr downtrended so start lasix if BP stable    Bleeding  - 1/28 Dressing changed around 8 am.   - bleeding stopped, swelling resolved  - restart Eliquis today and monitor for bleeding    [facility]A on CKD III s/p renal transplant  -c/w home mycophenolate and tacrolimus  -Cr baseline around 1.8  - lasix     Magnesium deficiency  - supplement orally    HLD  -c/w home atorvastatin     Lumbar radiculopathy   - c/w home oxycodone 15mg TID  - started duloxetine 30mg qHS    #Progress Note Handoff:  Pending (specify):  Monitor SCr, monitor bleeding on eliquis              
Patient was seen and examined earlier this morning by me on 3C.  EMR reviewed.    He is comfortable in bed.  No new complaints.  Denies palpitations.    Vitals:  T(C): 35.8 (01-29-24 @ 13:43), Max: 36.8 (01-27-24 @ 15:44)  HR: 73 (01-29-24 @ 13:43) (69 - 76)  BP: 134/67 (01-29-24 @ 13:43) (114/56 - 143/83)  RR: 18 (01-29-24 @ 13:43) (18 - 28)  SpO2: 98% (01-28-24 @ 20:48) (97% - 99%)    Telemetry: Sinus Rhythm    LABS:                        8.1    2.35  )-----------( 54       ( 29 Jan 2024 06:49 )             26.0     01-29    134<L>  |  103  |  29<H>  ----------------------------<  101<H>  4.4   |  24  |  2.1<H>    Ca    10.3      29 Jan 2024 06:49  Mg     1.6     01-29      PT/INR - ( 29 Jan 2024 06:49 )   PT: 12.10 sec;   INR: 1.06 ratio         PTT - ( 29 Jan 2024 06:49 )  PTT:28.7 sec  MEDICATIONS  (STANDING):  aMIOdarone    Tablet 200 milliGRAM(s) Oral two times a day  aspirin enteric coated 81 milliGRAM(s) Oral daily  atorvastatin 40 milliGRAM(s) Oral at bedtime  chlorhexidine 2% Cloths 1 Application(s) Topical <User Schedule>  DULoxetine 30 milliGRAM(s) Oral two times a day  lidocaine 2%/epinephrine 1:200,000 Inj 5 milliLiter(s) Local Injection once  methocarbamol 500 milliGRAM(s) Oral every 8 hours  mycophenolate mofetil 1000 milliGRAM(s) Oral daily  oxyCODONE  ER Tablet 20 milliGRAM(s) Oral every 12 hours  pantoprazole    Tablet 40 milliGRAM(s) Oral before breakfast  regadenoson Injectable 0.4 milliGRAM(s) IV Push once  sodium chloride 0.9%. 1000 milliLiter(s) (100 mL/Hr) IV Continuous <Continuous>  tacrolimus 3 milliGRAM(s) Oral with breakfast  tacrolimus 2 milliGRAM(s) Oral at bedtime  tamsulosin 0.4 milliGRAM(s) Oral every 12 hours    MEDICATIONS  (PRN):  acetaminophen     Tablet .. 650 milliGRAM(s) Oral every 6 hours PRN Mild Pain (1 - 3)  heparin   Injectable 3500 Unit(s) IV Push every 6 hours PRN For aPTT less than 40  ondansetron Injectable 4 milliGRAM(s) IV Push three times a day PRN Nausea and/or Vomiting      PHYSICAL EXAM:  Not in distress  Alert, oriented x 3  No JVD; regular rhythm; nl S1S2  Bilateral breath sounds  Abdomen soft  Moving all extremities    < from: CT Chest No Cont (01.06.24 @ 15:34) >  Findings:    Tubes/Lines: None    Lungs, Pleura, and Airways:Trachea and central airways are patent.   Bilateral upper lobe predominant emphysematous changes. No parenchymal   consolidation, pleural effusion or pneumothorax. No honeycombing or   bronchiectasis. Bilateral calcified granulomas. Left lower lobe 0.5 cm   nodule (series 5 image 178), unchanged.    Mediastinum/Lymph Nodes:No enlarged mediastinal, hilar or axillary lymph   nodes.    Heart/Great Vessels: Cardiomegaly without pericardial effusion. Extensive   coronary atherosclerotic calcifications. Ascending thoracic aorta   measures up to 4.2 cm. Main pulmonary artery is normal in caliber.   Aberrant right subclavian artery, congenital variant. Partially imaged   bilateral subclavian vascular stents    Abdomen: Partially imaged upper abdomen demonstrates splenomegaly. Post   cholecystectomy    Bones and soft tissues: No acute osseous abnormality. Degenerative   changes of the spine. Left greater than right gynecomastia      IMPRESSION:    No CT evidence for acute intrathoracic pathology.    Upper lobe predominant emphysematous changes. Left lower lobe 0.5 cm   nodule, unchanged.    Cardiomegaly, unchanged. Dilated ascending thoracic aorta measuring 4.2   cm, unchanged.    Partially imaged splenomegaly.    --- End of Report ---          < end of copied text >      < from: NM Nuclear Stress Pharmacologic Multiple (01.26.24 @ 14:58) >  Reason: Chest pain, abnormal EKG  8 Millicuries 99M technetium sestamibi was injected intravenously at   rest,and SPECT myocardial perfusion images were acquired over a 180   degree arc.  Then, lexiscan infusion was begun by Cardiology, and at the   end of infusion, 25.2millicuries 99m technetium sestamibi was injected   intravenously, and SPECT myocardial perfusion images were acquired in the   same manner as the resting study.  On viewing a cinematic display of the planar images, there is no   significant patient motion  On viewing the tomographic images in color and black and white, bullseye   polar map, and three-dimensional surface reconstruction,  moderate to   large size reversal defect in the anterior wall extending to the apex of   the left ventricle consistent with ischemia best appreciated on short   axis slices 6-13 and HLA slices 28-35 , VLA slices 30-34, corroborated on   naked and quantified bull?s eye data, three dimensional images and SDS   score of 13.  The lexiscan study was acquired as a gated study.  On viewing a cinematic   display of the frames,  there is normal left ventricular wall motion and   wall thickening.  Analysis of the ventriculogram generates a calculated left ventricular   ejection fraction of   74 % which is normal    Impression:  Moderate to large size reversible defect in the anterior wall extending   to apex of the left ventricle consistent with ischemia.    Normal left ventricular wall motion and wall thickening.     Left ventricle ejection fraction calculated as 74% which is within the   range of normal.    --- End of Report ---      < end of copied text >          
Patient was seen and examined earlier today by me on 3C.  EMR reviewed.    He is comfortable in bed.  S/P Cardiac cath on Jan 29, 2024.  No new complaints.  Denies palpitations    Vitals:  T(C): 36.8 (01-30-24 @ 15:14), Max: 37.3 (01-30-24 @ 04:56)  HR: 72 (01-30-24 @ 15:42) (69 - 154)  BP: 132/65 (01-30-24 @ 15:42) (96/52 - 169/74)  RR: 18 (01-30-24 @ 15:14) (14 - 22)  SpO2: 98% (01-30-24 @ 15:14) (91% - 98%)    Telemetry: Sinus Rhythm    LABS:                        7.3    3.54  )-----------( 54       ( 30 Jan 2024 11:07 )             23.3     01-30    133<L>  |  105  |  19  ----------------------------<  125<H>  4.8   |  22  |  1.6<H>    Ca    9.9      30 Jan 2024 11:07  Mg     2.1     01-30    TPro  5.2<L>  /  Alb  3.4<L>  /  TBili  0.7  /  DBili  x   /  AST  13  /  ALT  7   /  AlkPhos  43  01-30    PT/INR - ( 29 Jan 2024 06:49 )   PT: 12.10 sec;   INR: 1.06 ratio         PTT - ( 30 Jan 2024 07:23 )  PTT:28.8 sec  MEDICATIONS  (STANDING):  aMIOdarone    Tablet 200 milliGRAM(s) Oral two times a day  apixaban 5 milliGRAM(s) Oral two times a day  aspirin enteric coated 81 milliGRAM(s) Oral daily  atorvastatin 40 milliGRAM(s) Oral at bedtime  chlorhexidine 2% Cloths 1 Application(s) Topical <User Schedule>  DULoxetine 30 milliGRAM(s) Oral two times a day  furosemide    Tablet 20 milliGRAM(s) Oral daily  lidocaine 2%/epinephrine 1:200,000 Inj 5 milliLiter(s) Local Injection once  methocarbamol 500 milliGRAM(s) Oral every 8 hours  metoprolol tartrate 12.5 milliGRAM(s) Oral two times a day  mycophenolate mofetil 500 milliGRAM(s) Oral two times a day  oxyCODONE  ER Tablet 20 milliGRAM(s) Oral every 12 hours  pantoprazole    Tablet 40 milliGRAM(s) Oral before breakfast  polyethylene glycol 3350 17 Gram(s) Oral daily  regadenoson Injectable 0.4 milliGRAM(s) IV Push once  senna 2 Tablet(s) Oral at bedtime  tacrolimus 3 milliGRAM(s) Oral with breakfast  tacrolimus 2 milliGRAM(s) Oral at bedtime  tamsulosin 0.4 milliGRAM(s) Oral every 12 hours    MEDICATIONS  (PRN):  acetaminophen     Tablet .. 650 milliGRAM(s) Oral every 6 hours PRN Mild Pain (1 - 3)  ondansetron Injectable 4 milliGRAM(s) IV Push three times a day PRN Nausea and/or Vomiting      PHYSICAL EXAM:  Constitutional: appears stated age, well developed/nourished, no acute distress  Eyes: EOM's intact.  PERRLA  ENMT: Normocephalic, atraumatic.    Neck: Jugular veins non-distended; no carotid bruits bilaterally.  Respiratory:  lungs clear to auscultation bilaterally.  Cardiovascular: Regular rhythm.  S1 and S2 normal.  No murmur nor rub appreciated.  Abdomen: Soft, non-tender.  Normal bowel sounds.  Extremities: extremities warm; no  edema.  Pulses: Intact bilaterally  Skin: No gross abnormalities noted.  Musculoskeletal: No gross deformities  Neurological: Alert, oriented x 3.  No focal neurologic deficits noted.          
Patient's chart reviewed and patient examined by Iain Negro MD (contact:[phone])    SUBJ: no cp or palp    Events in last 24 hours: No arrhythmias    MEDICATIONS  (STANDING):  aMIOdarone    Tablet 200 milliGRAM(s) Oral two times a day  aspirin enteric coated 81 milliGRAM(s) Oral daily  atorvastatin 40 milliGRAM(s) Oral at bedtime  calcitriol   Capsule 0.5 MICROGram(s) Oral every 12 hours  chlorhexidine 2% Cloths 1 Application(s) Topical <User Schedule>  DULoxetine 30 milliGRAM(s) Oral two times a day  lidocaine 2%/epinephrine 1:200,000 Inj 5 milliLiter(s) Local Injection once  methocarbamol 500 milliGRAM(s) Oral every 8 hours  mycophenolate mofetil 1000 milliGRAM(s) Oral daily  oxyCODONE  ER Tablet 20 milliGRAM(s) Oral every 12 hours  pantoprazole    Tablet 40 milliGRAM(s) Oral before breakfast  regadenoson Injectable 0.4 milliGRAM(s) IV Push once  sodium chloride 0.9%. 1000 milliLiter(s) (100 mL/Hr) IV Continuous <Continuous>  tacrolimus 3 milliGRAM(s) Oral with breakfast  tacrolimus 2 milliGRAM(s) Oral at bedtime  tamsulosin 0.4 milliGRAM(s) Oral every 12 hours    MEDICATIONS  (PRN):  acetaminophen     Tablet .. 650 milliGRAM(s) Oral every 6 hours PRN Mild Pain (1 - 3)  heparin   Injectable 3500 Unit(s) IV Push every 6 hours PRN For aPTT less than 40  ondansetron Injectable 4 milliGRAM(s) IV Push three times a day PRN Nausea and/or Vomiting    Vital Signs Last 24 Hrs  T(C): 36.5 (28 Jan 2024 20:48), Max: 36.5 (28 Jan 2024 20:48)  T(F): 97.7 (28 Jan 2024 20:48), Max: 97.7 (28 Jan 2024 20:48)  HR: 76 (28 Jan 2024 20:48) (70 - 76)  BP: 143/83 (28 Jan 2024 20:48) (114/56 - 143/83)  BP(mean): --  RR: 18 (28 Jan 2024 20:48) (18 - 18)  SpO2: 98% (28 Jan 2024 20:48) (98% - 98%)         REVIEW OF SYSTEMS:  CONSTITUTIONAL: No fever, chills.  CARDIOLOGY: Denies chest pain, shortness of breath or palpitations.   RESPIRATORY: denies cough, shortness of breath, wheezing.   NEUROLOGICAL: Generalized weakness, no focal deficits to report.  GI: no melena/hematochezia, denies nausea, Vomiting or diarrhea.    PSYCHIATRY: normal mood and affect    PHYSICAL EXAM:  · CONSTITUTIONAL:	Well-developed, well nourished M in no distress    ·RESPIRATORY:   breath sounds equal with good entry;  clear to auscultation bilaterally; no rales, rhonchi or wheeze  · CARDIOVASCULAR	S1 and S2 normal intensity, no rub, gallop or murmur,    ABDOMEN: soft, non-tender, NABS  · EXTREMITIES: No cyanosis, clubbing or edema  NEURO: alert and oriented x 3, no focal deficits.  · VASCULAR: 	Equal and normal pulses   	  TELEMETRY:      ECG:    LABS:                        8.4    3.36  )-----------( 61       ( 28 Jan 2024 06:25 )             26.6     01-28    133<L>  |  100  |  33<H>  ----------------------------<  111<H>  4.2   |  25  |  2.5<H>    Ca    10.7<H>      28 Jan 2024 06:25  Mg     1.8     01-28          PT/INR - ( 28 Jan 2024 06:25 )   PT: 12.30 sec;   INR: 1.08 ratio         PTT - ( 28 Jan 2024 06:25 )  PTT:29.7 sec    I&O's Summary    27 Jan 2024 07:01  -  28 Jan 2024 07:00  --------------------------------------------------------  IN: 1047 mL / OUT: 850 mL / NET: 197 mL    28 Jan 2024 07:01  -  28 Jan 2024 23:13  --------------------------------------------------------  IN: 450 mL / OUT: 200 mL / NET: 250 mL      BNP  RADIOLOGY & ADDITIONAL STUDIES:    IMPRESSION AND PLAN:        
Patient is a 69y old  Male who presents with a chief complaint of chest pain (2024 11:22)        HPI:  Feels fine. Weak in general- chronic. No further tachy events. Cdnt do stress test yesterday.        PAST MEDICAL & SURGICAL HISTORY:  CKD (chronic kidney disease)      ESRD (end stage renal disease)      HTN (hypertension)      HLD (hyperlipidemia)      Atrial fibrillation  on eliquis      COPD, mild  not on O2      Peripheral neuropathy  unclear cause      Lymphoma  ?questionable, not treated, not followed      Melanoma  R eye, s/p laser      Cirrhosis  possibly related to hepC      Breast cancer      Legally blind      Stroke      History of kidney transplant  3 years ago      S/P arteriovenous (AV) fistula creation  prior old fistula in R arm      S/P lumpectomy, right breast      History of back surgery  s/p Left L5-S1 endoscopic laminoforaminotomy                      PREVIOUS DIAGNOSTIC TESTING:      ECHO  FINDINGS:    STRESS  FINDINGS:    CATHETERIZATION  FINDINGS:    ELECTROPHYSIOLOGY STUDY  FINDINGS:    CAROTID ULTRASOUND:  FINDINGS    VENOUS DUPLEX SCAN:  FINDINGS:    CHEST CT PULMONARY ANGIO with IV Contrast:  FINDINGS:    MEDICATIONS  (STANDING):  aMIOdarone    Tablet 200 milliGRAM(s) Oral two times a day  aspirin enteric coated 81 milliGRAM(s) Oral daily  atorvastatin 40 milliGRAM(s) Oral at bedtime  calcitriol   Capsule 0.5 MICROGram(s) Oral every 12 hours  chlorhexidine 2% Cloths 1 Application(s) Topical <User Schedule>  furosemide    Tablet 20 milliGRAM(s) Oral <User Schedule>  heparin  Infusion.  Unit(s)/Hr (7 mL/Hr) IV Continuous <Continuous>  mycophenolate mofetil 1000 milliGRAM(s) Oral daily  pantoprazole    Tablet 40 milliGRAM(s) Oral before breakfast  regadenoson Injectable 0.4 milliGRAM(s) IV Push once  tacrolimus 2 milliGRAM(s) Oral at bedtime  tacrolimus 3 milliGRAM(s) Oral with breakfast  tamsulosin 0.4 milliGRAM(s) Oral every 12 hours    MEDICATIONS  (PRN):  acetaminophen     Tablet .. 650 milliGRAM(s) Oral every 6 hours PRN Mild Pain (1 - 3)  HYDROmorphone  Injectable 0.5 milliGRAM(s) IV Push every 6 hours PRN Severe Pain (7 - 10)  oxyCODONE    IR 15 milliGRAM(s) Oral three times a day PRN Severe Pain (7 - 10)      FAMILY HISTORY:  FH: dementia  mother, alive    FHx: breast cancer  sister ( in 30s)        SOCIAL HISTORY:    CIGARETTES:    ALCOHOL:    Past Surgical History:    Allergies:    Rifuxen (Swelling)  Rituxan (Hives)      REVIEW OF SYSTEMS:    As Above.    Vital Signs Last 24 Hrs  T(C): 37.1 (2024 10:33), Max: 37.1 (2024 10:33)  T(F): 98.8 (2024 10:33), Max: 98.8 (2024 10:33)  HR: 69 (2024 10:33) (65 - 75)  BP: 154/49 (2024 10:33) (101/55 - 154/49)  BP(mean): 69 (2024 10:33) (69 - 96)  RR: 16 (2024 10:33) (13 - 18)  SpO2: 100% (2024 10:33) (99% - 100%)    Parameters below as of 2024 10:33  Patient On (Oxygen Delivery Method): room air        PHYSICAL EXAM:        GENERAL: In no apparent distress, well nourished, and hydrated.  HEAD:  Atraumatic, Normocephalic  HEART: Regular rate and rhythm; No murmurs, rubs, or gallops.  PULMONARY: Clear to auscultation and perfusion.  No rales, wheezing, or rhonchi bilaterally.  EXTREMITIES:  2+ Peripheral Pulses, No clubbing, cyanosis, or edema  NEUROLOGICAL: Grossly nonfocal      INTERPRETATION OF TELEMETRY:    ECG:    I&O's Detail    2024 07:01  -  2024 07:00  --------------------------------------------------------  IN:    Amiodarone: 83.5 mL    Heparin Infusion: 194 mL    Oral Fluid: 170 mL  Total IN: 447.5 mL    OUT:    Voided (mL): 1090 mL  Total OUT: 1090 mL    Total NET: -642.5 mL      2024 07:01  -  2024 12:23  --------------------------------------------------------  IN:    Heparin Infusion: 42.5 mL  Total IN: 42.5 mL    OUT:    Voided (mL): 125 mL  Total OUT: 125 mL    Total NET: -82.5 mL          LABS:                        9.2    3.02  )-----------( 81       ( 2024 05:12 )             28.4         135  |  102  |  40<H>  ----------------------------<  107<H>  4.4   |  21  |  2.5<H>    Ca    10.3      2024 05:12  Mg     2.0         TPro  5.6<L>  /  Alb  3.7  /  TBili  0.6  /  DBili  x   /  AST  27  /  ALT  8   /  AlkPhos  49          PT/INR - ( 2024 05:12 )   PT: 12.70 sec;   INR: 1.11 ratio         PTT - ( 2024 08:46 )  PTT:56.4 sec  Urinalysis Basic - ( 2024 05:12 )    Color: x / Appearance: x / SG: x / pH: x  Gluc: 107 mg/dL / Ketone: x  / Bili: x / Urobili: x   Blood: x / Protein: x / Nitrite: x   Leuk Esterase: x / RBC: x / WBC x   Sq Epi: x / Non Sq Epi: x / Bacteria: x      BNP  I&O's Detail    2024 07:01  -  2024 07:00  --------------------------------------------------------  IN:    Amiodarone: 83.5 mL    Heparin Infusion: 194 mL    Oral Fluid: 170 mL  Total IN: 447.5 mL    OUT:    Voided (mL): 1090 mL  Total OUT: 1090 mL    Total NET: -642.5 mL      2024 07:01  -  2024 12:23  --------------------------------------------------------  IN:    Heparin Infusion: 42.5 mL  Total IN: 42.5 mL    OUT:    Voided (mL): 125 mL  Total OUT: 125 mL    Total NET: -82.5 mL        Daily Height in cm: 174 (2024 05:57)    Daily     RADIOLOGY & ADDITIONAL STUDIES:
CHIEF COMPLAINT:    Patient is a 69y old  Male who presents with a chief complaint of Ventricular tachycardia    INTERVAL HPI/OVERNIGHT EVENTS:    Patient seen and examined at bedside. No acute overnight events occurred.    ROS:Reports pain at chest wall ILR site, bleeding from stie. All other systems are negative.    Medications:  Standing  aMIOdarone    Tablet 200 milliGRAM(s) Oral two times a day  aspirin enteric coated 81 milliGRAM(s) Oral daily  atorvastatin 40 milliGRAM(s) Oral at bedtime  calcitriol   Capsule 0.5 MICROGram(s) Oral every 12 hours  chlorhexidine 2% Cloths 1 Application(s) Topical <User Schedule>  DULoxetine 30 milliGRAM(s) Oral at bedtime  lactated ringers. 1000 milliLiter(s) IV Continuous <Continuous>  lidocaine 2%/epinephrine 1:200,000 Inj 5 milliLiter(s) Local Injection once  methocarbamol 500 milliGRAM(s) Oral every 8 hours  mycophenolate mofetil 1000 milliGRAM(s) Oral daily  pantoprazole    Tablet 40 milliGRAM(s) Oral before breakfast  regadenoson Injectable 0.4 milliGRAM(s) IV Push once  tacrolimus 3 milliGRAM(s) Oral with breakfast  tacrolimus 2 milliGRAM(s) Oral at bedtime  tamsulosin 0.4 milliGRAM(s) Oral every 12 hours    PRN Meds  acetaminophen     Tablet .. 650 milliGRAM(s) Oral every 6 hours PRN  heparin   Injectable 3500 Unit(s) IV Push every 6 hours PRN  ondansetron Injectable 4 milliGRAM(s) IV Push three times a day PRN  oxyCODONE    IR 15 milliGRAM(s) Oral three times a day PRN        Vital Signs:    T(F): 96.9 (24 @ 05:11), Max: 98.3 (24 @ 15:44)  HR: 72 (24 @ 05:11) (69 - 72)  BP: 131/60 (24 @ 05:11) (118/57 - 145/67)  RR: 18 (24 @ 05:11) (18 - 28)  SpO2: 97% (24 @ 20:00) (97% - 99%)  I&O's Summary    2024 07:01  -  2024 07:00  --------------------------------------------------------  IN: 1047 mL / OUT: 850 mL / NET: 197 mL    2024 07:01  -  2024 11:21  --------------------------------------------------------  IN: 210 mL / OUT: 0 mL / NET: 210 mL      Daily     Daily Weight in k (2024 05:11)  CAPILLARY BLOOD GLUCOSE          PHYSICAL EXAM:  GENERAL:  NAD  SKIN: No rashes or lesions. Left sided ILR incision site bleeding, swelling underneath. Tattoos throughout  HEENT: Atraumatic. Normocephalic. Anicteric  NECK:  No JVD.   PULMONARY: Clear to ausculation bilaterally. No wheezing. No rales  CVS: Normal S1, S2. Regular rate and rhythm. No murmurs.  ABDOMEN/GI: Soft, Nontender, Nondistended; Bowel sounds are present  EXTREMITIES:  No edema B/L LE.  NEUROLOGIC:  No motor deficit.  PSYCH: Alert & oriented x 3, normal affect      LABS:                        8.4    3.36  )-----------( 61       ( 2024 06:25 )             26.6         133<L>  |  100  |  33<H>  ----------------------------<  111<H>  4.2   |  25  |  2.5<H>    Ca    10.7<H>      2024 06:25  Mg     1.8           PT/INR - ( 2024 06:25 )   PT: 12.30 sec;   INR: 1.08 ratio         PTT - ( 2024 06:25 )  PTT:29.7 sec          RADIOLOGY & ADDITIONAL TESTS:  Imaging or report Personally Reviewed:  [ ] YES  [ ] NO -->no new images    Telemetry reviewed independently - NSR, no acute events  EKG reviewed independently -->no new EKGs    Consultant(s) Notes Reviewed:  [ ] YES  [ ] NO  Care Discussed with Consultants/Other Providers [ ] YES  [ ] NO    Case discussed with resident  Care discussed with pt

## 2024-01-31 NOTE — ADVANCED PRACTICE NURSE CONSULT - RECOMMEDATIONS
Clean skin with soap and water, pat dry  then apply moisture barrier cream daily, PRN for soiling.  Skin and incontinence care.  Pressure Injury Prevention.  Assess wound and inform primary provider of any changes.   Case discussed with primary RN.  Wound/ ostomy specialist to f/u as needed.   Other Recs. per Primary team.    Clean skin with soap and water, pat dry  then apply moisture barrier cream daily, PRN for soiling.  Skin and incontinence care.  Pressure Injury Prevention.  Assess wound and inform primary provider of any changes.   Case discussed with primary RN.  Wound/ ostomy specialist to f/u as needed.   Other Recs. per Primary team.   Pt seen during rounds with wound care nurse agreeable with above

## 2024-01-31 NOTE — PROGRESS NOTE ADULT - PROVIDER SPECIALTY LIST ADULT
Cardiology
Internal Medicine
Nephrology
Nephrology
Internal Medicine
CCU
Cardiology
Cardiology
Hospitalist
Internal Medicine
Nephrology
Electrophysiology

## 2024-01-31 NOTE — PROGRESS NOTE ADULT - ASSESSMENT
Status post  donor Kidney transplant at [facility] 6 years ago  SANTA likely prerenal azotemia  Abnormal stress test - plan for cardiac cath  Pancytopenia  CAD  Atrial fibrillation  HTN  Cirrhosis  Hypophosphatemia  Mild hypercalcemia    Plan    Continue mycophenolate  Encourage oral intake  Continue tacrolimus  Cardio f/u

## 2024-01-31 NOTE — PROGRESS NOTE ADULT - REASON FOR ADMISSION
CAD and ventricular tachycardia
Chest Pain
Tachycardia
VT
chest pain , ventricular tachycardia
Chest Pain
JENNIFER/ernestina
chest pain

## 2024-01-31 NOTE — ADVANCED PRACTICE NURSE CONSULT - ASSESSMENT
Patient received in bed, limited mobility, high risk for pressure injury development or progression.    Wound - Sacrum Possible Stage I  Type & Location: NO Breakdown or Pressure Injuries noted at time of assessment.   Skin intact at time of Assessment

## 2024-01-31 NOTE — DISCHARGE NOTE NURSING/CASE MANAGEMENT/SOCIAL WORK - NSDCPEFALRISK_GEN_ALL_CORE
For information on Fall & Injury Prevention, visit: [email or web address] OR  [email or web address] OR  https://www.cdc.gov/steadi/patient.html

## 2024-01-31 NOTE — DISCHARGE NOTE NURSING/CASE MANAGEMENT/SOCIAL WORK - PATIENT PORTAL LINK FT
You can access the FollowMyHealth Patient Portal offered by [facility] by registering at the following website: [email or web address]. By joining [facility]’s FollowMyHealth portal, you will also be able to view your health information using other applications (apps) compatible with our system.

## 2024-01-31 NOTE — PROGRESS NOTE ADULT - NUTRITIONAL ASSESSMENT
This patient has been assessed with a concern for Malnutrition and has been determined to have a diagnosis/diagnoses of Severe protein-calorie malnutrition.    This patient is being managed with:   Diet NPO after Midnight-     NPO Start Date: 28-Jan-2024   NPO Start Time: 23:59  Entered: Jan 28 2024  8:09AM    Diet DASH/TLC-  Sodium & Cholesterol Restricted  For patients receiving Renal Replacement - No Protein Restr No Conc K No Conc Phos Low Sodium  Entered: Jan 25 2024  5:00PM    The following pending diet order is being considered for treatment of Severe protein-calorie malnutrition:  Diet Regular-  Low Sodium  Supplement Feeding Modality:  Oral  Ensure Plus High Protein Cans or Servings Per Day:  1       Frequency:  Three Times a day  Entered: Jan 27 2024  7:43PM  
This patient has been assessed with a concern for Malnutrition and has been determined to have a diagnosis/diagnoses of Severe protein-calorie malnutrition.    This patient is being managed with:   Diet DASH/TLC-  Sodium & Cholesterol Restricted  Free Water Flush Instructions:  MAGIC CUP 2X/DAY  Supplement Feeding Modality:  Oral  Nepro Cans or Servings Per Day:  1       Frequency:  Two Times a day  Entered: Jan 30 2024 11:04AM    Diet DASH/TLC-  Sodium & Cholesterol Restricted  For patients receiving Renal Replacement - No Protein Restr No Conc K No Conc Phos Low Sodium  Entered: Jan 25 2024  5:00PM    The following pending diet order is being considered for treatment of Severe protein-calorie malnutrition:null
This patient has been assessed with a concern for Malnutrition and has been determined to have a diagnosis/diagnoses of Severe protein-calorie malnutrition.    This patient is being managed with:   Diet NPO after Midnight-     NPO Start Date: 28-Jan-2024   NPO Start Time: 23:59  Entered: Jan 28 2024  8:09AM    Diet DASH/TLC-  Sodium & Cholesterol Restricted  For patients receiving Renal Replacement - No Protein Restr No Conc K No Conc Phos Low Sodium  Entered: Jan 25 2024  5:00PM    The following pending diet order is being considered for treatment of Severe protein-calorie malnutrition:  Diet Regular-  Low Sodium  Supplement Feeding Modality:  Oral  Ensure Plus High Protein Cans or Servings Per Day:  1       Frequency:  Three Times a day  Entered: Jan 27 2024  7:43PM  
This patient has been assessed with a concern for Malnutrition and has been determined to have a diagnosis/diagnoses of Severe protein-calorie malnutrition.    This patient is being managed with:   Diet DASH/TLC-  Sodium & Cholesterol Restricted  Free Water Flush Instructions:  MAGIC CUP 2X/DAY  Supplement Feeding Modality:  Oral  Nepro Cans or Servings Per Day:  1       Frequency:  Two Times a day  Entered: Jan 30 2024 11:04AM

## 2024-02-01 ENCOUNTER — TRANSCRIPTION ENCOUNTER (OUTPATIENT)
Age: 69
End: 2024-02-01

## 2024-02-01 VITALS
OXYGEN SATURATION: 98 % | HEART RATE: 63 BPM | SYSTOLIC BLOOD PRESSURE: 102 MMHG | RESPIRATION RATE: 18 BRPM | DIASTOLIC BLOOD PRESSURE: 50 MMHG

## 2024-02-01 LAB
ALBUMIN SERPL ELPH-MCNC: 3.3 G/DL — LOW (ref 3.5–5.2)
ALP SERPL-CCNC: 42 U/L — SIGNIFICANT CHANGE UP (ref 30–115)
ALT FLD-CCNC: 6 U/L — SIGNIFICANT CHANGE UP (ref 0–41)
ANION GAP SERPL CALC-SCNC: 11 MMOL/L — SIGNIFICANT CHANGE UP (ref 7–14)
AST SERPL-CCNC: 12 U/L — SIGNIFICANT CHANGE UP (ref 0–41)
BASOPHILS # BLD AUTO: 0.01 K/UL — SIGNIFICANT CHANGE UP (ref 0–0.2)
BASOPHILS NFR BLD AUTO: 0.4 % — SIGNIFICANT CHANGE UP (ref 0–1)
BILIRUB SERPL-MCNC: 0.7 MG/DL — SIGNIFICANT CHANGE UP (ref 0.2–1.2)
BUN SERPL-MCNC: 23 MG/DL — HIGH (ref 10–20)
CALCIUM SERPL-MCNC: 10 MG/DL — SIGNIFICANT CHANGE UP (ref 8.4–10.4)
CHLORIDE SERPL-SCNC: 105 MMOL/L — SIGNIFICANT CHANGE UP (ref 98–110)
CO2 SERPL-SCNC: 21 MMOL/L — SIGNIFICANT CHANGE UP (ref 17–32)
CREAT SERPL-MCNC: 2 MG/DL — HIGH (ref 0.7–1.5)
EGFR: 36 ML/MIN/1.73M2 — LOW
EOSINOPHIL # BLD AUTO: 0.02 K/UL — SIGNIFICANT CHANGE UP (ref 0–0.7)
EOSINOPHIL NFR BLD AUTO: 0.7 % — SIGNIFICANT CHANGE UP (ref 0–8)
GLUCOSE BLDC GLUCOMTR-MCNC: 113 MG/DL — HIGH (ref 70–99)
GLUCOSE BLDC GLUCOMTR-MCNC: 138 MG/DL — HIGH (ref 70–99)
GLUCOSE SERPL-MCNC: 95 MG/DL — SIGNIFICANT CHANGE UP (ref 70–99)
HCT VFR BLD CALC: 22.7 % — LOW (ref 42–52)
HGB BLD-MCNC: 7.1 G/DL — LOW (ref 14–18)
IMM GRANULOCYTES NFR BLD AUTO: 0.4 % — HIGH (ref 0.1–0.3)
LYMPHOCYTES # BLD AUTO: 0.42 K/UL — LOW (ref 1.2–3.4)
LYMPHOCYTES # BLD AUTO: 15.3 % — LOW (ref 20.5–51.1)
MAGNESIUM SERPL-MCNC: 1.9 MG/DL — SIGNIFICANT CHANGE UP (ref 1.8–2.4)
MCHC RBC-ENTMCNC: 29.7 PG — SIGNIFICANT CHANGE UP (ref 27–31)
MCHC RBC-ENTMCNC: 31.3 G/DL — LOW (ref 32–37)
MCV RBC AUTO: 95 FL — HIGH (ref 80–94)
MONOCYTES # BLD AUTO: 0.27 K/UL — SIGNIFICANT CHANGE UP (ref 0.1–0.6)
MONOCYTES NFR BLD AUTO: 9.8 % — HIGH (ref 1.7–9.3)
NEUTROPHILS # BLD AUTO: 2.02 K/UL — SIGNIFICANT CHANGE UP (ref 1.4–6.5)
NEUTROPHILS NFR BLD AUTO: 73.4 % — SIGNIFICANT CHANGE UP (ref 42.2–75.2)
NRBC # BLD: 0 /100 WBCS — SIGNIFICANT CHANGE UP (ref 0–0)
PLATELET # BLD AUTO: 65 K/UL — LOW (ref 130–400)
PMV BLD: 12.5 FL — HIGH (ref 7.4–10.4)
POTASSIUM SERPL-MCNC: 4.5 MMOL/L — SIGNIFICANT CHANGE UP (ref 3.5–5)
POTASSIUM SERPL-SCNC: 4.5 MMOL/L — SIGNIFICANT CHANGE UP (ref 3.5–5)
PROT SERPL-MCNC: 5.1 G/DL — LOW (ref 6–8)
RBC # BLD: 2.39 M/UL — LOW (ref 4.7–6.1)
RBC # FLD: 18.6 % — HIGH (ref 11.5–14.5)
SODIUM SERPL-SCNC: 137 MMOL/L — SIGNIFICANT CHANGE UP (ref 135–146)
WBC # BLD: 2.75 K/UL — LOW (ref 4.8–10.8)
WBC # FLD AUTO: 2.75 K/UL — LOW (ref 4.8–10.8)

## 2024-02-01 PROCEDURE — 99239 HOSP IP/OBS DSCHRG MGMT >30: CPT

## 2024-02-01 RX ORDER — FUROSEMIDE 40 MG
1 TABLET ORAL
Qty: 60 | Refills: 0
Start: 2024-02-01 | End: 2024-03-31

## 2024-02-01 RX ORDER — METOPROLOL TARTRATE 50 MG
0.5 TABLET ORAL
Qty: 60 | Refills: 0
Start: 2024-02-01 | End: 2024-03-31

## 2024-02-01 RX ORDER — ATORVASTATIN CALCIUM 80 MG/1
1 TABLET, FILM COATED ORAL
Qty: 30 | Refills: 0
Start: 2024-02-01 | End: 2024-03-01

## 2024-02-01 RX ORDER — ISOSORBIDE MONONITRATE 60 MG/1
1 TABLET, EXTENDED RELEASE ORAL
Qty: 60 | Refills: 0
Start: 2024-02-01 | End: 2024-03-31

## 2024-02-01 RX ORDER — DULOXETINE HYDROCHLORIDE 30 MG/1
1 CAPSULE, DELAYED RELEASE ORAL
Qty: 90 | Refills: 0
Start: 2024-02-01 | End: 2024-03-16

## 2024-02-01 RX ORDER — AMIODARONE HYDROCHLORIDE 400 MG/1
1 TABLET ORAL
Qty: 60 | Refills: 0
Start: 2024-02-01 | End: 2024-03-31

## 2024-02-01 RX ADMIN — TAMSULOSIN HYDROCHLORIDE 0.4 MILLIGRAM(S): 0.4 CAPSULE ORAL at 05:51

## 2024-02-01 RX ADMIN — METHOCARBAMOL 500 MILLIGRAM(S): 500 TABLET, FILM COATED ORAL at 05:52

## 2024-02-01 RX ADMIN — PANTOPRAZOLE SODIUM 40 MILLIGRAM(S): 20 TABLET, DELAYED RELEASE ORAL at 05:51

## 2024-02-01 RX ADMIN — Medication 81 MILLIGRAM(S): at 12:03

## 2024-02-01 RX ADMIN — AMIODARONE HYDROCHLORIDE 200 MILLIGRAM(S): 400 TABLET ORAL at 05:52

## 2024-02-01 RX ADMIN — Medication 12.5 MILLIGRAM(S): at 05:53

## 2024-02-01 RX ADMIN — CHLORHEXIDINE GLUCONATE 1 APPLICATION(S): 213 SOLUTION TOPICAL at 05:55

## 2024-02-01 RX ADMIN — TACROLIMUS 3 MILLIGRAM(S): 5 CAPSULE ORAL at 08:10

## 2024-02-01 RX ADMIN — MYCOPHENOLATE MOFETIL 500 MILLIGRAM(S): 250 CAPSULE ORAL at 05:52

## 2024-02-01 RX ADMIN — DULOXETINE HYDROCHLORIDE 30 MILLIGRAM(S): 30 CAPSULE, DELAYED RELEASE ORAL at 05:52

## 2024-02-01 RX ADMIN — ISOSORBIDE MONONITRATE 30 MILLIGRAM(S): 60 TABLET, EXTENDED RELEASE ORAL at 12:03

## 2024-02-01 RX ADMIN — APIXABAN 5 MILLIGRAM(S): 2.5 TABLET, FILM COATED ORAL at 05:52

## 2024-02-01 NOTE — DISCHARGE NOTE PROVIDER - HOSPITAL COURSE
70 yo M CAD s/p PCI, COPD, chronic HFpEF, CKD III s/p renal tx, chronic afib/flutter s/p ablation, liver cirrhosis, Hx R eye melanoma and Breast Ca, HLD, HTN who presented for evaluation of chest pain. While in EMS pt found to be in monomorphic VTACH and received lidocaine and amiodarone. Pt was admitted to CCU. On 1/26 he underwent ILR. He was started on heparin for his afib/flutter and downgraded to 3c telemetry. Pt's ILR incision site bleeding, saturating dressing. Area of incision is now swollen. Last night heparin was stopped.    For Chest pain and ventricular tachycardia vs SVT with aberrancy on admission with h/o a-fib/flutter s/p ablation and CAD s/p PCI, HFpEF. EMS found patient to be in monomorphic ventricular tachycardia, s/p lidocaine and amiodarone IV by EMS, Once in the ED patient was in sinus rhythm, EKG demonstrated lateral t wave inversions (old), Chest x ray negative for acute cardiopulmonary disease, In the ED patient was started on amiodarone drip, now on PO amio, Stress test done: Moderate to large size reversible defect in the anterior wall extending to apex of the left ventricle consistent with ischemia. s/p cardiac cath yesterday - showed LAD: Mid LAD 70% stenosis at the site of bifurcation with D, D1 ostial 80% stenosis supplying small vascular territory. No stents placed due difficult location fluids not resumed after cath yesterday due to elevated LVEDP. Scr downtrended so started lasix. Started on imdur    Patient had Bleeding around ILR site which later resolved, restarted on eliquis. No further bleeding    SANTA on CKD III s/p renal transplant, c/w home mycophenolate and tacrolimus, Cr on admission 2.2, baseline around 1.8. Nephro on board. Tacrolimus level obtained. continued on home immunosuppressants.     Patient is stable for discharge, does not want to go to rehab and wants to go home and has aid at home.

## 2024-02-01 NOTE — DISCHARGE NOTE PROVIDER - NSDCMRMEDTOKEN_GEN_ALL_CORE_FT
amiodarone 200 mg oral tablet: 1 tab(s) orally once a day  aspirin 81 mg oral tablet: 1 tab(s) orally once a day  atorvastatin 40 mg oral tablet: 1 tab(s) orally once a day (at bedtime)  calcitriol 0.5 mcg oral capsule: 1 cap(s) orally 2 times a day  Eliquis 5 mg oral tablet: 1 tab(s) orally 2 times a day  Flomax 0.4 mg oral capsule: 1 cap(s) orally 2 times a day  Lasix 20 mg oral tablet: 1 tab(s) orally 3 times a week  mycophenolate mofetil 500 mg oral tablet: 2 tab(s) orally once a day  tacrolimus 1 mg oral capsule: 3 cap(s) orally once a day (in the morning)  tacrolimus 1 mg oral capsule: 2 cap(s) orally once a day (at bedtime)   amiodarone 200 mg oral tablet: 1 tab(s) orally once a day  aspirin 81 mg oral tablet: 1 tab(s) orally once a day  calcitriol 0.5 mcg oral capsule: 1 cap(s) orally 2 times a day  Cymbalta 30 mg oral delayed release capsule: 1 cap(s) orally 2 times a day  Eliquis 5 mg oral tablet: 1 tab(s) orally 2 times a day  Flomax 0.4 mg oral capsule: 1 cap(s) orally 2 times a day  isosorbide mononitrate 30 mg oral tablet, extended release: 1 tab(s) orally once a day  Lasix 20 mg oral tablet: 1 tab(s) orally once a day  Lipitor 80 mg oral tablet: 1 tab(s) orally once a day  metoprolol tartrate 25 mg oral tablet: 0.5 tab(s) orally 2 times a day  mycophenolate mofetil 500 mg oral tablet: 2 tab(s) orally once a day  tacrolimus 1 mg oral capsule: 3 cap(s) orally once a day (in the morning)  tacrolimus 1 mg oral capsule: 2 cap(s) orally once a day (at bedtime)

## 2024-02-01 NOTE — DISCHARGE NOTE PROVIDER - CARE PROVIDER_API CALL
Kin Low  Interventional Cardiology  [address]  [place] [address]  Phone: [phone]  Fax: [phone]  Follow Up Time: 1 week    Porsha Henderson  Cardiac Electrophysiology  [address] [place] [address]  Phone: [phone]  Fax: [phone]  Follow Up Time: 2 weeks    NAING, SUNDEE  [address] [place] [address]  Phone: [phone]  Fax: [phone]  Follow Up Time: 1 week

## 2024-02-01 NOTE — DISCHARGE NOTE PROVIDER - PROVIDER TOKENS
PROVIDER:[TOKEN:[[id number]],FOLLOWUP:[1 week]],PROVIDER:[TOKEN:[[id number]],FOLLOWUP:[2 weeks]],PROVIDER:[TOKEN:[[id number]],FOLLOWUP:[1 week]]

## 2024-02-01 NOTE — DISCHARGE NOTE PROVIDER - CARE PROVIDERS DIRECT ADDRESSES
[email or web address]

## 2024-02-01 NOTE — DISCHARGE NOTE PROVIDER - NSDCFUSCHEDAPPT_GEN_ALL_CORE_FT
[facility] Physician [facility]ly [facility]  ELECTROPH [address]  Scheduled Appointment: 02/15/2024    Adele Arnold  [facility]  NEUROSURG [address]  Scheduled Appointment: 02/28/2024     [facility]  ELECTROPH [address]  Scheduled Appointment: 02/15/2024    Adele Arnold  [facility]  NEUROSURG [address]  Scheduled Appointment: 02/28/2024    [facility]  CARDIOLOGY [address]  Scheduled Appointment: 03/19/2024

## 2024-02-01 NOTE — DISCHARGE NOTE PROVIDER - NSDCCPCAREPLAN_GEN_ALL_CORE_FT
PRINCIPAL DISCHARGE DIAGNOSIS  Diagnosis: V tach  Assessment and Plan of Treatment: You presented to us with chest pain and fast heart rate. Blood work, imaging studies obtained and you were seen by cardiologist and electrophysiologist. You underwent loop recorder placement and cardiac catheterization(no stents placed this admission). Your symptoms improved. Your medication was adjusted. You are being discharged home with advise to follow-up with cardiology, electrophysiology, and primary care as outpatient. Please continue the medications as prescribed.      SECONDARY DISCHARGE DIAGNOSES  Diagnosis: Elevated troponin  Assessment and Plan of Treatment:

## 2024-02-01 NOTE — DISCHARGE NOTE PROVIDER - DETAILS OF MALNUTRITION DIAGNOSIS/DIAGNOSES
This patient has been assessed with a concern for Malnutrition and was treated during this hospitalization for the following Nutrition diagnosis/diagnoses:     -  01/27/2024: Severe protein-calorie malnutrition

## 2024-02-01 NOTE — DISCHARGE NOTE PROVIDER - ATTENDING DISCHARGE PHYSICAL EXAMINATION:
T(F): , Max: 97.9 (02-01-24 @ 05:07)  HR: 63 (02-01-24 @ 12:00) (62 - 63)  BP: 102/50 (02-01-24 @ 12:00)  RR: 18 (02-01-24 @ 12:00)  SpO2: 98% (02-01-24 @ 12:00)  IN: 930 mL / OUT: 950 mL / NET: -20 mL      General: No apparent distress  Cardiovascular: S1, S2  Gastrointestinal: Soft, Non-tender, Non-distended  Respiratory: Good air entry bilaterally  Musculoskeletal: Moves all extremities  Lymphatic: No edema  Neurologic: No gross motor deficit  Dermatologic: Skin dry                          7.1    2.75  )-----------( 65       ( 01 Feb 2024 05:46 )             22.7     02-01    137  |  105  |  23<H>  ----------------------------<  95  4.5   |  21  |  2.0<H>    Ca    10.0      01 Feb 2024 05:46  Mg     1.9     02-01    TPro  5.1<L>  /  Alb  3.3<L>  /  TBili  0.7  /  DBili  x   /  AST  12  /  ALT  6   /  AlkPhos  42  02-01

## 2024-02-09 DIAGNOSIS — E43 UNSPECIFIED SEVERE PROTEIN-CALORIE MALNUTRITION: ICD-10-CM

## 2024-02-09 DIAGNOSIS — D84.821 IMMUNODEFICIENCY DUE TO DRUGS: ICD-10-CM

## 2024-02-09 DIAGNOSIS — Z88.8 ALLERGY STATUS TO OTHER DRUGS, MEDICAMENTS AND BIOLOGICAL SUBSTANCES: ICD-10-CM

## 2024-02-09 DIAGNOSIS — N17.9 ACUTE KIDNEY FAILURE, UNSPECIFIED: ICD-10-CM

## 2024-02-09 DIAGNOSIS — I47.29 OTHER VENTRICULAR TACHYCARDIA: ICD-10-CM
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
## 2024-02-09 DIAGNOSIS — D61.818 OTHER PANCYTOPENIA: ICD-10-CM

## 2024-02-09 DIAGNOSIS — Z79.01 LONG TERM (CURRENT) USE OF ANTICOAGULANTS: ICD-10-CM
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
## 2024-02-09 DIAGNOSIS — I11.0 HYPERTENSIVE HEART DISEASE WITH HEART FAILURE: ICD-10-CM

## 2024-02-09 DIAGNOSIS — T86.19 OTHER COMPLICATION OF KIDNEY TRANSPLANT: ICD-10-CM

## 2024-02-09 DIAGNOSIS — L76.22 POSTPROCEDURAL HEMORRHAGE OF SKIN AND SUBCUTANEOUS TISSUE FOLLOWING OTHER PROCEDURE: ICD-10-CM

## 2024-02-09 DIAGNOSIS — Z85.820 PERSONAL HISTORY OF MALIGNANT MELANOMA OF SKIN: ICD-10-CM

## 2024-02-09 DIAGNOSIS — G62.9 POLYNEUROPATHY, UNSPECIFIED: ICD-10-CM
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
## 2024-02-09 DIAGNOSIS — Z86.73 PERSONAL HISTORY OF TRANSIENT ISCHEMIC ATTACK (TIA), AND CEREBRAL INFARCTION WITHOUT RESIDUAL DEFICITS: ICD-10-CM

## 2024-02-09 DIAGNOSIS — Z90.11 ACQUIRED ABSENCE OF RIGHT BREAST AND NIPPLE: ICD-10-CM

## 2024-02-09 DIAGNOSIS — E83.52 HYPERCALCEMIA: ICD-10-CM

## 2024-02-09 DIAGNOSIS — M54.16 RADICULOPATHY, LUMBAR REGION: ICD-10-CM

## 2024-02-09 DIAGNOSIS — Z79.899 OTHER LONG TERM (CURRENT) DRUG THERAPY: ICD-10-CM

## 2024-02-09 DIAGNOSIS — Z79.621 LONG TERM (CURRENT) USE OF CALCINEURIN INHIBITOR: ICD-10-CM

## 2024-02-09 DIAGNOSIS — E83.39 OTHER DISORDERS OF PHOSPHORUS METABOLISM: ICD-10-CM

## 2024-02-09 DIAGNOSIS — Z95.5 PRESENCE OF CORONARY ANGIOPLASTY IMPLANT AND GRAFT: ICD-10-CM

## 2024-02-09 DIAGNOSIS — I25.10 ATHEROSCLEROTIC HEART DISEASE OF NATIVE CORONARY ARTERY WITHOUT ANGINA PECTORIS: ICD-10-CM

## 2024-02-09 DIAGNOSIS — I21.A1 MYOCARDIAL INFARCTION TYPE 2: ICD-10-CM

## 2024-02-09 DIAGNOSIS — E83.42 HYPOMAGNESEMIA: ICD-10-CM

## 2024-02-09 DIAGNOSIS — Z85.3 PERSONAL HISTORY OF MALIGNANT NEOPLASM OF BREAST: ICD-10-CM

## 2024-02-09 DIAGNOSIS — Z94.0 KIDNEY TRANSPLANT STATUS: ICD-10-CM

## 2024-02-09 DIAGNOSIS — K74.69 OTHER CIRRHOSIS OF LIVER: ICD-10-CM

## 2024-02-09 DIAGNOSIS — Z79.82 LONG TERM (CURRENT) USE OF ASPIRIN: ICD-10-CM

## 2024-02-09 DIAGNOSIS — H54.8 LEGAL BLINDNESS, AS DEFINED IN USA: ICD-10-CM

## 2024-02-09 DIAGNOSIS — I50.32 CHRONIC DIASTOLIC (CONGESTIVE) HEART FAILURE: ICD-10-CM

## 2024-02-09 DIAGNOSIS — I48.20 CHRONIC ATRIAL FIBRILLATION, UNSPECIFIED: ICD-10-CM

## 2024-02-15 ENCOUNTER — APPOINTMENT (OUTPATIENT)
Dept: ELECTROPHYSIOLOGY | Facility: CLINIC | Age: 69
End: 2024-02-15

## 2024-02-28 ENCOUNTER — APPOINTMENT (OUTPATIENT)
Dept: NEUROSURGERY | Facility: CLINIC | Age: 69
End: 2024-02-28
Payer: MEDICARE

## 2024-02-28 VITALS — WEIGHT: 130 LBS | BODY MASS INDEX: 19.26 KG/M2 | HEIGHT: 69 IN

## 2024-02-28 PROCEDURE — 99211 OFF/OP EST MAY X REQ PHY/QHP: CPT

## 2024-02-28 NOTE — ASSESSMENT
[FreeTextEntry1] : This is a 69-year-old gentleman who presents for neurosurgical revisit.  As a review, he had undergone a left L5-S1 endoscopic foraminotomy in September 2023 but despite such efforts persistent radicular features noted into the left lower extremity.  At this time, I have suggested that he attempt an updated MRI L-spine without contrast for our review.  As mentioned above, he has attempted conservative efforts in the form of physical therapy which were intolerable due to pain therefore he was not able to complete his treatment protocol.  I will have him return to the office once completed and we will review testing to determine if there is any additional neurosurgical need.  It is of note, patient with a plethora of underlying comorbid conditions which places him in a very high risk for elective surgical procedures.  I personally reviewed with the PA, this patient's history and physical exam findings, as documented above. I have discussed the relevant areas of concern, having direct implications to the presenting problems and illnesses, and I have personally examined all pertinent and positive and negative findings, which impact the neurosurgical treatment plan.  KOBE Maria MD

## 2024-02-28 NOTE — HISTORY OF PRESENT ILLNESS
[FreeTextEntry1] : This is a 69-year-old gentleman who presents for neurosurgical revisit.  As a review, he had undergone a left L5-S1 endoscopic foraminotomy in September 2023 with Dr. Arnold.  He had responded well postoperatively but began to experience an increase in radiating pain over the previous month which she attributes to frequent hospitalizations due to cardiac elements.  Pain emanates from the low back with associated radiating pain into the left leg.  He notes that 2-3 sessions of physical therapy were attempted but due to discomfort he then abandoned the remaining sessions.  He denies bladder, bowel incontinence.  He remains limited with regards to ambulation due to pain.  Past medical history includes, but is not limited to, CAD/PCI, HTN, Hep C, Cirrhosis with portal hypertension and esophageal varices, ESRD s/p renal transplant, Right AVF, persistent AF s/p cardioversion, anemia, ex-heroine use, ex-smoker, right eye blindness, peripheral neuropathy, right breast ca s/p lumpectomy, bilateral carotid stenosis, possible pulmonary hypertension, emboli after stopping eliquis s/p embolectomy  IMAGING: No post-operative films have been obtained.  PHYSICAL EXAM:  Constitutional: Ill appearing, no distress. Thin. HEENT: Normocephalic Atraumatic Psychiatric: Alert and oriented to all spheres, normal mood Pulmonary: No respiratory distress  Neurologic:  CN II-XII grossly intact Palpation: (+) left lumbar paravertebral tenderness to palpation Strength: pain limited strength assessment affecting LLE. Sensation: Full sensation to light touch in all extremities Reflexes:   2+ patellar  2+ ankle jerk  ROM limited in flex/ext  Signs: SLR negative  Gait: slow/ steady, walking w/o assistance.

## 2024-03-01 ENCOUNTER — APPOINTMENT (OUTPATIENT)
Dept: ELECTROPHYSIOLOGY | Facility: CLINIC | Age: 69
End: 2024-03-01
Payer: MEDICARE

## 2024-03-01 VITALS
TEMPERATURE: 98 F | HEIGHT: 69 IN | BODY MASS INDEX: 19.55 KG/M2 | DIASTOLIC BLOOD PRESSURE: 68 MMHG | HEART RATE: 72 BPM | SYSTOLIC BLOOD PRESSURE: 122 MMHG | WEIGHT: 132 LBS

## 2024-03-01 DIAGNOSIS — Z48.89 ENCOUNTER FOR OTHER SPECIFIED SURGICAL AFTERCARE: ICD-10-CM

## 2024-03-01 DIAGNOSIS — I25.10 ATHEROSCLEROTIC HEART DISEASE OF NATIVE CORONARY ARTERY W/OUT ANGINA PECTORIS: ICD-10-CM

## 2024-03-01 DIAGNOSIS — Z45.09 ENCOUNTER FOR ADJUSTMENT AND MANAGEMENT OF OTHER CARDIAC DEVICE: ICD-10-CM

## 2024-03-01 DIAGNOSIS — I48.91 UNSPECIFIED ATRIAL FIBRILLATION: ICD-10-CM

## 2024-03-01 PROCEDURE — 99214 OFFICE O/P EST MOD 30 MIN: CPT

## 2024-03-01 PROCEDURE — 93291 INTERROG DEV EVAL SCRMS IP: CPT

## 2024-03-01 RX ORDER — AMIODARONE HYDROCHLORIDE 200 MG/1
200 TABLET ORAL
Qty: 90 | Refills: 3 | Status: COMPLETED | COMMUNITY
Start: 2024-01-09 | End: 2024-03-01

## 2024-03-04 PROBLEM — I25.10 CORONARY ARTERY DISEASE: Status: ACTIVE | Noted: 2021-07-01

## 2024-03-04 PROBLEM — I48.91 AFIB: Status: ACTIVE | Noted: 2023-10-05

## 2024-03-06 NOTE — PHYSICAL EXAM
[Heart Rate And Rhythm] : heart rate and rhythm were normal [Heart Sounds] : normal S1 and S2 [General Appearance - In No Acute Distress] : no acute distress [Respiration, Rhythm And Depth] : normal respiratory rhythm and effort [] : no respiratory distress [Exaggerated Use Of Accessory Muscles For Inspiration] : no accessory muscle use [Clean] : clean [Dry] : dry [Well-Healed] : well-healed [Nail Clubbing] : no clubbing of the fingernails [Cyanosis, Localized] : no localized cyanosis [Petechial Hemorrhages (___cm)] : no petechial hemorrhages [FreeTextEntry1] : parasternal

## 2024-03-06 NOTE — ASSESSMENT
[FreeTextEntry1] : ## Persistent AF ## Liver cirrhosis with portal hypertension  - CHADVASC: 5+ (Age, HTN, CAD, thromboembolic event) - On Eliquis. Compliant. No s/s of bleeding. Patient to contact us if there is any bleeding issues, interruption or any issues with OAC. Patient to go to ER/call 911 if any major bleeding.  - On 5 mg dosing. (Borderline weight, recent creatinine 2.0 on 02/01/2024). However, considering prior history of emboli, we will continue with 5 mg dose. - Continue Metoprolol and Amiodarone. Labs from 02/01/2024: H/H 7.1/22.7, AST/ALT 12/6 Pulmonary and ophthalmology consultation annually recommended while on Amiodarone.   - GI doing EGD and colonoscopy done and all normal as reported by the patient. No reports available on HIE.  The patient underwent ILR implantation on 01/26/2024 for a long-term cardiac arrhythmia monitoring and presents today for wound check and device interrogation.   Wound is well healed. There are no signs or symptoms of infection of inflammation. There is no redness, no swelling, no erythema and the patient has no pain.  - Device interrogation normal. No events on interrogation. - The patient is enrolled in remote monitoring services. I reviewed remote transmission process with the patient, as well as schedule and ability to do manual transmission. We also spoke about associated co-payments with remote monitoring that may not be covered by insurance.   -Follow-up in 6 months or prn.   I have also advised the patient to go to the nearest emergency room if she experiences any chest pain, dyspnea, syncope, or has any other compelling symptoms.

## 2024-03-06 NOTE — HISTORY OF PRESENT ILLNESS
[FreeTextEntry1] : Mr. 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 pulmonary hypertension, emboli on stopping Eliquis s/p embolectomy (?location), underwent ILR implantation on 01/26/2024 for a long-term cardiac arrhythmia monitoring and presents today for wound check and device interrogation.  He presented to [facility] for evaluation of chest pain in January of this year. While in EMS pt found to be in monomorphic Vtach and received lidocaine and amiodarone.  The patient denies chest pain/discomfort, dyspnea, palpitation, dizziness and syncope.    Cardio: Dr. Low Renal: Dr. Heart GI/Liver: Dr. Buchanan (no recent f-up) Hem: Dr. Sharif Pain Mx: Dr. Gordon (Lower back pain)

## 2024-03-06 NOTE — CARDIOLOGY SUMMARY
[de-identified] : EKG (11/02/2023): AF @ 98, QRS 86, QTc 429  EKG (10/5/23): AF 87, QRSd 90, QTc 442 [de-identified] : MPI 0126//2024: moderate to large size reversible defect in the anterior wall extending to apex of the left ventricle consistent with ischemia. [de-identified] : TTE 01/24/2024: LVEF 70-75%, LA severely enlarged, mild to moderate MR, moderate thickening of the anterior and posterior of the MV leaflets. TTE (08/23): EF 50-55%, G2DD, Severe LVH, Severe LAE, Mild MR/TR [de-identified] : [employer] ILR 01/26/2024 [de-identified] : Cath 01/29/2024: Mid LAD 70% stenosis at the site of bifurcation with D, D1 ostial 80% stenosis supplying small vascular territory.

## 2024-03-06 NOTE — PROCEDURE
[No] : not [NSR] : normal sinus rhythm [See Device Printout] : See device printout [Longevity: ___ months] : The estimated remaining battery life is [unfilled] months [Sensing Amplitude ___mv] : sensing amplitude was [unfilled] mv [None] : none [de-identified] : 69 bpm [de-identified] : [employer] ILR [de-identified] : LINQ II [de-identified] : [id number] [de-identified] : Good [de-identified] : 01/26/2024 [de-identified] : Normal device function. No episodes. The patient is enrolled on remote monitoring.

## 2024-03-14 ENCOUNTER — INPATIENT (INPATIENT)
Facility: HOSPITAL | Age: 69
LOS: 3 days | Discharge: HOME HEALTH | DRG: 308 | End: 2024-03-18
Attending: INTERNAL MEDICINE | Admitting: INTERNAL MEDICINE
Payer: MEDICARE

## 2024-03-14 VITALS
RESPIRATION RATE: 25 BRPM | SYSTOLIC BLOOD PRESSURE: 174 MMHG | TEMPERATURE: 98 F | HEART RATE: 185 BPM | DIASTOLIC BLOOD PRESSURE: 82 MMHG | OXYGEN SATURATION: 100 %

## 2024-03-14 DIAGNOSIS — R00.0 TACHYCARDIA, UNSPECIFIED: ICD-10-CM

## 2024-03-14 DIAGNOSIS — Z94.0 KIDNEY TRANSPLANT STATUS: Chronic | ICD-10-CM

## 2024-03-14 DIAGNOSIS — Z98.890 OTHER SPECIFIED POSTPROCEDURAL STATES: Chronic | ICD-10-CM

## 2024-03-14 LAB
ALBUMIN SERPL ELPH-MCNC: 4.1 G/DL — SIGNIFICANT CHANGE UP (ref 3.5–5.2)
ALP SERPL-CCNC: 63 U/L — SIGNIFICANT CHANGE UP (ref 30–115)
ALT FLD-CCNC: 10 U/L — SIGNIFICANT CHANGE UP (ref 0–41)
ANION GAP SERPL CALC-SCNC: 16 MMOL/L — HIGH (ref 7–14)
APPEARANCE UR: CLEAR — SIGNIFICANT CHANGE UP
AST SERPL-CCNC: 39 U/L — SIGNIFICANT CHANGE UP (ref 0–41)
BACTERIA # UR AUTO: ABNORMAL /HPF
BASOPHILS # BLD AUTO: 0.03 K/UL — SIGNIFICANT CHANGE UP (ref 0–0.2)
BASOPHILS NFR BLD AUTO: 0.5 % — SIGNIFICANT CHANGE UP (ref 0–1)
BILIRUB SERPL-MCNC: 0.4 MG/DL — SIGNIFICANT CHANGE UP (ref 0.2–1.2)
BILIRUB UR-MCNC: NEGATIVE — SIGNIFICANT CHANGE UP
BUN SERPL-MCNC: 40 MG/DL — HIGH (ref 10–20)
CALCIUM SERPL-MCNC: 10 MG/DL — SIGNIFICANT CHANGE UP (ref 8.4–10.5)
CAST: 3 /LPF — SIGNIFICANT CHANGE UP (ref 0–4)
CHLORIDE SERPL-SCNC: 104 MMOL/L — SIGNIFICANT CHANGE UP (ref 98–110)
CO2 SERPL-SCNC: 16 MMOL/L — LOW (ref 17–32)
COLOR SPEC: YELLOW — SIGNIFICANT CHANGE UP
CREAT SERPL-MCNC: 2.5 MG/DL — HIGH (ref 0.7–1.5)
DIFF PNL FLD: NEGATIVE — SIGNIFICANT CHANGE UP
EGFR: 27 ML/MIN/1.73M2 — LOW
EOSINOPHIL # BLD AUTO: 0.02 K/UL — SIGNIFICANT CHANGE UP (ref 0–0.7)
EOSINOPHIL NFR BLD AUTO: 0.3 % — SIGNIFICANT CHANGE UP (ref 0–8)
GLUCOSE SERPL-MCNC: 168 MG/DL — HIGH (ref 70–99)
GLUCOSE UR QL: NEGATIVE MG/DL — SIGNIFICANT CHANGE UP
HCT VFR BLD CALC: 40.4 % — LOW (ref 42–52)
HGB BLD-MCNC: 12.8 G/DL — LOW (ref 14–18)
IMM GRANULOCYTES NFR BLD AUTO: 0.7 % — HIGH (ref 0.1–0.3)
KETONES UR-MCNC: NEGATIVE MG/DL — SIGNIFICANT CHANGE UP
LEUKOCYTE ESTERASE UR-ACNC: ABNORMAL
LYMPHOCYTES # BLD AUTO: 0.93 K/UL — LOW (ref 1.2–3.4)
LYMPHOCYTES # BLD AUTO: 15.8 % — LOW (ref 20.5–51.1)
MAGNESIUM SERPL-MCNC: 2 MG/DL — SIGNIFICANT CHANGE UP (ref 1.8–2.4)
MCHC RBC-ENTMCNC: 30.3 PG — SIGNIFICANT CHANGE UP (ref 27–31)
MCHC RBC-ENTMCNC: 31.7 G/DL — LOW (ref 32–37)
MCV RBC AUTO: 95.7 FL — HIGH (ref 80–94)
MONOCYTES # BLD AUTO: 0.52 K/UL — SIGNIFICANT CHANGE UP (ref 0.1–0.6)
MONOCYTES NFR BLD AUTO: 8.8 % — SIGNIFICANT CHANGE UP (ref 1.7–9.3)
NEUTROPHILS # BLD AUTO: 4.36 K/UL — SIGNIFICANT CHANGE UP (ref 1.4–6.5)
NEUTROPHILS NFR BLD AUTO: 73.9 % — SIGNIFICANT CHANGE UP (ref 42.2–75.2)
NITRITE UR-MCNC: NEGATIVE — SIGNIFICANT CHANGE UP
NRBC # BLD: 0 /100 WBCS — SIGNIFICANT CHANGE UP (ref 0–0)
NT-PROBNP SERPL-SCNC: HIGH PG/ML (ref 0–300)
OSMOLALITY SERPL: 306 MOS/KG — HIGH (ref 280–301)
OSMOLALITY UR: 347 MOS/KG — SIGNIFICANT CHANGE UP (ref 50–1200)
PH UR: 6.5 — SIGNIFICANT CHANGE UP (ref 5–8)
PLATELET # BLD AUTO: 143 K/UL — SIGNIFICANT CHANGE UP (ref 130–400)
PMV BLD: 12 FL — HIGH (ref 7.4–10.4)
POTASSIUM SERPL-MCNC: 4.3 MMOL/L — SIGNIFICANT CHANGE UP (ref 3.5–5)
POTASSIUM SERPL-SCNC: 4.3 MMOL/L — SIGNIFICANT CHANGE UP (ref 3.5–5)
POTASSIUM UR-SCNC: 12 MMOL/L — SIGNIFICANT CHANGE UP
PROT SERPL-MCNC: 6.4 G/DL — SIGNIFICANT CHANGE UP (ref 6–8)
PROT UR-MCNC: 30 MG/DL
RBC # BLD: 4.22 M/UL — LOW (ref 4.7–6.1)
RBC # FLD: 15.7 % — HIGH (ref 11.5–14.5)
RBC CASTS # UR COMP ASSIST: 1 /HPF — SIGNIFICANT CHANGE UP (ref 0–4)
SODIUM SERPL-SCNC: 136 MMOL/L — SIGNIFICANT CHANGE UP (ref 135–146)
SODIUM UR-SCNC: 53 MMOL/L — SIGNIFICANT CHANGE UP
SP GR SPEC: 1.01 — SIGNIFICANT CHANGE UP (ref 1–1.03)
SQUAMOUS # UR AUTO: 1 /HPF — SIGNIFICANT CHANGE UP (ref 0–5)
TROPONIN T, HIGH SENSITIVITY RESULT: 377 NG/L — CRITICAL HIGH (ref 6–21)
TROPONIN T, HIGH SENSITIVITY RESULT: 639 NG/L — CRITICAL HIGH (ref 6–21)
UROBILINOGEN FLD QL: 0.2 MG/DL — SIGNIFICANT CHANGE UP (ref 0.2–1)
WBC # BLD: 5.9 K/UL — SIGNIFICANT CHANGE UP (ref 4.8–10.8)
WBC # FLD AUTO: 5.9 K/UL — SIGNIFICANT CHANGE UP (ref 4.8–10.8)
WBC UR QL: 24 /HPF — HIGH (ref 0–5)

## 2024-03-14 PROCEDURE — 36415 COLL VENOUS BLD VENIPUNCTURE: CPT

## 2024-03-14 PROCEDURE — 97110 THERAPEUTIC EXERCISES: CPT | Mod: GP

## 2024-03-14 PROCEDURE — 85610 PROTHROMBIN TIME: CPT

## 2024-03-14 PROCEDURE — 93290 INTERROG DEV EVAL ICPMS IP: CPT

## 2024-03-14 PROCEDURE — 99223 1ST HOSP IP/OBS HIGH 75: CPT

## 2024-03-14 PROCEDURE — 88185 FLOWCYTOMETRY/TC ADD-ON: CPT

## 2024-03-14 PROCEDURE — 80053 COMPREHEN METABOLIC PANEL: CPT

## 2024-03-14 PROCEDURE — 93291 INTERROG DEV EVAL SCRMS IP: CPT | Mod: 26

## 2024-03-14 PROCEDURE — 84443 ASSAY THYROID STIM HORMONE: CPT

## 2024-03-14 PROCEDURE — 99291 CRITICAL CARE FIRST HOUR: CPT

## 2024-03-14 PROCEDURE — 88280 CHROMOSOME KARYOTYPE STUDY: CPT

## 2024-03-14 PROCEDURE — 71045 X-RAY EXAM CHEST 1 VIEW: CPT | Mod: 26

## 2024-03-14 PROCEDURE — 93005 ELECTROCARDIOGRAM TRACING: CPT

## 2024-03-14 PROCEDURE — 81001 URINALYSIS AUTO W/SCOPE: CPT

## 2024-03-14 PROCEDURE — 82570 ASSAY OF URINE CREATININE: CPT

## 2024-03-14 PROCEDURE — 85025 COMPLETE CBC W/AUTO DIFF WBC: CPT

## 2024-03-14 PROCEDURE — 88237 TISSUE CULTURE BONE MARROW: CPT

## 2024-03-14 PROCEDURE — 84540 ASSAY OF URINE/UREA-N: CPT

## 2024-03-14 PROCEDURE — 76770 US EXAM ABDO BACK WALL COMP: CPT

## 2024-03-14 PROCEDURE — 97162 PT EVAL MOD COMPLEX 30 MIN: CPT | Mod: GP

## 2024-03-14 PROCEDURE — 87205 SMEAR GRAM STAIN: CPT

## 2024-03-14 PROCEDURE — 85027 COMPLETE CBC AUTOMATED: CPT

## 2024-03-14 PROCEDURE — 86850 RBC ANTIBODY SCREEN: CPT

## 2024-03-14 PROCEDURE — 83735 ASSAY OF MAGNESIUM: CPT

## 2024-03-14 PROCEDURE — 86900 BLOOD TYPING SEROLOGIC ABO: CPT

## 2024-03-14 PROCEDURE — 83930 ASSAY OF BLOOD OSMOLALITY: CPT

## 2024-03-14 PROCEDURE — 84133 ASSAY OF URINE POTASSIUM: CPT

## 2024-03-14 PROCEDURE — 84484 ASSAY OF TROPONIN QUANT: CPT

## 2024-03-14 PROCEDURE — 84100 ASSAY OF PHOSPHORUS: CPT

## 2024-03-14 PROCEDURE — 83935 ASSAY OF URINE OSMOLALITY: CPT

## 2024-03-14 PROCEDURE — 84300 ASSAY OF URINE SODIUM: CPT

## 2024-03-14 PROCEDURE — 88264 CHROMOSOME ANALYSIS 20-25: CPT

## 2024-03-14 PROCEDURE — 93010 ELECTROCARDIOGRAM REPORT: CPT

## 2024-03-14 PROCEDURE — 88184 FLOWCYTOMETRY/ TC 1 MARKER: CPT

## 2024-03-14 PROCEDURE — 86901 BLOOD TYPING SEROLOGIC RH(D): CPT

## 2024-03-14 PROCEDURE — 80197 ASSAY OF TACROLIMUS: CPT

## 2024-03-14 PROCEDURE — 93306 TTE W/DOPPLER COMPLETE: CPT

## 2024-03-14 PROCEDURE — 84156 ASSAY OF PROTEIN URINE: CPT

## 2024-03-14 RX ORDER — TACROLIMUS 5 MG/1
3 CAPSULE ORAL
Refills: 0 | Status: DISCONTINUED | OUTPATIENT
Start: 2024-03-14 | End: 2024-03-18

## 2024-03-14 RX ORDER — METOPROLOL TARTRATE 50 MG
25 TABLET ORAL
Refills: 0 | Status: DISCONTINUED | OUTPATIENT
Start: 2024-03-14 | End: 2024-03-14

## 2024-03-14 RX ORDER — TAMSULOSIN HYDROCHLORIDE 0.4 MG/1
0.4 CAPSULE ORAL
Refills: 0 | Status: DISCONTINUED | OUTPATIENT
Start: 2024-03-14 | End: 2024-03-18

## 2024-03-14 RX ORDER — MYCOPHENOLATE MOFETIL 250 MG/1
1000 CAPSULE ORAL DAILY
Refills: 0 | Status: DISCONTINUED | OUTPATIENT
Start: 2024-03-14 | End: 2024-03-15

## 2024-03-14 RX ORDER — MORPHINE SULFATE 50 MG/1
3 CAPSULE, EXTENDED RELEASE ORAL ONCE
Refills: 0 | Status: DISCONTINUED | OUTPATIENT
Start: 2024-03-14 | End: 2024-03-14

## 2024-03-14 RX ORDER — METOPROLOL TARTRATE 50 MG
12.5 TABLET ORAL
Refills: 0 | Status: DISCONTINUED | OUTPATIENT
Start: 2024-03-14 | End: 2024-03-14

## 2024-03-14 RX ORDER — AMIODARONE HYDROCHLORIDE 400 MG/1
0.5 TABLET ORAL
Qty: 450 | Refills: 0 | Status: DISCONTINUED | OUTPATIENT
Start: 2024-03-14 | End: 2024-03-16

## 2024-03-14 RX ORDER — ATORVASTATIN CALCIUM 80 MG/1
80 TABLET, FILM COATED ORAL AT BEDTIME
Refills: 0 | Status: DISCONTINUED | OUTPATIENT
Start: 2024-03-14 | End: 2024-03-18

## 2024-03-14 RX ORDER — CALCITRIOL 0.5 UG/1
0.5 CAPSULE ORAL
Refills: 0 | Status: DISCONTINUED | OUTPATIENT
Start: 2024-03-14 | End: 2024-03-18

## 2024-03-14 RX ORDER — DULOXETINE HYDROCHLORIDE 30 MG/1
30 CAPSULE, DELAYED RELEASE ORAL DAILY
Refills: 0 | Status: DISCONTINUED | OUTPATIENT
Start: 2024-03-14 | End: 2024-03-18

## 2024-03-14 RX ORDER — METHOCARBAMOL 500 MG/1
750 TABLET, FILM COATED ORAL EVERY 8 HOURS
Refills: 0 | Status: DISCONTINUED | OUTPATIENT
Start: 2024-03-14 | End: 2024-03-18

## 2024-03-14 RX ORDER — ISOSORBIDE MONONITRATE 60 MG/1
30 TABLET, EXTENDED RELEASE ORAL DAILY
Refills: 0 | Status: DISCONTINUED | OUTPATIENT
Start: 2024-03-14 | End: 2024-03-18

## 2024-03-14 RX ORDER — TACROLIMUS 5 MG/1
2 CAPSULE ORAL AT BEDTIME
Refills: 0 | Status: DISCONTINUED | OUTPATIENT
Start: 2024-03-14 | End: 2024-03-14

## 2024-03-14 RX ORDER — AMIODARONE HYDROCHLORIDE 400 MG/1
1 TABLET ORAL
Qty: 450 | Refills: 0 | Status: DISCONTINUED | OUTPATIENT
Start: 2024-03-14 | End: 2024-03-16

## 2024-03-14 RX ORDER — APIXABAN 2.5 MG/1
5 TABLET, FILM COATED ORAL
Refills: 0 | Status: DISCONTINUED | OUTPATIENT
Start: 2024-03-14 | End: 2024-03-18

## 2024-03-14 RX ORDER — AMIODARONE HYDROCHLORIDE 400 MG/1
150 TABLET ORAL ONCE
Refills: 0 | Status: COMPLETED | OUTPATIENT
Start: 2024-03-14 | End: 2024-03-14

## 2024-03-14 RX ORDER — MORPHINE SULFATE 50 MG/1
2 CAPSULE, EXTENDED RELEASE ORAL ONCE
Refills: 0 | Status: DISCONTINUED | OUTPATIENT
Start: 2024-03-14 | End: 2024-03-14

## 2024-03-14 RX ORDER — ASPIRIN/CALCIUM CARB/MAGNESIUM 324 MG
81 TABLET ORAL DAILY
Refills: 0 | Status: DISCONTINUED | OUTPATIENT
Start: 2024-03-14 | End: 2024-03-18

## 2024-03-14 RX ORDER — METOPROLOL TARTRATE 50 MG
25 TABLET ORAL
Refills: 0 | Status: DISCONTINUED | OUTPATIENT
Start: 2024-03-14 | End: 2024-03-16

## 2024-03-14 RX ADMIN — AMIODARONE HYDROCHLORIDE 150 MILLIGRAM(S): 400 TABLET ORAL at 12:35

## 2024-03-14 RX ADMIN — ATORVASTATIN CALCIUM 80 MILLIGRAM(S): 80 TABLET, FILM COATED ORAL at 21:41

## 2024-03-14 RX ADMIN — APIXABAN 5 MILLIGRAM(S): 2.5 TABLET, FILM COATED ORAL at 21:41

## 2024-03-14 RX ADMIN — MORPHINE SULFATE 3 MILLIGRAM(S): 50 CAPSULE, EXTENDED RELEASE ORAL at 19:30

## 2024-03-14 RX ADMIN — AMIODARONE HYDROCHLORIDE 16.7 MG/MIN: 400 TABLET ORAL at 19:07

## 2024-03-14 RX ADMIN — METHOCARBAMOL 750 MILLIGRAM(S): 500 TABLET, FILM COATED ORAL at 21:41

## 2024-03-14 RX ADMIN — AMIODARONE HYDROCHLORIDE 600 MILLIGRAM(S): 400 TABLET ORAL at 12:25

## 2024-03-14 RX ADMIN — AMIODARONE HYDROCHLORIDE 33.3 MG/MIN: 400 TABLET ORAL at 12:45

## 2024-03-14 RX ADMIN — MORPHINE SULFATE 3 MILLIGRAM(S): 50 CAPSULE, EXTENDED RELEASE ORAL at 18:55

## 2024-03-14 RX ADMIN — MORPHINE SULFATE 2 MILLIGRAM(S): 50 CAPSULE, EXTENDED RELEASE ORAL at 14:36

## 2024-03-14 RX ADMIN — TAMSULOSIN HYDROCHLORIDE 0.4 MILLIGRAM(S): 0.4 CAPSULE ORAL at 21:41

## 2024-03-14 RX ADMIN — Medication 25 MILLIGRAM(S): at 18:01

## 2024-03-14 RX ADMIN — MORPHINE SULFATE 2 MILLIGRAM(S): 50 CAPSULE, EXTENDED RELEASE ORAL at 16:34

## 2024-03-14 NOTE — CONSULT NOTE ADULT - NS ATTEND AMEND GEN_ALL_CORE FT
Non-sustained AT. however, AFL cant be ruled out. These events are not that common on ILR interrogation so less of an issue at this moment.  Bigger issue is his overall lack of food intake and fatigue    Will need PT/Rehab  Eliquis  Amiodarone IV. Reduce it to 200 mg PO q24h with attempt to improve his appetite and fatigue  Increase Metoprolol to 25 mg PO q8  Baseline TSH, LFT  Tele  If HR reasonably controlled, can be discharged from EP standpoint of view  OP f-up in 2-3 weeks to discuss possible PPM/AVN ablation vs ablation for AT in case of recurrent AT episodes. At this moment, both are not indicated.  OP f-up

## 2024-03-14 NOTE — ED PROVIDER NOTE - PHYSICAL EXAMINATION
Vital Signs: I have reviewed the initial vital signs.  Constitutional: frail appearing, no acute distress  HEENT: Airway patent, moist MM, EOMI,   CV: tachycardic   Lungs: Clear to ascultation bilaterally, no increased work of breathing.  ABD: Non-tender, Non-distended,   MSK: Neck supple, nontender, normal range of motion,   INTEG: Skin warm, dry, no rash.  NEURO: A&Ox3, moving all extremities, normal speech

## 2024-03-14 NOTE — H&P ADULT - HISTORY OF PRESENT ILLNESS
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 Patient was at a physician appointment this morning. His HR at the appointment was 180s. Patient reports this morning he felt ok, did not experience any chest pain, shortness of breath or palpitations. Patient initially refused EMS intervention and was brought to the ED. Patient reports compliance with all medications, denies any recent drug or alcohol use, denies sick contacts. Review of systems negative for fever, chills, rhinorrhea, cough, shortness of breath, dizziness, lightheadedness, constipation, diarrhea, nausea, vomiting, abdominal pain, chest pain or urinary symptoms.     Vitals in the ED  T 97.9    /82  RR 25 SpO2 100 On RA     Significant Labs  wbc 5k hgb 12.8 plt 143  Na 136 K 4.3 BUN/Cr 40/2.5 bsl Cr 1.8   trop 377, BNP 20k  CXR no consolidation or pleural effusions     In the ED EKG concerning for wide-complex tachycardia vs Afib w/ aberrancy.  EP consulted, device interrogation showed atrial flutter with atrial tachycardia and PACs  Patient started on amiodarone drip and admitted to Cardiac Stepdown for continued monitoring     Of note patient previously admitted 2/24 w/ stress test showing large size reversible defect in anterior wall and apex of LV. Patient had cardiac cath w/ 70% stenosis LAD at bifurcation site w/ 80% D1 ostial stenosis. No stents were placed.

## 2024-03-14 NOTE — ED PROVIDER NOTE - ATTENDING CONTRIBUTION TO CARE
70 yo M PMHx CAD s/p PCI, COPD, CHF, CKD s/p renal transplant, liver cirrhosis, R eye melanoma and breast ca, HLD, HTN, afib/flutter s/p ablation, v-tach presents to ED for eval of tachycardia. Pt was at outpatient physician office for f/u appointment and cancer center, noted to be tachycardic to 180s and 911 called. Pt reports having R sided chest pain today radiating to jaw, resolving now, and SOB for weeks. No fevers. No cough.    CONSTITUTIONAL: NAD.   SKIN: warm, dry  HEAD: Normocephalic; atraumatic.  EYES: no conjunctival injection. EOMI.   ENT: MMM.   NECK: Supple.  CARD: +tachycardic   RESP: No wheezes, rales or rhonchi.  ABD: soft ntnd.   EXT: Normal ROM.  No lower extremity edema.   NEURO: Alert, oriented, grossly unremarkable.

## 2024-03-14 NOTE — ED PROVIDER NOTE - PROGRESS NOTE DETAILS
MM: Cardiology at bedside, less likely V. tach, could be A-fib / SVT with aberrancy.  Will provide further recommendations. MM: Discussed case with cardiology fellow, patient okay for cardiac stepdown unit.

## 2024-03-14 NOTE — H&P ADULT - ASSESSMENT
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     IMPRESSION:    #Atrial flutter/Atrial Tachycardia  #SANTA on CKD s/p renal transplant  #CAD s/p PCI   #HTN  #Compensated Liver Cirrhosis   #Hx cAfib s/p ablation   #Hx HFpEF  #Hx Hep C    PLAN:    CNS:   - Avoid CNS Depressants    HEENT:   - Oral care  - Aspiration precautions     PULMONARY:   - Monitor Pulse Ox. Keep > 94%. Supplement as needed.    CARDIOVASCULAR:   - c/w Amiodarone ggt then transition to po   - increased BB as tolerated, currently metoprolol 25mg po bid  - c/w home imdur 30mg qd, holding home lasix  - Daily Weight. Strict I&Os. Keep I < O, 1.5L fluid restriction   - TTE  - EP f/u     GI:   - DASH diet  - check INR    RENAL:   - Cellcept, tacrolimus level, holding dose until then   - KBUS, urine studies   - flomax   - nephro eval     INFECTIOUS DISEASE:  - monitor off abx     HEMATOLOGICAL:   - c/w eliquis 5mg po bid, asa 81mg     ENDOCRINE:   - c/w home calcitriol   - lipitor 80mg qhs    MUSCULOSKELETAL:   - PT eval when stable   - c/w duloxetine 30mg qd, methocarbamol 750mg po tid    CODE STATUS: FULL CODE    DISPOSITION:  Cardio SDU   69y M CAD s/p PCI, chronic afib s/p ablation, HTN, HFpEF, ESRD s/p renal tx, Hep C, liver cirrhosis, 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     IMPRESSION:    #Atrial flutter/Atrial Tachycardia  #SANTA on CKD s/p renal transplant  #CAD s/p PCI   #HTN  #Compensated Liver Cirrhosis   #Hx cAfib s/p ablation   #Hx HFpEF  #Hx Hep C    PLAN:    CNS:   - Avoid CNS Depressants    HEENT:   - Oral care  - Aspiration precautions     PULMONARY:   - Monitor Pulse Ox. Keep > 94%. Supplement as needed.    CARDIOVASCULAR:   - c/w Amiodarone ggt then transition to po   - increased BB as tolerated, currently metoprolol 25mg po bid  - c/w home imdur 30mg qd, holding home lasix  - Daily Weight. Strict I&Os. Keep I < O, 1.5L fluid restriction   - TTE  - tele monitoring   - EP f/u     GI:   - DASH diet  - check INR    RENAL:   - Cellcept, tacrolimus level, holding dose until then   - KBUS, urine studies   - flomax   - nephro eval     INFECTIOUS DISEASE:  - monitor off abx     HEMATOLOGICAL:   - c/w eliquis 5mg po bid, asa 81mg     ENDOCRINE:   - c/w home calcitriol   - lipitor 80mg qhs    MUSCULOSKELETAL:   - PT eval when stable   - c/w duloxetine 30mg qd, methocarbamol 750mg po tid    CODE STATUS: FULL CODE    DISPOSITION:  Cardio SDU

## 2024-03-14 NOTE — PATIENT PROFILE ADULT - FALL HARM RISK - HARM RISK INTERVENTIONS

## 2024-03-14 NOTE — ED PROVIDER NOTE - CLINICAL SUMMARY MEDICAL DECISION MAKING FREE TEXT BOX
68 yo M presented to ED with tachycardia. Labs and EKG were ordered and reviewed. Amiodarone bolus and drip given. Imaging was ordered and reviewed by me. Cardiology consulted. Appropriate medications for patient's presenting complaints were ordered and effects were reassessed.  Patient's records (prior hospital, ED visit, and/or nursing home notes if available) were reviewed.  Additional history was obtained from EMS, family, and/or PCP (where available).  Escalation to admission/observation was considered. Patient requires inpatient hospitalization - monitored setting, admitted to ccu step down.

## 2024-03-14 NOTE — H&P ADULT - NSHPPHYSICALEXAM_GEN_ALL_CORE
GEN: NAD, Resting comfortably in bed, cooperative  PULM: Clear to auscultation bilaterally, No wheezing, rales, rhonchi  CVS: reg rate/rhythm,, S1-S2, no murmurs, heaves, thrills  ABD: Soft, non-tender, non-distended, no guarding, BS +, hepatomegaly  EXT: No edema/cyanosis, extremities warm/dry, peripheral pulses palpable   NEURO: A&Ox3, no focal deficits, follows commands, answers appropriately

## 2024-03-14 NOTE — ED PROVIDER NOTE - CARE PLAN
Assessment and plan of treatment:	Plan- EKG, cardio, labs cxr admission   Principal Discharge DX:	Tachycardia  Assessment and plan of treatment:	Plan- EKG, cardio, labs cxr admission  Secondary Diagnosis:	Chronic kidney disease (CKD)   1

## 2024-03-14 NOTE — ED PROVIDER NOTE - OBJECTIVE STATEMENT
69 y F CAD s/p PCI, COPD, chronic HFpEF, CKD III s/p renal tx, chronic afib/flutter s/p ablation, liver cirrhosis, Hx R eye melanoma and Breast Ca, HLD, HTN 69 y F CAD s/p PCI, COPD, chronic HFpEF, CKD III s/p renal tx, chronic afib/flutter s/p ablation, liver cirrhosis, Hx R eye melanoma and Breast Ca, HLD, HTN resenting for evaluation of tachycardia.  Patient was outpatient physician appointment when routine vitals revealed heart rate 180s.  EMS was called.  EMS noted patient in A-fib RVR heart rate 180s, blood pressure 140/80.  Patient was refusing any EMS intervention.  Patient brought to critical care.  EKG with wide-complex tachycardia versus A-fib with aberrancy, cardiology consulted 69 y M CAD s/p PCI, COPD, chronic HFpEF, CKD III s/p renal tx, chronic afib/flutter s/p ablation, liver cirrhosis, Hx R eye melanoma and Breast Ca, HLD, HTN resenting for evaluation of tachycardia.  Patient was outpatient physician appointment when routine vitals revealed heart rate 180s.  EMS was called.  EMS noted patient in A-fib RVR heart rate 180s, blood pressure 140/80.  Patient was refusing any EMS intervention.  Patient brought to critical care.  EKG with wide-complex tachycardia versus A-fib with aberrancy, cardiology consulted

## 2024-03-14 NOTE — ED ADULT NURSE NOTE - NSFALLRISKINTERV_ED_ALL_ED

## 2024-03-14 NOTE — CONSULT NOTE ADULT - SUBJECTIVE AND OBJECTIVE BOX
Patient is a 69y old  Male who presents with a chief complaint of chest pressure    HPI: Patient is a 69 year-year old male with history of CAD/PCI, HTN, Hep C, Cirrhosis with portal hypertension and esophageal varices, ESRD s/p renal transplant, Right AVF, persistent AF s/p ablation , anemia, ex-heroine use, ex-smoker, 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 pulmonary hypertension, emboli on stopping Eliquis s/p embolectomy (?location), underwent ILR implantation on 2024 for a long-term cardiac arrhythmia. He presented to [facility] for evaluation of chest pain in January of this year. While in EMS pt found to be in monomorphic Vtach and received lidocaine and amiodarone. Cath was negative at that time.     PAST MEDICAL & SURGICAL HISTORY:  CKD (chronic kidney disease)      ESRD (end stage renal disease)      HTN (hypertension)      HLD (hyperlipidemia)      Atrial fibrillation  on eliquis      COPD, mild  not on O2      Peripheral neuropathy  unclear cause      Lymphoma  ?questionable, not treated, not followed      Melanoma  R eye, s/p laser      Cirrhosis  possibly related to hepC      Breast cancer      Legally blind      Stroke      History of kidney transplant  3 years ago      S/P arteriovenous (AV) fistula creation  prior old fistula in R arm      S/P lumpectomy, right breast      History of back surgery  s/p Left L5-S1 endoscopic laminoforaminotomy                      PREVIOUS DIAGNOSTIC TESTING:      ECHO  FINDINGS:    STRESS  FINDINGS:    CATHETERIZATION  FINDINGS:    ELECTROPHYSIOLOGY STUDY  FINDINGS:    CAROTID ULTRASOUND:  FINDINGS    VENOUS DUPLEX SCAN:  FINDINGS:    CHEST CT PULMONARY ANGIO with IV Contrast:  FINDINGS:    MEDICATIONS  (STANDING):  aMIOdarone Infusion 1 mG/Min (33.3 mL/Hr) IV Continuous <Continuous>  metoprolol tartrate 25 milliGRAM(s) Oral two times a day    MEDICATIONS  (PRN):      FAMILY HISTORY:  FH: dementia  mother, alive    FHx: breast cancer  sister ( in 30s)        SOCIAL HISTORY:    CIGARETTES:    ALCOHOL:    Past Surgical History:    Allergies:    Rituxan (Hives)  Rifuxen (Swelling)      REVIEW OF SYSTEMS:    CONSTITUTIONAL: No fever, weight loss, chills, shakes, or fatigue  RESPIRATORY: No cough, wheezing, hemoptysis, or shortness of breath  CARDIOVASCULAR: No chest pain, dyspnea, palpitations, dizziness, syncope, paroxysmal nocturnal dyspnea, orthopnea, or arm or leg swelling  GASTROINTESTINAL: No abdominal  or epigastric pain, nausea, vomiting, hematemesis, diarrhea, constipation, melena or bright red blood.  NEUROLOGICAL: No headaches, memory loss, slurred speech, limb weakness, loss of strength, numbness, or tremors  MUSCULOSKELETAL: No joint pain or swelling, muscle, back, or extremity pain      Vital Signs Last 24 Hrs  T(C): 36.1 (14 Mar 2024 14:23), Max: 36.7 (14 Mar 2024 13:10)  T(F): 97 (14 Mar 2024 14:23), Max: 98 (14 Mar 2024 13:10)  HR: 90 (14 Mar 2024 14:23) (90 - 185)  BP: 140/79 (14 Mar 2024 14:23) (120/83 - 174/82)  BP(mean): --  RR: 18 (14 Mar 2024 14:23) (18 - 25)  SpO2: 98% (14 Mar 2024 14:23) (98% - 100%)    Parameters below as of 14 Mar 2024 14:23  Patient On (Oxygen Delivery Method): room air        PHYSICAL EXAM:        GENERAL: In no apparent distress, well nourished, and hydrated.  NECK: Supple, No JVD   HEART: Regular rate and rhythm; No murmurs, rubs, or gallops.  PULMONARY: Clear to auscultation and perfusion.  No rales, wheezing, or rhonchi bilaterally.  EXTREMITIES:  2+ Peripheral Pulses, no LE edema BL  NEUROLOGICAL: Grossly nonfocal      INTERPRETATION OF TELEMETRY:    ECG:    I&O's Detail      LABS:                        12.8   5.90  )-----------( 143      ( 14 Mar 2024 12:26 )             40.4     03-14    136  |  104  |  40<H>  ----------------------------<  168<H>  4.3   |  16<L>  |  2.5<H>    Ca    10.0      14 Mar 2024 12:26  Mg     2.0     03-14    TPro  6.4  /  Alb  4.1  /  TBili  0.4  /  DBili  x   /  AST  39  /  ALT  10  /  AlkPhos  63  03-14          Urinalysis Basic - ( 14 Mar 2024 12:26 )    Color: x / Appearance: x / SG: x / pH: x  Gluc: 168 mg/dL / Ketone: x  / Bili: x / Urobili: x   Blood: x / Protein: x / Nitrite: x   Leuk Esterase: x / RBC: x / WBC x   Sq Epi: x / Non Sq Epi: x / Bacteria: x      BNP  I&O's Detail    Daily Height in cm: 173.99 (14 Mar 2024 13:10)    Daily     RADIOLOGY & ADDITIONAL STUDIES: Patient is a 69y old  Male who presents with a chief complaint of chest pressure    HPI: Patient is a 69 year-year old male with history of CAD/PCI, HTN, Hep C, Cirrhosis with portal hypertension and esophageal varices, ESRD s/p renal transplant, Right AVF, persistent AF s/p ablation , anemia, ex-heroine use, ex-smoker, 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 pulmonary hypertension, emboli on stopping Eliquis s/p embolectomy (?location), underwent ILR implantation on 2024 for a long-term cardiac arrhythmia. He presented to [facility] for evaluation of chest pain in January of this year. While in EMS pt found to be in monomorphic Vtach and received lidocaine and amiodarone. Cath was negative at that time. Today patient presents for evaluation of tachycardia.  Patient was outpatient physician appointment when routine vitals revealed heart rate 180s.  EMS was called.  EMS noted patient in A-fib RVR heart rate 180s, blood pressure 140/80.  Patient was refusing any EMS intervention.  Patient brought to critical care.  EKG with wide-complex tachycardia versus A-fib with aberrancy, cardiology consulted.    PAST MEDICAL & SURGICAL HISTORY:  CKD (chronic kidney disease)      ESRD (end stage renal disease)      HTN (hypertension)      HLD (hyperlipidemia)      Atrial fibrillation  on eliquis      COPD, mild  not on O2      Peripheral neuropathy  unclear cause      Lymphoma  ?questionable, not treated, not followed      Melanoma  R eye, s/p laser      Cirrhosis  possibly related to hepC      Breast cancer      Legally blind      Stroke      History of kidney transplant  3 years ago      S/P arteriovenous (AV) fistula creation  prior old fistula in R arm      S/P lumpectomy, right breast      History of back surgery  s/p Left L5-S1 endoscopic laminoforaminotomy      PREVIOUS DIAGNOSTIC TESTING:      ECHO  FINDINGS:  < from: TTE Echo Complete w/ Contrast w/o Doppler (24 @ 15:59) >  Summary:   1. Left ventricular ejection fraction, by visual estimation, is 70 to   75%.   2. Hyperdynamic global left ventricular systolic function.   3. Severely enlarged left atrium.   4. Moderately increased LV wall thickness.   5. Spectral Doppler shows pseudonormal pattern of left ventricular   myocardial filling (Grade II diastolic dysfunction).   6. Normal right atrial size.   7. Mild to moderate mitral valve regurgitation.   8. Moderate thickening of the anterior and posterior mitral valve   leaflets.   9. Mild tricuspid regurgitation.  10. Mild aortic regurgitation.  11. Mild dilatation of the ascending aorta.  12. Estimated pulmonary artery systolic pressure is 40.3 mmHg assuming a   right atrial pressure of 8 mmHg, which is consistent with mild pulmonary   hypertension.    < end of copied text >    STRESS  FINDINGS:    CATHETERIZATION  FINDINGS:  < from: Cardiac Catheterization (24 @ 16:38) >  Diagnostic Conclusions:   There is significant double vessel coronary artery disease.     < end of copied text >    ELECTROPHYSIOLOGY STUDY  FINDINGS:    CAROTID ULTRASOUND:  FINDINGS    VENOUS DUPLEX SCAN:  FINDINGS:    CHEST CT PULMONARY ANGIO with IV Contrast:  FINDINGS:    MEDICATIONS  (STANDING):  aMIOdarone Infusion 1 mG/Min (33.3 mL/Hr) IV Continuous <Continuous>  metoprolol tartrate 25 milliGRAM(s) Oral two times a day    MEDICATIONS  (PRN):      FAMILY HISTORY:  FH: dementia  mother, alive    FHx: breast cancer  sister ( in 30s)        SOCIAL HISTORY: Former smoker/heroine user, no ETOH    Past Surgical History: see above    Allergies:    Rituxan (Hives)  Rifuxen (Swelling)      REVIEW OF SYSTEMS: +R sided chest pain; no palpitations or syncope    Vital Signs Last 24 Hrs  T(C): 36.1 (14 Mar 2024 14:23), Max: 36.7 (14 Mar 2024 13:10)  T(F): 97 (14 Mar 2024 14:23), Max: 98 (14 Mar 2024 13:10)  HR: 90 (14 Mar 2024 14:23) (90 - 185)  BP: 140/79 (14 Mar 2024 14:23) (120/83 - 174/82)  BP(mean): --  RR: 18 (14 Mar 2024 14:23) (18 - 25)  SpO2: 98% (14 Mar 2024 14:23) (98% - 100%)    Parameters below as of 14 Mar 2024 14:23  Patient On (Oxygen Delivery Method): room air        PHYSICAL EXAM:  GENERAL: In no apparent distress, well nourished, and hydrated.  NECK: Supple, No JVD   HEART: Regular rate and rhythm; No murmurs, rubs, or gallops.  PULMONARY: Clear to auscultation and perfusion.  No rales, wheezing, or rhonchi bilaterally.  EXTREMITIES:  2+ Peripheral Pulses, no LE edema BL  NEUROLOGICAL: Grossly nonfocal      INTERPRETATION OF TELEMETRY: SR 90 bpm    ECG:  < from: 12 Lead ECG (24 @ 13:33) >  Ventricular Rate 149 BPM    QRS Duration 112 ms    Q-T Interval 338 ms    QTC Calculation(Bazett) 532 ms    R Axis 64 degrees    T Axis 237 degrees    Diagnosis Line Supraventricular tachycardia (2:1 atrial flutter)  Moderate voltage criteria for LVH, may be normal variant ( Sokolow-Nathan ,   [place] product )  Septal infarct , age undetermined  Marked ST abnormality, possible inferolateral subendocardial injury  Abnormal ECG    Confirmed by Ceferino Grande ([id number]) on 2024 6:21:56 PM    < end of copied text >      I&O's Detail      LABS:                        12.8   5.90  )-----------( 143      ( 14 Mar 2024 12:26 )             40.4     03-14    136  |  104  |  40<H>  ----------------------------<  168<H>  4.3   |  16<L>  |  2.5<H>    Ca    10.0      14 Mar 2024 12:26  Mg     2.0     03-14    TPro  6.4  /  Alb  4.1  /  TBili  0.4  /  DBili  x   /  AST  39  /  ALT  10  /  AlkPhos  63  03-14          Urinalysis Basic - ( 14 Mar 2024 12:26 )    Color: x / Appearance: x / SG: x / pH: x  Gluc: 168 mg/dL / Ketone: x  / Bili: x / Urobili: x   Blood: x / Protein: x / Nitrite: x   Leuk Esterase: x / RBC: x / WBC x   Sq Epi: x / Non Sq Epi: x / Bacteria: x      BNP  I&O's Detail    Daily Height in cm: 173.99 (14 Mar 2024 13:10)    Daily     RADIOLOGY & ADDITIONAL STUDIES:

## 2024-03-14 NOTE — CONSULT NOTE ADULT - ASSESSMENT
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 pulmonary hypertension, emboli on stopping Eliquis s/p embolectomy (?location), underwent ILR implantation on 01/26/2024 for a long-term cardiac arrhythmia. Today patient presents for evaluation of tachycardia.  Patient was outpatient physician appointment when routine vitals revealed heart rate 180s.  EMS was called.  EMS noted patient in A-fib RVR heart rate 180s, blood pressure 140/80.  Patient was refusing any EMS intervention.  Patient brought to critical care.  EKG with wide-complex tachycardia versus A-fib with aberrancy, cardiology consulted.    Impression:  Wide Complex Tachycardia, likely AF with aberrancy  Hx of AF sp Ablation  Hx of VT  S/P Loop Recorder  CAD sp PCI  HTN  Hep C/Liver Cirrhosis  ESRD sp Renal Transplant    Plan:  - Rhythm likely to be AF, reviewed on interrogation  - Cont Eliquis  - Cont tele monitoring  - Continue Amiodarone  - Will discuss further plan with attending EP: Dr Henderson  Cardiologist: Dr Low    69 year-year old male with history of CAD/PCI, HTN, Hep C, Cirrhosis with portal hypertension and esophageal varices, ESRD s/p renal transplant, Right AVF, persistent AF s/p ablation 12/23, anemia, ex-heroine use, ex-smoker, 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 pulmonary hypertension, emboli on stopping Eliquis s/p embolectomy (?location), underwent ILR implantation on 01/26/2024 for a long-term cardiac arrhythmia. Today patient presents for evaluation of tachycardia.  Patient was outpatient physician appointment when routine vitals revealed heart rate 180s.  EMS was called.  EMS noted patient in A-fib RVR heart rate 180s, blood pressure 140/80.  Patient was refusing any EMS intervention.  Patient brought to critical care.  EKG with wide-complex tachycardia versus A-fib with aberrancy, cardiology consulted.    Impression:  Atrial Flutter/Tachycardia; now NSR  Hx of AF sp Ablation  Hx of VT  S/P Loop Recorder  CAD sp PCI  HTN  Hep C/Liver Cirrhosis  ESRD sp Renal Transplant    Plan:  - Rhythm likely to be atrial flutter with AT and PACs, reviewed on interrogation  - Cont Eliquis  - Cont tele monitoring  - Continue Amiodarone drip and switch to PO when completed  - Increase BB as BP tolerates for rate control  - Will follow

## 2024-03-14 NOTE — ED ADULT TRIAGE NOTE - IDEAL BODY WEIGHT(KG)
[facility]  coding opportunities       Chart reviewed, no opportunity found: CHART REVIEWED, NO OPPORTUNITY FOUND        Patients Insurance        Commercial Insurance: [address] [place] [address]
70

## 2024-03-15 ENCOUNTER — TRANSCRIPTION ENCOUNTER (OUTPATIENT)
Age: 69
End: 2024-03-15

## 2024-03-15 ENCOUNTER — RESULT REVIEW (OUTPATIENT)
Age: 69
End: 2024-03-15

## 2024-03-15 LAB
ALBUMIN SERPL ELPH-MCNC: 3.4 G/DL — LOW (ref 3.5–5.2)
ALBUMIN SERPL ELPH-MCNC: 3.6 G/DL — SIGNIFICANT CHANGE UP (ref 3.5–5.2)
ALP SERPL-CCNC: 114 U/L — SIGNIFICANT CHANGE UP (ref 30–115)
ALP SERPL-CCNC: 127 U/L — HIGH (ref 30–115)
ALT FLD-CCNC: 51 U/L — HIGH (ref 0–41)
ALT FLD-CCNC: 58 U/L — HIGH (ref 0–41)
ANION GAP SERPL CALC-SCNC: 10 MMOL/L — SIGNIFICANT CHANGE UP (ref 7–14)
ANION GAP SERPL CALC-SCNC: 11 MMOL/L — SIGNIFICANT CHANGE UP (ref 7–14)
AST SERPL-CCNC: 53 U/L — HIGH (ref 0–41)
AST SERPL-CCNC: 62 U/L — HIGH (ref 0–41)
BASOPHILS # BLD AUTO: 0.02 K/UL — SIGNIFICANT CHANGE UP (ref 0–0.2)
BASOPHILS NFR BLD AUTO: 0.6 % — SIGNIFICANT CHANGE UP (ref 0–1)
BILIRUB SERPL-MCNC: 0.5 MG/DL — SIGNIFICANT CHANGE UP (ref 0.2–1.2)
BILIRUB SERPL-MCNC: 0.5 MG/DL — SIGNIFICANT CHANGE UP (ref 0.2–1.2)
BUN SERPL-MCNC: 30 MG/DL — HIGH (ref 10–20)
BUN SERPL-MCNC: 31 MG/DL — HIGH (ref 10–20)
CALCIUM SERPL-MCNC: 9.7 MG/DL — SIGNIFICANT CHANGE UP (ref 8.4–10.5)
CALCIUM SERPL-MCNC: 9.9 MG/DL — SIGNIFICANT CHANGE UP (ref 8.4–10.5)
CHLORIDE SERPL-SCNC: 107 MMOL/L — SIGNIFICANT CHANGE UP (ref 98–110)
CHLORIDE SERPL-SCNC: 110 MMOL/L — SIGNIFICANT CHANGE UP (ref 98–110)
CO2 SERPL-SCNC: 18 MMOL/L — SIGNIFICANT CHANGE UP (ref 17–32)
CO2 SERPL-SCNC: 20 MMOL/L — SIGNIFICANT CHANGE UP (ref 17–32)
CREAT ?TM UR-MCNC: 60 MG/DL — SIGNIFICANT CHANGE UP
CREAT SERPL-MCNC: 1.8 MG/DL — HIGH (ref 0.7–1.5)
CREAT SERPL-MCNC: 1.9 MG/DL — HIGH (ref 0.7–1.5)
EGFR: 38 ML/MIN/1.73M2 — LOW
EGFR: 41 ML/MIN/1.73M2 — LOW
EOSINOPHIL # BLD AUTO: 0.02 K/UL — SIGNIFICANT CHANGE UP (ref 0–0.7)
EOSINOPHIL NFR BLD AUTO: 0.6 % — SIGNIFICANT CHANGE UP (ref 0–8)
GLUCOSE SERPL-MCNC: 109 MG/DL — HIGH (ref 70–99)
GLUCOSE SERPL-MCNC: 112 MG/DL — HIGH (ref 70–99)
HCT VFR BLD CALC: 30.9 % — LOW (ref 42–52)
HCT VFR BLD CALC: 31.7 % — LOW (ref 42–52)
HCT VFR BLD CALC: 32.5 % — LOW (ref 42–52)
HGB BLD-MCNC: 10.4 G/DL — LOW (ref 14–18)
HGB BLD-MCNC: 9.8 G/DL — LOW (ref 14–18)
HGB BLD-MCNC: 9.9 G/DL — LOW (ref 14–18)
IMM GRANULOCYTES NFR BLD AUTO: 0.6 % — HIGH (ref 0.1–0.3)
INR BLD: 1.82 RATIO — HIGH (ref 0.65–1.3)
LYMPHOCYTES # BLD AUTO: 0.46 K/UL — LOW (ref 1.2–3.4)
LYMPHOCYTES # BLD AUTO: 14.3 % — LOW (ref 20.5–51.1)
MAGNESIUM SERPL-MCNC: 1.8 MG/DL — SIGNIFICANT CHANGE UP (ref 1.8–2.4)
MAGNESIUM SERPL-MCNC: 1.9 MG/DL — SIGNIFICANT CHANGE UP (ref 1.8–2.4)
MCHC RBC-ENTMCNC: 29.8 PG — SIGNIFICANT CHANGE UP (ref 27–31)
MCHC RBC-ENTMCNC: 30 PG — SIGNIFICANT CHANGE UP (ref 27–31)
MCHC RBC-ENTMCNC: 30 PG — SIGNIFICANT CHANGE UP (ref 27–31)
MCHC RBC-ENTMCNC: 30.9 G/DL — LOW (ref 32–37)
MCHC RBC-ENTMCNC: 32 G/DL — SIGNIFICANT CHANGE UP (ref 32–37)
MCHC RBC-ENTMCNC: 32 G/DL — SIGNIFICANT CHANGE UP (ref 32–37)
MCV RBC AUTO: 93.1 FL — SIGNIFICANT CHANGE UP (ref 80–94)
MCV RBC AUTO: 93.6 FL — SIGNIFICANT CHANGE UP (ref 80–94)
MCV RBC AUTO: 96.9 FL — HIGH (ref 80–94)
MONOCYTES # BLD AUTO: 0.13 K/UL — SIGNIFICANT CHANGE UP (ref 0.1–0.6)
MONOCYTES NFR BLD AUTO: 4 % — SIGNIFICANT CHANGE UP (ref 1.7–9.3)
NEUTROPHILS # BLD AUTO: 2.56 K/UL — SIGNIFICANT CHANGE UP (ref 1.4–6.5)
NEUTROPHILS NFR BLD AUTO: 79.9 % — HIGH (ref 42.2–75.2)
NRBC # BLD: 0 /100 WBCS — SIGNIFICANT CHANGE UP (ref 0–0)
PHOSPHATE SERPL-MCNC: 2.7 MG/DL — SIGNIFICANT CHANGE UP (ref 2.1–4.9)
PLATELET # BLD AUTO: 72 K/UL — LOW (ref 130–400)
PLATELET # BLD AUTO: 79 K/UL — LOW (ref 130–400)
PLATELET # BLD AUTO: 79 K/UL — LOW (ref 130–400)
PMV BLD: 11.4 FL — HIGH (ref 7.4–10.4)
PMV BLD: 12 FL — HIGH (ref 7.4–10.4)
PMV BLD: 12.2 FL — HIGH (ref 7.4–10.4)
POTASSIUM SERPL-MCNC: 3.2 MMOL/L — LOW (ref 3.5–5)
POTASSIUM SERPL-MCNC: 3.9 MMOL/L — SIGNIFICANT CHANGE UP (ref 3.5–5)
POTASSIUM SERPL-SCNC: 3.2 MMOL/L — LOW (ref 3.5–5)
POTASSIUM SERPL-SCNC: 3.9 MMOL/L — SIGNIFICANT CHANGE UP (ref 3.5–5)
PROT ?TM UR-MCNC: 25 MG/DLG/24H — SIGNIFICANT CHANGE UP
PROT SERPL-MCNC: 5.4 G/DL — LOW (ref 6–8)
PROT SERPL-MCNC: 5.5 G/DL — LOW (ref 6–8)
PROT/CREAT UR-RTO: 0.4 RATIO — HIGH (ref 0–0.2)
PROTHROM AB SERPL-ACNC: 20.9 SEC — HIGH (ref 9.95–12.87)
RBC # BLD: 3.27 M/UL — LOW (ref 4.7–6.1)
RBC # BLD: 3.3 M/UL — LOW (ref 4.7–6.1)
RBC # BLD: 3.49 M/UL — LOW (ref 4.7–6.1)
RBC # FLD: 15.6 % — HIGH (ref 11.5–14.5)
RBC # FLD: 15.7 % — HIGH (ref 11.5–14.5)
RBC # FLD: 15.7 % — HIGH (ref 11.5–14.5)
SODIUM SERPL-SCNC: 135 MMOL/L — SIGNIFICANT CHANGE UP (ref 135–146)
SODIUM SERPL-SCNC: 141 MMOL/L — SIGNIFICANT CHANGE UP (ref 135–146)
TACROLIMUS SERPL-MCNC: 7.3 NG/ML — SIGNIFICANT CHANGE UP
TROPONIN T, HIGH SENSITIVITY RESULT: 496 NG/L — CRITICAL HIGH (ref 6–21)
TROPONIN T, HIGH SENSITIVITY RESULT: 645 NG/L — CRITICAL HIGH (ref 6–21)
TSH SERPL-MCNC: 1.98 UIU/ML — SIGNIFICANT CHANGE UP (ref 0.27–4.2)
UUN UR-MCNC: 483 MG/DL — SIGNIFICANT CHANGE UP
WBC # BLD: 2.45 K/UL — LOW (ref 4.8–10.8)
WBC # BLD: 3.13 K/UL — LOW (ref 4.8–10.8)
WBC # BLD: 3.21 K/UL — LOW (ref 4.8–10.8)
WBC # FLD AUTO: 2.45 K/UL — LOW (ref 4.8–10.8)
WBC # FLD AUTO: 3.13 K/UL — LOW (ref 4.8–10.8)
WBC # FLD AUTO: 3.21 K/UL — LOW (ref 4.8–10.8)

## 2024-03-15 PROCEDURE — 93306 TTE W/DOPPLER COMPLETE: CPT | Mod: 26

## 2024-03-15 PROCEDURE — 76770 US EXAM ABDO BACK WALL COMP: CPT | Mod: 26

## 2024-03-15 PROCEDURE — 99233 SBSQ HOSP IP/OBS HIGH 50: CPT

## 2024-03-15 RX ORDER — POTASSIUM CHLORIDE 20 MEQ
40 PACKET (EA) ORAL EVERY 4 HOURS
Refills: 0 | Status: COMPLETED | OUTPATIENT
Start: 2024-03-15 | End: 2024-03-15

## 2024-03-15 RX ORDER — AMIODARONE HYDROCHLORIDE 400 MG/1
200 TABLET ORAL
Refills: 0 | Status: DISCONTINUED | OUTPATIENT
Start: 2024-03-15 | End: 2024-03-15

## 2024-03-15 RX ORDER — AMIODARONE HYDROCHLORIDE 400 MG/1
200 TABLET ORAL DAILY
Refills: 0 | Status: DISCONTINUED | OUTPATIENT
Start: 2024-03-16 | End: 2024-03-16

## 2024-03-15 RX ORDER — LIDOCAINE 4 G/100G
1 CREAM TOPICAL EVERY 24 HOURS
Refills: 0 | Status: DISCONTINUED | OUTPATIENT
Start: 2024-03-15 | End: 2024-03-18

## 2024-03-15 RX ORDER — CHLORHEXIDINE GLUCONATE 213 G/1000ML
1 SOLUTION TOPICAL
Refills: 0 | Status: DISCONTINUED | OUTPATIENT
Start: 2024-03-15 | End: 2024-03-18

## 2024-03-15 RX ORDER — MYCOPHENOLATE MOFETIL 250 MG/1
500 CAPSULE ORAL
Refills: 0 | Status: DISCONTINUED | OUTPATIENT
Start: 2024-03-15 | End: 2024-03-18

## 2024-03-15 RX ORDER — DAPAGLIFLOZIN 10 MG/1
10 TABLET, FILM COATED ORAL DAILY
Refills: 0 | Status: DISCONTINUED | OUTPATIENT
Start: 2024-03-15 | End: 2024-03-18

## 2024-03-15 RX ORDER — LIDOCAINE 4 G/100G
1 CREAM TOPICAL ONCE
Refills: 0 | Status: DISCONTINUED | OUTPATIENT
Start: 2024-03-15 | End: 2024-03-15

## 2024-03-15 RX ORDER — TACROLIMUS 5 MG/1
2 CAPSULE ORAL AT BEDTIME
Refills: 0 | Status: DISCONTINUED | OUTPATIENT
Start: 2024-03-15 | End: 2024-03-18

## 2024-03-15 RX ADMIN — Medication 25 MILLIGRAM(S): at 06:10

## 2024-03-15 RX ADMIN — DULOXETINE HYDROCHLORIDE 30 MILLIGRAM(S): 30 CAPSULE, DELAYED RELEASE ORAL at 11:12

## 2024-03-15 RX ADMIN — CHLORHEXIDINE GLUCONATE 1 APPLICATION(S): 213 SOLUTION TOPICAL at 21:22

## 2024-03-15 RX ADMIN — LIDOCAINE 1 PATCH: 4 CREAM TOPICAL at 11:12

## 2024-03-15 RX ADMIN — METHOCARBAMOL 750 MILLIGRAM(S): 500 TABLET, FILM COATED ORAL at 06:10

## 2024-03-15 RX ADMIN — APIXABAN 5 MILLIGRAM(S): 2.5 TABLET, FILM COATED ORAL at 18:14

## 2024-03-15 RX ADMIN — AMIODARONE HYDROCHLORIDE 200 MILLIGRAM(S): 400 TABLET ORAL at 10:26

## 2024-03-15 RX ADMIN — TAMSULOSIN HYDROCHLORIDE 0.4 MILLIGRAM(S): 0.4 CAPSULE ORAL at 18:14

## 2024-03-15 RX ADMIN — TACROLIMUS 2 MILLIGRAM(S): 5 CAPSULE ORAL at 21:21

## 2024-03-15 RX ADMIN — LIDOCAINE 1 PATCH: 4 CREAM TOPICAL at 23:15

## 2024-03-15 RX ADMIN — MYCOPHENOLATE MOFETIL 500 MILLIGRAM(S): 250 CAPSULE ORAL at 12:55

## 2024-03-15 RX ADMIN — Medication 81 MILLIGRAM(S): at 11:12

## 2024-03-15 RX ADMIN — TACROLIMUS 3 MILLIGRAM(S): 5 CAPSULE ORAL at 08:06

## 2024-03-15 RX ADMIN — MYCOPHENOLATE MOFETIL 500 MILLIGRAM(S): 250 CAPSULE ORAL at 18:14

## 2024-03-15 RX ADMIN — CALCITRIOL 0.5 MICROGRAM(S): 0.5 CAPSULE ORAL at 18:14

## 2024-03-15 RX ADMIN — Medication 40 MILLIEQUIVALENT(S): at 10:26

## 2024-03-15 RX ADMIN — ISOSORBIDE MONONITRATE 30 MILLIGRAM(S): 60 TABLET, EXTENDED RELEASE ORAL at 12:01

## 2024-03-15 RX ADMIN — ATORVASTATIN CALCIUM 80 MILLIGRAM(S): 80 TABLET, FILM COATED ORAL at 21:22

## 2024-03-15 RX ADMIN — DAPAGLIFLOZIN 10 MILLIGRAM(S): 10 TABLET, FILM COATED ORAL at 12:01

## 2024-03-15 RX ADMIN — CALCITRIOL 0.5 MICROGRAM(S): 0.5 CAPSULE ORAL at 06:10

## 2024-03-15 RX ADMIN — LIDOCAINE 1 PATCH: 4 CREAM TOPICAL at 19:06

## 2024-03-15 RX ADMIN — TAMSULOSIN HYDROCHLORIDE 0.4 MILLIGRAM(S): 0.4 CAPSULE ORAL at 06:10

## 2024-03-15 RX ADMIN — Medication 40 MILLIEQUIVALENT(S): at 13:03

## 2024-03-15 RX ADMIN — APIXABAN 5 MILLIGRAM(S): 2.5 TABLET, FILM COATED ORAL at 06:10

## 2024-03-15 NOTE — CONSULT NOTE ADULT - SUBJECTIVE AND OBJECTIVE BOX
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 Patient was at a physician appointment and his HR at the appointment was 180s. Patient did not experience any chest pain, shortness of breath or palpitations. Patient initially refused EMS intervention and was brought to the ED. In the ED EKG concerning for wide-complex tachycardia vs Afib w/ aberrancy. EP consulted, device interrogation showed atrial flutter with atrial tachycardia and PACs. Patient started on amiodarone drip and admitted to  for continued monitoring.  Of note patient previously admitted  w/ stress test showing large size reversible defect in anterior wall and apex of LV. Patient had cardiac cath w/ 70% stenosis LAD at bifurcation site w/ 80% D1 ostial stenosis. No stents were placed.     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, not treated, not followed  Melanoma R eye, s/p laser  Cirrhosis possibly related to hepC  Breast cancer  Legally blind  Stroke  History of kidney transplant  S/P arteriovenous (AV) fistula creation prior old fistula in R arm  S/P lumpectomy, right breast  S/P Left L5-S1 endoscopic laminoforaminotomy    FAMILY HISTORY:  FH: dementia  mother, alive  FHx: breast cancer  sister ( in 30s)    SOCIAL HISTORY:  Denies current ETOH, tobacco, and illcit drug use    ALLERGIES & MEDICATIONS  --------------------------------------------------------------------------------  Allergies    Rituxan (Hives)  Rifuxen (Swelling)    Standing Inpatient Medications  aMIOdarone    Tablet 200 milliGRAM(s) Oral two times a day  aMIOdarone Infusion 1 mG/Min IV Continuous <Continuous>  aMIOdarone Infusion 0.5 mG/Min IV Continuous <Continuous>  apixaban 5 milliGRAM(s) Oral two times a day  aspirin  chewable 81 milliGRAM(s) Oral daily  atorvastatin 80 milliGRAM(s) Oral at bedtime  calcitriol   Capsule 0.5 MICROGram(s) Oral two times a day  dapagliflozin 10 milliGRAM(s) Oral daily  DULoxetine 30 milliGRAM(s) Oral daily  isosorbide   mononitrate ER Tablet (IMDUR) 30 milliGRAM(s) Oral daily  lidocaine   4% Patch 1 Patch Transdermal every 24 hours  methocarbamol 750 milliGRAM(s) Oral every 8 hours  metoprolol tartrate 25 milliGRAM(s) Oral two times a day  mycophenolate mofetil 500 milliGRAM(s) Oral two times a day  potassium chloride    Tablet ER 40 milliEquivalent(s) Oral every 4 hours  tacrolimus 2 milliGRAM(s) Oral at bedtime  tacrolimus 3 milliGRAM(s) Oral with breakfast  tamsulosin 0.4 milliGRAM(s) Oral two times a day    REVIEW OF SYSTEMS  --------------------------------------------------------------------------------  Gen: No fevers/chills  Skin: No rashes  Head/Eyes/Ears/Mouth: No headache; No sinus pain/discomfort, sore throat  Respiratory: No dyspnea, cough, wheezing, hemoptysis  CV: No chest pain, PND, orthopnea  GI: No abdominal pain, diarrhea, constipation, nausea, vomiting, melena, hematochezia  : No increased frequency, dysuria, hematuria, nocturia  All other systems were reviewed and are negative, except as noted.    VITALS/PHYSICAL EXAM  --------------------------------------------------------------------------------  T(C): 36.4 (03-15-24 @ 12:00), Max: 36.8 (24 @ 16:00)  HR: 59 (03-15-24 @ 12:00) (48 - 170)  BP: 100/59 (03-15-24 @ 12:00) (86/50 - 174/80)  RR: 20 (03-15-24 @ 12:00) (12 - 27)  SpO2: 98% (03-15-24 @ 12:00) (95% - 100%)  Height (cm): 172.7 (24 @ 16:00)    24 @ 07:01  -  03-15-24 @ 07:00  --------------------------------------------------------  IN: 453.6 mL / OUT: 800 mL / NET: -346.4 mL    Physical Exam:  	Gen: NAD  	Pulm: CTA B/L  	CV: RRR, S1S2  	Back: No CVA tenderness; no sacral edema  	Abd: +BS, soft, nontender/nondistended  	: No suprapubic tenderness  	UE: Warm, no asterixis  	LE: Warm, no edema  	Neuro: AAO x3    LABS/STUDIES  --------------------------------------------------------------------------------              10.4   3.13  >-----------<  79       [03-15-24 @ 08:40]              32.5     141  |  110  |  30  ----------------------------<  112      [03-15-24 @ 08:40]  3.2   |  20  |  1.8        Ca     9.7     [03-15-24 @ 08:40]      Mg     1.8     [03-15-24 @ 08:40]      Phos  2.7     [03-15-24 @ 08:40]    TPro  5.5  /  Alb  3.6  /  TBili  0.5  /  DBili  x   /  AST  62  /  ALT  58  /  AlkPhos  127  [03-15-24 @ 08:40]    Serum Osmolality 306      [24 @ 19:50]    Creatinine Trend:  SCr 1.8 [03-15 @ 08:40]  SCr 2.5 [ 12:26]    Urinalysis - [03-15-24 @ 08:40]      Color  / Appearance  / SG  / pH       Gluc 112 / Ketone   / Bili  / Urobili        Blood  / Protein  / Leuk Est  / Nitrite       RBC  / WBC  / Hyaline  / Gran  / Sq Epi  / Non Sq Epi  / Bacteria     Urine Creatinine 60      [24 @ 21:52]  Urine Protein 25      [24 21:52]  Urine Sodium 53.0      [24 21:52]  Urine Urea Nitrogen 483      [24 21:52]  Urine Potassium 12      [24 21:52]  Urine Osmolality 347      [24 @ 21:52]    HbA1c 5.1      [19 @ 15:00]  TSH 0.49      [23 @ 08:36]    HBsAg Nonreact      [22 @ 04:30]  HCV 11.34, Reactive      [22 @ 04:30]  HIV Nonreact      [22 @ 04:30]    Immunofixation Serum:   IgM Kappa Band Identified    Reference Range: None Detected      [22 @ 04:30]  SPEP Interpretation: Gamma-Migrating Paraprotein Identified      [22 @ 04:30]  Immunofixation Urine: Reference Range: None Detected      [22 @ 12:58]  UPEP Interpretation: Normal Electrophoresis Pattern      [22 @ 12:58]  Cryoglobulin: Positive      [22 @ 04:30]

## 2024-03-15 NOTE — DISCHARGE NOTE PROVIDER - NSDCCPCAREPLAN_GEN_ALL_CORE_FT
PRINCIPAL DISCHARGE DIAGNOSIS  Diagnosis: Tachycardia  Assessment and Plan of Treatment: You came to the hospital for fast heart rate. You received medications to slow your heart rate but had recurrance. Thus electrophisiology recommend a procedure out of the hospital for ablation to stop the fast heart rate. Your medications were optimized.      SECONDARY DISCHARGE DIAGNOSES  Diagnosis: Chronic kidney disease (CKD)  Assessment and Plan of Treatment:

## 2024-03-15 NOTE — DISCHARGE NOTE PROVIDER - NSDCFUADDINST_GEN_ALL_CORE_FT
Follow up to monitor worsening thrombocytopenia with platelets in 40's, on eliquis, scheduled for ablation with EP.

## 2024-03-15 NOTE — DISCHARGE NOTE PROVIDER - CARE PROVIDER_API CALL
Porsha Henderson  Cardiac Electrophysiology  [address] [place] [address]  Phone: [phone]  Fax: [phone]  Follow Up Time: 1 week    NAING, SUNDEE [address] [place] [address]  Phone: ()-  Fax: ()-  Follow Up Time: 2 weeks   Porsha Henderson  Cardiac Electrophysiology  [address] [place] [address]  Phone: [phone]  Fax: [phone]  Follow Up Time: 1 week    KAMERON, SUNDEE  [address] [facility] [place] [address]  Phone: ()-  Fax: ()-  Follow Up Time: 2 weeks    Rigoberto Lake  Hematology  [address] [place] [address]  Phone: [phone]  Fax: [phone]  Follow Up Time: 1 week   Porsha Henderson  Cardiac Electrophysiology  [address] [place] [address]  Phone: [phone]  Fax: [phone]  Follow Up Time: 1 week    KAMERON, SUNDEE  [address] [facility] [place] [address]  Phone: ()-  Fax: ()-  Follow Up Time: 1-3 days    Rigoberto Lake  Hematology  [address] [place] [address]  Phone: [phone]  Fax: [phone]  Follow Up Time: 1 week    bryson barakat  [address] [facility]  Phone: (   )    -  Fax: (   )    -  Follow Up Time: 1 week

## 2024-03-15 NOTE — DISCHARGE NOTE PROVIDER - NSDCMRMEDTOKEN_GEN_ALL_CORE_FT
amiodarone 200 mg oral tablet: 1 tab(s) orally once a day  aspirin 81 mg oral tablet: 1 tab(s) orally once a day  calcitriol 0.5 mcg oral capsule: 1 cap(s) orally 2 times a day  Cymbalta 30 mg oral delayed release capsule: 1 cap(s) orally 2 times a day  Eliquis 5 mg oral tablet: 1 tab(s) orally 2 times a day  Flomax 0.4 mg oral capsule: 1 cap(s) orally 2 times a day  isosorbide mononitrate 30 mg oral tablet, extended release: 1 tab(s) orally once a day  Lasix 20 mg oral tablet: 1 tab(s) orally once a day  Lipitor 80 mg oral tablet: 1 tab(s) orally once a day  methocarbamol 750 mg oral tablet: 1 tab(s) orally every 8 hours  metoprolol tartrate 25 mg oral tablet: 0.5 tab(s) orally 2 times a day  mycophenolate mofetil 500 mg oral tablet: 2 tab(s) orally once a day  tacrolimus 1 mg oral capsule: 3 cap(s) orally once a day (in the morning)  tacrolimus 1 mg oral capsule: 2 cap(s) orally once a day (at bedtime)   aspirin 81 mg oral tablet: 1 tab(s) orally once a day  calcitriol 0.5 mcg oral capsule: 1 cap(s) orally 2 times a day  Cymbalta 30 mg oral delayed release capsule: 1 cap(s) orally 2 times a day  dapagliflozin 10 mg oral tablet: 1 tab(s) orally once a day  Eliquis 5 mg oral tablet: 1 tab(s) orally 2 times a day  Flomax 0.4 mg oral capsule: 1 cap(s) orally 2 times a day  isosorbide mononitrate 30 mg oral tablet, extended release: 1 tab(s) orally once a day  Lasix 20 mg oral tablet: 1 tab(s) orally once a day  Lipitor 80 mg oral tablet: 1 tab(s) orally once a day  methocarbamol 750 mg oral tablet: 1 tab(s) orally every 8 hours  metoprolol tartrate 25 mg oral tablet: 1 tab(s) orally 2 times a day  mycophenolate mofetil 500 mg oral tablet: 2 tab(s) orally once a day  oxyCODONE 15 mg oral tablet: 1 tab(s) orally every 8 hours As needed Severe Pain (7 - 10)  polyethylene glycol 3350 oral powder for reconstitution: 17 gram(s) orally 2 times a day as needed for  constipation  tacrolimus 1 mg oral capsule: 3 cap(s) orally once a day (in the morning)  tacrolimus 1 mg oral capsule: 2 cap(s) orally once a day (at bedtime)

## 2024-03-15 NOTE — DISCHARGE NOTE PROVIDER - NSDCFUSCHEDAPPT_GEN_ALL_CORE_FT
[facility] Physician [facility]  CARDIOLOGY [address] [facility]  Scheduled Appointment: 04/19/2024     Porsha Henderson  [facility]  ELECTROPH [address]  Scheduled Appointment: 04/11/2024    [facility]  CARDIOLOGY [address] [facility]  Scheduled Appointment: 04/19/2024

## 2024-03-15 NOTE — PROGRESS NOTE ADULT - ASSESSMENT
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 Pt was loaded with amiodarone with resolution of afib/aflutter. Pt has been in sinus with some episodes of bradycardia and BP running low. Trops initially increased but then downtrended, EP doesn't plan any acute intervention.     IMPRESSION:    #Atrial flutter/Atrial Tachycardia  #SANTA on CKD s/p renal transplant  #CAD s/p PCI   #HTN  #Compensated Liver Cirrhosis   #Hx cAfib s/p ablation   #Hx HFpEF  #Hx Hep C    PLAN:    CNS:   - Avoid CNS Depressants    HEENT:   - Oral care  - Aspiration precautions     PULMONARY:   - Monitor Pulse Ox. Keep > 94%. Supplement as needed.    CARDIOVASCULAR:   - s/p Amiodarone ggt, per EP now on 200mg PO amio daily  - increased BB as tolerated, currently metoprolol 25mg po bid  - c/w home imdur 30mg qd, holding home lasix  - Daily Weight. Strict I&Os. Keep I < O, 1.5L fluid restriction   - TTE: EF 70-75, grade III diastolic filling defect, dilated aortic root to 3.8cm, and dilated ascending aorta to 3.8cm.  - tele monitoring   - EP - no acute intervention, c/w amio PO 200mg daily, follow up out pt. f/u official recs.     GI:   - DASH diet  [ ] Dietitian consult for malnutrition    RENAL:   - Cellcept, tacrolimus level  - KBUS without hydro  - UA mildly positive, asymptomatic   - flomax   - nephro recs appreciated  [ ] f/u tacrolimus level    INFECTIOUS DISEASE:  - monitor off abx     HEMATOLOGICAL:   - hgb and plt at baseline, monitor  - maintain active T/S  - c/w eliquis 5mg po bid, asa 81mg     ENDOCRINE:   - c/w home calcitriol   - lipitor 80mg qhs  [ ] f/u TSH    MUSCULOSKELETAL:   - PT eval    - c/w duloxetine 30mg qd, methocarbamol 750mg po tid

## 2024-03-15 NOTE — DISCHARGE NOTE PROVIDER - PROVIDER TOKENS
PROVIDER:[TOKEN:[[id number]],FOLLOWUP:[1 week]],PROVIDER:[TOKEN:[[id number]],FOLLOWUP:[2 weeks]] PROVIDER:[TOKEN:[[id number]],FOLLOWUP:[1 week]],PROVIDER:[TOKEN:[[id number]],FOLLOWUP:[2 weeks]],PROVIDER:[TOKEN:[[id number]],FOLLOWUP:[1 week]] PROVIDER:[TOKEN:[[id number]],FOLLOWUP:[1 week]],PROVIDER:[TOKEN:[[id number]],FOLLOWUP:[1-3 days]],PROVIDER:[TOKEN:[[id number]],FOLLOWUP:[1 week]],FREE:[LAST:[galla],FIRST[facility]PHONE:[(   )    -],FAX:[(   )    -],ADDRESS:[[address]],FOLLOWUP:[1 week]]

## 2024-03-15 NOTE — CONSULT NOTE ADULT - ASSESSMENT
Status post  donor Kidney transplant at [facility] 6 years ago, creatinine at baseline  Pancytopenia  CAD  Atrial fibrillation  HTN  Cirrhosis    Plan    Replace potassium  Continue tacrolimus  Continue mycophenolate  Check tacrolimus level  Will follow with you

## 2024-03-15 NOTE — DISCHARGE NOTE PROVIDER - DETAILS OF MALNUTRITION DIAGNOSIS/DIAGNOSES
This patient has been assessed with a concern for Malnutrition and was treated during this hospitalization for the following Nutrition diagnosis/diagnoses:     -  03/17/2024: Severe protein-calorie malnutrition   -  03/17/2024: Underweight (BMI < 19)

## 2024-03-15 NOTE — DISCHARGE NOTE PROVIDER - NPI NUMBER (FOR SYSADMIN USE ONLY) :
[[id number] [[id number]],[[id number] [[id number]],[[id number]]

## 2024-03-15 NOTE — DISCHARGE NOTE PROVIDER - HOSPITAL COURSE
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 Patient was at a physician appointment this morning. His HR at the appointment was 180s. Patient reports this morning he felt ok, did not experience any chest pain, shortness of breath or palpitations. Patient initially refused EMS intervention and was brought to the ED. Patient reports compliance with all medications, denies any recent drug or alcohol use, denies sick contacts. Review of systems negative for fever, chills, rhinorrhea, cough, shortness of breath, dizziness, lightheadedness, constipation, diarrhea, nausea, vomiting, abdominal pain, chest pain or urinary symptoms.     Vitals in the ED  T 97.9    /82  RR 25 SpO2 100 On RA     Significant Labs  wbc 5k hgb 12.8 plt 143  Na 136 K 4.3 BUN/Cr 40/2.5 bsl Cr 1.8   trop 377, BNP 20k  CXR no consolidation or pleural effusions     In the ED EKG concerning for wide-complex tachycardia vs Afib w/ aberrancy.  EP consulted, device interrogation showed atrial flutter with atrial tachycardia and PACs  Patient started on amiodarone drip and admitted to Cardiac Stepdown for continued monitoring     Of note patient previously admitted 2/24 w/ stress test showing large size reversible defect in anterior wall and apex of LV. Patient had cardiac cath w/ 70% stenosis LAD at bifurcation site w/ 80% D1 ostial stenosis. No stents were placed.     Hospital course:  Pt had an episode of vomiting associated with chest pressure and SOB. ECG was without new changes, trops initially increased but then downtrended. Creatinine was elevated but then downtrended to his baseline. His chest pain and SOB resolved. Echo was done showing EF 70-75, grade III diastolic filling defect, dilated aortic root to 3.8cm, and dilated ascending aorta to 3.8cm. Pt was monitored on tele and remained in sinus with some episodes of bradycardia. 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 Patient was at a physician appointment this morning. His HR at the appointment was 180s. Patient reports this morning he felt ok, did not experience any chest pain, shortness of breath or palpitations. Patient initially refused EMS intervention and was brought to the ED. Patient reports compliance with all medications, denies any recent drug or alcohol use, denies sick contacts. Review of systems negative for fever, chills, rhinorrhea, cough, shortness of breath, dizziness, lightheadedness, constipation, diarrhea, nausea, vomiting, abdominal pain, chest pain or urinary symptoms.     Vitals in the ED  T 97.9    /82  RR 25 SpO2 100 On RA     Significant Labs  wbc 5k hgb 12.8 plt 143  Na 136 K 4.3 BUN/Cr 40/2.5 bsl Cr 1.8   trop 377, BNP 20k  CXR no consolidation or pleural effusions     In the ED EKG concerning for wide-complex tachycardia vs Afib w/ aberrancy.  EP consulted, device interrogation showed atrial flutter with atrial tachycardia and PACs  Patient started on amiodarone drip and admitted to Cardiac Stepdown for continued monitoring     Of note patient previously admitted 2/24 w/ stress test showing large size reversible defect in anterior wall and apex of LV. Patient had cardiac cath w/ 70% stenosis LAD at bifurcation site w/ 80% D1 ostial stenosis. No stents were placed.     Hospital course:  Pt had an episode of vomiting associated with chest pressure and SOB. ECG was without new changes, trops initially increased but then downtrended. Creatinine was elevated but then downtrended to his baseline. His chest pain and SOB resolved. Echo was done showing EF 70-75, grade III diastolic filling defect, dilated aortic root to 3.8cm, and dilated ascending aorta to 3.8cm. Pt was loaded with amiodarone with resolution of afib/aflutter. Pt has been in sinus with some episodes of bradycardia and BP running low. 3/16 Pt developed atrial tachycardia and EP recommended DCing amiodarone and increasing metoprolol as tolerated. Metoprolol was increased with pt's HR maintaining around 50's, asymptomatic. Pt remained asymptomatic while walking. Pt has chronic multifactorial thrombocytopenia, his plt downtrended to 46 for which heme onc recommended ***. Pt is scheduled for an ablation 3/26 with EP out pt.    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 Patient was at a physician appointment this morning. His HR at the appointment was 180s. Patient reports this morning he felt ok, did not experience any chest pain, shortness of breath or palpitations. Patient initially refused EMS intervention and was brought to the ED. Patient reports compliance with all medications, denies any recent drug or alcohol use, denies sick contacts. Review of systems negative for fever, chills, rhinorrhea, cough, shortness of breath, dizziness, lightheadedness, constipation, diarrhea, nausea, vomiting, abdominal pain, chest pain or urinary symptoms.     Vitals in the ED  T 97.9    /82  RR 25 SpO2 100 On RA     Significant Labs  wbc 5k hgb 12.8 plt 143  Na 136 K 4.3 BUN/Cr 40/2.5 bsl Cr 1.8   trop 377, BNP 20k  CXR no consolidation or pleural effusions     In the ED EKG concerning for wide-complex tachycardia vs Afib w/ aberrancy.  EP consulted, device interrogation showed atrial flutter with atrial tachycardia and PACs  Patient started on amiodarone drip and admitted to Cardiac Stepdown for continued monitoring     Of note patient previously admitted 2/24 w/ stress test showing large size reversible defect in anterior wall and apex of LV. Patient had cardiac cath w/ 70% stenosis LAD at bifurcation site w/ 80% D1 ostial stenosis. No stents were placed.     Hospital course:  Pt had an episode of vomiting associated with chest pressure and SOB. ECG was without new changes, trops initially increased but then downtrended. Creatinine was elevated but then downtrended to his baseline. His chest pain and SOB resolved. Echo was done showing EF 70-75, grade III diastolic filling defect, dilated aortic root to 3.8cm, and dilated ascending aorta to 3.8cm. Pt was loaded with amiodarone with resolution of afib/aflutter. Pt has been in sinus with some episodes of bradycardia and BP running low. 3/16 Pt developed atrial tachycardia and EP recommended DCing amiodarone and increasing metoprolol as tolerated. Metoprolol was increased with pt's HR maintaining around 50's, asymptomatic. Pt remained asymptomatic while walking. Pt has chronic multifactorial thrombocytopenia, his plt downtrended to 46 for which heme onc recommended close monitoring and ok to continue eliquis with plts 45-50. They also recommended flow cytometry due to his lymphoma history. Pt is scheduled for an ablation 3/26 with EP out pt. 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 Patient was at a physician appointment this morning. His HR at the appointment was 180s. Patient reports this morning he felt ok, did not experience any chest pain, shortness of breath or palpitations. Patient initially refused EMS intervention and was brought to the ED. Patient reports compliance with all medications, denies any recent drug or alcohol use, denies sick contacts. Review of systems negative for fever, chills, rhinorrhea, cough, shortness of breath, dizziness, lightheadedness, constipation, diarrhea, nausea, vomiting, abdominal pain, chest pain or urinary symptoms.     Vitals in the ED  T 97.9    /82  RR 25 SpO2 100 On RA     Significant Labs  wbc 5k hgb 12.8 plt 143  Na 136 K 4.3 BUN/Cr 40/2.5 bsl Cr 1.8   trop 377, BNP 20k  CXR no consolidation or pleural effusions     In the ED EKG concerning for wide-complex tachycardia vs Afib w/ aberrancy.  EP consulted, device interrogation showed atrial flutter with atrial tachycardia and PACs  Patient started on amiodarone drip and admitted to Cardiac Stepdown for continued monitoring     Of note patient previously admitted 2/24 w/ stress test showing large size reversible defect in anterior wall and apex of LV. Patient had cardiac cath w/ 70% stenosis LAD at bifurcation site w/ 80% D1 ostial stenosis. No stents were placed.     Hospital course:  Pt had an episode of vomiting associated with chest pressure and SOB. ECG was without new changes, trops initially increased but then downtrended. Creatinine was elevated but then downtrended to his baseline. His chest pain and SOB resolved. Echo was done showing EF 70-75, grade III diastolic filling defect, dilated aortic root to 3.8cm, and dilated ascending aorta to 3.8cm. Pt was loaded with amiodarone with resolution of afib/aflutter. Pt has been in sinus with some episodes of bradycardia and BP running low. 3/16 Pt developed atrial tachycardia and EP recommended DCing amiodarone and increasing metoprolol as tolerated. Metoprolol was increased with pt's HR maintaining around 50's, asymptomatic. Pt remained asymptomatic while walking. Pt has chronic multifactorial thrombocytopenia, his plt downtrended to 46 for which heme onc recommended close monitoring and ok to continue eliquis with plts 45-50. They also recommended flow cytometry due to his lymphoma history but he wasn't willing to stay for the lab work and needs to follow up out patient. Pt is scheduled for an ablation 3/26 with EP out pt. 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 Patient was at a physician appointment this morning. His HR at the appointment was 180s. Patient reports this morning he felt ok, did not experience any chest pain, shortness of breath or palpitations. Patient initially refused EMS intervention and was brought to the ED. Patient reports compliance with all medications, denies any recent drug or alcohol use, denies sick contacts. Review of systems negative for fever, chills, rhinorrhea, cough, shortness of breath, dizziness, lightheadedness, constipation, diarrhea, nausea, vomiting, abdominal pain, chest pain or urinary symptoms.     Vitals in the ED  T 97.9    /82  RR 25 SpO2 100 On RA     Significant Labs  wbc 5k hgb 12.8 plt 143  Na 136 K 4.3 BUN/Cr 40/2.5 bsl Cr 1.8   trop 377, BNP 20k  CXR no consolidation or pleural effusions     In the ED EKG concerning for wide-complex tachycardia vs Afib w/ aberrancy.  EP consulted, device interrogation showed atrial flutter with atrial tachycardia and PACs  Patient started on amiodarone drip and admitted to Cardiac Stepdown for continued monitoring     Of note patient previously admitted 2/24 w/ stress test showing large size reversible defect in anterior wall and apex of LV. Patient had cardiac cath w/ 70% stenosis LAD at bifurcation site w/ 80% D1 ostial stenosis. No stents were placed.     Hospital course:  Pt had an episode of vomiting associated with chest pressure and SOB. ECG was without new changes, trops initially increased but then downtrended. Creatinine was elevated but then downtrended to his baseline. His chest pain and SOB resolved. Echo was done showing EF 70-75, grade III diastolic filling defect, dilated aortic root to 3.8cm, and dilated ascending aorta to 3.8cm. Pt was loaded with amiodarone with resolution of afib/aflutter. Pt has been in sinus with some episodes of bradycardia and BP running low. 3/16 Pt developed atrial tachycardia and EP recommended DCing amiodarone and increasing metoprolol as tolerated. Metoprolol was increased with pt's HR maintaining around 50's, asymptomatic. Pt remained asymptomatic while walking. Pt has chronic multifactorial thrombocytopenia, his plt downtrended to 46 for which heme onc recommended close monitoring and ok to continue eliquis with plts 45-50. They also recommended flow cytometry due to his lymphoma history. Pt is scheduled for an ablation 3/26 with EP out pt.     Pt is medically stable for DC with close follow up.

## 2024-03-15 NOTE — DISCHARGE NOTE PROVIDER - CARE PROVIDERS DIRECT ADDRESSES
,[email or web address]

## 2024-03-16 LAB
ALBUMIN SERPL ELPH-MCNC: 3.4 G/DL — LOW (ref 3.5–5.2)
ALP SERPL-CCNC: 105 U/L — SIGNIFICANT CHANGE UP (ref 30–115)
ALT FLD-CCNC: 40 U/L — SIGNIFICANT CHANGE UP (ref 0–41)
ANION GAP SERPL CALC-SCNC: 10 MMOL/L — SIGNIFICANT CHANGE UP (ref 7–14)
AST SERPL-CCNC: 28 U/L — SIGNIFICANT CHANGE UP (ref 0–41)
BASOPHILS # BLD AUTO: 0.01 K/UL — SIGNIFICANT CHANGE UP (ref 0–0.2)
BASOPHILS NFR BLD AUTO: 0.5 % — SIGNIFICANT CHANGE UP (ref 0–1)
BILIRUB SERPL-MCNC: 0.5 MG/DL — SIGNIFICANT CHANGE UP (ref 0.2–1.2)
BUN SERPL-MCNC: 30 MG/DL — HIGH (ref 10–20)
CALCIUM SERPL-MCNC: 9.8 MG/DL — SIGNIFICANT CHANGE UP (ref 8.4–10.5)
CHLORIDE SERPL-SCNC: 109 MMOL/L — SIGNIFICANT CHANGE UP (ref 98–110)
CO2 SERPL-SCNC: 18 MMOL/L — SIGNIFICANT CHANGE UP (ref 17–32)
CREAT SERPL-MCNC: 2 MG/DL — HIGH (ref 0.7–1.5)
EGFR: 36 ML/MIN/1.73M2 — LOW
EOSINOPHIL # BLD AUTO: 0.02 K/UL — SIGNIFICANT CHANGE UP (ref 0–0.7)
EOSINOPHIL NFR BLD AUTO: 0.9 % — SIGNIFICANT CHANGE UP (ref 0–8)
GLUCOSE SERPL-MCNC: 98 MG/DL — SIGNIFICANT CHANGE UP (ref 70–99)
HCT VFR BLD CALC: 30.7 % — LOW (ref 42–52)
HGB BLD-MCNC: 9.7 G/DL — LOW (ref 14–18)
IMM GRANULOCYTES NFR BLD AUTO: 0.5 % — HIGH (ref 0.1–0.3)
LYMPHOCYTES # BLD AUTO: 0.55 K/UL — LOW (ref 1.2–3.4)
LYMPHOCYTES # BLD AUTO: 24.9 % — SIGNIFICANT CHANGE UP (ref 20.5–51.1)
MAGNESIUM SERPL-MCNC: 1.9 MG/DL — SIGNIFICANT CHANGE UP (ref 1.8–2.4)
MCHC RBC-ENTMCNC: 30 PG — SIGNIFICANT CHANGE UP (ref 27–31)
MCHC RBC-ENTMCNC: 31.6 G/DL — LOW (ref 32–37)
MCV RBC AUTO: 95 FL — HIGH (ref 80–94)
MONOCYTES # BLD AUTO: 0.14 K/UL — SIGNIFICANT CHANGE UP (ref 0.1–0.6)
MONOCYTES NFR BLD AUTO: 6.3 % — SIGNIFICANT CHANGE UP (ref 1.7–9.3)
NEUTROPHILS # BLD AUTO: 1.48 K/UL — SIGNIFICANT CHANGE UP (ref 1.4–6.5)
NEUTROPHILS NFR BLD AUTO: 66.9 % — SIGNIFICANT CHANGE UP (ref 42.2–75.2)
NRBC # BLD: 0 /100 WBCS — SIGNIFICANT CHANGE UP (ref 0–0)
PHOSPHATE SERPL-MCNC: 2.5 MG/DL — SIGNIFICANT CHANGE UP (ref 2.1–4.9)
PLATELET # BLD AUTO: 60 K/UL — LOW (ref 130–400)
PMV BLD: 12.9 FL — HIGH (ref 7.4–10.4)
POTASSIUM SERPL-MCNC: 4.5 MMOL/L — SIGNIFICANT CHANGE UP (ref 3.5–5)
POTASSIUM SERPL-SCNC: 4.5 MMOL/L — SIGNIFICANT CHANGE UP (ref 3.5–5)
PROT SERPL-MCNC: 5.3 G/DL — LOW (ref 6–8)
RBC # BLD: 3.23 M/UL — LOW (ref 4.7–6.1)
RBC # FLD: 15.6 % — HIGH (ref 11.5–14.5)
SODIUM SERPL-SCNC: 137 MMOL/L — SIGNIFICANT CHANGE UP (ref 135–146)
WBC # BLD: 2.21 K/UL — LOW (ref 4.8–10.8)
WBC # FLD AUTO: 2.21 K/UL — LOW (ref 4.8–10.8)

## 2024-03-16 PROCEDURE — 99233 SBSQ HOSP IP/OBS HIGH 50: CPT

## 2024-03-16 RX ORDER — METOPROLOL TARTRATE 50 MG
25 TABLET ORAL ONCE
Refills: 0 | Status: COMPLETED | OUTPATIENT
Start: 2024-03-16 | End: 2024-03-16

## 2024-03-16 RX ORDER — METOPROLOL TARTRATE 50 MG
12.5 TABLET ORAL EVERY 6 HOURS
Refills: 0 | Status: DISCONTINUED | OUTPATIENT
Start: 2024-03-16 | End: 2024-03-18

## 2024-03-16 RX ORDER — SODIUM CHLORIDE 9 MG/ML
250 INJECTION INTRAMUSCULAR; INTRAVENOUS; SUBCUTANEOUS ONCE
Refills: 0 | Status: COMPLETED | OUTPATIENT
Start: 2024-03-16 | End: 2024-03-16

## 2024-03-16 RX ORDER — OXYCODONE HYDROCHLORIDE 5 MG/1
15 TABLET ORAL ONCE
Refills: 0 | Status: DISCONTINUED | OUTPATIENT
Start: 2024-03-16 | End: 2024-03-16

## 2024-03-16 RX ORDER — MAGNESIUM SULFATE 500 MG/ML
2 VIAL (ML) INJECTION ONCE
Refills: 0 | Status: COMPLETED | OUTPATIENT
Start: 2024-03-16 | End: 2024-03-16

## 2024-03-16 RX ORDER — OXYCODONE AND ACETAMINOPHEN 5; 325 MG/1; MG/1
2 TABLET ORAL ONCE
Refills: 0 | Status: DISCONTINUED | OUTPATIENT
Start: 2024-03-16 | End: 2024-03-16

## 2024-03-16 RX ORDER — MAGNESIUM SULFATE 500 MG/ML
2 VIAL (ML) INJECTION ONCE
Refills: 0 | Status: DISCONTINUED | OUTPATIENT
Start: 2024-03-16 | End: 2024-03-16

## 2024-03-16 RX ORDER — METOPROLOL TARTRATE 50 MG
5 TABLET ORAL ONCE
Refills: 0 | Status: COMPLETED | OUTPATIENT
Start: 2024-03-16 | End: 2024-03-16

## 2024-03-16 RX ORDER — DILTIAZEM HCL 120 MG
10 CAPSULE, EXT RELEASE 24 HR ORAL ONCE
Refills: 0 | Status: COMPLETED | OUTPATIENT
Start: 2024-03-16 | End: 2024-03-16

## 2024-03-16 RX ADMIN — Medication 12.5 MILLIGRAM(S): at 21:21

## 2024-03-16 RX ADMIN — DULOXETINE HYDROCHLORIDE 30 MILLIGRAM(S): 30 CAPSULE, DELAYED RELEASE ORAL at 12:21

## 2024-03-16 RX ADMIN — DAPAGLIFLOZIN 10 MILLIGRAM(S): 10 TABLET, FILM COATED ORAL at 12:22

## 2024-03-16 RX ADMIN — Medication 25 MILLIGRAM(S): at 09:27

## 2024-03-16 RX ADMIN — ISOSORBIDE MONONITRATE 30 MILLIGRAM(S): 60 TABLET, EXTENDED RELEASE ORAL at 12:21

## 2024-03-16 RX ADMIN — TACROLIMUS 3 MILLIGRAM(S): 5 CAPSULE ORAL at 08:30

## 2024-03-16 RX ADMIN — OXYCODONE HYDROCHLORIDE 15 MILLIGRAM(S): 5 TABLET ORAL at 15:06

## 2024-03-16 RX ADMIN — Medication 10 MILLIGRAM(S): at 09:38

## 2024-03-16 RX ADMIN — OXYCODONE HYDROCHLORIDE 15 MILLIGRAM(S): 5 TABLET ORAL at 14:06

## 2024-03-16 RX ADMIN — TAMSULOSIN HYDROCHLORIDE 0.4 MILLIGRAM(S): 0.4 CAPSULE ORAL at 17:12

## 2024-03-16 RX ADMIN — CALCITRIOL 0.5 MICROGRAM(S): 0.5 CAPSULE ORAL at 17:12

## 2024-03-16 RX ADMIN — APIXABAN 5 MILLIGRAM(S): 2.5 TABLET, FILM COATED ORAL at 05:49

## 2024-03-16 RX ADMIN — APIXABAN 5 MILLIGRAM(S): 2.5 TABLET, FILM COATED ORAL at 17:12

## 2024-03-16 RX ADMIN — MYCOPHENOLATE MOFETIL 500 MILLIGRAM(S): 250 CAPSULE ORAL at 17:12

## 2024-03-16 RX ADMIN — MYCOPHENOLATE MOFETIL 500 MILLIGRAM(S): 250 CAPSULE ORAL at 05:49

## 2024-03-16 RX ADMIN — TAMSULOSIN HYDROCHLORIDE 0.4 MILLIGRAM(S): 0.4 CAPSULE ORAL at 05:49

## 2024-03-16 RX ADMIN — AMIODARONE HYDROCHLORIDE 200 MILLIGRAM(S): 400 TABLET ORAL at 05:49

## 2024-03-16 RX ADMIN — Medication 81 MILLIGRAM(S): at 12:22

## 2024-03-16 RX ADMIN — SODIUM CHLORIDE 250 MILLILITER(S): 9 INJECTION INTRAMUSCULAR; INTRAVENOUS; SUBCUTANEOUS at 17:12

## 2024-03-16 RX ADMIN — ATORVASTATIN CALCIUM 80 MILLIGRAM(S): 80 TABLET, FILM COATED ORAL at 21:02

## 2024-03-16 RX ADMIN — CALCITRIOL 0.5 MICROGRAM(S): 0.5 CAPSULE ORAL at 05:48

## 2024-03-16 RX ADMIN — Medication 50 GRAM(S): at 09:46

## 2024-03-16 RX ADMIN — TACROLIMUS 2 MILLIGRAM(S): 5 CAPSULE ORAL at 21:02

## 2024-03-16 RX ADMIN — CHLORHEXIDINE GLUCONATE 1 APPLICATION(S): 213 SOLUTION TOPICAL at 05:49

## 2024-03-16 NOTE — CHART NOTE - NSCHARTNOTEFT_GEN_A_CORE
Event :     Resident called for Tachycardia Ranging 140-150.     Patient is admitted for Afib/flutter and was on Amiodarone 200 mg OD and Metoprolol 25 mg BID (But metoprolol was held for low HRs of lower 50s and BP systolic of 80s).     EKG done showing SVT/A fib     Vagal manoeuvres tried and was able to break the tachycardia 4 times. Went back to HR of 150s in seconds.     25 mg Metoprolol PO and 10 mg of Cardizem IV push given.   Mag 1.9 >> 2 gm Magnesium supplemented.     Patient is asymptomatic , Maintaining BP of  140/70.     Cardio Fellow at bedside.     Attending Aware.     Will monitor the patient.

## 2024-03-16 NOTE — PROGRESS NOTE ADULT - ASSESSMENT
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 Pt was loaded with amiodarone with resolution of afib/aflutter. Pt has been in sinus with some episodes of bradycardia and BP running low. Trops initially increased but then downtrended, EP doesn't plan any acute intervention.     On 03/16: Patient  IMPRESSION:    #Atrial flutter/Atrial Tachycardia  #SANTA on CKD s/p renal transplant  #CAD s/p PCI   #HTN  #Compensated Liver Cirrhosis   #Hx cAfib s/p ablation   #Hx HFpEF  #Hx Hep C    PLAN:    CNS:   - Avoid CNS Depressants    HEENT:   - Oral care  - Aspiration precautions     PULMONARY:   - Monitor Pulse Ox. Keep > 94%. Supplement as needed.    CARDIOVASCULAR:   - s/p Amiodarone ggt, per EP now on 200mg PO amio daily  - increased BB as tolerated, currently metoprolol 25mg po bid  - c/w home imdur 30mg qd, holding home lasix  - Daily Weight. Strict I&Os. Keep I < O, 1.5L fluid restriction   - TTE: EF 70-75, grade III diastolic filling defect, dilated aortic root to 3.8cm, and dilated ascending aorta to 3.8cm.  - tele monitoring   - EP - no acute intervention, c/w amio PO 200mg daily, follow up out pt. f/u official recs.     GI:   - DASH diet  [ ] Dietitian consult for malnutrition    RENAL:   - Cellcept, tacrolimus level  - KBUS without hydro  - UA mildly positive, asymptomatic   - flomax   - nephro recs appreciated  [ ] f/u tacrolimus level    INFECTIOUS DISEASE:  - monitor off abx     HEMATOLOGICAL:   - hgb and plt at baseline, monitor  - maintain active T/S  - c/w eliquis 5mg po bid, asa 81mg     ENDOCRINE:   - c/w home calcitriol   - lipitor 80mg qhs  [ ] f/u TSH    MUSCULOSKELETAL:   - PT eval    - c/w duloxetine 30mg qd, methocarbamol 750mg po tid   69y M CAD s/p PCI, chronic afib s/p ablation, HTN, HFpEF, ESRD s/p renal tx, Hep C, liver cirrhosis, 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 Pt was loaded with amiodarone with resolution of afib/aflutter. Pt has been in sinus with some episodes of bradycardia and BP running low. Trops initially increased but then downtrended, EP doesn't plan any acute intervention.     On 03/16: Patient went into Tachycardia ( SVT/ Aflutter) ; Controlled with Metoprolol 25 mg oral and Cardizem 10 mg IV.   IMPRESSION:    #Atrial flutter/Atrial Tachycardia  #SANTA on CKD s/p renal transplant  #CAD s/p PCI   #HTN  #Compensated Liver Cirrhosis   #Hx cAfib s/p ablation   #Hx HFpEF  #Hx Hep C    PLAN:    CNS:   - Avoid CNS Depressants    HEENT:   - Oral care  - Aspiration precautions     PULMONARY:   - Monitor Pulse Ox. Keep > 94%. Supplement as needed.    CARDIOVASCULAR:   - s/p Amiodarone ggt, per EP now on 200mg PO amio daily  - increased BB as tolerated, currently metoprolol 25mg po bid>>> Can give Metoprolol with HR of 50s , Tachycardia is most likely due to BB withdrawal.   - c/w home imdur 30mg qd, holding home lasix  - Daily Weight. Strict I&Os. Keep I < O, 1.5L fluid restriction   - TTE: EF 70-75, grade III diastolic filling defect, dilated aortic root to 3.8cm, and dilated ascending aorta to 3.8cm.  - tele monitoring   - EP - no acute intervention, c/w amio PO 200mg daily, follow up out pt. f/u official recs.   - EP aware about episodes of Tachycardia. Follow EP recs     GI:   - DASH diet  [ ] Dietitian consult for malnutrition    RENAL:   - Cellcept, tacrolimus level  - KBUS without hydro  - UA mildly positive, asymptomatic   - flomax   - nephro recs appreciated  [ ] f/u tacrolimus level    INFECTIOUS DISEASE:  - monitor off abx     HEMATOLOGICAL:   - hgb and plt at baseline, monitor  - maintain active T/S  - c/w eliquis 5mg po bid, asa 81mg     ENDOCRINE:   - c/w home calcitriol   - lipitor 80mg qhs  [ ] f/u TSH    MUSCULOSKELETAL:   - PT eval    - c/w duloxetine 30mg qd, methocarbamol 750mg po tid   69y M CAD s/p PCI, chronic afib s/p ablation, HTN, HFpEF, ESRD s/p renal tx, Hep C, liver cirrhosis, 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 Pt was loaded with amiodarone with resolution of afib/aflutter. Pt has been in sinus with some episodes of bradycardia and BP running low. Trops initially increased but then downtrended, EP doesn't plan any acute intervention.     On 03/16: Patient went into Atrial tachycardia; received metoprolol 25mg PO and Cardizem 10mg PO; pending EP follow up    IMPRESSION:  #Atrial flutter/Atrial Tachycardia  #SANTA on CKD s/p renal transplant  #CAD s/p PCI   #HTN  #Compensated Liver Cirrhosis   #Hx cAfib s/p ablation   #Hx HFpEF  #Hx Hep C    PLAN:    CNS:   - Avoid CNS Depressants    HEENT:   - Oral care  - Aspiration precautions     PULMONARY:   - Monitor Pulse Ox. Keep > 94%. Supplement as needed.    CARDIOVASCULAR:   - s/p Amiodarone ggt, per EP now on 200mg PO amio daily  - Metoprolol stopped on 3/15. Today patient with episodes of SVTs. Given metoprolol 25mg PO x. Pending EP final recommendations   - c/w home imdur 30mg qd, holding home lasix  - Daily Weight. Strict I&Os. Keep I < O, 1.5L fluid restriction   - TTE: EF 70-75, grade III diastolic filling defect, dilated aortic root to 3.8cm, and dilated ascending aorta to 3.8cm.  - tele monitoring   - EP no acute intervention, c/w amio PO 200mg daily, follow up out pt. f/u official recs.   - EP aware about episodes of Tachycardia. Follow EP recs     GI:   - DASH diet    RENAL:   - Cellcept, tacrolimus level  - KBUS without hydro  - UA mildly positive, asymptomatic   - flomax   - nephro recs appreciated  - f/u tacrolimus level    INFECTIOUS DISEASE:  - monitor off abx     HEMATOLOGICAL:   - hgb and plt at baseline, monitor  - maintain active T/S  - c/w eliquis 5mg po bid, asa 81mg     ENDOCRINE:   - c/w home calcitriol   - lipitor 80mg qhs  [ ] f/u TSH    MUSCULOSKELETAL:   - PT eval    - c/w duloxetine 30mg qd, methocarbamol 750mg po tid

## 2024-03-16 NOTE — PROGRESS NOTE ADULT - ASSESSMENT
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 pulmonary hypertension, emboli on stopping Eliquis s/p embolectomy (?location), underwent ILR implantation on 01/26/2024 for a long-term cardiac arrhythmia. Admitted with episodes of tachycardia.   Was reloaded with Amio IV 24hrs. Was bradycardic and Metoprolol held this AM, developed more episodes of AT      Impression:  Atrial Flutter/Atrial Tachycardia; now NSR  Hx of AF sp Ablation  Hx of VT  S/P Loop Recorder  CAD sp PCI  HTN  Hep C/Liver Cirrhosis  ESRD sp Renal Transplant    Plan:  - Rhythm likely to be atrial tach and PACs, reviewed on interrogation  - Cont Eliquis  - Cont tele monitoring  - Stop Amiodarone   - Con't Metoprolol q12, increase to q8-q6 dosing as HR/ BP tolerates for rate control  - Plan for AT ablation on 3/26 as outpt  - Will follow   EP: Dr Henderson  Cardiologist: Dr Low    69 year-year old male with history of CAD/PCI, HTN, Hep C, Cirrhosis with portal hypertension and esophageal varices, ESRD s/p renal transplant, Right AVF, persistent AF s/p ablation 12/23, anemia, ex-heroine use, ex-smoker, 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 pulmonary hypertension, emboli on stopping Eliquis s/p embolectomy (?location), underwent ILR implantation on 01/26/2024 for a long-term cardiac arrhythmia. Admitted with episodes of tachycardia.   Was reloaded with Amio IV 24hrs. Was bradycardic and Metoprolol held this AM, developed more episodes of AT      Impression:  Atrial Flutter/Atrial Tachycardia; now NSR  Hx of AF sp Ablation  Hx of VT  S/P Loop Recorder  CAD sp PCI  HTN  Hep C/Liver Cirrhosis  ESRD sp Renal Transplant    Plan:  - Rhythm likely to be atrial tachycardia, reviewed on interrogation  - Cont Eliquis  - Cont tele monitoring  - Stop Amiodarone   - Con't Metoprolol q12, increase to q8-q6 dosing as HR/ BP tolerates for rate control  - Plan for AT ablation on 3/26 as outpt  - Will follow

## 2024-03-16 NOTE — PROGRESS NOTE ADULT - ASSESSMENT
1, ESRD , Status post  donor Kidney transplant at [place] 6 years ago  creatinine at baseline  continue current transplant medication  f/u BMP and prograf level    2, CAD, Atrial fibrillation, CHF  cardiology f/u    from renal point of view, can DC home.

## 2024-03-17 LAB
ALBUMIN SERPL ELPH-MCNC: 3.6 G/DL — SIGNIFICANT CHANGE UP (ref 3.5–5.2)
ALP SERPL-CCNC: 96 U/L — SIGNIFICANT CHANGE UP (ref 30–115)
ALT FLD-CCNC: 30 U/L — SIGNIFICANT CHANGE UP (ref 0–41)
ANION GAP SERPL CALC-SCNC: 10 MMOL/L — SIGNIFICANT CHANGE UP (ref 7–14)
AST SERPL-CCNC: 22 U/L — SIGNIFICANT CHANGE UP (ref 0–41)
BASOPHILS # BLD AUTO: 0.02 K/UL — SIGNIFICANT CHANGE UP (ref 0–0.2)
BASOPHILS NFR BLD AUTO: 0.7 % — SIGNIFICANT CHANGE UP (ref 0–1)
BILIRUB SERPL-MCNC: 0.5 MG/DL — SIGNIFICANT CHANGE UP (ref 0.2–1.2)
BUN SERPL-MCNC: 24 MG/DL — HIGH (ref 10–20)
CALCIUM SERPL-MCNC: 10.2 MG/DL — SIGNIFICANT CHANGE UP (ref 8.4–10.5)
CHLORIDE SERPL-SCNC: 108 MMOL/L — SIGNIFICANT CHANGE UP (ref 98–110)
CO2 SERPL-SCNC: 18 MMOL/L — SIGNIFICANT CHANGE UP (ref 17–32)
CREAT SERPL-MCNC: 1.9 MG/DL — HIGH (ref 0.7–1.5)
EGFR: 38 ML/MIN/1.73M2 — LOW
EOSINOPHIL # BLD AUTO: 0.02 K/UL — SIGNIFICANT CHANGE UP (ref 0–0.7)
EOSINOPHIL NFR BLD AUTO: 0.7 % — SIGNIFICANT CHANGE UP (ref 0–8)
GLUCOSE SERPL-MCNC: 102 MG/DL — HIGH (ref 70–99)
HCT VFR BLD CALC: 31.6 % — LOW (ref 42–52)
HCT VFR BLD CALC: 31.8 % — LOW (ref 42–52)
HGB BLD-MCNC: 9.8 G/DL — LOW (ref 14–18)
HGB BLD-MCNC: 9.8 G/DL — LOW (ref 14–18)
IMM GRANULOCYTES NFR BLD AUTO: 0.7 % — HIGH (ref 0.1–0.3)
LYMPHOCYTES # BLD AUTO: 0.63 K/UL — LOW (ref 1.2–3.4)
LYMPHOCYTES # BLD AUTO: 23.1 % — SIGNIFICANT CHANGE UP (ref 20.5–51.1)
MAGNESIUM SERPL-MCNC: 2.2 MG/DL — SIGNIFICANT CHANGE UP (ref 1.8–2.4)
MCHC RBC-ENTMCNC: 29.8 PG — SIGNIFICANT CHANGE UP (ref 27–31)
MCHC RBC-ENTMCNC: 30 PG — SIGNIFICANT CHANGE UP (ref 27–31)
MCHC RBC-ENTMCNC: 30.8 G/DL — LOW (ref 32–37)
MCHC RBC-ENTMCNC: 31 G/DL — LOW (ref 32–37)
MCV RBC AUTO: 96.6 FL — HIGH (ref 80–94)
MCV RBC AUTO: 96.7 FL — HIGH (ref 80–94)
MONOCYTES # BLD AUTO: 0.16 K/UL — SIGNIFICANT CHANGE UP (ref 0.1–0.6)
MONOCYTES NFR BLD AUTO: 5.9 % — SIGNIFICANT CHANGE UP (ref 1.7–9.3)
NEUTROPHILS # BLD AUTO: 1.88 K/UL — SIGNIFICANT CHANGE UP (ref 1.4–6.5)
NEUTROPHILS NFR BLD AUTO: 68.9 % — SIGNIFICANT CHANGE UP (ref 42.2–75.2)
NRBC # BLD: 0 /100 WBCS — SIGNIFICANT CHANGE UP (ref 0–0)
NRBC # BLD: 0 /100 WBCS — SIGNIFICANT CHANGE UP (ref 0–0)
PHOSPHATE SERPL-MCNC: 2.2 MG/DL — SIGNIFICANT CHANGE UP (ref 2.1–4.9)
PLATELET # BLD AUTO: 56 K/UL — LOW (ref 130–400)
PLATELET # BLD AUTO: 57 K/UL — LOW (ref 130–400)
PMV BLD: 12.7 FL — HIGH (ref 7.4–10.4)
PMV BLD: 12.9 FL — HIGH (ref 7.4–10.4)
POTASSIUM SERPL-MCNC: 4.6 MMOL/L — SIGNIFICANT CHANGE UP (ref 3.5–5)
POTASSIUM SERPL-SCNC: 4.6 MMOL/L — SIGNIFICANT CHANGE UP (ref 3.5–5)
PROT SERPL-MCNC: 5.4 G/DL — LOW (ref 6–8)
RBC # BLD: 3.27 M/UL — LOW (ref 4.7–6.1)
RBC # BLD: 3.29 M/UL — LOW (ref 4.7–6.1)
RBC # FLD: 15.4 % — HIGH (ref 11.5–14.5)
RBC # FLD: 15.6 % — HIGH (ref 11.5–14.5)
SODIUM SERPL-SCNC: 136 MMOL/L — SIGNIFICANT CHANGE UP (ref 135–146)
TACROLIMUS SERPL-MCNC: 7.9 NG/ML — SIGNIFICANT CHANGE UP
WBC # BLD: 2.73 K/UL — LOW (ref 4.8–10.8)
WBC # BLD: 3.27 K/UL — LOW (ref 4.8–10.8)
WBC # FLD AUTO: 2.73 K/UL — LOW (ref 4.8–10.8)
WBC # FLD AUTO: 3.27 K/UL — LOW (ref 4.8–10.8)

## 2024-03-17 PROCEDURE — 99232 SBSQ HOSP IP/OBS MODERATE 35: CPT

## 2024-03-17 PROCEDURE — 99222 1ST HOSP IP/OBS MODERATE 55: CPT

## 2024-03-17 PROCEDURE — 99233 SBSQ HOSP IP/OBS HIGH 50: CPT

## 2024-03-17 RX ORDER — OXYCODONE HYDROCHLORIDE 5 MG/1
5 TABLET ORAL EVERY 6 HOURS
Refills: 0 | Status: DISCONTINUED | OUTPATIENT
Start: 2024-03-17 | End: 2024-03-17

## 2024-03-17 RX ORDER — OXYCODONE HYDROCHLORIDE 5 MG/1
15 TABLET ORAL EVERY 8 HOURS
Refills: 0 | Status: DISCONTINUED | OUTPATIENT
Start: 2024-03-17 | End: 2024-03-18

## 2024-03-17 RX ADMIN — ATORVASTATIN CALCIUM 80 MILLIGRAM(S): 80 TABLET, FILM COATED ORAL at 21:06

## 2024-03-17 RX ADMIN — OXYCODONE HYDROCHLORIDE 15 MILLIGRAM(S): 5 TABLET ORAL at 18:43

## 2024-03-17 RX ADMIN — TAMSULOSIN HYDROCHLORIDE 0.4 MILLIGRAM(S): 0.4 CAPSULE ORAL at 17:57

## 2024-03-17 RX ADMIN — Medication 12.5 MILLIGRAM(S): at 16:28

## 2024-03-17 RX ADMIN — Medication 81 MILLIGRAM(S): at 11:36

## 2024-03-17 RX ADMIN — TACROLIMUS 2 MILLIGRAM(S): 5 CAPSULE ORAL at 21:06

## 2024-03-17 RX ADMIN — CHLORHEXIDINE GLUCONATE 1 APPLICATION(S): 213 SOLUTION TOPICAL at 05:18

## 2024-03-17 RX ADMIN — ISOSORBIDE MONONITRATE 30 MILLIGRAM(S): 60 TABLET, EXTENDED RELEASE ORAL at 11:36

## 2024-03-17 RX ADMIN — MYCOPHENOLATE MOFETIL 500 MILLIGRAM(S): 250 CAPSULE ORAL at 17:57

## 2024-03-17 RX ADMIN — Medication 12.5 MILLIGRAM(S): at 21:05

## 2024-03-17 RX ADMIN — CALCITRIOL 0.5 MICROGRAM(S): 0.5 CAPSULE ORAL at 05:17

## 2024-03-17 RX ADMIN — CALCITRIOL 0.5 MICROGRAM(S): 0.5 CAPSULE ORAL at 17:57

## 2024-03-17 RX ADMIN — DAPAGLIFLOZIN 10 MILLIGRAM(S): 10 TABLET, FILM COATED ORAL at 11:36

## 2024-03-17 RX ADMIN — Medication 12.5 MILLIGRAM(S): at 08:11

## 2024-03-17 RX ADMIN — MYCOPHENOLATE MOFETIL 500 MILLIGRAM(S): 250 CAPSULE ORAL at 05:17

## 2024-03-17 RX ADMIN — TACROLIMUS 3 MILLIGRAM(S): 5 CAPSULE ORAL at 08:11

## 2024-03-17 RX ADMIN — APIXABAN 5 MILLIGRAM(S): 2.5 TABLET, FILM COATED ORAL at 17:57

## 2024-03-17 RX ADMIN — DULOXETINE HYDROCHLORIDE 30 MILLIGRAM(S): 30 CAPSULE, DELAYED RELEASE ORAL at 11:36

## 2024-03-17 RX ADMIN — Medication 12.5 MILLIGRAM(S): at 02:04

## 2024-03-17 RX ADMIN — OXYCODONE HYDROCHLORIDE 15 MILLIGRAM(S): 5 TABLET ORAL at 19:19

## 2024-03-17 RX ADMIN — TAMSULOSIN HYDROCHLORIDE 0.4 MILLIGRAM(S): 0.4 CAPSULE ORAL at 05:17

## 2024-03-17 RX ADMIN — APIXABAN 5 MILLIGRAM(S): 2.5 TABLET, FILM COATED ORAL at 05:17

## 2024-03-17 NOTE — PROGRESS NOTE ADULT - ASSESSMENT
1, ESRD , Status post  donor Kidney transplant at [facility] [place] 6 years ago  creatinine at baseline  continue current transplant medication  f/u BMP and prograf level    2, CAD, Atrial fibrillation, CHF  cardiology f/u    from renal point of view, can DC home.

## 2024-03-17 NOTE — CHART NOTE - NSCHARTNOTEFT_GEN_A_CORE
PDI	First Name	Last Name	Birth Date	Gender	Street Address	City	State	Zip Code  A	Chito Echols	1955	Male	[address]	[facility]	[address]  B	Chito Echols	1955	Male	[address]	[facility]	[address]    Prescription Information      PDI Filter:    PDI	Current Rx	Drug Type	Rx Written	Rx Dispensed	Drug	Quantity	Days Supply	Prescriber Name	Prescriber YAYO #	Payment Method	Dispenser  A	Y	O	03/11/2024	03/13/2024	oxycodone hcl (ir) 15 mg tab	90	30	Jeyson Gordon	[id number]	Medicare	[facility]  A	N	O	02/12/2024	02/13/2024	oxycodone hcl (ir) 15 mg tab	90	30	Jeyson Gordon	[id number]	Medicare	[facility]  A	N	O	01/15/2024	01/15/2024	oxycodone hcl (ir) 15 mg tab	90	30	Jeyson Gordon	[id number]	Medicare	[facility]  A	N	O	12/19/2023	12/20/2023	oxycodone hcl (ir) 15 mg tab	90	30	Jeyson Gordon	[id number]	Medicare	[facility]  A	N	O	11/21/2023	11/24/2023	oxycodone hcl (ir) 15 mg tab	90	30	Jeyson Gordon	[id number]	Medicare	[facility]  A	N	O	10/18/2023	10/20/2023	oxycodone hcl (ir) 15 mg tab	90	30	Jeyson Gordon	[id number]	Medicare	[facility]  A	N	O	09/19/2023	09/19/2023	oxycodone hcl (ir) 15 mg tab	90	30	Jeyson Gordon	[id number]	Medicare	[facility]  A	N	O	08/19/2023	08/23/2023	oxycodone hcl (ir) 15 mg tab	90	30	Jeyson Gordon	[id number]	Medicare	[facility]  A	N	O	07/14/2023	07/27/2023	oxycodone hcl (ir) 15 mg tab	90	30	Jeyson Gordon	[id number]	Medicare	[facility]  A	N	O	06/17/2023	06/28/2023	oxycodone hcl (ir) 15 mg tab	90	30	Jeyson Gordon	[id number]	Medicare	[facility]  A	N	O	05/20/2023	05/31/2023	oxycodone hcl (ir) 15 mg tab	90	30	Jeyson Gordon	[id number]	Medicare	[facility]  A	N	O	04/27/2023	05/04/2023	oxycodone hcl (ir) 15 mg tab	90	30	Jeyson Gordon	[id number]	Medicare	[facility]  A	N	O	03/30/2023	04/10/2023	oxycodone hcl (ir) 15 mg tab	90	30	Jeyson Gordon	[id number]	[facility]  B	N	O	09/15/2023	09/16/2023	tramadol hcl 50 mg tablet	12	3	Atilio Laboy	[id number]	Medicare	[id number]  B	N	O	04/20/2023	04/20/2023	oxycodone-acetaminophen 5-325 mg tab	9	3	Ronald Livingston MD	[id number]	Medicare	[id number]

## 2024-03-17 NOTE — DIETITIAN INITIAL EVALUATION ADULT - PERTINENT LABORATORY DATA
03-17    136  |  108  |  24<H>  ----------------------------<  102<H>  4.6   |  18  |  1.9<H>    Ca    10.2      17 Mar 2024 05:59  Phos  2.2     03-17  Mg     2.2     03-17    TPro  5.4<L>  /  Alb  3.6  /  TBili  0.5  /  DBili  x   /  AST  22  /  ALT  30  /  AlkPhos  96  03-17

## 2024-03-17 NOTE — DIETITIAN INITIAL EVALUATION ADULT - OTHER INFO
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 Pt was loaded with amiodarone with resolution of afib/aflutter. Pt has been in sinus with some episodes of bradycardia and BP running low. Trops initially increased but then downtrended, EP doesn't plan any acute intervention.     On 03/16: Patient went into Atrial tachycardia; received metoprolol 25mg PO and Cardizem 10mg PO; pending EP follow up    Atrial flutter/Atrial Tachycardia; SANTA on CKD s/p renal transplant; CAD s/p PCl; HTN; Compensated Liver Cirrhosis; Hx afib s/p ablation; Hx HFpEF; Hx Hep C;

## 2024-03-17 NOTE — DIETITIAN INITIAL EVALUATION ADULT - PERTINENT MEDS FT
MEDICATIONS  (STANDING):  apixaban 5 milliGRAM(s) Oral two times a day  aspirin  chewable 81 milliGRAM(s) Oral daily  atorvastatin 80 milliGRAM(s) Oral at bedtime  calcitriol   Capsule 0.5 MICROGram(s) Oral two times a day  chlorhexidine 2% Cloths 1 Application(s) Topical <User Schedule>  dapagliflozin 10 milliGRAM(s) Oral daily  DULoxetine 30 milliGRAM(s) Oral daily  isosorbide   mononitrate ER Tablet (IMDUR) 30 milliGRAM(s) Oral daily  lidocaine   4% Patch 1 Patch Transdermal every 24 hours  methocarbamol 750 milliGRAM(s) Oral every 8 hours  metoprolol tartrate 12.5 milliGRAM(s) Oral every 6 hours  mycophenolate mofetil 500 milliGRAM(s) Oral two times a day  tacrolimus 2 milliGRAM(s) Oral at bedtime  tacrolimus 3 milliGRAM(s) Oral with breakfast  tamsulosin 0.4 milliGRAM(s) Oral two times a day    MEDICATIONS  (PRN):

## 2024-03-17 NOTE — DIETITIAN INITIAL EVALUATION ADULT - ADD RECOMMEND
1) add low fat modifier to diet order due to hx of liver cirrhosis  2) consider addition of daily multivitamin, thiamine 100 mg x 3 days   3) obtain dosing weight and daily weights  4) consider adding an appetite stimulant which can aid in promoting appetite and increased PO intake   5) Monitor BM, GI s/s   6) monitor electrolytes, renal profile   7) as patient reporting persistent nausea, would recommend to add zofran and give 30 mins prior to meals

## 2024-03-17 NOTE — PHYSICAL THERAPY INITIAL EVALUATION ADULT - GENERAL OBSERVATIONS, REHAB EVAL
Hold PT at this time. Pt. declining PT, stating that he has no energy from not sleeping well and has been unable to consistently eat. Social hx obtained and detailed below. Will f/u with PT as appropriate.
[id number] Pt received and left semifowlers in bed, NAD, pt agreeable to PT session +tele +BP cuff

## 2024-03-17 NOTE — DIETITIAN INITIAL EVALUATION ADULT - EDUCATION DIETARY MODIFICATIONS
discussed fluid restriction and oral nutrition supplements; RD encouraged PO intake as patient able/(1) partially meets; needs review/practice/verbalization

## 2024-03-17 NOTE — CONSULT NOTE ADULT - SUBJECTIVE AND OBJECTIVE BOX
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 Patient was at a physician appointment this morning. His HR at the appointment was 180s. Patient reports this morning he felt ok, did not experience any chest pain, shortness of breath or palpitations. Patient initially refused EMS intervention and was brought to the ED. Patient reports compliance with all medications, denies any recent drug or alcohol use, denies sick contacts. Review of systems negative for fever, chills, rhinorrhea, cough, shortness of breath, dizziness, lightheadedness, constipation, diarrhea, nausea, vomiting, abdominal pain, chest pain or urinary symptoms.     In the ED EKG concerning for wide-complex tachycardia vs Afib w/ aberrancy.  EP consulted, device interrogation showed atrial flutter with atrial tachycardia and PACs  Patient started on amiodarone drip and admitted to Cardiac Stepdown for continued monitoring     Of note patient previously admitted  w/ stress test showing large size reversible defect in anterior wall and apex of LV. Patient had cardiac cath w/ 70% stenosis LAD at bifurcation site w/ 80% D1 ostial stenosis. No stents were placed.       pt has h/o left L5-S1 endoscopic laminoforaminotomy  c/o lbp w/ radicular pain      PAST MEDICAL & SURGICAL HISTORY:  CKD (chronic kidney disease)      ESRD (end stage renal disease)      HTN (hypertension)      HLD (hyperlipidemia)      Atrial fibrillation  on eliquis      COPD, mild  not on O2      Peripheral neuropathy  unclear cause      Lymphoma  ?questionable, not treated, not followed      Melanoma  R eye, s/p laser      Cirrhosis  possibly related to hepC      Breast cancer      Legally blind      Stroke      History of kidney transplant  3 years ago      S/P arteriovenous (AV) fistula creation  prior old fistula in R arm      S/P lumpectomy, right breast      History of back surgery  s/p Left L5-S1 endoscopic laminoforaminotomy          FAMILY HISTORY:  FH: dementia  mother, alive    FHx: breast cancer  sister ( in 30s)        SOCIAL HISTORY:  [ ] Denies Smoking, Alcohol, or Drug Use    HOME MEDICATIONS:   Please refer to initial HNP    PAIN HOME MEDICATIONS:    Allergies    Rituxan (Hives)  Rifuxen (Swelling)    Intolerances        PAIN MEDICATIONS:  DULoxetine 30 milliGRAM(s) Oral daily  methocarbamol 750 milliGRAM(s) Oral every 8 hours  oxyCODONE    IR 15 milliGRAM(s) Oral every 8 hours PRN    Heme:  apixaban 5 milliGRAM(s) Oral two times a day  aspirin  chewable 81 milliGRAM(s) Oral daily    Antibiotics:    Cardiovascular:  isosorbide   mononitrate ER Tablet (IMDUR) 30 milliGRAM(s) Oral daily  metoprolol tartrate 12.5 milliGRAM(s) Oral every 6 hours    GI:    Endocrine:  atorvastatin 80 milliGRAM(s) Oral at bedtime  dapagliflozin 10 milliGRAM(s) Oral daily    All Other Medications:  calcitriol   Capsule 0.5 MICROGram(s) Oral two times a day  chlorhexidine 2% Cloths 1 Application(s) Topical <User Schedule>  lidocaine   4% Patch 1 Patch Transdermal every 24 hours  mycophenolate mofetil 500 milliGRAM(s) Oral two times a day  tacrolimus 3 milliGRAM(s) Oral with breakfast  tacrolimus 2 milliGRAM(s) Oral at bedtime  tamsulosin 0.4 milliGRAM(s) Oral two times a day      Vital Signs Last 24 Hrs  T(C): 36.8 (17 Mar 2024 20:00), Max: 36.8 (17 Mar 2024 20:00)  T(F): 98.3 (17 Mar 2024 20:00), Max: 98.3 (17 Mar 2024 20:00)  HR: 52 (17 Mar 2024 20:00) (49 - 52)  BP: 119/60 (17 Mar 2024 20:00) (98/61 - 119/60)  BP(mean): 83 (17 Mar 2024 20:00) (75 - 85)  RR: 22 (17 Mar 2024 20:00) (14 - 22)  SpO2: 99% (17 Mar 2024 20:00) (97% - 99%)    Parameters below as of 17 Mar 2024 20:00  Patient On (Oxygen Delivery Method): room air      LABS:                        9.8    3.27  )-----------( 56       ( 17 Mar 2024 18:03 )             31.6     03-17    136  |  108  |  24<H>  ----------------------------<  102<H>  4.6   |  18  |  1.9<H>    Ca    10.2      17 Mar 2024 05:59  Phos  2.2     -  Mg     2.2     -    TPro  5.4<L>  /  Alb  3.6  /  TBili  0.5  /  DBili  x   /  AST  22  /  ALT  30  /  AlkPhos  96  -      Urinalysis Basic - ( 17 Mar 2024 05:59 )    Color: x / Appearance: x / SG: x / pH: x  Gluc: 102 mg/dL / Ketone: x  / Bili: x / Urobili: x   Blood: x / Protein: x / Nitrite: x   Leuk Esterase: x / RBC: x / WBC x   Sq Epi: x / Non Sq Epi: x / Bacteria: x      PHYSICAL EXAM  GENERAL: Laying in bed  Motor exam: 5/5 b/l UE. 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 Patient was at a physician appointment this morning. His HR at the appointment was 180s. Patient reports this morning he felt ok, did not experience any chest pain, shortness of breath or palpitations. Patient initially refused EMS intervention and was brought to the ED. Patient reports compliance with all medications, denies any recent drug or alcohol use, denies sick contacts. Review of systems negative for fever, chills, rhinorrhea, cough, shortness of breath, dizziness, lightheadedness, constipation, diarrhea, nausea, vomiting, abdominal pain, chest pain or urinary symptoms.       In the ED EKG concerning for wide-complex tachycardia vs Afib w/ aberrancy.  EP consulted, device interrogation showed atrial flutter with atrial tachycardia and PACs  Patient started on amiodarone drip and admitted to Cardiac Stepdown for continued monitoring     Of note patient previously admitted  w/ stress test showing large size reversible defect in anterior wall and apex of LV. Patient had cardiac cath w/ 70% stenosis LAD at bifurcation site w/ 80% D1 ostial stenosis. No stents were placed.     lumbar radiculitis  post l5-s1 laminoforaminotomy  post lami syndrome    Recommendations  1. Duloxetine 30mg qD  2. Methocarbamol 750mg q8h standing  3. Oxycodone 15mg q8 (home dose)    Bowel regimen: miralax / colace  Nausea ppx: zofran standing  PT

## 2024-03-17 NOTE — PHYSICAL THERAPY INITIAL EVALUATION ADULT - PERTINENT HX OF CURRENT PROBLEM, REHAB EVAL
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 Patient was at a physician appointment this morning. His HR at the appointment was 180s. Patient reports this morning he felt ok, did not experience any chest pain, shortness of breath or palpitations. Patient initially refused EMS intervention and was brought to the ED. Patient reports compliance with all medications, denies any recent drug or alcohol use, denies sick contacts. Review of systems negative for fever, chills, rhinorrhea, cough, shortness of breath, dizziness, lightheadedness, constipation, diarrhea, nausea, vomiting, abdominal pain, chest pain or urinary symptoms.

## 2024-03-17 NOTE — PROGRESS NOTE ADULT - ASSESSMENT
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 pulmonary hypertension, emboli on stopping Eliquis s/p embolectomy (?location), underwent ILR implantation on 01/26/2024 for a long-term cardiac arrhythmia. Admitted with episodes of tachycardia.   Was reloaded with Amio IV 24hrs. Was bradycardic and Metoprolol held this AM, developed more episodes of AT      Impression:  Atrial Flutter/Atrial Tachycardia; now NSR  Hx of AF sp Ablation  Hx of VT  S/P Loop Recorder  CAD sp PCI  HTN  Hep C/Liver Cirrhosis  ESRD sp Renal Transplant    Plan:  - Rhythm likely to be atrial tachycardia, reviewed on interrogation  - Cont Eliquis  - Con't Metoprolol   - Plan for AT ablation on 3/26 as outpt   EP: Dr Henderson  Cardiologist: Dr Low    69 year-year old male with history of CAD/PCI, HTN, Hep C, Cirrhosis with portal hypertension and esophageal varices, ESRD s/p renal transplant, Right AVF, persistent AF s/p ablation 12/23, anemia, ex-heroine use, ex-smoker, 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 pulmonary hypertension, emboli on stopping Eliquis s/p embolectomy (?location), underwent ILR implantation on 01/26/2024 for a long-term cardiac arrhythmia. Admitted with episodes of tachycardia.   Was reloaded with Amio IV 24hrs. Was bradycardic and Metoprolol held this AM, developed more episodes of AT      Impression:  Atrial Tachycardia; now NSR  Hx of AF sp Ablation  Hx of VT  S/P Loop Recorder  CAD sp PCI  HTN  Hep C/Liver Cirrhosis  ESRD sp Renal Transplant    Plan:  - Rhythm likely to be atrial tachycardia, reviewed on interrogation  - Cont Eliquis  - Con't Metoprolol   - Plan for AT ablation on 3/26 as outpt

## 2024-03-17 NOTE — DIETITIAN NUTRITION RISK NOTIFICATION - TREATMENT: THE FOLLOWING DIET HAS BEEN RECOMMENDED
Diet, Regular:   DASH/TLC {Sodium & Cholesterol Restricted} (DASH)  1500mL Fluid Restriction ([id number])  Supplement Feeding Modality:  Oral  Ensure Compact Cans or Servings Per Day:  1       Frequency:  Three Times a day (03-17-24 @ 10:25) [Pending Verification By Attending]    Patient with severe malnutrition in the context of chronic illness as evidenced by energy intake </=75% of estimated energy requirement for >/=3 months, weight loss >7.5% x 3 months, severe  loss of muscle (clavicles, temples, shoulders) and severe loss of subcutaneous fat (buccal)

## 2024-03-17 NOTE — DIETITIAN INITIAL EVALUATION ADULT - OTHER CALCULATIONS
estimated needs with consideration for age, acuity of illness, underweight BMI, PCM  Fluid Needs: 1500 mL fluid restriction

## 2024-03-17 NOTE — DIETITIAN INITIAL EVALUATION ADULT - NAME AND PHONE
Sanjuanita Rojas, RD [phone] or via Teams    Patient is at high nutrition risk, RD to f/u in 3-5 days or PRN  Sanjuanita Rojas, RD [phone] or via Teams    Patient is at high nutrition risk, RD to f/u in 3 days or PRN

## 2024-03-17 NOTE — DIETITIAN INITIAL EVALUATION ADULT - ORAL INTAKE PTA/DIET HISTORY
Patient reports poor PO intake over the past 1 year accompanied by nausea, which has caused a 100 lb weight loss over the past 1 year. He was not following any particular diet at home. He used to receive Ensure supplements through disability, and he was drinking ~3-4 Ensure supplements/day when receiving them. Reports his weight was just taken today - 122 lbs. NKFA, reports he cannot tolerate onions and peppers.     Patient is agreeable to receive oral nutrition supplements.

## 2024-03-17 NOTE — DIETITIAN INITIAL EVALUATION ADULT - NS FNS REASON FOR WEIGHT CHANG
weight hx per EMR:    70.3 kg - 154.66 lbs (9/15/23)  61.2 kg - 134.64 lbs (11/10/23)  63 kg - 138.6 lbs (12/15/23)  59.1 kg - 130.02 lbs (1/6/24)  57.8 kg - 127.16 lbs (1/26/24)    weight loss likely related to persistent poor appetite and PO intake and persistent nausea/decreased po intake/altered GI function (specify)

## 2024-03-17 NOTE — PROGRESS NOTE ADULT - ASSESSMENT
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 Pt was loaded with amiodarone with resolution of afib/aflutter. Pt has been in sinus with some episodes of bradycardia and BP running low. Trops initially increased but then downtrended, EP doesn't plan any acute intervention.     IMPRESSION:  #Atrial flutter/Atrial Tachycardia, now in NSR  #SANTA on CKD s/p renal transplant  #CAD s/p PCI   #HTN  #Compensated Liver Cirrhosis   #Hx cAfib s/p ablation   #Hx HFpEF  #Hx Hep C    PLAN:    CNS:   - Avoid CNS Depressants    HEENT:   - Oral care  - Aspiration precautions     PULMONARY:   - Monitor Pulse Ox. Keep > 94%. Supplement as needed.    CARDIOVASCULAR:   - Amiodarone stopped due to profound bradycardia   - keep on metoprolol 12.5 mg every 6 hrs   - c/w home imdur 30mg qd  - Daily Weight. Strict I&Os. Keep I < O, 1.5L fluid restriction   - TTE: EF 70-75, grade III diastolic filling defect, dilated aortic root to 3.8cm, and dilated ascending aorta to 3.8cm.  - tele monitoring   - s/p interrogation which showed possible AT.   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 Pt was loaded with amiodarone with resolution of afib/aflutter. Pt has been in sinus with some episodes of bradycardia and BP running low. Trops initially increased but then downtrended. 3/16 Pt developed atrial tachycardia and EP recommended DCing amiodarone and increasing metoprolol as tolerated. Pt is scheduled for an ablation 3/26 with EP out pt.     IMPRESSION:  #Atrial flutter/Atrial Tachycardia, now in NSR  #SANTA on CKD s/p renal transplant  #CAD s/p PCI   #HTN  #Compensated Liver Cirrhosis   #Hx cAfib s/p ablation   #Hx HFpEF  #Hx Hep C    PLAN:    CNS:   - Avoid CNS Depressants    HEENT:   - Oral care  - Aspiration precautions     PULMONARY:   - Monitor Pulse Ox. Keep > 94%. Supplement as needed.    CARDIOVASCULAR:   - Amiodarone stopped due to profound bradycardia   - keep on metoprolol 12.5 mg every 6 hrs   - c/w home imdur 30mg qd  - Daily Weight. Strict I&Os. Keep I < O, 1.5L fluid restriction   - TTE: EF 70-75, grade III diastolic filling defect, dilated aortic root to 3.8cm, and dilated ascending aorta to 3.8cm.  - tele monitoring   - s/p interrogation which showed possible AT.   - will schedule for Atrial tachycardia ablation on 3/26 as OP by EP     GI:   - DASH diet    RENAL:   - cw Cellcept, tacrolimus level  - cleared by nephro for DC    INFECTIOUS DISEASE:  - monitor off abx     HEMATOLOGICAL:   - worsening Thrombocytopenia  - consider stopping eliquis if platelets count < 50 k    - maintain active T/S    ENDOCRINE:   - c/w home calcitriol   - lipitor 80mg qhs  - TSH 1.9    MUSCULOSKELETAL:   - PT eval    - c/w duloxetine 30mg qd, methocarbamol 750mg po tid

## 2024-03-17 NOTE — PHYSICAL THERAPY INITIAL EVALUATION ADULT - ADDITIONAL COMMENTS
Pt. lives alone in a condo with 1 step to enter. Reports use of SC, RW, and WC. WC used mostly in community. HHA 8h x 7d.
Pt lives alone in home with 1 steps to enter, no steps inside. Pt ambulates with RW, also uses w/c for longer distances. Home health aide 8 hours/day, daily.

## 2024-03-17 NOTE — DIETITIAN INITIAL EVALUATION ADULT - NS FNS DIET ORDER
Diet, Regular:   DASH/TLC {Sodium & Cholesterol Restricted} (DASH)  1500mL Fluid Restriction ([id number]) (03-14-24 @ 18:34) [Active]

## 2024-03-17 NOTE — PROGRESS NOTE ADULT - TIME BILLING
Atrial Tachycardia
AT, SB, thrombocytopenia, kidney transplant.
AT, a-fib, bradycardia, SANTA on CKD, CAD.
AT

## 2024-03-18 ENCOUNTER — TRANSCRIPTION ENCOUNTER (OUTPATIENT)
Age: 69
End: 2024-03-18

## 2024-03-18 VITALS
OXYGEN SATURATION: 100 % | DIASTOLIC BLOOD PRESSURE: 87 MMHG | RESPIRATION RATE: 20 BRPM | HEART RATE: 47 BPM | TEMPERATURE: 98 F | SYSTOLIC BLOOD PRESSURE: 131 MMHG

## 2024-03-18 LAB
ALBUMIN SERPL ELPH-MCNC: 3.4 G/DL — LOW (ref 3.5–5.2)
ALP SERPL-CCNC: 83 U/L — SIGNIFICANT CHANGE UP (ref 30–115)
ALT FLD-CCNC: 22 U/L — SIGNIFICANT CHANGE UP (ref 0–41)
ANION GAP SERPL CALC-SCNC: 8 MMOL/L — SIGNIFICANT CHANGE UP (ref 7–14)
AST SERPL-CCNC: 16 U/L — SIGNIFICANT CHANGE UP (ref 0–41)
BASOPHILS # BLD AUTO: 0.01 K/UL — SIGNIFICANT CHANGE UP (ref 0–0.2)
BASOPHILS # BLD AUTO: 0.01 K/UL — SIGNIFICANT CHANGE UP (ref 0–0.2)
BASOPHILS NFR BLD AUTO: 0.3 % — SIGNIFICANT CHANGE UP (ref 0–1)
BASOPHILS NFR BLD AUTO: 0.4 % — SIGNIFICANT CHANGE UP (ref 0–1)
BILIRUB SERPL-MCNC: 0.4 MG/DL — SIGNIFICANT CHANGE UP (ref 0.2–1.2)
BUN SERPL-MCNC: 24 MG/DL — HIGH (ref 10–20)
CALCIUM SERPL-MCNC: 10.4 MG/DL — SIGNIFICANT CHANGE UP (ref 8.4–10.5)
CHLORIDE SERPL-SCNC: 107 MMOL/L — SIGNIFICANT CHANGE UP (ref 98–110)
CO2 SERPL-SCNC: 19 MMOL/L — SIGNIFICANT CHANGE UP (ref 17–32)
CREAT SERPL-MCNC: 2 MG/DL — HIGH (ref 0.7–1.5)
EGFR: 36 ML/MIN/1.73M2 — LOW
EOSINOPHIL # BLD AUTO: 0.02 K/UL — SIGNIFICANT CHANGE UP (ref 0–0.7)
EOSINOPHIL # BLD AUTO: 0.02 K/UL — SIGNIFICANT CHANGE UP (ref 0–0.7)
EOSINOPHIL NFR BLD AUTO: 0.6 % — SIGNIFICANT CHANGE UP (ref 0–8)
EOSINOPHIL NFR BLD AUTO: 0.7 % — SIGNIFICANT CHANGE UP (ref 0–8)
GLUCOSE SERPL-MCNC: 103 MG/DL — HIGH (ref 70–99)
HCT VFR BLD CALC: 28.8 % — LOW (ref 42–52)
HCT VFR BLD CALC: 29.1 % — LOW (ref 42–52)
HGB BLD-MCNC: 9 G/DL — LOW (ref 14–18)
HGB BLD-MCNC: 9.1 G/DL — LOW (ref 14–18)
IMM GRANULOCYTES NFR BLD AUTO: 0.6 % — HIGH (ref 0.1–0.3)
IMM GRANULOCYTES NFR BLD AUTO: 0.7 % — HIGH (ref 0.1–0.3)
LYMPHOCYTES # BLD AUTO: 0.46 K/UL — LOW (ref 1.2–3.4)
LYMPHOCYTES # BLD AUTO: 0.59 K/UL — LOW (ref 1.2–3.4)
LYMPHOCYTES # BLD AUTO: 14.7 % — LOW (ref 20.5–51.1)
LYMPHOCYTES # BLD AUTO: 21.1 % — SIGNIFICANT CHANGE UP (ref 20.5–51.1)
MAGNESIUM SERPL-MCNC: 2 MG/DL — SIGNIFICANT CHANGE UP (ref 1.8–2.4)
MCHC RBC-ENTMCNC: 29.9 PG — SIGNIFICANT CHANGE UP (ref 27–31)
MCHC RBC-ENTMCNC: 30.5 PG — SIGNIFICANT CHANGE UP (ref 27–31)
MCHC RBC-ENTMCNC: 30.9 G/DL — LOW (ref 32–37)
MCHC RBC-ENTMCNC: 31.6 G/DL — LOW (ref 32–37)
MCV RBC AUTO: 96.6 FL — HIGH (ref 80–94)
MCV RBC AUTO: 96.7 FL — HIGH (ref 80–94)
MONOCYTES # BLD AUTO: 0.18 K/UL — SIGNIFICANT CHANGE UP (ref 0.1–0.6)
MONOCYTES # BLD AUTO: 0.18 K/UL — SIGNIFICANT CHANGE UP (ref 0.1–0.6)
MONOCYTES NFR BLD AUTO: 5.8 % — SIGNIFICANT CHANGE UP (ref 1.7–9.3)
MONOCYTES NFR BLD AUTO: 6.4 % — SIGNIFICANT CHANGE UP (ref 1.7–9.3)
NEUTROPHILS # BLD AUTO: 1.98 K/UL — SIGNIFICANT CHANGE UP (ref 1.4–6.5)
NEUTROPHILS # BLD AUTO: 2.43 K/UL — SIGNIFICANT CHANGE UP (ref 1.4–6.5)
NEUTROPHILS NFR BLD AUTO: 70.7 % — SIGNIFICANT CHANGE UP (ref 42.2–75.2)
NEUTROPHILS NFR BLD AUTO: 78 % — HIGH (ref 42.2–75.2)
NRBC # BLD: 0 /100 WBCS — SIGNIFICANT CHANGE UP (ref 0–0)
NRBC # BLD: 0 /100 WBCS — SIGNIFICANT CHANGE UP (ref 0–0)
PHOSPHATE SERPL-MCNC: 2.7 MG/DL — SIGNIFICANT CHANGE UP (ref 2.1–4.9)
PLATELET # BLD AUTO: 46 K/UL — LOW (ref 130–400)
PLATELET # BLD AUTO: 49 K/UL — LOW (ref 130–400)
PMV BLD: 12.4 FL — HIGH (ref 7.4–10.4)
PMV BLD: 12.9 FL — HIGH (ref 7.4–10.4)
POTASSIUM SERPL-MCNC: 4.3 MMOL/L — SIGNIFICANT CHANGE UP (ref 3.5–5)
POTASSIUM SERPL-SCNC: 4.3 MMOL/L — SIGNIFICANT CHANGE UP (ref 3.5–5)
PROT SERPL-MCNC: 5.1 G/DL — LOW (ref 6–8)
RBC # BLD: 2.98 M/UL — LOW (ref 4.7–6.1)
RBC # BLD: 3.01 M/UL — LOW (ref 4.7–6.1)
RBC # FLD: 15.3 % — HIGH (ref 11.5–14.5)
RBC # FLD: 15.3 % — HIGH (ref 11.5–14.5)
SODIUM SERPL-SCNC: 134 MMOL/L — LOW (ref 135–146)
WBC # BLD: 2.8 K/UL — LOW (ref 4.8–10.8)
WBC # BLD: 3.12 K/UL — LOW (ref 4.8–10.8)
WBC # FLD AUTO: 2.8 K/UL — LOW (ref 4.8–10.8)
WBC # FLD AUTO: 3.12 K/UL — LOW (ref 4.8–10.8)

## 2024-03-18 PROCEDURE — 88189 FLOWCYTOMETRY/READ 16 & >: CPT

## 2024-03-18 PROCEDURE — 99238 HOSP IP/OBS DSCHRG MGMT 30/<: CPT

## 2024-03-18 PROCEDURE — 99233 SBSQ HOSP IP/OBS HIGH 50: CPT

## 2024-03-18 PROCEDURE — 99222 1ST HOSP IP/OBS MODERATE 55: CPT

## 2024-03-18 PROCEDURE — 88291 CYTO/MOLECULAR REPORT: CPT

## 2024-03-18 RX ORDER — DAPAGLIFLOZIN 10 MG/1
1 TABLET, FILM COATED ORAL
Qty: 30 | Refills: 0
Start: 2024-03-18 | End: 2024-04-16

## 2024-03-18 RX ORDER — METOPROLOL TARTRATE 50 MG
1 TABLET ORAL
Qty: 60 | Refills: 0
Start: 2024-03-18 | End: 2024-04-16

## 2024-03-18 RX ORDER — OXYCODONE HYDROCHLORIDE 5 MG/1
1 TABLET ORAL
Qty: 0 | Refills: 0 | DISCHARGE
Start: 2024-03-18

## 2024-03-18 RX ORDER — POLYETHYLENE GLYCOL 3350 17 G/17G
17 POWDER, FOR SOLUTION ORAL
Qty: 1020 | Refills: 0
Start: 2024-03-18 | End: 2024-04-16

## 2024-03-18 RX ORDER — POLYETHYLENE GLYCOL 3350 17 G/17G
17 POWDER, FOR SOLUTION ORAL
Refills: 0 | Status: DISCONTINUED | OUTPATIENT
Start: 2024-03-18 | End: 2024-03-18

## 2024-03-18 RX ADMIN — POLYETHYLENE GLYCOL 3350 17 GRAM(S): 17 POWDER, FOR SOLUTION ORAL at 17:37

## 2024-03-18 RX ADMIN — CALCITRIOL 0.5 MICROGRAM(S): 0.5 CAPSULE ORAL at 17:34

## 2024-03-18 RX ADMIN — ISOSORBIDE MONONITRATE 30 MILLIGRAM(S): 60 TABLET, EXTENDED RELEASE ORAL at 11:32

## 2024-03-18 RX ADMIN — MYCOPHENOLATE MOFETIL 500 MILLIGRAM(S): 250 CAPSULE ORAL at 05:19

## 2024-03-18 RX ADMIN — Medication 12.5 MILLIGRAM(S): at 14:19

## 2024-03-18 RX ADMIN — APIXABAN 5 MILLIGRAM(S): 2.5 TABLET, FILM COATED ORAL at 17:34

## 2024-03-18 RX ADMIN — CHLORHEXIDINE GLUCONATE 1 APPLICATION(S): 213 SOLUTION TOPICAL at 05:19

## 2024-03-18 RX ADMIN — MYCOPHENOLATE MOFETIL 500 MILLIGRAM(S): 250 CAPSULE ORAL at 17:36

## 2024-03-18 RX ADMIN — Medication 12.5 MILLIGRAM(S): at 09:18

## 2024-03-18 RX ADMIN — CALCITRIOL 0.5 MICROGRAM(S): 0.5 CAPSULE ORAL at 05:19

## 2024-03-18 RX ADMIN — Medication 12.5 MILLIGRAM(S): at 02:10

## 2024-03-18 RX ADMIN — Medication 81 MILLIGRAM(S): at 11:32

## 2024-03-18 RX ADMIN — TACROLIMUS 3 MILLIGRAM(S): 5 CAPSULE ORAL at 09:18

## 2024-03-18 RX ADMIN — DAPAGLIFLOZIN 10 MILLIGRAM(S): 10 TABLET, FILM COATED ORAL at 11:32

## 2024-03-18 RX ADMIN — DULOXETINE HYDROCHLORIDE 30 MILLIGRAM(S): 30 CAPSULE, DELAYED RELEASE ORAL at 11:32

## 2024-03-18 RX ADMIN — TAMSULOSIN HYDROCHLORIDE 0.4 MILLIGRAM(S): 0.4 CAPSULE ORAL at 05:19

## 2024-03-18 RX ADMIN — APIXABAN 5 MILLIGRAM(S): 2.5 TABLET, FILM COATED ORAL at 05:19

## 2024-03-18 RX ADMIN — TAMSULOSIN HYDROCHLORIDE 0.4 MILLIGRAM(S): 0.4 CAPSULE ORAL at 17:37

## 2024-03-18 NOTE — DISCHARGE NOTE NURSING/CASE MANAGEMENT/SOCIAL WORK - NSDCPEELIQUISCOMP_GEN_ALL_CORE
Information about cardiac rehab provided along with contact information and exercise guidelines. Program is temporary closed due to Covid-19 will send referral to [facility] for staff to call patient when program reopens.      
Apixaban/Eliquis is used to treat and prevent blood clots. If you are not able to swallow the tablets whole, they may be crushed and mixed in water, apple juice, or applesauce and promptly taken within four hours. Never skip a dose of Apixaban/Eliquis. If you forget to take your Apixaban/Eliquis, take a dose as soon as you remember. If it is almost time for your next Apixaban/Eliquis dose, wait until then and take a regular dose. DO NOT take an extra pill to ‘catch up’.  NEVER TAKE A DOUBLE DOSE. Notify your doctor that you missed a dose. Take Apixaban/Eliquis at the same time each morning and evening. Apixaban/Eliquis may be taken with other medication or food.

## 2024-03-18 NOTE — PROGRESS NOTE ADULT - ASSESSMENT
Status post  donor Kidney transplant at [facility] 6 years ago, creatinine stable  Pancytopenia  CAD  Atrial fibrillation  HTN  Cirrhosis    Plan    Tacrolimus level acceptable  Continue tacrolimus  Continue mycophenolate  Will follow with you

## 2024-03-18 NOTE — PROGRESS NOTE ADULT - NS ATTEND AMEND GEN_ALL_CORE FT
Long standing persistent AF s/p Ablation  Intermittent non-sustained AT  Sinus Bradycardia    Patient overall feels great  We discussed management options including AT ablation, Meds only (Metoprolol) vs possible PPM + BB. He is not a good long term candidate for Amiodarone and Sotalol due to CKD and other meds. After discussing pros-cons, we will attmept Metoprolol for now. if continues to have recurrences, PPM + BB may be a good options.     I discussed the risks, benefits and alternatives for device implantation.    I reported a risk of major complications at 1% and minor complications at 3%. The details of the procedure and risks associated with undergoing the procedure were discussed in detail including but not limited to, death, myocardial ischemia, stroke, cardiac perforation, potential need for temporary pacemaker implantation, lung damage requiring chest tube (pneumothorax), blood clot, blood collection requiring blood transfusion or repeat surgery (hematoma), infection, or vascular injury. All questions were answered to satisfaction and the patient expressed verbal understanding of the procedure, the benefits, and risks.    OP discussion.  OP f-up in 3-4 weeks
Covering Dr. Henderson  complex patient  recommend Metoprolol titrated ti BP and HR   continue stopping Amiodarone  Plan discussed with Dr Henderson for ablation of AT
Covering Dr. Henderson  Atrial Tachycardia at 146 bpm alternating with sinus bradycardia ~ 45 bpm  On Tacrolimus and cellcept  recommend stop Amiodarone  Use Metoprolol titrated to BP and HR  Dr Henderson will assess patient for catheter ablation on Tue 3/26/2024  I discussed care with Dr. Erazo and Dr. Henderson

## 2024-03-18 NOTE — PROGRESS NOTE ADULT - SUBJECTIVE AND OBJECTIVE BOX
24H events:    Patient is a 69y old Male who presents with a chief complaint of Palpitations (15 Mar 2024 14:55)    Primary diagnosis of Tachycardia    Today is hospital day 2d. This morning patient was seen and examined at bedside, resting comfortably in bed.    No acute or major events overnight.    PAST MEDICAL & SURGICAL HISTORY  CKD (chronic kidney disease)    ESRD (end stage renal disease)    HTN (hypertension)    HLD (hyperlipidemia)    Atrial fibrillation  on eliquis    COPD, mild  not on O2    Peripheral neuropathy  unclear cause    Lymphoma  ?questionable, not treated, not followed    Melanoma  R eye, s/p laser    Cirrhosis  possibly related to hepC    Breast cancer    Legally blind    Stroke    History of kidney transplant  3 years ago    S/P arteriovenous (AV) fistula creation  prior old fistula in R arm    S/P lumpectomy, right breast    History of back surgery  s/p Left L5-S1 endoscopic laminoforaminotomy      SOCIAL HISTORY:  Social History:  Lives at home, has aid 8hrs, no occupation, no tobacco, etoh or recreational drug use (14 Mar 2024 18:35)      ALLERGIES:  Rituxan (Hives)  Rifuxen (Swelling)    MEDICATIONS:  STANDING MEDICATIONS  aMIOdarone    Tablet 200 milliGRAM(s) Oral daily  apixaban 5 milliGRAM(s) Oral two times a day  aspirin  chewable 81 milliGRAM(s) Oral daily  atorvastatin 80 milliGRAM(s) Oral at bedtime  calcitriol   Capsule 0.5 MICROGram(s) Oral two times a day  chlorhexidine 2% Cloths 1 Application(s) Topical <User Schedule>  dapagliflozin 10 milliGRAM(s) Oral daily  DULoxetine 30 milliGRAM(s) Oral daily  isosorbide   mononitrate ER Tablet (IMDUR) 30 milliGRAM(s) Oral daily  lidocaine   4% Patch 1 Patch Transdermal every 24 hours  methocarbamol 750 milliGRAM(s) Oral every 8 hours  metoprolol tartrate 25 milliGRAM(s) Oral two times a day  mycophenolate mofetil 500 milliGRAM(s) Oral two times a day  tacrolimus 3 milliGRAM(s) Oral with breakfast  tacrolimus 2 milliGRAM(s) Oral at bedtime  tamsulosin 0.4 milliGRAM(s) Oral two times a day    PRN MEDICATIONS    VITALS:   T(F): 98.9  HR: 50  BP: 104/63  RR: 19  SpO2: 99%    PHYSICAL EXAM:  GENERAL: laying on the left side  ( x ) NAD, lying in bed comfortably     (  ) obtunded     (  ) lethargic     (  ) somnolent    HEAD:   ( x ) Atraumatic     (  ) hematoma     (  ) laceration (specify location:       )     NECK:  ( x ) Supple     (  ) neck stiffness     (  ) nuchal rigidity     (  )  no JVD     (  ) JVD present ( -- cm)    HEART: Fluctuating from Bradycardia and Tachycardia   Rate -->     (  ) normal rate     (  ) bradycardic     (  ) tachycardic  Rhythm -->     (  ) regular     (  ) regularly irregular     ( x ) irregularly irregular  Murmurs -->     (  ) normal s1s2     (x  ) systolic murmur ( Mitral Area)     (  ) diastolic murmur     (  ) continuous murmur      (  ) S3 present     (  ) S4 present    LUNGS:   ( x )Unlabored respirations     (  ) tachypnea  (x  ) B/L air entry     (  ) decreased breath sounds in:  (location     )    (x  ) no adventitious sound     (  ) crackles     (  ) wheezing      (  ) rhonchi      (specify location:       )  (  ) chest wall tenderness (specify location:       )    ABDOMEN:   ( x ) Soft     (  ) tense   |   ( x ) nondistended     (  ) distended   |   (x  ) +BS     (  ) hypoactive bowel sounds     (  ) hyperactive bowel sounds  (  ) nontender     (  ) RUQ tenderness     (  ) RLQ tenderness     (  ) LLQ tenderness     (  ) epigastric tenderness     (  ) diffuse tenderness  (  ) Splenomegaly      (  ) Hepatomegaly      (  ) Jaundice     (  ) ecchymosis     EXTREMITIES:  ( x ) Normal     (  ) Rash     (  ) ecchymosis     (  ) varicose veins      (  ) pitting edema     (  ) non-pitting edema   (  ) ulceration     (  ) gangrene:     (location:     )    NERVOUS SYSTEM:    ( x ) A&Ox3     (  ) confused     (  ) lethargic  CN II-XII:     (  ) Intact     (  ) deficits found     (Specify:     )   Upper extremities:     (  ) no sensorimotor deficits     (  ) weakness     (  ) loss of proprioception/vibration     (  ) loss of touch/temperature (specify:    )  Lower extremities:     (  ) no sensorimotor deficits     (  ) weakness     (  ) loss of proprioception/vibration     (  ) loss of touch/temperature (specify:    )      LABS:                        9.7    2.21  )-----------( 60       ( 16 Mar 2024 05:39 )             30.7     03-16    137  |  109  |  30<H>  ----------------------------<  98  4.5   |  18  |  2.0<H>    Ca    9.8      16 Mar 2024 05:39  Phos  2.5     03-16  Mg     1.9     03-16    TPro  5.3<L>  /  Alb  3.4<L>  /  TBili  0.5  /  DBili  x   /  AST  28  /  ALT  40  /  AlkPhos  105  03-16    PT/INR - ( 15 Mar 2024 11:36 )   PT: 20.90 sec;   INR: 1.82 ratio           Urinalysis Basic - ( 16 Mar 2024 05:39 )    Color: x / Appearance: x / SG: x / pH: x  Gluc: 98 mg/dL / Ketone: x  / Bili: x / Urobili: x   Blood: x / Protein: x / Nitrite: x   Leuk Esterase: x / RBC: x / WBC x   Sq Epi: x / Non Sq Epi: x / Bacteria: x                RADIOLOGY:    < from: US Kidney and Bladder (03.15.24 @ 06:04) >  IMPRESSION:    Left lower quadrant transplanted kidney with no hydronephrosis, but mild   prominence to the calyces.    Bilateral ureteral jets are not identified, a nonspecific finding.    Debris is noted within the bladder.    Bilateral native kidneys are echogenic and atrophic, difficult to   visualize    < end of copied text >  < from: Xray Chest 1 View- PORTABLE-Urgent (03.14.24 @ 12:35) >  FINDINGS:    Support devices: None.    Cardiac/mediastinum/hilum: Stable.    Lung parenchyma/Pleura: No focal parenchymal opacities, pleural   effusions, or pneumothorax.    Skeleton/soft tissues: Stable.      IMPRESSION:    No radiographic evidence of acute cardiopulmonary disease.    --- End of Report ---    < end of copied text >        
DAVONTE CHOU 69y Male  MRN#: [id number]     Hospital Day: 1d    Overnight events   -episode of vomiting, hypotension and bradycardia ovn                                          ----------------------------------------------------------  OBJECTIVE  PAST MEDICAL & SURGICAL HISTORY  CKD (chronic kidney disease)    ESRD (end stage renal disease)    HTN (hypertension)    HLD (hyperlipidemia)    Atrial fibrillation  on eliquis    COPD, mild  not on O2    Peripheral neuropathy  unclear cause    Lymphoma  ?questionable, not treated, not followed    Melanoma  R eye, s/p laser    Cirrhosis  possibly related to hepC    Breast cancer    Legally blind    Stroke    History of kidney transplant  3 years ago    S/P arteriovenous (AV) fistula creation  prior old fistula in R arm    S/P lumpectomy, right breast    History of back surgery  s/p Left L5-S1 endoscopic laminoforaminotomy                                              -----------------------------------------------------------  MEDICATIONS:  STANDING MEDICATIONS  aMIOdarone Infusion 1 mG/Min IV Continuous <Continuous>  aMIOdarone Infusion 0.5 mG/Min IV Continuous <Continuous>  apixaban 5 milliGRAM(s) Oral two times a day  aspirin  chewable 81 milliGRAM(s) Oral daily  atorvastatin 80 milliGRAM(s) Oral at bedtime  calcitriol   Capsule 0.5 MICROGram(s) Oral two times a day  chlorhexidine 2% Cloths 1 Application(s) Topical <User Schedule>  dapagliflozin 10 milliGRAM(s) Oral daily  DULoxetine 30 milliGRAM(s) Oral daily  isosorbide   mononitrate ER Tablet (IMDUR) 30 milliGRAM(s) Oral daily  lidocaine   4% Patch 1 Patch Transdermal every 24 hours  methocarbamol 750 milliGRAM(s) Oral every 8 hours  metoprolol tartrate 25 milliGRAM(s) Oral two times a day  mycophenolate mofetil 500 milliGRAM(s) Oral two times a day  tacrolimus 2 milliGRAM(s) Oral at bedtime  tacrolimus 3 milliGRAM(s) Oral with breakfast  tamsulosin 0.4 milliGRAM(s) Oral two times a day    PRN MEDICATIONS                                            ------------------------------------------------------------  VITAL SIGNS: Last 24 Hours  T(C): 36.4 (15 Mar 2024 12:00), Max: 36.8 (14 Mar 2024 16:00)  T(F): 97.6 (15 Mar 2024 12:00), Max: 98.3 (14 Mar 2024 16:00)  HR: 51 (15 Mar 2024 14:05) (48 - 95)  BP: 81/46 (15 Mar 2024 14:05) (81/46 - 139/84)  BP(mean): 58 (15 Mar 2024 14:05) (58 - 106)  RR: 19 (15 Mar 2024 14:05) (12 - 27)  SpO2: 98% (15 Mar 2024 14:05) (95% - 100%)      03-14-24 @ 07:01  -  03-15-24 @ 07:00  --------------------------------------------------------  IN: 453.6 mL / OUT: 800 mL / NET: -346.4 mL                                             --------------------------------------------------------------  LABS:                        9.9    3.21  )-----------( 79       ( 15 Mar 2024 11:36 )             30.9     03-15    135  |  107  |  31<H>  ----------------------------<  109<H>  3.9   |  18  |  1.9<H>    Ca    9.9      15 Mar 2024 11:36  Phos  2.7     03-15  Mg     1.9     03-15    TPro  5.4<L>  /  Alb  3.4<L>  /  TBili  0.5  /  DBili  x   /  AST  53<H>  /  ALT  51<H>  /  AlkPhos  114  03-15    PT/INR - ( 15 Mar 2024 11:36 )   PT: 20.90 sec;   INR: 1.82 ratio           Urinalysis Basic - ( 15 Mar 2024 11:36 )    Color: x / Appearance: x / SG: x / pH: x  Gluc: 109 mg/dL / Ketone: x  / Bili: x / Urobili: x   Blood: x / Protein: x / Nitrite: x   Leuk Esterase: x / RBC: x / WBC x   Sq Epi: x / Non Sq Epi: x / Bacteria: x                                                            --------------------------------------------------------------  PHYSICAL EXAM:  GEN: AOx3, NAD, laying in bed  RESP: breathing comfortably on RA, CTAB  CARDS: warm, well perfused  ABD: soft, nontender, nondistended  EXT: no peripheral edema         
INTERVAL HPI/OVERNIGHT EVENTS:  episodes of AT this am, Metoprolol was hed this am due to HR 50s  received Metoprolol 25mg x1 and Cardizem push, back in SB 40s    MEDICATIONS  (STANDING):  aMIOdarone    Tablet 200 milliGRAM(s) Oral daily  apixaban 5 milliGRAM(s) Oral two times a day  aspirin  chewable 81 milliGRAM(s) Oral daily  atorvastatin 80 milliGRAM(s) Oral at bedtime  calcitriol   Capsule 0.5 MICROGram(s) Oral two times a day  chlorhexidine 2% Cloths 1 Application(s) Topical <User Schedule>  dapagliflozin 10 milliGRAM(s) Oral daily  DULoxetine 30 milliGRAM(s) Oral daily  isosorbide   mononitrate ER Tablet (IMDUR) 30 milliGRAM(s) Oral daily  lidocaine   4% Patch 1 Patch Transdermal every 24 hours  methocarbamol 750 milliGRAM(s) Oral every 8 hours  mycophenolate mofetil 500 milliGRAM(s) Oral two times a day  tacrolimus 2 milliGRAM(s) Oral at bedtime  tacrolimus 3 milliGRAM(s) Oral with breakfast  tamsulosin 0.4 milliGRAM(s) Oral two times a day    MEDICATIONS  (PRN):      Allergies    Rituxan (Hives)  Rifuxen (Swelling)    Intolerances        REVIEW OF SYSTEMS    [ x] A ten-point review of systems was otherwise negative except as noted.  [ ] Due to altered mental status/intubation, subjective information were not able to be obtained from the patient. History was obtained, to the extent possible, from review of the chart and collateral sources of information.      Vital Signs Last 24 Hrs  T(C): 37.2 (16 Mar 2024 08:07), Max: 37.2 (16 Mar 2024 08:07)  T(F): 98.9 (16 Mar 2024 08:07), Max: 98.9 (16 Mar 2024 08:07)  HR: 50 (16 Mar 2024 10:30) (50 - 150)  BP: 104/63 (16 Mar 2024 10:30) (81/46 - 148/87)  BP(mean): 80 (16 Mar 2024 10:30) (58 - 111)  RR: 19 (16 Mar 2024 10:30) (15 - 25)  SpO2: 99% (16 Mar 2024 10:30) (96% - 100%)    Parameters below as of 16 Mar 2024 08:43  Patient On (Oxygen Delivery Method): room air        03-15-24 @ 07:01  -  03-16-24 @ 07:00  --------------------------------------------------------  IN: 0 mL / OUT: 450 mL / NET: -450 mL    03-16-24 @ 07:01  -  03-16-24 @ 11:57  --------------------------------------------------------  IN: 180 mL / OUT: 320 mL / NET: -140 mL        PHYSICAL EXAM:    GENERAL: In no apparent,    HEART: keyonna Regular rate and rhythm; No murmur; NO rubs, or gallops.  PULMONARY: Clear to auscultation and percussion.  Normal expansion/effort. No rales, wheezing, or rhonchi bilaterally.  ABDOMEN: Soft, Nontender, Nondistended; Bowel sounds present  EXTREMITIES:  Extremities warm, pink, well-perfused, 2+ Peripheral Pulses, No clubbing, cyanosis, or edema  NEUROLOGICAL: alert & oriented x 3, no focal deficits, PERRLA, EOMI    LABS:                        9.7    2.21  )-----------( 60       ( 16 Mar 2024 05:39 )             30.7     03-16    137  |  109  |  30<H>  ----------------------------<  98  4.5   |  18  |  2.0<H>    Ca    9.8      16 Mar 2024 05:39  Phos  2.5     03-16  Mg     1.9     03-16    TPro  5.3<L>  /  Alb  3.4<L>  /  TBili  0.5  /  DBili  x   /  AST  28  /  ALT  40  /  AlkPhos  105  03-16    PT/INR - ( 15 Mar 2024 11:36 )   PT: 20.90 sec;   INR: 1.82 ratio                 12 Lead ECG:   Ventricular Rate 85 BPM    Atrial Rate 85 BPM    P-R Interval 176 ms    QRS Duration 98 ms    Q-T Interval 336 ms    QTC Calculation(Bazett) 399 ms    P Axis 86 degrees    R Axis 77 degrees    T Axis 260 degrees    Diagnosis Line Sinus rhythm with occasional Premature ventricular complexes  Moderate voltage criteria for LVH, may be normal variant ( Sokolow-Nathan ,    product )  Septal infarct , age undetermined  Marked ST abnormality, possible inferolateral subendocardial injury  AbnormalECG    Confirmed by Sylvester Dahl ([id number]) on 3/14/2024 9:38:01 PM (03-14-24 @ 20:23)      RADIOLOGY & ADDITIONAL TESTS:      
 [facility] FOLLOW UP NOTE  --------------------------------------------------------------------------------  Chief Complaint/24 hour events/subjective:    pt seen and examined, no c/o    PAST HISTORY  --------------------------------------------------------------------------------  No significant changes to PMH, PSH, FHx, SHx, unless otherwise noted    ALLERGIES & MEDICATIONS  --------------------------------------------------------------------------------  Allergies    Rituxan (Hives)  Rifuxen (Swelling)    Intolerances      Standing Inpatient Medications  apixaban 5 milliGRAM(s) Oral two times a day  aspirin  chewable 81 milliGRAM(s) Oral daily  atorvastatin 80 milliGRAM(s) Oral at bedtime  calcitriol   Capsule 0.5 MICROGram(s) Oral two times a day  chlorhexidine 2% Cloths 1 Application(s) Topical <User Schedule>  dapagliflozin 10 milliGRAM(s) Oral daily  DULoxetine 30 milliGRAM(s) Oral daily  isosorbide   mononitrate ER Tablet (IMDUR) 30 milliGRAM(s) Oral daily  lidocaine   4% Patch 1 Patch Transdermal every 24 hours  methocarbamol 750 milliGRAM(s) Oral every 8 hours  metoprolol tartrate 12.5 milliGRAM(s) Oral every 6 hours  mycophenolate mofetil 500 milliGRAM(s) Oral two times a day  tacrolimus 3 milliGRAM(s) Oral with breakfast  tacrolimus 2 milliGRAM(s) Oral at bedtime  tamsulosin 0.4 milliGRAM(s) Oral two times a day    PRN Inpatient Medications      REVIEW OF SYSTEMS  --------------------------------------------------------------------------------    All other systems were reviewed and are negative, except as noted.    VITALS/PHYSICAL EXAM  --------------------------------------------------------------------------------  T(C): 36.6 (03-17-24 @ 07:34), Max: 36.6 (03-16-24 @ 16:00)  HR: 52 (03-17-24 @ 07:34) (49 - 134)  BP: 98/61 (03-17-24 @ 07:34) (81/50 - 122/65)  RR: 18 (03-17-24 @ 07:34) (14 - 26)  SpO2: 97% (03-17-24 @ 07:34) (96% - 99%)  Wt(kg): --        03-16-24 @ 07:01  -  03-17-24 @ 07:00  --------------------------------------------------------  IN: 850 mL / OUT: 1220 mL / NET: -370 mL      Physical Exam:    	Gen: no distress   	Pulm: CTA B/L  	CV: S1S2; irregular HR  	Abd: +BS, soft, nontender/nondistended  	LE:  no  edema      LABS/STUDIES  --------------------------------------------------------------------------------              9.8    2.73  >-----------<  57       [03-17-24 @ 05:59]              31.8     136  |  108  |  24  ----------------------------<  102      [03-17-24 @ 05:59]  4.6   |  18  |  1.9        Ca     10.2     [03-17-24 @ 05:59]      Mg     2.2     [03-17-24 @ 05:59]      Phos  2.2     [03-17-24 @ 05:59]    TPro  5.4  /  Alb  3.6  /  TBili  0.5  /  DBili  x   /  AST  22  /  ALT  30  /  AlkPhos  96  [03-17-24 @ 05:59]    PT/INR: PT 20.90, INR 1.82       [03-15-24 @ 11:36]      Creatinine Trend:  SCr 1.9 [03-17 @ 05:59]  SCr 2.0 [03-16 @ 05:39]  SCr 1.9 [03-15 @ 11:36]  SCr 1.8 [03-15 @ 08:40]  SCr 2.5 [03-14 @ 12:26]    Urinalysis - [03-17-24 @ 05:59]      Color  / Appearance  / SG  / pH       Gluc 102 / Ketone   / Bili  / Urobili        Blood  / Protein  / Leuk Est  / Nitrite       RBC  / WBC  / Hyaline  / Gran  / Sq Epi  / Non Sq Epi  / Bacteria     Urine Creatinine 60      [03-14-24 @ 21:52]  Urine Protein 25      [03-14-24 @ 21:52]  Urine Sodium 53.0      [03-14-24 @ 21:52]  Urine Urea Nitrogen 483      [03-14-24 @ 21:52]  Urine Potassium 12      [03-14-24 @ 21:52]  Urine Osmolality 347      [03-14-24 @ 21:52]    HbA1c 5.1      [11-06-19 @ 15:00]  TSH 1.98      [03-15-24 @ 15:04]      
INTERVAL HPI/OVERNIGHT EVENTS:  No new events. HR in the high  40's    MEDICATIONS  (STANDING):  apixaban 5 milliGRAM(s) Oral two times a day  aspirin  chewable 81 milliGRAM(s) Oral daily  atorvastatin 80 milliGRAM(s) Oral at bedtime  calcitriol   Capsule 0.5 MICROGram(s) Oral two times a day  chlorhexidine 2% Cloths 1 Application(s) Topical <User Schedule>  dapagliflozin 10 milliGRAM(s) Oral daily  DULoxetine 30 milliGRAM(s) Oral daily  isosorbide   mononitrate ER Tablet (IMDUR) 30 milliGRAM(s) Oral daily  lidocaine   4% Patch 1 Patch Transdermal every 24 hours  methocarbamol 750 milliGRAM(s) Oral every 8 hours  metoprolol tartrate 12.5 milliGRAM(s) Oral every 6 hours  mycophenolate mofetil 500 milliGRAM(s) Oral two times a day  polyethylene glycol 3350 17 Gram(s) Oral two times a day  tacrolimus 2 milliGRAM(s) Oral at bedtime  tacrolimus 3 milliGRAM(s) Oral with breakfast  tamsulosin 0.4 milliGRAM(s) Oral two times a day    MEDICATIONS  (PRN):  oxyCODONE    IR 15 milliGRAM(s) Oral every 8 hours PRN Severe Pain (7 - 10)      Allergies    Rituxan (Hives)  Rifuxen (Swelling)    Intolerances    REVIEW OF SYSTEMS  negative    Vital Signs Last 24 Hrs  T(C): 36.3 (18 Mar 2024 08:00), Max: 36.8 (17 Mar 2024 20:00)  T(F): 97.3 (18 Mar 2024 08:00), Max: 98.3 (17 Mar 2024 20:00)  HR: 48 (18 Mar 2024 12:00) (47 - 55)  BP: 144/78 (18 Mar 2024 12:00) (94/57 - 144/78)  BP(mean): 103 (18 Mar 2024 12:00) (70 - 103)  RR: 26 (18 Mar 2024 12:00) (14 - 26)  SpO2: 99% (18 Mar 2024 12:00) (95% - 99%)    Parameters below as of 18 Mar 2024 12:00  Patient On (Oxygen Delivery Method): room air    03-17-24 @ 07:01  -  03-18-24 @ 07:00  --------------------------------------------------------  IN: 430 mL / OUT: 630 mL / NET: -200 mL    03-18-24 @ 07:01  -  03-18-24 @ 13:00  --------------------------------------------------------  IN: 390 mL / OUT: 250 mL / NET: 140 mL      Physical Exam    GENERAL: In no apparent distress, well nourished, and hydrated.  HEART:Conner at 48 BPM  PULMONARY: Clear to auscultation and perfusion.  No rales, wheezing, or rhonchi bilaterally.  ABDOMEN: Soft, Nontender, Nondistended; Bowel sounds present  EXTREMITIES:  2+ Peripheral Pulses, No clubbing, cyanosis, or edema  NEUROLOGICAL: Grossly nonfocal    LABS:                        9.1    3.12  )-----------( x        ( 18 Mar 2024 11:19 )             28.8     03-18    134<L>  |  107  |  24<H>  ----------------------------<  103<H>  4.3   |  19  |  2.0<H>    Ca    10.4      18 Mar 2024 06:08  Phos  2.7     03-18  Mg     2.0     03-18    TPro  5.1<L>  /  Alb  3.4<L>  /  TBili  0.4  /  DBili  x   /  AST  16  /  ALT  22  /  AlkPhos  83  03-18      Urinalysis Basic - ( 18 Mar 2024 06:08 )    Color: x / Appearance: x / SG: x / pH: x  Gluc: 103 mg/dL / Ketone: x  / Bili: x / Urobili: x   Blood: x / Protein: x / Nitrite: x   Leuk Esterase: x / RBC: x / WBC x   Sq Epi: x / Non Sq Epi: x / Bacteria: x        RADIOLOGY & ADDITIONAL TESTS:  
 [facility] FOLLOW UP NOTE  --------------------------------------------------------------------------------  Chief Complaint/24 hour events/subjective:    pt seen and examined, feel ok    PAST HISTORY  --------------------------------------------------------------------------------  No significant changes to PMH, PSH, FHx, SHx, unless otherwise noted    ALLERGIES & MEDICATIONS  --------------------------------------------------------------------------------  Allergies    Rituxan (Hives)  Rifuxen (Swelling)    Intolerances      Standing Inpatient Medications  aMIOdarone    Tablet 200 milliGRAM(s) Oral daily  apixaban 5 milliGRAM(s) Oral two times a day  aspirin  chewable 81 milliGRAM(s) Oral daily  atorvastatin 80 milliGRAM(s) Oral at bedtime  calcitriol   Capsule 0.5 MICROGram(s) Oral two times a day  chlorhexidine 2% Cloths 1 Application(s) Topical <User Schedule>  dapagliflozin 10 milliGRAM(s) Oral daily  DULoxetine 30 milliGRAM(s) Oral daily  isosorbide   mononitrate ER Tablet (IMDUR) 30 milliGRAM(s) Oral daily  lidocaine   4% Patch 1 Patch Transdermal every 24 hours  methocarbamol 750 milliGRAM(s) Oral every 8 hours  mycophenolate mofetil 500 milliGRAM(s) Oral two times a day  tacrolimus 2 milliGRAM(s) Oral at bedtime  tacrolimus 3 milliGRAM(s) Oral with breakfast  tamsulosin 0.4 milliGRAM(s) Oral two times a day    PRN Inpatient Medications      REVIEW OF SYSTEMS  --------------------------------------------------------------------------------    All other systems were reviewed and are negative, except as noted.    VITALS/PHYSICAL EXAM  --------------------------------------------------------------------------------  T(C): 37.2 (03-16-24 @ 08:07), Max: 37.2 (03-16-24 @ 08:07)  HR: 50 (03-16-24 @ 10:30) (50 - 150)  BP: 104/63 (03-16-24 @ 10:30) (81/46 - 148/87)  RR: 19 (03-16-24 @ 10:30) (15 - 25)  SpO2: 99% (03-16-24 @ 10:30) (96% - 100%)  Wt(kg): --  Height (cm): 172.7 (03-14-24 @ 16:00)      03-15-24 @ 07:01  -  03-16-24 @ 07:00  --------------------------------------------------------  IN: 0 mL / OUT: 450 mL / NET: -450 mL    03-16-24 @ 07:01  -  03-16-24 @ 12:10  --------------------------------------------------------  IN: 180 mL / OUT: 320 mL / NET: -140 mL      Physical Exam:    	Gen: no distress   	Pulm: CTA B/L  	CV: S1S2; no rub  	Abd: +BS, soft, nontender/nondistended  	LE:  no  edema      LABS/STUDIES  --------------------------------------------------------------------------------              9.7    2.21  >-----------<  60       [03-16-24 @ 05:39]              30.7     137  |  109  |  30  ----------------------------<  98      [03-16-24 @ 05:39]  4.5   |  18  |  2.0        Ca     9.8     [03-16-24 @ 05:39]      Mg     1.9     [03-16-24 @ 05:39]      Phos  2.5     [03-16-24 @ 05:39]    TPro  5.3  /  Alb  3.4  /  TBili  0.5  /  DBili  x   /  AST  28  /  ALT  40  /  AlkPhos  105  [03-16-24 @ 05:39]  PT/INR: PT 20.90, INR 1.82       [03-15-24 @ 11:36]    Serum Osmolality 306      [03-14-24 @ 19:50]    Creatinine Trend:  SCr 2.0 [03-16 @ 05:39]  SCr 1.9 [03-15 @ 11:36]  SCr 1.8 [03-15 @ 08:40]  SCr 2.5 [03-14 @ 12:26]    Urinalysis - [03-16-24 @ 05:39]      Color  / Appearance  / SG  / pH       Gluc 98 / Ketone   / Bili  / Urobili        Blood  / Protein  / Leuk Est  / Nitrite       RBC  / WBC  / Hyaline  / Gran  / Sq Epi  / Non Sq Epi  / Bacteria     Urine Creatinine 60      [03-14-24 @ 21:52]  Urine Protein 25      [03-14-24 @ 21:52]  Urine Sodium 53.0      [03-14-24 @ 21:52]  Urine Urea Nitrogen 483      [03-14-24 @ 21:52]  Urine Potassium 12      [03-14-24 @ 21:52]  Urine Osmolality 347      [03-14-24 @ 21:52]    HbA1c 5.1      [11-06-19 @ 15:00]  TSH 1.98      [03-15-24 @ 15:04]      
[place] [facility] FOLLOW UP NOTE  --------------------------------------------------------------------------------  24 hour events/subjective: Patient examined. Appears comfortable.    PAST HISTORY  --------------------------------------------------------------------------------  No significant changes to PMH, PSH, FHx, SHx, unless otherwise noted    ALLERGIES & MEDICATIONS  --------------------------------------------------------------------------------  Allergies    Rituxan (Hives)  Rifuxen (Swelling)    Standing Inpatient Medications  apixaban 5 milliGRAM(s) Oral two times a day  aspirin  chewable 81 milliGRAM(s) Oral daily  atorvastatin 80 milliGRAM(s) Oral at bedtime  calcitriol   Capsule 0.5 MICROGram(s) Oral two times a day  chlorhexidine 2% Cloths 1 Application(s) Topical <User Schedule>  dapagliflozin 10 milliGRAM(s) Oral daily  DULoxetine 30 milliGRAM(s) Oral daily  isosorbide   mononitrate ER Tablet (IMDUR) 30 milliGRAM(s) Oral daily  lidocaine   4% Patch 1 Patch Transdermal every 24 hours  methocarbamol 750 milliGRAM(s) Oral every 8 hours  metoprolol tartrate 12.5 milliGRAM(s) Oral every 6 hours  mycophenolate mofetil 500 milliGRAM(s) Oral two times a day  polyethylene glycol 3350 17 Gram(s) Oral two times a day  tacrolimus 3 milliGRAM(s) Oral with breakfast  tacrolimus 2 milliGRAM(s) Oral at bedtime  tamsulosin 0.4 milliGRAM(s) Oral two times a day    PRN Inpatient Medications  oxyCODONE    IR 15 milliGRAM(s) Oral every 8 hours PRN    VITALS/PHYSICAL EXAM  --------------------------------------------------------------------------------  T(C): 36.3 (03-18-24 @ 08:00), Max: 36.8 (03-17-24 @ 20:00)  HR: 48 (03-18-24 @ 12:00) (47 - 55)  BP: 144/78 (03-18-24 @ 12:00) (94/57 - 144/78)  RR: 26 (03-18-24 @ 12:00) (15 - 26)  SpO2: 99% (03-18-24 @ 12:00) (95% - 99%)    03-17-24 @ 07:01  -  03-18-24 @ 07:00  --------------------------------------------------------  IN: 430 mL / OUT: 630 mL / NET: -200 mL    03-18-24 @ 07:01  -  03-18-24 @ 16:10  --------------------------------------------------------  IN: 390 mL / OUT: 250 mL / NET: 140 mL    Physical Exam:  	Gen: NAD  	Pulm: CTA B/L  	CV: RRR, S1S2  	Abd: +BS, soft, nontender/nondistended  	: No suprapubic tenderness  	LE: Warm, no edema    LABS/STUDIES  --------------------------------------------------------------------------------              9.1    3.12  >-----------<  49       [03-18-24 @ 11:19]              28.8     134  |  107  |  24  ----------------------------<  103      [03-18-24 @ 06:08]  4.3   |  19  |  2.0        Ca     10.4     [03-18-24 @ 06:08]      Mg     2.0     [03-18-24 @ 06:08]      Phos  2.7     [03-18-24 @ 06:08]    TPro  5.1  /  Alb  3.4  /  TBili  0.4  /  DBili  x   /  AST  16  /  ALT  22  /  AlkPhos  83  [03-18-24 @ 06:08]    Creatinine Trend:  SCr 2.0 [03-18 @ 06:08]  SCr 1.9 [03-17 @ 05:59]  SCr 2.0 [03-16 @ 05:39]  SCr 1.9 [03-15 @ 11:36]  SCr 1.8 [03-15 @ 08:40]    Urinalysis - [03-18-24 @ 06:08]      Color  / Appearance  / SG  / pH       Gluc 103 / Ketone   / Bili  / Urobili        Blood  / Protein  / Leuk Est  / Nitrite       RBC  / WBC  / Hyaline  / Gran  / Sq Epi  / Non Sq Epi  / Bacteria     Urine Creatinine 60      [03-14-24 @ 21:52]  Urine Protein 25      [03-14-24 @ 21:52]  Urine Sodium 53.0      [03-14-24 @ 21:52]  Urine Urea Nitrogen 483      [03-14-24 @ 21:52]  Urine Potassium 12      [03-14-24 @ 21:52]  Urine Osmolality 347      [03-14-24 @ 21:52]    HbA1c 5.1      [11-06-19 @ 15:00]  TSH 1.98      [03-15-24 @ 15:04]
DAVONTE CHOU 69y Male  MRN#: [id number]     Hospital Day: 4d    Overnight events   -No major overnight events                                          ----------------------------------------------------------  OBJECTIVE  PAST MEDICAL & SURGICAL HISTORY  CKD (chronic kidney disease)    ESRD (end stage renal disease)    HTN (hypertension)    HLD (hyperlipidemia)    Atrial fibrillation  on eliquis    COPD, mild  not on O2    Peripheral neuropathy  unclear cause    Lymphoma  ?questionable, not treated, not followed    Melanoma  R eye, s/p laser    Cirrhosis  possibly related to hepC    Breast cancer    Legally blind    Stroke    History of kidney transplant  3 years ago    S/P arteriovenous (AV) fistula creation  prior old fistula in R arm    S/P lumpectomy, right breast    History of back surgery  s/p Left L5-S1 endoscopic laminoforaminotomy                                              -----------------------------------------------------------  MEDICATIONS:  STANDING MEDICATIONS  apixaban 5 milliGRAM(s) Oral two times a day  aspirin  chewable 81 milliGRAM(s) Oral daily  atorvastatin 80 milliGRAM(s) Oral at bedtime  calcitriol   Capsule 0.5 MICROGram(s) Oral two times a day  chlorhexidine 2% Cloths 1 Application(s) Topical <User Schedule>  dapagliflozin 10 milliGRAM(s) Oral daily  DULoxetine 30 milliGRAM(s) Oral daily  isosorbide   mononitrate ER Tablet (IMDUR) 30 milliGRAM(s) Oral daily  lidocaine   4% Patch 1 Patch Transdermal every 24 hours  methocarbamol 750 milliGRAM(s) Oral every 8 hours  metoprolol tartrate 12.5 milliGRAM(s) Oral every 6 hours  mycophenolate mofetil 500 milliGRAM(s) Oral two times a day  polyethylene glycol 3350 17 Gram(s) Oral two times a day  tacrolimus 3 milliGRAM(s) Oral with breakfast  tacrolimus 2 milliGRAM(s) Oral at bedtime  tamsulosin 0.4 milliGRAM(s) Oral two times a day    PRN MEDICATIONS  oxyCODONE    IR 15 milliGRAM(s) Oral every 8 hours PRN                                            ------------------------------------------------------------  VITAL SIGNS: Last 24 Hours  T(C): 36.3 (18 Mar 2024 08:00), Max: 36.8 (17 Mar 2024 20:00)  T(F): 97.3 (18 Mar 2024 08:00), Max: 98.3 (17 Mar 2024 20:00)  HR: 47 (18 Mar 2024 08:00) (47 - 55)  BP: 102/67 (18 Mar 2024 08:00) (94/57 - 124/73)  BP(mean): 80 (18 Mar 2024 08:00) (70 - 90)  RR: 18 (18 Mar 2024 08:00) (14 - 22)  SpO2: 95% (18 Mar 2024 08:00) (95% - 99%)      03-17-24 @ 07:01  -  03-18-24 @ 07:00  --------------------------------------------------------  IN: 430 mL / OUT: 630 mL / NET: -200 mL                                             --------------------------------------------------------------  LABS:                        9.0    2.80  )-----------( 46       ( 18 Mar 2024 06:08 )             29.1     03-18    134<L>  |  107  |  24<H>  ----------------------------<  103<H>  4.3   |  19  |  2.0<H>    Ca    10.4      18 Mar 2024 06:08  Phos  2.7     03-18  Mg     2.0     03-18    TPro  5.1<L>  /  Alb  3.4<L>  /  TBili  0.4  /  DBili  x   /  AST  16  /  ALT  22  /  AlkPhos  83  03-18      Urinalysis Basic - ( 18 Mar 2024 06:08 )    Color: x / Appearance: x / SG: x / pH: x  Gluc: 103 mg/dL / Ketone: x  / Bili: x / Urobili: x   Blood: x / Protein: x / Nitrite: x   Leuk Esterase: x / RBC: x / WBC x   Sq Epi: x / Non Sq Epi: x / Bacteria: x                                                            --------------------------------------------------------------  PHYSICAL EXAM:  GEN: AOx3, NAD, laying in bed  RESP: breathing comfortably on RA  CARDS: warm, well perfused  ABD: soft, nontender, nondistended  EXT: no peripheral edema         
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 Patient was at a physician appointment this morning. His HR at the appointment was 180s. Patient reports this morning he felt ok, did not experience any chest pain, shortness of breath or palpitations. Patient initially refused EMS intervention and was brought to the ED. Patient reports compliance with all medications, denies any recent drug or alcohol use, denies sick contacts. Review of systems negative for fever, chills, rhinorrhea, cough, shortness of breath, dizziness, lightheadedness, constipation, diarrhea, nausea, vomiting, abdominal pain, chest pain or urinary symptoms.     Vitals in the ED  T 97.9    /82  RR 25 SpO2 100 On RA     Significant Labs  wbc 5k hgb 12.8 plt 143  Na 136 K 4.3 BUN/Cr 40/2.5 bsl Cr 1.8   trop 377, BNP 20k  CXR no consolidation or pleural effusions     In the ED EKG concerning for wide-complex tachycardia vs Afib w/ aberrancy.  EP consulted, device interrogation showed atrial flutter with atrial tachycardia and PACs  Patient started on amiodarone drip and admitted to Cardiac Stepdown for continued monitoring     Of note patient previously admitted 2/24 w/ stress test showing large size reversible defect in anterior wall and apex of LV. Patient had cardiac cath w/ 70% stenosis LAD at bifurcation site w/ 80% D1 ostial stenosis. No stents were placed.                  (14 Mar 2024 18:35)      SUBJECTIVE:    PAST MEDICAL & SURGICAL HISTORY  CKD (chronic kidney disease)    ESRD (end stage renal disease)    HTN (hypertension)    HLD (hyperlipidemia)    Atrial fibrillation  on eliquis    COPD, mild  not on O2    Peripheral neuropathy  unclear cause    Lymphoma  ?questionable, not treated, not followed    Melanoma  R eye, s/p laser    Cirrhosis  possibly related to hepC    Breast cancer    Legally blind    Stroke    History of kidney transplant  3 years ago    S/P arteriovenous (AV) fistula creation  prior old fistula in R arm    S/P lumpectomy, right breast    History of back surgery  s/p Left L5-S1 endoscopic laminoforaminotomy        ALLERGIES:  Rituxan (Hives)  Rifuxen (Swelling)      MEDICATIONS:  MEDICATIONS  (STANDING):  apixaban 5 milliGRAM(s) Oral two times a day  aspirin  chewable 81 milliGRAM(s) Oral daily  atorvastatin 80 milliGRAM(s) Oral at bedtime  calcitriol   Capsule 0.5 MICROGram(s) Oral two times a day  chlorhexidine 2% Cloths 1 Application(s) Topical <User Schedule>  dapagliflozin 10 milliGRAM(s) Oral daily  DULoxetine 30 milliGRAM(s) Oral daily  isosorbide   mononitrate ER Tablet (IMDUR) 30 milliGRAM(s) Oral daily  lidocaine   4% Patch 1 Patch Transdermal every 24 hours  methocarbamol 750 milliGRAM(s) Oral every 8 hours  metoprolol tartrate 12.5 milliGRAM(s) Oral every 6 hours  mycophenolate mofetil 500 milliGRAM(s) Oral two times a day  tacrolimus 3 milliGRAM(s) Oral with breakfast  tacrolimus 2 milliGRAM(s) Oral at bedtime  tamsulosin 0.4 milliGRAM(s) Oral two times a day    MEDICATIONS  (PRN):      HOME MEDICATIONS:  Home Medications:  aspirin 81 mg oral tablet: 1 tab(s) orally once a day (15 Dec 2023 07:16)  calcitriol 0.5 mcg oral capsule: 1 cap(s) orally 2 times a day (15 Dec 2023 07:16)  Eliquis 5 mg oral tablet: 1 tab(s) orally 2 times a day (15 Dec 2023 07:16)  Flomax 0.4 mg oral capsule: 1 cap(s) orally 2 times a day (15 Dec 2023 07:16)  methocarbamol 750 mg oral tablet: 1 tab(s) orally every 8 hours (14 Mar 2024 18:30)  mycophenolate mofetil 500 mg oral tablet: 2 tab(s) orally once a day (15 Dec 2023 07:16)  tacrolimus 1 mg oral capsule: 3 cap(s) orally once a day (in the morning) (18 Dec 2023 10:05)  tacrolimus 1 mg oral capsule: 2 cap(s) orally once a day (at bedtime) (18 Dec 2023 10:05)      VITALS:   T(F): 97.8 (03-17 @ 07:34), Max: 98.9 (03-16 @ 08:07)  HR: 50 (03-17 @ 14:00) (48 - 185)  BP: 116/66 (03-17 @ 14:00) (81/46 - 174/82)  BP(mean): 83 (03-17 @ 14:00) (58 - 111)  RR: 21 (03-17 @ 14:00) (12 - 27)  SpO2: 97% (03-17 @ 07:34) (95% - 100%)    I&O's Summary    16 Mar 2024 07:01  -  17 Mar 2024 07:00  --------------------------------------------------------  IN: 850 mL / OUT: 1220 mL / NET: -370 mL    17 Mar 2024 07:01  -  17 Mar 2024 15:34  --------------------------------------------------------  IN: 430 mL / OUT: 180 mL / NET: 250 mL          PHYSICAL EXAM:  NEURO: patient is awake , alert and oriented  GEN: Not in acute distress  NECK: no thyroid enlargement, no JVD  LUNGS: Clear to auscultation bilaterally   CARDIOVASCULAR: S1/S2 present, RRR , no murmurs or rubs, no carotid bruits,  + PP bilaterally  ABD: Soft, non-tender, non-distended, +BS  EXT: No CLARIBEL  SKIN: Intact    LABS:                        9.8    2.73  )-----------( 57       ( 17 Mar 2024 05:59 )             31.8     03-17    136  |  108  |  24<H>  ----------------------------<  102<H>  4.6   |  18  |  1.9<H>    Ca    10.2      17 Mar 2024 05:59  Phos  2.2     03-17  Mg     2.2     03-17    TPro  5.4<L>  /  Alb  3.6  /  TBili  0.5  /  DBili  x   /  AST  22  /  ALT  30  /  AlkPhos  96  03-17              Troponin trend:            RADIOLOGY:  -CXR:  -TTE:  -CCTA:  -STRESS TEST:  -CATHETERIZATION:    ECG:    TELEMETRY EVENTS:  
INTERVAL HPI/OVERNIGHT EVENTS:  keyonna 50s, BP stable    MEDICATIONS  (STANDING):  apixaban 5 milliGRAM(s) Oral two times a day  aspirin  chewable 81 milliGRAM(s) Oral daily  atorvastatin 80 milliGRAM(s) Oral at bedtime  calcitriol   Capsule 0.5 MICROGram(s) Oral two times a day  chlorhexidine 2% Cloths 1 Application(s) Topical <User Schedule>  dapagliflozin 10 milliGRAM(s) Oral daily  DULoxetine 30 milliGRAM(s) Oral daily  isosorbide   mononitrate ER Tablet (IMDUR) 30 milliGRAM(s) Oral daily  lidocaine   4% Patch 1 Patch Transdermal every 24 hours  methocarbamol 750 milliGRAM(s) Oral every 8 hours  metoprolol tartrate 12.5 milliGRAM(s) Oral every 6 hours  mycophenolate mofetil 500 milliGRAM(s) Oral two times a day  tacrolimus 3 milliGRAM(s) Oral with breakfast  tacrolimus 2 milliGRAM(s) Oral at bedtime  tamsulosin 0.4 milliGRAM(s) Oral two times a day    MEDICATIONS  (PRN):      Allergies    Rituxan (Hives)  Rifuxen (Swelling)    Intolerances        REVIEW OF SYSTEMS    [x ] A ten-point review of systems was otherwise negative except as noted.  [ ] Due to altered mental status/intubation, subjective information were not able to be obtained from the patient. History was obtained, to the extent possible, from review of the chart and collateral sources of information.      Vital Signs Last 24 Hrs  T(C): 36.6 (17 Mar 2024 07:34), Max: 36.6 (16 Mar 2024 16:00)  T(F): 97.8 (17 Mar 2024 07:34), Max: 97.9 (16 Mar 2024 16:00)  HR: 50 (17 Mar 2024 14:00) (49 - 134)  BP: 116/66 (17 Mar 2024 14:00) (81/50 - 116/66)  BP(mean): 83 (17 Mar 2024 14:00) (60 - 85)  RR: 21 (17 Mar 2024 14:00) (14 - 25)  SpO2: 97% (17 Mar 2024 07:34) (96% - 99%)    Parameters below as of 17 Mar 2024 07:34  Patient On (Oxygen Delivery Method): room air        03-16-24 @ 07:01  -  03-17-24 @ 07:00  --------------------------------------------------------  IN: 850 mL / OUT: 1220 mL / NET: -370 mL    03-17-24 @ 07:01  -  03-17-24 @ 14:17  --------------------------------------------------------  IN: 430 mL / OUT: 180 mL / NET: 250 mL        PHYSICAL EXAM:    GENERAL: In no apparent distress, well nourished, and hydrated.  HEART: keyonna Regular rate and rhythm; No murmur; NO rubs, or gallops.  PULMONARY: Clear to auscultation and percussion.  Normal expansion/effort. No rales, wheezing, or rhonchi bilaterally.  ABDOMEN: Soft, Nontender, Nondistended; Bowel sounds present  EXTREMITIES:  Extremities warm, pink, well-perfused, 2+ Peripheral Pulses, No clubbing, cyanosis, or edema  NEUROLOGICAL: alert & oriented x 3, no focal deficits, PERRLA, EOMI    LABS:                        9.8    2.73  )-----------( 57       ( 17 Mar 2024 05:59 )             31.8     03-17    136  |  108  |  24<H>  ----------------------------<  102<H>  4.6   |  18  |  1.9<H>    Ca    10.2      17 Mar 2024 05:59  Phos  2.2     03-17  Mg     2.2     03-17    TPro  5.4<L>  /  Alb  3.6  /  TBili  0.5  /  DBili  x   /  AST  22  /  ALT  30  /  AlkPhos  96  03-17            12 Lead ECG:   Ventricular Rate 85 BPM    Atrial Rate 85 BPM    P-R Interval 176 ms    QRS Duration 98 ms    Q-T Interval 336 ms    QTC Calculation(Bazett) 399 ms    P Axis 86 degrees    R Axis 77 degrees    T Axis 260 degrees    Diagnosis Line Sinus rhythm with occasional Premature ventricular complexes  Moderate voltage criteria for LVH, may be normal variant ( Sokolow-Nathan ,    product )  Septal infarct , age undetermined  Marked ST abnormality, possible inferolateral subendocardial injury  AbnormalECG    Confirmed by Sylvester Dahl ([id number]) on 3/14/2024 9:38:01 PM (03-14-24 @ 20:23)      RADIOLOGY & ADDITIONAL TESTS:

## 2024-03-18 NOTE — PROGRESS NOTE ADULT - ATTENDING COMMENTS
Patient seen, case d/w SDU team on rounds.  Agree with assessment and plan.  S/p AF ablation, episodes of AT now controlled with BB.  SB 46-55/min on current dose of Metoprolol, patient appears asymptomatic.  Thrombocytopenia, repeat CBC pending.  Will d/c home in AM if Pt count stable with outpatient AT ablation on 3/26.
Plan as outlined above.  Hem-Onc consult for thrombocytopenia. Obtain flow cytometry.  Ok to restart Eliquis as long as plt > 50.  Change to Lopressor 25mg BID with extra 12.5mg prn doses for palpitations.  Will return for O/P EP ablation of Atach.
Patient seen, case d/w SDU team on rounds and EP attending.  Agree with assessment and plan.  Recurrent episodes of AT with 1:1 and 2:1 conduction today.  Periods of significant bradycardia when in SR.  Plan:  Continue cardiac monitoring.  D/c Amiodarone.  Continue Metoprolol 25 mg bid-qid as tolerated.  AT ablation with be scheduled by EP.

## 2024-03-18 NOTE — CONSULT NOTE ADULT - ASSESSMENT
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       Hemeonc consulted for management of thrombocytopenia      # Chronic pancytopenia  - chronic macrocytic anemia, baseline Hb 10  - Iron studies from April 2022: iron 75, sat 27%, TIBC 280, ferritin ([id number])  - Normal B12  - serum electrophoresis with immunofixation showed small IgM (2.1 %) and IgG (unable to quantitate) spikes. 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       Hemeon consulted for management of thrombocytopenia      # Chronic pancytopenia  - chronic macrocytic anemia, baseline Hb 10  - Iron studies from April 2022: iron 75, sat 27%, TIBC 280, ferritin (684>>349)  - Normal B12  - serum electrophoresis with immunofixation showed small IgM (2.1 %) and IgG (unable to quantitate) spikes. 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 Can continue eliquis provided that plts count remain 45K-50K and no evidence of bleeding, will need close monitoring of his counts.  - Please send flow cytometry to r/o lymphoma  - Outpatient follow up with his hematologist

## 2024-03-18 NOTE — DISCHARGE NOTE NURSING/CASE MANAGEMENT/SOCIAL WORK - PATIENT PORTAL LINK FT
You can access the FollowMyHealth Patient Portal offered by [facility] by registering at the following website: [email or web address]. By joining [employer]’s FollowMyHealth portal, you will also be able to view your health information using other applications (apps) compatible with our system.

## 2024-03-18 NOTE — CONSULT NOTE ADULT - SUBJECTIVE AND OBJECTIVE BOX
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 Patient was at a physician appointment this morning. His HR at the appointment was 180s. Patient reports this morning he felt ok, did not experience any chest pain, shortness of breath or palpitations. Patient initially refused EMS intervention and was brought to the ED. Patient reports compliance with all medications, denies any recent drug or alcohol use, denies sick contacts. Review of systems negative for fever, chills, rhinorrhea, cough, shortness of breath, dizziness, lightheadedness, constipation, diarrhea, nausea, vomiting, abdominal pain, chest pain or urinary symptoms.     Vitals in the ED  T 97.9    /82  RR 25 SpO2 100 On RA     Significant Labs  wbc 5k hgb 12.8 plt 143  Na 136 K 4.3 BUN/Cr 40/2.5 bsl Cr 1.8   trop 377, BNP 20k  CXR no consolidation or pleural effusions     In the ED EKG concerning for wide-complex tachycardia vs Afib w/ aberrancy.  EP consulted, device interrogation showed atrial flutter with atrial tachycardia and PACs  Patient started on amiodarone drip and admitted to Cardiac Stepdown for continued monitoring     Of note patient previously admitted  w/ stress test showing large size reversible defect in anterior wall and apex of LV. Patient had cardiac cath w/ 70% stenosis LAD at bifurcation site w/ 80% D1 ostial stenosis. No stents were placed.                  (14 Mar 2024 18:35)      Allergies    Rituxan (Hives)  Rifuxen (Swelling)    Intolerances        MEDICATIONS  (STANDING):  apixaban 5 milliGRAM(s) Oral two times a day  aspirin  chewable 81 milliGRAM(s) Oral daily  atorvastatin 80 milliGRAM(s) Oral at bedtime  calcitriol   Capsule 0.5 MICROGram(s) Oral two times a day  chlorhexidine 2% Cloths 1 Application(s) Topical <User Schedule>  dapagliflozin 10 milliGRAM(s) Oral daily  DULoxetine 30 milliGRAM(s) Oral daily  isosorbide   mononitrate ER Tablet (IMDUR) 30 milliGRAM(s) Oral daily  lidocaine   4% Patch 1 Patch Transdermal every 24 hours  methocarbamol 750 milliGRAM(s) Oral every 8 hours  metoprolol tartrate 12.5 milliGRAM(s) Oral every 6 hours  mycophenolate mofetil 500 milliGRAM(s) Oral two times a day  tacrolimus 3 milliGRAM(s) Oral with breakfast  tacrolimus 2 milliGRAM(s) Oral at bedtime  tamsulosin 0.4 milliGRAM(s) Oral two times a day    MEDICATIONS  (PRN):  oxyCODONE    IR 15 milliGRAM(s) Oral every 8 hours PRN Severe Pain (7 - 10)      PAST MEDICAL & SURGICAL HISTORY:  CKD (chronic kidney disease)      ESRD (end stage renal disease)      HTN (hypertension)      HLD (hyperlipidemia)      Atrial fibrillation  on eliquis      COPD, mild  not on O2      Peripheral neuropathy  unclear cause      Lymphoma  ?questionable, not treated, not followed      Melanoma  R eye, s/p laser      Cirrhosis  possibly related to hepC      Breast cancer      Legally blind      Stroke      History of kidney transplant  3 years ago      S/P arteriovenous (AV) fistula creation  prior old fistula in R arm      S/P lumpectomy, right breast      History of back surgery  s/p Left L5-S1 endoscopic laminoforaminotomy          FAMILY HISTORY:  FH: dementia  mother, alive    FHx: breast cancer  sister ( in 30s)        SOCIAL HISTORY: No EtOH, no tobacco    REVIEW OF SYSTEMS:    CONSTITUTIONAL: No weakness, fevers or chills  EYES/ENT: No visual changes;  No vertigo or throat pain   NECK: No pain or stiffness  RESPIRATORY: No cough, wheezing, hemoptysis; No shortness of breath  CARDIOVASCULAR: No chest pain or palpitations  GASTROINTESTINAL: No abdominal or epigastric pain. No nausea, vomiting, or hematemesis; No diarrhea or constipation. No melena or hematochezia.  GENITOURINARY: No dysuria, frequency or hematuria  NEUROLOGICAL: No numbness or weakness  SKIN: No itching, burning, rashes, or lesions   All other review of systems is negative unless indicated above.        T(F): 97.3 (24 @ 08:00), Max: 98.3 (24 @ 20:00)  HR: 47 (24 @ 08:00)  BP: 102/67 (24 @ 08:00)  RR: 18 (24 @ 08:00)  SpO2: 95% (24 @ 08:00)  Wt(kg): --    GENERAL: NAD, well-developed  HEAD:  Atraumatic, Normocephalic  EYES: EOMI, PERRLA, conjunctiva and sclera clear  NECK: Supple, No JVD  CHEST/LUNG: Clear to auscultation bilaterally; No wheeze  HEART: Regular rate and rhythm; No murmurs, rubs, or gallops  ABDOMEN: Soft, Nontender, Nondistended; Bowel sounds present  EXTREMITIES:  2+ Peripheral Pulses, No clubbing, cyanosis, or edema  NEUROLOGY: non-focal  SKIN: No rashes or lesions                          9.0    2.80  )-----------( 46       ( 18 Mar 2024 06:08 )             29.1           134<L>  |  107  |  24<H>  ----------------------------<  103<H>  4.3   |  19  |  2.0<H>    Ca    10.4      18 Mar 2024 06:08  Phos  2.7       Mg     2.0         TPro  5.1<L>  /  Alb  3.4<L>  /  TBili  0.4  /  DBili  x   /  AST  16  /  ALT  22  /  AlkPhos  83        Phosphorus: 2.7 mg/dL ( @ 06:08)  Magnesium: 2.0 mg/dL ( @ 06:08)

## 2024-03-18 NOTE — PROGRESS NOTE ADULT - PROVIDER SPECIALTY LIST ADULT
CCU
Nephrology
CCU
Electrophysiology
Nephrology
CCU
Electrophysiology
Electrophysiology
CCU
Nephrology

## 2024-03-18 NOTE — PROGRESS NOTE ADULT - NUTRITIONAL ASSESSMENT
This patient has been assessed with a concern for Malnutrition and has been determined to have a diagnosis/diagnoses of Severe protein-calorie malnutrition and Underweight (BMI < 19).    This patient is being managed with:   Diet Regular-  DASH/TLC {Sodium & Cholesterol Restricted} (DASH)  Low Fat (LOWFAT)  1500mL Fluid Restriction ([id number])  Supplement Feeding Modality:  Oral  Ensure Compact Cans or Servings Per Day:  1       Frequency:  Three Times a day  Entered: Mar 17 2024 10:27AM    Diet Regular-  DASH/TLC {Sodium & Cholesterol Restricted} (DASH)  1500mL Fluid Restriction ([id number])  Supplement Feeding Modality:  Oral  Ensure Compact Cans or Servings Per Day:  1       Frequency:  Three Times a day  Entered: Mar 17 2024 10:24AM    The following pending diet order is being considered for treatment of Severe protein-calorie malnutrition and Underweight (BMI < 19):null

## 2024-03-18 NOTE — PROGRESS NOTE ADULT - ASSESSMENT
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 pulmonary hypertension, emboli on stopping Eliquis s/p embolectomy (?location), underwent ILR implantation on 01/26/2024 for a long-term cardiac arrhythmia. Admitted with episodes of tachycardia.   Pt is on Metoprolol 12. 5 mg q 6 hours       Impression:  Atrial Tachycardia; now NSR  Hx of AF sp Ablation  Hx of VT  S/P Loop Recorder  CAD sp PCI  HTN  Hep C/Liver Cirrhosis  ESRD sp Renal Transplant    Plan:  - Rhythm likely to be atrial tachycardia, reviewed on interrogation  - Cont Eliquis  - Con't Metoprolol

## 2024-03-18 NOTE — PROGRESS NOTE ADULT - ASSESSMENT
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 Pt was loaded with amiodarone with resolution of afib/aflutter. Pt has been in sinus with some episodes of bradycardia and BP running low. Trops initially increased but then downtrended. 3/16 Pt developed atrial tachycardia and EP recommended DCing amiodarone and increasing metoprolol as tolerated. Pt is scheduled for an ablation 3/26 with EP out pt. Heme onc consulted for chronic thrombocytopenia, now with downtrending plt to 46.     IMPRESSION:  #Atrial flutter/Atrial Tachycardia, now in NSR  #SANTA on CKD s/p renal transplant  #CAD s/p PCI   #HTN  #Compensated Liver Cirrhosis   #Hx cAfib s/p ablation   #Hx HFpEF  #Hx Hep C    PLAN:    CNS:   - Avoid CNS Depressants    HEENT:   - Oral care  - Aspiration precautions     PULMONARY:   - Monitor Pulse Ox. Keep > 94%. Supplement as needed.    CARDIOVASCULAR:   - Amiodarone DCed per EP recs, pt had recurrent AT  - keep on metoprolol 12.5 mg every 6 hrs   - c/w home imdur 30mg qd  - Daily Weight. Strict I&Os. Keep I < O, 1.5L fluid restriction   - TTE: EF 70-75, grade III diastolic filling defect, dilated aortic root to 3.8cm, and dilated ascending aorta to 3.8cm.  - tele monitoring   - s/p interrogation which showed possible AT.   - will schedule for Atrial tachycardia ablation on 3/26 as OP by EP     GI:   - DASH diet    RENAL:   - cw Cellcept, tacrolimus level WNL  - cleared by nephro for DC    INFECTIOUS DISEASE:  - monitor off abx     HEMATOLOGICAL:   - heme onc consulted for worsening chronic multifactorial Thrombocytopenia for recs   - c/w eliquis  - maintain active T/S    ENDOCRINE:   - c/w home calcitriol   - lipitor 80mg qhs  - TSH 1.9    MUSCULOSKELETAL:   - PT eval, pt   - c/w duloxetine 30mg qd, methocarbamol 750mg po tid   69y M CAD s/p PCI, chronic afib s/p ablation, HTN, HFpEF, ESRD s/p renal tx, Hep C, liver cirrhosis, 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 Pt was loaded with amiodarone with resolution of afib/aflutter. Pt has been in sinus with some episodes of bradycardia and BP running low. Trops initially increased but then downtrended. 3/16 Pt developed atrial tachycardia and EP recommended DCing amiodarone and increasing metoprolol as tolerated. Pt is scheduled for an ablation 3/26 with EP out pt. Heme onc consulted for chronic thrombocytopenia, now with downtrending plt to 46.     IMPRESSION:  #Atrial flutter/Atrial Tachycardia, now in NSR  #SANTA on CKD s/p renal transplant  #CAD s/p PCI   #HTN  #Compensated Liver Cirrhosis   #Hx cAfib s/p ablation   #Hx HFpEF  #Hx Hep C    PLAN:    CNS:   - Avoid CNS Depressants    HEENT:   - Oral care  - Aspiration precautions     PULMONARY:   - Monitor Pulse Ox. Keep > 94%. Supplement as needed.    CARDIOVASCULAR:   - Amiodarone DCed per EP recs, pt had recurrent AT  - keep on metoprolol 12.5 mg every 6 hrs   - c/w home imdur 30mg qd  - Daily Weight. Strict I&Os. Keep I < O, 1.5L fluid restriction   - TTE: EF 70-75, grade III diastolic filling defect, dilated aortic root to 3.8cm, and dilated ascending aorta to 3.8cm.  - tele monitoring   - s/p interrogation which showed possible AT.   - will schedule for Atrial tachycardia ablation on 3/26 as OP by EP     GI:   - DASH diet    RENAL:   - cw Cellcept, tacrolimus level WNL  - cleared by nephro for DC    INFECTIOUS DISEASE:  - monitor off abx     HEMATOLOGICAL:   - heme onc consulted for worsening chronic multifactorial Thrombocytopenia for recs   - c/w eliquis  - maintain active T/S    ENDOCRINE:   - c/w home calcitriol   - lipitor 80mg qhs  - TSH 1.9    MUSCULOSKELETAL:   - PT eval, pt   - c/w duloxetine 30mg qd, methocarbamol 750mg po tid, oxycodone 15mg Q8H PRN for breakthrough pain

## 2024-03-19 ENCOUNTER — NON-APPOINTMENT (OUTPATIENT)
Age: 69
End: 2024-03-19

## 2024-03-20 LAB — TM INTERPRETATION: SIGNIFICANT CHANGE UP

## 2024-03-20 NOTE — CDI QUERY NOTE - NSCDIOTHERTXTBX_GEN_ALL_CORE_HH
Clinical documentation and/or evidence in the medical record indicates that this patient has pancytopenia.  In order to ensure accurate coding and accuracy of the clinical record, the documentation in this patient’s medical record requires additional clarification.  Please include more specific documentation as to the type of pancytopenia in your progress note and/or discharge summary.    Please clarify the underlying etiology of the pancytopenia:      •Chronic pancytopenia multifactorial: associated with drugs (specify drug) and chronic disease, could not be excluded at the time of discharge    •Chronic thrombocytopenia associated with drugs and chronic disease, could not be excluded at the time of discharge    •Other (specify)      Supporting documentation and/or clinical evidence:     3/18 Consult Note Adult-Heme/Onc Resident/Attending- # Chronic pancytopenia  - chronic macrocytic anemia, baseline Hb 10  - Iron studies from April 2022: iron 75, sat 27%, TIBC 280, ferritin (684>>349)  - Normal B12  - serum electrophoresis with immunofixation showed small IgM (2.1 %) and IgG (unable to quantitate) spikes. 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 Can continue eliquis provided that plts count remain 45K-50K and no evidence of bleeding, will need close monitoring of his counts.  - Please send flow cytometry to r/o lymphoma  - Outpatient follow up with his hematologist    Meds:  Tacrolimus 2mg PO at bedtime, indication: kidney transplant  Cellcept 1000mg PO daily    Thank you,  Farida Harper RN

## 2024-03-27 NOTE — ED PROVIDER NOTE - CONSULTANT FREE TEXT FOR MDM DISCUSSED CASE WITH QUESTION
Hypertension     Conversation: Care Coordination    Current Issues/Problems reviewed on call: Follow up call completed. CM spoke with Ms. Dominguez. She reported that Ms. Garcia is doing well. She does not have any symptoms of HTN. Her BP has been improving and it has been at goal since Isabelle increased her Labetalol to 200mg BID on 3/11/24. She denied any s/e from the new dose. She reported continued med and diet adherence. Ms. Garcia has a scheduled appt with Dr. Fernandez on 4/8/24. CM to send BP readings to her PCP prior to the appt.  Instruct on the use of the Hypertension Action Plan, including signs and symptoms of worsening Hypertension (symptoms of a potential exacerbation) and the recommended actions to take, including the importance of reporting symptoms early.     Details for Interventions/Education completed on call: Evaluated frequency of blood pressure monitoring and adherence to prescribed medication regime. Reviewed the importance of keeping regularly scheduled medical appointments.      Time frame for follow-up is within _21 days____ as able.     Plan for next call: Discuss result of PCP appt. Complete maintenance call. Continue educating patient regarding Hypertension and Hypertension healthy lifestyle, monitor for understanding of instruction previously provided, the integration of recommended behaviors, and evaluate patient's commitment to report any Hypertension symptoms early to their clinician.    The Hypertension Prescriptive Care Plan has been reviewed with patient; patient has agreed to participate and follow plan.    
Dr. Harjinder James

## 2024-03-28 ENCOUNTER — APPOINTMENT (OUTPATIENT)
Dept: ELECTROPHYSIOLOGY | Facility: CLINIC | Age: 69
End: 2024-03-28

## 2024-03-28 DIAGNOSIS — N17.9 ACUTE KIDNEY FAILURE, UNSPECIFIED: ICD-10-CM

## 2024-03-28 DIAGNOSIS — Z79.82 LONG TERM (CURRENT) USE OF ASPIRIN: ICD-10-CM

## 2024-03-28 DIAGNOSIS — Z86.73 PERSONAL HISTORY OF TRANSIENT ISCHEMIC ATTACK (TIA), AND CEREBRAL INFARCTION WITHOUT RESIDUAL DEFICITS: ICD-10-CM

## 2024-03-28 DIAGNOSIS — Z85.820 PERSONAL HISTORY OF MALIGNANT MELANOMA OF SKIN: ICD-10-CM

## 2024-03-28 DIAGNOSIS — Z95.5 PRESENCE OF CORONARY ANGIOPLASTY IMPLANT AND GRAFT: ICD-10-CM

## 2024-03-28 DIAGNOSIS — I49.1 ATRIAL PREMATURE DEPOLARIZATION: ICD-10-CM

## 2024-03-28 DIAGNOSIS — T45.1X5A ADVERSE EFFECT OF ANTINEOPLASTIC AND IMMUNOSUPPRESSIVE DRUGS, INITIAL ENCOUNTER: ICD-10-CM

## 2024-03-28 DIAGNOSIS — Z85.3 PERSONAL HISTORY OF MALIGNANT NEOPLASM OF BREAST: ICD-10-CM

## 2024-03-28 DIAGNOSIS — E43 UNSPECIFIED SEVERE PROTEIN-CALORIE MALNUTRITION: ICD-10-CM

## 2024-03-28 DIAGNOSIS — Z79.01 LONG TERM (CURRENT) USE OF ANTICOAGULANTS: ICD-10-CM

## 2024-03-28 DIAGNOSIS — Z87.891 PERSONAL HISTORY OF NICOTINE DEPENDENCE: ICD-10-CM

## 2024-03-28 DIAGNOSIS — F11.21 OPIOID DEPENDENCE, IN REMISSION: ICD-10-CM

## 2024-03-28 DIAGNOSIS — G62.9 POLYNEUROPATHY, UNSPECIFIED: ICD-10-CM

## 2024-03-28 DIAGNOSIS — D69.59 OTHER SECONDARY THROMBOCYTOPENIA: ICD-10-CM

## 2024-03-28 DIAGNOSIS — K74.69 OTHER CIRRHOSIS OF LIVER: ICD-10-CM

## 2024-03-28 DIAGNOSIS — J44.9 CHRONIC OBSTRUCTIVE PULMONARY DISEASE, UNSPECIFIED: ICD-10-CM

## 2024-03-28 DIAGNOSIS — Z99.2 DEPENDENCE ON RENAL DIALYSIS: ICD-10-CM

## 2024-03-28 DIAGNOSIS — D61.811 OTHER DRUG-INDUCED PANCYTOPENIA: ICD-10-CM

## 2024-03-28 DIAGNOSIS — I48.92 UNSPECIFIED ATRIAL FLUTTER: ICD-10-CM

## 2024-03-28 DIAGNOSIS — I13.0 HYPERTENSIVE HEART AND CHRONIC KIDNEY DISEASE WITH HEART FAILURE AND STAGE 1 THROUGH STAGE 4 CHRONIC KIDNEY DISEASE, OR UNSPECIFIED CHRONIC KIDNEY DISEASE: ICD-10-CM

## 2024-03-28 DIAGNOSIS — N18.30 CHRONIC KIDNEY DISEASE, STAGE 3 UNSPECIFIED: ICD-10-CM

## 2024-03-28 DIAGNOSIS — I48.20 CHRONIC ATRIAL FIBRILLATION, UNSPECIFIED: ICD-10-CM

## 2024-03-28 DIAGNOSIS — Z95.818 PRESENCE OF OTHER CARDIAC IMPLANTS AND GRAFTS: ICD-10-CM

## 2024-03-28 DIAGNOSIS — I47.19 OTHER SUPRAVENTRICULAR TACHYCARDIA: ICD-10-CM

## 2024-03-28 DIAGNOSIS — I50.32 CHRONIC DIASTOLIC (CONGESTIVE) HEART FAILURE: ICD-10-CM

## 2024-03-28 DIAGNOSIS — H54.8 LEGAL BLINDNESS, AS DEFINED IN USA: ICD-10-CM

## 2024-03-28 DIAGNOSIS — I25.10 ATHEROSCLEROTIC HEART DISEASE OF NATIVE CORONARY ARTERY WITHOUT ANGINA PECTORIS: ICD-10-CM

## 2024-03-28 DIAGNOSIS — Z88.8 ALLERGY STATUS TO OTHER DRUGS, MEDICAMENTS AND BIOLOGICAL SUBSTANCES STATUS: ICD-10-CM

## 2024-03-28 DIAGNOSIS — T39.395A ADVERSE EFFECT OF OTHER NONSTEROIDAL ANTI-INFLAMMATORY DRUGS [NSAID], INITIAL ENCOUNTER: ICD-10-CM

## 2024-03-28 DIAGNOSIS — Z94.0 KIDNEY TRANSPLANT STATUS: ICD-10-CM

## 2024-03-28 DIAGNOSIS — D64.9 ANEMIA, UNSPECIFIED: ICD-10-CM

## 2024-04-01 LAB — CHROM ANALY OVERALL INTERP SPEC-IMP: SIGNIFICANT CHANGE UP

## 2024-04-11 ENCOUNTER — APPOINTMENT (OUTPATIENT)
Dept: ELECTROPHYSIOLOGY | Facility: CLINIC | Age: 69
End: 2024-04-11

## 2024-04-18 ENCOUNTER — APPOINTMENT (OUTPATIENT)
Dept: ELECTROPHYSIOLOGY | Facility: CLINIC | Age: 69
End: 2024-04-18
Payer: MEDICARE

## 2024-04-18 VITALS
HEART RATE: 114 BPM | BODY MASS INDEX: 21.62 KG/M2 | WEIGHT: 146 LBS | TEMPERATURE: 97.1 F | HEIGHT: 69 IN | DIASTOLIC BLOOD PRESSURE: 80 MMHG | SYSTOLIC BLOOD PRESSURE: 120 MMHG

## 2024-04-18 PROCEDURE — 93000 ELECTROCARDIOGRAM COMPLETE: CPT | Mod: 59

## 2024-04-18 PROCEDURE — 99215 OFFICE O/P EST HI 40 MIN: CPT | Mod: 25

## 2024-04-18 PROCEDURE — 93285 PRGRMG DEV EVAL SCRMS IP: CPT

## 2024-04-18 RX ORDER — TACROLIMUS 0.5 MG/1
CAPSULE ORAL TWICE DAILY
Refills: 0 | Status: ACTIVE | COMMUNITY

## 2024-04-18 RX ORDER — ISOSORBIDE MONONITRATE 30 MG/1
30 TABLET, EXTENDED RELEASE ORAL
Qty: 90 | Refills: 3 | Status: ACTIVE | COMMUNITY

## 2024-04-18 RX ORDER — TAMSULOSIN HYDROCHLORIDE 0.4 MG/1
0.4 CAPSULE ORAL DAILY
Refills: 0 | Status: ACTIVE | COMMUNITY

## 2024-04-18 RX ORDER — METOPROLOL TARTRATE 25 MG/1
25 TABLET, FILM COATED ORAL
Refills: 0 | Status: ACTIVE | COMMUNITY
Start: 2024-03-04

## 2024-04-18 RX ORDER — ASPIRIN ENTERIC COATED TABLETS 81 MG 81 MG/1
81 TABLET, DELAYED RELEASE ORAL DAILY
Qty: 30 | Refills: 3 | Status: ACTIVE | COMMUNITY

## 2024-04-18 RX ORDER — FUROSEMIDE 20 MG/1
20 TABLET ORAL DAILY
Refills: 0 | Status: ACTIVE | COMMUNITY
Start: 2024-03-04

## 2024-04-18 RX ORDER — AMIODARONE HYDROCHLORIDE 200 MG/1
200 TABLET ORAL DAILY
Qty: 30 | Refills: 2 | Status: ACTIVE | COMMUNITY
Start: 2024-03-04

## 2024-04-18 RX ORDER — ATORVASTATIN CALCIUM 80 MG/1
80 TABLET, FILM COATED ORAL
Qty: 90 | Refills: 3 | Status: ACTIVE | COMMUNITY

## 2024-04-18 RX ORDER — APIXABAN 5 MG/1
5 TABLET, FILM COATED ORAL
Qty: 60 | Refills: 0 | Status: ACTIVE | COMMUNITY

## 2024-04-24 NOTE — PROCEDURE
[No] : not [NSR] : normal sinus rhythm [See Device Printout] : See device printout [Normal] : The battery status is normal. [Sensing Amplitude ___mv] : sensing amplitude was [unfilled] mv [None] : none [Programmed for Longevity] : output reprogrammed for improved battery longevity [Counters Reset] : the counters were reset [de-identified] : 115 bpm [de-identified] : Medtronic ILR [de-identified] : LINQ II [de-identified] : [id number] [de-identified] : 01/26/2024 [de-identified] : Good [de-identified] : Multiple AF and tachy episodes c/w AF with RVR longest lasting >50 mins with avg HR = 92-222bpm.  Transmitting on [employer].

## 2024-04-24 NOTE — ADDENDUM
[FreeTextEntry1] : Kizzy DELATORRE assisted in documentation on 04/24/2024 acting as a scribe for Dr. Porsha Henderson.

## 2024-04-24 NOTE — HISTORY OF PRESENT ILLNESS
[FreeTextEntry1] : Mr. CHOU is a 68 year-year old male with history of CAD/PCI, HTN, Hep C, Cirrhosis with portal hypertension and esophageal varices, ESRD s/p renal transplant, Right AVF, persistent AF s/p cardioversion, anemia, ex-heroine use, ex-smoker, right eye blindness, peripheral neuropathy, right breast ca s/p lumpectomy, bilateral carotid stenosis, ? pulmonary hypertension, emboli on stopping eliquis s/p embolectomy (?location) is here for management of atrial fibrillation.  + PABLO. Had DCCV in past- didn't last long. Felt better for few days No recent EGD Lives alone w/ his cats.  11/02/2023: Continues to have symptoms. Saw Dr. Buchanan and scheduled for EGD and colonoscopy. The plan is to be done next week. We discussed options again and he asked questions.    04/18/2024: Continues to have symptoms of fatigue. Found to have AFL/AT. Amiodarone attempted. However, there is a drug-drug interaction. Also offered him a pacemaker, which he was thinking about.    Denies chest pain, shortness of breath, palpitation, dizziness or LOC except noted above.   EKG (04/18/2024): AFL @ 114, , QTc 410  EKG (11/02/2023): AF @ 98, QRS 86, QTc 429  EKG (10/5/23): AF 87, QRSd 90, QTc 442 TTE (08/23): EF 50-55%, G2DD, Severe LVH, Severe LAE, Mild MR/TR Cardio: Dr. Low Renal: Dr. Heart GI/Liver: Dr. Buchanan (no recent f-up) Hem: Dr. Sharif Pain Mx: Dr. Gordon (Lower back pain)

## 2024-04-24 NOTE — ASSESSMENT
[FreeTextEntry1] : ## Persistent Atrial Fibrillation s/p Ablation   ## Liver Cirrhosis with Possible Portal Hypertension   - On Eliquis. Compliant. No bleeding issues. Patient to contact us if there is any bleeding issues, interruption or any issues with OAC. Patient to go to ER/call 911 if any major bleeding. - On Metoprolol and Amiodarone. Not a good candidate for long-term Amiodarone due to drug-drug interaction with his renal transplant suppression medication.  - ILR interrogation revealed appropriate function. Episodes of AF and bradycardia. Adjustments made as needed. - We discussed management options including repeat cardioversion versus AF ablation versus AV node ablation and pacemaker. After detailed discussion of pros and cons, he decided to proceed with AF ablation.   - He is at a high risk for any procedure considering his overall comorbidities. However, at this moment, we are not left with any other reasonable option.   - Not a great candidate for a pacemaker considering his high risk for blood infection and his relatively younger age.   - We discussed multiple therapeutic options for the treatment of atrial fibrillation, including undergoing an atrial fibrillation/left atrial antral isolation ablation. The details of the procedure and risks associated with undergoing an atrial fibrillation/MARY CARMEN ablation were discussed in detail including, but not limited to, death, myocardial ischemia, stroke, cardiac perforation, pulmonary vein stenosis, diaphragmatic paralysis via phrenic nerve injury, catheter entrapment in the mitral valve or other location, bleeding, infection, deep vein thrombosis, vascular injury, and worsening atrial arrhythmias. We also discussed that there is a low risk of possible esophageal ulceration, atrial esophageal fistula, atrial bronchial fistula, or another complication requiring the need for major surgery to address.  We also discussed that recurrent atrial fibrillation in the first 2-3 months post procedure is a part of the healing process and has no impact on the overall longer- term success of the ablation. To try to reduce the incidence of these events, the plan will be for antiarrhythmic therapy to be restarted post procedure and continued for the first 3 months after ablation. - Discussed patient's care with Dr. Low.   - RTC after AF ablation.

## 2024-04-24 NOTE — PROCEDURE
[No] : not [NSR] : normal sinus rhythm [See Device Printout] : See device printout [Normal] : The battery status is normal. [Sensing Amplitude ___mv] : sensing amplitude was [unfilled] mv [None] : none [Programmed for Longevity] : output reprogrammed for improved battery longevity [Counters Reset] : the counters were reset [de-identified] : 115 bpm [de-identified] : Medtronic ILR [de-identified] : LINQ II [de-identified] : [id number] [de-identified] : 01/26/2024 [de-identified] : Good [de-identified] : Multiple AF and tachy episodes c/w AF with RVR longest lasting >50 mins with avg HR = 92-222bpm.  Transmitting on [employer].

## 2024-04-24 NOTE — PHYSICAL EXAM
[Normal] : well developed, well nourished, no acute distress [Irregularly Irregular] : irregularly irregular [No Edema] : no edema [General Appearance - Well Developed] : well developed [Normal Appearance] : normal appearance [Well Groomed] : well groomed [General Appearance - Well Nourished] : well nourished [No Deformities] : no deformities [General Appearance - In No Acute Distress] : no acute distress [Heart Rate And Rhythm] : heart rate and rhythm were normal [Heart Sounds] : normal S1 and S2 [Murmurs] : no murmurs present

## 2024-04-30 ENCOUNTER — OUTPATIENT (OUTPATIENT)
Dept: OUTPATIENT SERVICES | Facility: HOSPITAL | Age: 69
LOS: 1 days | End: 2024-04-30
Payer: MEDICARE

## 2024-04-30 ENCOUNTER — RESULT REVIEW (OUTPATIENT)
Age: 69
End: 2024-04-30

## 2024-04-30 VITALS
HEART RATE: 100 BPM | SYSTOLIC BLOOD PRESSURE: 126 MMHG | WEIGHT: 132.28 LBS | DIASTOLIC BLOOD PRESSURE: 69 MMHG | OXYGEN SATURATION: 100 % | HEIGHT: 68 IN | RESPIRATION RATE: 18 BRPM | TEMPERATURE: 97 F

## 2024-04-30 DIAGNOSIS — Z98.890 OTHER SPECIFIED POSTPROCEDURAL STATES: Chronic | ICD-10-CM

## 2024-04-30 DIAGNOSIS — Z94.0 KIDNEY TRANSPLANT STATUS: Chronic | ICD-10-CM

## 2024-04-30 DIAGNOSIS — I48.19 OTHER PERSISTENT ATRIAL FIBRILLATION: ICD-10-CM

## 2024-04-30 DIAGNOSIS — Z01.818 ENCOUNTER FOR OTHER PREPROCEDURAL EXAMINATION: ICD-10-CM

## 2024-04-30 LAB
ALBUMIN SERPL ELPH-MCNC: 4 G/DL — SIGNIFICANT CHANGE UP (ref 3.5–5.2)
ALP SERPL-CCNC: 76 U/L — SIGNIFICANT CHANGE UP (ref 30–115)
ALT FLD-CCNC: 11 U/L — SIGNIFICANT CHANGE UP (ref 0–41)
ANION GAP SERPL CALC-SCNC: 11 MMOL/L — SIGNIFICANT CHANGE UP (ref 7–14)
AST SERPL-CCNC: 15 U/L — SIGNIFICANT CHANGE UP (ref 0–41)
BILIRUB SERPL-MCNC: 0.3 MG/DL — SIGNIFICANT CHANGE UP (ref 0.2–1.2)
BLD GP AB SCN SERPL QL: SIGNIFICANT CHANGE UP
BUN SERPL-MCNC: 40 MG/DL — HIGH (ref 10–20)
CALCIUM SERPL-MCNC: 9.8 MG/DL — SIGNIFICANT CHANGE UP (ref 8.4–10.5)
CHLORIDE SERPL-SCNC: 108 MMOL/L — SIGNIFICANT CHANGE UP (ref 98–110)
CO2 SERPL-SCNC: 19 MMOL/L — SIGNIFICANT CHANGE UP (ref 17–32)
CREAT SERPL-MCNC: 1.7 MG/DL — HIGH (ref 0.7–1.5)
EGFR: 44 ML/MIN/1.73M2 — LOW
GLUCOSE SERPL-MCNC: 109 MG/DL — HIGH (ref 70–99)
POTASSIUM SERPL-MCNC: 4.3 MMOL/L — SIGNIFICANT CHANGE UP (ref 3.5–5)
POTASSIUM SERPL-SCNC: 4.3 MMOL/L — SIGNIFICANT CHANGE UP (ref 3.5–5)
PROT SERPL-MCNC: 6.2 G/DL — SIGNIFICANT CHANGE UP (ref 6–8)
SODIUM SERPL-SCNC: 138 MMOL/L — SIGNIFICANT CHANGE UP (ref 135–146)

## 2024-04-30 PROCEDURE — 86850 RBC ANTIBODY SCREEN: CPT

## 2024-04-30 PROCEDURE — 86900 BLOOD TYPING SEROLOGIC ABO: CPT

## 2024-04-30 PROCEDURE — 71046 X-RAY EXAM CHEST 2 VIEWS: CPT | Mod: 26

## 2024-04-30 PROCEDURE — 86901 BLOOD TYPING SEROLOGIC RH(D): CPT

## 2024-04-30 PROCEDURE — 85025 COMPLETE CBC W/AUTO DIFF WBC: CPT

## 2024-04-30 PROCEDURE — 71046 X-RAY EXAM CHEST 2 VIEWS: CPT

## 2024-04-30 PROCEDURE — 80053 COMPREHEN METABOLIC PANEL: CPT

## 2024-04-30 PROCEDURE — 99214 OFFICE O/P EST MOD 30 MIN: CPT | Mod: 25

## 2024-04-30 PROCEDURE — 36415 COLL VENOUS BLD VENIPUNCTURE: CPT

## 2024-04-30 RX ORDER — CALCITRIOL 0.5 UG/1
1 CAPSULE ORAL
Refills: 0 | DISCHARGE

## 2024-04-30 NOTE — H&P PST ADULT - NSICDXPASTSURGICALHX_GEN_ALL_CORE_FT
PAST SURGICAL HISTORY:  History of back surgery s/p Left L5-S1 endoscopic laminoforaminotomy    History of kidney transplant 3 years ago    History of loop recorder     S/P arteriovenous (AV) fistula creation prior old fistula in R arm    S/P lumpectomy, right breast

## 2024-04-30 NOTE — H&P PST ADULT - NSICDXPASTMEDICALHX_GEN_ALL_CORE_FT
PAST MEDICAL HISTORY:  Atrial fibrillation on eliquis    Breast cancer     Cirrhosis possibly related to hepC    CKD (chronic kidney disease)     COPD, mild not on O2    ESRD (end stage renal disease)     History of chronic hepatitis     History of heroin abuse     HLD (hyperlipidemia)     HTN (hypertension)     Legally blind     Lymphoma ?questionable, not treated, not followed    Marijuana use     Melanoma R eye, s/p laser    Peripheral neuropathy unclear cause    Stroke

## 2024-04-30 NOTE — H&P PST ADULT - HEIGHT IN CM
BRONCHOSPASM/BRONCHOCONSTRICTION     [x]         IMPROVE AERATION/BREATH SOUNDS  [x]   ADMINISTER BRONCHODILATOR THERAPY AS APPROPRIATE  [x]   ASSESS BREATH SOUNDS  []   IMPLEMENT AEROSOL/MDI PROTOCOL  [x]   PATIENT EDUCATION AS NEEDED
Problem: Skin Integrity:  Goal: Will show no infection signs and symptoms  Description: Will show no infection signs and symptoms  Outcome: Ongoing  Goal: Absence of new skin breakdown  Description: Absence of new skin breakdown  Outcome: Ongoing     Problem: Falls - Risk of:  Goal: Will remain free from falls  Description: Will remain free from falls  Outcome: Ongoing  Goal: Absence of physical injury  Description: Absence of physical injury  Outcome: Ongoing     Problem: Pain:  Goal: Pain level will decrease  Description: Pain level will decrease  Outcome: Ongoing  Goal: Control of acute pain  Description: Control of acute pain  Outcome: Ongoing  Goal: Control of chronic pain  Description: Control of chronic pain  Outcome: Ongoing
172.72

## 2024-04-30 NOTE — H&P PST ADULT - HISTORY OF PRESENT ILLNESS
PATIENT CURRENTLY DENIES CHEST PAIN  SHORTNESS OF BREATH  PALPITATIONS,  DYSURIA, OR UPPER RESPIRATORY INFECTION IN PAST 2 WEEKS  denies travel outside the [place] in the past 30 days  Denies fevers, chills or any symptoms of recent UTI's or any rashes.   Denies near syncope or syncope episodes.        scheduled for ep study afib mapping afib ablation  and ERIC  hx afib  Anesthesia Alert  NO--Difficult Airway  NO--History of neck surgery or radiation  NO--Limited ROM of neck  NO--History of Malignant hyperthermia  NO--No personal or family history of Pseudocholinesterase deficiency.  NO--Prior Anesthesia Complication  NO--Latex Allergy  NO--Loose teeth  NO--History of Rheumatoid Arthritis  yes -Bleeding risk   aspirin  -eliquis  NO--LAINA  yes-Other_____loop recorder    PT DENIES ANY RASHES, ABRASION, OR OPEN WOUNDS OR CUTS    AS PER THE PT, THIS IS HIS/HER COMPLETE MEDICAL AND SURGICAL HX, INCLUDING MEDICATIONS PRESCRIBED AND OVER THE COUNTER    Patient verbalized understanding of instructions and was given the opportunity to ask questions and have them answered.    pt denies any suicidal ideation or thoughts, pt states not a threat to self or others    Duke Activity Status Index (DASI) from [employer]  on 4/30/2024  ** All calculations should be rechecked by clinician prior to use **    RESULT SUMMARY:  9.95 points  The higher the score (maximum 58.2), the higher the functional status.    3.97 METs        INPUTS:  Take care of self —> 2.75 = Yes  Walk indoors —> 1.75 = Yes  Walk 1&ndash;2 blocks on level ground —> 2.75 = Yes  Climb a flight of stairs or walk up a hill —> 0 = No  Run a short distance —> 0 = No  Do light work around the house —> 2.7 = Yes  Do moderate work around the house —> 0 = No  Do heavy work around the house —> 0 = No  Do yardwork —> 0 = No  Have sexual relations —> 0 = No  Participate in moderate recreational activities —> 0 = No  Participate in strenuous sports —> 0 = No      Revised Cardiac Risk Index for Pre-Operative Risk from [employer]  on 4/30/2024  ** All calculations should be rechecked by clinician prior to use **    RESULT SUMMARY:  3 points  Class IV Risk    15.0 %  30-day risk of death, MI, or cardiac arrest    [facility]. These numbers are higher than those from the original study (Andrés 1999). See Evidence for details.      INPUTS:  Elevated-risk surgery —> 1 = Yes  History of ischemic heart disease —> 1 = Yes  History of congestive heart failure —> 0 = No  History of cerebrovascular disease —> 1 = Yes  Pre-operative treatment with insulin —> 0 = No  Pre-operative creatinine >2 mg/dL / 176.8 µmol/L —> 0 = No   PATIENT CURRENTLY DENIES CHEST PAIN  SHORTNESS OF BREATH  PALPITATIONS,  DYSURIA, OR UPPER RESPIRATORY INFECTION IN PAST 2 WEEKS  denies travel outside the [place] in the past 30 days  Denies fevers, chills or any symptoms of recent UTI's or any rashes.   Denies near syncope or syncope episodes.        scheduled for ep study afib mapping afib ablation  and ERIC  history atrial fibrillation on eliquis  Anesthesia Alert  NO--Difficult Airway  NO--History of neck surgery or radiation  NO--Limited ROM of neck  NO--History of Malignant hyperthermia  NO--No personal or family history of Pseudocholinesterase deficiency.  NO--Prior Anesthesia Complication  NO--Latex Allergy  NO--Loose teeth  NO--History of Rheumatoid Arthritis  yes -Bleeding risk   aspirin  -eliquis  NO--LAINA  yes-Other_____loop recorder    PT DENIES ANY RASHES, ABRASION, OR OPEN WOUNDS OR CUTS    AS PER THE PT, THIS IS HIS/HER COMPLETE MEDICAL AND SURGICAL HX, INCLUDING MEDICATIONS PRESCRIBED AND OVER THE COUNTER    Patient verbalized understanding of instructions and was given the opportunity to ask questions and have them answered.    pt denies any suicidal ideation or thoughts, pt states not a threat to self or others    Duke Activity Status Index (DASI) from [employer]  on 4/30/2024  ** All calculations should be rechecked by clinician prior to use **    RESULT SUMMARY:  9.95 points  The higher the score (maximum 58.2), the higher the functional status.    3.97 METs        INPUTS:  Take care of self —> 2.75 = Yes  Walk indoors —> 1.75 = Yes  Walk 1&ndash;2 blocks on level ground —> 2.75 = Yes  Climb a flight of stairs or walk up a hill —> 0 = No  Run a short distance —> 0 = No  Do light work around the house —> 2.7 = Yes  Do moderate work around the house —> 0 = No  Do heavy work around the house —> 0 = No  Do yardwork —> 0 = No  Have sexual relations —> 0 = No  Participate in moderate recreational activities —> 0 = No  Participate in strenuous sports —> 0 = No      Revised Cardiac Risk Index for Pre-Operative Risk from [employer]  on 4/30/2024  ** All calculations should be rechecked by clinician prior to use **    RESULT SUMMARY:  3 points  Class IV Risk    15.0 %  30-day risk of death, MI, or cardiac arrest    From [employer] 2017. These numbers are higher than those from the original study (Andrés 1999). See Evidence for details.      INPUTS:  Elevated-risk surgery —> 1 = Yes  History of ischemic heart disease —> 1 = Yes  History of congestive heart failure —> 0 = No  History of cerebrovascular disease —> 1 = Yes  Pre-operative treatment with insulin —> 0 = No  Pre-operative creatinine >2 mg/dL / 176.8 µmol/L —> 0 = No

## 2024-05-01 DIAGNOSIS — I48.19 OTHER PERSISTENT ATRIAL FIBRILLATION: ICD-10-CM

## 2024-05-01 DIAGNOSIS — Z01.818 ENCOUNTER FOR OTHER PREPROCEDURAL EXAMINATION: ICD-10-CM

## 2024-05-01 LAB
BASOPHILS # BLD AUTO: 0.02 K/UL — SIGNIFICANT CHANGE UP (ref 0–0.2)
BASOPHILS NFR BLD AUTO: 0.5 % — SIGNIFICANT CHANGE UP (ref 0–1)
EOSINOPHIL # BLD AUTO: 0.03 K/UL — SIGNIFICANT CHANGE UP (ref 0–0.7)
EOSINOPHIL NFR BLD AUTO: 0.8 % — SIGNIFICANT CHANGE UP (ref 0–8)
HCT VFR BLD CALC: 39.1 % — LOW (ref 42–52)
HGB BLD-MCNC: 11.9 G/DL — LOW (ref 14–18)
IMM GRANULOCYTES NFR BLD AUTO: 0.5 % — HIGH (ref 0.1–0.3)
LYMPHOCYTES # BLD AUTO: 0.82 K/UL — LOW (ref 1.2–3.4)
LYMPHOCYTES # BLD AUTO: 20.7 % — SIGNIFICANT CHANGE UP (ref 20.5–51.1)
MCHC RBC-ENTMCNC: 28.5 PG — SIGNIFICANT CHANGE UP (ref 27–31)
MCHC RBC-ENTMCNC: 30.4 G/DL — LOW (ref 32–37)
MCV RBC AUTO: 93.8 FL — SIGNIFICANT CHANGE UP (ref 80–94)
MONOCYTES # BLD AUTO: 0.29 K/UL — SIGNIFICANT CHANGE UP (ref 0.1–0.6)
MONOCYTES NFR BLD AUTO: 7.3 % — SIGNIFICANT CHANGE UP (ref 1.7–9.3)
NEUTROPHILS # BLD AUTO: 2.78 K/UL — SIGNIFICANT CHANGE UP (ref 1.4–6.5)
NEUTROPHILS NFR BLD AUTO: 70.2 % — SIGNIFICANT CHANGE UP (ref 42.2–75.2)
NRBC # BLD: 0 /100 WBCS — SIGNIFICANT CHANGE UP (ref 0–0)
PLATELET # BLD AUTO: 142 K/UL — SIGNIFICANT CHANGE UP (ref 130–400)
PMV BLD: 12.4 FL — HIGH (ref 7.4–10.4)
RBC # BLD: 4.17 M/UL — LOW (ref 4.7–6.1)
RBC # FLD: 14.3 % — SIGNIFICANT CHANGE UP (ref 11.5–14.5)
WBC # BLD: 3.96 K/UL — LOW (ref 4.8–10.8)
WBC # FLD AUTO: 3.96 K/UL — LOW (ref 4.8–10.8)

## 2024-05-10 ENCOUNTER — INPATIENT (INPATIENT)
Facility: HOSPITAL | Age: 69
LOS: 1 days | Discharge: HOME | DRG: 951 | End: 2024-05-12
Attending: STUDENT IN AN ORGANIZED HEALTH CARE EDUCATION/TRAINING PROGRAM | Admitting: STUDENT IN AN ORGANIZED HEALTH CARE EDUCATION/TRAINING PROGRAM
Payer: MEDICARE

## 2024-05-10 ENCOUNTER — APPOINTMENT (OUTPATIENT)
Dept: ELECTROPHYSIOLOGY | Facility: HOSPITAL | Age: 69
End: 2024-05-10

## 2024-05-10 ENCOUNTER — TRANSCRIPTION ENCOUNTER (OUTPATIENT)
Age: 69
End: 2024-05-10

## 2024-05-10 VITALS
HEART RATE: 211 BPM | WEIGHT: 130.07 LBS | DIASTOLIC BLOOD PRESSURE: 56 MMHG | HEIGHT: 69 IN | RESPIRATION RATE: 24 BRPM | TEMPERATURE: 98 F | SYSTOLIC BLOOD PRESSURE: 125 MMHG | OXYGEN SATURATION: 97 %

## 2024-05-10 DIAGNOSIS — Z98.890 OTHER SPECIFIED POSTPROCEDURAL STATES: Chronic | ICD-10-CM

## 2024-05-10 DIAGNOSIS — Z80.3 FAMILY HISTORY OF MALIGNANT NEOPLASM OF BREAST: ICD-10-CM

## 2024-05-10 DIAGNOSIS — Z94.0 KIDNEY TRANSPLANT STATUS: Chronic | ICD-10-CM

## 2024-05-10 DIAGNOSIS — I48.19 OTHER PERSISTENT ATRIAL FIBRILLATION: ICD-10-CM

## 2024-05-10 LAB
ALBUMIN SERPL ELPH-MCNC: 4.4 G/DL — SIGNIFICANT CHANGE UP (ref 3.5–5.2)
ALP SERPL-CCNC: 93 U/L — SIGNIFICANT CHANGE UP (ref 30–115)
ALT FLD-CCNC: 17 U/L — SIGNIFICANT CHANGE UP (ref 0–41)
ANION GAP SERPL CALC-SCNC: 15 MMOL/L — HIGH (ref 7–14)
AST SERPL-CCNC: 24 U/L — SIGNIFICANT CHANGE UP (ref 0–41)
BILIRUB SERPL-MCNC: 0.5 MG/DL — SIGNIFICANT CHANGE UP (ref 0.2–1.2)
BUN SERPL-MCNC: 37 MG/DL — HIGH (ref 10–20)
CALCIUM SERPL-MCNC: 10.5 MG/DL — SIGNIFICANT CHANGE UP (ref 8.4–10.5)
CHLORIDE SERPL-SCNC: 111 MMOL/L — HIGH (ref 98–110)
CO2 SERPL-SCNC: 16 MMOL/L — LOW (ref 17–32)
CREAT SERPL-MCNC: 1.5 MG/DL — SIGNIFICANT CHANGE UP (ref 0.7–1.5)
EGFR: 51 ML/MIN/1.73M2 — LOW
GLUCOSE SERPL-MCNC: 194 MG/DL — HIGH (ref 70–99)
HCT VFR BLD CALC: 42.5 % — SIGNIFICANT CHANGE UP (ref 42–52)
HGB BLD-MCNC: 13.4 G/DL — LOW (ref 14–18)
MCHC RBC-ENTMCNC: 30.1 PG — SIGNIFICANT CHANGE UP (ref 27–31)
MCHC RBC-ENTMCNC: 31.5 G/DL — LOW (ref 32–37)
MCV RBC AUTO: 95.5 FL — HIGH (ref 80–94)
NRBC # BLD: 0 /100 WBCS — SIGNIFICANT CHANGE UP (ref 0–0)
PLATELET # BLD AUTO: 84 K/UL — LOW (ref 130–400)
PMV BLD: 13.1 FL — HIGH (ref 7.4–10.4)
POTASSIUM SERPL-MCNC: 4.4 MMOL/L — SIGNIFICANT CHANGE UP (ref 3.5–5)
POTASSIUM SERPL-SCNC: 4.4 MMOL/L — SIGNIFICANT CHANGE UP (ref 3.5–5)
PROT SERPL-MCNC: 6.5 G/DL — SIGNIFICANT CHANGE UP (ref 6–8)
RBC # BLD: 4.45 M/UL — LOW (ref 4.7–6.1)
RBC # FLD: 14.7 % — HIGH (ref 11.5–14.5)
SODIUM SERPL-SCNC: 142 MMOL/L — SIGNIFICANT CHANGE UP (ref 135–146)
WBC # BLD: 7.87 K/UL — SIGNIFICANT CHANGE UP (ref 4.8–10.8)
WBC # FLD AUTO: 7.87 K/UL — SIGNIFICANT CHANGE UP (ref 4.8–10.8)

## 2024-05-10 PROCEDURE — 36002 PSEUDOANEURYSM INJECTION TRT: CPT | Mod: LT

## 2024-05-10 PROCEDURE — 93657 TX L/R ATRIAL FIB ADDL: CPT

## 2024-05-10 PROCEDURE — 76942 ECHO GUIDE FOR BIOPSY: CPT

## 2024-05-10 PROCEDURE — 93005 ELECTROCARDIOGRAM TRACING: CPT

## 2024-05-10 PROCEDURE — 93655 ICAR CATH ABLTJ DSCRT ARRHYT: CPT

## 2024-05-10 PROCEDURE — 76937 US GUIDE VASCULAR ACCESS: CPT | Mod: 26

## 2024-05-10 PROCEDURE — 93325 DOPPLER ECHO COLOR FLOW MAPG: CPT | Mod: 26

## 2024-05-10 PROCEDURE — 93656 COMPRE EP EVAL ABLTJ ATR FIB: CPT

## 2024-05-10 PROCEDURE — 93623 PRGRMD STIMJ&PACG IV RX NFS: CPT

## 2024-05-10 PROCEDURE — 93312 ECHO TRANSESOPHAGEAL: CPT | Mod: 26

## 2024-05-10 PROCEDURE — 93623 PRGRMD STIMJ&PACG IV RX NFS: CPT | Mod: 26

## 2024-05-10 PROCEDURE — C9113: CPT

## 2024-05-10 PROCEDURE — 93320 DOPPLER ECHO COMPLETE: CPT | Mod: 26

## 2024-05-10 RX ORDER — MYCOPHENOLATE MOFETIL 250 MG/1
2 CAPSULE ORAL
Refills: 0 | DISCHARGE

## 2024-05-10 RX ORDER — TAMSULOSIN HYDROCHLORIDE 0.4 MG/1
1 CAPSULE ORAL
Refills: 0 | DISCHARGE

## 2024-05-10 RX ORDER — MYCOPHENOLATE MOFETIL 250 MG/1
500 CAPSULE ORAL
Refills: 0 | Status: DISCONTINUED | OUTPATIENT
Start: 2024-05-10 | End: 2024-05-12

## 2024-05-10 RX ORDER — TACROLIMUS 5 MG/1
1 CAPSULE ORAL DAILY
Refills: 0 | Status: DISCONTINUED | OUTPATIENT
Start: 2024-05-10 | End: 2024-05-10

## 2024-05-10 RX ORDER — PANTOPRAZOLE SODIUM 20 MG/1
20 TABLET, DELAYED RELEASE ORAL
Refills: 0 | Status: DISCONTINUED | OUTPATIENT
Start: 2024-05-10 | End: 2024-05-12

## 2024-05-10 RX ORDER — APIXABAN 2.5 MG/1
2.5 TABLET, FILM COATED ORAL EVERY 12 HOURS
Refills: 0 | Status: DISCONTINUED | OUTPATIENT
Start: 2024-05-10 | End: 2024-05-12

## 2024-05-10 RX ORDER — TACROLIMUS 5 MG/1
3 CAPSULE ORAL DAILY
Refills: 0 | Status: DISCONTINUED | OUTPATIENT
Start: 2024-05-10 | End: 2024-05-12

## 2024-05-10 RX ORDER — HYDROMORPHONE HYDROCHLORIDE 2 MG/ML
1 INJECTION INTRAMUSCULAR; INTRAVENOUS; SUBCUTANEOUS ONCE
Refills: 0 | Status: DISCONTINUED | OUTPATIENT
Start: 2024-05-10 | End: 2024-05-10

## 2024-05-10 RX ORDER — FUROSEMIDE 40 MG
1 TABLET ORAL
Refills: 0 | DISCHARGE

## 2024-05-10 RX ORDER — OXYCODONE HYDROCHLORIDE 5 MG/1
15 TABLET ORAL EVERY 8 HOURS
Refills: 0 | Status: DISCONTINUED | OUTPATIENT
Start: 2024-05-10 | End: 2024-05-12

## 2024-05-10 RX ORDER — AMIODARONE HYDROCHLORIDE 400 MG/1
2 TABLET ORAL
Refills: 0 | DISCHARGE

## 2024-05-10 RX ORDER — OXYCODONE HYDROCHLORIDE 5 MG/1
15 TABLET ORAL EVERY 8 HOURS
Refills: 0 | Status: DISCONTINUED | OUTPATIENT
Start: 2024-05-10 | End: 2024-05-10

## 2024-05-10 RX ORDER — SODIUM CHLORIDE 9 MG/ML
1000 INJECTION INTRAMUSCULAR; INTRAVENOUS; SUBCUTANEOUS ONCE
Refills: 0 | Status: COMPLETED | OUTPATIENT
Start: 2024-05-10 | End: 2024-05-10

## 2024-05-10 RX ORDER — POLYETHYLENE GLYCOL 3350 17 G/17G
17 POWDER, FOR SOLUTION ORAL
Refills: 0 | Status: DISCONTINUED | OUTPATIENT
Start: 2024-05-10 | End: 2024-05-12

## 2024-05-10 RX ORDER — ATORVASTATIN CALCIUM 80 MG/1
80 TABLET, FILM COATED ORAL AT BEDTIME
Refills: 0 | Status: DISCONTINUED | OUTPATIENT
Start: 2024-05-10 | End: 2024-05-12

## 2024-05-10 RX ORDER — METHOCARBAMOL 500 MG/1
1 TABLET, FILM COATED ORAL
Refills: 0 | DISCHARGE

## 2024-05-10 RX ORDER — TACROLIMUS 5 MG/1
2 CAPSULE ORAL AT BEDTIME
Refills: 0 | Status: DISCONTINUED | OUTPATIENT
Start: 2024-05-10 | End: 2024-05-12

## 2024-05-10 RX ORDER — ASPIRIN/CALCIUM CARB/MAGNESIUM 324 MG
81 TABLET ORAL DAILY
Refills: 0 | Status: DISCONTINUED | OUTPATIENT
Start: 2024-05-10 | End: 2024-05-12

## 2024-05-10 RX ORDER — ISOSORBIDE MONONITRATE 60 MG/1
30 TABLET, EXTENDED RELEASE ORAL DAILY
Refills: 0 | Status: DISCONTINUED | OUTPATIENT
Start: 2024-05-10 | End: 2024-05-12

## 2024-05-10 RX ORDER — METOPROLOL TARTRATE 50 MG
25 TABLET ORAL
Refills: 0 | Status: DISCONTINUED | OUTPATIENT
Start: 2024-05-10 | End: 2024-05-12

## 2024-05-10 RX ORDER — MORPHINE SULFATE 50 MG/1
2 CAPSULE, EXTENDED RELEASE ORAL ONCE
Refills: 0 | Status: DISCONTINUED | OUTPATIENT
Start: 2024-05-10 | End: 2024-05-10

## 2024-05-10 RX ORDER — TAMSULOSIN HYDROCHLORIDE 0.4 MG/1
0.4 CAPSULE ORAL AT BEDTIME
Refills: 0 | Status: DISCONTINUED | OUTPATIENT
Start: 2024-05-10 | End: 2024-05-12

## 2024-05-10 RX ORDER — HYDROMORPHONE HYDROCHLORIDE 2 MG/ML
2 INJECTION INTRAMUSCULAR; INTRAVENOUS; SUBCUTANEOUS ONCE
Refills: 0 | Status: DISCONTINUED | OUTPATIENT
Start: 2024-05-10 | End: 2024-05-10

## 2024-05-10 RX ORDER — FUROSEMIDE 40 MG
20 TABLET ORAL DAILY
Refills: 0 | Status: DISCONTINUED | OUTPATIENT
Start: 2024-05-10 | End: 2024-05-12

## 2024-05-10 RX ADMIN — HYDROMORPHONE HYDROCHLORIDE 1 MILLIGRAM(S): 2 INJECTION INTRAMUSCULAR; INTRAVENOUS; SUBCUTANEOUS at 23:59

## 2024-05-10 RX ADMIN — APIXABAN 2.5 MILLIGRAM(S): 2.5 TABLET, FILM COATED ORAL at 18:48

## 2024-05-10 RX ADMIN — Medication 81 MILLIGRAM(S): at 21:40

## 2024-05-10 RX ADMIN — Medication 25 MILLIGRAM(S): at 18:47

## 2024-05-10 RX ADMIN — OXYCODONE HYDROCHLORIDE 15 MILLIGRAM(S): 5 TABLET ORAL at 20:39

## 2024-05-10 RX ADMIN — TAMSULOSIN HYDROCHLORIDE 0.4 MILLIGRAM(S): 0.4 CAPSULE ORAL at 21:40

## 2024-05-10 RX ADMIN — SODIUM CHLORIDE 2000 MILLILITER(S): 9 INJECTION INTRAMUSCULAR; INTRAVENOUS; SUBCUTANEOUS at 10:30

## 2024-05-10 RX ADMIN — ATORVASTATIN CALCIUM 80 MILLIGRAM(S): 80 TABLET, FILM COATED ORAL at 21:40

## 2024-05-10 RX ADMIN — HYDROMORPHONE HYDROCHLORIDE 2 MILLIGRAM(S): 2 INJECTION INTRAMUSCULAR; INTRAVENOUS; SUBCUTANEOUS at 16:14

## 2024-05-10 RX ADMIN — ISOSORBIDE MONONITRATE 30 MILLIGRAM(S): 60 TABLET, EXTENDED RELEASE ORAL at 21:41

## 2024-05-10 RX ADMIN — Medication 20 MILLIGRAM(S): at 21:41

## 2024-05-10 RX ADMIN — TACROLIMUS 2 MILLIGRAM(S): 5 CAPSULE ORAL at 21:40

## 2024-05-10 RX ADMIN — PANTOPRAZOLE SODIUM 20 MILLIGRAM(S): 20 TABLET, DELAYED RELEASE ORAL at 18:48

## 2024-05-10 NOTE — PRE-ANESTHESIA EVALUATION ADULT - NSANTHOSAYNRD_GEN_A_CORE
No. LAINA screening performed.  STOP BANG Legend: 0-2 = LOW Risk; 3-4 = INTERMEDIATE Risk; 5-8 = HIGH Risk not examined

## 2024-05-10 NOTE — DISCHARGE NOTE PROVIDER - CARE PROVIDER_API CALL
Porsha Henderson  Cardiac Electrophysiology  [address] [place] [id number]  Phone: [phone]  Fax: [phone]  Follow Up Time: 1 month

## 2024-05-10 NOTE — PRE-ANESTHESIA EVALUATION ADULT - NSANTHBMIRD_ENT_A_CORE
Apt 10/10 canceled. Dr out. Patient in ICU. Spouse wanted to re-schedule a later date.   Offer 10/31 @ [facility] (hold spot open and ok to schedule- per Purnima @ [facility])
No

## 2024-05-10 NOTE — PATIENT PROFILE ADULT - FALL HARM RISK - HARM RISK INTERVENTIONS
Assistance with ambulation/Assistance OOB with selected safe patient handling equipment/Communicate Risk of Fall with Harm to all staff/Discuss with provider need for PT consult/Monitor gait and stability/Provide patient with walking aids - walker, cane, crutches/Reinforce activity limits and safety measures with patient and family/Sit up slowly, dangle for a short time, stand at bedside before walking/Tailored Fall Risk Interventions/Use of alarms - bed, chair and/or voice tab/Visual Cue: Yellow wristband and red socks/Bed in lowest position, wheels locked, appropriate side rails in place/Call bell, personal items and telephone in reach/Instruct patient to call for assistance before getting out of bed or chair/Non-slip footwear when patient is out of bed/[place] to call system/Physically safe environment - no spills, clutter or unnecessary equipment/Purposeful Proactive Rounding/Room/bathroom lighting operational, light cord in reach

## 2024-05-10 NOTE — ASU PATIENT PROFILE, ADULT - FALL HARM RISK - HARM RISK INTERVENTIONS

## 2024-05-10 NOTE — DISCHARGE NOTE PROVIDER - NSDCCPTREATMENT_GEN_ALL_CORE_FT
PRINCIPAL PROCEDURE  Procedure: Atrial ablation  Findings and Treatment: - Continue to take Aspirin 81 mg daily and Eliquis 2.5 mg BID  - STOP taking Diltiazem   - Do not drive or operate heavy machinery for 24 hours.  - After 24 hours, you may shower and remove the dressing from the site.  - Support the groin site with your hand when you sneeze, cough, or use the bathroom.  - No squatting, exertional activities, or lifting anything > 10 lbs for 1 week.  - Do not bathe or swim for 1 week.   - Do not rub or apply lotion, cream, or powder to the affected site. Leave it open to the air.   - Any sudden swelling, redness, fever, discharge, or severe pain, call your Electrophysiologist   - If there is bleeding from the puncture site, apply direct firm pressure on the site and call 911.     PRINCIPAL PROCEDURE  Procedure: Atrial ablation  Findings and Treatment: - Continue to take Aspirin 81 mg daily   - Your Eliquis was decreased to 2.5 mg BID (DO NOT SKIP A DOSE OF ELIQUIS FOR 3MONTHS)  - Please take Pantoprazole 40mg daily x 1 month   - Continue Cardizem 180xl daily start (5/13)  - Do not drive or operate heavy machinery for 24 hours.  - After 24 hours, you may shower and remove the dressing from the site.  - Support the groin site with your hand when you sneeze, cough, or use the bathroom.  - No squatting, exertional activities, or lifting anything > 10 lbs for 1 week.  - Do not bathe or swim for 1 week.   - Do not rub or apply lotion, cream, or powder to the affected site. Leave it open to the air.   - Any sudden swelling, redness, fever, discharge, or severe pain, call your Electrophysiologist   - If there is bleeding from the puncture site, apply direct firm pressure on the site and call 911.  - Followup with Dr. Henderson in 1 month.

## 2024-05-10 NOTE — DISCHARGE NOTE PROVIDER - ATTENDING DISCHARGE PHYSICAL EXAMINATION:
Patient seen and examined. Pertinent labs, imaging and telemetry reviewed. I agree with the above:     Patient feeling better. Still some groin pain.   -Back in NSR after cardizem.   RRR. S1S2 present.   CTA B/L   Left groin with soft hematoma, smaller than yesterday.     Patient is stable for discharge home with outpatient follow up.

## 2024-05-10 NOTE — CHART NOTE - NSCHARTNOTEFT_GEN_A_CORE
PACU ANESTHESIA ADMISSION NOTE      Procedure:   Post op diagnosis:      ____  Intubated  TV:______       Rate: ______      FiO2: ______    _x___  Patent Airway    _x___  Full return of protective reflexes    _x___  Full recovery from anesthesia / back to baseline status    Vitals:  T(C): 36.4 (05-10-24 @ 12:18), Max: 36.4 (05-10-24 @ 10:44)    BP  156/65  P  98  R  18  Sat  97    Mental Status:  _x___ Awake   _____ Alert   _____ Drowsy   _____ Sedated    Nausea/Vomiting:  _x___  NO       ______Yes,   See Post - Op Orders         Pain Scale (0-10):  __0___    Treatment: ___ None    x____ See Post - Op/PCA Orders    Post - Operative Fluids:   __x__ Oral   _x__ See Post - Op Orders    Plan: Discharge:   _x___Home       _____Floor     _____Critical Care    _____  Other:_________________    Comments:  No anesthesia issues or complications noted.  Discharge when criteria met.

## 2024-05-10 NOTE — DISCHARGE NOTE PROVIDER - NSDCMRMEDTOKEN_GEN_ALL_CORE_FT
aspirin 81 mg oral tablet: 1 tab(s) orally once a day  Eliquis 5 mg oral tablet: 1 tab(s) orally 2 times a day  Flomax 0.4 mg oral capsule: 1 cap(s) orally 2 times a day  isosorbide mononitrate 30 mg oral tablet, extended release: 1 tab(s) orally once a day  Lasix 20 mg oral tablet: 1 tab(s) orally once a day  Lipitor 80 mg oral tablet: 1 tab(s) orally once a day  metoprolol tartrate 25 mg oral tablet: 1 tab(s) orally 2 times a day  mycophenolate mofetil 500 mg oral tablet: 2 tab(s) orally once a day  oxyCODONE 15 mg oral tablet: 1 tab(s) orally every 8 hours As needed Severe Pain (7 - 10)  polyethylene glycol 3350 oral powder for reconstitution: 17 gram(s) orally 2 times a day as needed for  constipation  tacrolimus 1 mg oral capsule: 3 cap(s) orally once a day (in the morning)  tacrolimus 1 mg oral capsule: 2 cap(s) orally once a day (at bedtime)   aspirin 81 mg oral tablet: 1 tab(s) orally once a day  DilTIAZem (Eqv-Cardizem CD) 180 mg/24 hours oral capsule, extended release: 1 cap(s) orally once a day  Eliquis 2.5 mg oral tablet: 1 tab(s) orally 2 times a day  Flomax 0.4 mg oral capsule: 1 cap(s) orally 2 times a day  isosorbide mononitrate 30 mg oral tablet, extended release: 1 tab(s) orally once a day  Lasix 20 mg oral tablet: 1 tab(s) orally once a day  Lipitor 80 mg oral tablet: 1 tab(s) orally once a day  metoprolol tartrate 25 mg oral tablet: 1 tab(s) orally 2 times a day  mycophenolate mofetil 500 mg oral tablet: 2 tab(s) orally once a day  oxyCODONE 15 mg oral tablet: 1 tab(s) orally every 8 hours As needed Severe Pain (7 - 10)  pantoprazole 40 mg oral delayed release tablet: 1 tab(s) orally once a day  polyethylene glycol 3350 oral powder for reconstitution: 17 gram(s) orally 2 times a day as needed for  constipation  tacrolimus 1 mg oral capsule: 3 cap(s) orally once a day (in the morning)  tacrolimus 1 mg oral capsule: 2 cap(s) orally once a day (at bedtime)

## 2024-05-10 NOTE — DISCHARGE NOTE PROVIDER - NSDCFUSCHEDAPPT_GEN_ALL_CORE_FT
[facility]  CARDIOLOGY [address]  Scheduled Appointment: 05/20/2024    [facility] PreAdmits  Scheduled Appointment: 05/24/2024    Kelvin Sharif  [facility]  [facility] [address]  Scheduled Appointment: 05/24/2024

## 2024-05-10 NOTE — DISCHARGE NOTE PROVIDER - HOSPITAL COURSE
Patient is a 69y Male  with PMHx of HTN, HLD, HFpEF, ESRD s/p renal tx, Hep C, liver cirrhosis, Afib s/p ablation who presented to [facility] for elective AF Ablation. On 5/10/24 patient underwent successful Cryo ablation for Atrial Fibrillation. Patient was monitored overnight. On POD 1 patient remains HD stable with no complaints. Patient remains in SR with no arrhythmias noted on tele. Examination of B/L common femoral venous access sites reveal a Clear and dry area with no hematoma or erythema. For PVI- Patient should continue Eliquis for thromboembolic prophylaxis. Will discontinue Amiodarone. Patient is being DC home in stable condition. Patient is a 69y Male  with PMHx of HTN, HLD, HFpEF, ESRD s/p renal tx, Hep C, liver cirrhosis, Afib s/p ablation who presented to [facility] for elective AF Ablation. On 5/10/24 patient underwent successful Cryo ablation for Atrial Fibrillation. After ablation patient had Left groin hematoma, Ultrasound of L groin showed no pseudoaneurysm or fistula. Patient went in to Afib overnight which was controlled with IV cardizem gtt and Cardizem IVP. Patient remains in SR with no arrhythmias noted on tele. Examination of B/L common femoral venous access sites reveal a Clear and dry area with stable hematoma and minimal bruising. Patient will continue eliquis 2.5mg daily. Will discontinue Amiodarone. Patient is being DC home in stable condition.

## 2024-05-10 NOTE — PRE-ANESTHESIA EVALUATION ADULT - NSRADCARDRESULTSFT_GEN_ALL_CORE
TTE 3/15/24  Summary:   1. Hyperdynamic global left ventricular systolic function.   2. Left ventricular ejection fraction, by visual estimation, is 70 to   75%.   3. Moderately increased LV wall thickness.   4. Spectral Doppler shows restrictive pattern of left ventricular   myocardial filling (Grade III diastolic dysfunction).   5. Elevated mean left atrial pressure.   6. Normal right ventricular size and function.   7. Severely enlarged left atrium.   8. Mildly enlarged right atrium.   9. Mild aortic regurgitation.  10. Moderate mitral valve regurgitation.  11. Mild tricuspid regurgitation.  12. Dilatation of the aortic root (measuring 3.8 cm, indexed 2.24 cm/m2)   and ascending aorta (measuring 3.8 cm, indexed 2.24 cm/m2).  13. Estimated pulmonary artery systolic pressure is 38.5 mmHg assuming a   right atrial pressure of 8 mmHg, which is consistent with borderline   pulmonary hypertension.  14. Endocardial visualization was enhanced with intravenous echo contrast.  15.Compared to the previous study 1/24/24, the findings are similar.

## 2024-05-10 NOTE — PATIENT PROFILE ADULT - FUNCTIONAL SCREEN CURRENT LEVEL: COMMUNICATION, MLM
I have personally evaluated and examined the patient. The Attending was available to me as a supervising provider if needed. 0 = understands/communicates without difficulty

## 2024-05-10 NOTE — PATIENT PROFILE ADULT - VISION (WITH CORRECTIVE LENSES IF THE PATIENT USUALLY WEARS THEM):
INFORMATION YOUR PROVIDER WANTS YOU TO KNOW    Thank you for choosing Dr. Prabhakar Ferrer at the [facility] clinic. It was a pleasure to care for you today!    Clinic Policy:  Cancellation and No Shows: If you must cancel an appointment, please give 24 hours notice so we can accommodate other patients that have been waiting to be seen. Do not rely on a reminder call for your appointment as it is a courtesy and not guaranteed. Please write it on your calendar. You are responsible to be at all scheduled appointments. Any appointment cancelled less than 24 hours prior to start time will be considered a “No Show/Late Cancellation”.      You can cancel your appointment by calling our office at [phone] during our normal business hours which are as follows:  Monday-Thursday 8:00 am to 5:00 pm and Friday 8:00 am to 12:00 pm     Late Arrivals/Tardiness: If you are more than 15 minutes late for your scheduled appointment, you will be asked to reschedule.      Multiple late arrivals and/or cancellations could result in a dismissal from the clinic.     Arriving earlier to an appointment then your scheduled time does not mean you will be seen before those who are scheduled to be seen prior to your visit.     Messages:  Messages left for Dr. Ferrer will be returned within 24-48 business hours.     Medication Requests:  You are responsible to call for your refill 7 days or more before it expires. If you wait until you are out, or almost out, it will not be refilled in time. Please contact your preferred pharmacy for all non-narcotic refills. The pharmacy will contact the office for those refills. It is imperative that you plan ahead as we are unable to guarantee your refill by a certain date if this is not followed. If you miss appointments, you may not get a refill. When calling for refills or other concerns, please take into consideration that we are closed for holidays.    No  medication changes are made over the phone, if you feel that a medication change is needed, please make an appointment.    Requests for refills on controlled medications require an office visit. Please call our office to make an appointment.     Test results:  Any tests ordered by Dr. Ferrer will be reviewed at your next appointment.     Please be advised:  It is required that you bring all controlled medication in for every visit (Suboxone, Oxycodone, Hydrocodone, Lyrica, Gabapentin, etc.) for pill counts.     Our office is required to complete random urine screenings & pill counts for patients on controlled substances in compliance with the law. If called for a screening, you will be required to provide a urine sample and/or pill count within 24 hours. No exceptions will be made.       [facility]  [address], [place], [address] [phone]   Severely impaired: cannot locate objects without hearing or touching them or patient nonresponsive.

## 2024-05-11 PROCEDURE — 93010 ELECTROCARDIOGRAM REPORT: CPT

## 2024-05-11 PROCEDURE — 99232 SBSQ HOSP IP/OBS MODERATE 35: CPT

## 2024-05-11 PROCEDURE — 76942 ECHO GUIDE FOR BIOPSY: CPT | Mod: 26,LT

## 2024-05-11 PROCEDURE — 99233 SBSQ HOSP IP/OBS HIGH 50: CPT

## 2024-05-11 PROCEDURE — 36002 PSEUDOANEURYSM INJECTION TRT: CPT | Mod: LT

## 2024-05-11 RX ORDER — ADENOSINE 3 MG/ML
12 INJECTION INTRAVENOUS ONCE
Refills: 0 | Status: COMPLETED | OUTPATIENT
Start: 2024-05-11 | End: 2024-05-11

## 2024-05-11 RX ORDER — DILTIAZEM HCL 120 MG
10 CAPSULE, EXT RELEASE 24 HR ORAL ONCE
Refills: 0 | Status: COMPLETED | OUTPATIENT
Start: 2024-05-11 | End: 2024-05-11

## 2024-05-11 RX ORDER — HYDROMORPHONE HYDROCHLORIDE 2 MG/ML
1 INJECTION INTRAMUSCULAR; INTRAVENOUS; SUBCUTANEOUS ONCE
Refills: 0 | Status: DISCONTINUED | OUTPATIENT
Start: 2024-05-11 | End: 2024-05-11

## 2024-05-11 RX ORDER — HYDROMORPHONE HYDROCHLORIDE 2 MG/ML
2 INJECTION INTRAMUSCULAR; INTRAVENOUS; SUBCUTANEOUS ONCE
Refills: 0 | Status: DISCONTINUED | OUTPATIENT
Start: 2024-05-11 | End: 2024-05-11

## 2024-05-11 RX ORDER — DILTIAZEM HCL 120 MG
5 CAPSULE, EXT RELEASE 24 HR ORAL
Qty: 125 | Refills: 0 | Status: DISCONTINUED | OUTPATIENT
Start: 2024-05-11 | End: 2024-05-12

## 2024-05-11 RX ORDER — METOPROLOL TARTRATE 50 MG
5 TABLET ORAL ONCE
Refills: 0 | Status: COMPLETED | OUTPATIENT
Start: 2024-05-11 | End: 2024-05-11

## 2024-05-11 RX ORDER — METOPROLOL TARTRATE 50 MG
5 TABLET ORAL ONCE
Refills: 0 | Status: DISCONTINUED | OUTPATIENT
Start: 2024-05-11 | End: 2024-05-11

## 2024-05-11 RX ORDER — ADENOSINE 3 MG/ML
6 INJECTION INTRAVENOUS ONCE
Refills: 0 | Status: COMPLETED | OUTPATIENT
Start: 2024-05-11 | End: 2024-05-11

## 2024-05-11 RX ADMIN — ISOSORBIDE MONONITRATE 30 MILLIGRAM(S): 60 TABLET, EXTENDED RELEASE ORAL at 12:02

## 2024-05-11 RX ADMIN — OXYCODONE HYDROCHLORIDE 15 MILLIGRAM(S): 5 TABLET ORAL at 06:37

## 2024-05-11 RX ADMIN — Medication 5 MG/HR: at 18:07

## 2024-05-11 RX ADMIN — HYDROMORPHONE HYDROCHLORIDE 1 MILLIGRAM(S): 2 INJECTION INTRAMUSCULAR; INTRAVENOUS; SUBCUTANEOUS at 22:30

## 2024-05-11 RX ADMIN — Medication 81 MILLIGRAM(S): at 12:01

## 2024-05-11 RX ADMIN — OXYCODONE HYDROCHLORIDE 15 MILLIGRAM(S): 5 TABLET ORAL at 06:07

## 2024-05-11 RX ADMIN — HYDROMORPHONE HYDROCHLORIDE 1 MILLIGRAM(S): 2 INJECTION INTRAMUSCULAR; INTRAVENOUS; SUBCUTANEOUS at 22:08

## 2024-05-11 RX ADMIN — HYDROMORPHONE HYDROCHLORIDE 2 MILLIGRAM(S): 2 INJECTION INTRAMUSCULAR; INTRAVENOUS; SUBCUTANEOUS at 14:47

## 2024-05-11 RX ADMIN — HYDROMORPHONE HYDROCHLORIDE 2 MILLIGRAM(S): 2 INJECTION INTRAMUSCULAR; INTRAVENOUS; SUBCUTANEOUS at 14:15

## 2024-05-11 RX ADMIN — Medication 25 MILLIGRAM(S): at 17:01

## 2024-05-11 RX ADMIN — TACROLIMUS 2 MILLIGRAM(S): 5 CAPSULE ORAL at 21:43

## 2024-05-11 RX ADMIN — HYDROMORPHONE HYDROCHLORIDE 1 MILLIGRAM(S): 2 INJECTION INTRAMUSCULAR; INTRAVENOUS; SUBCUTANEOUS at 00:29

## 2024-05-11 RX ADMIN — Medication 5 MILLIGRAM(S): at 17:43

## 2024-05-11 RX ADMIN — HYDROMORPHONE HYDROCHLORIDE 2 MILLIGRAM(S): 2 INJECTION INTRAMUSCULAR; INTRAVENOUS; SUBCUTANEOUS at 08:33

## 2024-05-11 RX ADMIN — TAMSULOSIN HYDROCHLORIDE 0.4 MILLIGRAM(S): 0.4 CAPSULE ORAL at 21:43

## 2024-05-11 RX ADMIN — APIXABAN 2.5 MILLIGRAM(S): 2.5 TABLET, FILM COATED ORAL at 06:07

## 2024-05-11 RX ADMIN — HYDROMORPHONE HYDROCHLORIDE 2 MILLIGRAM(S): 2 INJECTION INTRAMUSCULAR; INTRAVENOUS; SUBCUTANEOUS at 18:35

## 2024-05-11 RX ADMIN — PANTOPRAZOLE SODIUM 20 MILLIGRAM(S): 20 TABLET, DELAYED RELEASE ORAL at 06:07

## 2024-05-11 RX ADMIN — APIXABAN 2.5 MILLIGRAM(S): 2.5 TABLET, FILM COATED ORAL at 17:01

## 2024-05-11 RX ADMIN — Medication 10 MILLIGRAM(S): at 19:38

## 2024-05-11 RX ADMIN — MYCOPHENOLATE MOFETIL 500 MILLIGRAM(S): 250 CAPSULE ORAL at 06:08

## 2024-05-11 RX ADMIN — MYCOPHENOLATE MOFETIL 500 MILLIGRAM(S): 250 CAPSULE ORAL at 17:04

## 2024-05-11 RX ADMIN — ADENOSINE 6 MILLIGRAM(S): 3 INJECTION INTRAVENOUS at 17:33

## 2024-05-11 RX ADMIN — HYDROMORPHONE HYDROCHLORIDE 2 MILLIGRAM(S): 2 INJECTION INTRAMUSCULAR; INTRAVENOUS; SUBCUTANEOUS at 09:49

## 2024-05-11 RX ADMIN — ADENOSINE 12 MILLIGRAM(S): 3 INJECTION INTRAVENOUS at 17:43

## 2024-05-11 RX ADMIN — Medication 10 MILLIGRAM(S): at 17:51

## 2024-05-11 RX ADMIN — ATORVASTATIN CALCIUM 80 MILLIGRAM(S): 80 TABLET, FILM COATED ORAL at 21:43

## 2024-05-11 RX ADMIN — TACROLIMUS 3 MILLIGRAM(S): 5 CAPSULE ORAL at 12:01

## 2024-05-11 RX ADMIN — HYDROMORPHONE HYDROCHLORIDE 2 MILLIGRAM(S): 2 INJECTION INTRAMUSCULAR; INTRAVENOUS; SUBCUTANEOUS at 17:55

## 2024-05-11 RX ADMIN — Medication 15 MG/HR: at 19:37

## 2024-05-11 RX ADMIN — Medication 20 MILLIGRAM(S): at 06:07

## 2024-05-11 RX ADMIN — Medication 10 MILLIGRAM(S): at 18:54

## 2024-05-11 RX ADMIN — Medication 25 MILLIGRAM(S): at 06:07

## 2024-05-11 RX ADMIN — Medication 5 MILLIGRAM(S): at 17:48

## 2024-05-11 NOTE — PROGRESS NOTE ADULT - NS ATTEND AMEND GEN_ALL_CORE FT
Patient seen and examined. Pertinent labs, imaging and telemetry reviewed. I agree with the above:     Patient feeling well. Pain in groin with deep palpation and pressure.   S/P AF ablation with left groin hematoma.   -Discussed with EP and will obtain US vascular groin to rule out pseudoaneurysm and fistula.   -Continue with ASA, Eliquis, atorvastatin 80mg, lasix, Imdur, MTP.   -Likely DC tomorrow if hematoma stable.

## 2024-05-11 NOTE — PROGRESS NOTE ADULT - ASSESSMENT
Assessment: 69 male PMHx Afib on Eliquis, IVDU, chronic hepatitis, stroke, legally blinde, COPD, Cirrhosis, kidney transplant presents for elective atrial fibrillation ablation complicated by left groin hematoma      Problems discussed and associated plan:    # Atrial Fibrillation  - s/p Afib ablation  - Continue Eliquis 2.5 mg BID  - Continue ASA 81 mg daily  - Continue Metoprolol 25 mg BID  - Continue Pantoprazole 40 mg daily   - Stop Amiodarone  - Left groin hematoma s/p manual compression  - Awaiting US of left groin r/o pseudoaneurysm     # HLD  - Continue Atorvastatin 80 mg daily     # Kidney transplant  - Continue home tacrolimus    # MSC  Diet: DASH  DVT prophylaxis: ASA/Eliquis  PPI: Protonix     Please contact me with any questions or concerns at [phone].

## 2024-05-11 NOTE — PROGRESS NOTE ADULT - SUBJECTIVE AND OBJECTIVE BOX
INTERVAL HPI/OVERNIGHT EVENTS:    Patient s/p             ablation.   No events over night  Patient in NSR    MEDICATIONS  (STANDING):  apixaban 2.5 milliGRAM(s) Oral every 12 hours  aspirin  chewable 81 milliGRAM(s) Oral daily  atorvastatin 80 milliGRAM(s) Oral at bedtime  furosemide    Tablet 20 milliGRAM(s) Oral daily  isosorbide   mononitrate ER Tablet (IMDUR) 30 milliGRAM(s) Oral daily  metoprolol tartrate 25 milliGRAM(s) Oral two times a day  mycophenolate mofetil 500 milliGRAM(s) Oral two times a day  pantoprazole  Injectable 20 milliGRAM(s) IV Push two times a day  tacrolimus 2 milliGRAM(s) Oral at bedtime  tacrolimus 3 milliGRAM(s) Oral daily  tamsulosin 0.4 milliGRAM(s) Oral at bedtime    MEDICATIONS  (PRN):  oxyCODONE    IR 15 milliGRAM(s) Oral every 8 hours PRN Severe Pain (7 - 10)  polyethylene glycol 3350 17 Gram(s) Oral two times a day PRN for constipation      Allergies    Rifuxen (Swelling)  Rituxan (Hives)    Intolerances          Vital Signs Last 24 Hrs  T(C): 36.9 (11 May 2024 08:00), Max: 36.9 (10 May 2024 16:01)  T(F): 98.5 (11 May 2024 08:00), Max: 98.5 (11 May 2024 08:00)  HR: 82 (11 May 2024 08:00) (70 - 211)  BP: 165/95 (11 May 2024 08:00) (125/56 - 181/94)  BP(mean): 124 (11 May 2024 08:00) (100 - 124)  RR: 16 (11 May 2024 08:00) (13 - 24)  SpO2: 98% (11 May 2024 08:00) (95% - 100%)    Parameters below as of 10 May 2024 16:46  Patient On (Oxygen Delivery Method): room air        GENERAL: In no apparent distress, well nourished, and hydrated.  HEART: Regular rate and rhythm; No murmurs, rubs, or gallops.  PULMONARY: Clear to auscultation and perfusion.  No rales, wheezing, or rhonchi bilaterally.  ABDOMEN: Soft, Nontender, Nondistended; Bowel sounds present  EXTREMITIES:  2+ Peripheral Pulses, No clubbing, cyanosis, or edema  groins Rt No hematoma, no bleeding;  Lt + hematoma medial aspect of the groin, no bleeding, no bruit    NEUROLOGICAL: Grossly nonfocal    LABS:                        13.4   7.87  )-----------( 84       ( 10 May 2024 10:43 )             42.5     05-10    142  |  111<H>  |  37<H>  ----------------------------<  194<H>  4.4   |  16<L>  |  1.5    Ca    10.5      10 May 2024 10:43    TPro  6.5  /  Alb  4.4  /  TBili  0.5  /  DBili  x   /  AST  24  /  ALT  17  /  AlkPhos  93  05-10      Urinalysis Basic - ( 10 May 2024 10:43 )    Color: x / Appearance: x / SG: x / pH: x  Gluc: 194 mg/dL / Ketone: x  / Bili: x / Urobili: x   Blood: x / Protein: x / Nitrite: x   Leuk Esterase: x / RBC: x / WBC x   Sq Epi: x / Non Sq Epi: x / Bacteria: x        EKG:      A/P  Patient S/P   AF  Ablation  Patient is doing well  - groin scan to evaluate hematoma for active extrav and r/o pseudo  - continue Eliquis   DO NOT SKIP A SINGLE DOSE FOR THE NEXT 3 MONTH  - protonix 40 mg daily x 30 days  - stop amiodarone  - No heavy lifting or exertional activities for 10days  - Can take a shower, no bathtub for 10days, do not submerge yourself in water  - FU in EP office with Dr Henderson in 1 month     INTERVAL HPI/OVERNIGHT EVENTS:    Patient s/p  AF           ablation.   No events over night  Patient in NSR    MEDICATIONS  (STANDING):  apixaban 2.5 milliGRAM(s) Oral every 12 hours  aspirin  chewable 81 milliGRAM(s) Oral daily  atorvastatin 80 milliGRAM(s) Oral at bedtime  furosemide    Tablet 20 milliGRAM(s) Oral daily  isosorbide   mononitrate ER Tablet (IMDUR) 30 milliGRAM(s) Oral daily  metoprolol tartrate 25 milliGRAM(s) Oral two times a day  mycophenolate mofetil 500 milliGRAM(s) Oral two times a day  pantoprazole  Injectable 20 milliGRAM(s) IV Push two times a day  tacrolimus 2 milliGRAM(s) Oral at bedtime  tacrolimus 3 milliGRAM(s) Oral daily  tamsulosin 0.4 milliGRAM(s) Oral at bedtime    MEDICATIONS  (PRN):  oxyCODONE    IR 15 milliGRAM(s) Oral every 8 hours PRN Severe Pain (7 - 10)  polyethylene glycol 3350 17 Gram(s) Oral two times a day PRN for constipation      Allergies    Rifuxen (Swelling)  Rituxan (Hives)    Intolerances          Vital Signs Last 24 Hrs  T(C): 36.9 (11 May 2024 08:00), Max: 36.9 (10 May 2024 16:01)  T(F): 98.5 (11 May 2024 08:00), Max: 98.5 (11 May 2024 08:00)  HR: 82 (11 May 2024 08:00) (70 - 211)  BP: 165/95 (11 May 2024 08:00) (125/56 - 181/94)  BP(mean): 124 (11 May 2024 08:00) (100 - 124)  RR: 16 (11 May 2024 08:00) (13 - 24)  SpO2: 98% (11 May 2024 08:00) (95% - 100%)    Parameters below as of 10 May 2024 16:46  Patient On (Oxygen Delivery Method): room air        GENERAL: In no apparent distress, well nourished, and hydrated.  HEART: Regular rate and rhythm; No murmurs, rubs, or gallops.  PULMONARY: Clear to auscultation and perfusion.  No rales, wheezing, or rhonchi bilaterally.  ABDOMEN: Soft, Nontender, Nondistended; Bowel sounds present  EXTREMITIES:  2+ Peripheral Pulses, No clubbing, cyanosis, or edema  groins Rt No hematoma, no bleeding;  Lt + hematoma medial aspect of the groin, no bleeding, no bruit    NEUROLOGICAL: Grossly nonfocal    LABS:                        13.4   7.87  )-----------( 84       ( 10 May 2024 10:43 )             42.5     05-10    142  |  111<H>  |  37<H>  ----------------------------<  194<H>  4.4   |  16<L>  |  1.5    Ca    10.5      10 May 2024 10:43    TPro  6.5  /  Alb  4.4  /  TBili  0.5  /  DBili  x   /  AST  24  /  ALT  17  /  AlkPhos  93  05-10      Urinalysis Basic - ( 10 May 2024 10:43 )    Color: x / Appearance: x / SG: x / pH: x  Gluc: 194 mg/dL / Ketone: x  / Bili: x / Urobili: x   Blood: x / Protein: x / Nitrite: x   Leuk Esterase: x / RBC: x / WBC x   Sq Epi: x / Non Sq Epi: x / Bacteria: x        EKG:      A/P  Patient S/P   AF  Ablation  Patient is doing well  - groin scan to evaluate hematoma for active extrav and r/o pseudo  - continue Eliquis   DO NOT SKIP A SINGLE DOSE FOR THE NEXT 3 MONTH  - protonix 40 mg daily x 30 days  - stop amiodarone  - No heavy lifting or exertional activities for 10days  - Can take a shower, no bathtub for 10days, do not submerge yourself in water  - FU in EP office with Dr Henderson in 1 month

## 2024-05-11 NOTE — PROGRESS NOTE ADULT - SUBJECTIVE AND OBJECTIVE BOX
Chief complaint: Patient is a 69y old  Male who presents with a chief complaint of atrial fibrillation elective ablation    Interval history: Patient seen and examined. Complaining of left groin pain post ablation. Hematoma noted. Manual compression performed, US ordered to r/o pseudoaneurysm. EP aware     Review of systems: A complete 10-point review of systems was obtained and is negative except as stated in the interval history.    Vitals:  T(F): 98.5, Max: 98.5 (05-11 @ 08:00)  HR: 82 (70 - 96)  BP: 165/95 (135/79 - 181/94)  RR: 16 (13 - 20)  SpO2: 98% (95% - 100%)    Ins & outs:     05-10 @ 07:01  -  05-11 @ 07:00  --------------------------------------------------------  IN: 0 mL / OUT: 350 mL / NET: -350 mL    05-11 @ 07:01  -  05-11 @ 12:38  --------------------------------------------------------  IN: 180 mL / OUT: 0 mL / NET: 180 mL      Weight trend:  Weight (kg): 59 (05-10)    Physical exam:  General: No apparent distress  HEENT: Anicteric sclera. Moist mucous membranes. JVP *** cm.   Cardiac: Regular rate and rhythm. No murmurs, rubs, or gallops.   Vascular: Symmetric radial pulses. Dorsalis pedis pulses palpable.   Respiratory: Normal effort. Bibasilar crackles. Clear to ascultation.   Abdomen: Soft, nontender. Audible bowel sounds.   Extremities: Warm with *** edema. No cyanosis or clubbing.   Skin: Warm and dry. No rash.   Neurologic: Grossly normal motor function.   Psychiatric: Oriented to person, place, and time.     Data reviewed:  - Telemetry: Sinus rhythm rate 70-80s no events     - Labs:                        13.4   7.87  )-----------( 84       ( 10 May 2024 10:43 )             42.5     05-10    142  |  111<H>  |  37<H>  ----------------------------<  194<H>  4.4   |  16<L>  |  1.5    Ca    10.5      10 May 2024 10:43    TPro  6.5  /  Alb  4.4  /  TBili  0.5  /  DBili  x   /  AST  24  /  ALT  17  /  AlkPhos  93  05-1      Urinalysis Basic - ( 10 May 2024 10:43 )    Color: x / Appearance: x / SG: x / pH: x  Gluc: 194 mg/dL / Ketone: x  / Bili: x / Urobili: x   Blood: x / Protein: x / Nitrite: x   Leuk Esterase: x / RBC: x / WBC x   Sq Epi: x / Non Sq Epi: x / Bacteria: x      Medications:  apixaban 2.5 milliGRAM(s) Oral every 12 hours  aspirin  chewable 81 milliGRAM(s) Oral daily  atorvastatin 80 milliGRAM(s) Oral at bedtime  furosemide    Tablet 20 milliGRAM(s) Oral daily  isosorbide   mononitrate ER Tablet (IMDUR) 30 milliGRAM(s) Oral daily  metoprolol tartrate 25 milliGRAM(s) Oral two times a day  mycophenolate mofetil 500 milliGRAM(s) Oral two times a day  pantoprazole  Injectable 20 milliGRAM(s) IV Push two times a day  tacrolimus 2 milliGRAM(s) Oral at bedtime  tacrolimus 3 milliGRAM(s) Oral daily  tamsulosin 0.4 milliGRAM(s) Oral at bedtime    Drips:    PRN:     Allergies    Rifuxen (Swelling)  Rituxan (Hives)    Intolerances            Please contact me with any questions or concerns at [phone].

## 2024-05-12 ENCOUNTER — TRANSCRIPTION ENCOUNTER (OUTPATIENT)
Age: 69
End: 2024-05-12

## 2024-05-12 VITALS — HEART RATE: 65 BPM | SYSTOLIC BLOOD PRESSURE: 108 MMHG | DIASTOLIC BLOOD PRESSURE: 61 MMHG

## 2024-05-12 PROCEDURE — 99239 HOSP IP/OBS DSCHRG MGMT >30: CPT

## 2024-05-12 RX ORDER — APIXABAN 2.5 MG/1
1 TABLET, FILM COATED ORAL
Refills: 0 | DISCHARGE

## 2024-05-12 RX ORDER — PANTOPRAZOLE SODIUM 20 MG/1
1 TABLET, DELAYED RELEASE ORAL
Qty: 30 | Refills: 0
Start: 2024-05-12 | End: 2024-06-10

## 2024-05-12 RX ORDER — APIXABAN 2.5 MG/1
1 TABLET, FILM COATED ORAL
Qty: 60 | Refills: 0
Start: 2024-05-12 | End: 2024-06-10

## 2024-05-12 RX ORDER — HYDROMORPHONE HYDROCHLORIDE 2 MG/ML
1 INJECTION INTRAMUSCULAR; INTRAVENOUS; SUBCUTANEOUS ONCE
Refills: 0 | Status: DISCONTINUED | OUTPATIENT
Start: 2024-05-12 | End: 2024-05-12

## 2024-05-12 RX ORDER — METOPROLOL TARTRATE 50 MG
5 TABLET ORAL ONCE
Refills: 0 | Status: COMPLETED | OUTPATIENT
Start: 2024-05-12 | End: 2024-05-12

## 2024-05-12 RX ORDER — DILTIAZEM HCL 120 MG
1 CAPSULE, EXT RELEASE 24 HR ORAL
Qty: 30 | Refills: 0
Start: 2024-05-12 | End: 2024-06-10

## 2024-05-12 RX ORDER — DILTIAZEM HCL 120 MG
60 CAPSULE, EXT RELEASE 24 HR ORAL EVERY 8 HOURS
Refills: 0 | Status: DISCONTINUED | OUTPATIENT
Start: 2024-05-12 | End: 2024-05-12

## 2024-05-12 RX ORDER — DILTIAZEM HCL 120 MG
10 CAPSULE, EXT RELEASE 24 HR ORAL ONCE
Refills: 0 | Status: COMPLETED | OUTPATIENT
Start: 2024-05-12 | End: 2024-05-12

## 2024-05-12 RX ORDER — MAGNESIUM SULFATE 500 MG/ML
2 VIAL (ML) INJECTION ONCE
Refills: 0 | Status: COMPLETED | OUTPATIENT
Start: 2024-05-12 | End: 2024-05-12

## 2024-05-12 RX ADMIN — Medication 5 MG/HR: at 06:51

## 2024-05-12 RX ADMIN — PANTOPRAZOLE SODIUM 20 MILLIGRAM(S): 20 TABLET, DELAYED RELEASE ORAL at 05:37

## 2024-05-12 RX ADMIN — TACROLIMUS 3 MILLIGRAM(S): 5 CAPSULE ORAL at 12:28

## 2024-05-12 RX ADMIN — Medication 60 MILLIGRAM(S): at 05:38

## 2024-05-12 RX ADMIN — Medication 5 MILLIGRAM(S): at 03:30

## 2024-05-12 RX ADMIN — Medication 25 GRAM(S): at 03:16

## 2024-05-12 RX ADMIN — HYDROMORPHONE HYDROCHLORIDE 1 MILLIGRAM(S): 2 INJECTION INTRAMUSCULAR; INTRAVENOUS; SUBCUTANEOUS at 12:28

## 2024-05-12 RX ADMIN — MYCOPHENOLATE MOFETIL 500 MILLIGRAM(S): 250 CAPSULE ORAL at 05:38

## 2024-05-12 RX ADMIN — Medication 25 MILLIGRAM(S): at 05:38

## 2024-05-12 RX ADMIN — Medication 10 MILLIGRAM(S): at 01:09

## 2024-05-12 RX ADMIN — APIXABAN 2.5 MILLIGRAM(S): 2.5 TABLET, FILM COATED ORAL at 05:38

## 2024-05-12 RX ADMIN — Medication 60 MILLIGRAM(S): at 13:46

## 2024-05-12 RX ADMIN — Medication 20 MILLIGRAM(S): at 05:38

## 2024-05-12 RX ADMIN — HYDROMORPHONE HYDROCHLORIDE 1 MILLIGRAM(S): 2 INJECTION INTRAMUSCULAR; INTRAVENOUS; SUBCUTANEOUS at 06:43

## 2024-05-12 RX ADMIN — Medication 81 MILLIGRAM(S): at 12:31

## 2024-05-12 RX ADMIN — Medication 60 MILLIGRAM(S): at 01:09

## 2024-05-12 NOTE — PROGRESS NOTE ADULT - ASSESSMENT
A/P  Patient S/P   AF  Ablation  c/b left froin hematoma, no active extravasation, stable, resolving  AF RVR last night, now back in NST  Patient is doing well        - continue Eliquis   DO NOT SKIP A SINGLE DOSE FOR THE NEXT 3 MONTH  - protonix 40 mg daily x 30 days  - con't cardizem, switch to XR dose  - No heavy lifting or exertional activities for 10days  - Can take a shower, no bathtub for 10days, do not submerge yourself in water  - FU in EP office with Dr Henderson in 1 month

## 2024-05-12 NOTE — DISCHARGE NOTE NURSING/CASE MANAGEMENT/SOCIAL WORK - PATIENT PORTAL LINK FT
You can access the FollowMyHealth Patient Portal offered by [facility] by registering at the following website: [email or web address]. By joining [email or web address]’s FollowMyHealth portal, you will also be able to view your health information using other applications (apps) compatible with our system.

## 2024-05-12 NOTE — DISCHARGE NOTE NURSING/CASE MANAGEMENT/SOCIAL WORK - NSDCPEFALRISK_GEN_ALL_CORE
For information on Fall & Injury Prevention, visit: [email or web address] OR  [email or web address] OR  https://www.cdc.gov/steadi/patient.html

## 2024-05-13 ENCOUNTER — INPATIENT (INPATIENT)
Facility: HOSPITAL | Age: 69
LOS: 4 days | Discharge: HOME | DRG: 310 | End: 2024-05-18
Attending: INTERNAL MEDICINE | Admitting: INTERNAL MEDICINE
Payer: MEDICARE

## 2024-05-13 VITALS
DIASTOLIC BLOOD PRESSURE: 52 MMHG | HEIGHT: 69 IN | SYSTOLIC BLOOD PRESSURE: 104 MMHG | TEMPERATURE: 98 F | OXYGEN SATURATION: 96 % | RESPIRATION RATE: 18 BRPM | HEART RATE: 90 BPM | WEIGHT: 130.07 LBS

## 2024-05-13 DIAGNOSIS — Z98.890 OTHER SPECIFIED POSTPROCEDURAL STATES: Chronic | ICD-10-CM

## 2024-05-13 DIAGNOSIS — Z94.0 KIDNEY TRANSPLANT STATUS: Chronic | ICD-10-CM

## 2024-05-13 DIAGNOSIS — I48.91 UNSPECIFIED ATRIAL FIBRILLATION: ICD-10-CM

## 2024-05-13 PROBLEM — F11.11 OPIOID ABUSE, IN REMISSION: Chronic | Status: ACTIVE | Noted: 2024-04-30

## 2024-05-13 PROBLEM — F12.90 CANNABIS USE, UNSPECIFIED, UNCOMPLICATED: Chronic | Status: ACTIVE | Noted: 2024-04-30

## 2024-05-13 LAB
ANION GAP SERPL CALC-SCNC: 11 MMOL/L — SIGNIFICANT CHANGE UP (ref 7–14)
ANION GAP SERPL CALC-SCNC: 11 MMOL/L — SIGNIFICANT CHANGE UP (ref 7–14)
APTT BLD: 28.1 SEC — SIGNIFICANT CHANGE UP (ref 27–39.2)
BASOPHILS # BLD AUTO: 0 K/UL — SIGNIFICANT CHANGE UP (ref 0–0.2)
BASOPHILS # BLD AUTO: 0 K/UL — SIGNIFICANT CHANGE UP (ref 0–0.2)
BASOPHILS NFR BLD AUTO: 0 % — SIGNIFICANT CHANGE UP (ref 0–1)
BASOPHILS NFR BLD AUTO: 0 % — SIGNIFICANT CHANGE UP (ref 0–1)
BLD GP AB SCN SERPL QL: SIGNIFICANT CHANGE UP
BUN SERPL-MCNC: 33 MG/DL — HIGH (ref 10–20)
BUN SERPL-MCNC: 33 MG/DL — HIGH (ref 10–20)
CALCIUM SERPL-MCNC: 10.1 MG/DL — SIGNIFICANT CHANGE UP (ref 8.4–10.5)
CALCIUM SERPL-MCNC: 9.9 MG/DL — SIGNIFICANT CHANGE UP (ref 8.4–10.4)
CHLORIDE SERPL-SCNC: 104 MMOL/L — SIGNIFICANT CHANGE UP (ref 98–110)
CHLORIDE SERPL-SCNC: 106 MMOL/L — SIGNIFICANT CHANGE UP (ref 98–110)
CO2 SERPL-SCNC: 18 MMOL/L — SIGNIFICANT CHANGE UP (ref 17–32)
CO2 SERPL-SCNC: 19 MMOL/L — SIGNIFICANT CHANGE UP (ref 17–32)
CREAT SERPL-MCNC: 1.8 MG/DL — HIGH (ref 0.7–1.5)
CREAT SERPL-MCNC: 1.9 MG/DL — HIGH (ref 0.7–1.5)
EGFR: 38 ML/MIN/1.73M2 — LOW
EGFR: 41 ML/MIN/1.73M2 — LOW
EOSINOPHIL # BLD AUTO: 0 K/UL — SIGNIFICANT CHANGE UP (ref 0–0.7)
EOSINOPHIL # BLD AUTO: 0 K/UL — SIGNIFICANT CHANGE UP (ref 0–0.7)
EOSINOPHIL NFR BLD AUTO: 0 % — SIGNIFICANT CHANGE UP (ref 0–8)
EOSINOPHIL NFR BLD AUTO: 0 % — SIGNIFICANT CHANGE UP (ref 0–8)
GLUCOSE SERPL-MCNC: 138 MG/DL — HIGH (ref 70–99)
GLUCOSE SERPL-MCNC: 157 MG/DL — HIGH (ref 70–99)
HCT VFR BLD CALC: 22.7 % — LOW (ref 42–52)
HCT VFR BLD CALC: 23.8 % — LOW (ref 42–52)
HCT VFR BLD CALC: 24.3 % — LOW (ref 42–52)
HCT VFR BLD CALC: 25.4 % — LOW (ref 42–52)
HGB BLD-MCNC: 7.2 G/DL — LOW (ref 14–18)
HGB BLD-MCNC: 7.5 G/DL — LOW (ref 14–18)
HGB BLD-MCNC: 7.7 G/DL — LOW (ref 14–18)
HGB BLD-MCNC: 7.8 G/DL — LOW (ref 14–18)
IMM GRANULOCYTES NFR BLD AUTO: 0.8 % — HIGH (ref 0.1–0.3)
IMM GRANULOCYTES NFR BLD AUTO: 0.8 % — HIGH (ref 0.1–0.3)
INR BLD: 1.43 RATIO — HIGH (ref 0.65–1.3)
LYMPHOCYTES # BLD AUTO: 0.36 K/UL — LOW (ref 1.2–3.4)
LYMPHOCYTES # BLD AUTO: 0.39 K/UL — LOW (ref 1.2–3.4)
LYMPHOCYTES # BLD AUTO: 6 % — LOW (ref 20.5–51.1)
LYMPHOCYTES # BLD AUTO: 7.9 % — LOW (ref 20.5–51.1)
MCHC RBC-ENTMCNC: 29.2 PG — SIGNIFICANT CHANGE UP (ref 27–31)
MCHC RBC-ENTMCNC: 29.7 PG — SIGNIFICANT CHANGE UP (ref 27–31)
MCHC RBC-ENTMCNC: 30 PG — SIGNIFICANT CHANGE UP (ref 27–31)
MCHC RBC-ENTMCNC: 30.1 PG — SIGNIFICANT CHANGE UP (ref 27–31)
MCHC RBC-ENTMCNC: 30.7 G/DL — LOW (ref 32–37)
MCHC RBC-ENTMCNC: 31.5 G/DL — LOW (ref 32–37)
MCHC RBC-ENTMCNC: 31.7 G/DL — LOW (ref 32–37)
MCHC RBC-ENTMCNC: 31.7 G/DL — LOW (ref 32–37)
MCV RBC AUTO: 93.8 FL — SIGNIFICANT CHANGE UP (ref 80–94)
MCV RBC AUTO: 95 FL — HIGH (ref 80–94)
MCV RBC AUTO: 95.1 FL — HIGH (ref 80–94)
MCV RBC AUTO: 95.2 FL — HIGH (ref 80–94)
MONOCYTES # BLD AUTO: 0.32 K/UL — SIGNIFICANT CHANGE UP (ref 0.1–0.6)
MONOCYTES # BLD AUTO: 0.42 K/UL — SIGNIFICANT CHANGE UP (ref 0.1–0.6)
MONOCYTES NFR BLD AUTO: 6.5 % — SIGNIFICANT CHANGE UP (ref 1.7–9.3)
MONOCYTES NFR BLD AUTO: 7 % — SIGNIFICANT CHANGE UP (ref 1.7–9.3)
NEUTROPHILS # BLD AUTO: 4.2 K/UL — SIGNIFICANT CHANGE UP (ref 1.4–6.5)
NEUTROPHILS # BLD AUTO: 5.16 K/UL — SIGNIFICANT CHANGE UP (ref 1.4–6.5)
NEUTROPHILS NFR BLD AUTO: 84.8 % — HIGH (ref 42.2–75.2)
NEUTROPHILS NFR BLD AUTO: 86.2 % — HIGH (ref 42.2–75.2)
NRBC # BLD: 0 /100 WBCS — SIGNIFICANT CHANGE UP (ref 0–0)
PLATELET # BLD AUTO: 56 K/UL — LOW (ref 130–400)
PLATELET # BLD AUTO: 58 K/UL — LOW (ref 130–400)
PLATELET # BLD AUTO: 61 K/UL — LOW (ref 130–400)
PLATELET # BLD AUTO: 72 K/UL — LOW (ref 130–400)
PMV BLD: 13.5 FL — HIGH (ref 7.4–10.4)
PMV BLD: 13.7 FL — HIGH (ref 7.4–10.4)
PMV BLD: 13.9 FL — HIGH (ref 7.4–10.4)
PMV BLD: 14.1 FL — HIGH (ref 7.4–10.4)
POTASSIUM SERPL-MCNC: 4 MMOL/L — SIGNIFICANT CHANGE UP (ref 3.5–5)
POTASSIUM SERPL-MCNC: 4.4 MMOL/L — SIGNIFICANT CHANGE UP (ref 3.5–5)
POTASSIUM SERPL-SCNC: 4 MMOL/L — SIGNIFICANT CHANGE UP (ref 3.5–5)
POTASSIUM SERPL-SCNC: 4.4 MMOL/L — SIGNIFICANT CHANGE UP (ref 3.5–5)
PROTHROM AB SERPL-ACNC: 16.4 SEC — HIGH (ref 9.95–12.87)
RBC # BLD: 2.39 M/UL — LOW (ref 4.7–6.1)
RBC # BLD: 2.5 M/UL — LOW (ref 4.7–6.1)
RBC # BLD: 2.59 M/UL — LOW (ref 4.7–6.1)
RBC # BLD: 2.67 M/UL — LOW (ref 4.7–6.1)
RBC # FLD: 15.5 % — HIGH (ref 11.5–14.5)
RBC # FLD: 15.8 % — HIGH (ref 11.5–14.5)
RBC # FLD: 16.5 % — HIGH (ref 11.5–14.5)
RBC # FLD: 17.1 % — HIGH (ref 11.5–14.5)
SODIUM SERPL-SCNC: 134 MMOL/L — LOW (ref 135–146)
SODIUM SERPL-SCNC: 135 MMOL/L — SIGNIFICANT CHANGE UP (ref 135–146)
WBC # BLD: 4.95 K/UL — SIGNIFICANT CHANGE UP (ref 4.8–10.8)
WBC # BLD: 5.83 K/UL — SIGNIFICANT CHANGE UP (ref 4.8–10.8)
WBC # BLD: 5.99 K/UL — SIGNIFICANT CHANGE UP (ref 4.8–10.8)
WBC # BLD: 7.06 K/UL — SIGNIFICANT CHANGE UP (ref 4.8–10.8)
WBC # FLD AUTO: 4.95 K/UL — SIGNIFICANT CHANGE UP (ref 4.8–10.8)
WBC # FLD AUTO: 5.83 K/UL — SIGNIFICANT CHANGE UP (ref 4.8–10.8)
WBC # FLD AUTO: 5.99 K/UL — SIGNIFICANT CHANGE UP (ref 4.8–10.8)
WBC # FLD AUTO: 7.06 K/UL — SIGNIFICANT CHANGE UP (ref 4.8–10.8)

## 2024-05-13 PROCEDURE — 82570 ASSAY OF URINE CREATININE: CPT

## 2024-05-13 PROCEDURE — 93005 ELECTROCARDIOGRAM TRACING: CPT

## 2024-05-13 PROCEDURE — 71045 X-RAY EXAM CHEST 1 VIEW: CPT

## 2024-05-13 PROCEDURE — 93971 EXTREMITY STUDY: CPT | Mod: RT

## 2024-05-13 PROCEDURE — 86901 BLOOD TYPING SEROLOGIC RH(D): CPT

## 2024-05-13 PROCEDURE — 36000 PLACE NEEDLE IN VEIN: CPT | Mod: 59

## 2024-05-13 PROCEDURE — 99053 MED SERV 10PM-8AM 24 HR FAC: CPT

## 2024-05-13 PROCEDURE — 87186 SC STD MICRODIL/AGAR DIL: CPT

## 2024-05-13 PROCEDURE — 83550 IRON BINDING TEST: CPT

## 2024-05-13 PROCEDURE — 71045 X-RAY EXAM CHEST 1 VIEW: CPT | Mod: 26

## 2024-05-13 PROCEDURE — 80053 COMPREHEN METABOLIC PANEL: CPT

## 2024-05-13 PROCEDURE — 80197 ASSAY OF TACROLIMUS: CPT

## 2024-05-13 PROCEDURE — 93010 ELECTROCARDIOGRAM REPORT: CPT | Mod: 77

## 2024-05-13 PROCEDURE — 70450 CT HEAD/BRAIN W/O DYE: CPT | Mod: 26,MC

## 2024-05-13 PROCEDURE — 82728 ASSAY OF FERRITIN: CPT

## 2024-05-13 PROCEDURE — 84156 ASSAY OF PROTEIN URINE: CPT

## 2024-05-13 PROCEDURE — 93010 ELECTROCARDIOGRAM REPORT: CPT | Mod: 76

## 2024-05-13 PROCEDURE — 82607 VITAMIN B-12: CPT

## 2024-05-13 PROCEDURE — 93308 TTE F-UP OR LMTD: CPT

## 2024-05-13 PROCEDURE — 36415 COLL VENOUS BLD VENIPUNCTURE: CPT

## 2024-05-13 PROCEDURE — P9040: CPT

## 2024-05-13 PROCEDURE — 93926 LOWER EXTREMITY STUDY: CPT | Mod: 26,LT

## 2024-05-13 PROCEDURE — 93308 TTE F-UP OR LMTD: CPT | Mod: 26

## 2024-05-13 PROCEDURE — 99285 EMERGENCY DEPT VISIT HI MDM: CPT

## 2024-05-13 PROCEDURE — 86900 BLOOD TYPING SEROLOGIC ABO: CPT

## 2024-05-13 PROCEDURE — 81001 URINALYSIS AUTO W/SCOPE: CPT

## 2024-05-13 PROCEDURE — 83935 ASSAY OF URINE OSMOLALITY: CPT

## 2024-05-13 PROCEDURE — 83540 ASSAY OF IRON: CPT

## 2024-05-13 PROCEDURE — 85025 COMPLETE CBC W/AUTO DIFF WBC: CPT

## 2024-05-13 PROCEDURE — 84466 ASSAY OF TRANSFERRIN: CPT

## 2024-05-13 PROCEDURE — 99291 CRITICAL CARE FIRST HOUR: CPT | Mod: 25

## 2024-05-13 PROCEDURE — 80048 BASIC METABOLIC PNL TOTAL CA: CPT

## 2024-05-13 PROCEDURE — 99222 1ST HOSP IP/OBS MODERATE 55: CPT

## 2024-05-13 PROCEDURE — 74177 CT ABD & PELVIS W/CONTRAST: CPT | Mod: 26,MC

## 2024-05-13 PROCEDURE — 83735 ASSAY OF MAGNESIUM: CPT

## 2024-05-13 PROCEDURE — 93926 LOWER EXTREMITY STUDY: CPT | Mod: LT

## 2024-05-13 PROCEDURE — 87086 URINE CULTURE/COLONY COUNT: CPT

## 2024-05-13 PROCEDURE — 36430 TRANSFUSION BLD/BLD COMPNT: CPT

## 2024-05-13 PROCEDURE — 82962 GLUCOSE BLOOD TEST: CPT

## 2024-05-13 PROCEDURE — 84100 ASSAY OF PHOSPHORUS: CPT

## 2024-05-13 PROCEDURE — 84300 ASSAY OF URINE SODIUM: CPT

## 2024-05-13 PROCEDURE — 82746 ASSAY OF FOLIC ACID SERUM: CPT

## 2024-05-13 PROCEDURE — 87077 CULTURE AEROBIC IDENTIFY: CPT

## 2024-05-13 PROCEDURE — 86850 RBC ANTIBODY SCREEN: CPT

## 2024-05-13 PROCEDURE — 85027 COMPLETE CBC AUTOMATED: CPT

## 2024-05-13 RX ORDER — METOPROLOL TARTRATE 50 MG
25 TABLET ORAL
Refills: 0 | Status: DISCONTINUED | OUTPATIENT
Start: 2024-05-13 | End: 2024-05-15

## 2024-05-13 RX ORDER — HYDROMORPHONE HYDROCHLORIDE 2 MG/ML
2 INJECTION INTRAMUSCULAR; INTRAVENOUS; SUBCUTANEOUS ONCE
Refills: 0 | Status: DISCONTINUED | OUTPATIENT
Start: 2024-05-13 | End: 2024-05-13

## 2024-05-13 RX ORDER — SODIUM CHLORIDE 9 MG/ML
1000 INJECTION INTRAMUSCULAR; INTRAVENOUS; SUBCUTANEOUS ONCE
Refills: 0 | Status: COMPLETED | OUTPATIENT
Start: 2024-05-13 | End: 2024-05-13

## 2024-05-13 RX ORDER — TACROLIMUS 5 MG/1
3 CAPSULE ORAL DAILY
Refills: 0 | Status: DISCONTINUED | OUTPATIENT
Start: 2024-05-13 | End: 2024-05-13

## 2024-05-13 RX ORDER — ONDANSETRON 8 MG/1
4 TABLET, FILM COATED ORAL EVERY 8 HOURS
Refills: 0 | Status: DISCONTINUED | OUTPATIENT
Start: 2024-05-13 | End: 2024-05-16

## 2024-05-13 RX ORDER — DILTIAZEM HCL 120 MG
15 CAPSULE, EXT RELEASE 24 HR ORAL ONCE
Refills: 0 | Status: DISCONTINUED | OUTPATIENT
Start: 2024-05-13 | End: 2024-05-13

## 2024-05-13 RX ORDER — HYDROMORPHONE HYDROCHLORIDE 2 MG/ML
1 INJECTION INTRAMUSCULAR; INTRAVENOUS; SUBCUTANEOUS ONCE
Refills: 0 | Status: DISCONTINUED | OUTPATIENT
Start: 2024-05-13 | End: 2024-05-13

## 2024-05-13 RX ORDER — DILTIAZEM HCL 120 MG
5 CAPSULE, EXT RELEASE 24 HR ORAL
Qty: 125 | Refills: 0 | Status: DISCONTINUED | OUTPATIENT
Start: 2024-05-13 | End: 2024-05-14

## 2024-05-13 RX ORDER — DILTIAZEM HCL 120 MG
10 CAPSULE, EXT RELEASE 24 HR ORAL ONCE
Refills: 0 | Status: COMPLETED | OUTPATIENT
Start: 2024-05-13 | End: 2024-05-13

## 2024-05-13 RX ORDER — MORPHINE SULFATE 50 MG/1
2 CAPSULE, EXTENDED RELEASE ORAL ONCE
Refills: 0 | Status: DISCONTINUED | OUTPATIENT
Start: 2024-05-13 | End: 2024-05-13

## 2024-05-13 RX ORDER — ACETAMINOPHEN 500 MG
650 TABLET ORAL EVERY 6 HOURS
Refills: 0 | Status: DISCONTINUED | OUTPATIENT
Start: 2024-05-13 | End: 2024-05-17

## 2024-05-13 RX ORDER — SODIUM CHLORIDE 9 MG/ML
1000 INJECTION, SOLUTION INTRAVENOUS ONCE
Refills: 0 | Status: COMPLETED | OUTPATIENT
Start: 2024-05-13 | End: 2024-05-13

## 2024-05-13 RX ORDER — TAMSULOSIN HYDROCHLORIDE 0.4 MG/1
0.8 CAPSULE ORAL AT BEDTIME
Refills: 0 | Status: DISCONTINUED | OUTPATIENT
Start: 2024-05-13 | End: 2024-05-18

## 2024-05-13 RX ORDER — HYDROMORPHONE HYDROCHLORIDE 2 MG/ML
2 INJECTION INTRAMUSCULAR; INTRAVENOUS; SUBCUTANEOUS EVERY 4 HOURS
Refills: 0 | Status: DISCONTINUED | OUTPATIENT
Start: 2024-05-13 | End: 2024-05-17

## 2024-05-13 RX ORDER — ATORVASTATIN CALCIUM 80 MG/1
80 TABLET, FILM COATED ORAL AT BEDTIME
Refills: 0 | Status: DISCONTINUED | OUTPATIENT
Start: 2024-05-13 | End: 2024-05-18

## 2024-05-13 RX ORDER — FUROSEMIDE 40 MG
20 TABLET ORAL DAILY
Refills: 0 | Status: DISCONTINUED | OUTPATIENT
Start: 2024-05-13 | End: 2024-05-15

## 2024-05-13 RX ORDER — ADENOSINE 3 MG/ML
12 INJECTION INTRAVENOUS ONCE
Refills: 0 | Status: COMPLETED | OUTPATIENT
Start: 2024-05-13 | End: 2024-05-13

## 2024-05-13 RX ORDER — TACROLIMUS 5 MG/1
2 CAPSULE ORAL AT BEDTIME
Refills: 0 | Status: DISCONTINUED | OUTPATIENT
Start: 2024-05-13 | End: 2024-05-16

## 2024-05-13 RX ORDER — ADENOSINE 3 MG/ML
6 INJECTION INTRAVENOUS ONCE
Refills: 0 | Status: COMPLETED | OUTPATIENT
Start: 2024-05-13 | End: 2024-05-13

## 2024-05-13 RX ORDER — APIXABAN 2.5 MG/1
2.5 TABLET, FILM COATED ORAL EVERY 12 HOURS
Refills: 0 | Status: DISCONTINUED | OUTPATIENT
Start: 2024-05-13 | End: 2024-05-18

## 2024-05-13 RX ORDER — MORPHINE SULFATE 50 MG/1
4 CAPSULE, EXTENDED RELEASE ORAL ONCE
Refills: 0 | Status: DISCONTINUED | OUTPATIENT
Start: 2024-05-13 | End: 2024-05-13

## 2024-05-13 RX ORDER — ACETAMINOPHEN 500 MG
975 TABLET ORAL ONCE
Refills: 0 | Status: COMPLETED | OUTPATIENT
Start: 2024-05-13 | End: 2024-05-13

## 2024-05-13 RX ORDER — SODIUM CHLORIDE 9 MG/ML
1000 INJECTION, SOLUTION INTRAVENOUS ONCE
Refills: 0 | Status: DISCONTINUED | OUTPATIENT
Start: 2024-05-13 | End: 2024-05-13

## 2024-05-13 RX ORDER — ISOSORBIDE MONONITRATE 60 MG/1
30 TABLET, EXTENDED RELEASE ORAL DAILY
Refills: 0 | Status: DISCONTINUED | OUTPATIENT
Start: 2024-05-13 | End: 2024-05-16

## 2024-05-13 RX ORDER — TACROLIMUS 5 MG/1
3 CAPSULE ORAL
Refills: 0 | Status: DISCONTINUED | OUTPATIENT
Start: 2024-05-14 | End: 2024-05-16

## 2024-05-13 RX ORDER — SENNA PLUS 8.6 MG/1
2 TABLET ORAL AT BEDTIME
Refills: 0 | Status: DISCONTINUED | OUTPATIENT
Start: 2024-05-13 | End: 2024-05-18

## 2024-05-13 RX ORDER — MYCOPHENOLATE MOFETIL 250 MG/1
1000 CAPSULE ORAL DAILY
Refills: 0 | Status: DISCONTINUED | OUTPATIENT
Start: 2024-05-13 | End: 2024-05-13

## 2024-05-13 RX ORDER — MYCOPHENOLATE MOFETIL 250 MG/1
1000 CAPSULE ORAL
Refills: 0 | Status: DISCONTINUED | OUTPATIENT
Start: 2024-05-13 | End: 2024-05-18

## 2024-05-13 RX ORDER — LANOLIN ALCOHOL/MO/W.PET/CERES
3 CREAM (GRAM) TOPICAL AT BEDTIME
Refills: 0 | Status: DISCONTINUED | OUTPATIENT
Start: 2024-05-13 | End: 2024-05-18

## 2024-05-13 RX ORDER — PANTOPRAZOLE SODIUM 20 MG/1
40 TABLET, DELAYED RELEASE ORAL
Refills: 0 | Status: DISCONTINUED | OUTPATIENT
Start: 2024-05-13 | End: 2024-05-18

## 2024-05-13 RX ORDER — OXYCODONE HYDROCHLORIDE 5 MG/1
15 TABLET ORAL EVERY 8 HOURS
Refills: 0 | Status: DISCONTINUED | OUTPATIENT
Start: 2024-05-13 | End: 2024-05-14

## 2024-05-13 RX ORDER — CHLORHEXIDINE GLUCONATE 213 G/1000ML
1 SOLUTION TOPICAL
Refills: 0 | Status: DISCONTINUED | OUTPATIENT
Start: 2024-05-13 | End: 2024-05-18

## 2024-05-13 RX ORDER — POLYETHYLENE GLYCOL 3350 17 G/17G
17 POWDER, FOR SOLUTION ORAL DAILY
Refills: 0 | Status: DISCONTINUED | OUTPATIENT
Start: 2024-05-13 | End: 2024-05-17

## 2024-05-13 RX ADMIN — HYDROMORPHONE HYDROCHLORIDE 1 MILLIGRAM(S): 2 INJECTION INTRAMUSCULAR; INTRAVENOUS; SUBCUTANEOUS at 13:44

## 2024-05-13 RX ADMIN — HYDROMORPHONE HYDROCHLORIDE 1 MILLIGRAM(S): 2 INJECTION INTRAMUSCULAR; INTRAVENOUS; SUBCUTANEOUS at 07:06

## 2024-05-13 RX ADMIN — ONDANSETRON 4 MILLIGRAM(S): 8 TABLET, FILM COATED ORAL at 18:18

## 2024-05-13 RX ADMIN — APIXABAN 2.5 MILLIGRAM(S): 2.5 TABLET, FILM COATED ORAL at 18:16

## 2024-05-13 RX ADMIN — MORPHINE SULFATE 4 MILLIGRAM(S): 50 CAPSULE, EXTENDED RELEASE ORAL at 10:47

## 2024-05-13 RX ADMIN — OXYCODONE HYDROCHLORIDE 15 MILLIGRAM(S): 5 TABLET ORAL at 21:22

## 2024-05-13 RX ADMIN — MYCOPHENOLATE MOFETIL 1000 MILLIGRAM(S): 250 CAPSULE ORAL at 18:05

## 2024-05-13 RX ADMIN — HYDROMORPHONE HYDROCHLORIDE 2 MILLIGRAM(S): 2 INJECTION INTRAMUSCULAR; INTRAVENOUS; SUBCUTANEOUS at 15:42

## 2024-05-13 RX ADMIN — Medication 5 MG/HR: at 15:41

## 2024-05-13 RX ADMIN — ATORVASTATIN CALCIUM 80 MILLIGRAM(S): 80 TABLET, FILM COATED ORAL at 22:56

## 2024-05-13 RX ADMIN — Medication 10 MILLIGRAM(S): at 14:17

## 2024-05-13 RX ADMIN — TACROLIMUS 3 MILLIGRAM(S): 5 CAPSULE ORAL at 15:41

## 2024-05-13 RX ADMIN — ADENOSINE 12 MILLIGRAM(S): 3 INJECTION INTRAVENOUS at 13:59

## 2024-05-13 RX ADMIN — MORPHINE SULFATE 2 MILLIGRAM(S): 50 CAPSULE, EXTENDED RELEASE ORAL at 06:16

## 2024-05-13 RX ADMIN — MORPHINE SULFATE 2 MILLIGRAM(S): 50 CAPSULE, EXTENDED RELEASE ORAL at 03:58

## 2024-05-13 RX ADMIN — Medication 25 MILLIGRAM(S): at 18:07

## 2024-05-13 RX ADMIN — Medication 10 MILLIGRAM(S): at 04:09

## 2024-05-13 RX ADMIN — SENNA PLUS 2 TABLET(S): 8.6 TABLET ORAL at 22:56

## 2024-05-13 RX ADMIN — TAMSULOSIN HYDROCHLORIDE 0.8 MILLIGRAM(S): 0.4 CAPSULE ORAL at 22:56

## 2024-05-13 RX ADMIN — SODIUM CHLORIDE 1000 MILLILITER(S): 9 INJECTION INTRAMUSCULAR; INTRAVENOUS; SUBCUTANEOUS at 13:05

## 2024-05-13 RX ADMIN — ADENOSINE 6 MILLIGRAM(S): 3 INJECTION INTRAVENOUS at 13:57

## 2024-05-13 RX ADMIN — HYDROMORPHONE HYDROCHLORIDE 2 MILLIGRAM(S): 2 INJECTION INTRAMUSCULAR; INTRAVENOUS; SUBCUTANEOUS at 19:57

## 2024-05-13 RX ADMIN — HYDROMORPHONE HYDROCHLORIDE 1 MILLIGRAM(S): 2 INJECTION INTRAMUSCULAR; INTRAVENOUS; SUBCUTANEOUS at 08:08

## 2024-05-13 RX ADMIN — ADENOSINE 12 MILLIGRAM(S): 3 INJECTION INTRAVENOUS at 10:41

## 2024-05-13 RX ADMIN — MORPHINE SULFATE 4 MILLIGRAM(S): 50 CAPSULE, EXTENDED RELEASE ORAL at 09:54

## 2024-05-13 RX ADMIN — ADENOSINE 6 MILLIGRAM(S): 3 INJECTION INTRAVENOUS at 10:21

## 2024-05-13 RX ADMIN — SODIUM CHLORIDE 1000 MILLILITER(S): 9 INJECTION, SOLUTION INTRAVENOUS at 04:01

## 2024-05-13 RX ADMIN — HYDROMORPHONE HYDROCHLORIDE 2 MILLIGRAM(S): 2 INJECTION INTRAMUSCULAR; INTRAVENOUS; SUBCUTANEOUS at 20:12

## 2024-05-13 RX ADMIN — Medication 5 MG/HR: at 04:09

## 2024-05-13 NOTE — ED PROVIDER NOTE - OBJECTIVE STATEMENT
69-year-old history of hypertension hyperlipidemia heart failure ESRD hep C A-fib with ablation coming in here for left groin pain.  Patient had ablation done recently and was discharged from the hospital.  Denies any chest pain shortness of breath.  No other complaints 69-year-old history of hypertension hyperlipidemia heart failure ESRD hep C A-fib with ablation coming in here for left groin pain.  Patient had ablation done yesterday and was discharged from the hospital yesterday.  Denies any chest pain shortness of breath.  No fevers, chills, n/v/d, abdominal pain, weakness, numbness. Complains of lower back pain. Follows with Dr. Henderson for EP.

## 2024-05-13 NOTE — ED PROVIDER NOTE - PROGRESS NOTE DETAILS
DC: patient endorsed to me by Dr. White pending labs.   patient with rapid a-fib, endorses left groin pain since ablation, groin site on bilateral groins, minimal ecchymosis. no overt hematoma. labs noted with hgb 7, significant drop since discharge. will add CT to r/o retroperitoneal bleed. stable at this time. DC: patient endorsed to Dr. Montenegro pending imaging, reassessment, disposition. Patient signed out to me from Dr. Chahal- Labs imaging reviewed.  QI negative for prior blood per rectum, melena.  Heart rate improved.  Pending CTA and CT head. Patient had full 4 blown IVs CAT scan.  Unable to perform CT angio.  Patient returned from CAT scan. Authored by Dr. Block: Signed out to me by Dr. Montenegro.  69-year-old male history of end-stage renal disease A-fib on Eliquis recent ablation with groin access presented with rapid A-fib.  Found to be anemic.  No evidence of GI bleed at this time.  Blood being administered.  Patient with difficult IV access.  Unable to perform CT with IV contrast due to IV access blowing.  Left forearm IV placed by me.  CBC repeated. Will add chest x-ray. Authored by Dr. Carrizales: Administered 6mg adenosine, then 12mg adenosine. Patient remains tachycardic in 160s, rhythm appears SVT. Cardiology was bedside, and state they will speak with EP since patient is post-ablation. Considering amiodarone load and gtt once cardiology speaks with EP. Authored by Dr. Block: Post adenosine push, underlying rhythm is sinus but then goes back to SVT.  Cardiology was at the bedside. Authored by Dr. Carrizales: Administered 6mg adenosine, then 12mg adenosine. Patient remains tachycardic in 160s, underlying rhythm sinus, rhythm returned to SVT. Cardiology was bedside, and state they will speak with EP since patient is post-ablation. Considering amiodarone load and gtt once cardiology speaks with EP. Cardiology spoke with EP, Dr. Henderson. Recommend restarting cardizem gtt. Admission for likely repeat ablation. Authored by Dr. Block: hematoma L thigh. active extrav.  I personally d/w IR attending Dr. Hammer.  will f/u Spoke to cardiology will admit to 4T, cards telemetry.  Not a candidate for IR intervention at this time because no active extravasation Spoke to cardiology will admit to 4T, cards telemetry.  Not a candidate for IR intervention at this time because no active extravasation. No beds on 4T, Cardiac SDU versus CCU.

## 2024-05-13 NOTE — ED PROVIDER NOTE - NS ED ATTENDING STATEMENT MOD
This was a shared visit with the JESUS ALBERTO. I reviewed and verified the documentation. I have personally provided the amount of critical care time documented below concurrently with the resident/fellow.  This time excludes time spent on separate procedures and time spent teaching. I have reviewed the resident’s / fellow’s documentation and I agree with the history, exam, and assessment and plan of care.

## 2024-05-13 NOTE — ED PROVIDER NOTE - ATTENDING CONTRIBUTION TO CARE
70 yo male, PMHx HTN, HLD, HFpEF, ESRD s/p renal tx, Hep C, liver cirrhosis, Afib s/p ablation who presented to [facility] for elective AF Ablation. On 5/10/24 patient underwent ablation for Atrial Fibrillation via left groin. Patient had a hematoma and ultrasound of L groin showed no pseudoaneurysm or fistula. Patient presents complaining of pain to left groin. Denies fevers, chills, chest pain, shortness of breath, nausea, vomiting, abdominal pain, dizziness.    CONSTITUTIONAL: Well-developed; well-nourished; in no acute distress.   SKIN: warm, dry  HEAD: Normocephalic  NECK: Supple  CARD: S1, S2 normal; Regular rate and rhythm.   RESP: No wheezes, rales or rhonchi.  ABD: soft ntnd  EXT: Normal ROM.  No clubbing, cyanosis or edema. +left groin 1.5 cm ecchymoses, pulses palpable at site and distally  NEURO: Alert, oriented, grossly unremarkable  PSYCH: Cooperative, appropriate.    Initial EKG afib rate controlled no TRINIDAD.    After initial evaluation, patient then transitioned into afib with RVR .  Patient mentating well, no complaints of chest pain or shortness of breath.    Cardizem IVP given. Repeat EKG obtained.  Cardiology consulted. 68 yo male, PMHx HTN, HLD, HFpEF, ESRD s/p renal tx, Hep C, liver cirrhosis, Afib s/p ablation who presented to [facility] for elective AF Ablation. On 5/10/24 patient underwent ablation for Atrial Fibrillation via left groin. Patient had a hematoma and ultrasound of L groin showed no pseudoaneurysm or fistula. Patient presents complaining of pain to left groin. Denies fevers, chills, chest pain, shortness of breath, nausea, vomiting, abdominal pain, dizziness.    CONSTITUTIONAL: Well-developed; well-nourished; in no acute distress.   SKIN: warm, dry  HEAD: Normocephalic  NECK: Supple  CARD: S1, S2 normal; Regular rate and rhythm.   RESP: No wheezes, rales or rhonchi.  ABD: soft ntnd  EXT: Normal ROM.  No clubbing, cyanosis or edema. +left groin 1.5 cm ecchymoses, pulses palpable at site and distally  NEURO: Alert, oriented, grossly unremarkable  PSYCH: Cooperative, appropriate.    Initial EKG afib rate controlled no TRINIDAD.    After initial evaluation, patient then transitioned into afib with RVR , but then back to rate control 84, then shortly back into afib RVR.  Patient mentating well, no complaints of chest pain or shortness of breath.  Cardiology consulted. 70 yo male, PMHx HTN, HLD, HFpEF, ESRD s/p renal tx, Hep C, liver cirrhosis, Afib s/p ablation who presented to [facility] for elective AF Ablation. On 5/10/24 patient underwent ablation for Atrial Fibrillation via left groin. Patient had a hematoma and ultrasound of L groin showed no pseudoaneurysm or fistula. Patient presents complaining of pain to left groin. Denies fevers, chills, chest pain, shortness of breath, nausea, vomiting, abdominal pain, dizziness.    CONSTITUTIONAL: Well-developed; well-nourished; in no acute distress.   SKIN: warm, dry  HEAD: Normocephalic  NECK: Supple  CARD: S1, S2 normal; Regular rate and rhythm.   RESP: No wheezes, rales or rhonchi.  ABD: soft ntnd  EXT: Normal ROM.  No clubbing, cyanosis or edema. +left groin 1.5 cm ecchymoses, pulses palpable at site and distally  NEURO: Alert, oriented, grossly unremarkable  PSYCH: Cooperative, appropriate.    Initial EKG afib rate controlled no TRINIDAD.    After initial evaluation, patient then transitioned into afib with RVR , but then back to rate control 84, then shortly back into afib RVR.  Patient mentating well, no complaints of chest pain or shortness of breath.  Cardiology consulted. Patient given cardizem IVP and ggt without rate control.  Cardiology at bedside.    s/o to Dr. Chahal

## 2024-05-13 NOTE — H&P ADULT - NSHPPHYSICALEXAM_GEN_ALL_CORE
PHYSICAL EXAM:  GENERAL: NAD, well-groomed, well-developed  HEAD:  Atraumatic, Normocephalic  EYES: EOMI, PERRLA, conjunctiva and sclera clear, bruising over the L eye   HEART: Regular rate and rhythm; No murmurs, rubs, or gallops  RESPIRATORY: CTA B/L, No W/R/R  ABDOMEN: Soft, Nontender, Nondistended; Bowel sounds present  NEUROLOGY: A&Ox3, nonfocal, moving all extremities  EXTREMITIES:  2+ Peripheral Pulses, No clubbing, cyanosis, or edema  SKIN: Bruising at the left groin site

## 2024-05-13 NOTE — ED PROVIDER NOTE - QRS
86 Humira Counseling:  I discussed with the patient the risks of adalimumab including but not limited to myelosuppression, immunosuppression, autoimmune hepatitis, demyelinating diseases, lymphoma, and serious infections.  The patient understands that monitoring is required including a PPD at baseline and must alert us or the primary physician if symptoms of infection or other concerning signs are noted.

## 2024-05-13 NOTE — H&P ADULT - ASSESSMENT
69-year-old history of hypertension, hyperlipidemia, HFpEF (70-75%) grade 2 DD, ESRD s/p kidney transplant 8 years ago at [place], chronic hep C, A-fib with ablation preformed yesterday presenting for left groin pain found to have a L thigh hematoma.     #Left thigh hematoma  #S/p afib ablation  - Patient underwent afib ablation 2 days ago here at [facility]  - Discharged yesterday, but had continued thigh pain so represented  - Hgb drop from 11.4 -> 7.2  - CT abdomen and pelvis showing left adductor muscle hematoma with possibility of extravasation  - Evaluated by IR no intervention, will continue to follow     Plan:  - CBC round the clock   - Keep hgb >7.0   - Compression wrapping around the thigh    #SVT  #AFib sp ablation  - Patient having multiple episodes of svt   - sp adenosine 6 + 12 x2    Plan:  - EP following   - Start cardizem gtt   - Repeat CBC stat, if stable resume Eliquis with a STAT dose   - Trend CBC  - NPO after midnight for tentative AV marleni ablation with PPM placement     #ESRD sp Kidney transplant  - Patient had kidney transplant 8 years ago at [place]    Plan:  - Nephrology consult   - Continue Tacro 2mg in AM and 3mg in PM   - Continue Mycophenolate 1000mg qd   - Fu Tacro level       #Misc  - DVT Prophylaxis: Eliquis   - GI Prophylaxis: None  - Diet: DASH   - Activity: As tolerated  - IV Fluids: None  - Code Status: Full     Dispo: Acute

## 2024-05-13 NOTE — CONSULT NOTE ADULT - SUBJECTIVE AND OBJECTIVE BOX
INTERVENTIONAL RADIOLOGY CONSULT:     Procedure Requested: Angiography with possible intervention    History of Present Illness: 69 y.o. M with recent cardiac ablation presents with left thigh/groin pain. CTA demonstrates left adductor muscle hematoma with possibility of extravasation.    PAST MEDICAL & SURGICAL HISTORY:  CKD (chronic kidney disease)  ESRD (end stage renal disease)  HTN (hypertension)  HLD (hyperlipidemia)  Atrial fibrillation on Eliquis  COPD, mild  not on O2  Peripheral neuropathy  unclear cause  Lymphoma  questionable, not treated, not followed  Melanoma  R eye, s/p laser  Cirrhosis  possibly related to hepC  Breast cancer  Legally blind  Stroke  History of heroin abuse  History of chronic hepatitis  Marijuana use  History of kidney transplant  3 years ago  S/P arteriovenous (AV) fistula creation  prior old fistula in R arm  S/P lumpectomy, right breast  History of back surgery  s/p Left L5-S1 endoscopic laminoforaminotomy  History of loop recorder    MEDICATIONS  (STANDING):  diltiazem Infusion 5 mG/Hr (5 mL/Hr) IV Continuous <Continuous>    Allergies:  Rifuxen (Swelling)  Rituxan (Hives)    FAMILY HISTORY:  FH: dementia  mother, alive  breast cancer  sister ( in 30s)    Vital Signs Last 24 Hrs  T(C): 35.8 (13 May 2024 11:30), Max: 36.7 (13 May 2024 02:34)  T(F): 96.4 (13 May 2024 11:30), Max: 98.1 (13 May 2024 02:34)  HR: 113 (13 May 2024 11:30) (65 - 169)  BP: 142/82 (13 May 2024 11:30) (90/54 - 142/82)  BP(mean): 90 (13 May 2024 07:56) (64 - 90)  RR: 18 (13 May 2024 11:30) (16 - 19)  SpO2: 98% (13 May 2024 11:30) (96% - 98%)    Parameters below as of 13 May 2024 11:30  Patient On (Oxygen Delivery Method): room air    Labs:                         7.5    5.99  )-----------( 58       ( 13 May 2024 09:40 )             23.8     05-13    135  |  106  |  33<H>  ----------------------------<  157<H>  4.0   |  18  |  1.9<H>    Ca    9.9      13 May 2024 05:10      PT/INR - ( 13 May 2024 07:07 )   PT: 16.40 sec;   INR: 1.43 ratio         PTT - ( 13 May 2024 07:07 )  PTT:28.1 sec    Pertinent labs:                      7.5    5.99  )-----------( 58       ( 13 May 2024 09:40 )             23.8       05    135  |  106  |  33<H>  ----------------------------<  157<H>  4.0   |  18  |  1.9<H>    Ca    9.9      13 May 2024 05:10        PT/INR - ( 13 May 2024 07:07 )   PT: 16.40 sec;   INR: 1.43 ratio         PTT - ( 13 May 2024 07:07 )  PTT:28.1 sec    Radiology & Additional Studies:   Radiology imaging reviewed.       ASSESSMENT/ PLAN:     69 y.o. M with recent cardiac ablation presents with left thigh/groin pain. CTA demonstrates left adductor muscle hematoma with possibility of extravasation. IR consulted for possible intervention.     -- Acute hemoglobin drop from 13.4 to 7.5 secondary to left thigh hematoma.   -- Reversal of anticoagulation (Eliquis).   -- Abdominal binder.  -- Repeat hemoglobin q8, transfuse as needed to keep Hgb > 7.   -- Rate-control for atrial fibrillation.   -- IR will continue to follow.     Thank you for the courtesy of this consult, please call [phone] with any further questions.    INTERVENTIONAL RADIOLOGY CONSULT:     Procedure Requested: Angiography with possible intervention    History of Present Illness: 69 y.o. M with recent cardiac ablation presents with left thigh/groin pain. CTA demonstrates left adductor muscle hematoma with possibility of extravasation.    PAST MEDICAL & SURGICAL HISTORY:  CKD (chronic kidney disease)  ESRD (end stage renal disease)  HTN (hypertension)  HLD (hyperlipidemia)  Atrial fibrillation on Eliquis  COPD, mild  not on O2  Peripheral neuropathy  unclear cause  Lymphoma  questionable, not treated, not followed  Melanoma  R eye, s/p laser  Cirrhosis  possibly related to hepC  Breast cancer  Legally blind  Stroke  History of heroin abuse  History of chronic hepatitis  Marijuana use  History of kidney transplant  3 years ago  S/P arteriovenous (AV) fistula creation  prior old fistula in R arm  S/P lumpectomy, right breast  History of back surgery  s/p Left L5-S1 endoscopic laminoforaminotomy  History of loop recorder    MEDICATIONS  (STANDING):  diltiazem Infusion 5 mG/Hr (5 mL/Hr) IV Continuous <Continuous>    Allergies:  Rifuxen (Swelling)  Rituxan (Hives)    FAMILY HISTORY:  FH: dementia  mother, alive  breast cancer  sister ( in 30s)    Vital Signs Last 24 Hrs  T(C): 35.8 (13 May 2024 11:30), Max: 36.7 (13 May 2024 02:34)  T(F): 96.4 (13 May 2024 11:30), Max: 98.1 (13 May 2024 02:34)  HR: 113 (13 May 2024 11:30) (65 - 169)  BP: 142/82 (13 May 2024 11:30) (90/54 - 142/82)  BP(mean): 90 (13 May 2024 07:56) (64 - 90)  RR: 18 (13 May 2024 11:30) (16 - 19)  SpO2: 98% (13 May 2024 11:30) (96% - 98%)    Parameters below as of 13 May 2024 11:30  Patient On (Oxygen Delivery Method): room air    Labs:                         7.5    5.99  )-----------( 58       ( 13 May 2024 09:40 )             23.8     05-13    135  |  106  |  33<H>  ----------------------------<  157<H>  4.0   |  18  |  1.9<H>    Ca    9.9      13 May 2024 05:10      PT/INR - ( 13 May 2024 07:07 )   PT: 16.40 sec;   INR: 1.43 ratio         PTT - ( 13 May 2024 07:07 )  PTT:28.1 sec    Pertinent labs:                      7.5    5.99  )-----------( 58       ( 13 May 2024 09:40 )             23.8           135  |  106  |  33<H>  ----------------------------<  157<H>  4.0   |  18  |  1.9<H>    Ca    9.9      13 May 2024 05:10        PT/INR - ( 13 May 2024 07:07 )   PT: 16.40 sec;   INR: 1.43 ratio         PTT - ( 13 May 2024 07:07 )  PTT:28.1 sec    Radiology & Additional Studies:   Radiology imaging reviewed.       ASSESSMENT/ PLAN:     69 y.o. M with recent cardiac ablation presents with left thigh/groin pain. CTA demonstrates left adductor muscle hematoma with possibility of extravasation. IR consulted for possible intervention.     -- Acute hemoglobin drop from 13.4 to 7.5 secondary to left thigh hematoma.   -- Reversal of anticoagulation (Eliquis).   -- Abdominal binder.  -- Repeat hemoglobin q8, transfuse as needed to keep Hgb > 7.   -- Please obtain arterial duplex of the left lower extremity to evaluate for psa or av fistula given recent procedural access in this area  -- IR will continue to follow.     Thank you for the courtesy of this consult, please call [phone] with any further questions.

## 2024-05-13 NOTE — ED PROVIDER NOTE - CARE PLAN
Principal Discharge DX:	Rapid atrial fibrillation  Secondary Diagnosis:	Lt groin pain   1 Principal Discharge DX:	Rapid atrial fibrillation  Secondary Diagnosis:	Lt groin pain  Secondary Diagnosis:	Drop in hemoglobin  Secondary Diagnosis:	Hematoma of left thigh

## 2024-05-13 NOTE — ED PROVIDER NOTE - ATTENDING APP SHARED VISIT CONTRIBUTION OF CARE
70 yo male, PMHx HTN, HLD, HFpEF, ESRD s/p renal tx, Hep C, liver cirrhosis, Afib s/p ablation who presented to [facility] for elective AF Ablation. On 5/10/24 patient underwent ablation for Atrial Fibrillation via left groin. Patient had a hematoma and ultrasound of L groin showed no pseudoaneurysm or fistula. Patient presents complaining of pain to left groin. Denies fevers, chills, chest pain, shortness of breath, nausea, vomiting, abdominal pain, dizziness.    CONSTITUTIONAL: Well-developed; well-nourished; in no acute distress.   SKIN: warm, dry  HEAD: Normocephalic  NECK: Supple  CARD: S1, S2 normal; Regular rate and rhythm.   RESP: No wheezes, rales or rhonchi.  ABD: soft ntnd  EXT: Normal ROM.  No clubbing, cyanosis or edema. +left groin 1.5 cm ecchymoses, pulses palpable at site and distally  NEURO: Alert, oriented, grossly unremarkable  PSYCH: Cooperative, appropriate.

## 2024-05-13 NOTE — CONSULT NOTE ADULT - ATTENDING COMMENTS
Recurrent SVT  Recurrent atypical AFL  Thigh hematoma  Anemia    EKG, Tele, labs,. CT reviewed, independently interpreted and discussed with CCU team + ER team  Tele/CCU for further monitoring  Transfuse as needed. However, baseline Hgb 8-10. Doubt ongoing bleeding. Risks/benefits of stopping of Eliquis in view of recent extensive ablation discussed with teams + pt. We will continue with Eliquis.  Cardizem drip. Not a suitable candidate for long term amiodarone. Pt refusing short term use  Will benefit from Dual chamber pacemaker with AV node ablation  NPO p MN for possible PPM izaiah - add on case  Trend Hgb

## 2024-05-13 NOTE — ED PROVIDER NOTE - PHYSICAL EXAMINATION
CONSTITUTIONAL:  in no acute distress.   SKIN: warm, dry  HEAD: Normocephalic; atraumatic.  EYES: PERRL, EOMI, normal sclera and conjunctiva   ENT: No nasal discharge; airway clear.  NECK: Supple; non tender.  CARD:  Regular rate and rhythm.   RESP: NO inc WOB   ABD: soft ntnd, Left groin ecchymosis 1.5 cm  EXT: Normal ROM.    LYMPH: No acute cervical adenopathy.  NEURO: Alert, oriented, grossly unremarkable  PSYCH: Cooperative, appropriate.

## 2024-05-13 NOTE — H&P ADULT - ATTENDING COMMENTS
Diastolic CHF  AFL    Comorbidities as above.  Notably, ESRD s/p renal transplant.    Elective AFL ablation complicated by left groin hematoma.  Recurrent AFL and AVNRT.    Chronic back pain.  BP stable.    - Cardizem gtt.  - Cont Eliquis given recent ablation.  Monitor Hb.  - Renal consult for immunosuppressant management. Diastolic CHF  AFL    Comorbidities as above.  Notably, ESRD s/p renal transplant.    Elective AFL ablation complicated by left groin hematoma.  PRBC transfusion  Recurrent AFL and AVNRT.    Chronic back pain.  BP stable.    - Cardizem gtt.  - Cont Eliquis given recent ablation.  Monitor Hb.  - Renal consult for immunosuppressant management.  - Tentative AVN ablation + PPM (follow-up EP)

## 2024-05-13 NOTE — ED PROVIDER NOTE - NS ED MD EKG INTERPRETATION 1
ED EKG: my independent interpretation - Dr. Denis Block : SVT at 169, normal axis T inversions laterally

## 2024-05-13 NOTE — ED ADULT NURSE NOTE - NSFALLUNIVINTERV_ED_ALL_ED
Bed/Stretcher in lowest position, wheels locked, appropriate side rails in place/Call bell, personal items and telephone in reach/Instruct patient to call for assistance before getting out of bed/chair/stretcher/Non-slip footwear applied when patient is off stretcher/[place] to call system/Physically safe environment - no spills, clutter or unnecessary equipment/Purposeful proactive rounding/Room/bathroom lighting operational, light cord in reach

## 2024-05-13 NOTE — ED PROVIDER NOTE - INTERPRETATION
ED EKG: my independent interpretation - Dr. Denis Block : Sinus rhythm at 79, normal axis, no ST elevation T inversions laterally occasional APCs

## 2024-05-13 NOTE — H&P ADULT - HISTORY OF PRESENT ILLNESS
69-year-old history of hypertension, hyperlipidemia, HFpEF (70-75%) grade 2 DD, ESRD on HD, chronic hep C, A-fib with ablation preformed yesterday presenting for left groin pain. Patient was discharged yesterday from [facility] after a 2 day stay following an A-Fib ablation. Access site was through the Left groin, evaluated prior to discharge with no hematoma or pseudoaneurysm. Patient states that he has had pain at the site since the procedure, but it worsened after discharge. There was no overt hematoma, minimal ecchymosis at the site.     In the ED patient was hemodynamically stable /82, . Patient was noted to be back in afib. Started on cardizem drip for HR control. Labs significant for Hgb drop from 11-> 7. CT angio was preformed which showed left adductor muscle hematoma with possibility of extravasation.     Vital Signs Last 24 Hrs  T(C): 35.8 (13 May 2024 11:30), Max: 36.7 (13 May 2024 02:34)  T(F): 96.4 (13 May 2024 11:30), Max: 98.1 (13 May 2024 02:34)  HR: 143 (13 May 2024 13:16) (65 - 169)  BP: 102/65 (13 May 2024 13:16) (75/52 - 142/82)  BP(mean): 74 (13 May 2024 13:16) (60 - 90)  RR: 18 (13 May 2024 13:16) (16 - 19)  SpO2: 98% (13 May 2024 13:16) (96% - 98%)    Parameters below as of 13 May 2024 13:16  Patient On (Oxygen Delivery Method): room air      69-year-old history of hypertension, hyperlipidemia, HFpEF (70-75%) grade 2 DD, ESRD s/p kidney transplant 8 years ago at [place], chronic hep C, A-fib with ablation preformed yesterday presenting for left groin pain. Patient was discharged yesterday from [facility] after a 2 day stay following an A-Fib ablation. Access site was through the Left groin, evaluated prior to discharge with no hematoma or pseudoaneurysm. Patient states that he has had pain at the site since the procedure, but it worsened after discharge. There was no overt hematoma, minimal ecchymosis at the site.     In the ED patient was hemodynamically stable /82, . Patient was noted to be back in afib. Started on cardizem drip for HR control. Labs significant for Hgb drop from 11-> 7. CT angio was preformed which showed left adductor muscle hematoma with possibility of extravasation.     Vital Signs Last 24 Hrs  T(C): 35.8 (13 May 2024 11:30), Max: 36.7 (13 May 2024 02:34)  T(F): 96.4 (13 May 2024 11:30), Max: 98.1 (13 May 2024 02:34)  HR: 143 (13 May 2024 13:16) (65 - 169)  BP: 102/65 (13 May 2024 13:16) (75/52 - 142/82)  BP(mean): 74 (13 May 2024 13:16) (60 - 90)  RR: 18 (13 May 2024 13:16) (16 - 19)  SpO2: 98% (13 May 2024 13:16) (96% - 98%)    Parameters below as of 13 May 2024 13:16  Patient On (Oxygen Delivery Method): room air

## 2024-05-13 NOTE — ED PROVIDER NOTE - CLINICAL SUMMARY MEDICAL DECISION MAKING FREE TEXT BOX
68 yo male, PMHx HTN, HLD, HFpEF, ESRD s/p renal tx, Hep C, liver cirrhosis, Afib s/p ablation who presented to [facility] for elective AF Ablation. On 5/10/24 patient underwent ablation for Atrial Fibrillation via left groin, discharged 5/12. Patient had a hematoma and ultrasound of L groin showed no pseudoaneurysm or fistula. Patient presents complaining of pain to left groin. Denies fevers, chills, chest pain, shortness of breath, nausea, vomiting, abdominal pain, dizziness.  After initial evaluation, patient then transitioned into afib with RVR . Patient mentating well, no complaints of chest pain or shortness of breath. Cardiology consulted. Cardizem IVP followed by ggt as recommended by cardiology. Escalation to admission considered. Patient requiring inpatient hospitalization. Patient presented with left groin pain and hematoma complaining of pain.  Shortly after presentation, patient developed rapid A-fib, controlled with Cardizem drip, cardiology saw the patient.  Additionally patient was noted to have a low hemoglobin.  CT angio was ordered.  But due to difficulty with IV access and blowing IVs, patient had a delayed imaging.  Currently hemodynamically stable.  Blood transfusion took place.  ICU consulted.    My independent interpretation - Dr. Denis Block - of the following studies labs, imaging, ekg that showed: SVT, anemia    Appropriate medications for patient's presenting complaints were ordered and effects were reassessed.   Patient inpatient records were reviewed..    At this time, patient requires inpatient hospitalization- monitored setting.

## 2024-05-13 NOTE — CONSULT NOTE ADULT - SUBJECTIVE AND OBJECTIVE BOX
Cardiology Fellow Notes    Patient with resistant a flutter s/p ablation on 5/10/2024  Has ESRD s/p kidney transplant and CKD   HTN, DLD  COPD    He presents post ablation for left groin pain   In the ED he was tachycardic and recevied adenosine which showed AVNRT and a flutter which recurred   Hb showed 7.5 (it was previously 13 but his baseline seems 9-10)   HD stable asymptomatic     He got a CT scan which showed a left groin hematoma. Unclear if active extravasation   No RP bleed       PAST MEDICAL & SURGICAL HISTORY  CKD (chronic kidney disease)    ESRD (end stage renal disease)    HTN (hypertension)    HLD (hyperlipidemia)    Atrial fibrillation  on eliquis    COPD, mild  not on O2    Peripheral neuropathy  unclear cause    Lymphoma  ?questionable, not treated, not followed    Melanoma  R eye, s/p laser    Cirrhosis  possibly related to hepC    Breast cancer    Legally blind    Stroke    History of heroin abuse    History of chronic hepatitis    Marijuana use    History of kidney transplant  3 years ago    S/P arteriovenous (AV) fistula creation  prior old fistula in R arm    S/P lumpectomy, right breast    History of back surgery  s/p Left L5-S1 endoscopic laminoforaminotomy    History of loop recorder        FAMILY HISTORY:  FAMILY HISTORY:  FH: dementia  mother, alive    FHx: breast cancer  sister ( in 30s)        SOCIAL HISTORY:  Social History:      ALLERGIES:  Rifuxen (Swelling)  Rituxan (Hives)      MEDICATIONS:  diltiazem Infusion 5 mG/Hr (5 mL/Hr) IV Continuous <Continuous>  sodium chloride 0.9% Bolus 1000 milliLiter(s) IV Bolus once    PRN:      HOME MEDICATIONS:  Home Medications:  aspirin 81 mg oral tablet: 1 tab(s) orally once a day (10 May 2024 11:00)  Flomax 0.4 mg oral capsule: 1 cap(s) orally 2 times a day (10 May 2024 11:00)  mycophenolate mofetil 500 mg oral tablet: 2 tab(s) orally once a day (10 May 2024 11:00)  oxyCODONE 15 mg oral tablet: 1 tab(s) orally every 8 hours As needed Severe Pain (7 - 10) (10 May 2024 11:00)  tacrolimus 1 mg oral capsule: 3 cap(s) orally once a day (in the morning) (10 May 2024 11:00)  tacrolimus 1 mg oral capsule: 2 cap(s) orally once a day (at bedtime) (10 May 2024 11:00)      VITALS:   T(F): 96.4 ( @ 11:30), Max: 98.6 ( @ 19:30)  HR: 143 ( @ 13:16) (65 - 211)  BP: 102/65 ( @ 13:16) (75/52 - 181/94)  BP(mean): 74 ( @ 13:16) (58 - 124)  RR: 18 ( @ 13:16) (12 - 25)  SpO2: 98% ( @ 13:16) (88% - 100%)    I&O's Summary      REVIEW OF SYSTEMS:  CONSTITUTIONAL: No weakness, fevers or chills  HEENT: No visual changes, neck/ear pain  RESPIRATORY: No cough, sob  CARDIOVASCULAR: See HPI  GASTROINTESTINAL: No abdominal pain. No nausea, vomiting, diarrhea   GENITOURINARY: No dysuria, frequency or hematuria  NEUROLOGICAL: No new focal deficits  SKIN: No new rashes    PHYSICAL EXAM:  *General: Not in distress.  Non-toxic appearing.   *Cardio: regular, S1, S2, no murmur  *Pulm: B/L BS.  No wheezing / crackles / rales  *Abdomen: Soft, non-tender, non-distended. Normoactive bowel sounds  *Extremities: No edema b/l le. Warm. Knee caps warm. Pulses + bilaterally. Normal capillary refill. Left groin hematoma tender. Right echymoses.   *Neuro: A&O x3. No focal deficits    LABS:                        7.5    5.99  )-----------( 58       ( 13 May 2024 09:40 )             23.8         135  |  106  |  33<H>  ----------------------------<  157<H>  4.0   |  18  |  1.9<H>    Ca    9.9      13 May 2024 05:10      PT/INR - ( 13 May 2024 07:07 )   PT: 16.40 sec;   INR: 1.43 ratio         PTT - ( 13 May 2024 07:07 )  PTT:28.1 sec          Troponin trend:            IMAGING:  - TTE:  < from: TTE Echo Complete w/o Contrast w/ Doppler (03.15.24 @ 08:20) >  Summary:   1. Hyperdynamic global left ventricular systolic function.   2. Left ventricular ejection fraction, by visual estimation, is 70 to   75%.   3. Moderately increased LV wall thickness.   4. Spectral Doppler shows restrictive pattern of left ventricular   myocardial filling (Grade III diastolic dysfunction).   5. Elevated mean left atrial pressure.   6. Normal right ventricular size and function.   7. Severely enlarged left atrium.   8. Mildly enlarged right atrium.   9. Mild aortic regurgitation.  10. Moderate mitral valve regurgitation.  11. Mild tricuspid regurgitation.  12. Dilatation of the aortic root (measuring 3.8 cm, indexed 2.24 cm/m2)   and ascending aorta (measuring 3.8 cm, indexed 2.24 cm/m2).  13. Estimated pulmonary artery systolic pressure is 38.5 mmHg assuming a   right atrial pressure of 8 mmHg, which is consistent with borderline   pulmonary hypertension.  14. Endocardial visualization was enhanced with intravenous echo contrast.  15.Compared to the previous study 24, the findings are similar.    ECG:  flutter with 2:1

## 2024-05-14 LAB
ALBUMIN SERPL ELPH-MCNC: 3.8 G/DL — SIGNIFICANT CHANGE UP (ref 3.5–5.2)
ALP SERPL-CCNC: 87 U/L — SIGNIFICANT CHANGE UP (ref 30–115)
ALT FLD-CCNC: 31 U/L — SIGNIFICANT CHANGE UP (ref 0–41)
ANION GAP SERPL CALC-SCNC: 10 MMOL/L — SIGNIFICANT CHANGE UP (ref 7–14)
AST SERPL-CCNC: 33 U/L — SIGNIFICANT CHANGE UP (ref 0–41)
BASOPHILS # BLD AUTO: 0.01 K/UL — SIGNIFICANT CHANGE UP (ref 0–0.2)
BASOPHILS # BLD AUTO: 0.01 K/UL — SIGNIFICANT CHANGE UP (ref 0–0.2)
BASOPHILS NFR BLD AUTO: 0.2 % — SIGNIFICANT CHANGE UP (ref 0–1)
BASOPHILS NFR BLD AUTO: 0.2 % — SIGNIFICANT CHANGE UP (ref 0–1)
BILIRUB SERPL-MCNC: 0.8 MG/DL — SIGNIFICANT CHANGE UP (ref 0.2–1.2)
BUN SERPL-MCNC: 27 MG/DL — HIGH (ref 10–20)
CALCIUM SERPL-MCNC: 10 MG/DL — SIGNIFICANT CHANGE UP (ref 8.4–10.4)
CHLORIDE SERPL-SCNC: 106 MMOL/L — SIGNIFICANT CHANGE UP (ref 98–110)
CO2 SERPL-SCNC: 18 MMOL/L — SIGNIFICANT CHANGE UP (ref 17–32)
CREAT SERPL-MCNC: 1.8 MG/DL — HIGH (ref 0.7–1.5)
EGFR: 41 ML/MIN/1.73M2 — LOW
EOSINOPHIL # BLD AUTO: 0 K/UL — SIGNIFICANT CHANGE UP (ref 0–0.7)
EOSINOPHIL # BLD AUTO: 0.01 K/UL — SIGNIFICANT CHANGE UP (ref 0–0.7)
EOSINOPHIL NFR BLD AUTO: 0 % — SIGNIFICANT CHANGE UP (ref 0–8)
EOSINOPHIL NFR BLD AUTO: 0.2 % — SIGNIFICANT CHANGE UP (ref 0–8)
GLUCOSE BLDC GLUCOMTR-MCNC: 123 MG/DL — HIGH (ref 70–99)
GLUCOSE BLDC GLUCOMTR-MCNC: 134 MG/DL — HIGH (ref 70–99)
GLUCOSE BLDC GLUCOMTR-MCNC: 196 MG/DL — HIGH (ref 70–99)
GLUCOSE SERPL-MCNC: 113 MG/DL — HIGH (ref 70–99)
HCT VFR BLD CALC: 21.8 % — LOW (ref 42–52)
HCT VFR BLD CALC: 22.7 % — LOW (ref 42–52)
HCT VFR BLD CALC: 23.6 % — LOW (ref 42–52)
HCT VFR BLD CALC: 23.6 % — LOW (ref 42–52)
HGB BLD-MCNC: 6.8 G/DL — CRITICAL LOW (ref 14–18)
HGB BLD-MCNC: 7 G/DL — LOW (ref 14–18)
HGB BLD-MCNC: 7.5 G/DL — LOW (ref 14–18)
HGB BLD-MCNC: 7.5 G/DL — LOW (ref 14–18)
IMM GRANULOCYTES NFR BLD AUTO: 0.5 % — HIGH (ref 0.1–0.3)
IMM GRANULOCYTES NFR BLD AUTO: 0.7 % — HIGH (ref 0.1–0.3)
LYMPHOCYTES # BLD AUTO: 0.47 K/UL — LOW (ref 1.2–3.4)
LYMPHOCYTES # BLD AUTO: 0.48 K/UL — LOW (ref 1.2–3.4)
LYMPHOCYTES # BLD AUTO: 8.1 % — LOW (ref 20.5–51.1)
LYMPHOCYTES # BLD AUTO: 8.8 % — LOW (ref 20.5–51.1)
MCHC RBC-ENTMCNC: 29.4 PG — SIGNIFICANT CHANGE UP (ref 27–31)
MCHC RBC-ENTMCNC: 29.8 PG — SIGNIFICANT CHANGE UP (ref 27–31)
MCHC RBC-ENTMCNC: 30 PG — SIGNIFICANT CHANGE UP (ref 27–31)
MCHC RBC-ENTMCNC: 30 PG — SIGNIFICANT CHANGE UP (ref 27–31)
MCHC RBC-ENTMCNC: 30.8 G/DL — LOW (ref 32–37)
MCHC RBC-ENTMCNC: 31.2 G/DL — LOW (ref 32–37)
MCHC RBC-ENTMCNC: 31.8 G/DL — LOW (ref 32–37)
MCHC RBC-ENTMCNC: 31.8 G/DL — LOW (ref 32–37)
MCV RBC AUTO: 94.4 FL — HIGH (ref 80–94)
MCV RBC AUTO: 94.4 FL — HIGH (ref 80–94)
MCV RBC AUTO: 95.4 FL — HIGH (ref 80–94)
MCV RBC AUTO: 95.6 FL — HIGH (ref 80–94)
MONOCYTES # BLD AUTO: 0.52 K/UL — SIGNIFICANT CHANGE UP (ref 0.1–0.6)
MONOCYTES # BLD AUTO: 0.54 K/UL — SIGNIFICANT CHANGE UP (ref 0.1–0.6)
MONOCYTES NFR BLD AUTO: 10.1 % — HIGH (ref 1.7–9.3)
MONOCYTES NFR BLD AUTO: 8.8 % — SIGNIFICANT CHANGE UP (ref 1.7–9.3)
NEUTROPHILS # BLD AUTO: 4.31 K/UL — SIGNIFICANT CHANGE UP (ref 1.4–6.5)
NEUTROPHILS # BLD AUTO: 4.86 K/UL — SIGNIFICANT CHANGE UP (ref 1.4–6.5)
NEUTROPHILS NFR BLD AUTO: 80.2 % — HIGH (ref 42.2–75.2)
NEUTROPHILS NFR BLD AUTO: 82.2 % — HIGH (ref 42.2–75.2)
NRBC # BLD: 0 /100 WBCS — SIGNIFICANT CHANGE UP (ref 0–0)
PLATELET # BLD AUTO: 53 K/UL — LOW (ref 130–400)
PLATELET # BLD AUTO: 58 K/UL — LOW (ref 130–400)
PLATELET # BLD AUTO: 58 K/UL — LOW (ref 130–400)
PLATELET # BLD AUTO: 67 K/UL — LOW (ref 130–400)
PMV BLD: 12.1 FL — HIGH (ref 7.4–10.4)
PMV BLD: 13.5 FL — HIGH (ref 7.4–10.4)
PMV BLD: 13.8 FL — HIGH (ref 7.4–10.4)
PMV BLD: 13.8 FL — HIGH (ref 7.4–10.4)
POTASSIUM SERPL-MCNC: 4.1 MMOL/L — SIGNIFICANT CHANGE UP (ref 3.5–5)
POTASSIUM SERPL-SCNC: 4.1 MMOL/L — SIGNIFICANT CHANGE UP (ref 3.5–5)
PROT SERPL-MCNC: 5.6 G/DL — LOW (ref 6–8)
RBC # BLD: 2.28 M/UL — LOW (ref 4.7–6.1)
RBC # BLD: 2.38 M/UL — LOW (ref 4.7–6.1)
RBC # BLD: 2.5 M/UL — LOW (ref 4.7–6.1)
RBC # BLD: 2.5 M/UL — LOW (ref 4.7–6.1)
RBC # FLD: 17 % — HIGH (ref 11.5–14.5)
RBC # FLD: 17.1 % — HIGH (ref 11.5–14.5)
SODIUM SERPL-SCNC: 134 MMOL/L — LOW (ref 135–146)
WBC # BLD: 5.37 K/UL — SIGNIFICANT CHANGE UP (ref 4.8–10.8)
WBC # BLD: 5.69 K/UL — SIGNIFICANT CHANGE UP (ref 4.8–10.8)
WBC # BLD: 5.82 K/UL — SIGNIFICANT CHANGE UP (ref 4.8–10.8)
WBC # BLD: 5.91 K/UL — SIGNIFICANT CHANGE UP (ref 4.8–10.8)
WBC # FLD AUTO: 5.37 K/UL — SIGNIFICANT CHANGE UP (ref 4.8–10.8)
WBC # FLD AUTO: 5.69 K/UL — SIGNIFICANT CHANGE UP (ref 4.8–10.8)
WBC # FLD AUTO: 5.82 K/UL — SIGNIFICANT CHANGE UP (ref 4.8–10.8)
WBC # FLD AUTO: 5.91 K/UL — SIGNIFICANT CHANGE UP (ref 4.8–10.8)

## 2024-05-14 PROCEDURE — 99233 SBSQ HOSP IP/OBS HIGH 50: CPT

## 2024-05-14 PROCEDURE — 99221 1ST HOSP IP/OBS SF/LOW 40: CPT

## 2024-05-14 PROCEDURE — 93971 EXTREMITY STUDY: CPT | Mod: 26,RT

## 2024-05-14 PROCEDURE — 93010 ELECTROCARDIOGRAM REPORT: CPT

## 2024-05-14 RX ORDER — DILTIAZEM HCL 120 MG
10 CAPSULE, EXT RELEASE 24 HR ORAL ONCE
Refills: 0 | Status: COMPLETED | OUTPATIENT
Start: 2024-05-14 | End: 2024-05-14

## 2024-05-14 RX ORDER — HYDROMORPHONE HYDROCHLORIDE 2 MG/ML
1 INJECTION INTRAMUSCULAR; INTRAVENOUS; SUBCUTANEOUS ONCE
Refills: 0 | Status: DISCONTINUED | OUTPATIENT
Start: 2024-05-14 | End: 2024-05-14

## 2024-05-14 RX ORDER — OXYCODONE HYDROCHLORIDE 5 MG/1
15 TABLET ORAL EVERY 8 HOURS
Refills: 0 | Status: DISCONTINUED | OUTPATIENT
Start: 2024-05-14 | End: 2024-05-16

## 2024-05-14 RX ORDER — SODIUM CHLORIDE 9 MG/ML
500 INJECTION INTRAMUSCULAR; INTRAVENOUS; SUBCUTANEOUS ONCE
Refills: 0 | Status: COMPLETED | OUTPATIENT
Start: 2024-05-14 | End: 2024-05-14

## 2024-05-14 RX ORDER — DILTIAZEM HCL 120 MG
180 CAPSULE, EXT RELEASE 24 HR ORAL ONCE
Refills: 0 | Status: COMPLETED | OUTPATIENT
Start: 2024-05-14 | End: 2024-05-14

## 2024-05-14 RX ORDER — METOPROLOL TARTRATE 50 MG
5 TABLET ORAL ONCE
Refills: 0 | Status: COMPLETED | OUTPATIENT
Start: 2024-05-14 | End: 2024-05-14

## 2024-05-14 RX ORDER — DILTIAZEM HCL 120 MG
60 CAPSULE, EXT RELEASE 24 HR ORAL THREE TIMES A DAY
Refills: 0 | Status: DISCONTINUED | OUTPATIENT
Start: 2024-05-14 | End: 2024-05-14

## 2024-05-14 RX ORDER — DILTIAZEM HCL 120 MG
5 CAPSULE, EXT RELEASE 24 HR ORAL
Qty: 125 | Refills: 0 | Status: DISCONTINUED | OUTPATIENT
Start: 2024-05-14 | End: 2024-05-15

## 2024-05-14 RX ADMIN — Medication 25 MILLIGRAM(S): at 06:28

## 2024-05-14 RX ADMIN — OXYCODONE HYDROCHLORIDE 15 MILLIGRAM(S): 5 TABLET ORAL at 14:48

## 2024-05-14 RX ADMIN — Medication 10 MILLIGRAM(S): at 04:59

## 2024-05-14 RX ADMIN — Medication 20 MILLIGRAM(S): at 06:28

## 2024-05-14 RX ADMIN — ONDANSETRON 4 MILLIGRAM(S): 8 TABLET, FILM COATED ORAL at 11:51

## 2024-05-14 RX ADMIN — HYDROMORPHONE HYDROCHLORIDE 2 MILLIGRAM(S): 2 INJECTION INTRAMUSCULAR; INTRAVENOUS; SUBCUTANEOUS at 16:29

## 2024-05-14 RX ADMIN — HYDROMORPHONE HYDROCHLORIDE 2 MILLIGRAM(S): 2 INJECTION INTRAMUSCULAR; INTRAVENOUS; SUBCUTANEOUS at 00:34

## 2024-05-14 RX ADMIN — CHLORHEXIDINE GLUCONATE 1 APPLICATION(S): 213 SOLUTION TOPICAL at 08:00

## 2024-05-14 RX ADMIN — Medication 5 MILLIGRAM(S): at 06:07

## 2024-05-14 RX ADMIN — APIXABAN 2.5 MILLIGRAM(S): 2.5 TABLET, FILM COATED ORAL at 16:26

## 2024-05-14 RX ADMIN — HYDROMORPHONE HYDROCHLORIDE 1 MILLIGRAM(S): 2 INJECTION INTRAMUSCULAR; INTRAVENOUS; SUBCUTANEOUS at 23:58

## 2024-05-14 RX ADMIN — APIXABAN 2.5 MILLIGRAM(S): 2.5 TABLET, FILM COATED ORAL at 06:28

## 2024-05-14 RX ADMIN — TAMSULOSIN HYDROCHLORIDE 0.8 MILLIGRAM(S): 0.4 CAPSULE ORAL at 21:40

## 2024-05-14 RX ADMIN — OXYCODONE HYDROCHLORIDE 15 MILLIGRAM(S): 5 TABLET ORAL at 21:39

## 2024-05-14 RX ADMIN — Medication 5 MG/HR: at 21:27

## 2024-05-14 RX ADMIN — TACROLIMUS 3 MILLIGRAM(S): 5 CAPSULE ORAL at 06:28

## 2024-05-14 RX ADMIN — SODIUM CHLORIDE 1000 MILLILITER(S): 9 INJECTION INTRAMUSCULAR; INTRAVENOUS; SUBCUTANEOUS at 08:20

## 2024-05-14 RX ADMIN — Medication 180 MILLIGRAM(S): at 04:35

## 2024-05-14 RX ADMIN — MYCOPHENOLATE MOFETIL 1000 MILLIGRAM(S): 250 CAPSULE ORAL at 16:26

## 2024-05-14 RX ADMIN — MYCOPHENOLATE MOFETIL 1000 MILLIGRAM(S): 250 CAPSULE ORAL at 06:27

## 2024-05-14 RX ADMIN — Medication 5 MG/HR: at 11:55

## 2024-05-14 RX ADMIN — Medication 10 MILLIGRAM(S): at 04:20

## 2024-05-14 RX ADMIN — HYDROMORPHONE HYDROCHLORIDE 2 MILLIGRAM(S): 2 INJECTION INTRAMUSCULAR; INTRAVENOUS; SUBCUTANEOUS at 04:37

## 2024-05-14 RX ADMIN — HYDROMORPHONE HYDROCHLORIDE 2 MILLIGRAM(S): 2 INJECTION INTRAMUSCULAR; INTRAVENOUS; SUBCUTANEOUS at 11:50

## 2024-05-14 RX ADMIN — TACROLIMUS 2 MILLIGRAM(S): 5 CAPSULE ORAL at 00:34

## 2024-05-14 RX ADMIN — TACROLIMUS 2 MILLIGRAM(S): 5 CAPSULE ORAL at 21:45

## 2024-05-14 RX ADMIN — ISOSORBIDE MONONITRATE 30 MILLIGRAM(S): 60 TABLET, EXTENDED RELEASE ORAL at 16:27

## 2024-05-14 RX ADMIN — Medication 25 MILLIGRAM(S): at 16:26

## 2024-05-14 RX ADMIN — ATORVASTATIN CALCIUM 80 MILLIGRAM(S): 80 TABLET, FILM COATED ORAL at 21:39

## 2024-05-14 RX ADMIN — PANTOPRAZOLE SODIUM 40 MILLIGRAM(S): 20 TABLET, DELAYED RELEASE ORAL at 07:17

## 2024-05-14 RX ADMIN — SENNA PLUS 2 TABLET(S): 8.6 TABLET ORAL at 21:46

## 2024-05-14 NOTE — PROGRESS NOTE ADULT - ASSESSMENT
69-year-old history of hypertension, hyperlipidemia, HFpEF (70-75%) grade 2 DD, ESRD s/p kidney transplant 8 years ago at [place], chronic hep C, A-fib with ablation preformed yesterday presenting for left groin pain found to have a L thigh hematoma.     #Left Hip adductor hematoma  #Acute on Chronic Anemia  #Possible AV fistula in L groin  #S/p afib ablation 5/10  - Hgb drop from 11.4 -> 7.2, s/p 1U pRBCs in ED  - CT abdomen and pelvis showing left adductor muscle hematoma  - No active extravasation on CT Angio  - Evaluated by IR no intervention deemed necessary  - LLE duplex - no pseudoaneurysm, evidence of possible AV fistula  - Vascular following - plan for CTA LLE after device placement  - Hb has remained stable ~ 7, has not required additional blood transfusion  - C/w Eliquis for AFib, discussed with EP (on reduced dose 2.5 mg BID)    #Atrial FIbrillation/Flutter  #s/p AFIb ablation 5/10  - Remains on Cardizem drip, has required Cardizem pushes for episodes of rapid HR over 150  - C/w Metoprolol tartrate 25 mg BID  - EP following, plan for AV marleni ablation and PPM likely Thursday    #ESRD sp Kidney transplant  - Patient had kidney transplant 8 years ago at [place]    Plan:  - Nephrology consult   - Continue Tacro 2mg in AM and 3mg in PM   - Continue Mycophenolate 1000mg qd   - Fu Tacro level     #Chronic Lower Back Pain   - Takes Oxycodone PRN at home, will c/w 15 mg q8 standing for now given severe pain  - Dilaudid 4 mg IV q4 PRN for breakthrough pain    #Misc  - DVT Prophylaxis: Eliquis   - GI Prophylaxis: None  - Diet: DASH   - Activity: As tolerated  - IV Fluids: None  - Code Status: Full     Dispo: Acute

## 2024-05-14 NOTE — CONSULT NOTE ADULT - NS ATTEND AMEND GEN_ALL_CORE FT
I reviewed the imaging and labs. The patient has a left thigh hematoma with small blush. The hematoma is not expanding and the H/H is stable. There is no indication for intervention at this time. Follow H/H.     On duplex there is suggestion of small AVF which is no evident on CTA. This is possibly a low flow AVF that often will close spontaneously without intervention. Recommend repeating the duplex in 2-4 weeks to reevaluate.     Will follow.

## 2024-05-14 NOTE — PROGRESS NOTE ADULT - ATTENDING COMMENTS
Chronic back pain.    Rate somewhat controlled on Cardizem gtt.  Episodes of PSVT / PAFL.  BP stable.  Hb stable.    1. AFL s/p Ablation (recurrent)    2. Chronic Diastolic CHF (relatively compensated)    3. ESRD s/p Renal Transplant (stable)    4. Left groin hematoma (stable)    5. Lower back pain (chronic)    - Cont Cardizem gtt and Metoprolol  - Cont Eliquis given recent ablation.  Monitor Hb.  - Cont Lasix  - RTC + PRN pain control  - Immunosuppressant management per renal  - Tentative AVN ablation + PPM (follow-up EP)

## 2024-05-14 NOTE — CONSULT NOTE ADULT - SUBJECTIVE AND OBJECTIVE BOX
Patient is a 69y old  Male who presents with a chief complaint of Thigh hematoma (13 May 2024 13:26)      HPI: 69-year-old history of hypertension, hyperlipidemia, HFpEF (70-75%) grade 2 DD, ESRD s/p kidney transplant 8 years ago at [place], chronic hep C, A-fib with ablation preformed yesterday presenting for left groin pain. Patient was discharged yesterday from [facility] after a 2 day stay following an A-Fib ablation. Access site was through the Left groin, evaluated prior to discharge with no hematoma or pseudoaneurysm. Patient states that he has had pain at the site since the procedure, but it worsened after discharge. There was no overt hematoma, minimal ecchymosis at the site.     Patient denies fevers/chills, denies lightheadedness/dizziness, denies SOB/chest pain, denies nausea/vomiting, denies constipation/diarrhea.      ROS: 10-system review is otherwise negative except HPI above.      PAST MEDICAL & SURGICAL HISTORY:  CKD (chronic kidney disease)      ESRD (end stage renal disease)      HTN (hypertension)      HLD (hyperlipidemia)      Atrial fibrillation  on eliquis      COPD, mild  not on O2      Peripheral neuropathy  unclear cause      Lymphoma  ?questionable, not treated, not followed      Melanoma  R eye, s/p laser      Cirrhosis  possibly related to hepC      Breast cancer      Legally blind      Stroke      History of heroin abuse      History of chronic hepatitis      Marijuana use      History of kidney transplant  3 years ago      S/P arteriovenous (AV) fistula creation  prior old fistula in R arm      S/P lumpectomy, right breast      History of back surgery  s/p Left L5-S1 endoscopic laminoforaminotomy      History of loop recorder        FAMILY HISTORY:  FH: dementia  mother, alive    FHx: breast cancer  sister ( in 30s)      [] Family history not pertinent as reviewed with the patient and family        ALLERGIES: Rifuxen (Swelling)  Rituxan (Hives)          CURRENT MEDICATIONS  MEDICATIONS (STANDING): apixaban 2.5 milliGRAM(s) Oral every 12 hours  atorvastatin 80 milliGRAM(s) Oral at bedtime  diltiazem Infusion 5 mG/Hr IV Continuous <Continuous>  furosemide    Tablet 20 milliGRAM(s) Oral daily  isosorbide   mononitrate ER Tablet (IMDUR) 30 milliGRAM(s) Oral daily  metoprolol tartrate 25 milliGRAM(s) Oral two times a day  mycophenolate mofetil 1000 milliGRAM(s) Oral two times a day  pantoprazole    Tablet 40 milliGRAM(s) Oral before breakfast  polyethylene glycol 3350 17 Gram(s) Oral daily  senna 2 Tablet(s) Oral at bedtime  tacrolimus 3 milliGRAM(s) Oral <User Schedule>  tacrolimus 2 milliGRAM(s) Oral at bedtime  tamsulosin 0.8 milliGRAM(s) Oral at bedtime    MEDICATIONS (PRN):acetaminophen     Tablet .. 650 milliGRAM(s) Oral every 6 hours PRN Temp greater or equal to 38C (100.4F), Mild Pain (1 - 3)  aluminum hydroxide/magnesium hydroxide/simethicone Suspension 30 milliLiter(s) Oral every 4 hours PRN Dyspepsia  HYDROmorphone  Injectable 2 milliGRAM(s) IV Push every 4 hours PRN Severe Pain (7 - 10)  melatonin 3 milliGRAM(s) Oral at bedtime PRN Insomnia  ondansetron Injectable 4 milliGRAM(s) IV Push every 8 hours PRN Nausea and/or Vomiting  oxyCODONE    IR 15 milliGRAM(s) Oral every 8 hours PRN Moderate Pain (4 - 6)    --------------------------------------------------------------------------------------------    Vitals:   T(C): 36.6 (24 @ 12:00), Max: 36.9 (24 @ 07:00)  HR: 100 (24 @ 12:00) (57 - 165)  BP: 100/53 (24 @ 12:00) (80/63 - 123/61)  RR: 21 (24 @ 12:00) (17 - 22)  SpO2: 100% (24 @ 12:00) (94% - 100%)  CAPILLARY BLOOD GLUCOSE      POCT Blood Glucose.: 134 mg/dL (14 May 2024 12:01)  POCT Blood Glucose.: 123 mg/dL (14 May 2024 07:54)    CAPILLARY BLOOD GLUCOSE      POCT Blood Glucose.: 134 mg/dL (14 May 2024 12:01)  POCT Blood Glucose.: 123 mg/dL (14 May 2024 07:54)      Height (cm): 175.3 ( @ 02:34)  Weight (kg): 59 ( @ 02:34)  BMI (kg/m2): 19.2 ( @ 02:34)  BSA (m2): 1.72 ( 02:34)        PHYSICAL EXAM:  General: NAD  Resp: Good effort  Vascular: Extremities are normal in size, color and temperature.   b/l DP/PT dopplerable  Skin: Intact, no breakdown. Left groin small hematoma with large ecchymosis soft to palpation.   Musculoskeletal: All 4 extremities moving spontaneously, no limitations.   --------------------------------------------------------------------------------------------    LABS  CBC ( @ 08:30)                              7.5<L>                         5.91    )----------------(  53<L>      82.2<H>% Neutrophils, 8.1<L>% Lymphocytes, ANC: 4.86                                23.6<L>  CBC ( @ 00:43)                              7.5<L>                         5.82    )----------------(  67<L>      --    % Neutrophils, --    % Lymphocytes, ANC: --                                  23.6<L>    BMP ( @ 08:30)             134<L>  |  106     |  27<H> 		Ca++ --      Ca 10.0               ---------------------------------( 113<H>		Mg --                 4.1     |  18      |  1.8<H>			Ph --      BMP ( @ 05:10)             135     |  106     |  33<H> 		Ca++ --      Ca 9.9                ---------------------------------( 157<H>		Mg --                 4.0     |  18      |  1.9<H>			Ph --        LFTs ( @ 08:30)      TPro 5.6<L> / Alb 3.8 / TBili 0.8 / DBili -- / AST 33 / ALT 31 / AlkPhos 87    Coags ( @ 07:07)  aPTT 28.1 / INR 1.43<H> / PT 16.40<H>          --------------------------------------------------------------------------------------------    MICROBIOLOGY  Urinalysis ( @ 08:30):     Color:  / Appearance:  / SG:  / pH:  / Gluc: 113<H> / Ketones:  / Bili:  / Urobili:  / Protein : / Nitrites:  / Leuk.Est:  / RBC:  / WBC:  / Sq Epi:  / Non Sq Epi:  / Bacteria          --------------------------------------------------------------------------------------------    IMAGING    VA Duplex Low Ext Arterial, Ltd, Left (24 @ 16:23)     No pseudoaneurysm is visualized in left groin with normal flow and   arterial system.  Evidence of arterial flow in the venous system with possibility of   arteriovenous fistula in the left groin.

## 2024-05-15 DIAGNOSIS — N18.9 CHRONIC KIDNEY DISEASE, UNSPECIFIED: ICD-10-CM

## 2024-05-15 DIAGNOSIS — I48.19 OTHER PERSISTENT ATRIAL FIBRILLATION: ICD-10-CM

## 2024-05-15 DIAGNOSIS — E78.5 HYPERLIPIDEMIA, UNSPECIFIED: ICD-10-CM

## 2024-05-15 DIAGNOSIS — I48.92 UNSPECIFIED ATRIAL FLUTTER: ICD-10-CM

## 2024-05-15 DIAGNOSIS — I13.0 HYPERTENSIVE HEART AND CHRONIC KIDNEY DISEASE WITH HEART FAILURE AND STAGE 1 THROUGH STAGE 4 CHRONIC KIDNEY DISEASE, OR UNSPECIFIED CHRONIC KIDNEY DISEASE: ICD-10-CM

## 2024-05-15 DIAGNOSIS — K74.60 UNSPECIFIED CIRRHOSIS OF LIVER: ICD-10-CM

## 2024-05-15 DIAGNOSIS — Z88.8 ALLERGY STATUS TO OTHER DRUGS, MEDICAMENTS AND BIOLOGICAL SUBSTANCES: ICD-10-CM

## 2024-05-15 DIAGNOSIS — J44.9 CHRONIC OBSTRUCTIVE PULMONARY DISEASE, UNSPECIFIED: ICD-10-CM

## 2024-05-15 DIAGNOSIS — H54.7 UNSPECIFIED VISUAL LOSS: ICD-10-CM

## 2024-05-15 DIAGNOSIS — Z86.73 PERSONAL HISTORY OF TRANSIENT ISCHEMIC ATTACK (TIA), AND CEREBRAL INFARCTION WITHOUT RESIDUAL DEFICITS: ICD-10-CM

## 2024-05-15 DIAGNOSIS — K73.9 CHRONIC HEPATITIS, UNSPECIFIED: ICD-10-CM

## 2024-05-15 DIAGNOSIS — Z94.0 KIDNEY TRANSPLANT STATUS: ICD-10-CM

## 2024-05-15 DIAGNOSIS — L76.32 POSTPROCEDURAL HEMATOMA OF SKIN AND SUBCUTANEOUS TISSUE FOLLOWING OTHER PROCEDURE: ICD-10-CM

## 2024-05-15 DIAGNOSIS — I50.32 CHRONIC DIASTOLIC (CONGESTIVE) HEART FAILURE: ICD-10-CM

## 2024-05-15 DIAGNOSIS — Z85.3 PERSONAL HISTORY OF MALIGNANT NEOPLASM OF BREAST: ICD-10-CM

## 2024-05-15 PROBLEM — Z87.19 PERSONAL HISTORY OF OTHER DISEASES OF THE DIGESTIVE SYSTEM: Chronic | Status: ACTIVE | Noted: 2024-04-30

## 2024-05-15 LAB
ALBUMIN SERPL ELPH-MCNC: 3.5 G/DL — SIGNIFICANT CHANGE UP (ref 3.5–5.2)
ALP SERPL-CCNC: 73 U/L — SIGNIFICANT CHANGE UP (ref 30–115)
ALT FLD-CCNC: 25 U/L — SIGNIFICANT CHANGE UP (ref 0–41)
ANION GAP SERPL CALC-SCNC: 8 MMOL/L — SIGNIFICANT CHANGE UP (ref 7–14)
AST SERPL-CCNC: 31 U/L — SIGNIFICANT CHANGE UP (ref 0–41)
BASOPHILS # BLD AUTO: 0 K/UL — SIGNIFICANT CHANGE UP (ref 0–0.2)
BASOPHILS NFR BLD AUTO: 0 % — SIGNIFICANT CHANGE UP (ref 0–1)
BILIRUB SERPL-MCNC: 0.7 MG/DL — SIGNIFICANT CHANGE UP (ref 0.2–1.2)
BUN SERPL-MCNC: 31 MG/DL — HIGH (ref 10–20)
CALCIUM SERPL-MCNC: 9.7 MG/DL — SIGNIFICANT CHANGE UP (ref 8.4–10.5)
CHLORIDE SERPL-SCNC: 108 MMOL/L — SIGNIFICANT CHANGE UP (ref 98–110)
CO2 SERPL-SCNC: 22 MMOL/L — SIGNIFICANT CHANGE UP (ref 17–32)
CREAT SERPL-MCNC: 2 MG/DL — HIGH (ref 0.7–1.5)
EGFR: 36 ML/MIN/1.73M2 — LOW
EOSINOPHIL # BLD AUTO: 0.01 K/UL — SIGNIFICANT CHANGE UP (ref 0–0.7)
EOSINOPHIL NFR BLD AUTO: 0.3 % — SIGNIFICANT CHANGE UP (ref 0–8)
GLUCOSE BLDC GLUCOMTR-MCNC: 131 MG/DL — HIGH (ref 70–99)
GLUCOSE SERPL-MCNC: 109 MG/DL — HIGH (ref 70–99)
HCT VFR BLD CALC: 19.7 % — LOW (ref 42–52)
HCT VFR BLD CALC: 23.9 % — LOW (ref 42–52)
HGB BLD-MCNC: 6.2 G/DL — CRITICAL LOW (ref 14–18)
HGB BLD-MCNC: 7.4 G/DL — LOW (ref 14–18)
IMM GRANULOCYTES NFR BLD AUTO: 0.6 % — HIGH (ref 0.1–0.3)
LYMPHOCYTES # BLD AUTO: 0.42 K/UL — LOW (ref 1.2–3.4)
LYMPHOCYTES # BLD AUTO: 13 % — LOW (ref 20.5–51.1)
MAGNESIUM SERPL-MCNC: 2 MG/DL — SIGNIFICANT CHANGE UP (ref 1.8–2.4)
MCHC RBC-ENTMCNC: 29 PG — SIGNIFICANT CHANGE UP (ref 27–31)
MCHC RBC-ENTMCNC: 30.1 PG — SIGNIFICANT CHANGE UP (ref 27–31)
MCHC RBC-ENTMCNC: 31 G/DL — LOW (ref 32–37)
MCHC RBC-ENTMCNC: 31.5 G/DL — LOW (ref 32–37)
MCV RBC AUTO: 93.7 FL — SIGNIFICANT CHANGE UP (ref 80–94)
MCV RBC AUTO: 95.6 FL — HIGH (ref 80–94)
MONOCYTES # BLD AUTO: 0.25 K/UL — SIGNIFICANT CHANGE UP (ref 0.1–0.6)
MONOCYTES NFR BLD AUTO: 7.8 % — SIGNIFICANT CHANGE UP (ref 1.7–9.3)
NEUTROPHILS # BLD AUTO: 2.52 K/UL — SIGNIFICANT CHANGE UP (ref 1.4–6.5)
NEUTROPHILS NFR BLD AUTO: 78.3 % — HIGH (ref 42.2–75.2)
NRBC # BLD: 0 /100 WBCS — SIGNIFICANT CHANGE UP (ref 0–0)
NRBC # BLD: 0 /100 WBCS — SIGNIFICANT CHANGE UP (ref 0–0)
PHOSPHATE SERPL-MCNC: 3.2 MG/DL — SIGNIFICANT CHANGE UP (ref 2.1–4.9)
PLATELET # BLD AUTO: 49 K/UL — LOW (ref 130–400)
PLATELET # BLD AUTO: 52 K/UL — LOW (ref 130–400)
PMV BLD: 12.7 FL — HIGH (ref 7.4–10.4)
PMV BLD: 13.9 FL — HIGH (ref 7.4–10.4)
POTASSIUM SERPL-MCNC: 4.2 MMOL/L — SIGNIFICANT CHANGE UP (ref 3.5–5)
POTASSIUM SERPL-SCNC: 4.2 MMOL/L — SIGNIFICANT CHANGE UP (ref 3.5–5)
PROT SERPL-MCNC: 5 G/DL — LOW (ref 6–8)
RBC # BLD: 2.06 M/UL — LOW (ref 4.7–6.1)
RBC # BLD: 2.55 M/UL — LOW (ref 4.7–6.1)
RBC # FLD: 16.4 % — HIGH (ref 11.5–14.5)
RBC # FLD: 16.7 % — HIGH (ref 11.5–14.5)
SODIUM SERPL-SCNC: 138 MMOL/L — SIGNIFICANT CHANGE UP (ref 135–146)
WBC # BLD: 3.22 K/UL — LOW (ref 4.8–10.8)
WBC # BLD: 4.85 K/UL — SIGNIFICANT CHANGE UP (ref 4.8–10.8)
WBC # FLD AUTO: 3.22 K/UL — LOW (ref 4.8–10.8)
WBC # FLD AUTO: 4.85 K/UL — SIGNIFICANT CHANGE UP (ref 4.8–10.8)

## 2024-05-15 PROCEDURE — 93010 ELECTROCARDIOGRAM REPORT: CPT

## 2024-05-15 PROCEDURE — 71045 X-RAY EXAM CHEST 1 VIEW: CPT | Mod: 26

## 2024-05-15 RX ORDER — SODIUM CHLORIDE 9 MG/ML
500 INJECTION, SOLUTION INTRAVENOUS ONCE
Refills: 0 | Status: DISCONTINUED | OUTPATIENT
Start: 2024-05-15 | End: 2024-05-15

## 2024-05-15 RX ORDER — DILTIAZEM HCL 120 MG
5 CAPSULE, EXT RELEASE 24 HR ORAL
Qty: 125 | Refills: 0 | Status: DISCONTINUED | OUTPATIENT
Start: 2024-05-15 | End: 2024-05-15

## 2024-05-15 RX ORDER — DILTIAZEM HCL 120 MG
5 CAPSULE, EXT RELEASE 24 HR ORAL
Qty: 125 | Refills: 0 | Status: DISCONTINUED | OUTPATIENT
Start: 2024-05-15 | End: 2024-05-16

## 2024-05-15 RX ORDER — METOPROLOL TARTRATE 50 MG
25 TABLET ORAL
Refills: 0 | Status: DISCONTINUED | OUTPATIENT
Start: 2024-05-15 | End: 2024-05-16

## 2024-05-15 RX ADMIN — MYCOPHENOLATE MOFETIL 1000 MILLIGRAM(S): 250 CAPSULE ORAL at 05:47

## 2024-05-15 RX ADMIN — APIXABAN 2.5 MILLIGRAM(S): 2.5 TABLET, FILM COATED ORAL at 18:28

## 2024-05-15 RX ADMIN — MYCOPHENOLATE MOFETIL 1000 MILLIGRAM(S): 250 CAPSULE ORAL at 22:10

## 2024-05-15 RX ADMIN — Medication 25 MILLIGRAM(S): at 05:47

## 2024-05-15 RX ADMIN — OXYCODONE HYDROCHLORIDE 15 MILLIGRAM(S): 5 TABLET ORAL at 05:47

## 2024-05-15 RX ADMIN — SENNA PLUS 2 TABLET(S): 8.6 TABLET ORAL at 21:46

## 2024-05-15 RX ADMIN — TACROLIMUS 2 MILLIGRAM(S): 5 CAPSULE ORAL at 21:46

## 2024-05-15 RX ADMIN — APIXABAN 2.5 MILLIGRAM(S): 2.5 TABLET, FILM COATED ORAL at 05:47

## 2024-05-15 RX ADMIN — Medication 25 MILLIGRAM(S): at 23:04

## 2024-05-15 RX ADMIN — Medication 25 MILLIGRAM(S): at 18:28

## 2024-05-15 RX ADMIN — Medication 5 MG/HR: at 05:54

## 2024-05-15 RX ADMIN — ATORVASTATIN CALCIUM 80 MILLIGRAM(S): 80 TABLET, FILM COATED ORAL at 21:47

## 2024-05-15 RX ADMIN — OXYCODONE HYDROCHLORIDE 15 MILLIGRAM(S): 5 TABLET ORAL at 21:46

## 2024-05-15 RX ADMIN — Medication 25 MILLIGRAM(S): at 12:53

## 2024-05-15 RX ADMIN — ISOSORBIDE MONONITRATE 30 MILLIGRAM(S): 60 TABLET, EXTENDED RELEASE ORAL at 12:53

## 2024-05-15 RX ADMIN — PANTOPRAZOLE SODIUM 40 MILLIGRAM(S): 20 TABLET, DELAYED RELEASE ORAL at 08:37

## 2024-05-15 RX ADMIN — HYDROMORPHONE HYDROCHLORIDE 2 MILLIGRAM(S): 2 INJECTION INTRAMUSCULAR; INTRAVENOUS; SUBCUTANEOUS at 18:29

## 2024-05-15 RX ADMIN — Medication 3 MILLIGRAM(S): at 21:47

## 2024-05-15 RX ADMIN — TACROLIMUS 3 MILLIGRAM(S): 5 CAPSULE ORAL at 05:47

## 2024-05-15 RX ADMIN — TAMSULOSIN HYDROCHLORIDE 0.8 MILLIGRAM(S): 0.4 CAPSULE ORAL at 21:47

## 2024-05-15 RX ADMIN — OXYCODONE HYDROCHLORIDE 15 MILLIGRAM(S): 5 TABLET ORAL at 22:21

## 2024-05-15 NOTE — PROGRESS NOTE ADULT - ASSESSMENT
Assessment: 69-year-old history of hypertension, hyperlipidemia, HFpEF (70-75%) grade 2 DD, ESRD s/p kidney transplant 8 years ago at [place], chronic hep C, A-fib with ablation preformed yesterday presenting for left groin pain found to have a L thigh hematoma.      Problems discussed and associated plan:    # Atrial Fibrillation  - s/p Afib ablation 5/10  - Continue Cardizem gtt with intermittent pushes  - EP: Plan for AV marleni ablation and PPM tomorrow   - Continue Eliquis 2.5 mg BID  - Continue Metoprolol 25 mg BID  - Continue Pantoprazole 40 mg daily     #Left Hip Hematoma/ Acute on Chronic Anemia/ Chronic Thrombcytopenia   - Hgb drop from 11.4 -> 7.2, s/p 2 units PRBCs in total this admission  - CT abdomen and pelvis showing left adductor muscle hematoma  - No active extravasation on CT Angio  - Evaluated by IR no intervention deemed necessary  - LLE duplex - no pseudoaneurysm, evidence of possible AV fistula  - Vascular following - plan for CTA LLE after device placement  - Monitor Hb, platelets  - C/w Eliquis for AFib, discussed with EP (on reduced dose 2.5 mg BID)    #ESRD sp Kidney transplant  - Patient had kidney transplant 8 years ago at [place]  - Nephrology consult pending  - Cr ~ 2, near baseline  - Takes Lasix 20 mg once daily at home, will hold for now as per nephro (appears euvolemic)  - Continue Tacro 2mg in AM and 3mg in PM   - Continue Mycophenolate 1000mg qd   - Fu Tacro level, pending    #Chronic Lower Back Pain   - Takes Oxycodone PRN at home, will c/w 15 mg q8 standing for now given severe pain  - Dilaudid 4 mg IV q4 PRN for breakthrough pain    #Misc  - DVT Prophylaxis: Eliquis   - GI Prophylaxis: None  - Diet: DASH   - Activity: As tolerated  - IV Fluids: None  - Code Status: Full.        Please contact me with any questions or concerns at [phone].

## 2024-05-15 NOTE — PATIENT PROFILE ADULT - .
Recommendation Override: Recommended mild skin care & resume [employer]- gel  as needed for flare ups. Detail Level: Zone Recommendations (Free Text): Discussed soolantra if metrogel not working Recommendation Preamble: The following recommendations were made during the visit: Render Risk Assessment In Note?: no 15-May-2024 20:39:51

## 2024-05-15 NOTE — PATIENT PROFILE ADULT - FUNCTIONAL ASSESSMENT - BASIC MOBILITY 6.
1-calculated by average/Not able to assess (calculate score using [facility] averaging method)

## 2024-05-15 NOTE — PATIENT PROFILE ADULT - FALL HARM RISK - HARM RISK INTERVENTIONS
Assistance with ambulation/Assistance OOB with selected safe patient handling equipment/Communicate Risk of Fall with Harm to all staff/Discuss with provider need for PT consult/Monitor gait and stability/Provide patient with walking aids - walker, cane, crutches/Reinforce activity limits and safety measures with patient and family/Sit up slowly, dangle for a short time, stand at bedside before walking/Tailored Fall Risk Interventions/Use of alarms - bed, chair and/or voice tab/Visual Cue: Yellow wristband and red socks/Bed in lowest position, wheels locked, appropriate side rails in place/Call bell, personal items and telephone in reach/Instruct patient to call for assistance before getting out of bed or chair/Non-slip footwear when patient is out of bed/[place] to call system/Physically safe environment - no spills, clutter or unnecessary equipment/Purposeful Proactive Rounding/Room/bathroom lighting operational, light cord in reach

## 2024-05-15 NOTE — PROGRESS NOTE ADULT - ASSESSMENT
69-year-old history of hypertension, hyperlipidemia, HFpEF (70-75%) grade 2 DD, ESRD s/p kidney transplant 8 years ago at [place], chronic hep C, A-fib with ablation preformed yesterday presenting for left groin pain found to have a L thigh hematoma.     #Left Hip adductor hematoma  #Acute on Chronic Anemia (Baseline Hb ~ 9)  #Possible AV fistula in L groin  #S/p afib ablation 5/10  - Hgb drop from 11.4 -> 7.2, s/p 1U pRBCs in ED  - CT abdomen and pelvis showing left adductor muscle hematoma  - No active extravasation on CT Angio  - Evaluated by IR no intervention deemed necessary  - LLE duplex - no pseudoaneurysm, evidence of possible AV fistula  - Vascular following - plan for CTA LLE after device placement  - Hb has remained stable ~ 7, dropped overnight to 6.8, subsequent CBC 6.2, receiving 1U pRBCs   - Repeat CBC at 11 AM  - C/w Eliquis for AFib, discussed with EP (on reduced dose 2.5 mg BID)    #Atrial FIbrillation/Flutter  #s/p AFIb ablation 5/10  - Remains on Cardizem drip, has required Cardizem pushes for episodes of rapid HR over 150  - C/w Metoprolol tartrate 25 mg BID  - EP following, plan for AV marleni ablation and PPM likely Thursday 5/16    #ESRD sp Kidney transplant  - Patient had kidney transplant 8 years ago at [facility]    Plan:  - Nephrology consult   - Continue Tacro 2mg in AM and 3mg in PM   - Continue Mycophenolate 1000mg qd   - Fu Tacro level     #Chronic Lower Back Pain   - Takes Oxycodone PRN at home, will c/w 15 mg q8 standing for now given severe pain  - Dilaudid 4 mg IV q4 PRN for breakthrough pain    #Misc  - DVT Prophylaxis: Eliquis   - GI Prophylaxis: None  - Diet: DASH   - Activity: As tolerated  - IV Fluids: None  - Code Status: Full     Dispo: Acute      69-year-old history of hypertension, hyperlipidemia, HFpEF (70-75%) grade 2 DD, ESRD s/p kidney transplant 8 years ago at [place], chronic hep C, A-fib with ablation preformed yesterday presenting for left groin pain found to have a L thigh hematoma.     #Left Hip adductor hematoma  #Acute on Chronic Anemia (Baseline Hb ~ 9)  #Possible AV fistula in L groin  #S/p afib ablation 5/10  - Hgb drop from 11.4 -> 7.2, s/p 1U pRBCs in ED  - CT abdomen and pelvis showing left adductor muscle hematoma  - No active extravasation on CT Angio  - Evaluated by IR no intervention deemed necessary  - LLE duplex - no pseudoaneurysm, evidence of possible AV fistula  - Vascular following - plan for CTA LLE after device placement  - Hb has remained stable ~ 7, dropped overnight to 6.8, subsequent CBC 6.2, receiving 1U pRBCs   - Repeat CBC at 11 AM  - C/w Eliquis for AFib, discussed with EP (on reduced dose 2.5 mg BID)    #Atrial FIbrillation/Flutter  #s/p AFIb ablation 5/10  - Remains on Cardizem drip, has required Cardizem pushes for episodes of rapid HR over 150  - C/w Metoprolol tartrate 25 mg BID  - EP following, plan for AV marleni ablation and PPM likely Thursday 5/16    #ESRD sp Kidney transplant  - Patient had kidney transplant 8 years ago at [place]  - Nephrology consult pending  - Cr ~ 2, near baseline  - Continue Tacro 2mg in AM and 3mg in PM   - Continue Mycophenolate 1000mg qd   - Fu Tacro level     #Chronic Lower Back Pain   - Takes Oxycodone PRN at home, will c/w 15 mg q8 standing for now given severe pain  - Dilaudid 4 mg IV q4 PRN for breakthrough pain    #Misc  - DVT Prophylaxis: Eliquis   - GI Prophylaxis: None  - Diet: DASH   - Activity: As tolerated  - IV Fluids: None  - Code Status: Full     Dispo: Acute      69-year-old history of hypertension, hyperlipidemia, HFpEF (70-75%) grade 2 DD, ESRD s/p kidney transplant 8 years ago at [place], chronic hep C, A-fib with ablation preformed yesterday presenting for left groin pain found to have a L thigh hematoma.     #Left Hip adductor hematoma  #Acute on Chronic Anemia (Baseline Hb ~ 9), Chronic Thrombocytopenia  #Possible AV fistula in L groin  #S/p afib ablation 5/10  - Hgb drop from 11.4 -> 7.2, s/p 1U pRBCs in ED  - CT abdomen and pelvis showing left adductor muscle hematoma  - No active extravasation on CT Angio  - Evaluated by IR no intervention deemed necessary  - LLE duplex - no pseudoaneurysm, evidence of possible AV fistula  - Vascular following - plan for CTA LLE after device placement  - Hb has remained stable ~ 7, dropped overnight to 6.8, subsequent CBC 6.2, receiving 1U pRBCs   - Repeat CBC at 11 AM  - Monitor Hb, platelets  - C/w Eliquis for AFib, discussed with EP (on reduced dose 2.5 mg BID)    #Atrial FIbrillation/Flutter  #s/p AFIb ablation 5/10  - Remains on Cardizem drip, has required Cardizem pushes for episodes of rapid HR over 150  - C/w Metoprolol tartrate 25 mg BID  - EP following, plan for AV marleni ablation and PPM likely Thursday 5/16    #ESRD sp Kidney transplant  - Patient had kidney transplant 8 years ago at [facility]  - Nephrology consult pending  - Cr ~ 2, near baseline  - Takes lasix 20 mg once daily at home, will hold for now  - Continue Tacro 2mg in AM and 3mg in PM   - Continue Mycophenolate 1000mg qd   - Fu Tacro level     #Chronic Lower Back Pain   - Takes Oxycodone PRN at home, will c/w 15 mg q8 standing for now given severe pain  - Dilaudid 4 mg IV q4 PRN for breakthrough pain    #Misc  - DVT Prophylaxis: Eliquis   - GI Prophylaxis: None  - Diet: DASH   - Activity: As tolerated  - IV Fluids: None  - Code Status: Full     Dispo: Acute

## 2024-05-15 NOTE — CONSULT NOTE ADULT - ASSESSMENT
ASSESSMENT: Patient is a 69y old m with a pmhx of HTN, HLD, HFpEF (70-75%), grade 2 DD, ESRD s/p kidney transplant 8 years ago, chronic Hep C, A-fib with ablation performed on 5/12/24, presenting with groin pain.     Vascular was contacted for Duplex evidence of arterial flow in the venous system with possibility of AV fistula in the left groin. Patient was seen and examined at bedside. There is tenderness over the left groin with palpation. Hematoma is small with large ecchymosis around it. No vascular intervention at this time.    PLAN:   - continue with EP plans of PPM placement  - please, obtain CTA LLE at some point to plan for possible repair of AV fistula after EP procedures  - may need to hold Eliquis for repair of AV fistula  - will follow  - Patient seen/examined or Plan Discussed with Fellow, Dr. Jarrell  - Plan to be discussed with Attending, Dr. Peters  
Impression   # Recurrent atrial flutter with 2:1  #AVNRT   # LEft groin hematoma   # Chronic thrombocytopenia     Recommendations   - Start cardizem gtt   - Repeat CBC stat please and if stable resume Eliquis with a STAT dose   - Trend CBC  - NPO after midnight for tentative AV marleni ablation with PPM placement 
Status post  donor Kidney transplant at [place] 6 years ago, creatinine increased  L groin hematoma post ablation  Pancytopenia  CAD  Atrial fibrillation  HTN  Cirrhosis    Plan    Transfuse as needed  Continue tacrolimus and check level  -->patient received diltiazem in ER  Continue mycophenolate  Renal recovery course may be prolonged as patient received IV contrast  DC lasix for now  Will follow with you

## 2024-05-15 NOTE — CONSULT NOTE ADULT - SUBJECTIVE AND OBJECTIVE BOX
[facility] INITIAL CONSULT NOTE  --------------------------------------------------------------------------------  HPI:  69-year-old history of hypertension, hyperlipidemia, HFpEF (70-75%) grade 2 DD, ESRD s/p kidney transplant 6  years ago at [place], chronic hep C, A-fib with ablation preformed yesterday presenting for left groin pain. Patient was discharged yesterday from [facility] after a 2 day stay following an A-Fib ablation. Access site was through the Left groin, evaluated prior to discharge with no hematoma or pseudoaneurysm. Patient states that he has had pain at the site since the procedure, but it worsened after discharge. There was no overt hematoma, minimal ecchymosis at the site.   In the ED patient was hemodynamically stable /82, . Patient was noted to be back in afib. Started on cardizem drip for HR control. Labs significant for Hgb drop from 11-> 7. CT angio was preformed which showed left adductor muscle hematoma with possibility of extravasation.     PAST HISTORY  --------------------------------------------------------------------------------  PAST MEDICAL & SURGICAL HISTORY:  ESRD (end stage renal disease)  Kidney transplant  HTN (hypertension)  HLD (hyperlipidemia)  Atrial fibrillationon   COPD  Peripheral neuropathy  Lymphoma  Melanoma  R eye, s/p laser  Cirrhosis  possibly related to hepC  Breast cancer  Legally blind  Stroke  History of heroin abuse  History of chronic hepatitis  Marijuana use  S/P arteriovenous (AV) fistula creation  History of back surgery  s/p Left L5-S1 endoscopic laminoforaminotomy    FAMILY HISTORY:  FH: dementia  mother, alive    FHx: breast cancer  sister ( in 30s)    SOCIAL HISTORY:  Denies current ETOH, tobacco, and illicit drug use    ALLERGIES & MEDICATIONS  --------------------------------------------------------------------------------  Allergies    Rifuxen (Swelling)  Rituxan (Hives)    Standing Inpatient Medications  apixaban 2.5 milliGRAM(s) Oral every 12 hours  atorvastatin 80 milliGRAM(s) Oral at bedtime  chlorhexidine 2% Cloths 1 Application(s) Topical <User Schedule>  diltiazem Infusion 5 mG/Hr IV Continuous <Continuous>  furosemide    Tablet 20 milliGRAM(s) Oral daily  isosorbide   mononitrate ER Tablet (IMDUR) 30 milliGRAM(s) Oral daily  metoprolol tartrate 25 milliGRAM(s) Oral four times a day  mycophenolate mofetil 1000 milliGRAM(s) Oral two times a day  oxyCODONE    IR 15 milliGRAM(s) Oral every 8 hours  pantoprazole    Tablet 40 milliGRAM(s) Oral before breakfast  polyethylene glycol 3350 17 Gram(s) Oral daily  senna 2 Tablet(s) Oral at bedtime  tacrolimus 3 milliGRAM(s) Oral <User Schedule>  tacrolimus 2 milliGRAM(s) Oral at bedtime  tamsulosin 0.8 milliGRAM(s) Oral at bedtime    PRN Inpatient Medications  acetaminophen     Tablet .. 650 milliGRAM(s) Oral every 6 hours PRN  aluminum hydroxide/magnesium hydroxide/simethicone Suspension 30 milliLiter(s) Oral every 4 hours PRN  HYDROmorphone  Injectable 2 milliGRAM(s) IV Push every 4 hours PRN  melatonin 3 milliGRAM(s) Oral at bedtime PRN  ondansetron Injectable 4 milliGRAM(s) IV Push every 8 hours PRN    REVIEW OF SYSTEMS  --------------------------------------------------------------------------------  Gen: No fevers/chills  Skin: No rashes  Head/Eyes/Ears/Mouth: No headache; No sinus pain/discomfort, sore throat  Respiratory: No dyspnea, cough, wheezing, hemoptysis  CV: No chest pain, PND, orthopnea  GI: No abdominal pain, diarrhea, constipation, nausea, vomiting, melena, hematochezia  : No increased frequency, dysuria, hematuria, nocturia  All other systems were reviewed and are negative, except as noted.    VITALS/PHYSICAL EXAM  --------------------------------------------------------------------------------  T(C): 36.1 (05-15-24 @ 09:31), Max: 36.6 (24 @ 16:00)  HR: 68 (05-15-24 @ 11:34) (61 - 135)  BP: 110/60 (05-15-24 @ 11:34) (75/41 - 134/62)  RR: 18 (05-15-24 @ 11:34) (17 - 20)  SpO2: 97% (05-15-24 @ 11:34) (97% - 100%)    Physical Exam:  	Gen: NAD  	Pulm: CTA B/L  	CV: irreg irreg  	Back: No spinal or CVA tenderness; no sacral edema  	Abd: +BS, soft, nontender/nondistended  	: No suprapubic tenderness  	UE: Warm,  no asterixis  	LE: Warm, no edema  	Neuro: AAO x3    LABS/STUDIES  --------------------------------------------------------------------------------              6.2    3.22  >-----------<  52       [05-15-24 @ 05:57]              19.7     138  |  108  |  31  ----------------------------<  109      [05-15-24 @ 05:57]  4.2   |  22  |  2.0        Ca     9.7     [05-15-24 @ 05:57]      Mg     2.0     [05-15-24 @ 05:57]      Phos  3.2     [05-15-24 @ 05:57]    TPro  5.0  /  Alb  3.5  /  TBili  0.7  /  DBili  x   /  AST  31  /  ALT  25  /  AlkPhos  73  [05-15-24 @ 05:57]  Creatinine Trend:  SCr 2.0 [05-15 @ 05:57]  SCr 1.8 [ 08:30]  SCr 1.9 [ 05:10]  SCr 1.8 [ 04:18]  SCr 1.5 [05-10 @ 10:43]    Urinalysis - [05-15-24 @ 05:57]      Color  / Appearance  / SG  / pH       Gluc 109 / Ketone   / Bili  / Urobili        Blood  / Protein  / Leuk Est  / Nitrite       RBC  / WBC  / Hyaline  / Gran  / Sq Epi  / Non Sq Epi  / Bacteria     HbA1c 5.1      [19 @ 15:00]  TSH 1.98      [03-15-24 @ 15:04]    HBsAg Nonreact      [22 @ 04:30]  HCV 11.34, Reactive      [22 @ 04:30]  HIV Nonreact      [22 04:30]    Immunofixation Serum:   IgM Kappa Band Identified    Reference Range: None Detected      [22 @ 04:30]  SPEP Interpretation: Gamma-Migrating Paraprotein Identified      [22 04:30]  Immunofixation Urine: Reference Range: None Detected      [22 @ 12:58]  UPEP Interpretation: Normal Electrophoresis Pattern      [22 @ 12:58]  Cryoglobulin: Positive      [22 04:30]

## 2024-05-16 LAB
ALBUMIN SERPL ELPH-MCNC: 3.4 G/DL — LOW (ref 3.5–5.2)
ALP SERPL-CCNC: 83 U/L — SIGNIFICANT CHANGE UP (ref 30–115)
ALT FLD-CCNC: 20 U/L — SIGNIFICANT CHANGE UP (ref 0–41)
ANION GAP SERPL CALC-SCNC: 10 MMOL/L — SIGNIFICANT CHANGE UP (ref 7–14)
APPEARANCE UR: ABNORMAL
AST SERPL-CCNC: 26 U/L — SIGNIFICANT CHANGE UP (ref 0–41)
BACTERIA # UR AUTO: ABNORMAL /HPF
BASOPHILS # BLD AUTO: 0.01 K/UL — SIGNIFICANT CHANGE UP (ref 0–0.2)
BASOPHILS NFR BLD AUTO: 0.2 % — SIGNIFICANT CHANGE UP (ref 0–1)
BILIRUB SERPL-MCNC: 1 MG/DL — SIGNIFICANT CHANGE UP (ref 0.2–1.2)
BILIRUB UR-MCNC: NEGATIVE — SIGNIFICANT CHANGE UP
BUN SERPL-MCNC: 36 MG/DL — HIGH (ref 10–20)
CALCIUM SERPL-MCNC: 9.7 MG/DL — SIGNIFICANT CHANGE UP (ref 8.4–10.5)
CAST: 2 /LPF — SIGNIFICANT CHANGE UP (ref 0–4)
CHLORIDE SERPL-SCNC: 107 MMOL/L — SIGNIFICANT CHANGE UP (ref 98–110)
CO2 SERPL-SCNC: 17 MMOL/L — SIGNIFICANT CHANGE UP (ref 17–32)
COLOR SPEC: YELLOW — SIGNIFICANT CHANGE UP
CREAT ?TM UR-MCNC: 105 MG/DL — SIGNIFICANT CHANGE UP
CREAT SERPL-MCNC: 2.5 MG/DL — HIGH (ref 0.7–1.5)
DIFF PNL FLD: ABNORMAL
EGFR: 27 ML/MIN/1.73M2 — LOW
EOSINOPHIL # BLD AUTO: 0.01 K/UL — SIGNIFICANT CHANGE UP (ref 0–0.7)
EOSINOPHIL NFR BLD AUTO: 0.2 % — SIGNIFICANT CHANGE UP (ref 0–8)
FINE GRAN CASTS #/AREA URNS AUTO: PRESENT
GLUCOSE SERPL-MCNC: 107 MG/DL — HIGH (ref 70–99)
GLUCOSE UR QL: NEGATIVE MG/DL — SIGNIFICANT CHANGE UP
HCT VFR BLD CALC: 22.7 % — LOW (ref 42–52)
HCT VFR BLD CALC: 22.7 % — LOW (ref 42–52)
HGB BLD-MCNC: 7.2 G/DL — LOW (ref 14–18)
HGB BLD-MCNC: 7.3 G/DL — LOW (ref 14–18)
IMM GRANULOCYTES NFR BLD AUTO: 0.7 % — HIGH (ref 0.1–0.3)
KETONES UR-MCNC: NEGATIVE MG/DL — SIGNIFICANT CHANGE UP
LEUKOCYTE ESTERASE UR-ACNC: ABNORMAL
LYMPHOCYTES # BLD AUTO: 0.38 K/UL — LOW (ref 1.2–3.4)
LYMPHOCYTES # BLD AUTO: 9.3 % — LOW (ref 20.5–51.1)
MAGNESIUM SERPL-MCNC: 1.9 MG/DL — SIGNIFICANT CHANGE UP (ref 1.8–2.4)
MCHC RBC-ENTMCNC: 29.8 PG — SIGNIFICANT CHANGE UP (ref 27–31)
MCHC RBC-ENTMCNC: 29.9 PG — SIGNIFICANT CHANGE UP (ref 27–31)
MCHC RBC-ENTMCNC: 31.7 G/DL — LOW (ref 32–37)
MCHC RBC-ENTMCNC: 32.2 G/DL — SIGNIFICANT CHANGE UP (ref 32–37)
MCV RBC AUTO: 92.7 FL — SIGNIFICANT CHANGE UP (ref 80–94)
MCV RBC AUTO: 94.2 FL — HIGH (ref 80–94)
MONOCYTES # BLD AUTO: 0.5 K/UL — SIGNIFICANT CHANGE UP (ref 0.1–0.6)
MONOCYTES NFR BLD AUTO: 12.2 % — HIGH (ref 1.7–9.3)
NEUTROPHILS # BLD AUTO: 3.16 K/UL — SIGNIFICANT CHANGE UP (ref 1.4–6.5)
NEUTROPHILS NFR BLD AUTO: 77.4 % — HIGH (ref 42.2–75.2)
NITRITE UR-MCNC: NEGATIVE — SIGNIFICANT CHANGE UP
NRBC # BLD: 0 /100 WBCS — SIGNIFICANT CHANGE UP (ref 0–0)
NRBC # BLD: 0 /100 WBCS — SIGNIFICANT CHANGE UP (ref 0–0)
OSMOLALITY UR: 388 MOS/KG — SIGNIFICANT CHANGE UP (ref 50–1200)
PH UR: 6 — SIGNIFICANT CHANGE UP (ref 5–8)
PHOSPHATE SERPL-MCNC: 2.5 MG/DL — SIGNIFICANT CHANGE UP (ref 2.1–4.9)
PLATELET # BLD AUTO: 57 K/UL — LOW (ref 130–400)
PLATELET # BLD AUTO: 63 K/UL — LOW (ref 130–400)
PMV BLD: 12.8 FL — HIGH (ref 7.4–10.4)
PMV BLD: 13.2 FL — HIGH (ref 7.4–10.4)
POTASSIUM SERPL-MCNC: 3.9 MMOL/L — SIGNIFICANT CHANGE UP (ref 3.5–5)
POTASSIUM SERPL-SCNC: 3.9 MMOL/L — SIGNIFICANT CHANGE UP (ref 3.5–5)
PROT ?TM UR-MCNC: 75 MG/DLG/24H — SIGNIFICANT CHANGE UP
PROT SERPL-MCNC: 4.9 G/DL — LOW (ref 6–8)
PROT UR-MCNC: 100 MG/DL
PROT/CREAT UR-RTO: 0.7 RATIO — HIGH (ref 0–0.2)
RBC # BLD: 2.41 M/UL — LOW (ref 4.7–6.1)
RBC # BLD: 2.45 M/UL — LOW (ref 4.7–6.1)
RBC # FLD: 17.2 % — HIGH (ref 11.5–14.5)
RBC # FLD: 17.5 % — HIGH (ref 11.5–14.5)
RBC CASTS # UR COMP ASSIST: 4 /HPF — SIGNIFICANT CHANGE UP (ref 0–4)
SODIUM SERPL-SCNC: 134 MMOL/L — LOW (ref 135–146)
SODIUM UR-SCNC: 30 MMOL/L — SIGNIFICANT CHANGE UP
SP GR SPEC: 1.02 — SIGNIFICANT CHANGE UP (ref 1–1.03)
SQUAMOUS # UR AUTO: 3 /HPF — SIGNIFICANT CHANGE UP (ref 0–5)
TACROLIMUS SERPL-MCNC: 13.8 NG/ML — SIGNIFICANT CHANGE UP
TACROLIMUS SERPL-MCNC: 19.9 NG/ML — SIGNIFICANT CHANGE UP
UROBILINOGEN FLD QL: 1 MG/DL — SIGNIFICANT CHANGE UP (ref 0.2–1)
WBC # BLD: 4.09 K/UL — LOW (ref 4.8–10.8)
WBC # BLD: 4.17 K/UL — LOW (ref 4.8–10.8)
WBC # FLD AUTO: 4.09 K/UL — LOW (ref 4.8–10.8)
WBC # FLD AUTO: 4.17 K/UL — LOW (ref 4.8–10.8)
WBC UR QL: 260 /HPF — HIGH (ref 0–5)

## 2024-05-16 PROCEDURE — 99233 SBSQ HOSP IP/OBS HIGH 50: CPT

## 2024-05-16 RX ORDER — ONDANSETRON 8 MG/1
2 TABLET, FILM COATED ORAL ONCE
Refills: 0 | Status: DISCONTINUED | OUTPATIENT
Start: 2024-05-16 | End: 2024-05-17

## 2024-05-16 RX ORDER — METOPROLOL TARTRATE 50 MG
50 TABLET ORAL EVERY 12 HOURS
Refills: 0 | Status: DISCONTINUED | OUTPATIENT
Start: 2024-05-16 | End: 2024-05-18

## 2024-05-16 RX ORDER — TACROLIMUS 5 MG/1
1 CAPSULE ORAL
Refills: 0 | Status: DISCONTINUED | OUTPATIENT
Start: 2024-05-16 | End: 2024-05-18

## 2024-05-16 RX ORDER — OXYCODONE HYDROCHLORIDE 5 MG/1
15 TABLET ORAL EVERY 8 HOURS
Refills: 0 | Status: DISCONTINUED | OUTPATIENT
Start: 2024-05-16 | End: 2024-05-17

## 2024-05-16 RX ORDER — SODIUM CHLORIDE 9 MG/ML
1000 INJECTION INTRAMUSCULAR; INTRAVENOUS; SUBCUTANEOUS
Refills: 0 | Status: DISCONTINUED | OUTPATIENT
Start: 2024-05-16 | End: 2024-05-17

## 2024-05-16 RX ORDER — SODIUM CHLORIDE 9 MG/ML
500 INJECTION INTRAMUSCULAR; INTRAVENOUS; SUBCUTANEOUS ONCE
Refills: 0 | Status: COMPLETED | OUTPATIENT
Start: 2024-05-16 | End: 2024-05-16

## 2024-05-16 RX ORDER — DILTIAZEM HCL 120 MG
180 CAPSULE, EXT RELEASE 24 HR ORAL DAILY
Refills: 0 | Status: DISCONTINUED | OUTPATIENT
Start: 2024-05-16 | End: 2024-05-18

## 2024-05-16 RX ADMIN — ATORVASTATIN CALCIUM 80 MILLIGRAM(S): 80 TABLET, FILM COATED ORAL at 21:01

## 2024-05-16 RX ADMIN — Medication 180 MILLIGRAM(S): at 11:01

## 2024-05-16 RX ADMIN — TAMSULOSIN HYDROCHLORIDE 0.8 MILLIGRAM(S): 0.4 CAPSULE ORAL at 21:01

## 2024-05-16 RX ADMIN — POLYETHYLENE GLYCOL 3350 17 GRAM(S): 17 POWDER, FOR SOLUTION ORAL at 11:01

## 2024-05-16 RX ADMIN — SENNA PLUS 2 TABLET(S): 8.6 TABLET ORAL at 21:01

## 2024-05-16 RX ADMIN — MYCOPHENOLATE MOFETIL 1000 MILLIGRAM(S): 250 CAPSULE ORAL at 05:57

## 2024-05-16 RX ADMIN — SODIUM CHLORIDE 60 MILLILITER(S): 9 INJECTION INTRAMUSCULAR; INTRAVENOUS; SUBCUTANEOUS at 17:06

## 2024-05-16 RX ADMIN — OXYCODONE HYDROCHLORIDE 15 MILLIGRAM(S): 5 TABLET ORAL at 08:00

## 2024-05-16 RX ADMIN — Medication 50 MILLIGRAM(S): at 17:05

## 2024-05-16 RX ADMIN — TACROLIMUS 1 MILLIGRAM(S): 5 CAPSULE ORAL at 17:05

## 2024-05-16 RX ADMIN — Medication 5 MILLIGRAM(S): at 11:02

## 2024-05-16 RX ADMIN — APIXABAN 2.5 MILLIGRAM(S): 2.5 TABLET, FILM COATED ORAL at 05:56

## 2024-05-16 RX ADMIN — APIXABAN 2.5 MILLIGRAM(S): 2.5 TABLET, FILM COATED ORAL at 17:06

## 2024-05-16 RX ADMIN — CHLORHEXIDINE GLUCONATE 1 APPLICATION(S): 213 SOLUTION TOPICAL at 06:05

## 2024-05-16 RX ADMIN — SODIUM CHLORIDE 500 MILLILITER(S): 9 INJECTION INTRAMUSCULAR; INTRAVENOUS; SUBCUTANEOUS at 12:30

## 2024-05-16 RX ADMIN — PANTOPRAZOLE SODIUM 40 MILLIGRAM(S): 20 TABLET, DELAYED RELEASE ORAL at 05:56

## 2024-05-16 RX ADMIN — MYCOPHENOLATE MOFETIL 1000 MILLIGRAM(S): 250 CAPSULE ORAL at 17:05

## 2024-05-16 RX ADMIN — OXYCODONE HYDROCHLORIDE 15 MILLIGRAM(S): 5 TABLET ORAL at 07:36

## 2024-05-16 RX ADMIN — Medication 25 MILLIGRAM(S): at 06:02

## 2024-05-16 RX ADMIN — TACROLIMUS 3 MILLIGRAM(S): 5 CAPSULE ORAL at 05:56

## 2024-05-16 RX ADMIN — Medication 50 MILLIGRAM(S): at 11:02

## 2024-05-16 NOTE — PROGRESS NOTE ADULT - ASSESSMENT
Assessment: 69-year-old history of hypertension, hyperlipidemia, HFpEF (70-75%) grade 2 DD, ESRD s/p kidney transplant 8 years ago at [place], chronic hep C, A-fib with ablation preformed yesterday presenting for left groin pain found to have a L thigh hematoma.      Problems discussed and associated plan:    # Atrial Fibrillation  - s/p Afib ablation 5/10  - EP: possible  AV marleni ablation and PPM tomorrow , NPO pMN  - Continue Eliquis 2.5 mg BID (lower dose due to recent thigh hematoma)  - today Lopressor 25 q6 switched to 50 q12  - CD drip discontinued and switched to  mg daily  - Continue Pantoprazole 40 mg daily     #Left Hip Hematoma/ Acute on Chronic Anemia/ Chronic Thrombcytopenia   - Hgb drop from 11.4 -> 7.2, s/p 2 units PRBCs in total this admission  - CT abdomen and pelvis showing left adductor muscle hematoma  - No active extravasation on CT Angio  - Evaluated by IR no intervention deemed necessary  - LLE duplex - no pseudoaneurysm, evidence of possible AV fistula  - Vascular following - plan for CTA LLE after device placement  - Monitor Hb, platelets  - C/w Eliquis for AFib, discussed with EP (on reduced dose 2.5 mg BID)    #ESRD sp Kidney transplant, SANTA   - Patient had kidney transplant 8 years ago at [place]  - Nephrology consult done   - Baseline Cr 1.5-1.6  - worsening SANTA, and borderline hypotension (MAP>60), likely contrast induced nephropathy, encouraged PO hydration  -  IVF  500 cc bolus x2 hours, bladder sono 100 cc , renal ordered urine studies   - strict Is and Os  -f/u tacrolimus level pending from 5/16 AM  - will lower dose tacrolimus 1mg po bid until true trough level back  - Takes Lasix 20 mg once daily at home, will hold for now as per nephro (appears euvolemic)  - Continue Mycophenolate 1000mg qd       #Chronic Lower Back Pain   - Takes Oxycodone PRN at home, will c/w 15 mg q8 standing for now given severe pain  - Dilaudid 4 mg IV q4 PRN for breakthrough pain      #Constipation  -  last BM was  a week ago as per pt, but reports passing gas     #Nausea/vomiting  - Zofran PRN nausea , one episode of vomiting after eating  - ensure BM, given enema  - ?due to opiods   - monitor       #Misc  - DVT Prophylaxis: Eliquis  - GI Prophylaxis: Protonix   - Diet: DASH   - Activity: As tolerated, OOB to chair   - Code Status: Full.      Please contact me with any questions or concerns at [phone]. Assessment: 69-year-old history of hypertension, hyperlipidemia, HFpEF (70-75%) grade 2 DD, ESRD s/p kidney transplant 8 years ago at [place], chronic hep C, A-fib with ablation preformed yesterday presenting for left groin pain found to have a L thigh hematoma.      Problems discussed and associated plan:    # Atrial Fibrillation  - s/p Afib ablation 5/10  - EP: possible  AV marleni ablation and PPM tomorrow , NPO pMN  - Continue Eliquis 2.5 mg BID (lower dose due to recent thigh hematoma)  - today Lopressor 25 q6 switched to 50 q12  - CD drip discontinued and switched to  mg daily  - Continue Pantoprazole 40 mg daily     #Left Hip Hematoma/ Acute on Chronic Anemia/ Chronic Thrombcytopenia   - Hgb drop from 11.4 -> 7.2, s/p 2 units PRBCs in total this admission  - CT abdomen and pelvis showing left adductor muscle hematoma  - No active extravasation on CT Angio  - Evaluated by IR no intervention deemed necessary  - LLE duplex - no pseudoaneurysm, evidence of possible AV fistula  - Vascular following - plan for CTA LLE after device placement  - Monitor Hb, platelets  - C/w Eliquis for AFib, discussed with EP (on reduced dose 2.5 mg BID)    #ESRD sp Kidney transplant, [facility]   - Patient had kidney transplant 8 years ago at [place]  - Nephrology consult done   - Baseline Cr 1.5-1.6  - worsening SANTA, and borderline hypotension (MAP>60), likely contrast induced nephropathy, encouraged PO hydration  -  IVF  500 cc bolus x2 hours, bladder sono 100 cc , renal ordered urine studies   - strict Is and Os  -f/u tacrolimus level pending from 5/16 AM  - will lower dose tacrolimus 1mg po bid until true trough level back  - Takes Lasix 20 mg once daily at home, will hold for now as per nephro (appears euvolemic)  - Continue Mycophenolate 1000mg qd       #Chronic Lower Back Pain   - Takes Oxycodone PRN at home, will c/w 15 mg q8 standing for now given severe pain  - Dilaudid 4 mg IV q4 PRN for breakthrough pain      #Constipation  -  last BM was  a week ago as per pt, but reports passing gas   - abd exam soft and non distended   - CT IV contrast abd/pelvis 5/13 showed  few sigmoid colonic diverticula, no evidence of bowel obstruction     #Nausea/vomiting  - Zofran PRN nausea , one episode of vomiting after eating  - ensure BM, given enema  - ?due to opiods   - monitor       #Misc  - DVT Prophylaxis: Eliquis  - GI Prophylaxis: Protonix   - Diet: DASH   - Activity: As tolerated, OOB to chair   - Code Status: Full.      Please contact me with any questions or concerns at [phone].

## 2024-05-16 NOTE — CDI QUERY NOTE - NSCDIOTHERTXTBX_GEN_ALL_CORE_HH
Clinical documentation and/or evidence in the medical record indicates that this patient has pancytopenia.  In order to ensure accurate coding and accuracy of the clinical record, the documentation in this patient’s medical record requires additional clarification.      Please include more specific documentation as to the type of pancytopenia in your progress note and/or discharge summary. Please clarify the underlying etiology of the pancytopenia:      •Pancytopenia is drug induced (specify drug)  •Pancytopenia is unspecified  •Other (specify)    Supporting documentation and/or clinical evidence:     5/15 Consult Note Adult-Nephrology Attending- Assessment- Status post  donor Kidney transplant at [place] 6 years ago... Pancytopenia… Continue tacrolimus and check level... Continue mycophenolate    5/15 Chart Note-Transfer Note Fellow- A/P: #Left Hip adductor hematoma #Acute on Chronic Anemia (Baseline Hb ~ 9), Chronic Thrombocytopenia - Hgb drop from 11.4 -> 7.2, s/p 1U pRBCs in ED; #ESRD sp Kidney transplant    		WBC   RBC   PLT  2024	3.33    4.25    58  01/10/2024	2.54    2.89    68  2024	3.54    2.45    54  03/15/2024	3.13    3.49    79    Thank you,  Farida Harper, RN  [phone]

## 2024-05-16 NOTE — PROGRESS NOTE ADULT - ASSESSMENT
acute kidney injury, likely due to iv contrast, hypotension and anemia / bleeding  no transplant hydro or stones on CT contrast   Status post  donor Kidney transplant at [place] 7 years ago  baseline Cr "upper 1's to low 2's"  L groin hematoma s/p post ablation  afib, now in NSR  Pancytopenia / anemia s/p prbc transfusion  CAD / HFpEF  HTN  Cirrhosis    plan:    cont off lasix  agree with NS IV bolus, repeat PRN or consider NS @ 60cc/hr  send for UA, calculate FeNa%, send Uosm and U prot / Cr  PRN PRBC transfusion, consider another PRBC 1U transfusion   cont mycophenolate  will lower dose tacrolimus 1mg po bid until true trough level back  trend renal function, monitor urine output via urinal, f/u HCO3, may need some bicarb  avoid further IV contrast for now  d/w team, will follow  full code

## 2024-05-16 NOTE — PROGRESS NOTE ADULT - ASSESSMENT
69-year-old history of hypertension, hyperlipidemia, HFpEF (70-75%) grade 2 DD, ESRD s/p kidney transplant 8 years ago at [place], chronic hep C, A-fib with ablation preformed yesterday presenting for left groin pain found to have a L thigh hematoma.     Impression:  AF sp Ablation, back in AF rate controlled  ESRD sp Renal Transplant  Hep C  HTN  HLD    Plan:  - Switch Cardizem to PO  - Cont Lopressor  - NPO after midnight for potential AVN ablation + DC Aveir tomorrow  - If patient remains rate controlled on Cardizem and Lopressor, will cancel procedure tomorrow and plan as outpatient  - Cont Eliquis  - Cont tele monitoring  - Monitor electrolytes, maintain WNL  - Will follow

## 2024-05-16 NOTE — CDI QUERY NOTE - NSCDIOTHERTXTBX_GEN_ALL_CORE_HH
There is inconsistent documentation in the medical record regarding the patient’s condition.  In order to ensure accurate coding and accuracy of the clinical record, the documentation in this patient’s medical record requires additional clarification.      The diagnosis of ESRD has been documented in the medical record on 5/15 by Chart Note-Transfer Note Fellow.    Please review the documentation in the medical record and clarify the appropriate diagnosis (along with any supporting documentation) in your progress note and/or discharge summary.    •Patient has history of ESRD, s/p kidney transplant, now with CDK 4  •Patient has history of ESRD, s/p kidney transplant, now with CDK 3B  •Other, please specify      Supporting documentation and/or clinical evidence:     5/15 Consult Note Adult-Nephrology Attending- Assessment- Status post  donor Kidney transplant at [place] 6 years ago, creatinine increased... Continue tacrolimus and check level, Continue mycophenolate    Labs: eGFR 41, 38, 41, 36, 27    5/15 Chart Note-Transfer Note Fellow- A/P: #ESRD sp Kidney transplant - Patient had kidney transplant 8 years ago at [place], - Nephrology consult pending  - Cr ~ 2, near baseline, - Takes Lasix 20 mg once daily at home, will hold for now as per nephro (appears euvolemic)    Thank you,  Farida Harper, RN  [phone]

## 2024-05-17 LAB
ANION GAP SERPL CALC-SCNC: 7 MMOL/L — SIGNIFICANT CHANGE UP (ref 7–14)
BASOPHILS # BLD AUTO: 0.01 K/UL — SIGNIFICANT CHANGE UP (ref 0–0.2)
BASOPHILS NFR BLD AUTO: 0.4 % — SIGNIFICANT CHANGE UP (ref 0–1)
BLD GP AB SCN SERPL QL: SIGNIFICANT CHANGE UP
BUN SERPL-MCNC: 33 MG/DL — HIGH (ref 10–20)
CALCIUM SERPL-MCNC: 9.3 MG/DL — SIGNIFICANT CHANGE UP (ref 8.4–10.5)
CHLORIDE SERPL-SCNC: 111 MMOL/L — HIGH (ref 98–110)
CO2 SERPL-SCNC: 19 MMOL/L — SIGNIFICANT CHANGE UP (ref 17–32)
CREAT SERPL-MCNC: 2.1 MG/DL — HIGH (ref 0.7–1.5)
EGFR: 34 ML/MIN/1.73M2 — LOW
EOSINOPHIL # BLD AUTO: 0.02 K/UL — SIGNIFICANT CHANGE UP (ref 0–0.7)
EOSINOPHIL NFR BLD AUTO: 0.8 % — SIGNIFICANT CHANGE UP (ref 0–8)
GLUCOSE SERPL-MCNC: 109 MG/DL — HIGH (ref 70–99)
HCT VFR BLD CALC: 22.5 % — LOW (ref 42–52)
HGB BLD-MCNC: 7.3 G/DL — LOW (ref 14–18)
IMM GRANULOCYTES NFR BLD AUTO: 0.4 % — HIGH (ref 0.1–0.3)
LYMPHOCYTES # BLD AUTO: 0.43 K/UL — LOW (ref 1.2–3.4)
LYMPHOCYTES # BLD AUTO: 17 % — LOW (ref 20.5–51.1)
MAGNESIUM SERPL-MCNC: 2.2 MG/DL — SIGNIFICANT CHANGE UP (ref 1.8–2.4)
MCHC RBC-ENTMCNC: 30.4 PG — SIGNIFICANT CHANGE UP (ref 27–31)
MCHC RBC-ENTMCNC: 32.4 G/DL — SIGNIFICANT CHANGE UP (ref 32–37)
MCV RBC AUTO: 93.8 FL — SIGNIFICANT CHANGE UP (ref 80–94)
MONOCYTES # BLD AUTO: 0.26 K/UL — SIGNIFICANT CHANGE UP (ref 0.1–0.6)
MONOCYTES NFR BLD AUTO: 10.3 % — HIGH (ref 1.7–9.3)
NEUTROPHILS # BLD AUTO: 1.8 K/UL — SIGNIFICANT CHANGE UP (ref 1.4–6.5)
NEUTROPHILS NFR BLD AUTO: 71.1 % — SIGNIFICANT CHANGE UP (ref 42.2–75.2)
NRBC # BLD: 0 /100 WBCS — SIGNIFICANT CHANGE UP (ref 0–0)
PHOSPHATE SERPL-MCNC: 1.9 MG/DL — LOW (ref 2.1–4.9)
PLATELET # BLD AUTO: 65 K/UL — LOW (ref 130–400)
PMV BLD: 12.4 FL — HIGH (ref 7.4–10.4)
POTASSIUM SERPL-MCNC: 3.9 MMOL/L — SIGNIFICANT CHANGE UP (ref 3.5–5)
POTASSIUM SERPL-SCNC: 3.9 MMOL/L — SIGNIFICANT CHANGE UP (ref 3.5–5)
RBC # BLD: 2.4 M/UL — LOW (ref 4.7–6.1)
RBC # FLD: 17.1 % — HIGH (ref 11.5–14.5)
SODIUM SERPL-SCNC: 137 MMOL/L — SIGNIFICANT CHANGE UP (ref 135–146)
WBC # BLD: 2.53 K/UL — LOW (ref 4.8–10.8)
WBC # FLD AUTO: 2.53 K/UL — LOW (ref 4.8–10.8)

## 2024-05-17 PROCEDURE — 99233 SBSQ HOSP IP/OBS HIGH 50: CPT

## 2024-05-17 RX ORDER — OXYCODONE HYDROCHLORIDE 5 MG/1
15 TABLET ORAL EVERY 8 HOURS
Refills: 0 | Status: DISCONTINUED | OUTPATIENT
Start: 2024-05-17 | End: 2024-05-18

## 2024-05-17 RX ORDER — POTASSIUM PHOSPHATE, MONOBASIC POTASSIUM PHOSPHATE, DIBASIC 236; 224 MG/ML; MG/ML
15 INJECTION, SOLUTION INTRAVENOUS ONCE
Refills: 0 | Status: COMPLETED | OUTPATIENT
Start: 2024-05-17 | End: 2024-05-17

## 2024-05-17 RX ORDER — CEFTRIAXONE 500 MG/1
1000 INJECTION, POWDER, FOR SOLUTION INTRAMUSCULAR; INTRAVENOUS EVERY 24 HOURS
Refills: 0 | Status: DISCONTINUED | OUTPATIENT
Start: 2024-05-17 | End: 2024-05-17

## 2024-05-17 RX ORDER — CEFTRIAXONE 500 MG/1
1000 INJECTION, POWDER, FOR SOLUTION INTRAMUSCULAR; INTRAVENOUS EVERY 24 HOURS
Refills: 0 | Status: DISCONTINUED | OUTPATIENT
Start: 2024-05-17 | End: 2024-05-18

## 2024-05-17 RX ORDER — LIDOCAINE 4 G/100G
1 CREAM TOPICAL DAILY
Refills: 0 | Status: DISCONTINUED | OUTPATIENT
Start: 2024-05-17 | End: 2024-05-17

## 2024-05-17 RX ORDER — ACETAMINOPHEN 500 MG
650 TABLET ORAL EVERY 6 HOURS
Refills: 0 | Status: DISCONTINUED | OUTPATIENT
Start: 2024-05-17 | End: 2024-05-18

## 2024-05-17 RX ORDER — POLYETHYLENE GLYCOL 3350 17 G/17G
17 POWDER, FOR SOLUTION ORAL DAILY
Refills: 0 | Status: DISCONTINUED | OUTPATIENT
Start: 2024-05-17 | End: 2024-05-18

## 2024-05-17 RX ORDER — LIDOCAINE 4 G/100G
1 CREAM TOPICAL ONCE
Refills: 0 | Status: COMPLETED | OUTPATIENT
Start: 2024-05-17 | End: 2024-05-17

## 2024-05-17 RX ADMIN — OXYCODONE HYDROCHLORIDE 15 MILLIGRAM(S): 5 TABLET ORAL at 11:05

## 2024-05-17 RX ADMIN — Medication 50 MILLIGRAM(S): at 05:16

## 2024-05-17 RX ADMIN — APIXABAN 2.5 MILLIGRAM(S): 2.5 TABLET, FILM COATED ORAL at 18:33

## 2024-05-17 RX ADMIN — Medication 50 MILLIGRAM(S): at 18:33

## 2024-05-17 RX ADMIN — OXYCODONE HYDROCHLORIDE 15 MILLIGRAM(S): 5 TABLET ORAL at 10:29

## 2024-05-17 RX ADMIN — POTASSIUM PHOSPHATE, MONOBASIC POTASSIUM PHOSPHATE, DIBASIC 63.75 MILLIMOLE(S): 236; 224 INJECTION, SOLUTION INTRAVENOUS at 09:52

## 2024-05-17 RX ADMIN — LIDOCAINE 1 PATCH: 4 CREAM TOPICAL at 18:13

## 2024-05-17 RX ADMIN — Medication 10 MILLIGRAM(S): at 05:15

## 2024-05-17 RX ADMIN — LIDOCAINE 1 PATCH: 4 CREAM TOPICAL at 12:37

## 2024-05-17 RX ADMIN — CHLORHEXIDINE GLUCONATE 1 APPLICATION(S): 213 SOLUTION TOPICAL at 05:16

## 2024-05-17 RX ADMIN — PANTOPRAZOLE SODIUM 40 MILLIGRAM(S): 20 TABLET, DELAYED RELEASE ORAL at 05:16

## 2024-05-17 RX ADMIN — TAMSULOSIN HYDROCHLORIDE 0.8 MILLIGRAM(S): 0.4 CAPSULE ORAL at 21:21

## 2024-05-17 RX ADMIN — ATORVASTATIN CALCIUM 80 MILLIGRAM(S): 80 TABLET, FILM COATED ORAL at 21:21

## 2024-05-17 RX ADMIN — SODIUM CHLORIDE 60 MILLILITER(S): 9 INJECTION INTRAMUSCULAR; INTRAVENOUS; SUBCUTANEOUS at 05:26

## 2024-05-17 RX ADMIN — OXYCODONE HYDROCHLORIDE 15 MILLIGRAM(S): 5 TABLET ORAL at 18:33

## 2024-05-17 RX ADMIN — Medication 3 MILLIGRAM(S): at 21:21

## 2024-05-17 RX ADMIN — MYCOPHENOLATE MOFETIL 1000 MILLIGRAM(S): 250 CAPSULE ORAL at 18:33

## 2024-05-17 RX ADMIN — Medication 180 MILLIGRAM(S): at 05:16

## 2024-05-17 RX ADMIN — APIXABAN 2.5 MILLIGRAM(S): 2.5 TABLET, FILM COATED ORAL at 05:16

## 2024-05-17 RX ADMIN — OXYCODONE HYDROCHLORIDE 15 MILLIGRAM(S): 5 TABLET ORAL at 19:02

## 2024-05-17 RX ADMIN — CEFTRIAXONE 100 MILLIGRAM(S): 500 INJECTION, POWDER, FOR SOLUTION INTRAMUSCULAR; INTRAVENOUS at 12:38

## 2024-05-17 RX ADMIN — TACROLIMUS 1 MILLIGRAM(S): 5 CAPSULE ORAL at 18:33

## 2024-05-17 RX ADMIN — LIDOCAINE 1 PATCH: 4 CREAM TOPICAL at 18:14

## 2024-05-17 RX ADMIN — TACROLIMUS 1 MILLIGRAM(S): 5 CAPSULE ORAL at 05:16

## 2024-05-17 RX ADMIN — MYCOPHENOLATE MOFETIL 1000 MILLIGRAM(S): 250 CAPSULE ORAL at 05:16

## 2024-05-17 NOTE — PROGRESS NOTE ADULT - ASSESSMENT
Assessment: 69-year-old history of hypertension, hyperlipidemia, HFpEF (70-75%) grade 2 DD, ESRD s/p kidney transplant 8 years ago at [place], chronic hep C, A-fib with ablation preformed yesterday presenting for left groin pain found to have a L thigh hematoma.       Problems discussed and associated plan:    # Atrial Fibrillation  - s/p Afib ablation 5/10  -s/p EP follow up   -c/w Eliquis 2.5 mg BID   -c/w lopressor 50 q12   - c/w  mg daily  - c/w Pantoprazole 40 mg daily     #Left Hip Hematoma/ Acute on Chronic Anemia/ Chronic Thrombcytopenia   - Hgb drop from 11.4 -> 7.2, s/p 2 units PRBCs in total this admission  - CT abdomen and pelvis showing left adductor muscle hematoma  - No active extravasation on CT Angio  - Evaluated by IR no intervention deemed necessary  - LLE duplex - no pseudoaneurysm, evidence of possible AV fistula  - s/p Vascular recommended CTA  LLE after device placement  - Monitor Hb, platelets. Hgb has been stable for the past couple of days 7.2-7.3 and pt in [place] so would avoid any imaging with IV contrast at this time.   - Patient was encouraged to be out of bed into chair and activity as tolerated.   -PT was ordered 5/17  - C/w Eliquis for AFib, discussed with EP (on reduced dose 2.5 mg BID)    #ESRD sp Kidney transplant, [facility]   - Patient had kidney transplant 8 years ago at [place]  - Nephrology consult done   - Baseline Cr 1.5-1.6  - Pt was in SANTA levels as high as 2 w/borderline hypotension (MAP>60), likely contrast induced nephropathy, encouraged PO hydration and gave total of 1.5 L of fluids 5/16  - strict Is and Os  - tacrolimus level 13.8 5/16 AM   - As per renal recommendation tacrolimus was decreased to 1mg PO BID 5/16. Recheck level today evening 5/17  -Today CR is improving 2.1 5/17   - Takes Lasix 20 mg once daily at home, will hold for now as per nephro (appears euvolemic)  - Continue Mycophenolate 1000mg qd       #Chronic Lower Back Pain  -Takes Oxycodone PRN 15mg q8 continue   - d/c Dilaudid 4 mg IV q4        #Constipation  #Nausea and vomiting (resolved)   -  last BM was this morning 5/17. No nausea and vomiting today.   - abd exam soft and non distended but had suprapubic tenderness 5/17   - CT IV contrast abd/pelvis 5/13 showed  few sigmoid colonic diverticula, no evidence of bowel obstruction     #Possible UTI   - 5/16 positive UA does not appear contaminated. Send urine culture before giving 1st abx dose. Pt has suprapubic tenderness w/vague complaints of dysuria.   -will start on ceftriaxone 1g q24 for 3 days  started on 5/17     #Misc  - DVT Prophylaxis: Eliquis  - GI Prophylaxis: Protonix   - Diet: DASH   - Activity: As tolerated, OOB to chair   - Code Status: Full.      Please contact me with any questions or concerns at [phone].

## 2024-05-17 NOTE — PROGRESS NOTE ADULT - ASSESSMENT
acute kidney injury, likely due to iv contrast, hypotension and anemia / bleeding   - improving  no transplant hydro or stones on CT contrast   status post  donor Kidney transplant at [place] 7 years ago  baseline Cr "upper 1's to low 2's"  L groin hematoma s/p post ablation  afib, now in NSR  Pancytopenia / anemia s/p prbc transfusion  CAD / HFpEF  HTN  Cirrhosis    plan:    cont off lasix today, can resume upon discharge  no further IVF  monitor h/h  PRN PRBC transfusion   can check iron stores  cont mycophenolate   level slightly high (from transient SANTA), cont tacrolimus 1mg po bid and f/u repeat level

## 2024-05-18 ENCOUNTER — TRANSCRIPTION ENCOUNTER (OUTPATIENT)
Age: 69
End: 2024-05-18

## 2024-05-18 VITALS
RESPIRATION RATE: 28 BRPM | DIASTOLIC BLOOD PRESSURE: 67 MMHG | OXYGEN SATURATION: 99 % | TEMPERATURE: 97 F | SYSTOLIC BLOOD PRESSURE: 134 MMHG | HEART RATE: 68 BPM

## 2024-05-18 LAB
ANION GAP SERPL CALC-SCNC: 9 MMOL/L — SIGNIFICANT CHANGE UP (ref 7–14)
BASOPHILS # BLD AUTO: 0 K/UL — SIGNIFICANT CHANGE UP (ref 0–0.2)
BASOPHILS NFR BLD AUTO: 0 % — SIGNIFICANT CHANGE UP (ref 0–1)
BUN SERPL-MCNC: 26 MG/DL — HIGH (ref 10–20)
CALCIUM SERPL-MCNC: 9.7 MG/DL — SIGNIFICANT CHANGE UP (ref 8.4–10.5)
CHLORIDE SERPL-SCNC: 109 MMOL/L — SIGNIFICANT CHANGE UP (ref 98–110)
CO2 SERPL-SCNC: 18 MMOL/L — SIGNIFICANT CHANGE UP (ref 17–32)
CREAT SERPL-MCNC: 2 MG/DL — HIGH (ref 0.7–1.5)
EGFR: 36 ML/MIN/1.73M2 — LOW
EOSINOPHIL # BLD AUTO: 0 K/UL — SIGNIFICANT CHANGE UP (ref 0–0.7)
EOSINOPHIL NFR BLD AUTO: 0 % — SIGNIFICANT CHANGE UP (ref 0–8)
FERRITIN SERPL-MCNC: 803 NG/ML — HIGH (ref 30–400)
FOLATE SERPL-MCNC: 10.2 NG/ML — SIGNIFICANT CHANGE UP
GLUCOSE SERPL-MCNC: 125 MG/DL — HIGH (ref 70–99)
HCT VFR BLD CALC: 23.8 % — LOW (ref 42–52)
HGB BLD-MCNC: 7.5 G/DL — LOW (ref 14–18)
IMM GRANULOCYTES NFR BLD AUTO: 0.8 % — HIGH (ref 0.1–0.3)
IRON SATN MFR SERPL: 15 % — SIGNIFICANT CHANGE UP (ref 15–50)
IRON SATN MFR SERPL: 28 UG/DL — LOW (ref 35–150)
LYMPHOCYTES # BLD AUTO: 0.09 K/UL — LOW (ref 1.2–3.4)
LYMPHOCYTES # BLD AUTO: 3.6 % — LOW (ref 20.5–51.1)
MAGNESIUM SERPL-MCNC: 1.6 MG/DL — LOW (ref 1.8–2.4)
MCHC RBC-ENTMCNC: 30 PG — SIGNIFICANT CHANGE UP (ref 27–31)
MCHC RBC-ENTMCNC: 31.5 G/DL — LOW (ref 32–37)
MCV RBC AUTO: 95.2 FL — HIGH (ref 80–94)
MONOCYTES # BLD AUTO: 0.19 K/UL — SIGNIFICANT CHANGE UP (ref 0.1–0.6)
MONOCYTES NFR BLD AUTO: 7.7 % — SIGNIFICANT CHANGE UP (ref 1.7–9.3)
NEUTROPHILS # BLD AUTO: 2.18 K/UL — SIGNIFICANT CHANGE UP (ref 1.4–6.5)
NEUTROPHILS NFR BLD AUTO: 87.9 % — HIGH (ref 42.2–75.2)
NRBC # BLD: 0 /100 WBCS — SIGNIFICANT CHANGE UP (ref 0–0)
PHOSPHATE SERPL-MCNC: 2 MG/DL — LOW (ref 2.1–4.9)
PLATELET # BLD AUTO: 73 K/UL — LOW (ref 130–400)
PMV BLD: 12.2 FL — HIGH (ref 7.4–10.4)
POTASSIUM SERPL-MCNC: 4.4 MMOL/L — SIGNIFICANT CHANGE UP (ref 3.5–5)
POTASSIUM SERPL-SCNC: 4.4 MMOL/L — SIGNIFICANT CHANGE UP (ref 3.5–5)
RBC # BLD: 2.5 M/UL — LOW (ref 4.7–6.1)
RBC # FLD: 17.4 % — HIGH (ref 11.5–14.5)
SODIUM SERPL-SCNC: 136 MMOL/L — SIGNIFICANT CHANGE UP (ref 135–146)
TACROLIMUS SERPL-MCNC: 10 NG/ML — SIGNIFICANT CHANGE UP
TIBC SERPL-MCNC: 185 UG/DL — LOW (ref 220–430)
TRANSFERRIN SERPL-MCNC: 154 MG/DL — LOW (ref 200–360)
UIBC SERPL-MCNC: 157 UG/DL — SIGNIFICANT CHANGE UP (ref 110–370)
VIT B12 SERPL-MCNC: 781 PG/ML — SIGNIFICANT CHANGE UP (ref 232–1245)
WBC # BLD: 2.48 K/UL — LOW (ref 4.8–10.8)
WBC # FLD AUTO: 2.48 K/UL — LOW (ref 4.8–10.8)

## 2024-05-18 PROCEDURE — 99238 HOSP IP/OBS DSCHRG MGMT 30/<: CPT

## 2024-05-18 PROCEDURE — 93010 ELECTROCARDIOGRAM REPORT: CPT

## 2024-05-18 PROCEDURE — 99233 SBSQ HOSP IP/OBS HIGH 50: CPT

## 2024-05-18 RX ORDER — ATORVASTATIN CALCIUM 80 MG/1
1 TABLET, FILM COATED ORAL
Qty: 30 | Refills: 0
Start: 2024-05-18 | End: 2024-06-16

## 2024-05-18 RX ORDER — METOPROLOL TARTRATE 50 MG
1 TABLET ORAL
Qty: 60 | Refills: 0
Start: 2024-05-18 | End: 2024-06-16

## 2024-05-18 RX ORDER — ACETAMINOPHEN 500 MG
2 TABLET ORAL
Qty: 0 | Refills: 0 | DISCHARGE
Start: 2024-05-18

## 2024-05-18 RX ORDER — SENNA PLUS 8.6 MG/1
2 TABLET ORAL
Qty: 0 | Refills: 0 | DISCHARGE
Start: 2024-05-18

## 2024-05-18 RX ORDER — MAGNESIUM SULFATE 500 MG/ML
2 VIAL (ML) INJECTION ONCE
Refills: 0 | Status: COMPLETED | OUTPATIENT
Start: 2024-05-18 | End: 2024-05-18

## 2024-05-18 RX ORDER — APIXABAN 2.5 MG/1
1 TABLET, FILM COATED ORAL
Qty: 60 | Refills: 0
Start: 2024-05-18 | End: 2024-06-16

## 2024-05-18 RX ORDER — DILTIAZEM HCL 120 MG
1 CAPSULE, EXT RELEASE 24 HR ORAL
Qty: 30 | Refills: 0
Start: 2024-05-18 | End: 2024-06-16

## 2024-05-18 RX ORDER — ASPIRIN/CALCIUM CARB/MAGNESIUM 324 MG
1 TABLET ORAL
Refills: 0 | DISCHARGE

## 2024-05-18 RX ORDER — TACROLIMUS 5 MG/1
1 CAPSULE ORAL
Qty: 60 | Refills: 0
Start: 2024-05-18 | End: 2024-06-16

## 2024-05-18 RX ORDER — CEFPODOXIME PROXETIL 100 MG
1 TABLET ORAL
Qty: 14 | Refills: 0
Start: 2024-05-18 | End: 2024-05-24

## 2024-05-18 RX ADMIN — MYCOPHENOLATE MOFETIL 1000 MILLIGRAM(S): 250 CAPSULE ORAL at 06:04

## 2024-05-18 RX ADMIN — PANTOPRAZOLE SODIUM 40 MILLIGRAM(S): 20 TABLET, DELAYED RELEASE ORAL at 06:04

## 2024-05-18 RX ADMIN — MYCOPHENOLATE MOFETIL 1000 MILLIGRAM(S): 250 CAPSULE ORAL at 17:04

## 2024-05-18 RX ADMIN — Medication 25 GRAM(S): at 09:10

## 2024-05-18 RX ADMIN — Medication 50 MILLIGRAM(S): at 17:05

## 2024-05-18 RX ADMIN — TACROLIMUS 1 MILLIGRAM(S): 5 CAPSULE ORAL at 06:04

## 2024-05-18 RX ADMIN — APIXABAN 2.5 MILLIGRAM(S): 2.5 TABLET, FILM COATED ORAL at 06:04

## 2024-05-18 RX ADMIN — APIXABAN 2.5 MILLIGRAM(S): 2.5 TABLET, FILM COATED ORAL at 17:04

## 2024-05-18 RX ADMIN — Medication 180 MILLIGRAM(S): at 06:04

## 2024-05-18 RX ADMIN — TACROLIMUS 1 MILLIGRAM(S): 5 CAPSULE ORAL at 17:04

## 2024-05-18 RX ADMIN — CEFTRIAXONE 100 MILLIGRAM(S): 500 INJECTION, POWDER, FOR SOLUTION INTRAMUSCULAR; INTRAVENOUS at 15:15

## 2024-05-18 RX ADMIN — Medication 63.75 MILLIMOLE(S): at 10:46

## 2024-05-18 RX ADMIN — Medication 50 MILLIGRAM(S): at 06:04

## 2024-05-18 RX ADMIN — CHLORHEXIDINE GLUCONATE 1 APPLICATION(S): 213 SOLUTION TOPICAL at 06:08

## 2024-05-18 RX ADMIN — POLYETHYLENE GLYCOL 3350 17 GRAM(S): 17 POWDER, FOR SOLUTION ORAL at 11:18

## 2024-05-18 RX ADMIN — OXYCODONE HYDROCHLORIDE 15 MILLIGRAM(S): 5 TABLET ORAL at 06:04

## 2024-05-18 NOTE — DISCHARGE NOTE PROVIDER - TIME SPENT: (MINUTES SPENT ON THE DISCHARGE SERVICE)
What Type Of Note Output Would You Prefer (Optional)?: Bullet Format Hpi Title: Evaluation of Skin Lesions How Severe Are Your Spot(S)?: mild Have Your Spot(S) Been Treated In The Past?: has not been treated 20

## 2024-05-18 NOTE — DISCHARGE NOTE NURSING/CASE MANAGEMENT/SOCIAL WORK - PATIENT PORTAL LINK FT
You can access the FollowMyHealth Patient Portal offered by [facility] by registering at the following website: [email or web address]. By joining [employer]’s FollowMyHealth portal, you will also be able to view your health information using other applications (apps) compatible with our system.

## 2024-05-18 NOTE — PROGRESS NOTE ADULT - NS ATTEND AMEND GEN_ALL_CORE FT
Long standing persistent AF s/p AF ablation  SVT    Doing better with cardizem + Metoprolol  Feels better  Eliquis  No need PPM for now. High risk of infection so avoiding/delaying PPM + AVN ablation for now considering he is responding great with CCB  DC home if stable otherwise
Persistent AF s/p AF ablation  Recurrent SVT/AT    Responding to cardizem drip  Change to  mg  Cont Metoprolol  Eliquis  Will hold off on PPM + AVN ablation considering recent ablation- need for uninterrupted OAC, low platelet count.  Hgb trend  Cr trend  Trying to avoid PPM if possible considering high risk of infection and expecting reasonable outcomes with recent AF ablation.  NPO p MN if SVT recurrence
69yoM with ESRD s/p kidney transplant (8 years ago at [place]), HFpEF, HTN, HLD, Afib s/p ablation on 5/10/24 c/b L groin hematoma, and chronic HCV who was admitted on 5/13/24 for worsening L groin pain.     On prior hospitalization, evaluation of L femoral access site demonstrated hematoma versus pseudoaneurysm. On this presentation, patient was noted to be in Afib/flutter with RVR in the ED, started on a diltiazem gtt. His Hgb was lower than his prior baseline, and he received 2u pRBCs. CT angio was performed, which showed left adductor muscle hematoma, no active extravasation. Arterial duplex revealed possible AV fistula in L groin. Vascular following and recommending CTA of the [facility].     While in the CCU, patient has continued to have episodes of Aflutter. EP was considering Aflutter ablation OR PPM and AV marleni ablation. However, given the patient's HR appears rate control, will attempt medical management.     Plan:   - Continue diltiazem 180 mg daily (home dose) and Lopressor 50 mg BID (home dose 25 mg BID)  - On Eliquis 2.5 mg BID (lower dose given hematoma)  - Discontinued Imdur, as BPs are low and there is concern for YOLANDA, avoiding hypotension  - Needs a tacrolimus level collected every day exactly 1 hour prior to tacrolimus dose!  - Nephrology following  - UA with concern for UTI and patient with suprapubic pain, will send UCx and start on CTX  - Will prescribe oxycodone 15 mg q8h, which is the patient home dose, however he states he takes THREE pills at a time. Will consult Pain Management.   - PT consult, made patient of the importance of ambulation
69yoM with ESRD s/p kidney transplant (8 years ago at [place]), HFpEF, HTN, HLD, Afib s/p ablation on 5/10/24 c/b L groin hematoma, and chronic HCV who was admitted on 5/13/24 for worsening L groin pain.     On prior hospitalization, evaluation of L femoral access site demonstrated hematoma versus pseudoaneurysm. On this presentation, patient was noted to be in Afib/flutter with RVR in the ED, started on a diltiazem gtt. His Hgb was noted to be lower than his prior baseline, and he has received 2u pRBCs. CT angio was performed, which showed left adductor muscle hematoma, no active extravasation. Arterial duplex revealed possible AV fistula in L groin. Vascular following and recommending CTA of the CL[facility]IBEL.     While in the CCU, patient has continued to have episodes of Aflutter. He is now on diltiazem 5 mg/hr with improvement HR, though occasional runs of  bpm. EP has been considering Aflutter ablation OR PPM and AV marleni ablation. However, given the patient's HR appears rate control, will attempt medical management.     On today's labs, Cr 2.5, up from baseline 1.5-2.0. Patient was given contrast on 5/13/24, concern for YOLANDA.     Plan:   - Start diltiazem 180 mg daily (home dose)  - Discontinue diltiazem gtt  - Transition from Lopressor 25 mg q6h to 50 mg BID (home dose 25 mg BID)  - On Eliquis 2.5 mg BID (lower dose given hematoma)  - Discontinue Imdur  - Give 500 cc IVF, will give additional fluids to avoid hypotension  - Needs a tacrolimus level collected at 5am tomorrow morning!  - Nephrology following  - NPO at MN in case procedure is needed

## 2024-05-18 NOTE — PROGRESS NOTE ADULT - SUBJECTIVE AND OBJECTIVE BOX
Chief complaint: Patient is a 69y old  Male who presents with a chief complaint of Thigh hematoma (17 May 2024 11:10)    69-year-old with PMHx of HTN, HLD, HFpEF (70-75%) grade 2 DD, ESRD s/p kidney transplant 8 years ago at [facility], chronic hep C, A-fib with ablation preformed 5/12, presenting for left groin pain. Patient was discharged from [facility] after a 2 day stay following an A-Fib ablation. Access site was through the Left groin, evaluated prior to discharge with no hematoma or pseudoaneurysm. Patient states that he has had pain at the site since the procedure, but it worsened after discharge.  In the ED patient was hemodynamically stable /82, . Patient was noted to be back in afib. Started on cardizem drip for HR control. Labs significant for Hgb drop from 11-> 7. CT angio was preformed which showed left adductor muscle hematoma, no active extravasation, no IR intervention.  Admitted to [address] [place] [address] for further management.  Pt received total 2 units pRBCs on this admission.  Left groin duplex no PSA.    Interval history:  Patient seen and examined this AM. He denies any new complaints.  Denies any pain at the groin site, no chest pain, palps, or SOB.    Review of systems: A complete 10-point review of systems was obtained and is negative except as stated in the interval history.    Vitals:  T(F): 98, Max: 98.2 (05-17 @ 23:52)  HR: 69 (58 - 80)  BP: 124/61 (117/69 - 143/72)  RR: 20 (19 - 21)  SpO2: 99% (96% - 100%)    Ins & outs:     05-15 @ 07:01  -  05-16 @ 07:00  --------------------------------------------------------  IN: 255 mL / OUT: 450 mL / NET: -195 mL    05-16 @ 07:01  -  05-17 @ 07:00  --------------------------------------------------------  IN: 2175 mL / OUT: 1400 mL / NET: 775 mL    05-17 @ 07:01 - 05-18 @ 07:00  --------------------------------------------------------  IN: 420 mL / OUT: 1250 mL / NET: -830 mL    05-18 @ 07:01 - 05-18 @ 10:21  --------------------------------------------------------  IN: 238 mL / OUT: 400 mL / NET: -162 mL      Weight trend:  Weight (kg): 63.9 (05-15)    Physical exam:  General: No apparent distress  HEENT: Anicteric sclera. Moist mucous membranes. JVP negative.   Cardiac: Irregular rate and rhythm. No murmurs, rubs, or gallops.   Vascular: Symmetric radial pulses. Dorsalis pedis pulses palpable.   Respiratory: Normal effort.  Clear to ascultation.   Abdomen: Soft, not distended, tender in suprapubic area   Extremities: Warm with no edema. L thigh and groin ecchymosis visible but improving, site is soft, extremities warm to touch. DP pulses +1 b/l   Skin: Warm and dry. No rash.   Neurologic: Grossly normal motor function.   Psychiatric: Oriented to person, place, and time.       Data reviewed:  - Telemetry: NSR  with frequent PACs 58-80    - ECG (5/15/24):   SVT @ 156bpm     - TTE (3/15/24):   Summary:   1. Hyperdynamic global left ventricular systolic function.   2. Left ventricular ejection fraction, by visual estimation, is 70 to   75%.   3. Moderately increased LV wall thickness.   4. Spectral Doppler shows restrictive pattern of left ventricular   myocardial filling (Grade III diastolic dysfunction).   5. Elevated mean left atrial pressure.   6. Normal right ventricular size and function.   7. Severely enlarged left atrium.   8. Mildly enlarged right atrium.   9. Mild aortic regurgitation.  10. Moderate mitral valve regurgitation.  11. Mild tricuspid regurgitation.  12. Dilatation of the aortic root (measuring 3.8 cm, indexed 2.24 cm/m2)   and ascending aorta (measuring 3.8 cm, indexed 2.24 cm/m2).  13. Estimated pulmonary artery systolic pressure is 38.5 mmHg assuming a   right atrial pressure of 8 mmHg, which is consistent with borderline   pulmonary hypertension.  14. Endocardial visualization was enhanced with intravenous echo contrast.  15.Compared to the previous study 1/24/24, the findings are similar.    - Chest x-ray (5/15/24):   No radiographic evidence of acute cardiopulmonary disease.     - CT Abdomen 5/13/24:  Apparent hematoma involving left hip adductor musculature measuring 7.7 x   5 cm. A blush of contrast is noted within (4/117). The underlying source   is not definitively elucidated on this exam.    - Labs:                        7.5    2.48  )-----------( 73       ( 18 May 2024 05:21 )             23.8     05-18    136  |  109  |  26<H>  ----------------------------<  125<H>  4.4   |  18  |  2.0<H>    Ca    9.7      18 May 2024 05:21  Phos  2.0     05-18  Mg     1.6     05-18      Urinalysis Basic - ( 18 May 2024 05:21 )    Color: x / Appearance: x / SG: x / pH: x  Gluc: 125 mg/dL / Ketone: x  / Bili: x / Urobili: x   Blood: x / Protein: x / Nitrite: x   Leuk Esterase: x / RBC: x / WBC x   Sq Epi: x / Non Sq Epi: x / Bacteria: x        Medications:  apixaban 2.5 milliGRAM(s) Oral every 12 hours  atorvastatin 80 milliGRAM(s) Oral at bedtime  cefTRIAXone   IVPB 1000 milliGRAM(s) IV Intermittent every 24 hours  chlorhexidine 2% Cloths 1 Application(s) Topical <User Schedule>  diltiazem    milliGRAM(s) Oral daily  metoprolol tartrate 50 milliGRAM(s) Oral every 12 hours  mycophenolate mofetil 1000 milliGRAM(s) Oral two times a day  pantoprazole    Tablet 40 milliGRAM(s) Oral before breakfast  polyethylene glycol 3350 17 Gram(s) Oral daily  senna 2 Tablet(s) Oral at bedtime  sodium phosphate 15 milliMole(s)/250 mL IVPB 15 milliMole(s) IV Intermittent once  tacrolimus 1 milliGRAM(s) Oral two times a day  tamsulosin 0.8 milliGRAM(s) Oral at bedtime      Allergies    Rifuxen (Swelling)  Rituxan (Hives)        
Chief complaint: Patient is a 69y old  Male who presents with a chief complaint of atrial fibrillation    Interval history: Patient seen and examined. Remains on cardizem gtt. NPO for AV marleni ablation with DC Aveir tomorrow. S/P 1 unit prbc's monitor CBC    Review of systems: A complete 10-point review of systems was obtained and is negative except as stated in the interval history.    Vitals:  T(C): 36.1 (15 May 2024 09:31), Max: 36.6 (14 May 2024 16:00)  T(F): 96.9 (15 May 2024 09:31), Max: 97.8 (14 May 2024 16:00)  HR: 75 (15 May 2024 13:35) (61 - 153)  BP: 93/65 (15 May 2024 13:35) (75/41 - 134/62)  BP(mean): 73 (15 May 2024 13:35) (64 - 96)  RR: 17 (15 May 2024 13:35) (17 - 19)  SpO2: 98% (15 May 2024 13:35) (97% - 100%)    Parameters below as of 15 May 2024 13:35  Patient On (Oxygen Delivery Method): nasal cannula  O2 Flow (L/min): 2    Physical exam:  General: No apparent distress  HEENT: Anicteric sclera. Moist mucous membranes. JVP *** cm.   Cardiac: Regular rate and rhythm. No murmurs, rubs, or gallops.   Vascular: Symmetric radial pulses. Dorsalis pedis pulses palpable.   Respiratory: Normal effort. Bibasilar crackles. Clear to ascultation.   Abdomen: Soft, nontender. Audible bowel sounds.   Extremities: Warm with *** edema. No cyanosis or clubbing.   Skin: Warm and dry. No rash.   Neurologic: Grossly normal motor function.   Psychiatric: Oriented to person, place, and time.     Data reviewed:  - Telemetry: Sinus rhythm rate 70-80s no events       - Labs:                        7.4    4.85  )-----------( 49       ( 15 May 2024 11:57 )             23.9     05-15    138  |  108  |  31<H>  ----------------------------<  109<H>  4.2   |  22  |  2.0<H>    Ca    9.7      15 May 2024 05:57  Phos  3.2     05-15  Mg     2.0     05-15    TPro  5.0<L>  /  Alb  3.5  /  TBili  0.7  /  DBili  x   /  AST  31  /  ALT  25  /  AlkPhos  73  05-15    LIVER FUNCTIONS - ( 15 May 2024 05:57 )  Alb: 3.5 g/dL / Pro: 5.0 g/dL / ALK PHOS: 73 U/L / ALT: 25 U/L / AST: 31 U/L / GGT: x             Lactate Trend    Urinalysis Basic - ( 15 May 2024 05:57 )    Color: x / Appearance: x / SG: x / pH: x  Gluc: 109 mg/dL / Ketone: x  / Bili: x / Urobili: x   Blood: x / Protein: x / Nitrite: x   Leuk Esterase: x / RBC: x / WBC x   Sq Epi: x / Non Sq Epi: x / Bacteria: x      Medications:  apixaban 2.5 milliGRAM(s) Oral every 12 hours  aspirin  chewable 81 milliGRAM(s) Oral daily  atorvastatin 80 milliGRAM(s) Oral at bedtime  furosemide    Tablet 20 milliGRAM(s) Oral daily  isosorbide   mononitrate ER Tablet (IMDUR) 30 milliGRAM(s) Oral daily  metoprolol tartrate 25 milliGRAM(s) Oral two times a day  mycophenolate mofetil 500 milliGRAM(s) Oral two times a day  pantoprazole  Injectable 20 milliGRAM(s) IV Push two times a day  tacrolimus 2 milliGRAM(s) Oral at bedtime  tacrolimus 3 milliGRAM(s) Oral daily  tamsulosin 0.4 milliGRAM(s) Oral at bedtime    Drips:    PRN:     Allergies    Rifuxen (Swelling)  Rituxan (Hives)    Intolerances            
INTERVAL HPI/OVERNIGHT EVENTS:  Patient has been consistently in AF rate controlled on Cardizem and Lopressor    MEDICATIONS  (STANDING):  apixaban 2.5 milliGRAM(s) Oral every 12 hours  atorvastatin 80 milliGRAM(s) Oral at bedtime  chlorhexidine 2% Cloths 1 Application(s) Topical <User Schedule>  diltiazem    milliGRAM(s) Oral daily  metoprolol tartrate 50 milliGRAM(s) Oral every 12 hours  mycophenolate mofetil 1000 milliGRAM(s) Oral two times a day  oxyCODONE    IR 15 milliGRAM(s) Oral every 8 hours  pantoprazole    Tablet 40 milliGRAM(s) Oral before breakfast  polyethylene glycol 3350 17 Gram(s) Oral daily  senna 2 Tablet(s) Oral at bedtime  sodium chloride 0.9% Bolus 500 milliLiter(s) IV Bolus once  tacrolimus 3 milliGRAM(s) Oral <User Schedule>  tacrolimus 2 milliGRAM(s) Oral at bedtime  tamsulosin 0.8 milliGRAM(s) Oral at bedtime    MEDICATIONS  (PRN):  acetaminophen     Tablet .. 650 milliGRAM(s) Oral every 6 hours PRN Temp greater or equal to 38C (100.4F), Mild Pain (1 - 3)  HYDROmorphone  Injectable 2 milliGRAM(s) IV Push every 4 hours PRN Severe Pain (7 - 10)  melatonin 3 milliGRAM(s) Oral at bedtime PRN Insomnia      Allergies    Rifuxen (Swelling)  Rituxan (Hives)    Intolerances        REVIEW OF SYSTEMS: No CP, palpitations, dizziness or SOB     Vital Signs Last 24 Hrs  T(C): 36.7 (16 May 2024 08:01), Max: 36.7 (15 May 2024 16:19)  T(F): 98 (16 May 2024 08:01), Max: 98 (15 May 2024 16:19)  HR: 69 (16 May 2024 10:00) (67 - 153)  BP: 98/56 (16 May 2024 10:00) (80/43 - 128/57)  BP(mean): 73 (16 May 2024 10:00) (57 - 102)  RR: 20 (16 May 2024 10:00) (17 - 20)  SpO2: 96% (16 May 2024 10:00) (93% - 100%)    Parameters below as of 16 May 2024 10:00  Patient On (Oxygen Delivery Method): room air        05-15-24 @ 07:01  -  05-16-24 @ 07:00  --------------------------------------------------------  IN: 255 mL / OUT: 450 mL / NET: -195 mL    05-16-24 @ 07:01  -  05-16-24 @ 11:54  --------------------------------------------------------  IN: 15 mL / OUT: 150 mL / NET: -135 mL    Physical Exam  GENERAL: In no apparent distress, well nourished, and hydrated.  EYES: EOMI, PERRLA, conjunctiva and sclera clear  NECK: Supple  HEART: Irregular rate and rhythm; No murmurs, rubs, or gallops.  PULMONARY: Clear to auscultation and perfusion.  No rales, wheezing, or rhonchi bilaterally.  EXTREMITIES:  2+ Peripheral Pulses, +L thigh hematoma improving  NEUROLOGICAL: Grossly nonfocal     LABS:                        7.2    4.09  )-----------( 57       ( 16 May 2024 05:38 )             22.7     05-16    134<L>  |  107  |  36<H>  ----------------------------<  107<H>  3.9   |  17  |  2.5<H>    Ca    9.7      16 May 2024 05:38  Phos  2.5     05-16  Mg     1.9     05-16    TPro  4.9<L>  /  Alb  3.4<L>  /  TBili  1.0  /  DBili  x   /  AST  26  /  ALT  20  /  AlkPhos  83  05-16      Urinalysis Basic - ( 16 May 2024 05:38 )    Color: x / Appearance: x / SG: x / pH: x  Gluc: 107 mg/dL / Ketone: x  / Bili: x / Urobili: x   Blood: x / Protein: x / Nitrite: x   Leuk Esterase: x / RBC: x / WBC x   Sq Epi: x / Non Sq Epi: x / Bacteria: x        RADIOLOGY & ADDITIONAL TESTS:  
CHIEF COMPLAINT:  Patient is a 69y old  Male who presents with a chief complaint of Thigh hematoma (14 May 2024 13:32)      INTERVAL HISTORY/OVERNIGHT EVENTS:  Remains on Cardizem drip, had rapid HR up to 150s overnight, now ranges from 70-120s.    ======================  MEDICATIONS:  apixaban 2.5 milliGRAM(s) Oral every 12 hours  atorvastatin 80 milliGRAM(s) Oral at bedtime  chlorhexidine 2% Cloths 1 Application(s) Topical <User Schedule>  furosemide    Tablet 20 milliGRAM(s) Oral daily  isosorbide   mononitrate ER Tablet (IMDUR) 30 milliGRAM(s) Oral daily  metoprolol tartrate 25 milliGRAM(s) Oral two times a day  mycophenolate mofetil 1000 milliGRAM(s) Oral two times a day  pantoprazole    Tablet 40 milliGRAM(s) Oral before breakfast  polyethylene glycol 3350 17 Gram(s) Oral daily  senna 2 Tablet(s) Oral at bedtime  tacrolimus 2 milliGRAM(s) Oral at bedtime  tacrolimus 3 milliGRAM(s) Oral <User Schedule>  tamsulosin 0.8 milliGRAM(s) Oral at bedtime    DRIPS:  diltiazem Infusion 5 mG/Hr (5 mL/Hr) IV Continuous <Continuous>    PRN:       ======================  PHYSICAL EXAMINATION:  GEN:  nad.   HEENT:  eomi. ncat  PULM:  b/l lung sounds   CARD: irregularly irregular  ABD: ntnd  EXT:  large diffuse ecchymosis on L groin  NEURO:  no new focal deficits.   ======================  OBJECTIVE:        VS:  T(F): 97.9 (05-14 @ 12:00), Max: 98.4 (05-14 @ 07:00)  HR: 100 (05-14 @ 12:00) (57 - 165)  BP: 100/53 (05-14 @ 12:00) (80/63 - 123/61)  RR: 21 (05-14 @ 12:00) (17 - 22)  SpO2: 100% (05-14 @ 12:00) (94% - 100%)  CVP(mm Hg): --  CO: --  CI: --  PA: --  PCWP: --    I/O:        Weight trend:  Weight (kg): 59 (05-13), 59 (05-10)    ======================    LABS:                          7.5    5.91  )-----------( 53       ( 14 May 2024 08:30 )             23.6     05-14    134<L>  |  106  |  27<H>  ----------------------------<  113<H>  4.1   |  18  |  1.8<H>    Ca    10.0      14 May 2024 08:30    TPro  5.6<L>  /  Alb  3.8  /  TBili  0.8  /  DBili  x   /  AST  33  /  ALT  31  /  AlkPhos  87  05-14    LIVER FUNCTIONS - ( 14 May 2024 08:30 )  Alb: 3.8 g/dL / Pro: 5.6 g/dL / ALK PHOS: 87 U/L / ALT: 31 U/L / AST: 33 U/L / GGT: x           PT/INR - ( 13 May 2024 07:07 )   PT: 16.40 sec;   INR: 1.43 ratio         PTT - ( 13 May 2024 07:07 )  PTT:28.1 sec          Urinalysis Basic - ( 14 May 2024 08:30 )    Color: x / Appearance: x / SG: x / pH: x  Gluc: 113 mg/dL / Ketone: x  / Bili: x / Urobili: x   Blood: x / Protein: x / Nitrite: x   Leuk Esterase: x / RBC: x / WBC x   Sq Epi: x / Non Sq Epi: x / Bacteria: x        Cultures:        
NEPHROLOGY FOLLOW UP NOTE    pt seen and examined  Cr better  bp's fair  no new complaints    PAST MEDICAL & SURGICAL HISTORY:  CKD (chronic kidney disease)      ESRD (end stage renal disease)      HTN (hypertension)      HLD (hyperlipidemia)      Atrial fibrillation  on eliquis      COPD, mild  not on O2      Peripheral neuropathy  unclear cause      Lymphoma  ?questionable, not treated, not followed      Melanoma  R eye, s/p laser      Cirrhosis  possibly related to hepC      Breast cancer      Legally blind      Stroke      History of heroin abuse      History of chronic hepatitis      Marijuana use      History of kidney transplant  3 years ago      S/P arteriovenous (AV) fistula creation  prior old fistula in R arm      S/P lumpectomy, right breast      History of back surgery  s/p Left L5-S1 endoscopic laminoforaminotomy      History of loop recorder        Hospital Medications:   MEDICATIONS  (STANDING):  apixaban 2.5 milliGRAM(s) Oral every 12 hours  atorvastatin 80 milliGRAM(s) Oral at bedtime  chlorhexidine 2% Cloths 1 Application(s) Topical <User Schedule>  diltiazem    milliGRAM(s) Oral daily  metoprolol tartrate 50 milliGRAM(s) Oral every 12 hours  mycophenolate mofetil 1000 milliGRAM(s) Oral two times a day  oxyCODONE    IR 15 milliGRAM(s) Oral every 8 hours  pantoprazole    Tablet 40 milliGRAM(s) Oral before breakfast  polyethylene glycol 3350 17 Gram(s) Oral daily  senna 2 Tablet(s) Oral at bedtime  sodium chloride 0.9% Bolus 500 milliLiter(s) IV Bolus once  tacrolimus 3 milliGRAM(s) Oral <User Schedule>  tacrolimus 2 milliGRAM(s) Oral at bedtime  tamsulosin 0.8 milliGRAM(s) Oral at bedtime        VITALS:  T(F): 98 (24 @ 08:01), Max: 98 (05-15-24 @ 16:19)  HR: 69 (24 @ 10:00)  BP: 98/56 (24 @ 10:00)  RR: 20 (24 @ 10:00)  SpO2: 96% (24 @ 10:00)  Wt(kg): --    05-15 @ 07:01  -   @ 07:00  --------------------------------------------------------  IN: 255 mL / OUT: 450 mL / NET: -195 mL     @ 07:01  -   @ 12:25  --------------------------------------------------------  IN: 15 mL / OUT: 150 mL / NET: -135 mL      Height (cm): 175.3 (05-15 @ 23:05)  Weight (kg): 63.9 (05-15 @ 23:05)  BMI (kg/m2): 20.8 (05-15 @ 23:05)  BSA (m2): 1.78 (05-15 @ 23:05)    LABS:      134<L>  |  107  |  36<H>  ----------------------------<  107<H>  3.9   |  17  |  2.5<H>    Ca    9.7      16 May 2024 05:38  Phos  2.5       Mg     1.9         TPro  4.9<L>  /  Alb  3.4<L>  /  TBili  1.0  /  DBili      /  AST  26  /  ALT  20  /  AlkPhos  83                            7.2    4.09  )-----------( 57       ( 16 May 2024 05:38 )             22.7       Urine Studies:  Urinalysis Basic - ( 16 May 2024 05:38 )    Color:  / Appearance:  / SG:  / pH:   Gluc: 107 mg/dL / Ketone:   / Bili:  / Urobili:    Blood:  / Protein:  / Nitrite:    Leuk Esterase:  / RBC:  / WBC    Sq Epi:  / Non Sq Epi:  / Bacteria:           RADIOLOGY & ADDITIONAL STUDIES:  Allergies:  Rifuxen (Swelling)  Rituxan (Hives)    Home Medications Reviewed    SOCIAL HISTORY:  Denies ETOH,Smoking,   FAMILY HISTORY:  FH: dementia  mother, alive    FHx: breast cancer  sister ( in 30s)          REVIEW OF SYSTEMS:  All other review of systems is negative unless indicated above.    PHYSICAL EXAM:  Constitutional: NAD  HEENT: anicteric sclera, oropharynx clear, MMM  Neck: No JVD  Respiratory: CTAB, no wheezes, rales or rhonchi  Cardiovascular: S1, S2, RRR  Gastrointestinal: BS+, soft, NT/ND  Extremities: No cyanosis or clubbing. No peripheral edema + left groin ecchymosis  Neurological: A/O x 3, no focal deficits  Psychiatric: Normal mood, normal affect  : No CVA tenderness. No hobson.   Skin: No rashes           Color:  / Appearance:  / SG:  / pH:   Gluc: 107 mg/dL / Ketone:   / Bili:  / Urobili:    Blood:  / Protein:  / Nitrite:    Leuk Esterase:  / RBC:  / WBC    Sq Epi:  / Non Sq Epi:  / Bacteria:     Hospital Medications:   MEDICATIONS  (STANDING):  apixaban 2.5 milliGRAM(s) Oral every 12 hours  atorvastatin 80 milliGRAM(s) Oral at bedtime  cefTRIAXone   IVPB 1000 milliGRAM(s) IV Intermittent every 24 hours  chlorhexidine 2% Cloths 1 Application(s) Topical <User Schedule>  diltiazem    milliGRAM(s) Oral daily  metoprolol tartrate 50 milliGRAM(s) Oral every 12 hours  mycophenolate mofetil 1000 milliGRAM(s) Oral two times a day  pantoprazole    Tablet 40 milliGRAM(s) Oral before breakfast  polyethylene glycol 3350 17 Gram(s) Oral daily  senna 2 Tablet(s) Oral at bedtime  tacrolimus 1 milliGRAM(s) Oral two times a day  tamsulosin 0.8 milliGRAM(s) Oral at bedtime        VITALS:  T(F): 97.8 (24 @ 07:59), Max: 98.5 (24 @ 00:01)  HR: 59 (24 @ 07:59)  BP: 108/59 (24 @ 07:59)  RR: 18 (24 @ 07:59)  SpO2: 99% (24 @ 07:59)  Wt(kg): --    05-15 @ 07:01  -   @ 07:00  --------------------------------------------------------  IN: 255 mL / OUT: 450 mL / NET: -195 mL     @ 07:01  -   @ 07:00  --------------------------------------------------------  IN: 2175 mL / OUT: 1400 mL / NET: 775 mL     @ 07:01  -   @ 11:11  --------------------------------------------------------  IN: 245 mL / OUT: 350 mL / NET: -105 mL          LABS:      137  |  111<H>  |  33<H>  ----------------------------<  109<H>  3.9   |  19  |  2.1<H>    Ca    9.3      17 May 2024 05:15  Phos  1.9       Mg     2.2         TPro  4.9<L>  /  Alb  3.4<L>  /  TBili  1.0  /  DBili      /  AST  26  /  ALT  20  /  AlkPhos  83                            7.3    2.53  )-----------( 65       ( 17 May 2024 05:15 )             22.5       Urine Studies:  Urinalysis Basic - ( 17 May 2024 05:15 )    Color:  / Appearance:  / SG:  / pH:   Gluc: 109 mg/dL / Ketone:   / Bili:  / Urobili:    Blood:  / Protein:  / Nitrite:    Leuk Esterase:  / RBC:  / WBC    Sq Epi:  / Non Sq Epi:  / Bacteria:       Creatinine, Random Urine: 105 mg/dL ( @ 17:26)  Protein/Creatinine Ratio Calculation: 0.7 Ratio (:26)  Sodium, Random Urine: 30.0 mmoL/L (05-16 @ 17:26)  Osmolality, Random Urine: 388 mos/kg (-16 @ 17:26)      RADIOLOGY & ADDITIONAL STUDIES:        RADIOLOGY & ADDITIONAL STUDIES:  
Chief complaint: Patient is a 69y old  Male who presents with a chief complaint of atrial fibrillation    Interval history: Patient seen and examined this AM. He denies any new complaints. No vomiting today and wants to eat breakfast.Tele reviewed with attending and remains in NSR with PAC's.  No plan for EP procedure. Continue medical management. BP improved and stable and SANTA improving.     Review of systems: A complete 10-point review of systems was obtained and is negative except as stated in the interval history.    Vitals:  ICU Vital Signs Last 24 Hrs  T(C): 36.6 (17 May 2024 07:59), Max: 36.9 (17 May 2024 00:01)  T(F): 97.8 (17 May 2024 07:59), Max: 98.5 (17 May 2024 00:01)  HR: 59 (17 May 2024 07:59) (59 - 76)  BP: 108/59 (17 May 2024 07:59) (96/55 - 130/59)  BP(mean): 76 (17 May 2024 07:59) (70 - 85)  ABP: --  ABP(mean): --  RR: 18 (17 May 2024 07:59) (18 - 20)  SpO2: 99% (17 May 2024 07:59) (95% - 99%)    O2 Parameters below as of 17 May 2024 07:59  Patient On (Oxygen Delivery Method): room air      Physical exam:  General: No apparent distress  HEENT: Anicteric sclera. Moist mucous membranes. JVP negative.   Cardiac: Irregular rate and rhythm. No murmurs, rubs, or gallops.   Vascular: Symmetric radial pulses. Dorsalis pedis pulses palpable.   Respiratory: Normal effort.  Clear to ascultation.   Abdomen: Soft, not distended, tender in suprapubic area   Extremities: Warm with no edema. L thigh and groin ecchymosis visible but improving . extremities warm to touch. DP pulses +1 b/l   Skin: Warm and dry. No rash.   Neurologic: Grossly normal motor function.   Psychiatric: Oriented to person, place, and time.     Data reviewed:  - Telemetry (last 24 hours): NSR w/ frequent PAC's 60-80's     - Labs:                        7.3    2.53  )-----------( 65       ( 17 May 2024 05:15 )             22.5     05-17    137  |  111<H>  |  33<H>  ----------------------------<  109<H>  3.9   |  19  |  2.1<H>    Ca    9.3      17 May 2024 05:15  Phos  1.9     05-17  Mg     2.2     05-17    TPro  4.9<L>  /  Alb  3.4<L>  /  TBili  1.0  /  DBili  x   /  AST  26  /  ALT  20  /  AlkPhos  83  05-16    LIVER FUNCTIONS - ( 16 May 2024 05:38 )  Alb: 3.4 g/dL / Pro: 4.9 g/dL / ALK PHOS: 83 U/L / ALT: 20 U/L / AST: 26 U/L / GGT: x           Urinalysis Basic - ( 17 May 2024 05:15 )    Color: x / Appearance: x / SG: x / pH: x  Gluc: 109 mg/dL / Ketone: x  / Bili: x / Urobili: x   Blood: x / Protein: x / Nitrite: x   Leuk Esterase: x / RBC: x / WBC x   Sq Epi: x / Non Sq Epi: x / Bacteria: x             Medications:  MEDICATIONS  (STANDING):  apixaban 2.5 milliGRAM(s) Oral every 12 hours  atorvastatin 80 milliGRAM(s) Oral at bedtime  cefTRIAXone   IVPB 1000 milliGRAM(s) IV Intermittent every 24 hours  chlorhexidine 2% Cloths 1 Application(s) Topical <User Schedule>  diltiazem    milliGRAM(s) Oral daily  metoprolol tartrate 50 milliGRAM(s) Oral every 12 hours  mycophenolate mofetil 1000 milliGRAM(s) Oral two times a day  pantoprazole    Tablet 40 milliGRAM(s) Oral before breakfast  polyethylene glycol 3350 17 Gram(s) Oral daily  senna 2 Tablet(s) Oral at bedtime  tacrolimus 1 milliGRAM(s) Oral two times a day  tamsulosin 0.8 milliGRAM(s) Oral at bedtime    MEDICATIONS  (PRN):  acetaminophen     Tablet .. 650 milliGRAM(s) Oral every 6 hours PRN Mild Pain (1 - 3), Moderate Pain (4 - 6)  melatonin 3 milliGRAM(s) Oral at bedtime PRN Insomnia  oxyCODONE    IR 15 milliGRAM(s) Oral every 8 hours PRN Moderate Pain (4 - 6)    Allergies  Rifuxen (Swelling)  Rituxan (Hives)                
Chief complaint: Patient is a 69y old  Male who presents with a chief complaint of atrial fibrillation    Interval history: Patient seen and examined this AM. No overnight complaints, episodes of SVT on tele (possible atrial flutter)   Remains on cardizem gtt switched to PO  mg today   NPO pMN for possible AV marleni ablation +  DC Aveir tomorrow  one episode of vomiting after eating today   worsening SANTA , borderline BP (MAP >60)  patient reports last BM one week ago, reports passing gas however, abdomen soft and  not distended     Review of systems: A complete 10-point review of systems was obtained and is negative except as stated in the interval history.    Vitals:  Vital Signs Last 24 Hrs  T(C): 36.7 (16 May 2024 08:01), Max: 36.7 (15 May 2024 16:19)  T(F): 98 (16 May 2024 08:01), Max: 98 (15 May 2024 16:19)  HR: 69 (16 May 2024 10:00) (67 - 87)  BP: 98/56 (16 May 2024 10:00) (80/43 - 128/57)  BP(mean): 73 (16 May 2024 10:00) (57 - 102)  RR: 20 (16 May 2024 10:00) (17 - 20)  SpO2: 96% (16 May 2024 10:00) (93% - 100%)    Parameters below as of 16 May 2024 10:00  Patient On (Oxygen Delivery Method): room air      Physical exam:  General: No apparent distress  HEENT: Anicteric sclera. Moist mucous membranes. JVPnegative.   Cardiac: Regular rate and rhythm. No murmurs, rubs, or gallops.   Vascular: Symmetric radial pulses. Dorsalis pedis pulses palpable.   Respiratory: Normal effort.  Clear to ascultation.   Abdomen: Soft, not distended,  nontender. Audible bowel sounds.   Extremities: Warm with no edema. L thigh and groin ecchymosis visible . extremities warm to touch. DP pulses +1 b/l   Skin: Warm and dry. No rash.   Neurologic: Grossly normal motor function.   Psychiatric: Oriented to person, place, and time.     Data reviewed:  - Telemetry (last 24 hours): Sinus rhythm rate 70-80s, episode of SVT ?aflutter     - Labs:                        7.2    4.09  )-----------( 57       ( 16 May 2024 05:38 )             22.7     05-16    134<L>  |  107  |  36<H>  ----------------------------<  107<H>  3.9   |  17  |  2.5<H>    Ca    9.7      16 May 2024 05:38  Phos  2.5     05-16  Mg     1.9     05-16    TPro  4.9<L>  /  Alb  3.4<L>  /  TBili  1.0  /  DBili  x   /  AST  26  /  ALT  20  /  AlkPhos  83  05-16    LIVER FUNCTIONS - ( 16 May 2024 05:38 )  Alb: 3.4 g/dL / Pro: 4.9 g/dL / ALK PHOS: 83 U/L / ALT: 20 U/L / AST: 26 U/L / GGT: x             Lactate Trend    Urinalysis Basic - ( 15 May 2024 05:57 )    Color: x / Appearance: x / SG: x / pH: x  Gluc: 109 mg/dL / Ketone: x  / Bili: x / Urobili: x   Blood: x / Protein: x / Nitrite: x   Leuk Esterase: x / RBC: x / WBC x   Sq Epi: x / Non Sq Epi: x / Bacteria: x      Medications:  MEDICATIONS  (STANDING):  apixaban 2.5 milliGRAM(s) Oral every 12 hours  atorvastatin 80 milliGRAM(s) Oral at bedtime  chlorhexidine 2% Cloths 1 Application(s) Topical <User Schedule>  diltiazem    milliGRAM(s) Oral daily  metoprolol tartrate 50 milliGRAM(s) Oral every 12 hours  mycophenolate mofetil 1000 milliGRAM(s) Oral two times a day  oxyCODONE    IR 15 milliGRAM(s) Oral every 8 hours  pantoprazole    Tablet 40 milliGRAM(s) Oral before breakfast  polyethylene glycol 3350 17 Gram(s) Oral daily  senna 2 Tablet(s) Oral at bedtime  tacrolimus 1 milliGRAM(s) Oral two times a day  tamsulosin 0.8 milliGRAM(s) Oral at bedtime    MEDICATIONS  (PRN):  acetaminophen     Tablet .. 650 milliGRAM(s) Oral every 6 hours PRN Temp greater or equal to 38C (100.4F), Mild Pain (1 - 3)  HYDROmorphone  Injectable 2 milliGRAM(s) IV Push every 4 hours PRN Severe Pain (7 - 10)  melatonin 3 milliGRAM(s) Oral at bedtime PRN Insomnia  ondansetron Injectable 2 milliGRAM(s) IV Push once PRN Nausea and/or Vomiting    Drips:    PRN:     Allergies    Rifuxen (Swelling)  Rituxan (Hives)    Intolerances            
INTERVAL HPI/OVERNIGHT EVENTS:  remains in NSR 60s    MEDICATIONS  (STANDING):  apixaban 2.5 milliGRAM(s) Oral every 12 hours  atorvastatin 80 milliGRAM(s) Oral at bedtime  cefTRIAXone   IVPB 1000 milliGRAM(s) IV Intermittent every 24 hours  chlorhexidine 2% Cloths 1 Application(s) Topical <User Schedule>  diltiazem    milliGRAM(s) Oral daily  metoprolol tartrate 50 milliGRAM(s) Oral every 12 hours  mycophenolate mofetil 1000 milliGRAM(s) Oral two times a day  pantoprazole    Tablet 40 milliGRAM(s) Oral before breakfast  polyethylene glycol 3350 17 Gram(s) Oral daily  senna 2 Tablet(s) Oral at bedtime  tacrolimus 1 milliGRAM(s) Oral two times a day  tamsulosin 0.8 milliGRAM(s) Oral at bedtime    MEDICATIONS  (PRN):  acetaminophen     Tablet .. 650 milliGRAM(s) Oral every 6 hours PRN Mild Pain (1 - 3), Moderate Pain (4 - 6)  melatonin 3 milliGRAM(s) Oral at bedtime PRN Insomnia  oxyCODONE    IR 15 milliGRAM(s) Oral every 8 hours PRN Moderate Pain (4 - 6)      Allergies    Rifuxen (Swelling)  Rituxan (Hives)    Intolerances        REVIEW OF SYSTEMS    [ ] A ten-point review of systems was otherwise negative except as noted.  [ ] Due to altered mental status/intubation, subjective information were not able to be obtained from the patient. History was obtained, to the extent possible, from review of the chart and collateral sources of information.      Vital Signs Last 24 Hrs  T(C): 36.7 (18 May 2024 08:40), Max: 36.8 (17 May 2024 23:52)  T(F): 98 (18 May 2024 08:40), Max: 98.2 (17 May 2024 23:52)  HR: 69 (18 May 2024 08:40) (58 - 80)  BP: 124/61 (18 May 2024 08:40) (117/69 - 143/72)  BP(mean): 85 (18 May 2024 08:40) (85 - 100)  RR: 20 (18 May 2024 08:40) (19 - 21)  SpO2: 99% (18 May 2024 08:40) (96% - 100%)    Parameters below as of 18 May 2024 08:40  Patient On (Oxygen Delivery Method): room air        05-17-24 @ 07:01  -  05-18-24 @ 07:00  --------------------------------------------------------  IN: 420 mL / OUT: 1250 mL / NET: -830 mL    05-18-24 @ 07:01  -  05-18-24 @ 12:33  --------------------------------------------------------  IN: 663 mL / OUT: 500 mL / NET: 163 mL        PHYSICAL EXAM:    GENERAL: In no apparent distress, well nourished, and hydrated.  HEART: Regular rate and rhythm; No murmur; NO rubs, or gallops.  PULMONARY: Clear to auscultation and percussion.  Normal expansion/effort. No rales, wheezing, or rhonchi bilaterally.  ABDOMEN: Soft, Nontender, Nondistended; Bowel sounds present  EXTREMITIES:  Extremities warm, pink, well-perfused, 2+ Peripheral Pulses, No clubbing, cyanosis, or edema  NEUROLOGICAL: alert & oriented x 3, no focal deficits, PERRLA, EOMI    LABS:                        7.5    2.48  )-----------( 73       ( 18 May 2024 05:21 )             23.8     05-18    136  |  109  |  26<H>  ----------------------------<  125<H>  4.4   |  18  |  2.0<H>    Ca    9.7      18 May 2024 05:21  Phos  2.0     05-18  Mg     1.6     05-18              12 Lead ECG:   Ventricular Rate 156 BPM    Atrial Rate 163 BPM    QRS Duration 92 ms    Q-T Interval 312 ms    QTC Calculation(Bazett) 503 ms    R Axis 84 degrees    T Axis 259 degrees    Diagnosis Line Supraventricular tachycardia  Left ventricular hypertrophy with repolarization abnormality  Cannot rule out Anteroseptal infarct , age undetermined  Abnormal ECG    Confirmed by SYLVIA EISENBERG, JO ANN ([id number]) on 5/15/2024 11:56:21 PM (05-15-24 @ 12:55)      RADIOLOGY & ADDITIONAL TESTS:      
NEPHROLOGY FOLLOW UP NOTE    worsening renal function   + emesis this AM  episode of low BP's  now in NSR  + void     PAST MEDICAL & SURGICAL HISTORY:  CKD (chronic kidney disease)      ESRD (end stage renal disease)      HTN (hypertension)      HLD (hyperlipidemia)      Atrial fibrillation  on eliquis      COPD, mild  not on O2      Peripheral neuropathy  unclear cause      Lymphoma  ?questionable, not treated, not followed      Melanoma  R eye, s/p laser      Cirrhosis  possibly related to hepC      Breast cancer      Legally blind      Stroke      History of heroin abuse      History of chronic hepatitis      Marijuana use      History of kidney transplant  3 years ago      S/P arteriovenous (AV) fistula creation  prior old fistula in R arm      S/P lumpectomy, right breast      History of back surgery  s/p Left L5-S1 endoscopic laminoforaminotomy      History of loop recorder        Hospital Medications:   MEDICATIONS  (STANDING):  apixaban 2.5 milliGRAM(s) Oral every 12 hours  atorvastatin 80 milliGRAM(s) Oral at bedtime  chlorhexidine 2% Cloths 1 Application(s) Topical <User Schedule>  diltiazem    milliGRAM(s) Oral daily  metoprolol tartrate 50 milliGRAM(s) Oral every 12 hours  mycophenolate mofetil 1000 milliGRAM(s) Oral two times a day  oxyCODONE    IR 15 milliGRAM(s) Oral every 8 hours  pantoprazole    Tablet 40 milliGRAM(s) Oral before breakfast  polyethylene glycol 3350 17 Gram(s) Oral daily  senna 2 Tablet(s) Oral at bedtime  sodium chloride 0.9% Bolus 500 milliLiter(s) IV Bolus once  tacrolimus 3 milliGRAM(s) Oral <User Schedule>  tacrolimus 2 milliGRAM(s) Oral at bedtime  tamsulosin 0.8 milliGRAM(s) Oral at bedtime        VITALS:  T(F): 98 (24 @ 08:01), Max: 98 (05-15-24 @ 16:19)  HR: 69 (24 @ 10:00)  BP: 98/56 (24 @ 10:00)  RR: 20 (24 @ 10:00)  SpO2: 96% (24 @ 10:00)  Wt(kg): --    05-15 @ 07:01  -   @ 07:00  --------------------------------------------------------  IN: 255 mL / OUT: 450 mL / NET: -195 mL     @ 07:01  -   @ 12:25  --------------------------------------------------------  IN: 15 mL / OUT: 150 mL / NET: -135 mL      Height (cm): 175.3 (05-15 @ 23:05)  Weight (kg): 63.9 (05-15 @ 23:05)  BMI (kg/m2): 20.8 (05-15 @ 23:05)  BSA (m2): 1.78 (05-15 @ 23:05)    LABS:      134<L>  |  107  |  36<H>  ----------------------------<  107<H>  3.9   |  17  |  2.5<H>    Ca    9.7      16 May 2024 05:38  Phos  2.5       Mg     1.9         TPro  4.9<L>  /  Alb  3.4<L>  /  TBili  1.0  /  DBili      /  AST  26  /  ALT  20  /  AlkPhos  83                            7.2    4.09  )-----------( 57       ( 16 May 2024 05:38 )             22.7       Urine Studies:  Urinalysis Basic - ( 16 May 2024 05:38 )    Color:  / Appearance:  / SG:  / pH:   Gluc: 107 mg/dL / Ketone:   / Bili:  / Urobili:    Blood:  / Protein:  / Nitrite:    Leuk Esterase:  / RBC:  / WBC    Sq Epi:  / Non Sq Epi:  / Bacteria:           RADIOLOGY & ADDITIONAL STUDIES:  Allergies:  Rifuxen (Swelling)  Rituxan (Hives)    Home Medications Reviewed    SOCIAL HISTORY:  Denies ETOH,Smoking,   FAMILY HISTORY:  FH: dementia  mother, alive    FHx: breast cancer  sister ( in 30s)          REVIEW OF SYSTEMS:  All other review of systems is negative unless indicated above.    PHYSICAL EXAM:  Constitutional: NAD  HEENT: anicteric sclera, oropharynx clear, MMM  Neck: No JVD  Respiratory: CTAB, no wheezes, rales or rhonchi  Cardiovascular: S1, S2, RRR  Gastrointestinal: BS+, soft, NT/ND  Extremities: No cyanosis or clubbing. No peripheral edema + left groin ecchymosis  Neurological: A/O x 3, no focal deficits  Psychiatric: Normal mood, normal affect  : No CVA tenderness. No hobson.   Skin: No rashes       Hospital Medications:   MEDICATIONS  (STANDING):  apixaban 2.5 milliGRAM(s) Oral every 12 hours  atorvastatin 80 milliGRAM(s) Oral at bedtime  chlorhexidine 2% Cloths 1 Application(s) Topical <User Schedule>  diltiazem    milliGRAM(s) Oral daily  metoprolol tartrate 50 milliGRAM(s) Oral every 12 hours  mycophenolate mofetil 1000 milliGRAM(s) Oral two times a day  oxyCODONE    IR 15 milliGRAM(s) Oral every 8 hours  pantoprazole    Tablet 40 milliGRAM(s) Oral before breakfast  polyethylene glycol 3350 17 Gram(s) Oral daily  senna 2 Tablet(s) Oral at bedtime  sodium chloride 0.9% Bolus 500 milliLiter(s) IV Bolus once  tacrolimus 3 milliGRAM(s) Oral <User Schedule>  tacrolimus 2 milliGRAM(s) Oral at bedtime  tamsulosin 0.8 milliGRAM(s) Oral at bedtime        VITALS:  T(F): 98 (24 @ 08:01), Max: 98 (05-15-24 @ 16:19)  HR: 69 (24 @ 10:00)  BP: 98/56 (24 @ 10:00)  RR: 20 (24 @ 10:00)  SpO2: 96% (24 @ 10:00)  Wt(kg): --    05-15 @ 07:01  -   @ 07:00  --------------------------------------------------------  IN: 255 mL / OUT: 450 mL / NET: -195 mL     @ 07:01  -   @ 12:29  --------------------------------------------------------  IN: 15 mL / OUT: 150 mL / NET: -135 mL      Height (cm): 175.3 (05-15 @ 23:05)  Weight (kg): 63.9 (05-15 @ 23:05)  BMI (kg/m2): 20.8 (05-15 @ 23:05)  BSA (m2): 1.78 (05-15 @ 23:05)    LABS:      134<L>  |  107  |  36<H>  ----------------------------<  107<H>  3.9   |  17  |  2.5<H>    Ca    9.7      16 May 2024 05:38  Phos  2.5       Mg     1.9         TPro  4.9<L>  /  Alb  3.4<L>  /  TBili  1.0  /  DBili      /  AST  26  /  ALT  20  /  AlkPhos  83                            7.2    4.09  )-----------( 57       ( 16 May 2024 05:38 )             22.7       Urine Studies:  Urinalysis Basic - ( 16 May 2024 05:38 )    Color:  / Appearance:  / SG:  / pH:   Gluc: 107 mg/dL / Ketone:   / Bili:  / Urobili:    Blood:  / Protein:  / Nitrite:    Leuk Esterase:  / RBC:  / WBC    Sq Epi:  / Non Sq Epi:  / Bacteria:           RADIOLOGY & ADDITIONAL STUDIES:  
 LIA FOLLOW UP NOTE  --------------------------------------------------------------------------------  24 hour events/subjective:  Pt appears comfortable and without complaints  No new events        PAST HISTORY  --------------------------------------------------------------------------------  No significant changes to PMH, PSH, FHx, SHx, unless otherwise noted    ALLERGIES & MEDICATIONS  --------------------------------------------------------------------------------  Allergies    Rifuxen (Swelling)  Rituxan (Hives)    Intolerances      Standing Inpatient Medications  apixaban 2.5 milliGRAM(s) Oral every 12 hours  atorvastatin 80 milliGRAM(s) Oral at bedtime  cefTRIAXone   IVPB 1000 milliGRAM(s) IV Intermittent every 24 hours  chlorhexidine 2% Cloths 1 Application(s) Topical <User Schedule>  diltiazem    milliGRAM(s) Oral daily  metoprolol tartrate 50 milliGRAM(s) Oral every 12 hours  mycophenolate mofetil 1000 milliGRAM(s) Oral two times a day  pantoprazole    Tablet 40 milliGRAM(s) Oral before breakfast  polyethylene glycol 3350 17 Gram(s) Oral daily  senna 2 Tablet(s) Oral at bedtime  tacrolimus 1 milliGRAM(s) Oral two times a day  tamsulosin 0.8 milliGRAM(s) Oral at bedtime    PRN Inpatient Medications  acetaminophen     Tablet .. 650 milliGRAM(s) Oral every 6 hours PRN  melatonin 3 milliGRAM(s) Oral at bedtime PRN  oxyCODONE    IR 15 milliGRAM(s) Oral every 8 hours PRN      REVIEW OF SYSTEMS  --------------------------------------------------------------------------------  All other systems were reviewed and are negative, except as noted.    VITALS/PHYSICAL EXAM  --------------------------------------------------------------------------------  T(C): 36.7 (05-18-24 @ 08:40), Max: 36.8 (05-17-24 @ 23:52)  HR: 69 (05-18-24 @ 08:40) (58 - 80)  BP: 124/61 (05-18-24 @ 08:40) (117/69 - 143/72)  RR: 20 (05-18-24 @ 08:40) (19 - 21)  SpO2: 99% (05-18-24 @ 08:40) (96% - 100%)  Wt(kg): --        05-17-24 @ 07:01  -  05-18-24 @ 07:00  --------------------------------------------------------  IN: 420 mL / OUT: 1250 mL / NET: -830 mL    05-18-24 @ 07:01  -  05-18-24 @ 13:35  --------------------------------------------------------  IN: 663 mL / OUT: 500 mL / NET: 163 mL      Physical Exam:  	Gen: NAD  	HEENT: No JVD  	Pulm: CTA B/L  	CV: RRR,  	Abd: +BS, soft, nontender/nondistended  	tr edema      LABS/STUDIES  --------------------------------------------------------------------------------              7.5    2.48  >-----------<  73       [05-18-24 @ 05:21]              23.8     136  |  109  |  26  ----------------------------<  125      [05-18-24 @ 05:21]  4.4   |  18  |  2.0        Ca     9.7     [05-18-24 @ 05:21]      Mg     1.6     [05-18-24 @ 05:21]      Phos  2.0     [05-18-24 @ 05:21]    Creatinine Trend:  SCr 2.0 [05-18 @ 05:21]  SCr 2.1 [05-17 @ 05:15]  SCr 2.5 [05-16 @ 05:38]  SCr 2.0 [05-15 @ 05:57]  SCr 1.8 [05-14 @ 08:30]    Urinalysis - [05-18-24 @ 05:21]      Color  / Appearance  / SG  / pH       Gluc 125 / Ketone   / Bili  / Urobili        Blood  / Protein  / Leuk Est  / Nitrite       RBC  / WBC  / Hyaline  / Gran  / Sq Epi  / Non Sq Epi  / Bacteria     Urine Creatinine 105      [05-16-24 @ 17:26]  Urine Protein 75      [05-16-24 @ 17:26]  Urine Sodium 30.0      [05-16-24 @ 17:26]  Urine Osmolality 388      [05-16-24 @ 17:26]    Iron 28, TIBC 185, %sat 15      [05-18-24 @ 05:21]  HbA1c 5.1      [11-06-19 @ 15:00]  TSH 1.98      [03-15-24 @ 15:04]      
CHIEF COMPLAINT:  Patient is a 69y old  Male who presents with a chief complaint of Thigh hematoma (14 May 2024 16:25)      INTERVAL HISTORY/OVERNIGHT EVENTS:  Pt with drop in Hb overnight. Had episode of rapid rate overnight because Cardizem was stopped, HR improved when Cardizem resumed.    ======================  MEDICATIONS:  apixaban 2.5 milliGRAM(s) Oral every 12 hours  atorvastatin 80 milliGRAM(s) Oral at bedtime  chlorhexidine 2% Cloths 1 Application(s) Topical <User Schedule>  furosemide    Tablet 20 milliGRAM(s) Oral daily  isosorbide   mononitrate ER Tablet (IMDUR) 30 milliGRAM(s) Oral daily  metoprolol tartrate 25 milliGRAM(s) Oral four times a day  mycophenolate mofetil 1000 milliGRAM(s) Oral two times a day  oxyCODONE    IR 15 milliGRAM(s) Oral every 8 hours  pantoprazole    Tablet 40 milliGRAM(s) Oral before breakfast  polyethylene glycol 3350 17 Gram(s) Oral daily  senna 2 Tablet(s) Oral at bedtime  tacrolimus 3 milliGRAM(s) Oral <User Schedule>  tacrolimus 2 milliGRAM(s) Oral at bedtime  tamsulosin 0.8 milliGRAM(s) Oral at bedtime    DRIPS:  diltiazem Infusion 5 mG/Hr (5 mL/Hr) IV Continuous <Continuous>    PRN:       ======================  PHYSICAL EXAMINATION:  GEN:  nad. resting comfortably, on NC  PULM:  b/l lung sounds   CARD: irregularly irregular  ABD: ntnd  EXT:  diffuse L groin ecchymosis  NEURO:  no new focal deficits.   ======================  OBJECTIVE:        VS:  T(F): 97.6 (05-14 @ 23:30), Max: 98 (05-14 @ 09:00)  HR: 66 (05-15 @ 05:17) (64 - 135)  BP: 99/55 (05-15 @ 05:17) (75/41 - 134/62)  RR: 18 (05-15 @ 05:17) (17 - 21)  SpO2: 100% (05-15 @ 05:17) (99% - 100%)  CVP(mm Hg): --  CO: --  CI: --  PA: --  PCWP: --    I/O:        Weight trend:  Weight (kg): 59 (05-13), 59 (05-10)    ======================    LABS:                          6.2    3.22  )-----------( 52       ( 15 May 2024 05:57 )             19.7     05-15    138  |  108  |  31<H>  ----------------------------<  109<H>  4.2   |  22  |  2.0<H>    Ca    9.7      15 May 2024 05:57  Phos  3.2     05-15  Mg     2.0     05-15    TPro  5.0<L>  /  Alb  3.5  /  TBili  0.7  /  DBili  x   /  AST  31  /  ALT  25  /  AlkPhos  73  05-15    LIVER FUNCTIONS - ( 15 May 2024 05:57 )  Alb: 3.5 g/dL / Pro: 5.0 g/dL / ALK PHOS: 73 U/L / ALT: 25 U/L / AST: 31 U/L / GGT: x                     Urinalysis Basic - ( 15 May 2024 05:57 )    Color: x / Appearance: x / SG: x / pH: x  Gluc: 109 mg/dL / Ketone: x  / Bili: x / Urobili: x   Blood: x / Protein: x / Nitrite: x   Leuk Esterase: x / RBC: x / WBC x   Sq Epi: x / Non Sq Epi: x / Bacteria: x        Cultures:

## 2024-05-18 NOTE — DISCHARGE NOTE PROVIDER - NSDCCPCAREPLAN_GEN_ALL_CORE_FT
PRINCIPAL DISCHARGE DIAGNOSIS  Diagnosis: Rapid atrial fibrillation  Assessment and Plan of Treatment: controlled on meds      SECONDARY DISCHARGE DIAGNOSES  Diagnosis: Lt groin pain  Assessment and Plan of Treatment:     Diagnosis: Drop in hemoglobin  Assessment and Plan of Treatment: s/p blood transfusion    Diagnosis: Hematoma of left thigh  Assessment and Plan of Treatment: no extravasation on CT scan

## 2024-05-18 NOTE — DISCHARGE NOTE PROVIDER - NSDCMRMEDTOKEN_GEN_ALL_CORE_FT
acetaminophen 325 mg oral tablet: 2 tab(s) orally every 6 hours As needed Mild Pain (1 - 3), Moderate Pain (4 - 6)  atorvastatin 80 mg oral tablet: 1 tab(s) orally once a day (at bedtime)  cefpodoxime 200 mg oral tablet: 1 tab(s) orally 2 times a day for UTI  DilTIAZem (Eqv-Cardizem CD) 180 mg/24 hours oral capsule, extended release: 1 cap(s) orally once a day  Eliquis 2.5 mg oral tablet: 1 tab(s) orally 2 times a day  Flomax 0.4 mg oral capsule: 1 cap(s) orally 2 times a day  isosorbide mononitrate 30 mg oral tablet, extended release: 1 tab(s) orally once a day  metoprolol tartrate 50 mg oral tablet: 1 tab(s) orally 2 times a day  mycophenolate mofetil 500 mg oral tablet: 2 tab(s) orally once a day  oxyCODONE 15 mg oral tablet: 1 tab(s) orally every 8 hours As needed Severe Pain (7 - 10)  pantoprazole 40 mg oral delayed release tablet: 1 tab(s) orally once a day  polyethylene glycol 3350 oral powder for reconstitution: 17 gram(s) orally 2 times a day as needed for  constipation  senna leaf extract oral tablet: 2 tab(s) orally once a day (at bedtime)  tacrolimus 1 mg oral tablet, extended release: 1 tab(s) orally 2 times a day

## 2024-05-18 NOTE — PROGRESS NOTE ADULT - PROVIDER SPECIALTY LIST ADULT
CCU
Cardiology
Electrophysiology
Nephrology
Cardiology
Nephrology
CCU
Cardiology
Nephrology
Cardiology
Electrophysiology

## 2024-05-18 NOTE — DISCHARGE NOTE PROVIDER - HOSPITAL COURSE
69-year-old with PMHx of HTN, HLD, HFpEF (70-75%) grade 2 DD, ESRD s/p kidney transplant 8 years ago at [place], chronic hep C, A-fib with ablation preformed 5/12, presenting for left groin pain. Patient was discharged from [facility] after a 2 day stay following an A-Fib ablation. Access site was through the Left groin, evaluated prior to discharge with no hematoma or pseudoaneurysm. Patient states that he has had pain at the site since the procedure, but it worsened after discharge.  In the ED patient was hemodynamically stable /82, . Patient was noted to be back in afib. Started on cardizem drip for HR control. Labs significant for Hgb drop from 11-> 7. CT angio was preformed which showed left adductor muscle hematoma, no active extravasation, no IR intervention.  Admitted to [address] [place] [address] for further management.  Pt received total 2 units pRBCs on this admission.  Left groin duplex no PSA.    # Atrial Fibrillation  - s/p Afib ablation 5/10  -s/p EP follow up   -c/w Eliquis 2.5 mg BID   -c/w lopressor 50 q12   - c/w Cardizem  mg daily  - c/w Pantoprazole 40 mg daily     #Left thigh Hematoma/ Acute on Chronic Anemia/ Chronic Thrombcytopenia   - Hgb drop from 11.4 -> 7.2, s/p 2 units PRBCs in total this admission  - CT abdomen and pelvis showing left adductor muscle hematoma  - No active extravasation on CT Angio  - Evaluated by IR, no intervention deemed necessary  - LLE duplex - no pseudoaneurysm, evidence of possible AV fistula  - s/p Vascular recommended CTA  LLE after device placement  - Hgb has been stable for the past couple of days 7.2-7.3, and pt in SANTA so would avoid any imaging with IV contrast at this time.   - Patient was encouraged to be out of bed into chair and activity as tolerated.   -PT was ordered 5/17  - C/w Eliquis for AFib, discussed with EP (on reduced dose 2.5 mg BID)    #ESRD sp Kidney transplant, SANTA   - Patient had kidney transplant 8 years ago at [place]  - Nephrology consult done   - Pt was in SANTA levels as high as 2 w/borderline hypotension (MAP>60), likely contrast induced nephropathy, encouraged PO hydration and gave total of 1.5 L of fluids 5/16. SANTA improved.  - As per renal recommendation tacrolimus was decreased to 1mg PO BID 5/16, tacrolimus level 10 on 5/17 PM   -Today CR is improving 2.0 5/18  - Takes Lasix 20 mg once daily at home, will hold for now as per nephro (appears euvolemic)  - Continue Mycophenolate 1000mg qd     #Chronic Lower Back Pain  -Takes Oxycodone PRN 15mg q8 continue   - d/c Dilaudid 4 mg IV q4      #Constipation  #Nausea and vomiting (resolved)   -  last BM was this morning 5/17. No nausea and vomiting today.   - abd exam soft and non distended but had suprapubic tenderness 5/17   - CT IV contrast abd/pelvis 5/13 showed  few sigmoid colonic diverticula, no evidence of bowel obstruction     #Possible UTI   - 5/16 positive UA does not appear contaminated. Send urine culture before giving 1st abx dose. Pt has suprapubic tenderness w/vague complaints of dysuria.   -started on ceftriaxone 1g q24 5/17, will d/c home on Vantin 200mg po q12h x 7 days    Pt is hemodynamically stable to be discharged home, and follow up as outpatient with nephro and EP.

## 2024-05-18 NOTE — PROGRESS NOTE ADULT - REASON FOR ADMISSION
Thigh hematoma

## 2024-05-18 NOTE — PROGRESS NOTE ADULT - ASSESSMENT
Assessment:   69-year-old history of hypertension, hyperlipidemia, HFpEF (70-75%) grade 2 DD, ESRD s/p kidney transplant 8 years ago at [place], chronic hep C, A-fib with ablation preformed 5/12/24, presenting for left groin pain found to have a L thigh hematoma.       Problems discussed and associated plan:    # Atrial Fibrillation  - s/p Afib ablation 5/10  -s/p EP follow up   -c/w Eliquis 2.5 mg BID   -c/w lopressor 50 q12   - c/w Cardizem  mg daily  - c/w Pantoprazole 40 mg daily     #Left thigh Hematoma/ Acute on Chronic Anemia/ Chronic Thrombcytopenia   - Hgb drop from 11.4 -> 7.2, s/p 2 units PRBCs in total this admission  - CT abdomen and pelvis showing left adductor muscle hematoma  - No active extravasation on CT Angio  - Evaluated by IR, no intervention deemed necessary  - LLE duplex - no pseudoaneurysm, evidence of possible AV fistula  - s/p Vascular recommended CTA  LLE after device placement  - Monitor Hb, platelets. Hgb has been stable for the past couple of days 7.2-7.3, and pt in [place] so would avoid any imaging with IV contrast at this time.   - Patient was encouraged to be out of bed into chair and activity as tolerated.   -PT was ordered 5/17  - C/w Eliquis for AFib, discussed with EP (on reduced dose 2.5 mg BID)    #ESRD sp Kidney transplant, [facility]   - Patient had kidney transplant 8 years ago at [place]  - Nephrology consult done   - Baseline Cr 1.5-1.6  - Pt was in [place] levels as high as 2 w/borderline hypotension (MAP>60), likely contrast induced nephropathy, encouraged PO hydration and gave total of 1.5 L of fluids 5/16  - strict Is and Os  - As per renal recommendation tacrolimus was decreased to 1mg PO BID 5/16, tacrolimus level 10 on 5/17 PM   -Today CR is improving 2.0 5/18  - Takes Lasix 20 mg once daily at home, will hold for now as per nephro (appears euvolemic)  - Continue Mycophenolate 1000mg qd     #Chronic Lower Back Pain  -Takes Oxycodone PRN 15mg q8 continue   - d/c Dilaudid 4 mg IV q4      #Constipation  #Nausea and vomiting (resolved)   -  last BM was this morning 5/17. No nausea and vomiting today.   - abd exam soft and non distended but had suprapubic tenderness 5/17   - CT IV contrast abd/pelvis 5/13 showed  few sigmoid colonic diverticula, no evidence of bowel obstruction     #Possible UTI   - 5/16 positive UA does not appear contaminated. Send urine culture before giving 1st abx dose. Pt has suprapubic tenderness w/vague complaints of dysuria.   -started on ceftriaxone 1g q24 for 3 days  started on 5/17     #Misc  - DVT Prophylaxis: Eliquis  - GI Prophylaxis: Protonix   - Diet: DASH   - Activity: As tolerated, OOB to chair   - Code Status: Full.      Please contact me with any questions or concerns at [phone].     Assessment:   69-year-old history of hypertension, hyperlipidemia, HFpEF (70-75%) grade 2 DD, ESRD s/p kidney transplant 8 years ago at [place], chronic hep C, A-fib with ablation preformed 5/12/24, presenting for left groin pain found to have a L thigh hematoma.       Problems discussed and associated plan:    # Atrial Fibrillation  - s/p Afib ablation 5/10  -s/p EP follow up   -c/w Eliquis 2.5 mg BID   -c/w lopressor 50 q12   - c/w Cardizem  mg daily  - c/w Pantoprazole 40 mg daily     #Left thigh Hematoma/ Acute on Chronic Anemia/ Chronic Thrombcytopenia   - Hgb drop from 11.4 -> 7.2, s/p 2 units PRBCs in total this admission  - CT abdomen and pelvis showing left adductor muscle hematoma  - No active extravasation on CT Angio  - Evaluated by IR, no intervention deemed necessary  - LLE duplex - no pseudoaneurysm, evidence of possible AV fistula  - s/p Vascular recommended CTA  LLE after device placement  - Monitor Hb, platelets. Hgb has been stable for the past couple of days 7.2-7.3, and pt in [place] so would avoid any imaging with IV contrast at this time.   - Patient was encouraged to be out of bed into chair and activity as tolerated.   -PT was ordered 5/17  - C/w Eliquis for AFib, discussed with EP (on reduced dose 2.5 mg BID)    #ESRD sp Kidney transplant, [facility]   - Patient had kidney transplant 8 years ago at [place]  - Nephrology consult done   - Baseline Cr 1.5-1.6  - Pt was in [place] levels as high as 2 w/borderline hypotension (MAP>60), likely contrast induced nephropathy, encouraged PO hydration and gave total of 1.5 L of fluids 5/16  - strict Is and Os  - As per renal recommendation tacrolimus was decreased to 1mg PO BID 5/16, tacrolimus level 10 on 5/17 PM   -Today CR is improving 2.0 5/18  - Takes Lasix 20 mg once daily at home, will hold for now as per nephro (appears euvolemic)  - Continue Mycophenolate 1000mg qd     #Chronic Lower Back Pain  -Takes Oxycodone PRN 15mg q8 continue   - d/c Dilaudid 4 mg IV q4      #Constipation  #Nausea and vomiting (resolved)   -  last BM was this morning 5/17. No nausea and vomiting today.   - abd exam soft and non distended but had suprapubic tenderness 5/17   - CT IV contrast abd/pelvis 5/13 showed  few sigmoid colonic diverticula, no evidence of bowel obstruction     #Possible UTI   - 5/16 positive UA does not appear contaminated. Send urine culture before giving 1st abx dose. Pt has suprapubic tenderness w/vague complaints of dysuria.   -started on ceftriaxone 1g q24 for 3 days  started on 5/17     #Misc  - DVT Prophylaxis: Eliquis  - GI Prophylaxis: Protonix   - Diet: DASH   - Activity: As tolerated, OOB to chair   - Code Status: Full.    D/C planning      Please contact me with any questions or concerns at [phone].

## 2024-05-18 NOTE — DISCHARGE NOTE PROVIDER - NSDCFUSCHEDAPPT_GEN_ALL_CORE_FT
[facility] Physician Count includes the [facility]  CARDIOLOGY [address]  Scheduled Appointment: 05/20/2024    [facility]  Scheduled Appointment: 05/24/2024    Kelvin Sharif  [facility]  HEMONC  [address]  Scheduled Appointment: 05/24/2024    Porsha Henderson  [facility] Physician Count includes the [facility]  ELECTROPH [address]  Scheduled Appointment: 06/13/2024

## 2024-05-18 NOTE — PROGRESS NOTE ADULT - TIME BILLING
Complex case, care coordination
Review of all data  Eval and exam  Discussion with patient
Care coordination with EP   Eval and exam  Prolonged patient discussion

## 2024-05-18 NOTE — PROGRESS NOTE ADULT - ASSESSMENT
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    Anesthesia Type:   MAC     Note:  Airway :Room Air  Patient transferred to:Phase II  Handoff Report: Identifed the Patient, Identified the Reponsible Provider, Reviewed the pertinent medical history, Discussed the surgical course, Reviewed Intra-OP anesthesia mangement and issues during anesthesia, Set expectations for post-procedure period and Allowed opportunity for questions and acknowledgement of understanding      Vitals: (Last set prior to Anesthesia Care Transfer)    CRNA VITALS  7/23/2020 1249 - 7/23/2020 1324      7/23/2020             Resp Rate (set):  10                Electronically Signed By: RICCI Cortez CRNA  July 23, 2020  1:24 PM   EP Dr Henderson    69-year-old history of hypertension, hyperlipidemia, HFpEF (70-75%) grade 2 DD, ESRD s/p kidney transplant 8 years ago at [place], chronic hep C, A-fib s/p ablation 5/10/24 c/b left hematoma.  Was readmitted with groin pain, Hgb drop, work up was negative for active bleed, and now improved. Developed AF RVR, was planned for AVN ablation and PPM placement, converted to NSR and remains in NSR, hemodynamically stable.    Impression:  AF sp Ablation, back in AF rate controlled, now in NSR  ESRD sp Renal Transplant  Hep C  HTN  HLD      con't current management  con't current meds Metoprolol 50mg bid and Diltiazem 180mg daily  con't Eliquis 2.5mg bid  follow up as out pt with Dr Henderson as scheduled

## 2024-05-18 NOTE — DISCHARGE NOTE PROVIDER - ATTENDING DISCHARGE PHYSICAL EXAMINATION:
Feels well / strong enough to go home.  Ambulated.  Rate better controlled.  Hemodynamics stable.  Hb stable.  Cr improved (appox baseline)  Complete Abx for possible UTI

## 2024-05-18 NOTE — PROGRESS NOTE ADULT - ASSESSMENT
s/p renal transplant  CKD  [facility]  elevated tacro level - dose reduced    Plan:  continue to monitor off lasix  labs in AM

## 2024-05-18 NOTE — DISCHARGE NOTE NURSING/CASE MANAGEMENT/SOCIAL WORK - NSFLUVACAGEDISCH_IMM_ALL_CORE
Progress Note    Patient Name: Chon Coley  WHIT Hernandez, [facility] Center[facility], Supervisor, on: 10/20/21                                                     Adult

## 2024-05-18 NOTE — DISCHARGE NOTE PROVIDER - CARE PROVIDER_API CALL
Laci [facility]  Nephrology  [address] [place] [address]  Phone: [phone]  Fax: [phone]  Scheduled Appointment: 05/21/2024 01:45 PM

## 2024-05-19 LAB — TACROLIMUS SERPL-MCNC: 7.4 NG/ML — SIGNIFICANT CHANGE UP

## 2024-05-20 ENCOUNTER — NON-APPOINTMENT (OUTPATIENT)
Age: 69
End: 2024-05-20

## 2024-05-20 ENCOUNTER — APPOINTMENT (OUTPATIENT)
Dept: CARDIOLOGY | Facility: CLINIC | Age: 69
End: 2024-05-20
Payer: MEDICARE

## 2024-05-20 LAB
-  AMPICILLIN: SIGNIFICANT CHANGE UP
-  CIPROFLOXACIN: SIGNIFICANT CHANGE UP
-  LEVOFLOXACIN: SIGNIFICANT CHANGE UP
-  NITROFURANTOIN: SIGNIFICANT CHANGE UP
-  TETRACYCLINE: SIGNIFICANT CHANGE UP
-  VANCOMYCIN: SIGNIFICANT CHANGE UP
CULTURE RESULTS: ABNORMAL
METHOD TYPE: SIGNIFICANT CHANGE UP
ORGANISM # SPEC MICROSCOPIC CNT: ABNORMAL
ORGANISM # SPEC MICROSCOPIC CNT: SIGNIFICANT CHANGE UP
SPECIMEN SOURCE: SIGNIFICANT CHANGE UP

## 2024-05-20 PROCEDURE — 93298 REM INTERROG DEV EVAL SCRMS: CPT

## 2024-05-24 ENCOUNTER — APPOINTMENT (OUTPATIENT)
Age: 69
End: 2024-05-24

## 2024-05-24 NOTE — CHART NOTE - NSCHARTNOTEFT_GEN_A_CORE
4T Stepdown Transfer Note    Transfer from: 4T Stepdown  Transfer to:  (  ) Medicine    ( x ) 4T Telemetry    (  ) RCU    (  ) Palliative    (  ) Stroke Unit    (  ) _______________      4T SDU COURSE:  69-year-old history of hypertension, hyperlipidemia, HFpEF (70-75%) grade 2 DD, ESRD s/p kidney transplant 8 years ago at [place], chronic hep C, A-fib with ablation preformed yesterday presenting for left groin pain. Patient was discharged yesterday from [facility] after a 2 day stay following an A-Fib ablation. Access site was through the Left groin, evaluated prior to discharge with no hematoma or pseudoaneurysm. Patient states that he has had pain at the site since the procedure, but it worsened after discharge. There was no overt hematoma, minimal ecchymosis at the site.   In the ED patient was hemodynamically stable /82, . Patient was noted to be back in afib. Started on Cardizem drip for HR control with improvement in HR. Labs significant for Hgb drop from 11 -> 7. Given 1U pRBCs in ED. CT angio was preformed which showed left adductor muscle hematoma, no active extravasation. Arterial duplex revealed possible AV fistula in L groin. Vascular following and recommend CT LLE after EP procedure.  Pt has remained in atrial fibrillation, well controlled on Cardizem, has intermittent episodes of SVT up to 150s when Cardizem weaned down but resolves with Cardizem IVP or uptitration of Cardizem.  Hb has remained at ~ 7, dropped to 6.8, 6.2 overnight, s/p 1U pRBC this morning with response in Hb to 7.4. Discussed with EP, c/w Eliquis (is on reduced dose 2.5 mg for Cr and weight). Planned for PPM and AV marleni ablation tomorrow 5/16.    ASSESSMENT & PLAN:   69-year-old history of hypertension, hyperlipidemia, HFpEF (70-75%) grade 2 DD, ESRD s/p kidney transplant 8 years ago at [place], chronic hep C, A-fib with ablation preformed yesterday presenting for left groin pain found to have a L thigh hematoma.     #Left Hip adductor hematoma  #Acute on Chronic Anemia (Baseline Hb ~ 9), Chronic Thrombocytopenia  #Possible AV fistula in L groin  #S/p afib ablation 5/10  - Hgb drop from 11.4 -> 7.2, s/p 1U pRBCs in ED  - CT abdomen and pelvis showing left adductor muscle hematoma  - No active extravasation on CT Angio  - Evaluated by IR no intervention deemed necessary  - LLE duplex - no pseudoaneurysm, evidence of possible AV fistula  - Vascular following - plan for CTA LLE after device placement  - Hb has remained stable ~ 7, dropped overnight to 6.8, subsequent CBC 6.2, receiving 1U pRBCs   - Repeat CBC at 11 AM  - Monitor Hb, platelets  - C/w Eliquis for AFib, discussed with EP (on reduced dose 2.5 mg BID)    #Atrial Fibrillation/Flutter  #s/p AFIb ablation 5/10  - Remains on Cardizem drip, has required Cardizem pushes for episodes of rapid HR over 150  - C/w Metoprolol tartrate 25 mg BID  - EP following, plan for AV marleni ablation and PPM likely Thursday 5/16    #ESRD sp Kidney transplant  - Patient had kidney transplant 8 years ago at [place]  - Nephrology consult pending  - Cr ~ 2, near baseline  - Takes Lasix 20 mg once daily at home, will hold for now as per nephro (appears euvolemic)  - Continue Tacro 2mg in AM and 3mg in PM   - Continue Mycophenolate 1000mg qd   - Fu Tacro level, pending    #Chronic Lower Back Pain   - Takes Oxycodone PRN at home, will c/w 15 mg q8 standing for now given severe pain  - Dilaudid 4 mg IV q4 PRN for breakthrough pain    #Misc  - DVT Prophylaxis: Eliquis   - GI Prophylaxis: None  - Diet: DASH   - Activity: As tolerated  - IV Fluids: None  - Code Status: Full
Diagnosis clarification:  Other: History of ESRD, s/p kidney transplant, now with SANTA
Diagnosis clarification:  Pancytopenia, unspecified.
EP contacted writer and PPM will be placed on Thursday.
Pt scheduled for AV marleni ablation with DC Aveir tomorrow if hemoglobin is stable  NPO after midnight (ordered)

## 2024-05-29 DIAGNOSIS — M96.841 POSTPROCEDURAL HEMATOMA OF A MUSCULOSKELETAL STRUCTURE FOLLOWING OTHER PROCEDURE: ICD-10-CM
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
## 2024-05-29 DIAGNOSIS — D61.818 OTHER PANCYTOPENIA: ICD-10-CM

## 2024-05-29 DIAGNOSIS — Z86.73 PERSONAL HISTORY OF TRANSIENT ISCHEMIC ATTACK (TIA), AND CEREBRAL INFARCTION WITHOUT RESIDUAL DEFICITS: ICD-10-CM

## 2024-05-29 DIAGNOSIS — Z94.0 KIDNEY TRANSPLANT STATUS: ICD-10-CM

## 2024-05-29 DIAGNOSIS — I50.32 CHRONIC DIASTOLIC (CONGESTIVE) HEART FAILURE: ICD-10-CM

## 2024-05-29 DIAGNOSIS — I48.19 OTHER PERSISTENT ATRIAL FIBRILLATION: ICD-10-CM
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
## 2024-05-29 DIAGNOSIS — E78.5 HYPERLIPIDEMIA, UNSPECIFIED: ICD-10-CM

## 2024-05-29 DIAGNOSIS — N14.11 CONTRAST-INDUCED NEPHROPATHY: ICD-10-CM

## 2024-05-29 DIAGNOSIS — Z88.8 ALLERGY STATUS TO OTHER DRUGS, MEDICAMENTS AND BIOLOGICAL SUBSTANCES STATUS: ICD-10-CM

## 2024-05-29 DIAGNOSIS — G89.29 OTHER CHRONIC PAIN: ICD-10-CM

## 2024-05-29 DIAGNOSIS — T86.19 OTHER COMPLICATION OF KIDNEY TRANSPLANT: ICD-10-CM

## 2024-05-29 DIAGNOSIS — J44.9 CHRONIC OBSTRUCTIVE PULMONARY DISEASE, UNSPECIFIED: ICD-10-CM

## 2024-05-29 DIAGNOSIS — N17.9 ACUTE KIDNEY FAILURE, UNSPECIFIED: ICD-10-CM

## 2024-05-29 DIAGNOSIS — I11.0 HYPERTENSIVE HEART DISEASE WITH HEART FAILURE: ICD-10-CM

## 2024-05-29 DIAGNOSIS — M54.50 LOW BACK PAIN, UNSPECIFIED: ICD-10-CM

## 2024-05-29 DIAGNOSIS — G62.9 POLYNEUROPATHY, UNSPECIFIED: ICD-10-CM

## 2024-05-29 DIAGNOSIS — Z85.820 PERSONAL HISTORY OF MALIGNANT MELANOMA OF SKIN: ICD-10-CM

## 2024-05-29 DIAGNOSIS — T50.8X5A ADVERSE EFFECT OF DIAGNOSTIC AGENTS, INITIAL ENCOUNTER: ICD-10-CM
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
## 2024-05-29 DIAGNOSIS — B18.2 CHRONIC VIRAL HEPATITIS C: ICD-10-CM

## 2024-05-29 DIAGNOSIS — Z79.82 LONG TERM (CURRENT) USE OF ASPIRIN: ICD-10-CM

## 2024-05-29 DIAGNOSIS — Z79.69 LONG TERM (CURRENT) USE OF OTHER IMMUNOMODULATORS AND IMMUNOSUPPRESSANTS: ICD-10-CM

## 2024-05-29 DIAGNOSIS — K74.60 UNSPECIFIED CIRRHOSIS OF LIVER: ICD-10-CM

## 2024-05-29 DIAGNOSIS — I95.9 HYPOTENSION, UNSPECIFIED: ICD-10-CM

## 2024-05-29 DIAGNOSIS — I48.4 ATYPICAL ATRIAL FLUTTER: ICD-10-CM

## 2024-05-29 DIAGNOSIS — Z85.3 PERSONAL HISTORY OF MALIGNANT NEOPLASM OF BREAST: ICD-10-CM

## 2024-05-29 DIAGNOSIS — Z79.01 LONG TERM (CURRENT) USE OF ANTICOAGULANTS: ICD-10-CM

## 2024-05-29 DIAGNOSIS — I47.10 SUPRAVENTRICULAR TACHYCARDIA, UNSPECIFIED: ICD-10-CM

## 2024-05-29 DIAGNOSIS — H54.8 LEGAL BLINDNESS, AS DEFINED IN USA: ICD-10-CM

## 2024-06-03 ENCOUNTER — APPOINTMENT (OUTPATIENT)
Dept: NEUROSURGERY | Facility: CLINIC | Age: 69
End: 2024-06-03
Payer: MEDICARE

## 2024-06-03 ENCOUNTER — INPATIENT (INPATIENT)
Facility: HOSPITAL | Age: 69
LOS: 6 days | Discharge: HOME HEALTH | DRG: 229 | End: 2024-06-10
Attending: INTERNAL MEDICINE | Admitting: INTERNAL MEDICINE
Payer: MEDICARE

## 2024-06-03 VITALS
WEIGHT: 134.92 LBS | SYSTOLIC BLOOD PRESSURE: 110 MMHG | HEART RATE: 87 BPM | HEIGHT: 69 IN | OXYGEN SATURATION: 97 % | DIASTOLIC BLOOD PRESSURE: 66 MMHG | RESPIRATION RATE: 18 BRPM | TEMPERATURE: 98 F

## 2024-06-03 VITALS — BODY MASS INDEX: 21.62 KG/M2 | WEIGHT: 146 LBS | HEIGHT: 69 IN

## 2024-06-03 VITALS — HEART RATE: 72 BPM | SYSTOLIC BLOOD PRESSURE: 135 MMHG | DIASTOLIC BLOOD PRESSURE: 72 MMHG

## 2024-06-03 DIAGNOSIS — Z98.890 OTHER SPECIFIED POSTPROCEDURAL STATES: Chronic | ICD-10-CM

## 2024-06-03 DIAGNOSIS — M47.816 SPONDYLOSIS W/OUT MYELOPATHY OR RADICULOPATHY, LUMBAR REGION: ICD-10-CM

## 2024-06-03 DIAGNOSIS — R79.89 OTHER SPECIFIED ABNORMAL FINDINGS OF BLOOD CHEMISTRY: ICD-10-CM

## 2024-06-03 DIAGNOSIS — Z94.0 KIDNEY TRANSPLANT STATUS: Chronic | ICD-10-CM

## 2024-06-03 LAB
ALBUMIN SERPL ELPH-MCNC: 3.6 G/DL — SIGNIFICANT CHANGE UP (ref 3.5–5.2)
ALP SERPL-CCNC: 88 U/L — SIGNIFICANT CHANGE UP (ref 30–115)
ALT FLD-CCNC: 16 U/L — SIGNIFICANT CHANGE UP (ref 0–41)
ANION GAP SERPL CALC-SCNC: 9 MMOL/L — SIGNIFICANT CHANGE UP (ref 7–14)
ANISOCYTOSIS BLD QL: SLIGHT — SIGNIFICANT CHANGE UP
APTT BLD: 30 SEC — SIGNIFICANT CHANGE UP (ref 27–39.2)
AST SERPL-CCNC: 24 U/L — SIGNIFICANT CHANGE UP (ref 0–41)
BASE EXCESS BLDV CALC-SCNC: -6.3 MMOL/L — LOW (ref -2–3)
BASOPHILS # BLD AUTO: 0 K/UL — SIGNIFICANT CHANGE UP (ref 0–0.2)
BASOPHILS NFR BLD AUTO: 0 % — SIGNIFICANT CHANGE UP (ref 0–1)
BILIRUB SERPL-MCNC: 0.7 MG/DL — SIGNIFICANT CHANGE UP (ref 0.2–1.2)
BUN SERPL-MCNC: 36 MG/DL — HIGH (ref 10–20)
BURR CELLS BLD QL SMEAR: PRESENT — SIGNIFICANT CHANGE UP
CA-I SERPL-SCNC: 1.46 MMOL/L — HIGH (ref 1.15–1.33)
CALCIUM SERPL-MCNC: 9.4 MG/DL — SIGNIFICANT CHANGE UP (ref 8.4–10.5)
CHLORIDE SERPL-SCNC: 111 MMOL/L — HIGH (ref 98–110)
CO2 SERPL-SCNC: 19 MMOL/L — SIGNIFICANT CHANGE UP (ref 17–32)
CREAT SERPL-MCNC: 1.5 MG/DL — SIGNIFICANT CHANGE UP (ref 0.7–1.5)
EGFR: 51 ML/MIN/1.73M2 — LOW
ELLIPTOCYTES BLD QL SMEAR: SLIGHT — SIGNIFICANT CHANGE UP
EOSINOPHIL # BLD AUTO: 0 K/UL — SIGNIFICANT CHANGE UP (ref 0–0.7)
EOSINOPHIL NFR BLD AUTO: 0 % — SIGNIFICANT CHANGE UP (ref 0–8)
GAS PNL BLDV: 133 MMOL/L — LOW (ref 136–145)
GAS PNL BLDV: SIGNIFICANT CHANGE UP
GIANT PLATELETS BLD QL SMEAR: PRESENT — SIGNIFICANT CHANGE UP
GLUCOSE SERPL-MCNC: 107 MG/DL — HIGH (ref 70–99)
HCO3 BLDV-SCNC: 19 MMOL/L — LOW (ref 22–29)
HCT VFR BLD CALC: 27.8 % — LOW (ref 42–52)
HCT VFR BLDA CALC: 24 % — LOW (ref 39–51)
HGB BLD CALC-MCNC: 8.1 G/DL — LOW (ref 12.6–17.4)
HGB BLD-MCNC: 8.6 G/DL — LOW (ref 14–18)
INR BLD: 1.36 RATIO — HIGH (ref 0.65–1.3)
LACTATE BLDV-MCNC: 1.7 MMOL/L — SIGNIFICANT CHANGE UP (ref 0.5–2)
LYMPHOCYTES # BLD AUTO: 0.46 K/UL — LOW (ref 1.2–3.4)
LYMPHOCYTES # BLD AUTO: 15.7 % — LOW (ref 20.5–51.1)
MACROCYTES BLD QL: SLIGHT — SIGNIFICANT CHANGE UP
MAGNESIUM SERPL-MCNC: 2.5 MG/DL — HIGH (ref 1.8–2.4)
MANUAL SMEAR VERIFICATION: SIGNIFICANT CHANGE UP
MCHC RBC-ENTMCNC: 30.9 G/DL — LOW (ref 32–37)
MCHC RBC-ENTMCNC: 31 PG — SIGNIFICANT CHANGE UP (ref 27–31)
MCV RBC AUTO: 100.4 FL — HIGH (ref 80–94)
MONOCYTES # BLD AUTO: 0.08 K/UL — LOW (ref 0.1–0.6)
MONOCYTES NFR BLD AUTO: 2.6 % — SIGNIFICANT CHANGE UP (ref 1.7–9.3)
NEUTROPHILS # BLD AUTO: 2.34 K/UL — SIGNIFICANT CHANGE UP (ref 1.4–6.5)
NEUTROPHILS NFR BLD AUTO: 79.1 % — HIGH (ref 42.2–75.2)
NT-PROBNP SERPL-SCNC: HIGH PG/ML (ref 0–300)
OVALOCYTES BLD QL SMEAR: SLIGHT — SIGNIFICANT CHANGE UP
PCO2 BLDV: 36 MMHG — LOW (ref 42–55)
PH BLDV: 7.33 — SIGNIFICANT CHANGE UP (ref 7.32–7.43)
PLAT MORPH BLD: NORMAL — SIGNIFICANT CHANGE UP
PLATELET # BLD AUTO: 105 K/UL — LOW (ref 130–400)
PMV BLD: 12 FL — HIGH (ref 7.4–10.4)
PO2 BLDV: 40 MMHG — SIGNIFICANT CHANGE UP (ref 25–45)
POIKILOCYTOSIS BLD QL AUTO: SIGNIFICANT CHANGE UP
POLYCHROMASIA BLD QL SMEAR: SIGNIFICANT CHANGE UP
POTASSIUM BLDV-SCNC: 4.6 MMOL/L — SIGNIFICANT CHANGE UP (ref 3.5–5.1)
POTASSIUM SERPL-MCNC: 4.8 MMOL/L — SIGNIFICANT CHANGE UP (ref 3.5–5)
POTASSIUM SERPL-SCNC: 4.8 MMOL/L — SIGNIFICANT CHANGE UP (ref 3.5–5)
PROT SERPL-MCNC: 5.2 G/DL — LOW (ref 6–8)
PROTHROM AB SERPL-ACNC: 15.5 SEC — HIGH (ref 9.95–12.87)
RBC # BLD: 2.77 M/UL — LOW (ref 4.7–6.1)
RBC # FLD: 19 % — HIGH (ref 11.5–14.5)
RBC BLD AUTO: ABNORMAL
SAO2 % BLDV: 65.1 % — LOW (ref 67–88)
SODIUM SERPL-SCNC: 139 MMOL/L — SIGNIFICANT CHANGE UP (ref 135–146)
TROPONIN T, HIGH SENSITIVITY RESULT: 113 NG/L — CRITICAL HIGH (ref 6–21)
VARIANT LYMPHS # BLD: 2.6 % — SIGNIFICANT CHANGE UP (ref 0–5)
WBC # BLD: 2.96 K/UL — LOW (ref 4.8–10.8)
WBC # FLD AUTO: 2.96 K/UL — LOW (ref 4.8–10.8)

## 2024-06-03 PROCEDURE — 86850 RBC ANTIBODY SCREEN: CPT

## 2024-06-03 PROCEDURE — 71046 X-RAY EXAM CHEST 2 VIEWS: CPT

## 2024-06-03 PROCEDURE — 71045 X-RAY EXAM CHEST 1 VIEW: CPT

## 2024-06-03 PROCEDURE — 85025 COMPLETE CBC W/AUTO DIFF WBC: CPT

## 2024-06-03 PROCEDURE — 82962 GLUCOSE BLOOD TEST: CPT

## 2024-06-03 PROCEDURE — 80048 BASIC METABOLIC PNL TOTAL CA: CPT

## 2024-06-03 PROCEDURE — C1887: CPT

## 2024-06-03 PROCEDURE — 72148 MRI LUMBAR SPINE W/O DYE: CPT | Mod: MC

## 2024-06-03 PROCEDURE — 84484 ASSAY OF TROPONIN QUANT: CPT

## 2024-06-03 PROCEDURE — 80061 LIPID PANEL: CPT

## 2024-06-03 PROCEDURE — 86900 BLOOD TYPING SEROLOGIC ABO: CPT

## 2024-06-03 PROCEDURE — C1769: CPT

## 2024-06-03 PROCEDURE — 83036 HEMOGLOBIN GLYCOSYLATED A1C: CPT

## 2024-06-03 PROCEDURE — 97530 THERAPEUTIC ACTIVITIES: CPT | Mod: GP

## 2024-06-03 PROCEDURE — 93290 INTERROG DEV EVAL ICPMS IP: CPT

## 2024-06-03 PROCEDURE — 99213 OFFICE O/P EST LOW 20 MIN: CPT

## 2024-06-03 PROCEDURE — 80053 COMPREHEN METABOLIC PANEL: CPT

## 2024-06-03 PROCEDURE — 93005 ELECTROCARDIOGRAM TRACING: CPT

## 2024-06-03 PROCEDURE — C1785: CPT

## 2024-06-03 PROCEDURE — 0795T TCAT INS 2CHMBR LDLS PM CMPL: CPT | Mod: Q0

## 2024-06-03 PROCEDURE — 85027 COMPLETE CBC AUTOMATED: CPT

## 2024-06-03 PROCEDURE — 86901 BLOOD TYPING SEROLOGIC RH(D): CPT

## 2024-06-03 PROCEDURE — 80197 ASSAY OF TACROLIMUS: CPT

## 2024-06-03 PROCEDURE — 83735 ASSAY OF MAGNESIUM: CPT

## 2024-06-03 PROCEDURE — 93286 PERI-PX EVAL PM/LDLS PM IP: CPT

## 2024-06-03 PROCEDURE — 71045 X-RAY EXAM CHEST 1 VIEW: CPT | Mod: 26

## 2024-06-03 PROCEDURE — 97162 PT EVAL MOD COMPLEX 30 MIN: CPT | Mod: GP

## 2024-06-03 PROCEDURE — C1894: CPT

## 2024-06-03 PROCEDURE — 36415 COLL VENOUS BLD VENIPUNCTURE: CPT

## 2024-06-03 PROCEDURE — 99291 CRITICAL CARE FIRST HOUR: CPT

## 2024-06-03 PROCEDURE — 93308 TTE F-UP OR LMTD: CPT | Mod: 26

## 2024-06-03 PROCEDURE — 93010 ELECTROCARDIOGRAM REPORT: CPT | Mod: 76

## 2024-06-03 PROCEDURE — C2630: CPT

## 2024-06-03 PROCEDURE — 80180 DRUG SCRN QUAN MYCOPHENOLATE: CPT

## 2024-06-03 PROCEDURE — 97116 GAIT TRAINING THERAPY: CPT | Mod: GP

## 2024-06-03 RX ORDER — HYDROMORPHONE HYDROCHLORIDE 2 MG/ML
3 INJECTION INTRAMUSCULAR; INTRAVENOUS; SUBCUTANEOUS THREE TIMES A DAY
Refills: 0 | Status: DISCONTINUED | OUTPATIENT
Start: 2024-06-03 | End: 2024-06-04

## 2024-06-03 RX ORDER — LANOLIN ALCOHOL/MO/W.PET/CERES
3 CREAM (GRAM) TOPICAL AT BEDTIME
Refills: 0 | Status: DISCONTINUED | OUTPATIENT
Start: 2024-06-03 | End: 2024-06-10

## 2024-06-03 RX ORDER — METOPROLOL TARTRATE 50 MG
50 TABLET ORAL
Refills: 0 | Status: DISCONTINUED | OUTPATIENT
Start: 2024-06-03 | End: 2024-06-10

## 2024-06-03 RX ORDER — MAGNESIUM SULFATE 500 MG/ML
2 VIAL (ML) INJECTION ONCE
Refills: 0 | Status: COMPLETED | OUTPATIENT
Start: 2024-06-03 | End: 2024-06-03

## 2024-06-03 RX ORDER — POLYETHYLENE GLYCOL 3350 17 G/17G
17 POWDER, FOR SOLUTION ORAL
Refills: 0 | Status: DISCONTINUED | OUTPATIENT
Start: 2024-06-03 | End: 2024-06-10

## 2024-06-03 RX ORDER — FENTANYL CITRATE 50 UG/ML
50 INJECTION INTRAVENOUS ONCE
Refills: 0 | Status: DISCONTINUED | OUTPATIENT
Start: 2024-06-03 | End: 2024-06-03

## 2024-06-03 RX ORDER — APIXABAN 2.5 MG/1
2.5 TABLET, FILM COATED ORAL EVERY 12 HOURS
Refills: 0 | Status: DISCONTINUED | OUTPATIENT
Start: 2024-06-03 | End: 2024-06-06

## 2024-06-03 RX ORDER — MYCOPHENOLATE MOFETIL 250 MG/1
500 CAPSULE ORAL
Refills: 0 | Status: DISCONTINUED | OUTPATIENT
Start: 2024-06-03 | End: 2024-06-10

## 2024-06-03 RX ORDER — ATORVASTATIN CALCIUM 80 MG/1
80 TABLET, FILM COATED ORAL AT BEDTIME
Refills: 0 | Status: DISCONTINUED | OUTPATIENT
Start: 2024-06-03 | End: 2024-06-10

## 2024-06-03 RX ORDER — PANTOPRAZOLE SODIUM 20 MG/1
40 TABLET, DELAYED RELEASE ORAL
Refills: 0 | Status: DISCONTINUED | OUTPATIENT
Start: 2024-06-03 | End: 2024-06-10

## 2024-06-03 RX ORDER — DILTIAZEM HCL 120 MG
180 CAPSULE, EXT RELEASE 24 HR ORAL DAILY
Refills: 0 | Status: DISCONTINUED | OUTPATIENT
Start: 2024-06-03 | End: 2024-06-04

## 2024-06-03 RX ORDER — MORPHINE SULFATE 50 MG/1
4 CAPSULE, EXTENDED RELEASE ORAL ONCE
Refills: 0 | Status: DISCONTINUED | OUTPATIENT
Start: 2024-06-03 | End: 2024-06-03

## 2024-06-03 RX ORDER — TACROLIMUS 5 MG/1
1 CAPSULE ORAL
Refills: 0 | Status: DISCONTINUED | OUTPATIENT
Start: 2024-06-03 | End: 2024-06-10

## 2024-06-03 RX ORDER — ACETAMINOPHEN 500 MG
650 TABLET ORAL EVERY 6 HOURS
Refills: 0 | Status: DISCONTINUED | OUTPATIENT
Start: 2024-06-03 | End: 2024-06-04

## 2024-06-03 RX ORDER — TAMSULOSIN HYDROCHLORIDE 0.4 MG/1
0.4 CAPSULE ORAL AT BEDTIME
Refills: 0 | Status: DISCONTINUED | OUTPATIENT
Start: 2024-06-03 | End: 2024-06-10

## 2024-06-03 RX ORDER — SENNA PLUS 8.6 MG/1
2 TABLET ORAL AT BEDTIME
Refills: 0 | Status: DISCONTINUED | OUTPATIENT
Start: 2024-06-03 | End: 2024-06-10

## 2024-06-03 RX ORDER — OXYCODONE HYDROCHLORIDE 5 MG/1
30 TABLET ORAL DAILY
Refills: 0 | Status: DISCONTINUED | OUTPATIENT
Start: 2024-06-03 | End: 2024-06-04

## 2024-06-03 RX ORDER — ONDANSETRON 8 MG/1
4 TABLET, FILM COATED ORAL EVERY 8 HOURS
Refills: 0 | Status: DISCONTINUED | OUTPATIENT
Start: 2024-06-03 | End: 2024-06-10

## 2024-06-03 RX ORDER — TACROLIMUS 5 MG/1
1 CAPSULE ORAL DAILY
Refills: 0 | Status: DISCONTINUED | OUTPATIENT
Start: 2024-06-03 | End: 2024-06-03

## 2024-06-03 RX ADMIN — FENTANYL CITRATE 50 MICROGRAM(S): 50 INJECTION INTRAVENOUS at 20:28

## 2024-06-03 RX ADMIN — MORPHINE SULFATE 4 MILLIGRAM(S): 50 CAPSULE, EXTENDED RELEASE ORAL at 18:45

## 2024-06-03 RX ADMIN — HYDROMORPHONE HYDROCHLORIDE 3 MILLIGRAM(S): 2 INJECTION INTRAMUSCULAR; INTRAVENOUS; SUBCUTANEOUS at 22:39

## 2024-06-03 RX ADMIN — Medication 25 GRAM(S): at 18:27

## 2024-06-03 NOTE — ED ADULT TRIAGE NOTE - CHIEF COMPLAINT QUOTE
pt BIBA with c/o palpitations. when EMS arrived, pt HR was 180s. EMS admin 30mg of adenosine, 20mg of cardizem, and 60 mcg of fentanyl for sciatica

## 2024-06-03 NOTE — ED PROVIDER NOTE - CLINICAL SUMMARY MEDICAL DECISION MAKING FREE TEXT BOX
Discussed with ICU discussed with EMS team.  Prehospital findings noted.  Patient with acute ischemic EKG as well as ultrasound was concerning for decreased ejection fraction and wall motion.  Cardiology consulted immediately and at bedside.  Prior EKGs reviewed    Due to a high probability of clinically significant, life threatening deterioration, the patient required my highest level of preparedness to intervene emergently and I personally spent this critical care time directly and personally managing the patient. This critical care time included obtaining a history; examining the patient; pulse oximetry; ordering and review of studies; arranging urgent treatment with development of a management plan; evaluation of patient's response to treatment; frequent reassessment; and, discussions with other providers and the patient/patients family. This critical care time was performed to assess and manage the high probability of imminent, life-threatening deterioration that could result in multi-organ failure.

## 2024-06-03 NOTE — H&P ADULT - ASSESSMENT
69-year-old with PMHx of HTN, HLD, HFpEF (70-75%) grade 2 DD, ESRD s/p kidney transplant 8 years ago at [place], chronic hep C, A-fib SP ablation preformed 5/12, brought in by ambulance with chief complaint of palpitations.    #SVT, chronic Afib post ablation    #SP renal transplant    #HFpEF    #Chronic thrombocytopenia, recent groin hematoma    #DD grade 2, history of HFpEF, compensated, EF 70%, Stage B,     #HTN    #HLD    #Hep C  - Negative Negative: HCV RNA Not Detected  (02.20.22 @ 04:30), Hepatitis C RNA, Qualitative: NotDetec (11.07.19 @ 07:02)   - rest of viral workup           69-year-old with PMHx of HTN, HLD, HFpEF (70-75%) grade 2 DD, ESRD s/p kidney transplant 8 years ago at [place], chronic hep C, A-fib SP ablation preformed 5/12, brought in by ambulance with chief complaint of palpitations.     #SVT, chronic Afib post recurrent ablation  - ON Eliquis 2,5mg due to high bleeding risk (subtherapeutic) CHADsVaSc: 5 (stroke report by patient cause decreased visual acuity)  - Per EMS was   - Patient stressed that he is compliant with home meds yet was not sure of names and timing as he has home health aide that help him out, doubt medication compliance at home  - EKG show sinus tachycardia with elevated aVR deep symmetrical T waves inferio anterior with STD mostly strain with LVH and tachycardia as patient is off pain  - Trop elevated to be trended, EKG to be trended unlikely ACS  - SP Cardizem push 30mg IV by EMS  - will resume home Diltazem and lopressor    #Sciatica unlikely/Pain/History if substance abuse  - Leg raise test in negative, pain description is atypical  - Patient looks severely depressed disheveled uses Maur[employer] and used some other ORAL PILLS of abuse he does not know dishevelled has no family only aide at home  - Pain management consult  - Oxy 15mg tid PRN at home  - will add IR oxy 30mg and PRN dliaudid q6 PO 3mg  - Left groin ecchymosis 2ry to old hematoma 2ry to ablation fem access complication    #SP renal transplant  - On Tacrolimus at home and MMF  - Cr 1.5 (improved from 2 at 2.5 last admission) eGFR 50  - no SANTA     #DD grade 2, history of HFpEF, compensated, EF 70%, Stage B,   - BNP elevated mostly 2ry to SVT  - CXR and on exam patient is euvolemic    #Chronic thrombocytopenia, recent groin hematoma  - 2ry 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 with rt eye blind/Pancytopenia  - Malignancy history is remote, treated no on current chemotherapy  - Chemotherapy might have caused his chronic Peripheral neuropathy    #Visual impaired  - reg precaution    #DVT Eliquis  #Full code  #Amb as sandra  #Dispo tele      69-year-old with PMHx of HTN, HLD, HFpEF (70-75%) grade 2 DD, ESRD s/p kidney transplant 8 years ago at [place], chronic hep C, A-fib SP ablation preformed 5/12, brought in by ambulance with chief complaint of palpitations.     #SVT, chronic Afib post recurrent ablation  - ON Eliquis 2,5mg due to high bleeding risk (subtherapeutic) CHADsVaSc: 5 (stroke report by patient cause decreased visual acuity)  - Per EMS was   - Patient stressed that he is compliant with home meds yet was not sure of names and timing as he has home health aide that help him out, doubt medication compliance at home  - EKG show sinus tachycardia with elevated aVR deep symmetrical T waves inferio anterior with STD mostly strain with LVH and tachycardia as patient is off pain  - Trop elevated to be trended, EKG to be trended unlikely ACS  - SP Cardizem push 30mg IV by EMS  - will resume home Diltazem and lopressor  - READ CHART NOTE 6.4.2024 for SVT    #Sciatica unlikely/Pain/History if substance abuse  - Leg raise test in negative, pain description is atypical  - Patient looks severely depressed disheveled uses Maurijana and used some other ORAL PILLS of abuse he does not know dishevelled has no family only aide at home  - Pain management consult  - Oxy 15mg tid PRN at home  - will add IR oxy 30mg and PRN dliaudid q6 PO 3mg  - Left groin ecchymosis 2ry to old hematoma 2ry to ablation fem access complication    #SP renal transplant  - On Tacrolimus at home and MMF  - Cr 1.5 (improved from 2 at 2.5 last admission) eGFR 50  - no SANTA     #DD grade 2, history of HFpEF, compensated, EF 70%, Stage B,   - BNP elevated mostly 2ry to SVT  - CXR and on exam patient is euvolemic    #Chronic thrombocytopenia, recent groin hematoma  - 2ry 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 with rt eye blind/Pancytopenia  - Malignancy history is remote, treated no on current chemotherapy  - Chemotherapy might have caused his chronic Peripheral neuropathy    #Visual impaired  - reg precaution    #DVT Eliquis  #Full code  #Amb as sandra  #Dispo tele      69-year-old with PMHx of HTN, HLD, HFpEF (70-75%) grade 2 DD, ESRD s/p kidney transplant 8 years ago at [place], chronic hep C, A-fib SP ablation preformed 5/12, brought in by ambulance with chief complaint of palpitations.     #SVT, chronic Afib post recurrent ablation  - ON Eliquis 2,5mg due to high bleeding risk (subtherapeutic) CHADsVaSc: 5 (stroke report by patient cause decreased visual acuity)  - Per EMS was   - Patient stressed that he is compliant with home meds yet was not sure of names and timing as he has home health aide that help him out, doubt medication compliance at home  - EKG show sinus tachycardia with elevated aVR deep symmetrical T waves inferio anterior with STD mostly strain with LVH and tachycardia as patient is off pain  - Trop elevated to be trended, EKG to be trended unlikely ACS  - SP Cardizem push 30mg IV by EMS  - will resume home Diltazem and lopressor  - READ CHART NOTE 6.4.2024 for SVT  - NB can loop recorder  - EP consults/Dr Henderson    #Sciatica unlikely/Pain/History if substance abuse  - Leg raise test in negative, pain description is atypical  - Patient looks severely depressed disheveled uses [employer] and used some other ORAL PILLS of abuse he does not know dishevelled has no family only aide at home  - Pain management consult  - Oxy 15mg tid PRN at home  - will add IR oxy 30mg and PRN dliaudid q6 PO 3mg  - Left groin ecchymosis 2ry to old hematoma 2ry to ablation fem access complication    #SP renal transplant  - On Tacrolimus at home and MMF  - Cr 1.5 (improved from 2 at 2.5 last admission) eGFR 50  - no SANTA     #DD grade 2, history of HFpEF, compensated, EF 70%, Stage B,   - BNP elevated mostly 2ry to SVT  - CXR and on exam patient is euvolemic    #Chronic thrombocytopenia, recent groin hematoma  - 2ry 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 with rt eye blind/Pancytopenia  - Malignancy history is remote, treated no on current chemotherapy  - Chemotherapy might have caused his chronic Peripheral neuropathy    #Visual impaired  - reg precaution    #DVT Eliquis  #Full code  #Amb as sandra  #Dispo tele

## 2024-06-03 NOTE — H&P ADULT - NSHPLABSRESULTS_GEN_ALL_CORE
CBC Full  -  ( 03 Jun 2024 18:37 )  WBC Count : 2.96 K/uL  RBC Count : 2.77 M/uL  Hemoglobin : 8.6 g/dL  Hematocrit : 27.8 %  Platelet Count - Automated : 105 K/uL  Mean Cell Volume : 100.4 fL  Mean Cell Hemoglobin : 31.0 pg  Mean Cell Hemoglobin Concentration : 30.9 g/dL  Auto Neutrophil # : 2.34 K/uL  Auto Lymphocyte # : 0.46 K/uL  Auto Monocyte # : 0.08 K/uL  Auto Eosinophil # : 0.00 K/uL  Auto Basophil # : 0.00 K/uL  Auto Neutrophil % : 79.1 %  Auto Lymphocyte % : 15.7 %  Auto Monocyte % : 2.6 %  Auto Eosinophil % : 0.0 %  Auto Basophil % : 0.0 %    06-03    139  |  111<H>  |  36<H>  ----------------------------<  107<H>  4.8   |  19  |  1.5    Ca    9.4      03 Jun 2024 18:37  Mg     2.5     06-03    TPro  5.2<L>  /  Alb  3.6  /  TBili  0.7  /  DBili  x   /  AST  24  /  ALT  16  /  AlkPhos  88  06-03

## 2024-06-03 NOTE — HISTORY OF PRESENT ILLNESS
[FreeTextEntry1] : Mr. CHOU has an extensive medical history including but not limited to CAD/PCI, HTN, Hep C, Cirrhosis with portal hypertension and esophageal varices, ESRD s/p renal transplant, Right AVF, persistent AF s/p cardioversion, anemia, ex-heroine use, ex-smoker, right eye blindness, peripheral neuropathy, right breast ca s/p lumpectomy, bilateral carotid stenosis, possible pulmonary hypertension, emboli after stopping eliquis s/p embolectomy.   Recent recently he was hospitalized for cardiac concerns and anemia.  Of note he had a left L5-S1 endoscopic foraminotomy in September 2023.  Initially he did well however then began to experience recurrent pain in his left leg.  Since his last office visit in February symptoms have significantly increased.  He reports persistent pain radiating down his left leg.  No recent spinal imaging to review today.  He did have a CT of the abdomen pelvis in May while hospitalized which showed lumbar degenerative disc disease worse at L4-5.  He is aware that advanced imaging with an MRI of the lumbar spine is warranted.  He is hesitant to proceed due to pain.  He is medically managed under the care of pain management, Dr. Gordon on OxyContin 15 mg 3 times daily as needed.  Over the past few weeks he has not been taking it since he is concerned that he is becoming reliant on it.   No recent physical therapy or injections with pain management for pain control.

## 2024-06-03 NOTE — ED PROVIDER NOTE - ATTENDING CONTRIBUTION TO CARE
69-year-old male to ED with palpitations and rapid heart rate.  Patient had chest pressure and palpitations prehospital and called EMS.  On arrival patient found to be in rapid heart rate identified as SVT.  Prehospital given adenosine as well as given Cardizem IV prehospital.  Heart rate controlled patient brought to ED.  He is already on amiodarone and states pain improved. Afebrile vital signs stable exam as noted clear lungs bilaterally conjunctiva pink HEENT normal, regular rate and rhythm abdomen soft nontender and neuro nonfocal.

## 2024-06-03 NOTE — H&P ADULT - HISTORY OF PRESENT ILLNESS
69-year-old with PMHx of HTN, HLD, HFpEF (70-75%) grade 2 DD, ESRD s/p kidney transplant 8 years ago at [place], chronic hep C, A-fib SP ablation preformed 5/12, brought in by ambulance with chief complaint of palpitations.  Per EMS patient's heart rate was in the 180s identified as SVT. Patient was given a total of 30 mg of adenosine by EMS without reduction in heart rate.  Patient was then given 20mg of Cardizem, 60mcg fentanyl for sciatica pain and heart rate subsequently slowed down into the 80s.  Patient was normotensive.  Patient states that this morning his heart rate was 180 and remained tachycardic throughout the day.  Patient states that he did have some left-sided chest pain this morning but that has since resolved.  Denies any fevers, trauma, URI symptoms, SOB, current CP, palpitations, abdominal pain, n/v/d or dysruia. Pt states he follows with Dr Henderson for cardiology. Pt has to sciatica pain.    Patient was admitted in May due to groin hematoma post Afib ablation received 2 units of blood and treated conservatively     Pro-Brain Natriuretic Peptide: 11709 pg/mL (06.03.24 @ 18:37)   Troponin T, High Sensitivity Result: 113:  Hemoglobin: 8.6 g/dL (06.03.24 @ 18:37) Platelet Count - Automated: 105: Verified on smear K/uL (06.03.24 @ 18:37) normal morphology  EKG: New deep symmetrical T wave anterio-inferior in the setting of LVH (Strain pattern?) with elevation in aVR

## 2024-06-03 NOTE — H&P ADULT - NSHPPHYSICALEXAM_GEN_ALL_CORE
T(C): 36.8 (06-03-24 @ 17:42), Max: 36.8 (06-03-24 @ 17:42)  HR: 87 (06-03-24 @ 17:42) (87 - 87)  BP: 110/66 (06-03-24 @ 17:42) (110/66 - 110/66)  RR: 18 (06-03-24 @ 17:42) (18 - 18)  SpO2: 97% (06-03-24 @ 17:42) (97% - 97%)    CONSTITUTIONAL: Well groomed, no apparent distress  EYES: PERRLA and symmetric, EOMI, No conjunctival or scleral injection, non-icteric  ENMT: Oral mucosa with moist membranes. Normal dentition; no pharyngeal injection or exudates             NECK: Supple, symmetric and without tracheal deviation   RESP: No respiratory distress, no use of accessory muscles; CTA b/l, no WRR  CV: RRR, +S1S2, no MRG; no JVD; no peripheral edema  GI: Soft, NT, ND, no rebound, no guarding; no palpable masses; no hepatosplenomegaly; no hernia palpated  LYMPH: No cervical LAD or tenderness; no axillary LAD or tenderness; no inguinal LAD or tenderness  MSK: Normal gait; No digital clubbing or cyanosis; examination of the (head/neck/spine/ribs/pelvis, RUE, LUE, RLE, LLE) without misalignment,            Normal ROM without pain, no spinal tenderness, normal muscle strength/tone  SKIN: No rashes or ulcers noted; no subcutaneous nodules or induration palpable  NEURO: CN II-XII intact; normal reflexes in upper and lower extremities, sensation intact in upper and lower extremities b/l to light touch   PSYCH: Appropriate insight/judgment; A+O x 3, mood and affect appropriate, recent/remote memory intact T(C): 36.8 (06-03-24 @ 17:42), Max: 36.8 (06-03-24 @ 17:42)  HR: 87 (06-03-24 @ 17:42) (87 - 87)  BP: 110/66 (06-03-24 @ 17:42) (110/66 - 110/66)  RR: 18 (06-03-24 @ 17:42) (18 - 18)  SpO2: 97% (06-03-24 @ 17:42) (97% - 97%)    CONSTITUTIONAL: dishevelled, frail, abnormal behaviour, ecchymosis noted left groin  EYES: rt sided blindness and redness  RESP: Clear equal AE no adv sounds  CV: RRR, +S1S2, no MRG; no JVD; no peripheral edema  GI: Soft, NT, ND, no rebound, no guarding; no palpable masses; no hepatosplenomegaly; no hernia palpated  SKIN: multiple tattoos, shunt seen left arm  NEURO: CN II-XII intact; normal reflexes in upper and lower extremities, sensation intact in upper and lower extremities b/l to light touch   PSYCH: Appropriate insight/judgment; A+O x 3, mood and affect appropriate, recent/remote memory intact yet abnormal behaviour T(C): 36.8 (06-03-24 @ 17:42), Max: 36.8 (06-03-24 @ 17:42)  HR: 87 (06-03-24 @ 17:42) (87 - 87)  BP: 110/66 (06-03-24 @ 17:42) (110/66 - 110/66)  RR: 18 (06-03-24 @ 17:42) (18 - 18)  SpO2: 97% (06-03-24 @ 17:42) (97% - 97%)    CONSTITUTIONAL: dishevelled, frail, abnormal behaviour, ecchymosis noted left groin  EYES: rt sided blindness and redness  RESP: Clear equal AE no adv sounds  CV: RRR, +S1S2, no MRG; no JVD; no peripheral edema  GI: Soft, NT, ND, no rebound, no guarding; no palpable masses; no hepatosplenomegaly; no hernia palpated  SKIN: multiple tattoos, shunt seen in left arm freeman arm scars for shunts gynecomastia  NEURO: CN II-XII intact; normal reflexes in upper and lower extremities, sensation intact in upper and lower extremities b/l to light touch   PSYCH: Appropriate insight/judgment; A+O x 3, mood and affect appropriate, recent/remote memory intact yet abnormal behaviour

## 2024-06-03 NOTE — ASSESSMENT
[FreeTextEntry1] : Mr. CHOU will proceed with an updated MRI of the lumbar spine at stand-up MRI.  Follow-up in 1 week with Dr. Arnold to discuss treatment options.  Patient is agreeable with our plan of care.   Marsha Albert MS PA-C Senior Physician Assistant [facility]    Adele Arnold MD FAANS Chair, Department of Neurosurgery [facility]

## 2024-06-03 NOTE — ED PROVIDER NOTE - OBJECTIVE STATEMENT
69-year-old with PMHx of HTN, HLD, HFpEF (70-75%) grade 2 DD, ESRD s/p kidney transplant 8 years ago at [place], chronic hep C, A-fib with ablation preformed 5/12, brought in by ambulance with chief complaint of palpitations.  Per EMS patient's heart rate was in the 180s patient was given a total of 30 mg of adenosine without reduction in heart rate.  Patient was then given 20 of Cardizem, 60mcg fentanyl for sciatica pain and heart rate subsequently slowed down into the 80s.  Patient was normotensive.  Patient states that this morning his heart rate was 180 and remained tachycardic throughout the day.  Patient states that he did have some left-sided chest pain this morning but that has since resolved.  Denies any fevers, trauma, URI symptoms, SOB, current CP, palpitations, abdominal pain, n/v/d or dysruia. Pt states he follows Dr. Pelaez for cardiology. Pt admits to sciatica pain. Pt is a 69-year-old with PMHx of HTN, HLD, HFpEF (70-75%) grade 2 DD, ESRD s/p kidney transplant 8 years ago at [place], chronic hep C, A-fib with ablation preformed 5/12, brought in by ambulance with chief complaint of palpitations.  Per EMS patient's heart rate was in the 180s identified as SVT. Patient was given a total of 30 mg of adenosine by EMS without reduction in heart rate.  Patient was then given 20 of Cardizem, 60mcg fentanyl for sciatica pain and heart rate subsequently slowed down into the 80s.  Patient was normotensive.  Patient states that this morning his heart rate was 180 and remained tachycardic throughout the day.  Patient states that he did have some left-sided chest pain this morning but that has since resolved.  Denies any fevers, trauma, URI symptoms, SOB, current CP, palpitations, abdominal pain, n/v/d or dysruia. Pt states he follows Dr. Pelaez for cardiology. Pt admits to sciatica pain.

## 2024-06-03 NOTE — ED PROVIDER NOTE - PROGRESS NOTE DETAILS
Talked with Cardio fellow Raghu Sandoval about new trop of 113, stated the pt is appropriate of Med tele

## 2024-06-03 NOTE — ED ADULT NURSE NOTE - NSFALLHARMRISKINTERV_ED_ALL_ED

## 2024-06-03 NOTE — ED PROVIDER NOTE - CHIEF COMPLAINT
Left message for patient letting her know hCG level and to have her hCG drawn to continue trending on 11/20/19. Also let her know that she will have progesterone drawn at that time as well. Instructed to call with any questions.     Patient called VANE Milton back and discussed lab results and had no further questions.    The patient is a 69y Male complaining of palpitations.

## 2024-06-03 NOTE — ED ADULT NURSE REASSESSMENT NOTE - NS ED NURSE REASSESS COMMENT FT1
Received pt from previous RN at 1915, Pt AxOx3, pt denies any chest pain nor discomfort during rounding. Cardiac monitoring maintained. Respirations even and unlabored. Pt getting OOB and standing at the edge of the bed c/o sciatica pain. MD made aware. Pain meds administered as ordered. Pt refusing to get back into bed despite multiple attempts of education and encouragement from staff. CN made aware. Pt near nurses station. Pt with nonskid red socks on. Bed alarm in place. Care to continue.

## 2024-06-03 NOTE — ED PROVIDER NOTE - PHYSICAL EXAMINATION
VITAL SIGNS: noted  CONSTITUTIONAL: Well-developed; well-nourished; in no acute distress, pale appearing  HEAD: Normocephalic; atraumatic  EYES: PERRL, EOM intact; conjunctiva and sclera clear  ENT: No nasal discharge; MMM, oropharynx clear without tonsillar hypertrophy or exudates  NECK: Supple; non tender. No anterior cervical lymphadenopathy noted  CARD: S1, S2 normal; no murmurs, gallops, or rubs. Regular rate and rhythm  RESP: CTAB/L, no wheezes, rales or rhonchi  ABD: Normal bowel sounds; soft; non-distended; non-tender; no organoomegaly. No CVA tenderness  EXT: Normal ROM. No calf tenderness or edema. Distal pulses intact  NEURO: Awake and alert, oriented. Grossly unremarkable. No focal deficits.  SKIN: Skin exam is warm and dry, no acute rash

## 2024-06-04 LAB
A1C WITH ESTIMATED AVERAGE GLUCOSE RESULT: 4.8 % — SIGNIFICANT CHANGE UP (ref 4–5.6)
ALBUMIN SERPL ELPH-MCNC: 3.6 G/DL — SIGNIFICANT CHANGE UP (ref 3.5–5.2)
ALP SERPL-CCNC: 87 U/L — SIGNIFICANT CHANGE UP (ref 30–115)
ALT FLD-CCNC: 16 U/L — SIGNIFICANT CHANGE UP (ref 0–41)
ANION GAP SERPL CALC-SCNC: 12 MMOL/L — SIGNIFICANT CHANGE UP (ref 7–14)
AST SERPL-CCNC: 23 U/L — SIGNIFICANT CHANGE UP (ref 0–41)
BASOPHILS # BLD AUTO: 0.02 K/UL — SIGNIFICANT CHANGE UP (ref 0–0.2)
BASOPHILS NFR BLD AUTO: 0.6 % — SIGNIFICANT CHANGE UP (ref 0–1)
BILIRUB SERPL-MCNC: 0.7 MG/DL — SIGNIFICANT CHANGE UP (ref 0.2–1.2)
BUN SERPL-MCNC: 27 MG/DL — HIGH (ref 10–20)
CALCIUM SERPL-MCNC: 9.7 MG/DL — SIGNIFICANT CHANGE UP (ref 8.4–10.5)
CHLORIDE SERPL-SCNC: 110 MMOL/L — SIGNIFICANT CHANGE UP (ref 98–110)
CHOLEST SERPL-MCNC: 84 MG/DL — SIGNIFICANT CHANGE UP
CO2 SERPL-SCNC: 17 MMOL/L — SIGNIFICANT CHANGE UP (ref 17–32)
CREAT SERPL-MCNC: 1.3 MG/DL — SIGNIFICANT CHANGE UP (ref 0.7–1.5)
EGFR: 60 ML/MIN/1.73M2 — SIGNIFICANT CHANGE UP
EOSINOPHIL # BLD AUTO: 0.02 K/UL — SIGNIFICANT CHANGE UP (ref 0–0.7)
EOSINOPHIL NFR BLD AUTO: 0.6 % — SIGNIFICANT CHANGE UP (ref 0–8)
ESTIMATED AVERAGE GLUCOSE: 91 MG/DL — SIGNIFICANT CHANGE UP (ref 68–114)
GLUCOSE SERPL-MCNC: 114 MG/DL — HIGH (ref 70–99)
HCT VFR BLD CALC: 27.5 % — LOW (ref 42–52)
HDLC SERPL-MCNC: 32 MG/DL — LOW
HGB BLD-MCNC: 8.4 G/DL — LOW (ref 14–18)
IMM GRANULOCYTES NFR BLD AUTO: 1.3 % — HIGH (ref 0.1–0.3)
LIPID PNL WITH DIRECT LDL SERPL: 39 MG/DL — SIGNIFICANT CHANGE UP
LYMPHOCYTES # BLD AUTO: 0.54 K/UL — LOW (ref 1.2–3.4)
LYMPHOCYTES # BLD AUTO: 17.3 % — LOW (ref 20.5–51.1)
MCHC RBC-ENTMCNC: 30.5 G/DL — LOW (ref 32–37)
MCHC RBC-ENTMCNC: 30.8 PG — SIGNIFICANT CHANGE UP (ref 27–31)
MCV RBC AUTO: 100.7 FL — HIGH (ref 80–94)
MONOCYTES # BLD AUTO: 0.23 K/UL — SIGNIFICANT CHANGE UP (ref 0.1–0.6)
MONOCYTES NFR BLD AUTO: 7.4 % — SIGNIFICANT CHANGE UP (ref 1.7–9.3)
NEUTROPHILS # BLD AUTO: 2.27 K/UL — SIGNIFICANT CHANGE UP (ref 1.4–6.5)
NEUTROPHILS NFR BLD AUTO: 72.8 % — SIGNIFICANT CHANGE UP (ref 42.2–75.2)
NON HDL CHOLESTEROL: 52 MG/DL — SIGNIFICANT CHANGE UP
NRBC # BLD: 0 /100 WBCS — SIGNIFICANT CHANGE UP (ref 0–0)
PLATELET # BLD AUTO: 95 K/UL — LOW (ref 130–400)
PMV BLD: 12.5 FL — HIGH (ref 7.4–10.4)
POTASSIUM SERPL-MCNC: 4.4 MMOL/L — SIGNIFICANT CHANGE UP (ref 3.5–5)
POTASSIUM SERPL-SCNC: 4.4 MMOL/L — SIGNIFICANT CHANGE UP (ref 3.5–5)
PROT SERPL-MCNC: 5.2 G/DL — LOW (ref 6–8)
RBC # BLD: 2.73 M/UL — LOW (ref 4.7–6.1)
RBC # FLD: 19.5 % — HIGH (ref 11.5–14.5)
SODIUM SERPL-SCNC: 139 MMOL/L — SIGNIFICANT CHANGE UP (ref 135–146)
TRIGL SERPL-MCNC: 65 MG/DL — SIGNIFICANT CHANGE UP
TROPONIN T, HIGH SENSITIVITY RESULT: 149 NG/L — CRITICAL HIGH (ref 6–21)
TROPONIN T, HIGH SENSITIVITY RESULT: 150 NG/L — CRITICAL HIGH (ref 6–21)
TROPONIN T, HIGH SENSITIVITY RESULT: 154 NG/L — CRITICAL HIGH (ref 6–21)
WBC # BLD: 3.12 K/UL — LOW (ref 4.8–10.8)
WBC # FLD AUTO: 3.12 K/UL — LOW (ref 4.8–10.8)

## 2024-06-04 PROCEDURE — 72148 MRI LUMBAR SPINE W/O DYE: CPT | Mod: 26

## 2024-06-04 PROCEDURE — 93010 ELECTROCARDIOGRAM REPORT: CPT

## 2024-06-04 PROCEDURE — 99223 1ST HOSP IP/OBS HIGH 75: CPT

## 2024-06-04 PROCEDURE — 99233 SBSQ HOSP IP/OBS HIGH 50: CPT

## 2024-06-04 RX ORDER — METHOCARBAMOL 500 MG/1
750 TABLET, FILM COATED ORAL EVERY 8 HOURS
Refills: 0 | Status: DISCONTINUED | OUTPATIENT
Start: 2024-06-04 | End: 2024-06-10

## 2024-06-04 RX ORDER — DILTIAZEM HCL 120 MG
20 CAPSULE, EXT RELEASE 24 HR ORAL ONCE
Refills: 0 | Status: COMPLETED | OUTPATIENT
Start: 2024-06-04 | End: 2024-06-04

## 2024-06-04 RX ORDER — ADENOSINE 3 MG/ML
6 INJECTION INTRAVENOUS ONCE
Refills: 0 | Status: COMPLETED | OUTPATIENT
Start: 2024-06-04 | End: 2024-06-04

## 2024-06-04 RX ORDER — HYDROMORPHONE HYDROCHLORIDE 2 MG/ML
1 INJECTION INTRAMUSCULAR; INTRAVENOUS; SUBCUTANEOUS ONCE
Refills: 0 | Status: DISCONTINUED | OUTPATIENT
Start: 2024-06-04 | End: 2024-06-04

## 2024-06-04 RX ORDER — ACETAMINOPHEN 500 MG
650 TABLET ORAL EVERY 8 HOURS
Refills: 0 | Status: DISCONTINUED | OUTPATIENT
Start: 2024-06-04 | End: 2024-06-10

## 2024-06-04 RX ORDER — DILTIAZEM HCL 120 MG
240 CAPSULE, EXT RELEASE 24 HR ORAL DAILY
Refills: 0 | Status: DISCONTINUED | OUTPATIENT
Start: 2024-06-04 | End: 2024-06-10

## 2024-06-04 RX ORDER — GABAPENTIN 400 MG/1
300 CAPSULE ORAL AT BEDTIME
Refills: 0 | Status: DISCONTINUED | OUTPATIENT
Start: 2024-06-04 | End: 2024-06-10

## 2024-06-04 RX ORDER — DILTIAZEM HCL 120 MG
10 CAPSULE, EXT RELEASE 24 HR ORAL ONCE
Refills: 0 | Status: COMPLETED | OUTPATIENT
Start: 2024-06-04 | End: 2024-06-04

## 2024-06-04 RX ORDER — OXYCODONE HYDROCHLORIDE 5 MG/1
15 TABLET ORAL EVERY 8 HOURS
Refills: 0 | Status: DISCONTINUED | OUTPATIENT
Start: 2024-06-04 | End: 2024-06-04

## 2024-06-04 RX ORDER — HYDROMORPHONE HYDROCHLORIDE 2 MG/ML
0.5 INJECTION INTRAMUSCULAR; INTRAVENOUS; SUBCUTANEOUS EVERY 6 HOURS
Refills: 0 | Status: DISCONTINUED | OUTPATIENT
Start: 2024-06-04 | End: 2024-06-10

## 2024-06-04 RX ORDER — DULOXETINE HYDROCHLORIDE 30 MG/1
30 CAPSULE, DELAYED RELEASE ORAL DAILY
Refills: 0 | Status: DISCONTINUED | OUTPATIENT
Start: 2024-06-04 | End: 2024-06-07

## 2024-06-04 RX ORDER — OXYCODONE HYDROCHLORIDE 5 MG/1
15 TABLET ORAL EVERY 8 HOURS
Refills: 0 | Status: DISCONTINUED | OUTPATIENT
Start: 2024-06-04 | End: 2024-06-10

## 2024-06-04 RX ORDER — ADENOSINE 3 MG/ML
12 INJECTION INTRAVENOUS ONCE
Refills: 0 | Status: COMPLETED | OUTPATIENT
Start: 2024-06-04 | End: 2024-06-04

## 2024-06-04 RX ADMIN — HYDROMORPHONE HYDROCHLORIDE 3 MILLIGRAM(S): 2 INJECTION INTRAMUSCULAR; INTRAVENOUS; SUBCUTANEOUS at 06:18

## 2024-06-04 RX ADMIN — METHOCARBAMOL 750 MILLIGRAM(S): 500 TABLET, FILM COATED ORAL at 14:56

## 2024-06-04 RX ADMIN — ADENOSINE 12 MILLIGRAM(S): 3 INJECTION INTRAVENOUS at 03:38

## 2024-06-04 RX ADMIN — HYDROMORPHONE HYDROCHLORIDE 1 MILLIGRAM(S): 2 INJECTION INTRAMUSCULAR; INTRAVENOUS; SUBCUTANEOUS at 01:27

## 2024-06-04 RX ADMIN — PANTOPRAZOLE SODIUM 40 MILLIGRAM(S): 20 TABLET, DELAYED RELEASE ORAL at 06:18

## 2024-06-04 RX ADMIN — HYDROMORPHONE HYDROCHLORIDE 1 MILLIGRAM(S): 2 INJECTION INTRAMUSCULAR; INTRAVENOUS; SUBCUTANEOUS at 03:46

## 2024-06-04 RX ADMIN — OXYCODONE HYDROCHLORIDE 15 MILLIGRAM(S): 5 TABLET ORAL at 12:11

## 2024-06-04 RX ADMIN — Medication 20 MILLIGRAM(S): at 03:44

## 2024-06-04 RX ADMIN — Medication 650 MILLIGRAM(S): at 21:22

## 2024-06-04 RX ADMIN — HYDROMORPHONE HYDROCHLORIDE 0.5 MILLIGRAM(S): 2 INJECTION INTRAMUSCULAR; INTRAVENOUS; SUBCUTANEOUS at 14:46

## 2024-06-04 RX ADMIN — Medication 240 MILLIGRAM(S): at 04:19

## 2024-06-04 RX ADMIN — ATORVASTATIN CALCIUM 80 MILLIGRAM(S): 80 TABLET, FILM COATED ORAL at 21:22

## 2024-06-04 RX ADMIN — HYDROMORPHONE HYDROCHLORIDE 0.5 MILLIGRAM(S): 2 INJECTION INTRAMUSCULAR; INTRAVENOUS; SUBCUTANEOUS at 13:34

## 2024-06-04 RX ADMIN — TACROLIMUS 1 MILLIGRAM(S): 5 CAPSULE ORAL at 17:47

## 2024-06-04 RX ADMIN — OXYCODONE HYDROCHLORIDE 15 MILLIGRAM(S): 5 TABLET ORAL at 13:28

## 2024-06-04 RX ADMIN — DULOXETINE HYDROCHLORIDE 30 MILLIGRAM(S): 30 CAPSULE, DELAYED RELEASE ORAL at 12:04

## 2024-06-04 RX ADMIN — Medication 650 MILLIGRAM(S): at 13:33

## 2024-06-04 RX ADMIN — Medication 50 MILLIGRAM(S): at 17:47

## 2024-06-04 RX ADMIN — HYDROMORPHONE HYDROCHLORIDE 1 MILLIGRAM(S): 2 INJECTION INTRAMUSCULAR; INTRAVENOUS; SUBCUTANEOUS at 03:40

## 2024-06-04 RX ADMIN — APIXABAN 2.5 MILLIGRAM(S): 2.5 TABLET, FILM COATED ORAL at 06:18

## 2024-06-04 RX ADMIN — GABAPENTIN 300 MILLIGRAM(S): 400 CAPSULE ORAL at 21:22

## 2024-06-04 RX ADMIN — MYCOPHENOLATE MOFETIL 500 MILLIGRAM(S): 250 CAPSULE ORAL at 17:47

## 2024-06-04 RX ADMIN — SENNA PLUS 2 TABLET(S): 8.6 TABLET ORAL at 21:22

## 2024-06-04 RX ADMIN — TAMSULOSIN HYDROCHLORIDE 0.4 MILLIGRAM(S): 0.4 CAPSULE ORAL at 21:22

## 2024-06-04 RX ADMIN — APIXABAN 2.5 MILLIGRAM(S): 2.5 TABLET, FILM COATED ORAL at 17:47

## 2024-06-04 RX ADMIN — Medication 10 MILLIGRAM(S): at 03:52

## 2024-06-04 RX ADMIN — Medication 50 MILLIGRAM(S): at 06:19

## 2024-06-04 RX ADMIN — ADENOSINE 6 MILLIGRAM(S): 3 INJECTION INTRAVENOUS at 03:30

## 2024-06-04 RX ADMIN — Medication 3 MILLIGRAM(S): at 21:25

## 2024-06-04 RX ADMIN — TACROLIMUS 1 MILLIGRAM(S): 5 CAPSULE ORAL at 06:18

## 2024-06-04 RX ADMIN — METHOCARBAMOL 750 MILLIGRAM(S): 500 TABLET, FILM COATED ORAL at 21:25

## 2024-06-04 RX ADMIN — MYCOPHENOLATE MOFETIL 500 MILLIGRAM(S): 250 CAPSULE ORAL at 06:18

## 2024-06-04 RX ADMIN — Medication 1 MILLIGRAM(S): at 17:46

## 2024-06-04 RX ADMIN — Medication 650 MILLIGRAM(S): at 14:46

## 2024-06-04 RX ADMIN — HYDROMORPHONE HYDROCHLORIDE 1 MILLIGRAM(S): 2 INJECTION INTRAMUSCULAR; INTRAVENOUS; SUBCUTANEOUS at 17:45

## 2024-06-04 NOTE — PROGRESS NOTE ADULT - SUBJECTIVE AND OBJECTIVE BOX
CHOUDAVONTE LINK  69y Male    CHIEF COMPLAINT:    Patient is a 69y old  Male who presents with a chief complaint of     INTERVAL HPI/OVERNIGHT EVENTS:    Patient seen and examined.    ROS: All other systems are negative.    Vital Signs:    T(F): 96.8 (24 @ 04:48), Max: 98.3 (24 @ 17:42)  HR: 57 (24 @ 04:48) (57 - 181)  BP: 118/64 (24 @ 04:48) (110/66 - 138/80)  RR: 18 (24 @ 04:48) (18 - 22)  SpO2: 99% (24 @ 04:48) (97% - 99%)  I&O's Summary    2024 07:01  -  2024 07:00  --------------------------------------------------------  IN: 0 mL / OUT: 450 mL / NET: -450 mL      Daily Height in cm: 175.26 (2024 17:42)    Daily Weight in k.9 (2024 04:48)  CAPILLARY BLOOD GLUCOSE          PHYSICAL EXAM:    GENERAL:  NAD  SKIN: No rashes or lesions  HENT: Atraumatic. Normocephalic. PERRL. Moist membranes.  NECK: Supple, No JVD. No lymphadenopathy.  PULMONARY: CTA B/L. No wheezing. No rales  CVS: Normal S1, S2. Rate and Rhythm are regular. No murmurs.  ABDOMEN/GI: Soft, Nontender, Nondistended; BS present  EXTREMITIES: Peripheral pulses intact. No edema B/L LE.  NEUROLOGIC:  No motor or sensory deficit.  PSYCH: Alert & oriented x 3    Consultant(s) Notes Reviewed:  [x ] YES  [ ] NO  Care Discussed with Consultants/Other Providers [ x] YES  [ ] NO    EKG reviewed  Telemetry reviewed    LABS:                        8.6    2.96  )-----------( 105      ( 2024 18:37 )             27.8     -    139  |  111<H>  |  36<H>  ----------------------------<  107<H>  4.8   |  19  |  1.5    Ca    9.4      2024 18:37  Mg     2.5     06-03    TPro  5.2<L>  /  Alb  3.6  /  TBili  0.7  /  DBili  x   /  AST  24  /  ALT  16  /  AlkPhos  88  06-03    PT/INR - ( 2024 18:37 )   PT: 15.50 sec;   INR: 1.36 ratio         PTT - ( 2024 18:37 )  PTT:30.0 sec          RADIOLOGY & ADDITIONAL TESTS:    < from: Xray Chest 1 View- PORTABLE-Urgent (24 @ 18:52) >    Impression:    No radiographic evidence of acute cardiopulmonary disease.    < end of copied text >  < from: TTE Echo Complete w/o Contrast w/ Doppler (03.15.24 @ 08:20) >  Summary:   1. Hyperdynamic global left ventricular systolic function.   2. Left ventricular ejection fraction, by visual estimation, is 70 to   75%.   3. Moderately increased LV wall thickness.   4. Spectral Doppler shows restrictive pattern of left ventricular   myocardial filling (Grade III diastolic dysfunction).   5. Elevated mean left atrial pressure.   6. Normal right ventricular size and function.   7. Severely enlarged left atrium.   8. Mildly enlarged right atrium.   9. Mild aortic regurgitation.  10. Moderate mitral valve regurgitation.  11. Mild tricuspid regurgitation.  12. Dilatation of the aortic root (measuring 3.8 cm, indexed 2.24 cm/m2)   and ascending aorta (measuring 3.8 cm, indexed 2.24 cm/m2).  13. Estimated pulmonary artery systolic pressure is 38.5 mmHg assuming a   right atrial pressure of 8 mmHg, which is consistent with borderline   pulmonary hypertension.  14. Endocardial visualization was enhanced with intravenous echo contrast.  15.Compared to the previous study 24, the findings are similar.    < end of copied text >    Imaging or report Personally Reviewed:  [x ] YES  [ ] NO    Medications:  Standing  acetaminophen     Tablet .. 650 milliGRAM(s) Oral every 6 hours  apixaban 2.5 milliGRAM(s) Oral every 12 hours  atorvastatin 80 milliGRAM(s) Oral at bedtime  diltiazem    milliGRAM(s) Oral daily  metoprolol tartrate 50 milliGRAM(s) Oral two times a day  mycophenolate mofetil 500 milliGRAM(s) Oral two times a day  oxyCODONE    IR 30 milliGRAM(s) Oral daily  pantoprazole    Tablet 40 milliGRAM(s) Oral before breakfast  senna 2 Tablet(s) Oral at bedtime  tacrolimus 1 milliGRAM(s) Oral two times a day  tamsulosin 0.4 milliGRAM(s) Oral at bedtime    PRN Meds  aluminum hydroxide/magnesium hydroxide/simethicone Suspension 30 milliLiter(s) Oral every 4 hours PRN  HYDROmorphone   Tablet 3 milliGRAM(s) Oral three times a day PRN  melatonin 3 milliGRAM(s) Oral at bedtime PRN  ondansetron Injectable 4 milliGRAM(s) IV Push every 8 hours PRN  polyethylene glycol 3350 17 Gram(s) Oral two times a day PRN      Case discussed with resident    Care discussed with pt/family           EFFIE DAVONTE  69y Male    CHIEF COMPLAINT:    Patient is a 69y old  Male who presents with a chief complaint of     INTERVAL HPI/OVERNIGHT EVENTS:    Patient seen and examined. C/O on and off palpitations. Also c/o chronic sciatica pain in Lt buttock and LLE.     ROS: All other systems are negative.    Vital Signs:    T(F): 96.8 (24 @ 04:48), Max: 98.3 (24 @ 17:42)  HR: 57 (24 @ 04:48) (57 - 181)  BP: 118/64 (24 @ 04:48) (110/66 - 138/80)  RR: 18 (24 @ 04:48) (18 - 22)  SpO2: 99% (24 @ 04:48) (97% - 99%)  I&O's Summary    2024 07:01  -  2024 07:00  --------------------------------------------------------  IN: 0 mL / OUT: 450 mL / NET: -450 mL      Daily Height in cm: 175.26 (2024 17:42)    Daily Weight in k.9 (2024 04:48)  CAPILLARY BLOOD GLUCOSE          PHYSICAL EXAM:    GENERAL:  NAD  SKIN: No rashes or lesions  HENT: Atraumatic. Normocephalic. PERRL. Moist membranes.  NECK: Supple, No JVD. No lymphadenopathy.  PULMONARY: CTA B/L. No wheezing. No rales  CVS: Normal S1, S2. Rate and Rhythm are regular. No murmurs.  ABDOMEN/GI: Soft, Nontender, Nondistended; BS present  EXTREMITIES: Peripheral pulses intact. No edema B/L LE.  NEUROLOGIC:  No motor or sensory deficit.  PSYCH: Alert & oriented x 3    Consultant(s) Notes Reviewed:  [x ] YES  [ ] NO  Care Discussed with Consultants/Other Providers [ x] YES  [ ] NO    EKG reviewed  Telemetry reviewed    LABS:                        8.6    2.96  )-----------( 105      ( 2024 18:37 )             27.8     06-03    139  |  111<H>  |  36<H>  ----------------------------<  107<H>  4.8   |  19  |  1.5    Ca    9.4      2024 18:37  Mg     2.5     06-03    TPro  5.2<L>  /  Alb  3.6  /  TBili  0.7  /  DBili  x   /  AST  24  /  ALT  16  /  AlkPhos  88  06-03    PT/INR - ( 2024 18:37 )   PT: 15.50 sec;   INR: 1.36 ratio         PTT - ( 2024 18:37 )  PTT:30.0 sec          RADIOLOGY & ADDITIONAL TESTS:    < from: Xray Chest 1 View- PORTABLE-Urgent (24 @ 18:52) >    Impression:    No radiographic evidence of acute cardiopulmonary disease.    < end of copied text >  < from: TTE Echo Complete w/o Contrast w/ Doppler (03.15.24 @ 08:20) >  Summary:   1. Hyperdynamic global left ventricular systolic function.   2. Left ventricular ejection fraction, by visual estimation, is 70 to   75%.   3. Moderately increased LV wall thickness.   4. Spectral Doppler shows restrictive pattern of left ventricular   myocardial filling (Grade III diastolic dysfunction).   5. Elevated mean left atrial pressure.   6. Normal right ventricular size and function.   7. Severely enlarged left atrium.   8. Mildly enlarged right atrium.   9. Mild aortic regurgitation.  10. Moderate mitral valve regurgitation.  11. Mild tricuspid regurgitation.  12. Dilatation of the aortic root (measuring 3.8 cm, indexed 2.24 cm/m2)   and ascending aorta (measuring 3.8 cm, indexed 2.24 cm/m2).  13. Estimated pulmonary artery systolic pressure is 38.5 mmHg assuming a   right atrial pressure of 8 mmHg, which is consistent with borderline   pulmonary hypertension.  14. Endocardial visualization was enhanced with intravenous echo contrast.  15.Compared to the previous study 24, the findings are similar.    < end of copied text >    Imaging or report Personally Reviewed:  [x ] YES  [ ] NO    Medications:  Standing  acetaminophen     Tablet .. 650 milliGRAM(s) Oral every 6 hours  apixaban 2.5 milliGRAM(s) Oral every 12 hours  atorvastatin 80 milliGRAM(s) Oral at bedtime  diltiazem    milliGRAM(s) Oral daily  metoprolol tartrate 50 milliGRAM(s) Oral two times a day  mycophenolate mofetil 500 milliGRAM(s) Oral two times a day  oxyCODONE    IR 30 milliGRAM(s) Oral daily  pantoprazole    Tablet 40 milliGRAM(s) Oral before breakfast  senna 2 Tablet(s) Oral at bedtime  tacrolimus 1 milliGRAM(s) Oral two times a day  tamsulosin 0.4 milliGRAM(s) Oral at bedtime    PRN Meds  aluminum hydroxide/magnesium hydroxide/simethicone Suspension 30 milliLiter(s) Oral every 4 hours PRN  HYDROmorphone   Tablet 3 milliGRAM(s) Oral three times a day PRN  melatonin 3 milliGRAM(s) Oral at bedtime PRN  ondansetron Injectable 4 milliGRAM(s) IV Push every 8 hours PRN  polyethylene glycol 3350 17 Gram(s) Oral two times a day PRN      Case discussed with resident    Care discussed with pt/family

## 2024-06-04 NOTE — CONSULT NOTE ADULT - SUBJECTIVE AND OBJECTIVE BOX
[facility] INITIAL CONSULT NOTE  --------------------------------------------------------------------------------  HPI:  69-year-old with PMHx of HTN, HLD, HFpEF (70-75%) grade 2 DD, ESRD s/p kidney transplant 8 years ago at [place], chronic hep C, A-fib SP ablation preformed , brought in by ambulance with chief complaint of palpitations.  Per EMS patient's heart rate was in the 180s identified as SVT. Patient was given a total of 30 mg of adenosine by EMS without reduction in heart rate.  Patient was then given 20mg of Cardizem, 60mcg fentanyl for sciatica pain and heart rate subsequently slowed down into the 80s.  Patient was normotensive.  Patient states that this morning his heart rate was 180 and remained tachycardic throughout the day.  Patient states that he did have some left-sided chest pain this morning but that has since resolved.  He follows with Dr Henderson for cardiology.   Patient was admitted in May due to groin hematoma post Afib ablation received 2 units of blood and treated conservatively     PAST HISTORY  --------------------------------------------------------------------------------  PAST MEDICAL & SURGICAL HISTORY:  ESRD (end stage renal disease)  HTN (hypertension)  HLD (hyperlipidemia)  Atrial fibrillation on eliquis  COPD, mild  Peripheral neuropathy, unclear cause  Lymphoma  Melanoma R eye, s/p laser  Cirrhosis possibly related to hepC  Breast cancer  Legally blind  Stroke  History of heroin abuse  History of chronic hepatitis  Marijuana use  History of kidney transplant n3 years ago  S/P lumpectomy, right breast  History of back surgery  s/p Left L5-S1 endoscopic laminoforaminotomy  History of loop recorder    FAMILY HISTORY:  FH: dementia  mother, alive  FHx: breast cancer  sister ( in 30s)    SOCIAL HISTORY:  Denies current ETOH, tobacco, and illicit drug use    ALLERGIES & MEDICATIONS  --------------------------------------------------------------------------------  Allergies    Rituxan (Hives)    Standing Inpatient Medications  acetaminophen  Tablet .. 650 milliGRAM(s) Oral every 6 hours  apixaban 2.5 milliGRAM(s) Oral every 12 hours  atorvastatin 80 milliGRAM(s) Oral at bedtime  diltiazem    milliGRAM(s) Oral daily  HYDROmorphone  Injectable 1 milliGRAM(s) IV Push once  metoprolol tartrate 50 milliGRAM(s) Oral two times a day  mycophenolate mofetil 500 milliGRAM(s) Oral two times a day  oxyCODONE    IR 30 milliGRAM(s) Oral daily  pantoprazole    Tablet 40 milliGRAM(s) Oral before breakfast  senna 2 Tablet(s) Oral at bedtime  tacrolimus 1 milliGRAM(s) Oral two times a day  tamsulosin 0.4 milliGRAM(s) Oral at bedtime    PRN Inpatient Medications  aluminum hydroxide/magnesium hydroxide/simethicone Suspension 30 milliLiter(s) Oral every 4 hours PRN  HYDROmorphone   Tablet 3 milliGRAM(s) Oral three times a day PRN  melatonin 3 milliGRAM(s) Oral at bedtime PRN  ondansetron Injectable 4 milliGRAM(s) IV Push every 8 hours PRN  polyethylene glycol 3350 17 Gram(s) Oral two times a day PRN    REVIEW OF SYSTEMS  --------------------------------------------------------------------------------  Gen: No fevers/chills  Skin: No rashes  Head/Eyes/Ears/Mouth: No headache;  No sinus pain/discomfort, sore throat  Respiratory: No dyspnea, cough, wheezing, hemoptysis  CV: No chest pain, PND, orthopnea  GI: No abdominal pain, diarrhea, constipation, nausea, vomiting, melena, hematochezia  : No increased frequency, dysuria, hematuria, nocturia  All other systems were reviewed and are negative, except as noted.    VITALS/PHYSICAL EXAM  --------------------------------------------------------------------------------  T(C): 36.6 (24 @ 08:34), Max: 36.8 (24 @ 17:42)  HR: 53 (24 08:34) (53 - 181)  BP: 92/58 (24 @ 08:34) (92/58 - 138/80)  RR: 18 (24 08:34) (18 - 22)  SpO2: 98% (24 @ 08:34) (97% - 99%)  Height (cm): 175.3 (24 @ 17:42)  Weight (kg): 61.2 (24 @ 17:42)  BMI (kg/m2): 19.9 (24 @ 17:42)  BSA (m2): 1.75 (24 @ 17:42)    24 @ 07:01  -  24 @ 07:00  --------------------------------------------------------  IN: 0 mL / OUT: 450 mL / NET: -450 mL    Physical Exam:  	Gen: NAD  	Pulm: CTA B/L  	CV: RRR, S1S2  	Back: No CVA tenderness; no sacral edema  	Abd: +BS, soft, nontender/nondistended  	: No suprapubic tenderness  	UE: Warm,  no asterixis  	LE: Warm, no edema  	Neuro: AAO x3    LABS/STUDIES  --------------------------------------------------------------------------------              8.4    3.12  >-----------<  95       [24 @ 07:04]              27.5     139  |  110  |  27  ----------------------------<  114      [24 @ 07:04]  4.4   |  17  |  1.3        Ca     9.7     [24 @ 07:04]      Mg     2.5     [24 @ 19:12]    TPro  5.2  /  Alb  3.6  /  TBili  0.7  /  DBili  x   /  AST  23  /  ALT  16  /  AlkPhos  87  [24 @ 07:04]    PT/INR: PT 15.50, INR 1.36       [24 @ 18:37]  PTT: 30.0       [24 18:37]    Creatinine Trend:  SCr 1.3 [ 07:04]  SCr 1.5 [ 18:37]  SCr 2.0 [ 05:21]  SCr 2.1 [ @ 05:15]  SCr 2.5 [ 05:38]    Urinalysis - [24 @ 07:04]      Color  / Appearance  / SG  / pH       Gluc 114 / Ketone   / Bili  / Urobili        Blood  / Protein  / Leuk Est  / Nitrite       RBC  / WBC  / Hyaline  / Gran  / Sq Epi  / Non Sq Epi  / Bacteria     Iron 28, TIBC 185, %sat 15      [24 @ 05:21]  Ferritin 803      [24 @ 05:21]  HbA1c 5.1      [19 @ 15:00]  TSH 1.98      [03-15-24 @ 15:04]  Lipid: chol 84, TG 65, HDL 32, LDL --      [24 @ 07:04]    HBsAg Nonreact      [22 @ 04:30]  HCV 11.34, Reactive      [22 @ 04:30]  HIV Nonreact      [22 @ 04:30]    Immunofixation Serum:   IgM Kappa Band Identified    Reference Range: None Detected      [22 @ 04:30]  SPEP Interpretation: Gamma-Migrating Paraprotein Identified      [22 @ 04:30]  Immunofixation Urine: Reference Range: None Detected      [22 @ 12:58]  UPEP Interpretation: Normal Electrophoresis Pattern      [22 @ 12:58]  Cryoglobulin: Positive      [22 @ 04:30]

## 2024-06-04 NOTE — PHYSICAL THERAPY INITIAL EVALUATION ADULT - PERTINENT HX OF CURRENT PROBLEM, REHAB EVAL
69-year-old with PMHx of HTN, HLD, HFpEF (70-75%) grade 2 DD, ESRD s/p kidney transplant 8 years ago at [place], chronic hep C, A-fib SP ablation preformed 5/12, brought in by ambulance with chief complaint of palpitations.  Per EMS patient's heart rate was in the 180s identified as SVT. Patient was given a total of 30 mg of adenosine by EMS without reduction in heart rate.  Patient was then given 20mg of Cardizem, 60mcg fentanyl for sciatica pain and heart rate subsequently slowed down into the 80s.  Patient was normotensive.  Patient states that this morning his heart rate was 180 and remained tachycardic throughout the day.  Patient states that he did have some left-sided chest pain this morning but that has since resolved.  He follows with Dr Henderson for cardiology.   Patient was admitted in May due to groin hematoma post Afib ablation received 2 units of blood and treated conservatively

## 2024-06-04 NOTE — CONSULT NOTE ADULT - NSCONSULTADDITIONALINFOA_GEN_ALL_CORE
Confidential Drug Utilization Report  Search Terms: chito amor, 1955Search Date: 06/04/2024 09:02:06 AM  The Drug Utilization Report below displays all of the controlled substance prescriptions, if any, that your patient has filled in the last twelve months. The information displayed on this report is compiled from pharmacy submissions to the Department, and accurately reflects the information as submitted by the pharmacies.    This report was requested by: Adwoa Bennett | Reference #: [id number]    You have not added a YAYO number. Keeping your YAYO number(s) up to date on the My YAYO # tab will enable the separation of your prescriptions from others in the search results.    Practitioner Count: 1  Pharmacy Count: 2  Current Opioid Prescriptions: 1  Current Benzodiazepine Prescriptions: 0  Current Stimulant Prescriptions: 0      Patient Demographic Information (PDI)       PDI	First Name	Last Name	Birth Date	Gender	Street Address	[facility]  A	Chito Amor	1955	Male	[address] [facility]	[address]  B	Chito Amor	1955	[facility]	[address] [facility]	[address]  C	Chito Amor	1955	Male	[address] [facility]	[id number]    Prescription Information      PDI Filter:    PDI	Current Rx	Drug Type	Rx Written	Rx Dispensed	Drug	Quantity	Days Supply	Prescriber Name	Prescriber YAYO #	Payment Method	Dispenser  A	N	O	04/08/2024	04/11/2024	oxycodone hcl (ir) 15 mg tab	90	30	Jeyson Gordon	[id number]	Medicare	[facility]  A	N	O	03/11/2024	03/13/2024	oxycodone hcl (ir) 15 mg tab	90	30	GordonJeyson lawson	[id number]	Medicare	[facility]  A	N	O	02/12/2024	02/13/2024	oxycodone hcl (ir) 15 mg tab	90	30	GordonJeyson lawson	[id number]	Medicare	[facility]  A	N	O	01/15/2024	01/15/2024	oxycodone hcl (ir) 15 mg tab	90	30	GordonJeyson lawson	[id number]	Medicare	[facility]  A	N	O	12/19/2023	12/20/2023	oxycodone hcl (ir) 15 mg tab	90	30	Jeyson Gordon	[id number]	Medicare	[facility]  A	N	O	11/21/2023	11/24/2023	oxycodone hcl (ir) 15 mg tab	90	30	Jeyson Gordon	[id number]	Medicare	[facility]  A	N	O	10/18/2023	10/20/2023	oxycodone hcl (ir) 15 mg tab	90	30	Jeyson Gordon	[id number]	Medicare	[facility]

## 2024-06-04 NOTE — CONSULT NOTE ADULT - SUBJECTIVE AND OBJECTIVE BOX
Patient is a 69y old  Male who presents with a chief complaint of palpitations      HPI: 69-year-old with PMHx of HTN, HLD, HFpEF (70-75%) grade 2 DD, ESRD s/p kidney transplant 8 years ago at [facility], chronic hep C, A-fib SP ablation preformed , brought in by ambulance with chief complaint of palpitations.            PAST MEDICAL & SURGICAL HISTORY:  CKD (chronic kidney disease)      ESRD (end stage renal disease)      HTN (hypertension)      HLD (hyperlipidemia)      Atrial fibrillation  on eliquis      COPD, mild  not on O2      Peripheral neuropathy  unclear cause      Lymphoma  ?questionable, not treated, not followed      Melanoma  R eye, s/p laser      Cirrhosis  possibly related to hepC      Breast cancer      Legally blind      Stroke      History of heroin abuse      History of chronic hepatitis      Marijuana use      History of kidney transplant  3 years ago      S/P arteriovenous (AV) fistula creation  prior old fistula in R arm      S/P lumpectomy, right breast      History of back surgery  s/p Left L5-S1 endoscopic laminoforaminotomy      History of loop recorder                      PREVIOUS DIAGNOSTIC TESTING:      ECHO  FINDINGS: ACC: [id number] EXAM:  ECHO TTE WO CON COMP W DOPP                          PROCEDURE DATE:  03/15/2024          INTERPRETATION:   [place] [address]                Phone: [phone].   TRANSTHORACIC ECHOCARDIOGRAM REPORT        Patient Name:   DAVONTE CHOU Accession #: [id number]  Medical Rec #:  [id number]           Height:      68.0 in 172.7 cm  YOB: 1955           Weight: 130.0 lb 58.97 kg  Patient Age:    69 years           BSA:         1.70 m²  Patient Gender: M                  BP:          108/59 mmHg      Date of Exam:        3/15/2024 8:20:05 AM  Referring Physician: Moo Schultz  Sonographer:         Gwendolyn Berger  Reading Physician:   Guillermo Armstrong.    Procedure:   2D Echo/Doppler/Color Doppler Complete and Echocardiogram   with               Definity Contrast.  Indications: R94.31 - Abnormal Electrocardiogram [ECG] [EKG]  Diagnosis:   R94.31 - Abnormal electrocardiogram ECG/EKG        Summary:   1. Hyperdynamic global left ventricular systolic function.   2. Left ventricular ejection fraction, by visual estimation, is 70 to   75%.   3. Moderately increased LV wall thickness.   4. Spectral Doppler shows restrictive pattern of left ventricular   myocardial filling (Grade III diastolic dysfunction).   5. Elevated mean left atrial pressure.   6. Normal right ventricular size and function.   7. Severely enlarged left atrium.   8. Mildly enlarged right atrium.   9. Mild aortic regurgitation.  10. Moderate mitral valve regurgitation.  11. Mild tricuspid regurgitation.  12. Dilatation of the aortic root (measuring 3.8 cm, indexed 2.24 cm/m2)   and ascending aorta (measuring 3.8 cm, indexed 2.24 cm/m2).  13. Estimated pulmonary artery systolic pressure is 38.5 mmHg assuming a   right atrial pressure of 8 mmHg, which is consistent with borderline   pulmonary hypertension.  14. Endocardial visualization was enhanced with intravenous echo contrast.  15.Compared to the previous study 24, the findings are similar.    PHYSICIAN INTERPRETATION:  Left Ventricle: Endocardial visualization was enhanced with intravenous   echo contrast. The left ventricular internal cavity size is normal. Left   ventricular wall thickness is moderately increased. Global LV systolic   function was hyperdynamic. Left ventricular ejection fraction, by visual   estimation, is 70 to 75%. Spectral Doppler shows restrictive pattern of   left ventricular myocardial filling (Grade III diastolic dysfunction).   Elevated mean left atrial pressure.  Right Ventricle: Normal right ventricular size and function.  Left Atrium: Severely enlarged left atrium.  Right Atrium: Mildly enlarged right atrium.  Mitral Valve: Severe thickening of the anterior and posterior mitral   valve leaflets. There is severe mitral annular calcification. No evidence   of mitral stenosis. Moderate mitral valve regurgitation is seen.  Tricuspid Valve: Structurally normal tricuspid valve, with normal leaflet   excursion. Mild tricuspid regurgitation is visualized. Estimated   pulmonary artery systolic pressure is 38.5 mmHg assuming a right atrial   pressure of 8 mmHg, which is consistent with borderline pulmonary   hypertension.  Aortic Valve: No evidence of aortic stenosis. Sclerotic aortic valve with   normal opening. Mild aortic valve regurgitation is seen.  Aorta: There is dilatation of the aortic root and ascending aorta.  Venous: The inferior vena cava was normal sized, with respiratory size   variation less than 50%.  In comparison to the previous echocardiogram(s): Prior examinations are   available and were reviewed for comparison purposes. Compared to the   previous study 24, the findings are similar.      2D AND M-MODEMEASUREMENTS (normal ranges within parentheses):  Left Ventricle:                  Normal   Aorta/Left Atrium:            Normal  IVSd (2D):              1.40 cm (0.7-1.1) Aortic Root (2D):  3.80 cm   (2.4-3.7)  LVPWd (2D):             1.00 cm (0.7-1.1) Left Atrium (2D):  6.90 cm   (1.9-4.0)  LVIDd (2D):             4.80 cm (3.4-5.7)  LVIDs (2D):             3.40 cm  LV FS (2D):             29.2 %   (>25%)  Relative Wall Thickness  0.42    (<0.42)    SPECTRAL DOPPLER ANALYSIS:  LV DIASTOLIC FUNCTION:  MV Peak E: 1.36 m/s Decel Time: 179 msec  MV Peak A: 0.28 m/s  E/A Ratio: 4.81    Aortic Valve:  AoV VMax:    1.25 m/s AoV Area, Vmax:    2.05 cm² Vmax Indx:    1.20   cm²/m²  AoV VTI:     0.21 m   AoV Area, VTI:     2.22 cm² VTI Indx:     1.30   cm²/m²  AoV Pk Grad: 6.2 mmHg AoV Area, Mn Grad: 2.31 cm² Mn Grad Indx: 1.36   cm²/m²  AoV Mn Grad: 3.0 mmHg    LVOT Vmax: 0.82 m/s  LVOT VTI:  0.15 m  LVOT Diam: 2.00 cm    Mitral Valve:  MV P1/2 Time: 51.91 msec  MV Area, PHT: 4.24 cm²    Tricuspid Valve and PA/RV Systolic Pressure: TR Max Velocity: 2.76 m/s RA   Pressure: 8 mmHg RVSP/PASP: 38.5 mmHg    Pulmonic Valve:  PV Max Velocity: 0.62 m/s PV Max P.6 mmHg PV Mean P Guillermo Armstrong, Electronically signed on 3/15/2024 at 11:26:07 AM        STRESS  FINDINGS:  Impression: Moderate to large size reversible defect in the anterior wall extending   to apex of the left ventricle consistent with ischemia.    Normal left ventricular wall motion and wall thickening.     Left ventricle ejection fraction calculated as 74% which is within the   range of normal.    CATHETERIZATION  FINDINGS:   Study Date:     2024   Indications:               Sustained Ventricular Tachycardia   Positive Stress Test     Lab Visit Indication:      stable known CAD     Diagnostic Conclusions:   There is significant double vessel coronary artery disease.       ELECTROPHYSIOLOGY STUDY  FINDINGS: Study Date:     05/10/2024   AF s/p Ablation    CAROTID ULTRASOUND:  FINDINGS     VENOUS DUPLEX SCAN:  FINDINGS:   IMPRESSION:  No evidence of right upper extremity deep venous thrombosis.    Right cephalic vein is not visualized.      CHEST CT PULMONARY ANGIO with IV Contrast:  FINDINGS:     MEDICATIONS  (STANDING):  acetaminophen     Tablet .. 650 milliGRAM(s) Oral every 6 hours  apixaban 2.5 milliGRAM(s) Oral every 12 hours  atorvastatin 80 milliGRAM(s) Oral at bedtime  diltiazem    milliGRAM(s) Oral daily  HYDROmorphone  Injectable 1 milliGRAM(s) IV Push once  metoprolol tartrate 50 milliGRAM(s) Oral two times a day  mycophenolate mofetil 500 milliGRAM(s) Oral two times a day  oxyCODONE    IR 30 milliGRAM(s) Oral daily  pantoprazole    Tablet 40 milliGRAM(s) Oral before breakfast  senna 2 Tablet(s) Oral at bedtime  tacrolimus 1 milliGRAM(s) Oral two times a day  tamsulosin 0.4 milliGRAM(s) Oral at bedtime    MEDICATIONS  (PRN):  aluminum hydroxide/magnesium hydroxide/simethicone Suspension 30 milliLiter(s) Oral every 4 hours PRN Dyspepsia  HYDROmorphone   Tablet 3 milliGRAM(s) Oral three times a day PRN Severe Pain (7 - 10)  melatonin 3 milliGRAM(s) Oral at bedtime PRN Insomnia  ondansetron Injectable 4 milliGRAM(s) IV Push every 8 hours PRN Nausea and/or Vomiting  polyethylene glycol 3350 17 Gram(s) Oral two times a day PRN for constipation      FAMILY HISTORY:  FH: dementia  mother, alive    FHx: breast cancer  sister ( in 30s)        SOCIAL HISTORY:    CIGARETTES:    ALCOHOL:    Past Surgical History:    Allergies:    Rituxan (Hives)  Rifuxen (Swelling)      REVIEW OF SYSTEMS:    CONSTITUTIONAL: No fever, weight loss, chills, shakes, or fatigue  EYES: No eye pain, visual disturbances, or discharge  ENMT:  No difficulty hearing, tinnitus, vertigo; No sinus or throat pain  NECK: No pain or stiffness  BREASTS: No pain, masses, or nipple discharge  RESPIRATORY: No cough, wheezing, hemoptysis, or shortness of breath  CARDIOVASCULAR: No chest pain, dyspnea, palpitations, dizziness, syncope, paroxysmal nocturnal dyspnea, orthopnea, or arm or leg swelling  GASTROINTESTINAL: No abdominal  or epigastric pain, nausea, vomiting, hematemesis, diarrhea, constipation, melena or bright red blood.  GENITOURINARY: No dysuria, nocturia, hematuria, or urinary incontinence  NEUROLOGICAL: No headaches, memory loss, slurred speech, limb weakness, loss of strength, numbness, or tremors  SKIN: No itching, burning, rashes, or lesions   LYMPH NODES: No enlarged glands  ENDOCRINE: No heat or cold intolerance, or hair loss  MUSCULOSKELETAL: No joint pain or swelling, muscle, back, or extremity pain  PSYCHIATRIC: No depression, anxiety, or difficulty sleeping  HEME/LYMPH: No easy bruising or bleeding gums  ALLERY AND IMMUNOLOGIC: No hives or rash.      Vital Signs Last 24 Hrs  T(C): 36.6 (2024 08:34), Max: 36.8 (2024 17:42)  T(F): 97.8 (2024 08:34), Max: 98.3 (2024 17:42)  HR: 53 (2024 08:34) (53 - 181)  BP: 92/58 (2024 08:34) (92/58 - 138/80)  BP(mean): 99 (2024 00:09) (99 - 99)  RR: 18 (2024 08:34) (18 - 22)  SpO2: 98% (2024 08:34) (97% - 99%)    Parameters below as of 2024 08:34  Patient On (Oxygen Delivery Method): room air        PHYSICAL EXAM:        GENERAL: In no apparent distress, well nourished, and hydrated.  HEAD:  Atraumatic, Normocephalic  EYES: EOMI, PERRLA, conjunctiva and sclera clear  ENMT: No tonsillar erythema, exudates, or enlargements; ist mucous membranes, Good dentition, No lesions  NECK: Supple and normal thyroid.  No JVD or carotid bruit.  Carotid pulse is 2+ bilaterally.  HEART: Regular rate and rhythm; No murmurs, rubs, or gallops.  PULMONARY: Clear to auscultation and perfusion.  No rales, wheezing, or rhonchi bilaterally.  ABDOMEN: Soft, Nontender, Nondistended; Bowel sounds present  EXTREMITIES:  2+ Peripheral Pulses, No clubbing, cyanosis, or edema  LYMPH: No lymphadenopathy noted  NEUROLOGICAL: Grossly nonfocal      INTERPRETATION OF TELEMETRY:    ECG:    I&O's Detail    2024 07:01  -  2024 07:00  --------------------------------------------------------  IN:  Total IN: 0 mL    OUT:    Voided (mL): 450 mL  Total OUT: 450 mL    Total NET: -450 mL          LABS:                        8.4    3.12  )-----------( 95       ( 2024 07:04 )             27.5     06-04    139  |  110  |  27<H>  ----------------------------<  114<H>  4.4   |  17  |  1.3    Ca    9.7      2024 07:04  Mg     2.5     06-03    TPro  5.2<L>  /  Alb  3.6  /  TBili  0.7  /  DBili  x   /  AST  23  /  ALT  16  /  AlkPhos  87  06-04        PT/INR - ( 2024 18:37 )   PT: 15.50 sec;   INR: 1.36 ratio         PTT - ( 2024 18:37 )  PTT:30.0 sec  Urinalysis Basic - ( 2024 07:04 )    Color: x / Appearance: x / SG: x / pH: x  Gluc: 114 mg/dL / Ketone: x  / Bili: x / Urobili: x   Blood: x / Protein: x / Nitrite: x   Leuk Esterase: x / RBC: x / WBC x   Sq Epi: x / Non Sq Epi: x / Bacteria: x      BNP  I&O's Detail    2024 07:01  -  2024 07:00  --------------------------------------------------------  IN:  Total IN: 0 mL    OUT:    Voided (mL): 450 mL  Total OUT: 450 mL    Total NET: -450 mL        Daily Height in cm: 175.26 (2024 17:42)    Daily Weight in k.9 (2024 04:48)    RADIOLOGY & ADDITIONAL STUDIES: Patient is a 69y old  Male who presents with a chief complaint of palpitations      HPI: 69-year-old with PMHx of HTN, HLD, HFpEF (70-75%) grade 2 DD, ESRD s/p kidney transplant 8 years ago at [place], chronic hep C, A-fib SP ablation preformed , brought in by ambulance with chief complaint of palpitations.            PAST MEDICAL & SURGICAL HISTORY:  CKD (chronic kidney disease)  ESRD (end stage renal disease)  HTN (hypertension)  HLD (hyperlipidemia)  Atrial fibrillation  COPD  Peripheral neuropathy  Lymphoma  Melanoma  R eye, s/p laser  Cirrhosis  Breast cancer S/P lumpectomy, right breast  Legally blind  Stroke  History of heroin abuse  History of chronic hepatitis  Marijuana use  History of kidney transplant  3 years ago  S/P arteriovenous (AV) fistula creation  prior old fistula in R arm  History of back surgery  s/p Left L5-S1 endoscopic laminoforaminotomy  History of loop recorder      PREVIOUS DIAGNOSTIC TESTING:      ECHO  FINDINGS: ACC: [id number] EXAM:  ECHO TTE WO CON COMP W DOPP                          PROCEDURE DATE:  03/15/2024          INTERPRETATION:   [place] [address]                Phone: [phone].   TRANSTHORACIC ECHOCARDIOGRAM REPORT        Patient Name:   DAVONTE CHOU Accession #: [id number]  Medical Rec #:  [id number]           Height:      68.0 in 172.7 cm  YOB: 1955           Weight: 130.0 lb 58.97 kg  Patient Age:    69 years           BSA:         1.70 m²  Patient Gender: M                  BP:          108/59 mmHg      Date of Exam:        3/15/2024 8:20:05 AM  Referring Physician: Moo Schultz  Sonographer:         Gwendolyn Berger  Reading Physician:   Guillermo Armstrong.    Procedure:   2D Echo/Doppler/Color Doppler Complete and Echocardiogram   with               Definity Contrast.  Indications: R94.31 - Abnormal Electrocardiogram [ECG] [EKG]  Diagnosis:   R94.31 - Abnormal electrocardiogram ECG/EKG        Summary:   1. Hyperdynamic global left ventricular systolic function.   2. Left ventricular ejection fraction, by visual estimation, is 70 to   75%.   3. Moderately increased LV wall thickness.   4. Spectral Doppler shows restrictive pattern of left ventricular   myocardial filling (Grade III diastolic dysfunction).   5. Elevated mean left atrial pressure.   6. Normal right ventricular size and function.   7. Severely enlarged left atrium.   8. Mildly enlarged right atrium.   9. Mild aortic regurgitation.  10. Moderate mitral valve regurgitation.  11. Mild tricuspid regurgitation.  12. Dilatation of the aortic root (measuring 3.8 cm, indexed 2.24 cm/m2)   and ascending aorta (measuring 3.8 cm, indexed 2.24 cm/m2).  13. Estimated pulmonary artery systolic pressure is 38.5 mmHg assuming a   right atrial pressure of 8 mmHg, which is consistent with borderline   pulmonary hypertension.  14. Endocardial visualization was enhanced with intravenous echo contrast.  15.Compared to the previous study 24, the findings are similar.    PHYSICIAN INTERPRETATION:  Left Ventricle: Endocardial visualization was enhanced with intravenous   echo contrast. The left ventricular internal cavity size is normal. Left   ventricular wall thickness is moderately increased. Global LV systolic   function was hyperdynamic. Left ventricular ejection fraction, by visual   estimation, is 70 to 75%. Spectral Doppler shows restrictive pattern of   left ventricular myocardial filling (Grade III diastolic dysfunction).   Elevated mean left atrial pressure.  Right Ventricle: Normal right ventricular size and function.  Left Atrium: Severely enlarged left atrium.  Right Atrium: Mildly enlarged right atrium.  Mitral Valve: Severe thickening of the anterior and posterior mitral   valve leaflets. There is severe mitral annular calcification. No evidence   of mitral stenosis. Moderate mitral valve regurgitation is seen.  Tricuspid Valve: Structurally normal tricuspid valve, with normal leaflet   excursion. Mild tricuspid regurgitation is visualized. Estimated   pulmonary artery systolic pressure is 38.5 mmHg assuming a right atrial   pressure of 8 mmHg, which is consistent with borderline pulmonary   hypertension.  Aortic Valve: No evidence of aortic stenosis. Sclerotic aortic valve with   normal opening. Mild aortic valve regurgitation is seen.  Aorta: There is dilatation of the aortic root and ascending aorta.  Venous: The inferior vena cava was normal sized, with respiratory size   variation less than 50%.  In comparison to the previous echocardiogram(s): Prior examinations are   available and were reviewed for comparison purposes. Compared to the   previous study 24, the findings are similar.      2D AND M-MODEMEASUREMENTS (normal ranges within parentheses):  Left Ventricle:                  Normal   Aorta/Left Atrium:            Normal  IVSd (2D):              1.40 cm (0.7-1.1) Aortic Root (2D):  3.80 cm   (2.4-3.7)  LVPWd (2D):             1.00 cm (0.7-1.1) Left Atrium (2D):  6.90 cm   (1.9-4.0)  LVIDd (2D):             4.80 cm (3.4-5.7)  LVIDs (2D):             3.40 cm  LV FS (2D):             29.2 %   (>25%)  Relative Wall Thickness  0.42    (<0.42)    SPECTRAL DOPPLER ANALYSIS:  LV DIASTOLIC FUNCTION:  MV Peak E: 1.36 m/s Decel Time: 179 msec  MV Peak A: 0.28 m/s  E/A Ratio: 4.81    Aortic Valve:  AoV VMax:    1.25 m/s AoV Area, Vmax:    2.05 cm² Vmax Indx:    1.20   cm²/m²  AoV VTI:     0.21 m   AoV Area, VTI:     2.22 cm² VTI Indx:     1.30   cm²/m²  AoV Pk Grad: 6.2 mmHg AoV Area, Mn Grad: 2.31 cm² Mn Grad Indx: 1.36   cm²/m²  AoV Mn Grad: 3.0 mmHg    LVOT Vmax: 0.82 m/s  LVOT VTI:  0.15 m  LVOT Diam: 2.00 cm    Mitral Valve:  MV P1/2 Time: 51.91 msec  MV Area, PHT: 4.24 cm²    Tricuspid Valve and PA/RV Systolic Pressure: TR Max Velocity: 2.76 m/s RA   Pressure: 8 mmHg RVSP/PASP: 38.5 mmHg    Pulmonic Valve:  PV Max Velocity: 0.62 m/s PV Max P.6 mmHg PV Mean P Guillermo Armstrong, Electronically signed on 3/15/2024 at 11:26:07 AM        STRESS  FINDINGS:  Impression: Moderate to large size reversible defect in the anterior wall extending   to apex of the left ventricle consistent with ischemia.    Normal left ventricular wall motion and wall thickening.     Left ventricle ejection fraction calculated as 74% which is within the   range of normal.    CATHETERIZATION  FINDINGS:   Study Date:     2024   Indications:               Sustained Ventricular Tachycardia   Positive Stress Test     Lab Visit Indication:      stable known CAD     Diagnostic Conclusions:   There is significant double vessel coronary artery disease.       ELECTROPHYSIOLOGY STUDY  FINDINGS: Study Date:     05/10/2024   AF s/p Ablation    CAROTID ULTRASOUND:  FINDINGS     VENOUS DUPLEX SCAN:  FINDINGS:   IMPRESSION:  No evidence of right upper extremity deep venous thrombosis.    Right cephalic vein is not visualized.      CHEST CT PULMONARY ANGIO with IV Contrast:  FINDINGS:     MEDICATIONS  (STANDING):  acetaminophen     Tablet .. 650 milliGRAM(s) Oral every 6 hours  apixaban 2.5 milliGRAM(s) Oral every 12 hours  atorvastatin 80 milliGRAM(s) Oral at bedtime  diltiazem    milliGRAM(s) Oral daily  HYDROmorphone  Injectable 1 milliGRAM(s) IV Push once  metoprolol tartrate 50 milliGRAM(s) Oral two times a day  mycophenolate mofetil 500 milliGRAM(s) Oral two times a day  oxyCODONE    IR 30 milliGRAM(s) Oral daily  pantoprazole    Tablet 40 milliGRAM(s) Oral before breakfast  senna 2 Tablet(s) Oral at bedtime  tacrolimus 1 milliGRAM(s) Oral two times a day  tamsulosin 0.4 milliGRAM(s) Oral at bedtime    MEDICATIONS  (PRN):  aluminum hydroxide/magnesium hydroxide/simethicone Suspension 30 milliLiter(s) Oral every 4 hours PRN Dyspepsia  HYDROmorphone   Tablet 3 milliGRAM(s) Oral three times a day PRN Severe Pain (7 - 10)  melatonin 3 milliGRAM(s) Oral at bedtime PRN Insomnia  ondansetron Injectable 4 milliGRAM(s) IV Push every 8 hours PRN Nausea and/or Vomiting  polyethylene glycol 3350 17 Gram(s) Oral two times a day PRN for constipation      FAMILY HISTORY:  FH: dementia  mother, alive    FHx: breast cancer  sister ( in 30s)        SOCIAL HISTORY:    CIGARETTES:    ALCOHOL:    Past Surgical History:    Allergies:    Rituxan (Hives)  Rifuxen (Swelling)      REVIEW OF SYSTEMS:  12 pt ROS (-)  Left thigh hematoma      Vital Signs Last 24 Hrs  T(C): 36.6 (2024 08:34), Max: 36.8 (2024 17:42)  T(F): 97.8 (2024 08:34), Max: 98.3 (2024 17:42)  HR: 53 (2024 08:34) (53 - 181)  BP: 92/58 (2024 08:34) (92/58 - 138/80)  BP(mean): 99 (2024 00:09) (99 - 99)  RR: 18 (2024 08:34) (18 - 22)  SpO2: 98% (2024 08:34) (97% - 99%)    Parameters below as of 2024 08:34  Patient On (Oxygen Delivery Method): room air        PHYSICAL EXAM:        GENERAL: In no apparent distress,   HEART: Regular rate and rhythm  PULMONARY: Clear to auscultation and perfusion.  No rales, wheezing, or rhonchi bilaterally.  EXTREMITIES:  2+ Peripheral Pulses, No clubbing, cyanosis, or edema- left thigh hematoma  NEUROLOGICAL: Grossly nonfocal      INTERPRETATION OF TELEMETRY: NSR    ECG: Ventricular Rate 77 BPM    Atrial Rate 77 BPM    P-R Interval 144 ms    QRS Duration 86 ms    Q-T Interval 398 ms    QTC Calculation(Bazett) 450 ms    P Axis 111 degrees    R Axis 139 degrees    T Axis 77 degrees    Diagnosis Line *** Suspect arm lead reversal, interpretation assumes no reversal  Normal sinus rhythm  Biventricular hypertrophy  Lateral infarct , age undetermined  Abnormal ECG  Artifact on tracing  Confirmed by LAURA FRANK MD ([id number]) on 2024 10:43:55 AM    I&O's Detail    2024 07:01  -  2024 07:00  --------------------------------------------------------  IN:  Total IN: 0 mL    OUT:    Voided (mL): 450 mL  Total OUT: 450 mL    Total NET: -450 mL          LABS:                        8.4    3.12  )-----------( 95       ( 2024 07:04 )             27.5     06-04    139  |  110  |  27<H>  ----------------------------<  114<H>  4.4   |  17  |  1.3    Ca    9.7      2024 07:04  Mg     2.5     06-03    TPro  5.2<L>  /  Alb  3.6  /  TBili  0.7  /  DBili  x   /  AST  23  /  ALT  16  /  AlkPhos  87  06-04        PT/INR - ( 2024 18:37 )   PT: 15.50 sec;   INR: 1.36 ratio         PTT - ( 2024 18:37 )  PTT:30.0 sec  Urinalysis Basic - ( 2024 07:04 )    Color: x / Appearance: x / SG: x / pH: x  Gluc: 114 mg/dL / Ketone: x  / Bili: x / Urobili: x   Blood: x / Protein: x / Nitrite: x   Leuk Esterase: x / RBC: x / WBC x   Sq Epi: x / Non Sq Epi: x / Bacteria: x      BNP  I&O's Detail    2024 07:01  -  2024 07:00  --------------------------------------------------------  IN:  Total IN: 0 mL    OUT:    Voided (mL): 450 mL  Total OUT: 450 mL    Total NET: -450 mL        Daily Height in cm: 175.26 (2024 17:42)    Daily Weight in k.9 (2024 04:48)    RADIOLOGY & ADDITIONAL STUDIES: Patient is a 69y old  Male who presents with a chief complaint of palpitations      HPI: 69-year-old with PMHx of HTN, HLD, HFpEF (70-75%) grade 2 DD, ESRD s/p kidney transplant 8 years ago at [place], chronic hep C, A-fib SP ablation preformed , brought in by ambulance with chief complaint of palpitations.            PAST MEDICAL & SURGICAL HISTORY:  CKD (chronic kidney disease)  ESRD (end stage renal disease)  HTN (hypertension)  HLD (hyperlipidemia)  Atrial fibrillation  COPD  Peripheral neuropathy  Lymphoma  Melanoma  R eye, s/p laser  Cirrhosis  Breast cancer S/P lumpectomy, right breast  Legally blind  Stroke  History of heroin abuse  History of chronic hepatitis  Marijuana use  History of kidney transplant  3 years ago  S/P arteriovenous (AV) fistula creation  prior old fistula in R arm  History of back surgery  s/p Left L5-S1 endoscopic laminoforaminotomy  History of loop recorder      PREVIOUS DIAGNOSTIC TESTING:      ECHO  FINDINGS: ACC: [id number] EXAM:  ECHO TTE WO CON COMP W DOPP                          PROCEDURE DATE:  03/15/2024          INTERPRETATION:   [place] [address]                Phone: [phone].   TRANSTHORACIC ECHOCARDIOGRAM REPORT        Patient Name:   DAVONTE CHOU Accession #: [id number]  Medical Rec #:  [id number]           Height:      68.0 in 172.7 cm  YOB: 1955           Weight: 130.0 lb 58.97 kg  Patient Age:    69 years           BSA:         1.70 m²  Patient Gender: M                  BP:          108/59 mmHg      Date of Exam:        3/15/2024 8:20:05 AM  Referring Physician: Moo Schultz  Sonographer:         Gwendolyn Berger  Reading Physician:   Guillermo Armstrong.    Procedure:   2D Echo/Doppler/Color Doppler Complete and Echocardiogram   with               Definity Contrast.  Indications: R94.31 - Abnormal Electrocardiogram [ECG] [EKG]  Diagnosis:   R94.31 - Abnormal electrocardiogram ECG/EKG        Summary:   1. Hyperdynamic global left ventricular systolic function.   2. Left ventricular ejection fraction, by visual estimation, is 70 to   75%.   3. Moderately increased LV wall thickness.   4. Spectral Doppler shows restrictive pattern of left ventricular   myocardial filling (Grade III diastolic dysfunction).   5. Elevated mean left atrial pressure.   6. Normal right ventricular size and function.   7. Severely enlarged left atrium.   8. Mildly enlarged right atrium.   9. Mild aortic regurgitation.  10. Moderate mitral valve regurgitation.  11. Mild tricuspid regurgitation.  12. Dilatation of the aortic root (measuring 3.8 cm, indexed 2.24 cm/m2)   and ascending aorta (measuring 3.8 cm, indexed 2.24 cm/m2).  13. Estimated pulmonary artery systolic pressure is 38.5 mmHg assuming a   right atrial pressure of 8 mmHg, which is consistent with borderline   pulmonary hypertension.  14. Endocardial visualization was enhanced with intravenous echo contrast.  15.Compared to the previous study 24, the findings are similar.    PHYSICIAN INTERPRETATION:  Left Ventricle: Endocardial visualization was enhanced with intravenous   echo contrast. The left ventricular internal cavity size is normal. Left   ventricular wall thickness is moderately increased. Global LV systolic   function was hyperdynamic. Left ventricular ejection fraction, by visual   estimation, is 70 to 75%. Spectral Doppler shows restrictive pattern of   left ventricular myocardial filling (Grade III diastolic dysfunction).   Elevated mean left atrial pressure.  Right Ventricle: Normal right ventricular size and function.  Left Atrium: Severely enlarged left atrium.  Right Atrium: Mildly enlarged right atrium.  Mitral Valve: Severe thickening of the anterior and posterior mitral   valve leaflets. There is severe mitral annular calcification. No evidence   of mitral stenosis. Moderate mitral valve regurgitation is seen.  Tricuspid Valve: Structurally normal tricuspid valve, with normal leaflet   excursion. Mild tricuspid regurgitation is visualized. Estimated   pulmonary artery systolic pressure is 38.5 mmHg assuming a right atrial   pressure of 8 mmHg, which is consistent with borderline pulmonary   hypertension.  Aortic Valve: No evidence of aortic stenosis. Sclerotic aortic valve with   normal opening. Mild aortic valve regurgitation is seen.  Aorta: There is dilatation of the aortic root and ascending aorta.  Venous: The inferior vena cava was normal sized, with respiratory size   variation less than 50%.  In comparison to the previous echocardiogram(s): Prior examinations are   available and were reviewed for comparison purposes. Compared to the   previous study 24, the findings are similar.      2D AND M-MODEMEASUREMENTS (normal ranges within parentheses):  Left Ventricle:                  Normal   Aorta/Left Atrium:            Normal  IVSd (2D):              1.40 cm (0.7-1.1) Aortic Root (2D):  3.80 cm   (2.4-3.7)  LVPWd (2D):             1.00 cm (0.7-1.1) Left Atrium (2D):  6.90 cm   (1.9-4.0)  LVIDd (2D):             4.80 cm (3.4-5.7)  LVIDs (2D):             3.40 cm  LV FS (2D):             29.2 %   (>25%)  Relative Wall Thickness  0.42    (<0.42)    SPECTRAL DOPPLER ANALYSIS:  LV DIASTOLIC FUNCTION:  MV Peak E: 1.36 m/s Decel Time: 179 msec  MV Peak A: 0.28 m/s  E/A Ratio: 4.81    Aortic Valve:  AoV VMax:    1.25 m/s AoV Area, Vmax:    2.05 cm² Vmax Indx:    1.20   cm²/m²  AoV VTI:     0.21 m   AoV Area, VTI:     2.22 cm² VTI Indx:     1.30   cm²/m²  AoV Pk Grad: 6.2 mmHg AoV Area, Mn Grad: 2.31 cm² Mn Grad Indx: 1.36   cm²/m²  AoV Mn Grad: 3.0 mmHg    LVOT Vmax: 0.82 m/s  LVOT VTI:  0.15 m  LVOT Diam: 2.00 cm    Mitral Valve:  MV P1/2 Time: 51.91 msec  MV Area, PHT: 4.24 cm²    Tricuspid Valve and PA/RV Systolic Pressure: TR Max Velocity: 2.76 m/s RA   Pressure: 8 mmHg RVSP/PASP: 38.5 mmHg    Pulmonic Valve:  PV Max Velocity: 0.62 m/s PV Max P.6 mmHg PV Mean P Guillermo Armstrong, Electronically signed on 3/15/2024 at 11:26:07 AM        STRESS  FINDINGS:  Impression: Moderate to large size reversible defect in the anterior wall extending   to apex of the left ventricle consistent with ischemia.    Normal left ventricular wall motion and wall thickening.     Left ventricle ejection fraction calculated as 74% which is within the   range of normal.    CATHETERIZATION  FINDINGS:   Study Date:     2024   Indications:               Sustained Ventricular Tachycardia   Positive Stress Test     Lab Visit Indication:      stable known CAD     Diagnostic Conclusions:   There is significant double vessel coronary artery disease.       ELECTROPHYSIOLOGY STUDY  FINDINGS: Study Date:     05/10/2024   AF s/p Ablation    CAROTID ULTRASOUND:  FINDINGS     VENOUS DUPLEX SCAN:  FINDINGS:   IMPRESSION:  No evidence of right upper extremity deep venous thrombosis.    Right cephalic vein is not visualized.      CHEST CT PULMONARY ANGIO with IV Contrast:  FINDINGS:     MEDICATIONS  (STANDING):  acetaminophen     Tablet .. 650 milliGRAM(s) Oral every 6 hours  apixaban 2.5 milliGRAM(s) Oral every 12 hours  atorvastatin 80 milliGRAM(s) Oral at bedtime  diltiazem    milliGRAM(s) Oral daily  HYDROmorphone  Injectable 1 milliGRAM(s) IV Push once  metoprolol tartrate 50 milliGRAM(s) Oral two times a day  mycophenolate mofetil 500 milliGRAM(s) Oral two times a day  oxyCODONE    IR 30 milliGRAM(s) Oral daily  pantoprazole    Tablet 40 milliGRAM(s) Oral before breakfast  senna 2 Tablet(s) Oral at bedtime  tacrolimus 1 milliGRAM(s) Oral two times a day  tamsulosin 0.4 milliGRAM(s) Oral at bedtime    MEDICATIONS  (PRN):  aluminum hydroxide/magnesium hydroxide/simethicone Suspension 30 milliLiter(s) Oral every 4 hours PRN Dyspepsia  HYDROmorphone   Tablet 3 milliGRAM(s) Oral three times a day PRN Severe Pain (7 - 10)  melatonin 3 milliGRAM(s) Oral at bedtime PRN Insomnia  ondansetron Injectable 4 milliGRAM(s) IV Push every 8 hours PRN Nausea and/or Vomiting  polyethylene glycol 3350 17 Gram(s) Oral two times a day PRN for constipation      FAMILY HISTORY:  FH: dementia  mother, alive    FHx: breast cancer  sister ( in 30s)        SOCIAL HISTORY:    CIGARETTES:    ALCOHOL:    Past Surgical History:    Allergies:    Rituxan (Hives)  Rifuxen (Swelling)      REVIEW OF SYSTEMS:  12 pt ROS (-)  Left thigh hematoma      Vital Signs Last 24 Hrs  T(C): 36.6 (2024 08:34), Max: 36.8 (2024 17:42)  T(F): 97.8 (2024 08:34), Max: 98.3 (2024 17:42)  HR: 53 (2024 08:34) (53 - 181)  BP: 92/58 (2024 08:34) (92/58 - 138/80)  BP(mean): 99 (2024 00:09) (99 - 99)  RR: 18 (2024 08:34) (18 - 22)  SpO2: 98% (2024 08:34) (97% - 99%)    Parameters below as of 2024 08:34  Patient On (Oxygen Delivery Method): room air        PHYSICAL EXAM:        GENERAL: In no apparent distress,   HEART: Regular rate and rhythm  PULMONARY: Clear to auscultation and perfusion.  No rales, wheezing, or rhonchi bilaterally.  EXTREMITIES:  2+ Peripheral Pulses, No clubbing, cyanosis, or edema- left thigh hematoma  NEUROLOGICAL: Grossly nonfocal      INTERPRETATION OF TELEMETRY: NSR    ECG: Ventricular Rate 77 BPM    Atrial Rate 77 BPM    P-R Interval 144 ms    QRS Duration 86 ms    Q-T Interval 398 ms    QTC Calculation(Bazett) 450 ms    P Axis 111 degrees    R Axis 139 degrees    T Axis 77 degrees    Diagnosis Line *** Suspect arm lead reversal, interpretation assumes no reversal  Normal sinus rhythm  Biventricular hypertrophy  Lateral infarct , age undetermined  Abnormal ECG  Artifact on tracing  Confirmed by LAURA FRANK MD ([id number]) on 2024 10:43:55 AM    I&O's Detail    2024 07:01  -  2024 07:00  --------------------------------------------------------  IN:  Total IN: 0 mL    OUT:    Voided (mL): 450 mL  Total OUT: 450 mL    Total NET: -450 mL          LABS:                        8.4    3.12  )-----------( 95       ( 2024 07:04 )             27.5     06-04    139  |  110  |  27<H>  ----------------------------<  114<H>  4.4   |  17  |  1.3    Ca    9.7      2024 07:04  Mg     2.5     06-03    TPro  5.2<L>  /  Alb  3.6  /  TBili  0.7  /  DBili  x   /  AST  23  /  ALT  16  /  AlkPhos  87  06-04        PT/INR - ( 2024 18:37 )   PT: 15.50 sec;   INR: 1.36 ratio         PTT - ( 2024 18:37 )  PTT:30.0 sec  Urinalysis Basic - ( 2024 07:04 )    Color: x / Appearance: x / SG: x / pH: x  Gluc: 114 mg/dL / Ketone: x  / Bili: x / Urobili: x   Blood: x / Protein: x / Nitrite: x   Leuk Esterase: x / RBC: x / WBC x   Sq Epi: x / Non Sq Epi: x / Bacteria: x      BNP  I&O's Detail    2024 07:01  -  2024 07:00  --------------------------------------------------------  IN:  Total IN: 0 mL    OUT:    Voided (mL): 450 mL  Total OUT: 450 mL    Total NET: -450 mL        Daily Height in cm: 175.26 (2024 17:42)    Daily Weight in k.9 (2024 04:48)    RADIOLOGY & ADDITIONAL STUDIES:

## 2024-06-04 NOTE — PHYSICAL THERAPY INITIAL EVALUATION ADULT - GENERAL OBSERVATIONS, REHAB EVAL
Pt seen from [id number]. Pt encountered in the bed, +tele, c/o pain in lower back radiating to L LE, agreeable for b/s PT.

## 2024-06-04 NOTE — PHYSICAL THERAPY INITIAL EVALUATION ADULT - GAIT TRAINING, PT EVAL
Increase amb to 200ft Independent with RW by d/c                               Stairs: 2 steps supervision with 1HR by d/c

## 2024-06-04 NOTE — CONSULT NOTE ADULT - ASSESSMENT
A/P: Patient is a 69F with PMH of HTN, HLD, HFpEF (70-75%) grade 2 DD, ESRD s/p kidney transplant 8 years ago at [facility], chronic hep C, A-fib SP ablation preformed 5/12, brought in by ambulance with chief complaint of palpitations.   Patient endorsing 8/10 low back pain L > R. Patient also endorses pain down the posterior left leg to anterior knee. Back pain not elicited during straight leg test. Patient denies any confusion, nausea, lethargy, or itching.    #Low back pain  #Lumbar radiculitis  - Change Tylenol 976mg q8hr standing  - Make Oxycodone 15mg q8hr (home dose)  - Change Hydromorphone 3mg PO TID to Hydromorphone 0.5mg IV q6hr PRN for break through pain  - Start Methocarbamol 750mg q8hr standing  - Start Gabapentin 300mg qHS  - Start Duloxetine 30mg daily   - Lidocaine 4% patch  - Encourage PT/ambulation    #opioid induced constipation  - bowel regimen as per primary team

## 2024-06-04 NOTE — PHYSICAL THERAPY INITIAL EVALUATION ADULT - LEVEL OF INDEPENDENCE: STAIR NEGOTIATION, REHAB EVAL
2* to radiating pain from lower back to L LE at this time. Will assess when appropriate./unable to perform

## 2024-06-04 NOTE — PATIENT PROFILE ADULT - NSPRESCRALCFREQ_GEN_A_NUR
All reflexes are absent on no sedation. Restraints removed.    2 amps bicarb given and 2g calcium given. Able to back off on some pressors (see MAR).     Distal port on CVC does not draw or flush. Attempted to tPA with no success. Medial port becoming difficult to draw and flush regardless of fluid running into port. Proximal port is WDL at this time.      NG remains with dark bilious/black output. Pt did have a large loose stool with no s/s bleeding.     Remains with no UO.    Lytes supplemented per [facility] protocol. Machine running appropriately. +100/hr 19-05. At 05, net loss able to be zero as BP has become more stable.    Skin becoming increasingly mottled and jaundice. Scrotum with new deep purple and scabbed appearance.              Never

## 2024-06-04 NOTE — PATIENT PROFILE ADULT - FALL HARM RISK - HARM RISK INTERVENTIONS

## 2024-06-04 NOTE — CONSULT NOTE ADULT - ASSESSMENT
HPI: 69-year-old with PMHx of HTN, HLD, HFpEF (70-75%) grade 2 DD, ESRD s/p kidney transplant 8 years ago at [place], chronic hep C, A-fib SP ablation preformed 5/12, brought in by ambulance with chief complaint of palpitations.      Impression:  Plan: Possible PPM this admission- will check loop recorder HPI: 69-year-old with PMHx of HTN, HLD, HFpEF (70-75%) grade 2 DD, ESRD s/p kidney transplant 8 years ago at [place], chronic hep C, A-fib SP ablation preformed 5/12, brought in by ambulance with chief complaint of palpitations.      Impression:  CKD (chronic kidney disease)  ESRD (end stage renal disease)  HTN (hypertension)  HLD (hyperlipidemia)  Atrial fibrillation s/p ablation last admission  COPD  Peripheral neuropathy  Lymphoma  Melanoma  R eye, s/p laser  Cirrhosis  Breast cancer S/P lumpectomy, right breast  Legally blind  Stroke  History of heroin abuse  History of chronic hepatitis  Marijuana use  History of kidney transplant  3 years ago  S/P arteriovenous (AV) fistula creation  prior old fistula in R arm  History of back surgery  s/p Left L5-S1 endoscopic laminoforaminotomy  History of loop recorder    Plan:   -Continue telemetry  -Check EKG  -Possible PPM this admission- will check loop recorder HPI: 69-year-old with PMHx of HTN, HLD, HFpEF (70-75%) grade 2 DD, ESRD s/p kidney transplant 8 years ago at [place], chronic hep C, A-fib SP ablation preformed 5/12, brought in by ambulance with chief complaint of palpitations.      Patient complains of sciatica and was seen by pain management    Impression:  CKD (chronic kidney disease)  ESRD (end stage renal disease)  HTN (hypertension)  HLD (hyperlipidemia)  Atrial fibrillation s/p ablation last admission  COPD  Peripheral neuropathy  Lymphoma  Melanoma  R eye, s/p laser  Cirrhosis  Breast cancer S/P lumpectomy, right breast  Legally blind  Stroke  History of heroin abuse  History of chronic hepatitis  Marijuana use  History of kidney transplant  3 years ago  S/P arteriovenous (AV) fistula creation  prior old fistula in R arm  History of back surgery  s/p Left L5-S1 endoscopic laminoforaminotomy  History of loop recorder    Plan:   -Continue telemetry  -Monitor left thigh hematoma  -Check EKG  -Possible PPM this admission- will check loop recorder

## 2024-06-04 NOTE — PROGRESS NOTE ADULT - SUBJECTIVE AND OBJECTIVE BOX
----------Daily Progress Note----------    HISTORY OF PRESENT ILLNESS:  Patient is a  70 yo M w/PMH of HTN, HLD, HFpEF (70-75%) grade 2 DD, ESRD s/p kidney transplant 8 years ago at [facility], chronic hep C, and A-fib SP ablation performed on 5/12, now presenting for palpitations w/HR in 180s identified as SVT as per EMS w/total of 30 mg of adenosine and 20 mg of cardizem given, and 60 mcg of fentanyl given for sciatic pain, with HR subsequently decreasing into the 80s. Patient was normotensive. Patient reported left-sided chest pain in the morning that resolved w/tachycardia into the 180s that remained throughout the day. He denies any fevers, dyspnea, abdominal pain, nausea, diarrhea, or dysuria. He does endorse sciatic pain.    In the ED, vitals were stable, labs notable for trop 113 -> 154, pBNP 20654, Cr 1.5 (last known was 2.0), and Hgb 8.5 (around baseline), EKG notable for new deep symmetrical T wave anterio-inferior in the setting of LVH (Strain pattern?) with elevation in aVR, and CXR unremarkable.    He is now admitted to telemetry for work up and monitoring of SVT.     Cardiologist: Dr. Henderson    Today is hospital day 1d.     INTERVAL HOSPITAL COURSE / OVERNIGHT EVENTS:  O/N events:  Patient into into SVT with retro P waves seen on EKG; Vitals:  /80 SaO2 100% RA; No crackles on exam  Intervention: Adenosine 6mg then adenosine 12mg given terminated AVRNT for 30secs; Maintained with Cardizem push 20 then 10mg into sinus 70bpm    Given Cardizem PO stat for maintenance, if recurs will load amiodarone.    24 hr events:      Patient was examined and seen at bedside.    Review of Systems: Otherwise unremarkable     <<<<<PAST MEDICAL & SURGICAL HISTORY>>>>>  CKD (chronic kidney disease)    ESRD (end stage renal disease)    HTN (hypertension)    HLD (hyperlipidemia)    Atrial fibrillation  on eliquis    COPD, mild  not on O2    Peripheral neuropathy  unclear cause    Lymphoma  ?questionable, not treated, not followed    Melanoma  R eye, s/p laser    Cirrhosis  possibly related to hepC    Breast cancer    Legally blind    Stroke    History of heroin abuse    History of chronic hepatitis    Marijuana use    History of kidney transplant  3 years ago    S/P arteriovenous (AV) fistula creation  prior old fistula in R arm    S/P lumpectomy, right breast    History of back surgery  s/p Left L5-S1 endoscopic laminoforaminotomy    History of loop recorder      ALLERGIES  Rituxan (Hives)  Rifuxen (Swelling)      Home Medications:  acetaminophen 325 mg oral tablet: 2 tab(s) orally every 6 hours As needed Mild Pain (1 - 3), Moderate Pain (4 - 6) (18 May 2024 14:44)  Flomax 0.4 mg oral capsule: 1 cap(s) orally 2 times a day (10 May 2024 11:00)  mycophenolate mofetil 500 mg oral tablet: 2 tab(s) orally once a day (10 May 2024 11:00)  oxyCODONE 15 mg oral tablet: 1 tab(s) orally every 8 hours As needed Severe Pain (7 - 10) (10 May 2024 11:00)  senna leaf extract oral tablet: 2 tab(s) orally once a day (at bedtime) (18 May 2024 14:44)        MEDICATIONS  STANDING MEDICATIONS  acetaminophen     Tablet .. 650 milliGRAM(s) Oral every 6 hours  apixaban 2.5 milliGRAM(s) Oral every 12 hours  atorvastatin 80 milliGRAM(s) Oral at bedtime  diltiazem    milliGRAM(s) Oral daily  metoprolol tartrate 50 milliGRAM(s) Oral two times a day  mycophenolate mofetil 500 milliGRAM(s) Oral two times a day  oxyCODONE    IR 30 milliGRAM(s) Oral daily  pantoprazole    Tablet 40 milliGRAM(s) Oral before breakfast  senna 2 Tablet(s) Oral at bedtime  tacrolimus 1 milliGRAM(s) Oral two times a day  tamsulosin 0.4 milliGRAM(s) Oral at bedtime    PRN MEDICATIONS  aluminum hydroxide/magnesium hydroxide/simethicone Suspension 30 milliLiter(s) Oral every 4 hours PRN  HYDROmorphone   Tablet 3 milliGRAM(s) Oral three times a day PRN  melatonin 3 milliGRAM(s) Oral at bedtime PRN  ondansetron Injectable 4 milliGRAM(s) IV Push every 8 hours PRN  polyethylene glycol 3350 17 Gram(s) Oral two times a day PRN    VITALS:  T(F): 97.5  HR: 66  BP: 113/66  RR: 18  SpO2: 99%    <<<<<PHYSICAL EXAM>>>>>  GENERAL: Well developed, well nourished and in no acute distress. Resting comfortably in bed.  HEENT: Normocephalic, atraumatic, mucous membranes moist, EOMI, PERRLA, bilateral sclera anicteric, no conjunctival injection  Neck: Supple, non-tender, no lymphadenopathy.  PULMONARY: Clear to auscultation bilaterally. No rales, rhonchi, or wheezing.  CARDIOVASCULAR: Regular rate and rhythm, S1-S2, no murmurs  GASTROINTESTINAL: Soft, non-tender, non-distended, no guarding.  RENAL: No CVA tenderness.  SKIN/EXTREMITIES: No clubbing or edema  NEUROLOGIC/MUSCULOSKELETAL: AOx4, grossly moving all extremities, no focal deficits.    <<<<<LABS>>>>>                        8.6    2.96  )-----------( 105      ( 03 Jun 2024 18:37 )             27.8     06-03    139  |  111<H>  |  36<H>  ----------------------------<  107<H>  4.8   |  19  |  1.5    Ca    9.4      03 Jun 2024 18:37  Mg     2.5     06-03    TPro  5.2<L>  /  Alb  3.6  /  TBili  0.7  /  DBili  x   /  AST  24  /  ALT  16  /  AlkPhos  88  06-03    PT/INR - ( 03 Jun 2024 18:37 )   PT: 15.50 sec;   INR: 1.36 ratio         PTT - ( 03 Jun 2024 18:37 )  PTT:30.0 sec  Urinalysis Basic - ( 03 Jun 2024 18:37 )    Color: x / Appearance: x / SG: x / pH: x  Gluc: 107 mg/dL / Ketone: x  / Bili: x / Urobili: x   Blood: x / Protein: x / Nitrite: x   Leuk Esterase: x / RBC: x / WBC x   Sq Epi: x / Non Sq Epi: x / Bacteria: x          [id number]        <<<<<RADIOLOGY>>>>>          ----------------------------------------------------------------------------------------------------------------------------------------------------------------------------------------------- ----------Daily Progress Note----------    HISTORY OF PRESENT ILLNESS:  Patient is a  68 yo M w/PMH of HTN, HLD, HFpEF (70-75%) grade 2 DD, ESRD s/p kidney transplant 8 years ago at [place], chronic hep C, and A-fib SP ablation performed on 5/12, now presenting for palpitations w/HR in 180s identified as SVT as per EMS w/total of 30 mg of adenosine and 20 mg of cardizem given, and 60 mcg of fentanyl given for sciatic pain, with HR subsequently decreasing into the 80s. Patient was normotensive. Patient reported left-sided chest pain in the morning that resolved w/tachycardia into the 180s that remained throughout the day. He denies any fevers, dyspnea, abdominal pain, nausea, diarrhea, or dysuria. He does endorse sciatic pain.    In the ED, vitals were stable, labs notable for trop 113 -> 154, pBNP 08366, Cr 1.5 (last known was 2.0), and Hgb 8.5 (around baseline), EKG notable for new deep symmetrical T wave anterio-inferior in the setting of LVH (Strain pattern?) with elevation in aVR, and CXR unremarkable.    He is now admitted to telemetry for work up and monitoring of SVT.     Cardiologist: Dr. Henderson    Today is hospital day 1d.     INTERVAL HOSPITAL COURSE / OVERNIGHT EVENTS:  O/N events:  Patient into into SVT with retro P waves seen on EKG; Vitals:  /80 SaO2 100% RA; No crackles on exam  Intervention: Adenosine 6mg then adenosine 12mg given terminated AVRNT for 30secs; Maintained with Cardizem push 20 then 10mg into sinus 70bpm    Given Cardizem PO stat for maintenance, if recurs will load amiodarone.    24 hr events:  None    Patient was examined and seen at bedside. Denies any chest pain and palpitations at the moment, endorses severe L leg sciatic pain, asking for better pain control and an MRI    Review of Systems: Otherwise unremarkable     <<<<<PAST MEDICAL & SURGICAL HISTORY>>>>>  CKD (chronic kidney disease)    ESRD (end stage renal disease)    HTN (hypertension)    HLD (hyperlipidemia)    Atrial fibrillation  on eliquis    COPD, mild  not on O2    Peripheral neuropathy  unclear cause    Lymphoma  ?questionable, not treated, not followed    Melanoma  R eye, s/p laser    Cirrhosis  possibly related to hepC    Breast cancer    Legally blind    Stroke    History of heroin abuse    History of chronic hepatitis    Marijuana use    History of kidney transplant  3 years ago    S/P arteriovenous (AV) fistula creation  prior old fistula in R arm    S/P lumpectomy, right breast    History of back surgery  s/p Left L5-S1 endoscopic laminoforaminotomy    History of loop recorder      ALLERGIES  Rituxan (Hives)  Rifuxen (Swelling)      Home Medications:  acetaminophen 325 mg oral tablet: 2 tab(s) orally every 6 hours As needed Mild Pain (1 - 3), Moderate Pain (4 - 6) (18 May 2024 14:44)  Flomax 0.4 mg oral capsule: 1 cap(s) orally 2 times a day (10 May 2024 11:00)  mycophenolate mofetil 500 mg oral tablet: 2 tab(s) orally once a day (10 May 2024 11:00)  oxyCODONE 15 mg oral tablet: 1 tab(s) orally every 8 hours As needed Severe Pain (7 - 10) (10 May 2024 11:00)  senna leaf extract oral tablet: 2 tab(s) orally once a day (at bedtime) (18 May 2024 14:44)        MEDICATIONS  STANDING MEDICATIONS  acetaminophen     Tablet .. 650 milliGRAM(s) Oral every 6 hours  apixaban 2.5 milliGRAM(s) Oral every 12 hours  atorvastatin 80 milliGRAM(s) Oral at bedtime  diltiazem    milliGRAM(s) Oral daily  metoprolol tartrate 50 milliGRAM(s) Oral two times a day  mycophenolate mofetil 500 milliGRAM(s) Oral two times a day  oxyCODONE    IR 30 milliGRAM(s) Oral daily  pantoprazole    Tablet 40 milliGRAM(s) Oral before breakfast  senna 2 Tablet(s) Oral at bedtime  tacrolimus 1 milliGRAM(s) Oral two times a day  tamsulosin 0.4 milliGRAM(s) Oral at bedtime    PRN MEDICATIONS  aluminum hydroxide/magnesium hydroxide/simethicone Suspension 30 milliLiter(s) Oral every 4 hours PRN  HYDROmorphone   Tablet 3 milliGRAM(s) Oral three times a day PRN  melatonin 3 milliGRAM(s) Oral at bedtime PRN  ondansetron Injectable 4 milliGRAM(s) IV Push every 8 hours PRN  polyethylene glycol 3350 17 Gram(s) Oral two times a day PRN    VITALS:  T(F): 97.5  HR: 66  BP: 113/66  RR: 18  SpO2: 99%    <<<<<PHYSICAL EXAM>>>>>  GENERAL: NAD  PULMONARY: Clear to auscultation bilaterally. No wheezing or crackles  CARDIOVASCULAR: Regular rate and rhythm, S1-S2, no murmurs, rubs, or gallops  BREAST: notable L sided gynecomastia  GASTROINTESTINAL: Soft, non-tender, non-distended, no guarding, + hepatomegaly  SKIN/EXTREMITIES: Warm, 2+ tibialis posterior pulses, no UE or LE edema  NEUROLOGIC/MUSCULOSKELETAL: AOx4, grossly moving all extremities, no focal deficits    <<<<<LABS>>>>>                        8.6    2.96  )-----------( 105      ( 03 Jun 2024 18:37 )             27.8     06-03    139  |  111<H>  |  36<H>  ----------------------------<  107<H>  4.8   |  19  |  1.5    Ca    9.4      03 Jun 2024 18:37  Mg     2.5     06-03    TPro  5.2<L>  /  Alb  3.6  /  TBili  0.7  /  DBili  x   /  AST  24  /  ALT  16  /  AlkPhos  88  06-03    PT/INR - ( 03 Jun 2024 18:37 )   PT: 15.50 sec;   INR: 1.36 ratio         PTT - ( 03 Jun 2024 18:37 )  PTT:30.0 sec  Urinalysis Basic - ( 03 Jun 2024 18:37 )    Color: x / Appearance: x / SG: x / pH: x  Gluc: 107 mg/dL / Ketone: x  / Bili: x / Urobili: x   Blood: x / Protein: x / Nitrite: x   Leuk Esterase: x / RBC: x / WBC x   Sq Epi: x / Non Sq Epi: x / Bacteria: x          [id number]        <<<<<RADIOLOGY>>>>>          -----------------------------------------------------------------------------------------------------------------------------------------------------------------------------------------------

## 2024-06-04 NOTE — PROGRESS NOTE ADULT - ASSESSMENT
Patient is a  68 yo M w/PMH of HTN, HLD, HFpEF (70-75%) grade 2 DD, ESRD s/p kidney transplant 8 years ago at [place], chronic hep C, and A-fib SP ablation performed on 5/12, now presenting for palpitations w/HR in 180s identified as SVT, resolved with 30 mg of adenosine and 20 mg of cardizem, now admitted to telemetry for work up and monitoring of SVT. Overnight, he developed another episode of SVT that resolved with total of 18 mg of adenosine and 30 mg of cardizem.    #SVT, chronic Afib post recurrent ablation  - ON Eliquis 2,5mg due to high bleeding risk (subtherapeutic) CHADsVaSc: 5 (stroke reported by patient caused decreased visual acuity)  - Per EMS was   - Patient stressed that he is compliant with home meds yet was not sure of names and timing as he has home health aide that help him out, doubt medication compliance at home  - EKG show sinus tachycardia with elevated aVR deep symmetrical T waves inferio-anterior with STD mostly strain with LVH and tachycardia as patient is off pain  - Trop elevated to be trended, EKG to be trended unlikely ACS  - SP Cardizem push 30mg IV by EMS  - will resume home Diltazem and lopressor  - Had another episode of SVT O/N on 6/4 that resolved with total of 18 mg adenosine and 30 mg of cardizem  - Started PO cardizem 240 mg daily overnight  - Has loop recorder  - Pending EP consult (Dr. Henderson) for recurrent SVTs    #Sciatica unlikely/Pain/History if substance abuse  - Leg raise test in negative, pain description is atypical  - Patient looks severely depressed disheveled uses Marijuana and used some other ORAL PILLS of abuse he does not know dishevelled has no family only aide at home  - Pain management consult  - Oxy 15mg tid PRN at home  - will add IR oxy 30mg and PRN dliaudid q6 PO 3mg  - Left groin ecchymosis 2/2 old hematoma 2/2 ablation fem access complication    #SP renal transplant  - On Tacrolimus at home and MMF  - Cr 1.5 (improved from 2 at 2.5 last admission) eGFR 50  - no SANTA     #DD grade 2, history of HFpEF, compensated, EF 70%, Stage B  - BNP elevated mostly 2/2 SVT  - CXR and on exam patient is euvolemic    #Chronic thrombocytopenia, recent groin hematoma  - 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 with rt eye blind/Pancytopenia  - Malignancy history is remote, treated no on current chemotherapy  - Chemotherapy might have caused his chronic Peripheral neuropathy    #Visual impaired  - reg precaution    #MISC  - DVT PPx: Eliquis  - GI PPx: Protonix  - Activity: AAT  - Diet: DASH  - CODE: Full Code     Patient is a  70 yo M w/PMH of HTN, HLD, HFpEF (70-75%) grade 2 DD, ESRD s/p kidney transplant 8 years ago at [place], chronic hep C, and A-fib SP ablation performed on 5/12, now presenting for palpitations w/HR in 180s identified as SVT, resolved with 30 mg of adenosine and 20 mg of cardizem, now admitted to telemetry for work up and monitoring of SVT. Overnight, he developed another episode of SVT that resolved with total of 18 mg of adenosine and 30 mg of cardizem.    #SVT, chronic Afib post recurrent ablation  - ON Eliquis 2,5mg due to high bleeding risk (subtherapeutic) CHADsVaSc: 5 (stroke reported by patient caused decreased visual acuity)  - Per EMS was   - Patient stressed that he is compliant with home meds yet was not sure of names and timing as he has home health aide that help him out, doubt medication compliance at home  - EKG show sinus tachycardia with elevated aVR deep symmetrical T waves inferio-anterior with STD mostly strain with LVH and tachycardia as patient is off pain  - Trop elevated to be trended, EKG to be trended unlikely ACS  - SP Cardizem push 30mg IV by EMS  - will resume home Diltazem and lopressor  - Had another episode of SVT O/N on 6/4 that resolved with total of 18 mg adenosine and 30 mg of cardizem  - Started PO cardizem 240 mg daily overnight  - Has loop recorder  - Pending EP consult (Dr. Henderson) for recurrent SVTs    #Sciatica unlikely/Pain/History if substance abuse  - Leg raise test in negative, pain description is atypical  - Patient looks severely depressed disheveled uses Marijuana and used some other ORAL PILLS of abuse he does not know dishevelled has no family only aide at home  - Pain management consult  - On Oxy 15mg tid PRN at home  - Left groin ecchymosis 2/2 old hematoma 2/2 ablation fem access complication  - Pain regimen: Tylenol 650 mg q8hr standing, Oxycodone 15mg q8hr PRN, Hydromorphone 0.5mg IV q6hr PRN for break through pain, Methocarbamol 750mg q8hr standing, Gabapentin 300mg qHS, Duloxetine 30mg daily, Lidocaine 4% patch  - Encourage PT/ambulation  - Appreciate pain management recs  - Patient requesting MRI for sciatica, pending Dr. 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 with rt eye blind/Pancytopenia  - Malignancy history is remote, treated no on current chemotherapy  - Chemotherapy might have caused his chronic Peripheral neuropathy    #Visual impaired  - reg precaution    #MISC  - DVT PPx: Eliquis  - GI PPx: Protonix  - Activity: AAT  - Diet: DASH  - CODE: Full Code    Pending: EP consult, PT recs

## 2024-06-04 NOTE — CONSULT NOTE ADULT - NS ATTEND AMEND GEN_ALL_CORE FT
Long standing persistent AF s/p Ablation - now in SR  Paroxysmal AT w/RVR    Cont Cardizem/Metoprolol. No change in meds. Not a candidate for Anti-arrhythmic drugs.  Eliquis  Tele  TSH  OP f-up for PPM placement  No plan for inpatient PPM placement at this moment  Ok to DC from EP standpoint of view with rapid f-up as OP  Recall as needed

## 2024-06-04 NOTE — CONSULT NOTE ADULT - ASSESSMENT
status post  donor Kidney transplant at [facility] 7 years ago  baseline Cr "upper 1's to low 2's"  afib / svt  Pancytopenia / anemia   CAD / HFpEF  HTN  Cirrhosis    plan:    cont tacrolimus, check level am  cont mycophenolate  daily BMP  ep f/u

## 2024-06-04 NOTE — PROGRESS NOTE ADULT - ASSESSMENT
69-year-old with PMH of HTN, HLD, HFpEF (70-75%) grade 2 DD, ESRD s/p kidney transplant 8 years ago at [place], chronic hep C, A-fib SP ablation preformed 5/12, brought in by ambulance with chief complaint of palpitations.  Per EMS patient's heart rate was in the 180s identified as SVT.     Palpitations  Chronic HFpEF  HTN / DL  ESRD s/p renal transplant  H/O Hep C  H/O A-fib s/p ablation  Pancytopenia  Macrocytic anemia               PLAN:    ·	Tele reviewed by me.   ·	EKG on admission: /min (Interpreted by me.   ·	Troponin: 113-->154  69-year-old with PMH of HTN, HLD, HFpEF (70-75%) grade 2 DD, ESRD s/p kidney transplant 8 years ago at [place], chronic hep C, A-fib SP ablation preformed 5/12, brought in by ambulance with chief complaint of palpitations.  Per EMS patient's heart rate was in the 180s identified as SVT.     Palpitations / SVT  Chronic HFpEF  HTN / DL  ESRD s/p renal transplant  H/O Hep C  H/O A-fib s/p ablation  Pancytopenia  Macrocytic anemia               PLAN:    ·	Tele reviewed by me. Episodes of narrow complex tachycardia.   ·	EKG on admission: /min (Interpreted by me.   ·	Cont Cardizem  mg po daily  ·	EP eval  ·	Troponin: 113-->154  ·	Pain management eval noted. Recommended to start Methocarbamol 750mg q8hr standing, Gabapentin 300mg qHS and Duloxetine 30 mg po daily. Lidocaine 4% patch to lower back  ·	Neurosurgery eval for sciatic. Pt has surgery done by Dr Arnold  ·	Old record reviewed. ECHO done in March this year showed EF is 70-75%  ·	Nephrology eval noted. Recommended to cont Tacrolimus and check level in AM. Also cont Mycophenolate.   ·	Cont his other home meds  ·	Check i's and o's and daily wt  ·	Low salt diet and water restriction to 1.5 L/D    Progress Note Handoff    Pending (specify):  Consults_EP, Neurosurgery________, Tests________, Test Results_______, Other_________  Family discussion:  Disposition: Home___/SNF___/Other________/Unknown at this time________    Germain Mazariegos MD  Spectra: [phone]

## 2024-06-04 NOTE — CONSULT NOTE ADULT - SUBJECTIVE AND OBJECTIVE BOX
HPI: Patient is a 69F with PMH of HTN, HLD, HFpEF (70-75%) grade 2 DD, ESRD s/p kidney transplant 8 years ago at [place], chronic hep C, A-fib SP ablation preformed 5/12, brought in by ambulance with chief complaint of palpitations. Pain medicine services now consulted for assistance managing intractable back pain.    Patient seen and evaluated at bedside by me. Patient endorsing 8/10 low back pain L > R. Patient also endorses pain down the posterior left leg to anterior knee. Back pain not elicited during straight leg test. Patient denies any confusion, nausea, lethargy, or itching.    Current Inpatient Medication Regimen:  acetaminophen     Tablet .. 650 milliGRAM(s) Oral every 6 hours  aluminum hydroxide/magnesium hydroxide/simethicone Suspension 30 milliLiter(s) Oral every 4 hours PRN  apixaban 2.5 milliGRAM(s) Oral every 12 hours  atorvastatin 80 milliGRAM(s) Oral at bedtime  diltiazem    milliGRAM(s) Oral daily  HYDROmorphone   Tablet 3 milliGRAM(s) Oral three times a day PRN  melatonin 3 milliGRAM(s) Oral at bedtime PRN  metoprolol tartrate 50 milliGRAM(s) Oral two times a day  mycophenolate mofetil 500 milliGRAM(s) Oral two times a day  ondansetron Injectable 4 milliGRAM(s) IV Push every 8 hours PRN  oxyCODONE    IR 30 milliGRAM(s) Oral daily  pantoprazole    Tablet 40 milliGRAM(s) Oral before breakfast  polyethylene glycol 3350 17 Gram(s) Oral two times a day PRN  senna 2 Tablet(s) Oral at bedtime  tacrolimus 1 milliGRAM(s) Oral two times a day  tamsulosin 0.4 milliGRAM(s) Oral at bedtime    Allergies:  Rituxan (Hives)  Rifuxen (Swelling)      Past Medical History:  Other specified abnormal findings of blood chemistry    CKD (chronic kidney disease)    ESRD (end stage renal disease)    HTN (hypertension)    HLD (hyperlipidemia)    Atrial fibrillation    COPD, mild    Sensory neuropathy    Peripheral neuropathy    Lymphoma    Melanoma    Cirrhosis    Breast cancer    Legally blind    Stroke    History of heroin abuse    Hepatitis-C    History of chronic hepatitis    Marijuana use    Elevated troponin    History of kidney transplant    S/P arteriovenous (AV) fistula creation    S/P lumpectomy, right breast    History of back surgery    History of loop recorder      Family History:  FH: dementia    FHx: breast cancer            Review of Systems:  General: no fevers or chills  Eyes: no diplopia or blurred vision  ENT: no rhinorrhea  CV: no chest pain  Resp: no cough or dyspnea  GI: no abdominal pain, constipation, or diarrhea  : no urinary incontinence or dysuria  Neuro:  weakness    Physical Exam:  T(C): 36.6 (06-04-24 @ 08:34), Max: 36.8 (06-03-24 @ 17:42)  HR: 53 (06-04-24 @ 08:34) (53 - 181)  BP: 92/58 (06-04-24 @ 08:34) (92/58 - 138/80)  RR: 18 (06-04-24 @ 08:34) (18 - 22)  SpO2: 98% (06-04-24 @ 08:34) (97% - 99%)  Gen: NAD  Eyes: no scleral icterus  Head:  Normocephalic / Atraumatic  CV: no JVD  Lungs: nonlabored breathing  Abdomen:  nondistended, soft  : no hobson catheter in place  Back: tenderness to palpation  Neuro: AOx3  Extremities: full ROM in upper/lower extremities  Psych: normal affect      Labs:  CBC  3.12 K/uL<L> [4.80 - 10.80] > 8.4 g/dL<L> [14.0 - 18.0] / 27.5 %<L> [42.0 - 52.0] < 95 K/uL<L> [130 - 400]      BMP  139 mmol/L [135 - 146] | 110 mmol/L [98 - 110] | 27 mg/dL<H> [10 - 20]  4.4 mmol/L [3.5 - 5.0] | 17 mmol/L [17 - 32] | 1.3 mg/dL [0.7 - 1.5]    114 mg/dL<H> [70 - 99]  CBC  2.96 K/uL<L> [4.80 - 10.80] > 8.6 g/dL<L> [14.0 - 18.0] / 27.8 %<L> [42.0 - 52.0] < 105 K/uL<L> [130 - 400]      BMP  139 mmol/L [135 - 146] | 111 mmol/L<H> [98 - 110] | 36 mg/dL<H> [10 - 20]  4.8 mmol/L [3.5 - 5.0] | 19 mmol/L [17 - 32] | 1.5 mg/dL [0.7 - 1.5]    107 mg/dL<H> [70 - 99]        Imaging Studies:        Opioid Risk Assessment Tool                                                                           Female       Male  Family History  Alcohol                                                              1                3  Illegal drugs                                                       2                3  Rx drugs                                                            4                4    Personal History   Alcohol                                                              3                3  Illegal drugs                                                       4                4  Rx drugs                                                            5                5    Age between 16—45 years                                1                1  History of preadolescent sexual abuse               3                0    Psychological disease  ADD, OCD, bipolar, schizophrenia                      2                2  Depression                                                       1                1    Total Score                                                      __                 9    0 - 3 = low risk for future opioid abuse  4 - 7 = moderate risk for future opioid abuse  8+ = high risk for future opioid abuse

## 2024-06-04 NOTE — CHART NOTE - NSCHARTNOTEFT_GEN_A_CORE
Patient into into SVT with retro P waves seen on EKG   /80 SaO2 100% RA no crackles on exam    Adenosine 6mg then adenosine 12mg given terminated AVRNT for 30secs    Maintained with Cardizem push 20 then 10mg into sinus 70bpm    Will get Cardizem PO stat to maintenance if recurs will load amiodarone Patient into into SVT with retro P waves seen on EKG   /80 SaO2 100% RA no crackles on exam    Adenosine 6mg then adenosine 12mg given terminated AVRNT for 30secs    Maintained with Cardizem push 20 then 10mg into sinus 70bpm    Will get Cardizem PO stat to maintenance if recurs will load amiodarone increase from 180 home dose to 240mg as patient says he was compliant Patient into into SVT with retro P waves seen on EKG   /80 SaO2 100% RA no crackles on exam    Adenosine 6mg then adenosine 12mg given terminated AVRNT for 30secs    Maintained with Cardizem push 20 then 10mg into sinus 70bpm    Will get Cardizem PO stat for maintenance.   if recurs will load amiodarone.

## 2024-06-05 ENCOUNTER — TRANSCRIPTION ENCOUNTER (OUTPATIENT)
Age: 69
End: 2024-06-05

## 2024-06-05 LAB
ANION GAP SERPL CALC-SCNC: 5 MMOL/L — LOW (ref 7–14)
BUN SERPL-MCNC: 21 MG/DL — HIGH (ref 10–20)
CALCIUM SERPL-MCNC: 9.9 MG/DL — SIGNIFICANT CHANGE UP (ref 8.4–10.5)
CHLORIDE SERPL-SCNC: 107 MMOL/L — SIGNIFICANT CHANGE UP (ref 98–110)
CO2 SERPL-SCNC: 22 MMOL/L — SIGNIFICANT CHANGE UP (ref 17–32)
CREAT SERPL-MCNC: 1.4 MG/DL — SIGNIFICANT CHANGE UP (ref 0.7–1.5)
EGFR: 55 ML/MIN/1.73M2 — LOW
GLUCOSE SERPL-MCNC: 99 MG/DL — SIGNIFICANT CHANGE UP (ref 70–99)
HCT VFR BLD CALC: 27.6 % — LOW (ref 42–52)
HGB BLD-MCNC: 8.3 G/DL — LOW (ref 14–18)
MCHC RBC-ENTMCNC: 30.1 G/DL — LOW (ref 32–37)
MCHC RBC-ENTMCNC: 30.5 PG — SIGNIFICANT CHANGE UP (ref 27–31)
MCV RBC AUTO: 101.5 FL — HIGH (ref 80–94)
NRBC # BLD: 0 /100 WBCS — SIGNIFICANT CHANGE UP (ref 0–0)
PLATELET # BLD AUTO: 83 K/UL — LOW (ref 130–400)
PMV BLD: 12.2 FL — HIGH (ref 7.4–10.4)
POTASSIUM SERPL-MCNC: 4.6 MMOL/L — SIGNIFICANT CHANGE UP (ref 3.5–5)
POTASSIUM SERPL-SCNC: 4.6 MMOL/L — SIGNIFICANT CHANGE UP (ref 3.5–5)
RBC # BLD: 2.72 M/UL — LOW (ref 4.7–6.1)
RBC # FLD: 19.6 % — HIGH (ref 11.5–14.5)
SODIUM SERPL-SCNC: 134 MMOL/L — LOW (ref 135–146)
TACROLIMUS SERPL-MCNC: 13.5 NG/ML — SIGNIFICANT CHANGE UP
WBC # BLD: 1.85 K/UL — LOW (ref 4.8–10.8)
WBC # FLD AUTO: 1.85 K/UL — LOW (ref 4.8–10.8)

## 2024-06-05 PROCEDURE — 93010 ELECTROCARDIOGRAM REPORT: CPT

## 2024-06-05 PROCEDURE — 99233 SBSQ HOSP IP/OBS HIGH 50: CPT

## 2024-06-05 RX ADMIN — METHOCARBAMOL 750 MILLIGRAM(S): 500 TABLET, FILM COATED ORAL at 13:25

## 2024-06-05 RX ADMIN — Medication 650 MILLIGRAM(S): at 05:47

## 2024-06-05 RX ADMIN — Medication 240 MILLIGRAM(S): at 05:48

## 2024-06-05 RX ADMIN — Medication 3 MILLIGRAM(S): at 22:04

## 2024-06-05 RX ADMIN — SENNA PLUS 2 TABLET(S): 8.6 TABLET ORAL at 22:05

## 2024-06-05 RX ADMIN — Medication 650 MILLIGRAM(S): at 13:25

## 2024-06-05 RX ADMIN — GABAPENTIN 300 MILLIGRAM(S): 400 CAPSULE ORAL at 22:05

## 2024-06-05 RX ADMIN — Medication 50 MILLIGRAM(S): at 05:49

## 2024-06-05 RX ADMIN — Medication 650 MILLIGRAM(S): at 22:05

## 2024-06-05 RX ADMIN — TACROLIMUS 1 MILLIGRAM(S): 5 CAPSULE ORAL at 05:47

## 2024-06-05 RX ADMIN — HYDROMORPHONE HYDROCHLORIDE 0.5 MILLIGRAM(S): 2 INJECTION INTRAMUSCULAR; INTRAVENOUS; SUBCUTANEOUS at 15:03

## 2024-06-05 RX ADMIN — OXYCODONE HYDROCHLORIDE 15 MILLIGRAM(S): 5 TABLET ORAL at 22:04

## 2024-06-05 RX ADMIN — APIXABAN 2.5 MILLIGRAM(S): 2.5 TABLET, FILM COATED ORAL at 05:48

## 2024-06-05 RX ADMIN — MYCOPHENOLATE MOFETIL 500 MILLIGRAM(S): 250 CAPSULE ORAL at 05:47

## 2024-06-05 RX ADMIN — MYCOPHENOLATE MOFETIL 500 MILLIGRAM(S): 250 CAPSULE ORAL at 17:52

## 2024-06-05 RX ADMIN — OXYCODONE HYDROCHLORIDE 15 MILLIGRAM(S): 5 TABLET ORAL at 12:19

## 2024-06-05 RX ADMIN — APIXABAN 2.5 MILLIGRAM(S): 2.5 TABLET, FILM COATED ORAL at 17:52

## 2024-06-05 RX ADMIN — DULOXETINE HYDROCHLORIDE 30 MILLIGRAM(S): 30 CAPSULE, DELAYED RELEASE ORAL at 13:25

## 2024-06-05 RX ADMIN — TAMSULOSIN HYDROCHLORIDE 0.4 MILLIGRAM(S): 0.4 CAPSULE ORAL at 22:05

## 2024-06-05 RX ADMIN — OXYCODONE HYDROCHLORIDE 15 MILLIGRAM(S): 5 TABLET ORAL at 09:53

## 2024-06-05 RX ADMIN — TACROLIMUS 1 MILLIGRAM(S): 5 CAPSULE ORAL at 17:52

## 2024-06-05 RX ADMIN — METHOCARBAMOL 750 MILLIGRAM(S): 500 TABLET, FILM COATED ORAL at 22:10

## 2024-06-05 RX ADMIN — HYDROMORPHONE HYDROCHLORIDE 0.5 MILLIGRAM(S): 2 INJECTION INTRAMUSCULAR; INTRAVENOUS; SUBCUTANEOUS at 17:51

## 2024-06-05 RX ADMIN — PANTOPRAZOLE SODIUM 40 MILLIGRAM(S): 20 TABLET, DELAYED RELEASE ORAL at 05:49

## 2024-06-05 RX ADMIN — METHOCARBAMOL 750 MILLIGRAM(S): 500 TABLET, FILM COATED ORAL at 05:48

## 2024-06-05 RX ADMIN — ATORVASTATIN CALCIUM 80 MILLIGRAM(S): 80 TABLET, FILM COATED ORAL at 22:04

## 2024-06-05 RX ADMIN — Medication 650 MILLIGRAM(S): at 14:24

## 2024-06-05 RX ADMIN — Medication 50 MILLIGRAM(S): at 17:52

## 2024-06-05 NOTE — DISCHARGE NOTE PROVIDER - NSDCFUADDAPPT_GEN_ALL_CORE_FT
APPTS ARE READY TO BE MADE: [X] YES    Best Family or Patient Contact (if needed):    Additional Information about above appointments (if needed):    1: Needs appointment with electrophysiologist within 2 weeks to assess for any further arrhythmias and for further necessary management (Dr. Porsha Henderson)  2:   3:     Other comments or requests:    APPTS ARE READY TO BE MADE: [X] YES    Best Family or Patient Contact (if needed):    Additional Information about above appointments (if needed):    1: Needs to follow up with an interventional radiologist for discussion of IR-guided transformational steroid injection within 2 weeks (Dr. Lion Stuart)  2:   3:     Other comments or requests:

## 2024-06-05 NOTE — DISCHARGE NOTE PROVIDER - DISCHARGE SERVICE FOR PATIENT
on the discharge service for the patient. I have reviewed and made amendments to the documentation where necessary. Skin Substitute Text: The defect edges were debeveled with a #15 scalpel blade.  Given the location of the defect, shape of the defect and the proximity to free margins a skin substitute graft was deemed most appropriate.  The graft material was trimmed to fit the size of the defect. The graft was then placed in the primary defect and oriented appropriately.

## 2024-06-05 NOTE — PROGRESS NOTE ADULT - SUBJECTIVE AND OBJECTIVE BOX
----------Daily Progress Note----------    HISTORY OF PRESENT ILLNESS:  Patient is a  70 yo M w/PMH of HTN, HLD, HFpEF (70-75%) grade 2 DD, ESRD s/p kidney transplant 8 years ago at [place], chronic hep C, and A-fib SP ablation performed on 5/12, now presenting for palpitations w/HR in 180s identified as SVT as per EMS w/total of 30 mg of adenosine and 20 mg of cardizem given, and 60 mcg of fentanyl given for sciatic pain, with HR subsequently decreasing into the 80s. Patient was normotensive. Patient reported left-sided chest pain in the morning that resolved w/tachycardia into the 180s that remained throughout the day. He denies any fevers, dyspnea, abdominal pain, nausea, diarrhea, or dysuria. He does endorse sciatic pain.    In the ED, vitals were stable, labs notable for trop 113 -> 154, pBNP 53197, Cr 1.5 (last known was 2.0), and Hgb 8.5 (around baseline), EKG notable for new deep symmetrical T wave anterio-inferior in the setting of LVH (Strain pattern?) with elevation in aVR, and CXR unremarkable.    He is now admitted to telemetry for work up and monitoring of SVT.     Cardiologist: Dr. Henderson    Today is hospital day 2d.     INTERVAL HOSPITAL COURSE / OVERNIGHT EVENTS:  O/N events:  None    24 hr events:  None    Patient was examined and seen at bedside. Denies any chest pain and palpitations at the moment, continues to endorse severe L leg sciatic pain    Review of Systems: Otherwise unremarkable     <<<<<PAST MEDICAL & SURGICAL HISTORY>>>>>  CKD (chronic kidney disease)    ESRD (end stage renal disease)    HTN (hypertension)    HLD (hyperlipidemia)    Atrial fibrillation  on eliquis    COPD, mild  not on O2    Peripheral neuropathy  unclear cause    Lymphoma  ?questionable, not treated, not followed    Melanoma  R eye, s/p laser    Cirrhosis  possibly related to hepC    Breast cancer    Legally blind    Stroke    History of heroin abuse    History of chronic hepatitis    Marijuana use    History of kidney transplant  3 years ago    S/P arteriovenous (AV) fistula creation  prior old fistula in R arm    S/P lumpectomy, right breast    History of back surgery  s/p Left L5-S1 endoscopic laminoforaminotomy    History of loop recorder      ALLERGIES  Rituxan (Hives)      Home Medications:  acetaminophen 325 mg oral tablet: 2 tab(s) orally every 6 hours As needed Mild Pain (1 - 3), Moderate Pain (4 - 6) (18 May 2024 14:44)  Flomax 0.4 mg oral capsule: 1 cap(s) orally 2 times a day (10 May 2024 11:00)  mycophenolate mofetil 500 mg oral tablet: 2 tab(s) orally once a day (10 May 2024 11:00)  oxyCODONE 15 mg oral tablet: 1 tab(s) orally every 8 hours As needed Severe Pain (7 - 10) (10 May 2024 11:00)  senna leaf extract oral tablet: 2 tab(s) orally once a day (at bedtime) (18 May 2024 14:44)        MEDICATIONS  STANDING MEDICATIONS  acetaminophen     Tablet .. 650 milliGRAM(s) Oral every 8 hours  apixaban 2.5 milliGRAM(s) Oral every 12 hours  atorvastatin 80 milliGRAM(s) Oral at bedtime  diltiazem    milliGRAM(s) Oral daily  DULoxetine 30 milliGRAM(s) Oral daily  gabapentin 300 milliGRAM(s) Oral at bedtime  methocarbamol 750 milliGRAM(s) Oral every 8 hours  metoprolol tartrate 50 milliGRAM(s) Oral two times a day  mycophenolate mofetil 500 milliGRAM(s) Oral two times a day  pantoprazole    Tablet 40 milliGRAM(s) Oral before breakfast  senna 2 Tablet(s) Oral at bedtime  tacrolimus 1 milliGRAM(s) Oral two times a day  tamsulosin 0.4 milliGRAM(s) Oral at bedtime    PRN MEDICATIONS  aluminum hydroxide/magnesium hydroxide/simethicone Suspension 30 milliLiter(s) Oral every 4 hours PRN  HYDROmorphone  Injectable 0.5 milliGRAM(s) IV Push every 6 hours PRN  melatonin 3 milliGRAM(s) Oral at bedtime PRN  ondansetron Injectable 4 milliGRAM(s) IV Push every 8 hours PRN  oxyCODONE    IR 15 milliGRAM(s) Oral every 8 hours PRN  polyethylene glycol 3350 17 Gram(s) Oral two times a day PRN    VITALS:  T(F): 98.1  HR: 61  BP: 105/62  RR: 18  SpO2: 98%    <<<<<PHYSICAL EXAM>>>>>  GENERAL: NAD  PULMONARY: Clear to auscultation bilaterally. No wheezing or crackles  CARDIOVASCULAR: Regular rate and rhythm, S1-S2, no murmurs, rubs, or gallops  BREAST: notable L sided gynecomastia  GASTROINTESTINAL: Soft, non-tender, non-distended, no guarding, + hepatomegaly  SKIN/EXTREMITIES: Warm, 2+ tibialis posterior pulses, no UE or LE edema  NEUROLOGIC/MUSCULOSKELETAL: AOx4, grossly moving all extremities, no focal deficits    <<<<<LABS>>>>>                        8.4    3.12  )-----------( 95       ( 04 Jun 2024 07:04 )             27.5     06-04    139  |  110  |  27<H>  ----------------------------<  114<H>  4.4   |  17  |  1.3    Ca    9.7      04 Jun 2024 07:04  Mg     2.5     06-03    TPro  5.2<L>  /  Alb  3.6  /  TBili  0.7  /  DBili  x   /  AST  23  /  ALT  16  /  AlkPhos  87  06-04    PT/INR - ( 03 Jun 2024 18:37 )   PT: 15.50 sec;   INR: 1.36 ratio         PTT - ( 03 Jun 2024 18:37 )  PTT:30.0 sec  Urinalysis Basic - ( 04 Jun 2024 07:04 )    Color: x / Appearance: x / SG: x / pH: x  Gluc: 114 mg/dL / Ketone: x  / Bili: x / Urobili: x   Blood: x / Protein: x / Nitrite: x   Leuk Esterase: x / RBC: x / WBC x   Sq Epi: x / Non Sq Epi: x / Bacteria: x          [id number]        <<<<<RADIOLOGY>>>>>      -----------------------------------------------------------------------------------------------------------------------------------------------------------------------------------------------

## 2024-06-05 NOTE — PROGRESS NOTE ADULT - SUBJECTIVE AND OBJECTIVE BOX
[place] [facility] FOLLOW UP NOTE  --------------------------------------------------------------------------------  24 hour events/subjective: Patient examined. Appears comfortable.    PAST HISTORY  --------------------------------------------------------------------------------  No significant changes to PMH, PSH, FHx, SHx, unless otherwise noted    ALLERGIES & MEDICATIONS  --------------------------------------------------------------------------------  Allergies    Rituxan (Hives)    Standing Inpatient Medications  acetaminophen     Tablet .. 650 milliGRAM(s) Oral every 8 hours  apixaban 2.5 milliGRAM(s) Oral every 12 hours  atorvastatin 80 milliGRAM(s) Oral at bedtime  diltiazem    milliGRAM(s) Oral daily  DULoxetine 30 milliGRAM(s) Oral daily  gabapentin 300 milliGRAM(s) Oral at bedtime  methocarbamol 750 milliGRAM(s) Oral every 8 hours  metoprolol tartrate 50 milliGRAM(s) Oral two times a day  mycophenolate mofetil 500 milliGRAM(s) Oral two times a day  pantoprazole    Tablet 40 milliGRAM(s) Oral before breakfast  senna 2 Tablet(s) Oral at bedtime  tacrolimus 1 milliGRAM(s) Oral two times a day  tamsulosin 0.4 milliGRAM(s) Oral at bedtime    PRN Inpatient Medications  aluminum hydroxide/magnesium hydroxide/simethicone Suspension 30 milliLiter(s) Oral every 4 hours PRN  HYDROmorphone  Injectable 0.5 milliGRAM(s) IV Push every 6 hours PRN  melatonin 3 milliGRAM(s) Oral at bedtime PRN  ondansetron Injectable 4 milliGRAM(s) IV Push every 8 hours PRN  oxyCODONE    IR 15 milliGRAM(s) Oral every 8 hours PRN  polyethylene glycol 3350 17 Gram(s) Oral two times a day PRN    VITALS/PHYSICAL EXAM  --------------------------------------------------------------------------------  T(C): 36.6 (06-05-24 @ 09:12), Max: 36.7 (06-04-24 @ 12:22)  HR: 62 (06-05-24 @ 09:12) (57 - 67)  BP: 112/66 (06-05-24 @ 09:12) (105/62 - 129/80)  RR: 18 (06-05-24 @ 09:12) (14 - 18)  SpO2: 100% (06-05-24 @ 09:12) (98% - 100%)  Height (cm): 175.3 (06-03-24 @ 17:42)  Weight (kg): 61.2 (06-03-24 @ 17:42)  BMI (kg/m2): 19.9 (06-03-24 @ 17:42)  BSA (m2): 1.75 (06-03-24 @ 17:42)    06-04-24 @ 07:01  -  06-05-24 @ 07:00  --------------------------------------------------------  IN: 0 mL / OUT: 300 mL / NET: -300 mL    Physical Exam:  	Gen: NAD  	Pulm: CTA B/L  	CV: RRR, S1S2  	Abd: +BS, soft, nontender/nondistended  	: No suprapubic tenderness  	LE: Warm,  no edema    LABS/STUDIES  --------------------------------------------------------------------------------              8.3    1.85  >-----------<  83       [06-05-24 @ 07:07]              27.6     134  |  107  |  21  ----------------------------<  99      [06-05-24 @ 07:07]  4.6   |  22  |  1.4        Ca     9.9     [06-05-24 @ 07:07]      Mg     2.5     [06-03-24 @ 19:12]    TPro  5.2  /  Alb  3.6  /  TBili  0.7  /  DBili  x   /  AST  23  /  ALT  16  /  AlkPhos  87  [06-04-24 @ 07:04]    PT/INR: PT 15.50, INR 1.36       [06-03-24 @ 18:37]  PTT: 30.0       [06-03-24 @ 18:37]    Creatinine Trend:  SCr 1.4 [06-05 @ 07:07]  SCr 1.3 [06-04 @ 07:04]  SCr 1.5 [06-03 @ 18:37]  SCr 2.0 [05-18 @ 05:21]  SCr 2.1 [05-17 @ 05:15]    Urinalysis - [06-05-24 @ 07:07]      Color  / Appearance  / SG  / pH       Gluc 99 / Ketone   / Bili  / Urobili        Blood  / Protein  / Leuk Est  / Nitrite       RBC  / WBC  / Hyaline  / Gran  / Sq Epi  / Non Sq Epi  / Bacteria     Iron 28, TIBC 185, %sat 15      [05-18-24 @ 05:21]  Ferritin 803      [05-18-24 @ 05:21]  HbA1c 5.1      [11-06-19 @ 15:00]  TSH 1.98      [03-15-24 @ 15:04]  Lipid: chol 84, TG 65, HDL 32, LDL --      [06-04-24 @ 07:04]

## 2024-06-05 NOTE — PROGRESS NOTE ADULT - SUBJECTIVE AND OBJECTIVE BOX
EFFIE DAVONTE  69y Male    CHIEF COMPLAINT:    Patient is a 69y old  Male who presents with a chief complaint of Palpitations (04 Jun 2024 09:21)      INTERVAL HPI/OVERNIGHT EVENTS:    Patient seen and examined. Pt states that his sciatica pain has improved. No palpitations.     ROS: All other systems are negative.    Vital Signs:    T(F): 97.9 (06-05-24 @ 09:12), Max: 98.1 (06-04-24 @ 19:41)  HR: 62 (06-05-24 @ 09:12) (57 - 67)  BP: 112/66 (06-05-24 @ 09:12) (105/62 - 129/80)  RR: 18 (06-05-24 @ 09:12) (14 - 18)  SpO2: 100% (06-05-24 @ 09:12) (98% - 100%)  I&O's Summary    04 Jun 2024 07:01  -  05 Jun 2024 07:00  --------------------------------------------------------  IN: 0 mL / OUT: 300 mL / NET: -300 mL      Daily     Daily   CAPILLARY BLOOD GLUCOSE          PHYSICAL EXAM:    GENERAL:  NAD  SKIN: No rashes or lesions  HENT: Atraumatic. Normocephalic. PERRL. Moist membranes.  NECK: Supple, No JVD. No lymphadenopathy.  PULMONARY: CTA B/L. No wheezing. No rales  CVS: Normal S1, S2. Rate and Rhythm are regular. No murmurs.  ABDOMEN/GI: Soft, Nontender, Nondistended; BS present  EXTREMITIES: Peripheral pulses intact. No edema B/L LE.  NEUROLOGIC:  No motor or sensory deficit.  PSYCH: Alert & oriented x 3    Consultant(s) Notes Reviewed:  [x ] YES  [ ] NO  Care Discussed with Consultants/Other Providers [ x] YES  [ ] NO    EKG reviewed  Telemetry reviewed    LABS:                        8.3    1.85  )-----------( 83       ( 05 Jun 2024 07:07 )             27.6     06-05    134<L>  |  107  |  21<H>  ----------------------------<  99  4.6   |  22  |  1.4    Ca    9.9      05 Jun 2024 07:07  Mg     2.5     06-03    TPro  5.2<L>  /  Alb  3.6  /  TBili  0.7  /  DBili  x   /  AST  23  /  ALT  16  /  AlkPhos  87  06-04    PT/INR - ( 03 Jun 2024 18:37 )   PT: 15.50 sec;   INR: 1.36 ratio         PTT - ( 03 Jun 2024 18:37 )  PTT:30.0 sec          RADIOLOGY & ADDITIONAL TESTS:      Imaging or report Personally Reviewed:  [ ] YES  [ ] NO    Medications:  Standing  acetaminophen     Tablet .. 650 milliGRAM(s) Oral every 8 hours  apixaban 2.5 milliGRAM(s) Oral every 12 hours  atorvastatin 80 milliGRAM(s) Oral at bedtime  diltiazem    milliGRAM(s) Oral daily  DULoxetine 30 milliGRAM(s) Oral daily  gabapentin 300 milliGRAM(s) Oral at bedtime  methocarbamol 750 milliGRAM(s) Oral every 8 hours  metoprolol tartrate 50 milliGRAM(s) Oral two times a day  mycophenolate mofetil 500 milliGRAM(s) Oral two times a day  pantoprazole    Tablet 40 milliGRAM(s) Oral before breakfast  senna 2 Tablet(s) Oral at bedtime  tacrolimus 1 milliGRAM(s) Oral two times a day  tamsulosin 0.4 milliGRAM(s) Oral at bedtime    PRN Meds  aluminum hydroxide/magnesium hydroxide/simethicone Suspension 30 milliLiter(s) Oral every 4 hours PRN  HYDROmorphone  Injectable 0.5 milliGRAM(s) IV Push every 6 hours PRN  melatonin 3 milliGRAM(s) Oral at bedtime PRN  ondansetron Injectable 4 milliGRAM(s) IV Push every 8 hours PRN  oxyCODONE    IR 15 milliGRAM(s) Oral every 8 hours PRN  polyethylene glycol 3350 17 Gram(s) Oral two times a day PRN      Case discussed with resident    Care discussed with pt/family

## 2024-06-05 NOTE — DISCHARGE NOTE PROVIDER - PROVIDER TOKENS
PROVIDER:[TOKEN:[[id number]],FOLLOWUP:[2 weeks],ESTABLISHEDPATIENT:[T]],PROVIDER:[TOKEN:[[id number]],FOLLOWUP:[2 weeks],ESTABLISHEDPATIENT:[T]] PROVIDER:[TOKEN:[[id number]],FOLLOWUP:[2 weeks],ESTABLISHEDPATIENT:[T]] PROVIDER:[TOKEN:[[id number]],FOLLOWUP:[2 weeks],ESTABLISHEDPATIENT:[T]],PROVIDER:[TOKEN:[[id number]],FOLLOWUP:[2 weeks]]

## 2024-06-05 NOTE — PROGRESS NOTE ADULT - ASSESSMENT
69-year-old with PMH of HTN, HLD, HFpEF (70-75%) grade 2 DD, ESRD s/p kidney transplant 8 years ago at [place], chronic hep C, A-fib SP ablation preformed 5/12, brought in by ambulance with chief complaint of palpitations.  Per EMS patient's heart rate was in the 180s identified as SVT.     Palpitations / SVT  Chronic HFpEF  HTN / DL  ESRD s/p renal transplant  H/O Hep C  H/O A-fib s/p ablation  Pancytopenia  Macrocytic anemia               PLAN:    ·	Tele reviewed by me. Episodes of narrow complex tachycardia.   ·	EKG on admission: /min (Interpreted by me.   ·	Cont Cardizem  mg po daily  ·	EP eval pending  ·	Troponin: 113-->154  ·	Pain management eval noted. Recommended to start Methocarbamol 750mg q8hr standing, Gabapentin 300mg qHS and Duloxetine 30 mg po daily. Lidocaine 4% patch to lower back  ·	Neurosurgery eval for sciatic. Pt has surgery done by Dr Arnold  ·	S/P Lumbar spine MRI. Will check official read.   ·	Old record reviewed. ECHO done in March this year showed EF is 70-75%  ·	Nephrology eval noted. Recommended to cont Tacrolimus and check level in AM. Also cont Mycophenolate.   ·	Tacrolimus level is 13.5  ·	Cont his other home meds  ·	Check i's and o's and daily wt  ·	Low salt diet and water restriction to 1.5 L/D  ·	PT eval noted. Recommended home with home PT    Progress Note Handoff    Pending (specify):  Consults_EP_______, Tests________, Test Results__MRI lumber spine_____, Other_________  Family discussion:  Disposition: Home___/SNF___/Other________/Unknown at this time________    Germain Mazariegos MD  Spectra: [phone]

## 2024-06-05 NOTE — DISCHARGE NOTE PROVIDER - CARE PROVIDERS DIRECT ADDRESSES
[email or web address]

## 2024-06-05 NOTE — DISCHARGE NOTE PROVIDER - NSDCCPCAREPLAN_GEN_ALL_CORE_FT
PRINCIPAL DISCHARGE DIAGNOSIS  Diagnosis: Supraventricular tachycardia  Assessment and Plan of Treatment: You came to the hospital due to chest pain and rapid heart beat and were found to have supraventricular tachycardia. You were treated with medications with your heart rate returning to normal. You had another episode while admitted to the hospital which returned to normal with medication. Your cardizem dose was changed to better prevent future episodes of supraventricular tachycardia. You also reported severe L leg sciatica and hematoma pain and saw our pain management specialist here who recommended a new pain regimen. You reported that your pain improved with the new regimen. Please follow up with Dr. Arnold on 6/12 to review your MRI of the lumbar spine that you obtained here and to discuss further care for your sciatica. Please follow up with your electrophysiologist, Dr. Henderson, within 2 weeks after you are discharged to assess for any further arrhythmias. Please follow up with your primary medical doctor within 2 weeks. If you notice your chest pain or fast heart beat returning, please return to the emergency room.     PRINCIPAL DISCHARGE DIAGNOSIS  Diagnosis: Supraventricular tachycardia  Assessment and Plan of Treatment: You came to the hospital due to chest pain and rapid heart beat and were found to have supraventricular tachycardia. You were treated with medications with your heart rate returning to normal. You had another episode while admitted to the hospital which returned to normal with medication. Your cardizem dose was changed to better prevent future episodes of supraventricular tachycardia. You also had an AV node ablation and a permanent pacemaker placed on 6/7. You also reported severe L leg sciatica and hematoma pain and saw our pain management specialist here who recommended a new pain regimen. You reported that your pain improved with the new regimen. Please follow up with Dr. Arnold on 6/27/24 to review your MRI of the lumbar spine that you obtained here and to discuss further care for your sciatica. Please follow up with your electrophysiologist, Dr. Henderson, on 7/11/24 to assess for any further arrhythmias. Please follow up with your primary medical doctor within 2 weeks. If you notice your chest pain or fast heart beat returning, please return to the emergency room.

## 2024-06-05 NOTE — DISCHARGE NOTE PROVIDER - NSDCMRMEDTOKEN_GEN_ALL_CORE_FT
acetaminophen 325 mg oral tablet: 2 tab(s) orally every 6 hours As needed Mild Pain (1 - 3), Moderate Pain (4 - 6)  atorvastatin 80 mg oral tablet: 1 tab(s) orally once a day (at bedtime)  DilTIAZem (Eqv-Cardizem CD) 180 mg/24 hours oral capsule, extended release: 1 cap(s) orally once a day  Eliquis 2.5 mg oral tablet: 1 tab(s) orally 2 times a day  Flomax 0.4 mg oral capsule: 1 cap(s) orally 2 times a day  isosorbide mononitrate 30 mg oral tablet, extended release: 1 tab(s) orally once a day  metoprolol tartrate 50 mg oral tablet: 1 tab(s) orally 2 times a day  mycophenolate mofetil 500 mg oral tablet: 2 tab(s) orally once a day  oxyCODONE 15 mg oral tablet: 1 tab(s) orally every 8 hours As needed Severe Pain (7 - 10)  pantoprazole 40 mg oral delayed release tablet: 1 tab(s) orally once a day  polyethylene glycol 3350 oral powder for reconstitution: 17 gram(s) orally 2 times a day as needed for  constipation  senna leaf extract oral tablet: 2 tab(s) orally once a day (at bedtime)  tacrolimus 1 mg oral tablet, extended release: 1 tab(s) orally 2 times a day   atorvastatin 80 mg oral tablet: 1 tab(s) orally once a day (at bedtime)  Eliquis 2.5 mg oral tablet: 1 tab(s) orally 2 times a day  Flomax 0.4 mg oral capsule: 1 cap(s) orally 2 times a day  isosorbide mononitrate 30 mg oral tablet, extended release: 1 tab(s) orally once a day  metoprolol tartrate 50 mg oral tablet: 1 tab(s) orally 2 times a day  mycophenolate mofetil 500 mg oral tablet: 2 tab(s) orally once a day  oxyCODONE 15 mg oral tablet: 1 tab(s) orally every 8 hours As needed Severe Pain (7 - 10)  pantoprazole 40 mg oral delayed release tablet: 1 tab(s) orally once a day  polyethylene glycol 3350 oral powder for reconstitution: 17 gram(s) orally 2 times a day as needed for  constipation  senna leaf extract oral tablet: 2 tab(s) orally once a day (at bedtime)  tacrolimus 1 mg oral tablet, extended release: 1 tab(s) orally 2 times a day   acetaminophen 325 mg oral tablet: 2 tab(s) orally every 8 hours  atorvastatin 80 mg oral tablet: 1 tab(s) orally once a day (at bedtime)  Cardizem  mg/24 hours oral capsule, extended release: 1 cap(s) orally once a day  DULoxetine 30 mg oral delayed release capsule: 1 cap(s) orally once a day  Eliquis 2.5 mg oral tablet: 1 tab(s) orally 2 times a day  Flomax 0.4 mg oral capsule: 1 cap(s) orally 2 times a day  gabapentin 300 mg oral capsule: 1 cap(s) orally once a day (at bedtime)  isosorbide mononitrate 30 mg oral tablet, extended release: 1 tab(s) orally once a day  methocarbamol 750 mg oral tablet: 1 tab(s) orally every 8 hours  metoprolol tartrate 50 mg oral tablet: 1 tab(s) orally 2 times a day  mycophenolate mofetil 500 mg oral tablet: 2 tab(s) orally once a day  oxyCODONE 15 mg oral tablet: 1 tab(s) orally every 8 hours As needed Severe Pain (7 - 10)  pantoprazole 40 mg oral delayed release tablet: 1 tab(s) orally once a day  polyethylene glycol 3350 oral powder for reconstitution: 17 gram(s) orally 2 times a day as needed for  constipation  senna leaf extract oral tablet: 2 tab(s) orally once a day (at bedtime)  tacrolimus 1 mg oral tablet, extended release: 1 tab(s) orally 2 times a day

## 2024-06-05 NOTE — PROGRESS NOTE ADULT - ASSESSMENT
status post  donor Kidney transplant at [facility] 7 years ago  baseline Cr "upper 1's to low 2's"  afib / svt  Pancytopenia / anemia   CAD / HFpEF  HTN  Cirrhosis    plan:    cont tacrolimus, current level elevated but not a true trough, will repeat level am  cont mycophenolate  daily BMP  ep f/u

## 2024-06-05 NOTE — CONSULT NOTE ADULT - SUBJECTIVE AND OBJECTIVE BOX
Neurosurgery Consultation Note     69-year-old with PMHx of HTN, HLD, HFpEF (70-75%) grade 2 DD, ESRD s/p kidney transplant 8 years ago at [facility], chronic hep C, A-fib SP ablation preformed 5/12, brought in by ambulance with chief complaint of palpitations.  Per EMS patient's heart rate was in the 180s identified as SVT. Patient was given a total of 30 mg of adenosine by EMS without reduction in heart rate.  Patient was then given 20mg of Cardizem, 60mcg fentanyl for sciatica pain and heart rate subsequently slowed down into the 80s.  Patient was normotensive.  Patient states that this morning his heart rate was 180 and remained tachycardic throughout the day.  Patient states that he did have some left-sided chest pain this morning but that has since resolved.  Denies any fevers, trauma, URI symptoms, SOB, current CP, palpitations, abdominal pain, n/v/d or dysruia. Pt states he follows with Dr Henderson for cardiology. Pt has to sciatica pain.    ---------------------  Neurosurgery    Pt seen and examined at the bedside.  MRI L spine reviewed demonstrating interval obliteration of the L4-5 disc space since the prior lumbar MRI from 11/27/2007 likely reflecting sequela of discitis osteomyelitis. Interval right L5 hemilaminectomy. Interval progression of multilevel degenerative changes, most severe at L5-S1 with severe bilateral neuroforaminal stenosis with flattening of the exiting bilateral L5 nerve roots, and L3-4 with moderate-severe right/mild left neuroforaminal stenosis.    At present time pt is resting in bed comfortably.  Pt c/o sharp pain from Left buttocks radiating posteriorly down to Lt knee, intermittently relieved with analgesics.  Pt with an intact neurological exam PELAYO x4 with good strength, no C/T/L spinal TTP.         Subjective: 69yMale with a pmhx of Other specified abnormal findings of blood chemistry    FH: dementia    FHx: breast cancer    Handoff    MEWS Score    CKD (chronic kidney disease)    ESRD (end stage renal disease)    HTN (hypertension)    HLD (hyperlipidemia)    Atrial fibrillation    COPD, mild    Sensory neuropathy    Peripheral neuropathy    Lymphoma    Melanoma    Cirrhosis    Breast cancer    Legally blind    Stroke    History of heroin abuse    Hepatitis-C    History of chronic hepatitis    Marijuana use    Elevated troponin    Supraventricular tachycardia    History of kidney transplant    S/P arteriovenous (AV) fistula creation    S/P lumpectomy, right breast    History of back surgery    History of loop recorder    SVT    16    SysAdmin_VstLnk      s/p     Allergies    Rituxan (Hives)    Intolerances        Vital Signs Last 24 Hrs  T(C): 36.7 (05 Jun 2024 12:43), Max: 36.7 (04 Jun 2024 19:41)  T(F): 98 (05 Jun 2024 12:43), Max: 98.1 (04 Jun 2024 19:41)  HR: 54 (05 Jun 2024 12:43) (54 - 67)  BP: 127/68 (05 Jun 2024 12:43) (105/62 - 129/80)  BP(mean): --  RR: 18 (05 Jun 2024 12:43) (16 - 18)  SpO2: 100% (05 Jun 2024 12:43) (98% - 100%)      acetaminophen     Tablet .. 650 milliGRAM(s) Oral every 8 hours  aluminum hydroxide/magnesium hydroxide/simethicone Suspension 30 milliLiter(s) Oral every 4 hours PRN  apixaban 2.5 milliGRAM(s) Oral every 12 hours  atorvastatin 80 milliGRAM(s) Oral at bedtime  diltiazem    milliGRAM(s) Oral daily  DULoxetine 30 milliGRAM(s) Oral daily  gabapentin 300 milliGRAM(s) Oral at bedtime  HYDROmorphone  Injectable 0.5 milliGRAM(s) IV Push every 6 hours PRN  melatonin 3 milliGRAM(s) Oral at bedtime PRN  methocarbamol 750 milliGRAM(s) Oral every 8 hours  metoprolol tartrate 50 milliGRAM(s) Oral two times a day  mycophenolate mofetil 500 milliGRAM(s) Oral two times a day  ondansetron Injectable 4 milliGRAM(s) IV Push every 8 hours PRN  oxyCODONE    IR 15 milliGRAM(s) Oral every 8 hours PRN  pantoprazole    Tablet 40 milliGRAM(s) Oral before breakfast  polyethylene glycol 3350 17 Gram(s) Oral two times a day PRN  senna 2 Tablet(s) Oral at bedtime  tacrolimus 1 milliGRAM(s) Oral two times a day  tamsulosin 0.4 milliGRAM(s) Oral at bedtime        06-04-24 @ 07:01  -  06-05-24 @ 07:00  --------------------------------------------------------  IN: 0 mL / OUT: 300 mL / NET: -300 mL    06-05-24 @ 07:01  -  06-05-24 @ 17:12  --------------------------------------------------------  IN: 762 mL / OUT: 720 mL / NET: 42 mL        REVIEW OF SYSTEMS    [ ] A ten-point review of systems was otherwise negative except as noted.  [ ] Due to altered mental status/intubation, subjective information were not able to be obtained from the patient. History was obtained, to the extent possible, from review of the chart and collateral sources of information.      Exam:  AAOX3. Verbal function intact  tongue midline, facial motions symmetric  PERRLA, EOMI  Pronator Drift:   Finger to Nose intact  Motor: MAEx4, 5/5 power in b/l UE and LE  Sensation: intact to touch in all extremities        CBC Full  -  ( 05 Jun 2024 07:07 )  WBC Count : 1.85 K/uL  RBC Count : 2.72 M/uL  Hemoglobin : 8.3 g/dL  Hematocrit : 27.6 %  Platelet Count - Automated : 83 K/uL  Mean Cell Volume : 101.5 fL  Mean Cell Hemoglobin : 30.5 pg  Mean Cell Hemoglobin Concentration : 30.1 g/dL  Auto Neutrophil # : x  Auto Lymphocyte # : x  Auto Monocyte # : x  Auto Eosinophil # : x  Auto Basophil # : x  Auto Neutrophil % : x  Auto Lymphocyte % : x  Auto Monocyte % : x  Auto Eosinophil % : x  Auto Basophil % : x    06-05    134<L>  |  107  |  21<H>  ----------------------------<  99  4.6   |  22  |  1.4    Ca    9.9      05 Jun 2024 07:07  Mg     2.5     06-03    TPro  5.2<L>  /  Alb  3.6  /  TBili  0.7  /  DBili  x   /  AST  23  /  ALT  16  /  AlkPhos  87  06-04    PT/INR - ( 03 Jun 2024 18:37 )   PT: 15.50 sec;   INR: 1.36 ratio         PTT - ( 03 Jun 2024 18:37 )  PTT:30.0 sec        Wound:    Imaging:  < from: MR Lumbar Spine No Cont (06.04.24 @ 19:01) >    IMPRESSION:    Interval obliteration of the L4-5 disc space since the prior lumbar MRI   from 11/27/2007 likely reflecting sequela of discitis osteomyelitis.   Interval right L5 hemilaminectomy.    Interval progression of multilevel degenerative changes, most severe at   L5-S1 with severe bilateral neuroforaminal stenosis with flattening of   the exiting bilateral L5 nerve roots, and L3-4 with moderate-severe   right/mild left neuroforaminal stenosis.    Focal edema in the ventral epidural space at L5 likely reflecting   engorged venous plexus, likely inflammatory in association with L5-S1   with degenerative changes.    Diffusely decreased heterogeneous vertebral marrow signal which could   reflect osteopenia.    --- End of Report ---          MAURO GRAY MD; Resident Radiologist  This document has been electronically signed.  TARA MCGHEE MD; Attending Radiologist  This document has been electronically signed. Jun 5 2024  3:49PM    < end of copied text >      Assessment/Plan:     Images reviewed  No acute Neurosurgical intervention  Stable Neurological exam  recommend IR guided transformational steroid injections  Outpt follow up with Dr Arnold x2 weeks of discharge   Plan and care discussed with Dr Arnold and primary team       Neurosurgery Consultation Note     69-year-old with PMHx of HTN, HLD, HFpEF (70-75%) grade 2 DD, ESRD s/p kidney transplant 8 years ago at [facility], chronic hep C, A-fib SP ablation preformed 5/12, brought in by ambulance with chief complaint of palpitations.  Per EMS patient's heart rate was in the 180s identified as SVT. Patient was given a total of 30 mg of adenosine by EMS without reduction in heart rate.  Patient was then given 20mg of Cardizem, 60mcg fentanyl for sciatica pain and heart rate subsequently slowed down into the 80s.  Patient was normotensive.  Patient states that this morning his heart rate was 180 and remained tachycardic throughout the day.  Patient states that he did have some left-sided chest pain this morning but that has since resolved.  Denies any fevers, trauma, URI symptoms, SOB, current CP, palpitations, abdominal pain, n/v/d or dysruia. Pt states he follows with Dr Henderson for cardiology. Pt has to sciatica pain.    ---------------------  Neurosurgery    Pt seen and examined at the bedside.  MRI L spine reviewed demonstrating interval obliteration of the L4-5 disc space since the prior lumbar MRI from 11/27/2007 likely reflecting sequela of discitis osteomyelitis. Interval right L5 hemilaminectomy. Interval progression of multilevel degenerative changes, most severe at L5-S1 with severe bilateral neuroforaminal stenosis with flattening of the exiting bilateral L5 nerve roots, and L3-4 with moderate-severe right/mild left neuroforaminal stenosis.    At present time pt is resting in bed comfortably.  Pt c/o sharp pain from Left buttocks radiating posteriorly down to Lt knee, intermittently relieved with analgesics.  Pt with an intact neurological exam PELAYO x4 with good strength, no C/T/L spinal TTP.         Subjective: 69yMale with a pmhx of Other specified abnormal findings of blood chemistry    FH: dementia    FHx: breast cancer    Handoff    MEWS Score    CKD (chronic kidney disease)    ESRD (end stage renal disease)    HTN (hypertension)    HLD (hyperlipidemia)    Atrial fibrillation    COPD, mild    Sensory neuropathy    Peripheral neuropathy    Lymphoma    Melanoma    Cirrhosis    Breast cancer    Legally blind    Stroke    History of heroin abuse    Hepatitis-C    History of chronic hepatitis    Marijuana use    Elevated troponin    Supraventricular tachycardia    History of kidney transplant    S/P arteriovenous (AV) fistula creation    S/P lumpectomy, right breast    History of back surgery    History of loop recorder    SVT    16    SysAdmin_VstLnk      s/p     Allergies    Rituxan (Hives)    Intolerances        Vital Signs Last 24 Hrs  T(C): 36.7 (05 Jun 2024 12:43), Max: 36.7 (04 Jun 2024 19:41)  T(F): 98 (05 Jun 2024 12:43), Max: 98.1 (04 Jun 2024 19:41)  HR: 54 (05 Jun 2024 12:43) (54 - 67)  BP: 127/68 (05 Jun 2024 12:43) (105/62 - 129/80)  BP(mean): --  RR: 18 (05 Jun 2024 12:43) (16 - 18)  SpO2: 100% (05 Jun 2024 12:43) (98% - 100%)      acetaminophen     Tablet .. 650 milliGRAM(s) Oral every 8 hours  aluminum hydroxide/magnesium hydroxide/simethicone Suspension 30 milliLiter(s) Oral every 4 hours PRN  apixaban 2.5 milliGRAM(s) Oral every 12 hours  atorvastatin 80 milliGRAM(s) Oral at bedtime  diltiazem    milliGRAM(s) Oral daily  DULoxetine 30 milliGRAM(s) Oral daily  gabapentin 300 milliGRAM(s) Oral at bedtime  HYDROmorphone  Injectable 0.5 milliGRAM(s) IV Push every 6 hours PRN  melatonin 3 milliGRAM(s) Oral at bedtime PRN  methocarbamol 750 milliGRAM(s) Oral every 8 hours  metoprolol tartrate 50 milliGRAM(s) Oral two times a day  mycophenolate mofetil 500 milliGRAM(s) Oral two times a day  ondansetron Injectable 4 milliGRAM(s) IV Push every 8 hours PRN  oxyCODONE    IR 15 milliGRAM(s) Oral every 8 hours PRN  pantoprazole    Tablet 40 milliGRAM(s) Oral before breakfast  polyethylene glycol 3350 17 Gram(s) Oral two times a day PRN  senna 2 Tablet(s) Oral at bedtime  tacrolimus 1 milliGRAM(s) Oral two times a day  tamsulosin 0.4 milliGRAM(s) Oral at bedtime        06-04-24 @ 07:01  -  06-05-24 @ 07:00  --------------------------------------------------------  IN: 0 mL / OUT: 300 mL / NET: -300 mL    06-05-24 @ 07:01  -  06-05-24 @ 17:12  --------------------------------------------------------  IN: 762 mL / OUT: 720 mL / NET: 42 mL        REVIEW OF SYSTEMS    [ ] A ten-point review of systems was otherwise negative except as noted.  [ ] Due to altered mental status/intubation, subjective information were not able to be obtained from the patient. History was obtained, to the extent possible, from review of the chart and collateral sources of information.      Exam:  AAOX3. Verbal function intact  tongue midline, facial motions symmetric  PERRLA, EOMI  Pronator Drift:   Finger to Nose intact  Motor: MAEx4, 5/5 power in b/l UE and LE  Sensation: intact to touch in all extremities        CBC Full  -  ( 05 Jun 2024 07:07 )  WBC Count : 1.85 K/uL  RBC Count : 2.72 M/uL  Hemoglobin : 8.3 g/dL  Hematocrit : 27.6 %  Platelet Count - Automated : 83 K/uL  Mean Cell Volume : 101.5 fL  Mean Cell Hemoglobin : 30.5 pg  Mean Cell Hemoglobin Concentration : 30.1 g/dL  Auto Neutrophil # : x  Auto Lymphocyte # : x  Auto Monocyte # : x  Auto Eosinophil # : x  Auto Basophil # : x  Auto Neutrophil % : x  Auto Lymphocyte % : x  Auto Monocyte % : x  Auto Eosinophil % : x  Auto Basophil % : x    06-05    134<L>  |  107  |  21<H>  ----------------------------<  99  4.6   |  22  |  1.4    Ca    9.9      05 Jun 2024 07:07  Mg     2.5     06-03    TPro  5.2<L>  /  Alb  3.6  /  TBili  0.7  /  DBili  x   /  AST  23  /  ALT  16  /  AlkPhos  87  06-04    PT/INR - ( 03 Jun 2024 18:37 )   PT: 15.50 sec;   INR: 1.36 ratio         PTT - ( 03 Jun 2024 18:37 )  PTT:30.0 sec        Wound:    Imaging:  < from: MR Lumbar Spine No Cont (06.04.24 @ 19:01) >    IMPRESSION:    Interval obliteration of the L4-5 disc space since the prior lumbar MRI   from 11/27/2007 likely reflecting sequela of discitis osteomyelitis.   Interval right L5 hemilaminectomy.    Interval progression of multilevel degenerative changes, most severe at   L5-S1 with severe bilateral neuroforaminal stenosis with flattening of   the exiting bilateral L5 nerve roots, and L3-4 with moderate-severe   right/mild left neuroforaminal stenosis.    Focal edema in the ventral epidural space at L5 likely reflecting   engorged venous plexus, likely inflammatory in association with L5-S1   with degenerative changes.    Diffusely decreased heterogeneous vertebral marrow signal which could   reflect osteopenia.    --- End of Report ---          MAURO GRAY MD; Resident Radiologist  This document has been electronically signed.  TARA MCGHEE MD; Attending Radiologist  This document has been electronically signed. Jun 5 2024  3:49PM    < end of copied text >      Assessment/Plan:     Images reviewed  No acute Neurosurgical intervention  Stable Neurological exam  recommend IR guided transformational steroid injections, targeting Left L5-S1  Outpt follow up with Dr Arnold x2 weeks of discharge   Plan and care discussed with Dr Arnold and primary team

## 2024-06-05 NOTE — PROGRESS NOTE ADULT - ASSESSMENT
Patient is a  70 yo M w/PMH of HTN, HLD, HFpEF (70-75%) grade 2 DD, ESRD s/p kidney transplant 8 years ago at [place], chronic hep C, and A-fib SP ablation performed on 5/12, now presenting for palpitations w/HR in 180s identified as SVT, resolved with 30 mg of adenosine and 20 mg of cardizem, now admitted to telemetry for work up and monitoring of SVT. Overnight, he developed another episode of SVT that resolved with total of 18 mg of adenosine and 30 mg of cardizem.    #SVT, chronic Afib post recurrent ablation  - ON Eliquis 2,5mg due to high bleeding risk (subtherapeutic) CHADsVaSc: 5 (stroke reported by patient caused decreased visual acuity)  - Per EMS was   - Patient stressed that he is compliant with home meds yet was not sure of names and timing as he has home health aide that help him out, doubt medication compliance at home  - EKG show sinus tachycardia with elevated aVR deep symmetrical T waves inferio-anterior with STD mostly strain with LVH and tachycardia as patient is off pain  - Trop elevated to be trended, EKG to be trended unlikely ACS  - SP Cardizem push 30mg IV by EMS  - will resume home Diltazem and lopressor  - Had another episode of SVT O/N on 6/4 that resolved with total of 18 mg adenosine and 30 mg of cardizem  - Started PO cardizem 240 mg daily overnight  - Has loop recorder  - EP consult recs (6/4): Possible PPM this admission- will check loop recorder  - f/u final EP recs    #Sciatica unlikely/Pain/History if substance abuse  - Leg raise test in negative, pain description is atypical  - Patient looks severely depressed disheveled uses Marijuana and used some other ORAL PILLS of abuse he does not know dishevelled has no family only aide at home  - Pain management consult  - On Oxy 15mg tid PRN at home  - Left groin ecchymosis 2/2 old hematoma 2/2 ablation fem access complication  - Pain regimen: Tylenol 650 mg q8hr standing, Oxycodone 15mg q8hr PRN, Hydromorphone 0.5mg IV q6hr PRN for break through pain, Methocarbamol 750mg q8hr standing, Gabapentin 300mg qHS, Duloxetine 30mg daily, Lidocaine 4% patch  - Encourage PT/ambulation  - Appreciate pain management recs  - Patient requesting MRI for sciatica  - Pending MRI lumbar spine noncon read  - PT recs (6/4): Home PT    #SP renal transplant  - c/w home Tacrolimus and MMF  - Cr 1.5 -> 1.3 (improved from 2 at 2.5 last admission) eGFR 50  - no SANTA     #DD grade 2, history of HFpEF, compensated, EF 70%, Stage B  - BNP elevated mostly 2/2 SVT  - CXR and on exam patient is euvolemic    #Chronic thrombocytopenia, recent groin hematoma  - 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/Pancytopenia  - Malignancy history is remote, treated no on current chemotherapy  - Chemotherapy might have caused his chronic Peripheral neuropathy    #Visual impaired  - reg precaution    #MISC  - DVT PPx: Eliquis  - GI PPx: Protonix  - Activity: AAT  - Diet: DASH  - CODE: Full Code    Pending: f/u final EP recs for possible PPM, PT recs       Patient is a  70 yo M w/PMH of HTN, HLD, HFpEF (70-75%) grade 2 DD, ESRD s/p kidney transplant 8 years ago at [place], chronic hep C, and A-fib SP ablation performed on 5/12, now presenting for palpitations w/HR in 180s identified as SVT, resolved with 30 mg of adenosine and 20 mg of cardizem, now admitted to telemetry for work up and monitoring of SVT. Overnight, he developed another episode of SVT that resolved with total of 18 mg of adenosine and 30 mg of cardizem.    #SVT, chronic Afib post recurrent ablation  - ON Eliquis 2,5mg due to high bleeding risk (subtherapeutic) CHADsVaSc: 5 (stroke reported by patient caused decreased visual acuity)  - Per EMS was   - Patient stressed that he is compliant with home meds yet was not sure of names and timing as he has home health aide that help him out, doubt medication compliance at home  - EKG show sinus tachycardia with elevated aVR deep symmetrical T waves inferio-anterior with STD mostly strain with LVH and tachycardia as patient is off pain  - Trop elevated to be trended, EKG to be trended unlikely ACS  - SP Cardizem push 30mg IV by EMS  - will resume home Diltazem and lopressor  - Had another episode of SVT O/N on 6/4 that resolved with total of 18 mg adenosine and 30 mg of cardizem  - Started PO cardizem 240 mg daily overnight  - Has loop recorder  - As per EP, no need for PPM placement, outpatient follow up    #Sciatica unlikely/Pain/History if substance abuse  - Leg raise test in negative, pain description is atypical  - Patient looks severely depressed disheveled uses Marijuana and used some other ORAL PILLS of abuse he does not know dishevelled has no family only aide at home  - Pain management consult  - On Oxy 15mg tid PRN at home  - Left groin ecchymosis 2/2 old hematoma 2/2 ablation fem access complication  - Pain regimen: Tylenol 650 mg q8hr standing, Oxycodone 15mg q8hr PRN, Hydromorphone 0.5mg IV q6hr PRN for break through pain, Methocarbamol 750mg q8hr standing, Gabapentin 300mg qHS, Duloxetine 30mg daily, Lidocaine 4% patch  - Encourage PT/ambulation  - Appreciate pain management recs  - Patient requesting MRI for sciatica  - Pending MRI lumbar spine noncon read, f/u with neurosurgery  - PT recs (6/4): Home PT    #SP renal transplant  - c/w home Tacrolimus and MMF  - Cr 1.5 -> 1.3 (improved from 2 at 2.5 last admission) eGFR 50  - no SANTA     #DD grade 2, history of HFpEF, compensated, EF 70%, Stage B  - BNP elevated mostly 2/2 SVT  - CXR and on exam patient is euvolemic    #Chronic thrombocytopenia, recent groin hematoma  - 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/Pancytopenia  - Malignancy history is remote, treated no on current chemotherapy  - Chemotherapy might have caused his chronic Peripheral neuropathy    #Visual impaired  - reg precaution    #MISC  - DVT PPx: Eliquis  - GI PPx: Protonix  - Activity: AAT  - Diet: DASH  - CODE: Full Code    Pending: MR lumbar spine read, possible D/c today

## 2024-06-05 NOTE — DISCHARGE NOTE PROVIDER - CARE PROVIDER_API CALL
Porsha Henderson  Cardiac Electrophysiology  [address] [place] [address]  Phone: [phone]  Fax: [phone]  Established Patient  Follow Up Time: 2 weeks    NAING, SUNDEE [address] [place] [address]  Phone: ()-  Fax: ()-  Established Patient  Follow Up Time: 2 weeks   KAMERON, SUNDEE  [address] [facility] [place] [address]  Phone: ()-  Fax: ()-  Established Patient  Follow Up Time: 2 weeks   ASHLEY MELO  [address] [place] [address]  Phone: ()-  Fax: ()-  Established Patient  Follow Up Time: 2 weeks    Lion Stuart  Neuroradiology  [address] [place] [address]  Phone: [phone]  Fax: [phone]  Follow Up Time: 2 weeks

## 2024-06-05 NOTE — DISCHARGE NOTE PROVIDER - NSDCFUSCHEDAPPT_GEN_ALL_CORE_FT
Porsha Henderson  [facility] Physician [facility]  ELECTROPH [address]  Scheduled Appointment: 06/06/2024    Adele Arnold  [facility]  NEUROSURG [address]  Scheduled Appointment: 06/12/2024    [facility]  CARDIOLOGY [address]  Scheduled Appointment: 07/17/2024     Adele Arnold  [facility]  NEUROSURG [address]  Scheduled Appointment: 06/27/2024    Porsha Henderson  [facility]  ELECTROPH [address]  Scheduled Appointment: 07/11/2024    [facility]  CARDIOLOGY [address]  Scheduled Appointment: 07/17/2024

## 2024-06-05 NOTE — DISCHARGE NOTE PROVIDER - HOSPITAL COURSE
Patient is a  68 yo M w/PMH of HTN, HLD, HFpEF (70-75%) grade 2 DD, ESRD s/p kidney transplant 8 years ago at [place], chronic hep C, and A-fib SP ablation performed on 5/12, now presenting for palpitations w/HR in 180s identified as SVT, resolved with 30 mg of adenosine and 20 mg of cardizem, now admitted to telemetry for work up and monitoring of SVT. Overnight, he developed another episode of SVT that resolved with total of 18 mg of adenosine and 30 mg of cardizem.    #SVT, chronic Afib post recurrent ablation  - ON Eliquis 2,5mg due to high bleeding risk (subtherapeutic) CHADsVaSc: 5 (stroke reported by patient caused decreased visual acuity)  - Per EMS was   - Patient stressed that he is compliant with home meds yet was not sure of names and timing as he has home health aide that help him out, doubt medication compliance at home  - EKG show sinus tachycardia with elevated aVR deep symmetrical T waves inferio-anterior with STD mostly strain with LVH and tachycardia as patient is off pain  - Trop elevated to be trended, EKG to be trended unlikely ACS  - SP Cardizem push 30mg IV by EMS  - will resume home Diltazem and lopressor  - Had another episode of SVT O/N on 6/4 that resolved with total of 18 mg adenosine and 30 mg of cardizem  - Started PO cardizem 240 mg daily overnight  - Has loop recorder  - As per EP, no need for PPM placement, outpatient follow up    #Sciatica unlikely/Pain/History if substance abuse  - Leg raise test in negative, pain description is atypical  - Patient looks severely depressed disheveled uses Marijuana and used some other ORAL PILLS of abuse he does not know dishevelled has no family only aide at home  - Pain management consult  - On Oxy 15mg tid PRN at home  - Left groin ecchymosis 2/2 old hematoma 2/2 ablation fem access complication  - Pain regimen: Tylenol 650 mg q8hr standing, Oxycodone 15mg q8hr PRN, Hydromorphone 0.5mg IV q6hr PRN for break through pain, Methocarbamol 750mg q8hr standing, Gabapentin 300mg qHS, Duloxetine 30mg daily, Lidocaine 4% patch  - Encourage PT/ambulation  - Appreciate pain management recs  - Patient requesting MRI for sciatica  - Pending MRI lumbar spine noncon read, f/u with neurosurgery  - PT recs (6/4): Home PT    #SP renal transplant  - c/w home Tacrolimus and MMF  - Cr 1.5 -> 1.3 (improved from 2 at 2.5 last admission) eGFR 50  - no SANTA     #DD grade 2, history of HFpEF, compensated, EF 70%, Stage B  - BNP elevated mostly 2/2 SVT  - CXR and on exam patient is euvolemic    #Chronic thrombocytopenia, recent groin hematoma  - 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 with rt eye blind/Pancytopenia  - Malignancy history is remote, treated not on current chemotherapy    #Visual impaired  - reg precaution       Patient is a  70 yo M w/PMH of HTN, HLD, HFpEF (70-75%) grade 2 DD, ESRD s/p kidney transplant 8 years ago at [place], chronic hep C, and A-fib SP ablation performed on 5/12, now presenting for palpitations w/HR in 180s identified as SVT, resolved with 30 mg of adenosine and 20 mg of cardizem, now admitted to telemetry for work up and monitoring of SVT. Overnight, he developed another episode of SVT that resolved with total of 18 mg of adenosine and 30 mg of cardizem.    #SVT, chronic Afib post recurrent ablation  - c/w PO cardizem 240 mg daily and home lopressor  - Has loop recorder  - As per EP, no need for PPM placement, outpatient follow up    #Sciatica unlikely/Pain/History if substance abuse  - Pain regimen: Tylenol 650 mg q8hr standing, Oxycodone 15mg q8hr PRN, Hydromorphone 0.5mg IV q6hr PRN for break through pain, Methocarbamol 750mg q8hr standing, Gabapentin 300mg qHS, Duloxetine 30mg daily, Lidocaine 4% patch  - Appreciate pain management recs  - Pending MRI lumbar spine noncon read, f/u with neurosurgery  - Home PT  - F/u w/Dr. 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 with rt eye blind/Pancytopenia  - Malignancy history is remote, treated not on current chemotherapy    #Visual impaired  - reg precaution       Patient is a  68 yo M w/PMH of HTN, HLD, HFpEF (70-75%) grade 2 DD, ESRD s/p kidney transplant 8 years ago at [place], chronic hep C, and A-fib SP ablation performed on 5/12, now presenting for palpitations w/HR in 180s identified as SVT, resolved with 30 mg of adenosine and 20 mg of cardizem, now admitted to telemetry for work up and monitoring of SVT. Overnight, he developed another episode of SVT that resolved with total of 18 mg of adenosine and 30 mg of cardizem.    #SVT, chronic Afib post recurrent ablation  - c/w PO cardizem 240 mg daily and home lopressor  - s/p AVN ablation and PPM implantation on 6/7  - f/u with EP in one month    #Sciatica unlikely/Pain/History if substance abuse  - Pain regimen: Tylenol 650 mg q8hr standing, Oxycodone 15mg q8hr PRN, Hydromorphone 0.5mg IV q6hr PRN for break through pain, Methocarbamol 750mg q8hr standing, Gabapentin 300mg qHS, Duloxetine 30mg daily, Lidocaine 4% patch  - Appreciate pain management recs  - MRI lumbar spine noncon (6/4): Focal edema in the ventral epidural space at L5 likely reflecting engorged venous plexus, likely inflammatory in association with L5-S1 with degenerative changes  - F/u w/Dr. 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 with rt eye blind/Pancytopenia  - Malignancy history is remote, treated not on current chemotherapy    #Visual impaired  - reg precaution

## 2024-06-06 ENCOUNTER — APPOINTMENT (OUTPATIENT)
Dept: ELECTROPHYSIOLOGY | Facility: CLINIC | Age: 69
End: 2024-06-06

## 2024-06-06 LAB
GLUCOSE BLDC GLUCOMTR-MCNC: 126 MG/DL — HIGH (ref 70–99)
TACROLIMUS SERPL-MCNC: 12.2 NG/ML — SIGNIFICANT CHANGE UP
TACROLIMUS SERPL-MCNC: 4.3 NG/ML — SIGNIFICANT CHANGE UP

## 2024-06-06 PROCEDURE — 99232 SBSQ HOSP IP/OBS MODERATE 35: CPT

## 2024-06-06 RX ADMIN — METHOCARBAMOL 750 MILLIGRAM(S): 500 TABLET, FILM COATED ORAL at 05:59

## 2024-06-06 RX ADMIN — METHOCARBAMOL 750 MILLIGRAM(S): 500 TABLET, FILM COATED ORAL at 15:28

## 2024-06-06 RX ADMIN — Medication 650 MILLIGRAM(S): at 22:01

## 2024-06-06 RX ADMIN — Medication 240 MILLIGRAM(S): at 05:55

## 2024-06-06 RX ADMIN — Medication 650 MILLIGRAM(S): at 16:00

## 2024-06-06 RX ADMIN — Medication 50 MILLIGRAM(S): at 05:55

## 2024-06-06 RX ADMIN — OXYCODONE HYDROCHLORIDE 15 MILLIGRAM(S): 5 TABLET ORAL at 22:11

## 2024-06-06 RX ADMIN — METHOCARBAMOL 750 MILLIGRAM(S): 500 TABLET, FILM COATED ORAL at 22:05

## 2024-06-06 RX ADMIN — PANTOPRAZOLE SODIUM 40 MILLIGRAM(S): 20 TABLET, DELAYED RELEASE ORAL at 05:55

## 2024-06-06 RX ADMIN — Medication 50 MILLIGRAM(S): at 19:33

## 2024-06-06 RX ADMIN — Medication 650 MILLIGRAM(S): at 15:12

## 2024-06-06 RX ADMIN — MYCOPHENOLATE MOFETIL 500 MILLIGRAM(S): 250 CAPSULE ORAL at 19:30

## 2024-06-06 RX ADMIN — TAMSULOSIN HYDROCHLORIDE 0.4 MILLIGRAM(S): 0.4 CAPSULE ORAL at 22:01

## 2024-06-06 RX ADMIN — MYCOPHENOLATE MOFETIL 500 MILLIGRAM(S): 250 CAPSULE ORAL at 05:56

## 2024-06-06 RX ADMIN — DULOXETINE HYDROCHLORIDE 30 MILLIGRAM(S): 30 CAPSULE, DELAYED RELEASE ORAL at 11:22

## 2024-06-06 RX ADMIN — TACROLIMUS 1 MILLIGRAM(S): 5 CAPSULE ORAL at 05:56

## 2024-06-06 RX ADMIN — ATORVASTATIN CALCIUM 80 MILLIGRAM(S): 80 TABLET, FILM COATED ORAL at 22:02

## 2024-06-06 RX ADMIN — Medication 650 MILLIGRAM(S): at 05:54

## 2024-06-06 RX ADMIN — APIXABAN 2.5 MILLIGRAM(S): 2.5 TABLET, FILM COATED ORAL at 05:55

## 2024-06-06 RX ADMIN — TACROLIMUS 1 MILLIGRAM(S): 5 CAPSULE ORAL at 19:30

## 2024-06-06 RX ADMIN — OXYCODONE HYDROCHLORIDE 15 MILLIGRAM(S): 5 TABLET ORAL at 12:15

## 2024-06-06 RX ADMIN — SENNA PLUS 2 TABLET(S): 8.6 TABLET ORAL at 22:00

## 2024-06-06 RX ADMIN — OXYCODONE HYDROCHLORIDE 15 MILLIGRAM(S): 5 TABLET ORAL at 11:22

## 2024-06-06 RX ADMIN — GABAPENTIN 300 MILLIGRAM(S): 400 CAPSULE ORAL at 22:02

## 2024-06-06 RX ADMIN — Medication 650 MILLIGRAM(S): at 07:00

## 2024-06-06 NOTE — PROGRESS NOTE ADULT - ASSESSMENT
69-year-old with PMH of HTN, HLD, HFpEF (70-75%) grade 2 DD, ESRD s/p kidney transplant 8 years ago at [place], chronic hep C, A-fib SP ablation preformed 5/12, brought in by ambulance with chief complaint of palpitations.  Per EMS patient's heart rate was in the 180s identified as SVT.     Palpitations / SVT  Chronic HFpEF  HTN / DL  ESRD s/p renal transplant  H/O Hep C  H/O A-fib s/p ablation  Pancytopenia  Macrocytic anemia               PLAN:    ·	Tele reviewed by me. Episodes of narrow complex tachycardia.   ·	EKG on admission: /min (Interpreted by me.   ·	Cont Cardizem  mg po daily  ·	EP eval pending  ·	Troponin: 113-->154  ·	Pain management eval noted. Recommended to start Methocarbamol 750mg q8hr standing, Gabapentin 300mg qHS and Duloxetine 30 mg po daily. Lidocaine 4% patch to lower back  ·	Lumber spine MRI noted. Interval obliteration of the L4-5 disc space since the prior lumbar MRI from 11/27/2007 likely reflecting sequela of discitis osteomyelitis. Interval right L5 hemilaminectomy.  ·	Neurosurgery eval noted. No acute neurosurgical intervention. Recommended IR guided transformational steroid injections, targeting Left L5-S1  ·	Old record reviewed. ECHO done in March this year showed EF is 70-75%  ·	Nephrology eval noted. Recommended to cont Tacrolimus and check level in AM. Also cont Mycophenolate.   ·	Tacrolimus level is 13.5  ·	Cont his other home meds  ·	Check i's and o's and daily wt  ·	Low salt diet and water restriction to 1.5 L/D  ·	PT eval noted. Recommended home with home PT    Progress Note Handoff    Pending (specify):  Consults_EP_______, Tests________, Test Results_______, Other__IR for steroid injection_______  Family discussion:  Disposition: Home___/SNF___/Other________/Unknown at this time________    Germain Mazariegos MD  Spectra: [phone]

## 2024-06-06 NOTE — PROGRESS NOTE ADULT - ASSESSMENT
status post  donor Kidney transplant at [place] 7 years ago  baseline Cr "upper 1's to low 2's"  afib / svt  Pancytopenia / anemia   CAD / HFpEF  HTN  Cirrhosis    plan:    cont tacrolimus, current level elevated but not a true trough, will repeat level am  cont mycophenolate  daily BMP  ep f/u, ?PPM

## 2024-06-06 NOTE — PROGRESS NOTE ADULT - SUBJECTIVE AND OBJECTIVE BOX
[facility] FOLLOW UP NOTE  --------------------------------------------------------------------------------  24 hour events/subjective: Patient examined. Appears comfortable.    PAST HISTORY  --------------------------------------------------------------------------------  No significant changes to PMH, PSH, FHx, SHx, unless otherwise noted    ALLERGIES & MEDICATIONS  --------------------------------------------------------------------------------  Allergies    Rituxan (Hives)    Standing Inpatient Medications  acetaminophen     Tablet .. 650 milliGRAM(s) Oral every 8 hours  apixaban 2.5 milliGRAM(s) Oral every 12 hours  atorvastatin 80 milliGRAM(s) Oral at bedtime  diltiazem    milliGRAM(s) Oral daily  DULoxetine 30 milliGRAM(s) Oral daily  gabapentin 300 milliGRAM(s) Oral at bedtime  methocarbamol 750 milliGRAM(s) Oral every 8 hours  metoprolol tartrate 50 milliGRAM(s) Oral two times a day  mycophenolate mofetil 500 milliGRAM(s) Oral two times a day  pantoprazole    Tablet 40 milliGRAM(s) Oral before breakfast  senna 2 Tablet(s) Oral at bedtime  tacrolimus 1 milliGRAM(s) Oral two times a day  tamsulosin 0.4 milliGRAM(s) Oral at bedtime    PRN Inpatient Medications  aluminum hydroxide/magnesium hydroxide/simethicone Suspension 30 milliLiter(s) Oral every 4 hours PRN  HYDROmorphone  Injectable 0.5 milliGRAM(s) IV Push every 6 hours PRN  melatonin 3 milliGRAM(s) Oral at bedtime PRN  ondansetron Injectable 4 milliGRAM(s) IV Push every 8 hours PRN  oxyCODONE    IR 15 milliGRAM(s) Oral every 8 hours PRN  polyethylene glycol 3350 17 Gram(s) Oral two times a day PRN    VITALS/PHYSICAL EXAM  --------------------------------------------------------------------------------  T(C): 36.8 (06-06-24 @ 13:48), Max: 36.8 (06-06-24 @ 13:48)  HR: 89 (06-06-24 @ 13:48) (85 - 89)  BP: 103/69 (06-06-24 @ 13:48) (103/69 - 120/67)  RR: 18 (06-06-24 @ 13:48) (18 - 19)  SpO2: 98% (06-06-24 @ 09:11) (98% - 98%)    06-05-24 @ 07:01  -  06-06-24 @ 07:00  --------------------------------------------------------  IN: 1252 mL / OUT: 1770 mL / NET: -518 mL    06-06-24 @ 07:01  -  06-06-24 @ 15:16  --------------------------------------------------------  IN: 406 mL / OUT: 100 mL / NET: 306 mL    Physical Exam:  	Gen: NAD  	Pulm: CTA B/L  	CV: RRR, S1S2  	Abd: +BS, soft, nontender/nondistended  	: No suprapubic tenderness  	LE: Warm,  no edema    LABS/STUDIES  --------------------------------------------------------------------------------              8.3    1.85  >-----------<  83       [06-05-24 @ 07:07]              27.6     134  |  107  |  21  ----------------------------<  99      [06-05-24 @ 07:07]  4.6   |  22  |  1.4        Ca     9.9     [06-05-24 @ 07:07]    Creatinine Trend:  SCr 1.4 [06-05 @ 07:07]  SCr 1.3 [06-04 @ 07:04]  SCr 1.5 [06-03 @ 18:37]  SCr 2.0 [05-18 @ 05:21]  SCr 2.1 [05-17 @ 05:15]    Urinalysis - [06-05-24 @ 07:07]      Color  / Appearance  / SG  / pH       Gluc 99 / Ketone   / Bili  / Urobili        Blood  / Protein  / Leuk Est  / Nitrite       RBC  / WBC  / Hyaline  / Gran  / Sq Epi  / Non Sq Epi  / Bacteria     Iron 28, TIBC 185, %sat 15      [05-18-24 @ 05:21]  Ferritin 803      [05-18-24 @ 05:21]  HbA1c 5.1      [11-06-19 @ 15:00]  TSH 1.98      [03-15-24 @ 15:04]  Lipid: chol 84, TG 65, HDL 32, LDL --      [06-04-24 @ 07:04]

## 2024-06-06 NOTE — PROGRESS NOTE ADULT - SUBJECTIVE AND OBJECTIVE BOX
----------Daily Progress Note----------    HISTORY OF PRESENT ILLNESS:  Patient is a  70 yo M w/PMH of HTN, HLD, HFpEF (70-75%) grade 2 DD, ESRD s/p kidney transplant 8 years ago at [facility], chronic hep C, and A-fib SP ablation performed on 5/12, now presenting for palpitations w/HR in 180s identified as SVT as per EMS w/total of 30 mg of adenosine and 20 mg of cardizem given, and 60 mcg of fentanyl given for sciatic pain, with HR subsequently decreasing into the 80s. Patient was normotensive. Patient reported left-sided chest pain in the morning that resolved w/tachycardia into the 180s that remained throughout the day. He denies any fevers, dyspnea, abdominal pain, nausea, diarrhea, or dysuria. He does endorse sciatic pain.    In the ED, vitals were stable, labs notable for trop 113 -> 154, pBNP 69098, Cr 1.5 (last known was 2.0), and Hgb 8.5 (around baseline), EKG notable for new deep symmetrical T wave anterio-inferior in the setting of LVH (Strain pattern?) with elevation in aVR, and CXR unremarkable.    He is now admitted to telemetry for work up and monitoring of SVT.     Cardiologist: Dr. Henderson    Today is hospital day 3d.     INTERVAL HOSPITAL COURSE / OVERNIGHT EVENTS:  O/N events:  Had episode of tachycardia yesterday with multiple PVCs, EKG showing afib w/RVR    24 hr events:  None    Patient was examined and seen at bedside.     Review of Systems: Otherwise unremarkable     <<<<<PAST MEDICAL & SURGICAL HISTORY>>>>>  CKD (chronic kidney disease)    ESRD (end stage renal disease)    HTN (hypertension)    HLD (hyperlipidemia)    Atrial fibrillation  on eliquis    COPD, mild  not on O2    Peripheral neuropathy  unclear cause    Lymphoma  ?questionable, not treated, not followed    Melanoma  R eye, s/p laser    Cirrhosis  possibly related to hepC    Breast cancer    Legally blind    Stroke    History of heroin abuse    History of chronic hepatitis    Marijuana use    History of kidney transplant  3 years ago    S/P arteriovenous (AV) fistula creation  prior old fistula in R arm    S/P lumpectomy, right breast    History of back surgery  s/p Left L5-S1 endoscopic laminoforaminotomy    History of loop recorder      ALLERGIES  Rituxan (Hives)      Home Medications:  Flomax 0.4 mg oral capsule: 1 cap(s) orally 2 times a day (10 May 2024 11:00)  mycophenolate mofetil 500 mg oral tablet: 2 tab(s) orally once a day (10 May 2024 11:00)  oxyCODONE 15 mg oral tablet: 1 tab(s) orally every 8 hours As needed Severe Pain (7 - 10) (10 May 2024 11:00)  senna leaf extract oral tablet: 2 tab(s) orally once a day (at bedtime) (18 May 2024 14:44)        MEDICATIONS  STANDING MEDICATIONS  acetaminophen     Tablet .. 650 milliGRAM(s) Oral every 8 hours  apixaban 2.5 milliGRAM(s) Oral every 12 hours  atorvastatin 80 milliGRAM(s) Oral at bedtime  diltiazem    milliGRAM(s) Oral daily  DULoxetine 30 milliGRAM(s) Oral daily  gabapentin 300 milliGRAM(s) Oral at bedtime  methocarbamol 750 milliGRAM(s) Oral every 8 hours  metoprolol tartrate 50 milliGRAM(s) Oral two times a day  mycophenolate mofetil 500 milliGRAM(s) Oral two times a day  pantoprazole    Tablet 40 milliGRAM(s) Oral before breakfast  senna 2 Tablet(s) Oral at bedtime  tacrolimus 1 milliGRAM(s) Oral two times a day  tamsulosin 0.4 milliGRAM(s) Oral at bedtime    PRN MEDICATIONS  aluminum hydroxide/magnesium hydroxide/simethicone Suspension 30 milliLiter(s) Oral every 4 hours PRN  HYDROmorphone  Injectable 0.5 milliGRAM(s) IV Push every 6 hours PRN  melatonin 3 milliGRAM(s) Oral at bedtime PRN  ondansetron Injectable 4 milliGRAM(s) IV Push every 8 hours PRN  oxyCODONE    IR 15 milliGRAM(s) Oral every 8 hours PRN  polyethylene glycol 3350 17 Gram(s) Oral two times a day PRN    VITALS:  T(F): 98.1  HR: 85  BP: 120/67  RR: 18  SpO2: 100%    <<<<<PHYSICAL EXAM>>>>>  GENERAL: NAD  PULMONARY: Clear to auscultation bilaterally. No wheezing or crackles  CARDIOVASCULAR: Regular rate and rhythm, S1-S2, no murmurs, rubs, or gallops  BREAST: notable L sided gynecomastia  GASTROINTESTINAL: Soft, non-tender, non-distended, no guarding, + hepatomegaly  SKIN/EXTREMITIES: Warm, 2+ tibialis posterior pulses, no UE or LE edema  NEUROLOGIC/MUSCULOSKELETAL: AOx4, grossly moving all extremities, no focal deficits    <<<<<LABS>>>>>                        8.3    1.85  )-----------( 83       ( 05 Jun 2024 07:07 )             27.6     06-05    134<L>  |  107  |  21<H>  ----------------------------<  99  4.6   |  22  |  1.4    Ca    9.9      05 Jun 2024 07:07        Urinalysis Basic - ( 05 Jun 2024 07:07 )    Color: x / Appearance: x / SG: x / pH: x  Gluc: 99 mg/dL / Ketone: x  / Bili: x / Urobili: x   Blood: x / Protein: x / Nitrite: x   Leuk Esterase: x / RBC: x / WBC x   Sq Epi: x / Non Sq Epi: x / Bacteria: x          [id number]        <<<<<RADIOLOGY>>>>>      ----------------------------------------------------------------------------------------------------------------------------------------------------------------------------------------------- ----------Daily Progress Note----------    HISTORY OF PRESENT ILLNESS:  Patient is a  70 yo M w/PMH of HTN, HLD, HFpEF (70-75%) grade 2 DD, ESRD s/p kidney transplant 8 years ago at [facility], chronic hep C, and A-fib SP ablation performed on 5/12, now presenting for palpitations w/HR in 180s identified as SVT as per EMS w/total of 30 mg of adenosine and 20 mg of cardizem given, and 60 mcg of fentanyl given for sciatic pain, with HR subsequently decreasing into the 80s. Patient was normotensive. Patient reported left-sided chest pain in the morning that resolved w/tachycardia into the 180s that remained throughout the day. He denies any fevers, dyspnea, abdominal pain, nausea, diarrhea, or dysuria. He does endorse sciatic pain.    In the ED, vitals were stable, labs notable for trop 113 -> 154, pBNP 84039, Cr 1.5 (last known was 2.0), and Hgb 8.5 (around baseline), EKG notable for new deep symmetrical T wave anterio-inferior in the setting of LVH (Strain pattern?) with elevation in aVR, and CXR unremarkable.    He is now admitted to telemetry for work up and monitoring of SVT.     Cardiologist: Dr. Henderson    Today is hospital day 3d.     INTERVAL HOSPITAL COURSE / OVERNIGHT EVENTS:  O/N events:  Had episode of tachycardia yesterday with multiple PVCs, EKG showing afib w/RVR    24 hr events:  None    Patient was examined and seen at bedside. Reports improvement in L leg pain, denies any chest pain or palpitations, wants to speak to Dr. Henderson as he is concerned about recurrent arrhythmias, endorses good appetite    Review of Systems: Otherwise unremarkable     <<<<<PAST MEDICAL & SURGICAL HISTORY>>>>>  CKD (chronic kidney disease)    ESRD (end stage renal disease)    HTN (hypertension)    HLD (hyperlipidemia)    Atrial fibrillation  on eliquis    COPD, mild  not on O2    Peripheral neuropathy  unclear cause    Lymphoma  ?questionable, not treated, not followed    Melanoma  R eye, s/p laser    Cirrhosis  possibly related to hepC    Breast cancer    Legally blind    Stroke    History of heroin abuse    History of chronic hepatitis    Marijuana use    History of kidney transplant  3 years ago    S/P arteriovenous (AV) fistula creation  prior old fistula in R arm    S/P lumpectomy, right breast    History of back surgery  s/p Left L5-S1 endoscopic laminoforaminotomy    History of loop recorder      ALLERGIES  Rituxan (Hives)      Home Medications:  Flomax 0.4 mg oral capsule: 1 cap(s) orally 2 times a day (10 May 2024 11:00)  mycophenolate mofetil 500 mg oral tablet: 2 tab(s) orally once a day (10 May 2024 11:00)  oxyCODONE 15 mg oral tablet: 1 tab(s) orally every 8 hours As needed Severe Pain (7 - 10) (10 May 2024 11:00)  senna leaf extract oral tablet: 2 tab(s) orally once a day (at bedtime) (18 May 2024 14:44)        MEDICATIONS  STANDING MEDICATIONS  acetaminophen     Tablet .. 650 milliGRAM(s) Oral every 8 hours  apixaban 2.5 milliGRAM(s) Oral every 12 hours  atorvastatin 80 milliGRAM(s) Oral at bedtime  diltiazem    milliGRAM(s) Oral daily  DULoxetine 30 milliGRAM(s) Oral daily  gabapentin 300 milliGRAM(s) Oral at bedtime  methocarbamol 750 milliGRAM(s) Oral every 8 hours  metoprolol tartrate 50 milliGRAM(s) Oral two times a day  mycophenolate mofetil 500 milliGRAM(s) Oral two times a day  pantoprazole    Tablet 40 milliGRAM(s) Oral before breakfast  senna 2 Tablet(s) Oral at bedtime  tacrolimus 1 milliGRAM(s) Oral two times a day  tamsulosin 0.4 milliGRAM(s) Oral at bedtime    PRN MEDICATIONS  aluminum hydroxide/magnesium hydroxide/simethicone Suspension 30 milliLiter(s) Oral every 4 hours PRN  HYDROmorphone  Injectable 0.5 milliGRAM(s) IV Push every 6 hours PRN  melatonin 3 milliGRAM(s) Oral at bedtime PRN  ondansetron Injectable 4 milliGRAM(s) IV Push every 8 hours PRN  oxyCODONE    IR 15 milliGRAM(s) Oral every 8 hours PRN  polyethylene glycol 3350 17 Gram(s) Oral two times a day PRN    VITALS:  T(F): 98.1  HR: 85  BP: 120/67  RR: 18  SpO2: 100%    <<<<<PHYSICAL EXAM>>>>>  GENERAL: NAD  PULMONARY: Clear to auscultation bilaterally. No wheezing or crackles  CARDIOVASCULAR: Regular rate and rhythm, S1-S2, no murmurs, rubs, or gallops  BREAST: notable L sided gynecomastia  GASTROINTESTINAL: Soft, non-tender, non-distended, no guarding, + hepatomegaly  SKIN/EXTREMITIES: Warm, 2+ tibialis posterior pulses, no UE or LE edema  NEUROLOGIC/MUSCULOSKELETAL: AOx4, grossly moving all extremities, no focal deficits    <<<<<LABS>>>>>                        8.3    1.85  )-----------( 83       ( 05 Jun 2024 07:07 )             27.6     06-05    134<L>  |  107  |  21<H>  ----------------------------<  99  4.6   |  22  |  1.4    Ca    9.9      05 Jun 2024 07:07        Urinalysis Basic - ( 05 Jun 2024 07:07 )    Color: x / Appearance: x / SG: x / pH: x  Gluc: 99 mg/dL / Ketone: x  / Bili: x / Urobili: x   Blood: x / Protein: x / Nitrite: x   Leuk Esterase: x / RBC: x / WBC x   Sq Epi: x / Non Sq Epi: x / Bacteria: x          [id number]        <<<<<RADIOLOGY>>>>>      -----------------------------------------------------------------------------------------------------------------------------------------------------------------------------------------------

## 2024-06-06 NOTE — PROGRESS NOTE ADULT - SUBJECTIVE AND OBJECTIVE BOX
EFFIE DAVNOTE  69y Male    CHIEF COMPLAINT:    Patient is a 69y old  Male who presents with a chief complaint of Palpitations (2024 09:21)      INTERVAL HPI/OVERNIGHT EVENTS:    Patient seen and examined. Pt states that his sciatica pain is under control. No palpitations.     ROS: All other systems are negative.    Vital Signs:    T(F): 98.1 (24 @ 05:59), Max: 98.1 (24 @ 20:42)  HR: 85 (24 @ 05:59) (54 - 89)  BP: 120/67 (24 @ 05:59) (117/61 - 127/68)  RR: 18 (24 @ 05:59) (18 - 19)  SpO2: 98% (24 @ 09:11) (98% - 100%)  I&O's Summary    2024 07:01  -  2024 07:00  --------------------------------------------------------  IN: 1252 mL / OUT: 1770 mL / NET: -518 mL      Daily     Daily Weight in k.2 (2024 05:59)  CAPILLARY BLOOD GLUCOSE          PHYSICAL EXAM:    GENERAL:  NAD  SKIN: No rashes or lesions  HENT: Atraumatic. Normocephalic. PERRL. Moist membranes.  NECK: Supple, No JVD. No lymphadenopathy.  PULMONARY: CTA B/L. No wheezing. No rales  CVS: Normal S1, S2. Rate and Rhythm are regular. No murmurs.  ABDOMEN/GI: Soft, Nontender, Nondistended; BS present  EXTREMITIES: Peripheral pulses intact. No edema B/L LE.  NEUROLOGIC:  No motor or sensory deficit.  PSYCH: Alert & oriented x 3    Consultant(s) Notes Reviewed:  [x ] YES  [ ] NO  Care Discussed with Consultants/Other Providers [ x] YES  [ ] NO    EKG reviewed  Telemetry reviewed    LABS:                        8.3    1.85  )-----------( 83       ( 2024 07:07 )             27.6     06-05    134<L>  |  107  |  21<H>  ----------------------------<  99  4.6   |  22  |  1.4    Ca    9.9      2024 07:07                RADIOLOGY & ADDITIONAL TESTS:    < from: MR Lumbar Spine No Cont (24 @ 19:01) >    IMPRESSION:    Interval obliteration of the L4-5 disc space since the prior lumbar MRI   from 2007 likely reflecting sequela of discitis osteomyelitis.   Interval right L5 hemilaminectomy.    Interval progression of multilevel degenerative changes, most severe at   L5-S1 with severe bilateral neuroforaminal stenosis with flattening of   the exiting bilateral L5 nerve roots, and L3-4 with moderate-severe   right/mild left neuroforaminal stenosis.    Focal edema in the ventral epidural space at L5 likely reflecting   engorged venous plexus, likely inflammatory in association with L5-S1   with degenerative changes.    Diffusely decreased heterogeneous vertebral marrow signal which could   reflect osteopenia.    < end of copied text >    Imaging or report Personally Reviewed:  [ ] YES  [ ] NO    Medications:  Standing  acetaminophen     Tablet .. 650 milliGRAM(s) Oral every 8 hours  apixaban 2.5 milliGRAM(s) Oral every 12 hours  atorvastatin 80 milliGRAM(s) Oral at bedtime  diltiazem    milliGRAM(s) Oral daily  DULoxetine 30 milliGRAM(s) Oral daily  gabapentin 300 milliGRAM(s) Oral at bedtime  methocarbamol 750 milliGRAM(s) Oral every 8 hours  metoprolol tartrate 50 milliGRAM(s) Oral two times a day  mycophenolate mofetil 500 milliGRAM(s) Oral two times a day  pantoprazole    Tablet 40 milliGRAM(s) Oral before breakfast  senna 2 Tablet(s) Oral at bedtime  tacrolimus 1 milliGRAM(s) Oral two times a day  tamsulosin 0.4 milliGRAM(s) Oral at bedtime    PRN Meds  aluminum hydroxide/magnesium hydroxide/simethicone Suspension 30 milliLiter(s) Oral every 4 hours PRN  HYDROmorphone  Injectable 0.5 milliGRAM(s) IV Push every 6 hours PRN  melatonin 3 milliGRAM(s) Oral at bedtime PRN  ondansetron Injectable 4 milliGRAM(s) IV Push every 8 hours PRN  oxyCODONE    IR 15 milliGRAM(s) Oral every 8 hours PRN  polyethylene glycol 3350 17 Gram(s) Oral two times a day PRN      Case discussed with resident    Care discussed with pt/family

## 2024-06-06 NOTE — PROGRESS NOTE ADULT - ASSESSMENT
Patient is a  70 yo M w/PMH of HTN, HLD, HFpEF (70-75%) grade 2 DD, ESRD s/p kidney transplant 8 years ago at [facility], chronic hep C, and A-fib SP ablation performed on 5/12, now presenting for palpitations w/HR in 180s identified as SVT, resolved with 30 mg of adenosine and 20 mg of cardizem, now admitted to telemetry for work up and monitoring of SVT. Overnight, he developed another episode of SVT that resolved with total of 18 mg of adenosine and 30 mg of cardizem.    #SVT, chronic Afib post recurrent ablation  - ON Eliquis 2,5mg due to high bleeding risk (subtherapeutic) CHADsVaSc: 5 (stroke reported by patient caused decreased visual acuity)  - Per EMS was   - Patient stressed that he is compliant with home meds yet was not sure of names and timing as he has home health aide that help him out, doubt medication compliance at home  - EKG show sinus tachycardia with elevated aVR deep symmetrical T waves inferio-anterior with STD mostly strain with LVH and tachycardia as patient is off pain  - Trop elevated to be trended, EKG to be trended unlikely ACS  - SP Cardizem push 30mg IV by EMS  - will resume home Diltazem and lopressor  - Had another episode of SVT O/N on 6/4 that resolved with total of 18 mg adenosine and 30 mg of cardizem  - Started PO cardizem 240 mg daily overnight  - Has loop recorder  - As per EP, no need for PPM placement, outpatient follow up  - F/u EP as patient had multiple episodes of tachycardia yesterday and patient wants to see Dr. Henderson    #Sciatica unlikely/Pain/History if substance abuse  - Leg raise test in negative, pain description is atypical  - Patient looks severely depressed disheveled uses Marijuana and used some other ORAL PILLS of abuse he does not know dishevelled has no family only aide at home  - Pain management consult  - On Oxy 15mg tid PRN at home  - Left groin ecchymosis 2/2 old hematoma 2/2 ablation fem access complication  - Pain regimen: Tylenol 650 mg q8hr standing, Oxycodone 15mg q8hr PRN, Hydromorphone 0.5mg IV q6hr PRN for break through pain, Methocarbamol 750mg q8hr standing, Gabapentin 300mg qHS, Duloxetine 30mg daily, Lidocaine 4% patch  - Encourage PT/ambulation  - Appreciate pain management recs  - Patient requesting MRI for sciatica  - PT recs (6/4): Home PT  - Neurosurgery recs (6/5): IR guided transformational steroid injections, targeting Left L5-S1, outpatient f/u with Dr. 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 Henderson

## 2024-06-07 LAB
ANION GAP SERPL CALC-SCNC: 9 MMOL/L — SIGNIFICANT CHANGE UP (ref 7–14)
BLD GP AB SCN SERPL QL: SIGNIFICANT CHANGE UP
BUN SERPL-MCNC: 23 MG/DL — HIGH (ref 10–20)
CALCIUM SERPL-MCNC: 9.7 MG/DL — SIGNIFICANT CHANGE UP (ref 8.4–10.5)
CHLORIDE SERPL-SCNC: 106 MMOL/L — SIGNIFICANT CHANGE UP (ref 98–110)
CO2 SERPL-SCNC: 20 MMOL/L — SIGNIFICANT CHANGE UP (ref 17–32)
CREAT SERPL-MCNC: 1.5 MG/DL — SIGNIFICANT CHANGE UP (ref 0.7–1.5)
EGFR: 51 ML/MIN/1.73M2 — LOW
GLUCOSE SERPL-MCNC: 87 MG/DL — SIGNIFICANT CHANGE UP (ref 70–99)
HCT VFR BLD CALC: 31.4 % — LOW (ref 42–52)
HGB BLD-MCNC: 9.8 G/DL — LOW (ref 14–18)
MCHC RBC-ENTMCNC: 30.7 PG — SIGNIFICANT CHANGE UP (ref 27–31)
MCHC RBC-ENTMCNC: 31.2 G/DL — LOW (ref 32–37)
MCV RBC AUTO: 98.4 FL — HIGH (ref 80–94)
NRBC # BLD: 0 /100 WBCS — SIGNIFICANT CHANGE UP (ref 0–0)
PLATELET # BLD AUTO: 81 K/UL — LOW (ref 130–400)
PMV BLD: 12 FL — HIGH (ref 7.4–10.4)
POTASSIUM SERPL-MCNC: 5 MMOL/L — SIGNIFICANT CHANGE UP (ref 3.5–5)
POTASSIUM SERPL-SCNC: 5 MMOL/L — SIGNIFICANT CHANGE UP (ref 3.5–5)
RBC # BLD: 3.19 M/UL — LOW (ref 4.7–6.1)
RBC # FLD: 18.3 % — HIGH (ref 11.5–14.5)
SODIUM SERPL-SCNC: 135 MMOL/L — SIGNIFICANT CHANGE UP (ref 135–146)
WBC # BLD: 2.07 K/UL — LOW (ref 4.8–10.8)
WBC # FLD AUTO: 2.07 K/UL — LOW (ref 4.8–10.8)

## 2024-06-07 PROCEDURE — 76937 US GUIDE VASCULAR ACCESS: CPT | Mod: 26

## 2024-06-07 PROCEDURE — 99233 SBSQ HOSP IP/OBS HIGH 50: CPT | Mod: 57

## 2024-06-07 PROCEDURE — 71045 X-RAY EXAM CHEST 1 VIEW: CPT | Mod: 26

## 2024-06-07 PROCEDURE — 99233 SBSQ HOSP IP/OBS HIGH 50: CPT

## 2024-06-07 PROCEDURE — 93650 ICAR CATH ABLTJ AV NODE FUNC: CPT

## 2024-06-07 PROCEDURE — 0795T TCAT INS 2CHMBR LDLS PM CMPL: CPT

## 2024-06-07 RX ORDER — DULOXETINE HYDROCHLORIDE 30 MG/1
30 CAPSULE, DELAYED RELEASE ORAL DAILY
Refills: 0 | Status: DISCONTINUED | OUTPATIENT
Start: 2024-06-07 | End: 2024-06-10

## 2024-06-07 RX ORDER — VANCOMYCIN HCL 1 G
1000 VIAL (EA) INTRAVENOUS ONCE
Refills: 0 | Status: COMPLETED | OUTPATIENT
Start: 2024-06-07 | End: 2024-06-07

## 2024-06-07 RX ADMIN — DULOXETINE HYDROCHLORIDE 30 MILLIGRAM(S): 30 CAPSULE, DELAYED RELEASE ORAL at 20:46

## 2024-06-07 RX ADMIN — TACROLIMUS 1 MILLIGRAM(S): 5 CAPSULE ORAL at 05:19

## 2024-06-07 RX ADMIN — SENNA PLUS 2 TABLET(S): 8.6 TABLET ORAL at 21:22

## 2024-06-07 RX ADMIN — Medication 240 MILLIGRAM(S): at 05:29

## 2024-06-07 RX ADMIN — Medication 50 MILLIGRAM(S): at 19:40

## 2024-06-07 RX ADMIN — TAMSULOSIN HYDROCHLORIDE 0.4 MILLIGRAM(S): 0.4 CAPSULE ORAL at 21:24

## 2024-06-07 RX ADMIN — MYCOPHENOLATE MOFETIL 500 MILLIGRAM(S): 250 CAPSULE ORAL at 19:40

## 2024-06-07 RX ADMIN — METHOCARBAMOL 750 MILLIGRAM(S): 500 TABLET, FILM COATED ORAL at 21:23

## 2024-06-07 RX ADMIN — ATORVASTATIN CALCIUM 80 MILLIGRAM(S): 80 TABLET, FILM COATED ORAL at 21:23

## 2024-06-07 RX ADMIN — MYCOPHENOLATE MOFETIL 500 MILLIGRAM(S): 250 CAPSULE ORAL at 05:32

## 2024-06-07 RX ADMIN — Medication 50 MILLIGRAM(S): at 05:19

## 2024-06-07 RX ADMIN — GABAPENTIN 300 MILLIGRAM(S): 400 CAPSULE ORAL at 21:23

## 2024-06-07 RX ADMIN — PANTOPRAZOLE SODIUM 40 MILLIGRAM(S): 20 TABLET, DELAYED RELEASE ORAL at 05:19

## 2024-06-07 RX ADMIN — OXYCODONE HYDROCHLORIDE 15 MILLIGRAM(S): 5 TABLET ORAL at 19:40

## 2024-06-07 RX ADMIN — Medication 250 MILLIGRAM(S): at 13:52

## 2024-06-07 RX ADMIN — METHOCARBAMOL 750 MILLIGRAM(S): 500 TABLET, FILM COATED ORAL at 05:19

## 2024-06-07 RX ADMIN — TACROLIMUS 1 MILLIGRAM(S): 5 CAPSULE ORAL at 19:40

## 2024-06-07 RX ADMIN — Medication 650 MILLIGRAM(S): at 05:18

## 2024-06-07 NOTE — PROGRESS NOTE ADULT - SUBJECTIVE AND OBJECTIVE BOX
EFFIE DAVONTE  69y Male    CHIEF COMPLAINT:    Patient is a 69y old  Male who presents with a chief complaint of Palpitations (2024 09:21)      INTERVAL HPI/OVERNIGHT EVENTS:    Patient seen and examined. Denies palpitations. Feels improvement in sciatica pain. Scheduled for AV node ablation and PPM    ROS: All other systems are negative.    Vital Signs:    T(F): 97.6 (24 @ 04:25), Max: 98.2 (24 @ 13:48)  HR: 85 (24 @ 04:25) (68 - 89)  BP: 134/77 (24 @ 04:25) (103/69 - 134/77)  RR: 18 (24 @ 04:25) (18 - 18)  SpO2: 98% (24 @ 08:38) (91% - 98%)  I&O's Summary    2024 07:01  -  2024 07:00  --------------------------------------------------------  IN: 556 mL / OUT: 1000 mL / NET: -444 mL    2024 07:01  -  2024 11:51  --------------------------------------------------------  IN: 0 mL / OUT: 600 mL / NET: -600 mL      Daily     Daily Weight in k.2 (2024 04:25)  CAPILLARY BLOOD GLUCOSE      POCT Blood Glucose.: 126 mg/dL (2024 16:55)      PHYSICAL EXAM:    GENERAL:  NAD  SKIN: No rashes or lesions  HENT: Atraumatic. Normocephalic. PERRL. Moist membranes.  NECK: Supple, No JVD. No lymphadenopathy.  PULMONARY: CTA B/L. No wheezing. No rales  CVS: Normal S1, S2. Rate and Rhythm are regular. No murmurs.  ABDOMEN/GI: Soft, Nontender, Nondistended; BS present  EXTREMITIES: Peripheral pulses intact. No edema B/L LE.  NEUROLOGIC:  No motor or sensory deficit.  PSYCH: Alert & oriented x 3    Consultant(s) Notes Reviewed:  [x ] YES  [ ] NO  Care Discussed with Consultants/Other Providers [ x] YES  [ ] NO    EKG reviewed  Telemetry reviewed    LABS:                        9.8    2.07  )-----------( 81       ( 2024 05:58 )             31.4     06-07    135  |  106  |  23<H>  ----------------------------<  87  5.0   |  20  |  1.5    Ca    9.7      2024 05:58                RADIOLOGY & ADDITIONAL TESTS:      Imaging or report Personally Reviewed:  [ ] YES  [ ] NO    Medications:  Standing  acetaminophen     Tablet .. 650 milliGRAM(s) Oral every 8 hours  atorvastatin 80 milliGRAM(s) Oral at bedtime  diltiazem    milliGRAM(s) Oral daily  DULoxetine 30 milliGRAM(s) Oral daily  gabapentin 300 milliGRAM(s) Oral at bedtime  methocarbamol 750 milliGRAM(s) Oral every 8 hours  metoprolol tartrate 50 milliGRAM(s) Oral two times a day  mycophenolate mofetil 500 milliGRAM(s) Oral two times a day  pantoprazole    Tablet 40 milliGRAM(s) Oral before breakfast  senna 2 Tablet(s) Oral at bedtime  tacrolimus 1 milliGRAM(s) Oral two times a day  tamsulosin 0.4 milliGRAM(s) Oral at bedtime    PRN Meds  aluminum hydroxide/magnesium hydroxide/simethicone Suspension 30 milliLiter(s) Oral every 4 hours PRN  HYDROmorphone  Injectable 0.5 milliGRAM(s) IV Push every 6 hours PRN  melatonin 3 milliGRAM(s) Oral at bedtime PRN  ondansetron Injectable 4 milliGRAM(s) IV Push every 8 hours PRN  oxyCODONE    IR 15 milliGRAM(s) Oral every 8 hours PRN  polyethylene glycol 3350 17 Gram(s) Oral two times a day PRN      Case discussed with resident    Care discussed with pt/family

## 2024-06-07 NOTE — PROGRESS NOTE ADULT - ASSESSMENT
Persistent AF s/p ablation- now in paroxysmal AT    - Not a good candiate for AAD or repeat ablation  - Discussed option of leadless PPM + AVN ablation. We had extensive discussion with EP team on prior 2 admission.   - Risks/benefits including bleeding, effusion/death discussed. Agreeable  - Considering high risk of procedure, we will do AVN + Pacemaker at the same time.

## 2024-06-07 NOTE — PROGRESS NOTE ADULT - ASSESSMENT
status post  donor Kidney transplant at [facility] 7 years ago  baseline Cr "upper 1's to low 2's"  afib / svt  Pancytopenia / anemia   CAD / HFpEF  HTN  Cirrhosis    plan:    cont tacrolimus, current level acceptable  cont mycophenolate  daily BMP  ep f/u for PPM

## 2024-06-07 NOTE — PROGRESS NOTE ADULT - SUBJECTIVE AND OBJECTIVE BOX
INTERVAL HPI/OVERNIGHT EVENTS:    MEDICATIONS  (STANDING):  acetaminophen     Tablet .. 650 milliGRAM(s) Oral every 8 hours  atorvastatin 80 milliGRAM(s) Oral at bedtime  diltiazem    milliGRAM(s) Oral daily  DULoxetine 30 milliGRAM(s) Oral daily  gabapentin 300 milliGRAM(s) Oral at bedtime  methocarbamol 750 milliGRAM(s) Oral every 8 hours  metoprolol tartrate 50 milliGRAM(s) Oral two times a day  mycophenolate mofetil 500 milliGRAM(s) Oral two times a day  pantoprazole    Tablet 40 milliGRAM(s) Oral before breakfast  senna 2 Tablet(s) Oral at bedtime  tacrolimus 1 milliGRAM(s) Oral two times a day  tamsulosin 0.4 milliGRAM(s) Oral at bedtime    MEDICATIONS  (PRN):  aluminum hydroxide/magnesium hydroxide/simethicone Suspension 30 milliLiter(s) Oral every 4 hours PRN Dyspepsia  HYDROmorphone  Injectable 0.5 milliGRAM(s) IV Push every 6 hours PRN BREAKTHROUGH PAIN  melatonin 3 milliGRAM(s) Oral at bedtime PRN Insomnia  ondansetron Injectable 4 milliGRAM(s) IV Push every 8 hours PRN Nausea and/or Vomiting  oxyCODONE    IR 15 milliGRAM(s) Oral every 8 hours PRN Severe Pain (7 - 10)  polyethylene glycol 3350 17 Gram(s) Oral two times a day PRN for constipation      Allergies    Rituxan (Hives)    Intolerances        REVIEW OF SYSTEMS      General:	    Skin/Breast:  	  Ophthalmologic:  	  ENMT:	    Respiratory and Thorax:  	  Cardiovascular:	    Gastrointestinal:	    Genitourinary:	    Musculoskeletal:	    Neurological:	    Psychiatric:	    Hematology/Lymphatics:	    Endocrine:	    Allergic/Immunologic:	    Vital Signs Last 24 Hrs  T(C): 36.4 (07 Jun 2024 04:25), Max: 36.8 (06 Jun 2024 13:48)  T(F): 97.6 (07 Jun 2024 04:25), Max: 98.2 (06 Jun 2024 13:48)  HR: 85 (07 Jun 2024 04:25) (68 - 89)  BP: 134/77 (07 Jun 2024 04:25) (103/69 - 134/77)  BP(mean): --  RR: 18 (07 Jun 2024 04:25) (18 - 18)  SpO2: 98% (07 Jun 2024 08:38) (91% - 98%)    Parameters below as of 06 Jun 2024 09:11  Patient On (Oxygen Delivery Method): room air        06-06-24 @ 07:01  -  06-07-24 @ 07:00  --------------------------------------------------------  IN: 556 mL / OUT: 1000 mL / NET: -444 mL    06-07-24 @ 07:01  -  06-07-24 @ 09:10  --------------------------------------------------------  IN: 0 mL / OUT: 600 mL / NET: -600 mL        Physical Exam    GENERAL: In no apparent distress, well nourished, and hydrated.  HEAD:  Atraumatic, Normocephalic  EYES: EOMI, PERRLA, conjunctiva and sclera clear  ENMT: No tonsillar erythema, exudates, or enlargements; ist mucous membranes, Good dentition, No lesions  NECK: Supple and normal thyroid.  No JVD or carotid bruit.  Carotid pulse is 2+ bilaterally.  HEART: Regular rate and rhythm; No murmurs, rubs, or gallops.  PULMONARY: Clear to auscultation and perfusion.  No rales, wheezing, or rhonchi bilaterally.  ABDOMEN: Soft, Nontender, Nondistended; Bowel sounds present  EXTREMITIES:  2+ Peripheral Pulses, No clubbing, cyanosis, or edema  LYMPH: No lymphadenopathy noted  NEUROLOGICAL: Grossly nonfocal    LABS:                        9.8    2.07  )-----------( 81       ( 07 Jun 2024 05:58 )             31.4     06-07    135  |  106  |  23<H>  ----------------------------<  87  5.0   |  20  |  1.5    Ca    9.7      07 Jun 2024 05:58        Urinalysis Basic - ( 07 Jun 2024 05:58 )    Color: x / Appearance: x / SG: x / pH: x  Gluc: 87 mg/dL / Ketone: x  / Bili: x / Urobili: x   Blood: x / Protein: x / Nitrite: x   Leuk Esterase: x / RBC: x / WBC x   Sq Epi: x / Non Sq Epi: x / Bacteria: x        RADIOLOGY & ADDITIONAL TESTS:   INTERVAL HPI/OVERNIGHT EVENTS:    Feels great when in sinus  Chest tightness with AT/AFL      MEDICATIONS  (STANDING):  acetaminophen     Tablet .. 650 milliGRAM(s) Oral every 8 hours  atorvastatin 80 milliGRAM(s) Oral at bedtime  diltiazem    milliGRAM(s) Oral daily  DULoxetine 30 milliGRAM(s) Oral daily  gabapentin 300 milliGRAM(s) Oral at bedtime  methocarbamol 750 milliGRAM(s) Oral every 8 hours  metoprolol tartrate 50 milliGRAM(s) Oral two times a day  mycophenolate mofetil 500 milliGRAM(s) Oral two times a day  pantoprazole    Tablet 40 milliGRAM(s) Oral before breakfast  senna 2 Tablet(s) Oral at bedtime  tacrolimus 1 milliGRAM(s) Oral two times a day  tamsulosin 0.4 milliGRAM(s) Oral at bedtime    MEDICATIONS  (PRN):  aluminum hydroxide/magnesium hydroxide/simethicone Suspension 30 milliLiter(s) Oral every 4 hours PRN Dyspepsia  HYDROmorphone  Injectable 0.5 milliGRAM(s) IV Push every 6 hours PRN BREAKTHROUGH PAIN  melatonin 3 milliGRAM(s) Oral at bedtime PRN Insomnia  ondansetron Injectable 4 milliGRAM(s) IV Push every 8 hours PRN Nausea and/or Vomiting  oxyCODONE    IR 15 milliGRAM(s) Oral every 8 hours PRN Severe Pain (7 - 10)  polyethylene glycol 3350 17 Gram(s) Oral two times a day PRN for constipation      Allergies    Rituxan (Hives)    Intolerances        REVIEW OF SYSTEMS      General:	    Skin/Breast:  	  Ophthalmologic:  	  ENMT:	    Respiratory and Thorax:  	  Cardiovascular:	    Gastrointestinal:	    Genitourinary:	    Musculoskeletal:	    Neurological:	    Psychiatric:	    Hematology/Lymphatics:	    Endocrine:	    Allergic/Immunologic:	    Vital Signs Last 24 Hrs  T(C): 36.4 (07 Jun 2024 04:25), Max: 36.8 (06 Jun 2024 13:48)  T(F): 97.6 (07 Jun 2024 04:25), Max: 98.2 (06 Jun 2024 13:48)  HR: 85 (07 Jun 2024 04:25) (68 - 89)  BP: 134/77 (07 Jun 2024 04:25) (103/69 - 134/77)  BP(mean): --  RR: 18 (07 Jun 2024 04:25) (18 - 18)  SpO2: 98% (07 Jun 2024 08:38) (91% - 98%)    Parameters below as of 06 Jun 2024 09:11  Patient On (Oxygen Delivery Method): room air        06-06-24 @ 07:01  -  06-07-24 @ 07:00  --------------------------------------------------------  IN: 556 mL / OUT: 1000 mL / NET: -444 mL    06-07-24 @ 07:01  -  06-07-24 @ 09:10  --------------------------------------------------------  IN: 0 mL / OUT: 600 mL / NET: -600 mL        Physical Exam    GENERAL: In no apparent distress, well nourished, and hydrated.  HEAD:  Atraumatic, Normocephalic  EYES: EOMI, PERRLA, conjunctiva and sclera clear  ENMT: No tonsillar erythema, exudates, or enlargements; ist mucous membranes, Good dentition, No lesions  NECK: Supple and normal thyroid.  No JVD or carotid bruit.  Carotid pulse is 2+ bilaterally.  HEART: Regular rate and rhythm; No murmurs, rubs, or gallops.  PULMONARY: Clear to auscultation and perfusion.  No rales, wheezing, or rhonchi bilaterally.  ABDOMEN: Soft, Nontender, Nondistended; Bowel sounds present  EXTREMITIES:  2+ Peripheral Pulses, No clubbing, cyanosis, or edema  LYMPH: No lymphadenopathy noted  NEUROLOGICAL: Grossly nonfocal    LABS:                        9.8    2.07  )-----------( 81       ( 07 Jun 2024 05:58 )             31.4     06-07    135  |  106  |  23<H>  ----------------------------<  87  5.0   |  20  |  1.5    Ca    9.7      07 Jun 2024 05:58        Urinalysis Basic - ( 07 Jun 2024 05:58 )    Color: x / Appearance: x / SG: x / pH: x  Gluc: 87 mg/dL / Ketone: x  / Bili: x / Urobili: x   Blood: x / Protein: x / Nitrite: x   Leuk Esterase: x / RBC: x / WBC x   Sq Epi: x / Non Sq Epi: x / Bacteria: x        RADIOLOGY & ADDITIONAL TESTS:

## 2024-06-07 NOTE — PROGRESS NOTE ADULT - SUBJECTIVE AND OBJECTIVE BOX
[facility] FOLLOW UP NOTE  --------------------------------------------------------------------------------  24 hour events/subjective: Patient examined. Appears comfortable.    PAST HISTORY  --------------------------------------------------------------------------------  No significant changes to PMH, PSH, FHx, SHx, unless otherwise noted    ALLERGIES & MEDICATIONS  --------------------------------------------------------------------------------  Allergies    Rituxan (Hives)    Standing Inpatient Medications  acetaminophen     Tablet .. 650 milliGRAM(s) Oral every 8 hours  atorvastatin 80 milliGRAM(s) Oral at bedtime  diltiazem    milliGRAM(s) Oral daily  DULoxetine 30 milliGRAM(s) Oral daily  gabapentin 300 milliGRAM(s) Oral at bedtime  methocarbamol 750 milliGRAM(s) Oral every 8 hours  metoprolol tartrate 50 milliGRAM(s) Oral two times a day  mycophenolate mofetil 500 milliGRAM(s) Oral two times a day  pantoprazole    Tablet 40 milliGRAM(s) Oral before breakfast  senna 2 Tablet(s) Oral at bedtime  tacrolimus 1 milliGRAM(s) Oral two times a day  tamsulosin 0.4 milliGRAM(s) Oral at bedtime  vancomycin  IVPB 1000 milliGRAM(s) IV Intermittent once    PRN Inpatient Medications  aluminum hydroxide/magnesium hydroxide/simethicone Suspension 30 milliLiter(s) Oral every 4 hours PRN  HYDROmorphone  Injectable 0.5 milliGRAM(s) IV Push every 6 hours PRN  melatonin 3 milliGRAM(s) Oral at bedtime PRN  ondansetron Injectable 4 milliGRAM(s) IV Push every 8 hours PRN  oxyCODONE    IR 15 milliGRAM(s) Oral every 8 hours PRN  polyethylene glycol 3350 17 Gram(s) Oral two times a day PRN      VITALS/PHYSICAL EXAM  --------------------------------------------------------------------------------  T(C): 36.4 (06-07-24 @ 13:17), Max: 36.8 (06-06-24 @ 17:44)  HR: 85 (06-07-24 @ 13:17) (68 - 88)  BP: 134/77 (06-07-24 @ 13:17) (122/74 - 134/77)  RR: 18 (06-07-24 @ 13:17) (12 - 18)  SpO2: 98% (06-07-24 @ 13:17) (91% - 99%)  Height (cm): 175.3 (06-07-24 @ 13:17)  Weight (kg): 61.2 (06-07-24 @ 13:17)  BMI (kg/m2): 19.9 (06-07-24 @ 13:17)  BSA (m2): 1.75 (06-07-24 @ 13:17)    06-06-24 @ 07:01  -  06-07-24 @ 07:00  --------------------------------------------------------  IN: 556 mL / OUT: 1000 mL / NET: -444 mL    06-07-24 @ 07:01  -  06-07-24 @ 14:57  --------------------------------------------------------  IN: 0 mL / OUT: 600 mL / NET: -600 mL    Physical Exam:  	Gen: NAD  	Pulm: CTA B/L  	CV: RRR, S1S2  	Abd: +BS, soft, nontender/nondistended  	: No suprapubic tenderness  	LE: Warm,  no edema    LABS/STUDIES  --------------------------------------------------------------------------------              9.8    2.07  >-----------<  81       [06-07-24 @ 05:58]              31.4     135  |  106  |  23  ----------------------------<  87      [06-07-24 @ 05:58]  5.0   |  20  |  1.5        Ca     9.7     [06-07-24 @ 05:58]    Creatinine Trend:  SCr 1.5 [06-07 @ 05:58]  SCr 1.4 [06-05 @ 07:07]  SCr 1.3 [06-04 @ 07:04]  SCr 1.5 [06-03 @ 18:37]  SCr 2.0 [05-18 @ 05:21]    Urinalysis - [06-07-24 @ 05:58]      Color  / Appearance  / SG  / pH       Gluc 87 / Ketone   / Bili  / Urobili        Blood  / Protein  / Leuk Est  / Nitrite       RBC  / WBC  / Hyaline  / Gran  / Sq Epi  / Non Sq Epi  / Bacteria     Iron 28, TIBC 185, %sat 15      [05-18-24 @ 05:21]  Ferritin 803      [05-18-24 @ 05:21]  HbA1c 5.1      [11-06-19 @ 15:00]  TSH 1.98      [03-15-24 @ 15:04]  Lipid: chol 84, TG 65, HDL 32, LDL --      [06-04-24 @ 07:04]

## 2024-06-07 NOTE — PROGRESS NOTE ADULT - SUBJECTIVE AND OBJECTIVE BOX
----------Daily Progress Note----------    HISTORY OF PRESENT ILLNESS:  Patient is a  70 yo M w/PMH of HTN, HLD, HFpEF (70-75%) grade 2 DD, ESRD s/p kidney transplant 8 years ago at [place], chronic hep C, and A-fib SP ablation performed on 5/12, now presenting for palpitations w/HR in 180s identified as SVT as per EMS w/total of 30 mg of adenosine and 20 mg of cardizem given, and 60 mcg of fentanyl given for sciatic pain, with HR subsequently decreasing into the 80s. Patient was normotensive. Patient reported left-sided chest pain in the morning that resolved w/tachycardia into the 180s that remained throughout the day. He denies any fevers, dyspnea, abdominal pain, nausea, diarrhea, or dysuria. He does endorse sciatic pain.    In the ED, vitals were stable, labs notable for trop 113 -> 154, pBNP 74172, Cr 1.5 (last known was 2.0), and Hgb 8.5 (around baseline), EKG notable for new deep symmetrical T wave anterio-inferior in the setting of LVH (Strain pattern?) with elevation in aVR, and CXR unremarkable.    He is now admitted to telemetry for work up and monitoring of SVT.     Cardiologist: Dr. Henderson    Today is hospital day 4d.     INTERVAL HOSPITAL COURSE / OVERNIGHT EVENTS:  O/N events:  Had episode of tachycardia yesterday with multiple PVCs, EKG showing afib w/RVR    24 hr events:  None    Patient was examined and seen at bedside. Reports L leg pain well controlled, denies any chest pain or palpitations, endorses good appetite    Review of Systems: Otherwise unremarkable     <<<<<PAST MEDICAL & SURGICAL HISTORY>>>>>  CKD (chronic kidney disease)    ESRD (end stage renal disease)    HTN (hypertension)    HLD (hyperlipidemia)    Atrial fibrillation  on eliquis    COPD, mild  not on O2    Peripheral neuropathy  unclear cause    Lymphoma  ?questionable, not treated, not followed    Melanoma  R eye, s/p laser    Cirrhosis  possibly related to hepC    Breast cancer    Legally blind    Stroke    History of heroin abuse    History of chronic hepatitis    Marijuana use    History of kidney transplant  3 years ago    S/P arteriovenous (AV) fistula creation  prior old fistula in R arm    S/P lumpectomy, right breast    History of back surgery  s/p Left L5-S1 endoscopic laminoforaminotomy    History of loop recorder      ALLERGIES  Rituxan (Hives)      Home Medications:  Flomax 0.4 mg oral capsule: 1 cap(s) orally 2 times a day (10 May 2024 11:00)  mycophenolate mofetil 500 mg oral tablet: 2 tab(s) orally once a day (10 May 2024 11:00)  oxyCODONE 15 mg oral tablet: 1 tab(s) orally every 8 hours As needed Severe Pain (7 - 10) (10 May 2024 11:00)  senna leaf extract oral tablet: 2 tab(s) orally once a day (at bedtime) (18 May 2024 14:44)        MEDICATIONS  STANDING MEDICATIONS  acetaminophen     Tablet .. 650 milliGRAM(s) Oral every 8 hours  atorvastatin 80 milliGRAM(s) Oral at bedtime  diltiazem    milliGRAM(s) Oral daily  DULoxetine 30 milliGRAM(s) Oral daily  gabapentin 300 milliGRAM(s) Oral at bedtime  methocarbamol 750 milliGRAM(s) Oral every 8 hours  metoprolol tartrate 50 milliGRAM(s) Oral two times a day  mycophenolate mofetil 500 milliGRAM(s) Oral two times a day  pantoprazole    Tablet 40 milliGRAM(s) Oral before breakfast  senna 2 Tablet(s) Oral at bedtime  tacrolimus 1 milliGRAM(s) Oral two times a day  tamsulosin 0.4 milliGRAM(s) Oral at bedtime  vancomycin  IVPB 1000 milliGRAM(s) IV Intermittent once    PRN MEDICATIONS  aluminum hydroxide/magnesium hydroxide/simethicone Suspension 30 milliLiter(s) Oral every 4 hours PRN  HYDROmorphone  Injectable 0.5 milliGRAM(s) IV Push every 6 hours PRN  melatonin 3 milliGRAM(s) Oral at bedtime PRN  ondansetron Injectable 4 milliGRAM(s) IV Push every 8 hours PRN  oxyCODONE    IR 15 milliGRAM(s) Oral every 8 hours PRN  polyethylene glycol 3350 17 Gram(s) Oral two times a day PRN    VITALS:  T(F): 98.1  HR: 85  BP: 134/77  RR: 18  SpO2: 98%    <<<<<PHYSICAL EXAM>>>>>  GENERAL: NAD  PULMONARY: Clear to auscultation bilaterally. No wheezing or crackles  CARDIOVASCULAR: Regular rate and rhythm, S1-S2, no murmurs, rubs, or gallops  BREAST: notable L sided gynecomastia  GASTROINTESTINAL: Soft, non-tender, non-distended, no guarding, + hepatomegaly  SKIN/EXTREMITIES: Warm, 2+ tibialis posterior pulses, no UE or LE edema  NEUROLOGIC/MUSCULOSKELETAL: AOx4, grossly moving all extremities, no focal deficits    <<<<<LABS>>>>>                        9.8    2.07  )-----------( 81       ( 07 Jun 2024 05:58 )             31.4     06-07    135  |  106  |  23<H>  ----------------------------<  87  5.0   |  20  |  1.5    Ca    9.7      07 Jun 2024 05:58        Urinalysis Basic - ( 07 Jun 2024 05:58 )    Color: x / Appearance: x / SG: x / pH: x  Gluc: 87 mg/dL / Ketone: x  / Bili: x / Urobili: x   Blood: x / Protein: x / Nitrite: x   Leuk Esterase: x / RBC: x / WBC x   Sq Epi: x / Non Sq Epi: x / Bacteria: x          [id number]        <<<<<RADIOLOGY>>>>>      -----------------------------------------------------------------------------------------------------------------------------------------------------------------------------------------------

## 2024-06-07 NOTE — PROGRESS NOTE ADULT - ASSESSMENT
69-year-old with PMH of HTN, HLD, HFpEF (70-75%) grade 2 DD, ESRD s/p kidney transplant 8 years ago at [place], chronic hep C, A-fib SP ablation preformed 5/12, brought in by ambulance with chief complaint of palpitations.  Per EMS patient's heart rate was in the 180s identified as SVT.     Palpitations / SVT  Chronic HFpEF  HTN / DL  ESRD s/p renal transplant  H/O Hep C  H/O A-fib s/p ablation  Pancytopenia  Macrocytic anemia               PLAN:    ·	Tele reviewed by me. Episodes of narrow complex tachycardia.   ·	EKG on admission: /min (Interpreted by me.   ·	Cont Cardizem  mg po daily  ·	Care d/w the EP. Scheduled for AV node ablation and PPM  ·	Troponin: 113-->154  ·	Pain management eval noted. Recommended to start Methocarbamol 750mg q8hr standing, Gabapentin 300mg qHS and Duloxetine 30 mg po daily. Lidocaine 4% patch to lower back  ·	Lumber spine MRI noted. Interval obliteration of the L4-5 disc space since the prior lumbar MRI from 11/27/2007 likely reflecting sequela of discitis osteomyelitis. Interval right L5 hemilaminectomy.  ·	Neurosurgery eval noted. No acute neurosurgical intervention. Recommended IR guided transformational steroid injections, targeting Left L5-S1  ·	Old record reviewed. ECHO done in March this year showed EF is 70-75%  ·	Nephrology eval noted. Recommended to cont Tacrolimus and check level in AM. Also cont Mycophenolate.   ·	Tacrolimus level is 13.5  ·	Cont his other home meds  ·	Check i's and o's and daily wt  ·	Low salt diet and water restriction to 1.5 L/D  ·	PT eval noted. Recommended home with home PT    Progress Note Handoff    Pending (specify):  Consults:_______, Tests________, Test Results_______, Other_AV node ablation and PPM today._IR for steroid injection_______  Family discussion:  Disposition: Home___/SNF___/Other________/Unknown at this time________    Germain Mazariegos MD  Spectra: [phone]

## 2024-06-07 NOTE — PROGRESS NOTE ADULT - ASSESSMENT
Patient is a  70 yo M w/PMH of HTN, HLD, HFpEF (70-75%) grade 2 DD, ESRD s/p kidney transplant 8 years ago at [place], chronic hep C, and A-fib SP ablation performed on 5/12, now presenting for palpitations w/HR in 180s identified as SVT, resolved with 30 mg of adenosine and 20 mg of cardizem, now admitted to telemetry for work up and monitoring of SVT. Overnight, he developed another episode of SVT that resolved with total of 18 mg of adenosine and 30 mg of cardizem.    #SVT, chronic Afib post recurrent ablation  - ON Eliquis 2,5mg due to high bleeding risk (subtherapeutic) CHADsVaSc: 5 (stroke reported by patient caused decreased visual acuity)  - Per EMS was   - Patient stressed that he is compliant with home meds yet was not sure of names and timing as he has home health aide that help him out, doubt medication compliance at home  - EKG show sinus tachycardia with elevated aVR deep symmetrical T waves inferio-anterior with STD mostly strain with LVH and tachycardia as patient is off pain  - Trop elevated to be trended, EKG to be trended unlikely ACS  - SP Cardizem push 30mg IV by EMS  - will resume home Diltazem and lopressor  - Had another episode of SVT O/N on 6/4 that resolved with total of 18 mg adenosine and 30 mg of cardizem  - Started PO cardizem 240 mg daily overnight  - Has loop recorder  - As per EP, pending leadless PPM + AVN ablation today  - f/u post-op note    #Sciatica unlikely/Pain/History if substance abuse  - Leg raise test in negative, pain description is atypical  - Patient looks severely depressed disheveled uses Marijuana and used some other ORAL PILLS of abuse he does not know dishevelled has no family only aide at home  - Pain management consult  - On Oxy 15mg tid PRN at home  - Left groin ecchymosis 2/2 old hematoma 2/2 ablation fem access complication  - Pain regimen: Tylenol 650 mg q8hr standing, Oxycodone 15mg q8hr PRN, Hydromorphone 0.5mg IV q6hr PRN for break through pain, Methocarbamol 750mg q8hr standing, Gabapentin 300mg qHS, Duloxetine 30mg daily, Lidocaine 4% patch  - Encourage PT/ambulation  - Appreciate pain management recs  - Patient requesting MRI for sciatica  - PT recs (6/4): Home PT  - Neurosurgery recs (6/5): IR guided transformational steroid injections, targeting Left L5-S1, outpatient f/u with Dr. Arnold  - Pending IR-guided transformational steroid injections, targeting Left L5-S1, will be done by Dr. 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

## 2024-06-08 LAB
ANION GAP SERPL CALC-SCNC: 10 MMOL/L — SIGNIFICANT CHANGE UP (ref 7–14)
BUN SERPL-MCNC: 20 MG/DL — SIGNIFICANT CHANGE UP (ref 10–20)
CALCIUM SERPL-MCNC: 9.6 MG/DL — SIGNIFICANT CHANGE UP (ref 8.4–10.4)
CHLORIDE SERPL-SCNC: 106 MMOL/L — SIGNIFICANT CHANGE UP (ref 98–110)
CO2 SERPL-SCNC: 19 MMOL/L — SIGNIFICANT CHANGE UP (ref 17–32)
CREAT SERPL-MCNC: 1.4 MG/DL — SIGNIFICANT CHANGE UP (ref 0.7–1.5)
EGFR: 55 ML/MIN/1.73M2 — LOW
GLUCOSE BLDC GLUCOMTR-MCNC: 147 MG/DL — HIGH (ref 70–99)
GLUCOSE SERPL-MCNC: 98 MG/DL — SIGNIFICANT CHANGE UP (ref 70–99)
HCT VFR BLD CALC: 31.8 % — LOW (ref 42–52)
HGB BLD-MCNC: 9.8 G/DL — LOW (ref 14–18)
MAGNESIUM SERPL-MCNC: 2 MG/DL — SIGNIFICANT CHANGE UP (ref 1.8–2.4)
MCHC RBC-ENTMCNC: 30.5 PG — SIGNIFICANT CHANGE UP (ref 27–31)
MCHC RBC-ENTMCNC: 30.8 G/DL — LOW (ref 32–37)
MCV RBC AUTO: 99.1 FL — HIGH (ref 80–94)
NRBC # BLD: 0 /100 WBCS — SIGNIFICANT CHANGE UP (ref 0–0)
PLATELET # BLD AUTO: 75 K/UL — LOW (ref 130–400)
PMV BLD: 11.2 FL — HIGH (ref 7.4–10.4)
POTASSIUM SERPL-MCNC: 4.5 MMOL/L — SIGNIFICANT CHANGE UP (ref 3.5–5)
POTASSIUM SERPL-SCNC: 4.5 MMOL/L — SIGNIFICANT CHANGE UP (ref 3.5–5)
RBC # BLD: 3.21 M/UL — LOW (ref 4.7–6.1)
RBC # FLD: 18.2 % — HIGH (ref 11.5–14.5)
SODIUM SERPL-SCNC: 135 MMOL/L — SIGNIFICANT CHANGE UP (ref 135–146)
TACROLIMUS SERPL-MCNC: 9.5 NG/ML — SIGNIFICANT CHANGE UP
WBC # BLD: 2.88 K/UL — LOW (ref 4.8–10.8)
WBC # FLD AUTO: 2.88 K/UL — LOW (ref 4.8–10.8)

## 2024-06-08 PROCEDURE — 71046 X-RAY EXAM CHEST 2 VIEWS: CPT | Mod: 26

## 2024-06-08 PROCEDURE — 99232 SBSQ HOSP IP/OBS MODERATE 35: CPT

## 2024-06-08 PROCEDURE — 93286 PERI-PX EVAL PM/LDLS PM IP: CPT | Mod: 26

## 2024-06-08 PROCEDURE — 99233 SBSQ HOSP IP/OBS HIGH 50: CPT

## 2024-06-08 PROCEDURE — 93010 ELECTROCARDIOGRAM REPORT: CPT

## 2024-06-08 RX ADMIN — METHOCARBAMOL 750 MILLIGRAM(S): 500 TABLET, FILM COATED ORAL at 05:36

## 2024-06-08 RX ADMIN — Medication 50 MILLIGRAM(S): at 05:37

## 2024-06-08 RX ADMIN — METHOCARBAMOL 750 MILLIGRAM(S): 500 TABLET, FILM COATED ORAL at 13:18

## 2024-06-08 RX ADMIN — Medication 650 MILLIGRAM(S): at 13:18

## 2024-06-08 RX ADMIN — TACROLIMUS 1 MILLIGRAM(S): 5 CAPSULE ORAL at 05:37

## 2024-06-08 RX ADMIN — Medication 50 MILLIGRAM(S): at 17:55

## 2024-06-08 RX ADMIN — MYCOPHENOLATE MOFETIL 500 MILLIGRAM(S): 250 CAPSULE ORAL at 17:55

## 2024-06-08 RX ADMIN — PANTOPRAZOLE SODIUM 40 MILLIGRAM(S): 20 TABLET, DELAYED RELEASE ORAL at 05:37

## 2024-06-08 RX ADMIN — METHOCARBAMOL 750 MILLIGRAM(S): 500 TABLET, FILM COATED ORAL at 21:33

## 2024-06-08 RX ADMIN — TAMSULOSIN HYDROCHLORIDE 0.4 MILLIGRAM(S): 0.4 CAPSULE ORAL at 21:32

## 2024-06-08 RX ADMIN — TACROLIMUS 1 MILLIGRAM(S): 5 CAPSULE ORAL at 17:55

## 2024-06-08 RX ADMIN — MYCOPHENOLATE MOFETIL 500 MILLIGRAM(S): 250 CAPSULE ORAL at 05:37

## 2024-06-08 RX ADMIN — GABAPENTIN 300 MILLIGRAM(S): 400 CAPSULE ORAL at 21:32

## 2024-06-08 RX ADMIN — Medication 240 MILLIGRAM(S): at 05:36

## 2024-06-08 RX ADMIN — SENNA PLUS 2 TABLET(S): 8.6 TABLET ORAL at 21:31

## 2024-06-08 RX ADMIN — POLYETHYLENE GLYCOL 3350 17 GRAM(S): 17 POWDER, FOR SOLUTION ORAL at 05:40

## 2024-06-08 RX ADMIN — DULOXETINE HYDROCHLORIDE 30 MILLIGRAM(S): 30 CAPSULE, DELAYED RELEASE ORAL at 13:18

## 2024-06-08 RX ADMIN — ATORVASTATIN CALCIUM 80 MILLIGRAM(S): 80 TABLET, FILM COATED ORAL at 21:33

## 2024-06-08 NOTE — PROGRESS NOTE ADULT - ASSESSMENT
A/P  Patient S/P  AVN Ablation plus DC Aveir  Sasha is doing well  - hold Eliquis for 3 days post op.  Ok to resume Eliquis on Tuesday morning  - No heavy lifting x 1 week  - Pt may shower today  - No bath/swimming x 1 week  - ok to discharge home today (after pt seen by EP attending)  - follow up with EP in 1 month   A/P  Patient S/P Modified AVN Ablation plus DC Aveir  Sasha is doing well  - hold Eliquis for 3 days post op.  Ok to resume Eliquis on Tuesday morning  - No heavy lifting x 1 week  - Pt may shower today  - No bath/swimming x 1 week  - ok to discharge home today (after pt seen by EP attending)  - follow up with EP in 1 month

## 2024-06-08 NOTE — PROGRESS NOTE ADULT - ASSESSMENT
Patient is a  68 yo M w/PMH of HTN, HLD, HFpEF (70-75%) grade 2 DD, ESRD s/p kidney transplant 8 years ago at [facility], chronic hep C, and A-fib SP ablation performed on 5/12, now presenting for palpitations w/HR in 180s identified as SVT, resolved with 30 mg of adenosine and 20 mg of cardizem, now admitted to telemetry for work up and monitoring of SVT. Overnight, he developed another episode of SVT that resolved with total of 18 mg of adenosine and 30 mg of cardizem.    #SVT, chronic Afib post recurrent ablation  - ON Eliquis 2,5mg due to high bleeding risk (subtherapeutic) CHADsVaSc: 5 (stroke reported by patient caused decreased visual acuity)  - Per EMS was   - Patient stressed that he is compliant with home meds yet was not sure of names and timing as he has home health aide that help him out, doubt medication compliance at home  - EKG show sinus tachycardia with elevated aVR deep symmetrical T waves inferio-anterior with STD mostly strain with LVH and tachycardia as patient is off pain  - Trop elevated to be trended, EKG to be trended unlikely ACS  - SP Cardizem push 30mg IV by EMS  - will resume home Diltazem and lopressor  - Had another episode of SVT O/N on 6/4 that resolved with total of 18 mg adenosine and 30 mg of cardizem  - Started PO cardizem 240 mg daily overnight  - Has loop recorder  - S/p leadless PPM + AVN ablation (6/8)  - EP recs (6/8): hold Eliquis for 3 days post op.  Ok to resume Eliquis on Tuesday morning, No heavy lifting x 1 week, Pt may shower today, No bath/swimming x 1 week, ok to discharge home today (after pt seen by EP attending), follow up with EP in 1 month    #Sciatica unlikely/Pain/History if substance abuse  - Leg raise test in negative, pain description is atypical  - Patient looks severely depressed disheveled uses Marijuana and used some other ORAL PILLS of abuse he does not know dishevelled has no family only aide at home  - Pain management consult  - On Oxy 15mg tid PRN at home  - Left groin ecchymosis 2/2 old hematoma 2/2 ablation fem access complication  - Pain regimen: Tylenol 650 mg q8hr standing, Oxycodone 15mg q8hr PRN, Hydromorphone 0.5mg IV q6hr PRN for break through pain, Methocarbamol 750mg q8hr standing, Gabapentin 300mg qHS, Duloxetine 30mg daily, Lidocaine 4% patch  - Encourage PT/ambulation  - Appreciate pain management recs  - Patient requesting MRI for sciatica  - PT recs (6/4): Home PT  - Neurosurgery recs (6/5): IR guided transformational steroid injections, targeting Left L5-S1, outpatient f/u with Dr. Arnold  - Pending IR-guided transformational steroid injections, targeting Left L5-S1, will be done by Dr. 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 Stuart, possibly Monday

## 2024-06-08 NOTE — PROGRESS NOTE ADULT - ASSESSMENT
69-year-old with PMH of HTN, HLD, HFpEF (70-75%) grade 2 DD, ESRD s/p kidney transplant 8 years ago at [place], chronic hep C, A-fib SP ablation preformed 5/12, brought in by ambulance with chief complaint of palpitations.  Per EMS patient's heart rate was in the 180s identified as SVT.     Palpitations / SVT  Chronic HFpEF  HTN / DL  ESRD s/p renal transplant  H/O Hep C  H/O A-fib s/p ablation  Pancytopenia  Macrocytic anemia               PLAN:    ·	Tele reviewed by me. Episodes of narrow complex tachycardia.   ·	EKG on admission: /min (Interpreted by me.   ·	Cont Cardizem  mg po daily  ·	EP f/u noted. Patient S/P Modified AVN Ablation plus DC Aveir  ·	Hold Eliquis for three days and restart on Tuesday.   ·	Troponin: 113-->154  ·	Pain management eval noted. Recommended to start Methocarbamol 750mg q8hr standing, Gabapentin 300mg qHS and Duloxetine 30 mg po daily. Lidocaine 4% patch to lower back  ·	Lumber spine MRI noted. Interval obliteration of the L4-5 disc space since the prior lumbar MRI from 11/27/2007 likely reflecting sequela of discitis osteomyelitis. Interval right L5 hemilaminectomy.  ·	Neurosurgery eval noted. No acute neurosurgical intervention. Recommended IR guided transformational steroid injections, targeting Left L5-S1  ·	Old record reviewed. ECHO done in March this year showed EF is 70-75%  ·	Nephrology eval noted. Recommended to cont Tacrolimus and check level in AM. Also cont Mycophenolate.   ·	Tacrolimus level is 13.5  ·	Cont his other home meds  ·	Check i's and o's and daily wt  ·	Low salt diet and water restriction to 1.5 L/D  ·	PT eval noted. Recommended home with home PT    Progress Note Handoff    Pending (specify):  Consults:_______, Tests________, Test Results_______, Other_IR for steroid injection_______  Family discussion:  Disposition: Home___/SNF___/Other________/Unknown at this time________    Germain Mazariegos MD  Spectra: [phone]

## 2024-06-08 NOTE — PROGRESS NOTE ADULT - SUBJECTIVE AND OBJECTIVE BOX
----------Daily Progress Note----------    HISTORY OF PRESENT ILLNESS:  Patient is a  70 yo M w/PMH of HTN, HLD, HFpEF (70-75%) grade 2 DD, ESRD s/p kidney transplant 8 years ago at [place], chronic hep C, and A-fib SP ablation performed on 5/12, now presenting for palpitations w/HR in 180s identified as SVT as per EMS w/total of 30 mg of adenosine and 20 mg of cardizem given, and 60 mcg of fentanyl given for sciatic pain, with HR subsequently decreasing into the 80s. Patient was normotensive. Patient reported left-sided chest pain in the morning that resolved w/tachycardia into the 180s that remained throughout the day. He denies any fevers, dyspnea, abdominal pain, nausea, diarrhea, or dysuria. He does endorse sciatic pain.    In the ED, vitals were stable, labs notable for trop 113 -> 154, pBNP 23327, Cr 1.5 (last known was 2.0), and Hgb 8.5 (around baseline), EKG notable for new deep symmetrical T wave anterio-inferior in the setting of LVH (Strain pattern?) with elevation in aVR, and CXR unremarkable.    He is now admitted to telemetry for work up and monitoring of SVT.     Cardiologist: Dr. Henderson    Today is hospital day 5d.     INTERVAL HOSPITAL COURSE / OVERNIGHT EVENTS:  O/N events:  None    24 hr events:  s/p AVN ablation and PPM placement    Patient was examined and seen at bedside. Reports L leg pain well controlled, endorses some pain at R groin and back, denies any chest pain or palpitations, endorses good appetite    Review of Systems: Otherwise unremarkable     <<<<<PAST MEDICAL & SURGICAL HISTORY>>>>>  CKD (chronic kidney disease)    ESRD (end stage renal disease)    HTN (hypertension)    HLD (hyperlipidemia)    Atrial fibrillation  on eliquis    COPD, mild  not on O2    Peripheral neuropathy  unclear cause    Lymphoma  ?questionable, not treated, not followed    Melanoma  R eye, s/p laser    Cirrhosis  possibly related to hepC    Breast cancer    Legally blind    Stroke    History of heroin abuse    History of chronic hepatitis    Marijuana use    History of kidney transplant  3 years ago    S/P arteriovenous (AV) fistula creation  prior old fistula in R arm    S/P lumpectomy, right breast    History of back surgery  s/p Left L5-S1 endoscopic laminoforaminotomy    History of loop recorder      ALLERGIES  Rituxan (Hives)      Home Medications:  Flomax 0.4 mg oral capsule: 1 cap(s) orally 2 times a day (10 May 2024 11:00)  mycophenolate mofetil 500 mg oral tablet: 2 tab(s) orally once a day (10 May 2024 11:00)  oxyCODONE 15 mg oral tablet: 1 tab(s) orally every 8 hours As needed Severe Pain (7 - 10) (10 May 2024 11:00)  senna leaf extract oral tablet: 2 tab(s) orally once a day (at bedtime) (18 May 2024 14:44)        MEDICATIONS  STANDING MEDICATIONS  acetaminophen     Tablet .. 650 milliGRAM(s) Oral every 8 hours  atorvastatin 80 milliGRAM(s) Oral at bedtime  diltiazem    milliGRAM(s) Oral daily  DULoxetine 30 milliGRAM(s) Oral daily  gabapentin 300 milliGRAM(s) Oral at bedtime  methocarbamol 750 milliGRAM(s) Oral every 8 hours  metoprolol tartrate 50 milliGRAM(s) Oral two times a day  mycophenolate mofetil 500 milliGRAM(s) Oral two times a day  pantoprazole    Tablet 40 milliGRAM(s) Oral before breakfast  senna 2 Tablet(s) Oral at bedtime  tacrolimus 1 milliGRAM(s) Oral two times a day  tamsulosin 0.4 milliGRAM(s) Oral at bedtime    PRN MEDICATIONS  aluminum hydroxide/magnesium hydroxide/simethicone Suspension 30 milliLiter(s) Oral every 4 hours PRN  HYDROmorphone  Injectable 0.5 milliGRAM(s) IV Push every 6 hours PRN  melatonin 3 milliGRAM(s) Oral at bedtime PRN  ondansetron Injectable 4 milliGRAM(s) IV Push every 8 hours PRN  oxyCODONE    IR 15 milliGRAM(s) Oral every 8 hours PRN  polyethylene glycol 3350 17 Gram(s) Oral two times a day PRN    VITALS:  T(F): 98.4  HR: 85  BP: 111/72  RR: 18  SpO2: 98%  <<<<<PHYSICAL EXAM>>>>>  GENERAL: NAD  PULMONARY: Clear to auscultation bilaterally. No wheezing or crackles  CARDIOVASCULAR: Regular rate and rhythm, S1-S2, no murmurs, rubs, or gallops  BREAST: Notable L sided gynecomastia  GASTROINTESTINAL: Soft, non-tender, non-distended, no guarding, + hepatomegaly  SKIN/EXTREMITIES: Warm, 2+ tibialis posterior pulses, no UE or LE edema, L thigh bruising, soft, R thigh access site slightly erythematous and tender, no exudates  NEUROLOGIC/MUSCULOSKELETAL: AOx4, grossly moving all extremities, no focal deficits    <<<<<LABS>>>>>                        9.8    2.88  )-----------( 75       ( 08 Jun 2024 04:30 )             31.8     06-08    135  |  106  |  20  ----------------------------<  98  4.5   |  19  |  1.4    Ca    9.6      08 Jun 2024 04:30  Mg     2.0     06-08        Urinalysis Basic - ( 08 Jun 2024 04:30 )    Color: x / Appearance: x / SG: x / pH: x  Gluc: 98 mg/dL / Ketone: x  / Bili: x / Urobili: x   Blood: x / Protein: x / Nitrite: x   Leuk Esterase: x / RBC: x / WBC x   Sq Epi: x / Non Sq Epi: x / Bacteria: x          [id number]        <<<<<RADIOLOGY>>>>>      -----------------------------------------------------------------------------------------------------------------------------------------------------------------------------------------------

## 2024-06-08 NOTE — PROGRESS NOTE ADULT - SUBJECTIVE AND OBJECTIVE BOX
INTERVAL HPI/OVERNIGHT EVENTS:    Patient s/p AVN  ablation plus DC Aveir implant   No events over night  Patient in NSR    MEDICATIONS  (STANDING):  acetaminophen     Tablet .. 650 milliGRAM(s) Oral every 8 hours  atorvastatin 80 milliGRAM(s) Oral at bedtime  diltiazem    milliGRAM(s) Oral daily  DULoxetine 30 milliGRAM(s) Oral daily  gabapentin 300 milliGRAM(s) Oral at bedtime  methocarbamol 750 milliGRAM(s) Oral every 8 hours  metoprolol tartrate 50 milliGRAM(s) Oral two times a day  mycophenolate mofetil 500 milliGRAM(s) Oral two times a day  pantoprazole    Tablet 40 milliGRAM(s) Oral before breakfast  senna 2 Tablet(s) Oral at bedtime  tacrolimus 1 milliGRAM(s) Oral two times a day  tamsulosin 0.4 milliGRAM(s) Oral at bedtime    MEDICATIONS  (PRN):  aluminum hydroxide/magnesium hydroxide/simethicone Suspension 30 milliLiter(s) Oral every 4 hours PRN Dyspepsia  HYDROmorphone  Injectable 0.5 milliGRAM(s) IV Push every 6 hours PRN BREAKTHROUGH PAIN  melatonin 3 milliGRAM(s) Oral at bedtime PRN Insomnia  ondansetron Injectable 4 milliGRAM(s) IV Push every 8 hours PRN Nausea and/or Vomiting  oxyCODONE    IR 15 milliGRAM(s) Oral every 8 hours PRN Severe Pain (7 - 10)  polyethylene glycol 3350 17 Gram(s) Oral two times a day PRN for constipation      Allergies    Rituxan (Hives)    Intolerances    Vital Signs Last 24 Hrs  T(C): 36.9 (08 Jun 2024 04:53), Max: 36.9 (08 Jun 2024 04:53)  T(F): 98.4 (08 Jun 2024 04:53), Max: 98.4 (08 Jun 2024 04:53)  HR: 85 (08 Jun 2024 04:53) (57 - 115)  BP: 111/72 (08 Jun 2024 04:53) (91/62 - 134/77)  BP(mean): 83 (07 Jun 2024 20:57) (70 - 95)  RR: 18 (08 Jun 2024 04:53) (12 - 18)  SpO2: 98% (07 Jun 2024 16:52) (96% - 99%)    Parameters below as of 07 Jun 2024 16:22  Patient On (Oxygen Delivery Method): room air      HEENT ELENITA EOMI  Lung CTAB  Heart RRR normal S1 S2  abd +BS soft  groins No hematoma, no bleeding  Ext no C/C/E    LABS:                        9.8    2.88  )-----------( 75       ( 08 Jun 2024 04:30 )             31.8     06-08    135  |  106  |  20  ----------------------------<  98  4.5   |  19  |  1.4    Ca    9.6      08 Jun 2024 04:30  Mg     2.0     06-08      Urinalysis Basic - ( 08 Jun 2024 04:30 )    Color: x / Appearance: x / SG: x / pH: x  Gluc: 98 mg/dL / Ketone: x  / Bili: x / Urobili: x   Blood: x / Protein: x / Nitrite: x   Leuk Esterase: x / RBC: x / WBC x   Sq Epi: x / Non Sq Epi: x / Bacteria: x           INTERVAL HPI/OVERNIGHT EVENTS:    Patient s/p modified AVN ablation DC Aveir implant   No events over night  Patient in NSR    MEDICATIONS  (STANDING):  acetaminophen     Tablet .. 650 milliGRAM(s) Oral every 8 hours  atorvastatin 80 milliGRAM(s) Oral at bedtime  diltiazem    milliGRAM(s) Oral daily  DULoxetine 30 milliGRAM(s) Oral daily  gabapentin 300 milliGRAM(s) Oral at bedtime  methocarbamol 750 milliGRAM(s) Oral every 8 hours  metoprolol tartrate 50 milliGRAM(s) Oral two times a day  mycophenolate mofetil 500 milliGRAM(s) Oral two times a day  pantoprazole    Tablet 40 milliGRAM(s) Oral before breakfast  senna 2 Tablet(s) Oral at bedtime  tacrolimus 1 milliGRAM(s) Oral two times a day  tamsulosin 0.4 milliGRAM(s) Oral at bedtime    MEDICATIONS  (PRN):  aluminum hydroxide/magnesium hydroxide/simethicone Suspension 30 milliLiter(s) Oral every 4 hours PRN Dyspepsia  HYDROmorphone  Injectable 0.5 milliGRAM(s) IV Push every 6 hours PRN BREAKTHROUGH PAIN  melatonin 3 milliGRAM(s) Oral at bedtime PRN Insomnia  ondansetron Injectable 4 milliGRAM(s) IV Push every 8 hours PRN Nausea and/or Vomiting  oxyCODONE    IR 15 milliGRAM(s) Oral every 8 hours PRN Severe Pain (7 - 10)  polyethylene glycol 3350 17 Gram(s) Oral two times a day PRN for constipation      Allergies    Rituxan (Hives)    Intolerances    Vital Signs Last 24 Hrs  T(C): 36.9 (08 Jun 2024 04:53), Max: 36.9 (08 Jun 2024 04:53)  T(F): 98.4 (08 Jun 2024 04:53), Max: 98.4 (08 Jun 2024 04:53)  HR: 85 (08 Jun 2024 04:53) (57 - 115)  BP: 111/72 (08 Jun 2024 04:53) (91/62 - 134/77)  BP(mean): 83 (07 Jun 2024 20:57) (70 - 95)  RR: 18 (08 Jun 2024 04:53) (12 - 18)  SpO2: 98% (07 Jun 2024 16:52) (96% - 99%)    Parameters below as of 07 Jun 2024 16:22  Patient On (Oxygen Delivery Method): room air      HEENT ELENITA EOMI  Lung CTAB  Heart RRR normal S1 S2  abd +BS soft  groins No hematoma, no bleeding  Ext no C/C/E    LABS:                        9.8    2.88  )-----------( 75       ( 08 Jun 2024 04:30 )             31.8     06-08    135  |  106  |  20  ----------------------------<  98  4.5   |  19  |  1.4    Ca    9.6      08 Jun 2024 04:30  Mg     2.0     06-08      Urinalysis Basic - ( 08 Jun 2024 04:30 )    Color: x / Appearance: x / SG: x / pH: x  Gluc: 98 mg/dL / Ketone: x  / Bili: x / Urobili: x   Blood: x / Protein: x / Nitrite: x   Leuk Esterase: x / RBC: x / WBC x   Sq Epi: x / Non Sq Epi: x / Bacteria: x           INTERVAL HPI/OVERNIGHT EVENTS:  Patient s/p modified AVN ablation DC Aveir implant   No events over night  Patient in NSR    MEDICATIONS  (STANDING):  acetaminophen     Tablet .. 650 milliGRAM(s) Oral every 8 hours  atorvastatin 80 milliGRAM(s) Oral at bedtime  diltiazem    milliGRAM(s) Oral daily  DULoxetine 30 milliGRAM(s) Oral daily  gabapentin 300 milliGRAM(s) Oral at bedtime  methocarbamol 750 milliGRAM(s) Oral every 8 hours  metoprolol tartrate 50 milliGRAM(s) Oral two times a day  mycophenolate mofetil 500 milliGRAM(s) Oral two times a day  pantoprazole    Tablet 40 milliGRAM(s) Oral before breakfast  senna 2 Tablet(s) Oral at bedtime  tacrolimus 1 milliGRAM(s) Oral two times a day  tamsulosin 0.4 milliGRAM(s) Oral at bedtime    MEDICATIONS  (PRN):  aluminum hydroxide/magnesium hydroxide/simethicone Suspension 30 milliLiter(s) Oral every 4 hours PRN Dyspepsia  HYDROmorphone  Injectable 0.5 milliGRAM(s) IV Push every 6 hours PRN BREAKTHROUGH PAIN  melatonin 3 milliGRAM(s) Oral at bedtime PRN Insomnia  ondansetron Injectable 4 milliGRAM(s) IV Push every 8 hours PRN Nausea and/or Vomiting  oxyCODONE    IR 15 milliGRAM(s) Oral every 8 hours PRN Severe Pain (7 - 10)  polyethylene glycol 3350 17 Gram(s) Oral two times a day PRN for constipation      Allergies    Rituxan (Hives)    Intolerances    Vital Signs Last 24 Hrs  T(C): 36.9 (08 Jun 2024 04:53), Max: 36.9 (08 Jun 2024 04:53)  T(F): 98.4 (08 Jun 2024 04:53), Max: 98.4 (08 Jun 2024 04:53)  HR: 85 (08 Jun 2024 04:53) (57 - 115)  BP: 111/72 (08 Jun 2024 04:53) (91/62 - 134/77)  BP(mean): 83 (07 Jun 2024 20:57) (70 - 95)  RR: 18 (08 Jun 2024 04:53) (12 - 18)  SpO2: 98% (07 Jun 2024 16:52) (96% - 99%)    Parameters below as of 07 Jun 2024 16:22  Patient On (Oxygen Delivery Method): room air      HEENT ELENITA EOMI  Lung CTAB  Heart RRR normal S1 S2  abd +BS soft  groins No hematoma, no bleeding  Ext no C/C/E    LABS:                        9.8    2.88  )-----------( 75       ( 08 Jun 2024 04:30 )             31.8     06-08    135  |  106  |  20  ----------------------------<  98  4.5   |  19  |  1.4    Ca    9.6      08 Jun 2024 04:30  Mg     2.0     06-08      Urinalysis Basic - ( 08 Jun 2024 04:30 )    Color: x / Appearance: x / SG: x / pH: x  Gluc: 98 mg/dL / Ketone: x  / Bili: x / Urobili: x   Blood: x / Protein: x / Nitrite: x   Leuk Esterase: x / RBC: x / WBC x   Sq Epi: x / Non Sq Epi: x / Bacteria: x

## 2024-06-08 NOTE — PROGRESS NOTE ADULT - NS ATTEND AMEND GEN_ALL_CORE FT
s/p AVN modification. Groins healing well.  HR better controlled  Resume Melodie Feliciano AM per Dr. Henderson  Follow up with Dr. Henderson in 1 mo  Recall as needed

## 2024-06-08 NOTE — PROGRESS NOTE ADULT - SUBJECTIVE AND OBJECTIVE BOX
EFFIE DAVONTE  69y Male    CHIEF COMPLAINT:    Patient is a 69y old  Male who presents with a chief complaint of Palpitations (2024 09:21)      INTERVAL HPI/OVERNIGHT EVENTS:    Patient seen and examined. Feels good. No cp or palpitations. S/P AV node ablation and leadless pacemaker on     ROS: All other systems are negative.    Vital Signs:    T(F): 98.7 (24 @ 11:58), Max: 98.7 (24 @ 11:58)  HR: 86 (24 @ 11:58) (57 - 115)  BP: 120/64 (24 @ 11:58) (91/62 - 134/77)  RR: 18 (24 @ 11:58) (12 - 18)  SpO2: 98% (24 @ 11:58) (96% - 99%)  I&O's Summary    2024 07:01  -  2024 07:00  --------------------------------------------------------  IN: 820 mL / OUT: 2025 mL / NET: -1205 mL      Daily Height in cm: 175.26 (2024 13:17)    Daily Weight in k (2024 04:53)  CAPILLARY BLOOD GLUCOSE          PHYSICAL EXAM:    GENERAL:  NAD  SKIN: No rashes or lesions  HENT: Atraumatic. Normocephalic. PERRL. Moist membranes.  NECK: Supple, No JVD. No lymphadenopathy.  PULMONARY: CTA B/L. No wheezing. No rales  CVS: Normal S1, S2. Rate and Rhythm are regular. No murmurs.  ABDOMEN/GI: Soft, Nontender, Nondistended; BS present  EXTREMITIES: Peripheral pulses intact. No edema B/L LE.  NEUROLOGIC:  No motor or sensory deficit.  PSYCH: Alert & oriented x 3    Consultant(s) Notes Reviewed:  [x ] YES  [ ] NO  Care Discussed with Consultants/Other Providers [ x] YES  [ ] NO    EKG reviewed  Telemetry reviewed    LABS:                        9.8    2.88  )-----------( 75       ( 2024 04:30 )             31.8     06-08    135  |  106  |  20  ----------------------------<  98  4.5   |  19  |  1.4    Ca    9.6      2024 04:30  Mg     2.0     06-08                RADIOLOGY & ADDITIONAL TESTS:    < from: Xray Chest 1 View AP/PA (24 @ 16:57) >  impression:    Partial lordotic view. EKG leads overlie thorax, obscuring anatomy.    Support devices: Loop recorder. Multiple leadless  pacemakers.    Cardiac/ Mediastinum: Stable    Lungs/ Pleura: No consolidation, pleural effusionsor pneumothorax.    Skeletal/ soft tissues: Stable    Bilateral subclavian stents.    < end of copied text >    Imaging or report Personally Reviewed:  [ ] YES  [ ] NO    Medications:  Standing  acetaminophen     Tablet .. 650 milliGRAM(s) Oral every 8 hours  atorvastatin 80 milliGRAM(s) Oral at bedtime  diltiazem    milliGRAM(s) Oral daily  DULoxetine 30 milliGRAM(s) Oral daily  gabapentin 300 milliGRAM(s) Oral at bedtime  methocarbamol 750 milliGRAM(s) Oral every 8 hours  metoprolol tartrate 50 milliGRAM(s) Oral two times a day  mycophenolate mofetil 500 milliGRAM(s) Oral two times a day  pantoprazole    Tablet 40 milliGRAM(s) Oral before breakfast  senna 2 Tablet(s) Oral at bedtime  tacrolimus 1 milliGRAM(s) Oral two times a day  tamsulosin 0.4 milliGRAM(s) Oral at bedtime    PRN Meds  aluminum hydroxide/magnesium hydroxide/simethicone Suspension 30 milliLiter(s) Oral every 4 hours PRN  HYDROmorphone  Injectable 0.5 milliGRAM(s) IV Push every 6 hours PRN  melatonin 3 milliGRAM(s) Oral at bedtime PRN  ondansetron Injectable 4 milliGRAM(s) IV Push every 8 hours PRN  oxyCODONE    IR 15 milliGRAM(s) Oral every 8 hours PRN  polyethylene glycol 3350 17 Gram(s) Oral two times a day PRN      Case discussed with resident    Care discussed with pt/family

## 2024-06-09 LAB
ANION GAP SERPL CALC-SCNC: 11 MMOL/L — SIGNIFICANT CHANGE UP (ref 7–14)
BUN SERPL-MCNC: 19 MG/DL — SIGNIFICANT CHANGE UP (ref 10–20)
CALCIUM SERPL-MCNC: 9.9 MG/DL — SIGNIFICANT CHANGE UP (ref 8.4–10.4)
CHLORIDE SERPL-SCNC: 106 MMOL/L — SIGNIFICANT CHANGE UP (ref 98–110)
CO2 SERPL-SCNC: 18 MMOL/L — SIGNIFICANT CHANGE UP (ref 17–32)
CREAT SERPL-MCNC: 1.5 MG/DL — SIGNIFICANT CHANGE UP (ref 0.7–1.5)
EGFR: 51 ML/MIN/1.73M2 — LOW
GLUCOSE SERPL-MCNC: 111 MG/DL — HIGH (ref 70–99)
HCT VFR BLD CALC: 32.3 % — LOW (ref 42–52)
HGB BLD-MCNC: 9.9 G/DL — LOW (ref 14–18)
MAGNESIUM SERPL-MCNC: 2 MG/DL — SIGNIFICANT CHANGE UP (ref 1.8–2.4)
MCHC RBC-ENTMCNC: 30.2 PG — SIGNIFICANT CHANGE UP (ref 27–31)
MCHC RBC-ENTMCNC: 30.7 G/DL — LOW (ref 32–37)
MCV RBC AUTO: 98.5 FL — HIGH (ref 80–94)
MYCOPHENOLATE SERPL-MCNC: 1.4 UG/ML — SIGNIFICANT CHANGE UP (ref 1–3.5)
MYCOPHENOLIC ACID GLUCURONIDE: 50 UG/ML — SIGNIFICANT CHANGE UP (ref 15–125)
NRBC # BLD: 0 /100 WBCS — SIGNIFICANT CHANGE UP (ref 0–0)
PLATELET # BLD AUTO: 73 K/UL — LOW (ref 130–400)
PMV BLD: 11.9 FL — HIGH (ref 7.4–10.4)
POTASSIUM SERPL-MCNC: 4.6 MMOL/L — SIGNIFICANT CHANGE UP (ref 3.5–5)
POTASSIUM SERPL-SCNC: 4.6 MMOL/L — SIGNIFICANT CHANGE UP (ref 3.5–5)
RBC # BLD: 3.28 M/UL — LOW (ref 4.7–6.1)
RBC # FLD: 18.1 % — HIGH (ref 11.5–14.5)
SODIUM SERPL-SCNC: 135 MMOL/L — SIGNIFICANT CHANGE UP (ref 135–146)
WBC # BLD: 2.44 K/UL — LOW (ref 4.8–10.8)
WBC # FLD AUTO: 2.44 K/UL — LOW (ref 4.8–10.8)

## 2024-06-09 PROCEDURE — 99232 SBSQ HOSP IP/OBS MODERATE 35: CPT

## 2024-06-09 RX ADMIN — PANTOPRAZOLE SODIUM 40 MILLIGRAM(S): 20 TABLET, DELAYED RELEASE ORAL at 06:01

## 2024-06-09 RX ADMIN — DULOXETINE HYDROCHLORIDE 30 MILLIGRAM(S): 30 CAPSULE, DELAYED RELEASE ORAL at 13:18

## 2024-06-09 RX ADMIN — ATORVASTATIN CALCIUM 80 MILLIGRAM(S): 80 TABLET, FILM COATED ORAL at 21:26

## 2024-06-09 RX ADMIN — Medication 240 MILLIGRAM(S): at 05:59

## 2024-06-09 RX ADMIN — Medication 3 MILLIGRAM(S): at 02:15

## 2024-06-09 RX ADMIN — Medication 650 MILLIGRAM(S): at 22:00

## 2024-06-09 RX ADMIN — Medication 50 MILLIGRAM(S): at 17:55

## 2024-06-09 RX ADMIN — MYCOPHENOLATE MOFETIL 500 MILLIGRAM(S): 250 CAPSULE ORAL at 05:59

## 2024-06-09 RX ADMIN — Medication 50 MILLIGRAM(S): at 06:00

## 2024-06-09 RX ADMIN — POLYETHYLENE GLYCOL 3350 17 GRAM(S): 17 POWDER, FOR SOLUTION ORAL at 06:01

## 2024-06-09 RX ADMIN — TAMSULOSIN HYDROCHLORIDE 0.4 MILLIGRAM(S): 0.4 CAPSULE ORAL at 21:26

## 2024-06-09 RX ADMIN — METHOCARBAMOL 750 MILLIGRAM(S): 500 TABLET, FILM COATED ORAL at 13:18

## 2024-06-09 RX ADMIN — MYCOPHENOLATE MOFETIL 500 MILLIGRAM(S): 250 CAPSULE ORAL at 17:55

## 2024-06-09 RX ADMIN — TACROLIMUS 1 MILLIGRAM(S): 5 CAPSULE ORAL at 17:55

## 2024-06-09 RX ADMIN — TACROLIMUS 1 MILLIGRAM(S): 5 CAPSULE ORAL at 06:00

## 2024-06-09 RX ADMIN — METHOCARBAMOL 750 MILLIGRAM(S): 500 TABLET, FILM COATED ORAL at 21:25

## 2024-06-09 RX ADMIN — METHOCARBAMOL 750 MILLIGRAM(S): 500 TABLET, FILM COATED ORAL at 06:00

## 2024-06-09 RX ADMIN — Medication 650 MILLIGRAM(S): at 21:26

## 2024-06-09 RX ADMIN — Medication 650 MILLIGRAM(S): at 13:18

## 2024-06-09 RX ADMIN — GABAPENTIN 300 MILLIGRAM(S): 400 CAPSULE ORAL at 21:25

## 2024-06-09 RX ADMIN — OXYCODONE HYDROCHLORIDE 15 MILLIGRAM(S): 5 TABLET ORAL at 17:55

## 2024-06-09 NOTE — PROGRESS NOTE ADULT - SUBJECTIVE AND OBJECTIVE BOX
EFFIE DAVONTE  69y Male    CHIEF COMPLAINT:    Patient is a 69y old  Male who presents with a chief complaint of Palpitations (2024 09:21)      INTERVAL HPI/OVERNIGHT EVENTS:    Patient seen and examined. Feels better. No more sciatica pain    ROS: All other systems are negative.    Vital Signs:    T(F): 98.7 (24 @ 06:03), Max: 98.7 (24 @ 11:58)  HR: 81 (24 @ 06:03) (81 - 89)  BP: 120/69 (24 @ 06:03) (100/54 - 120/69)  RR: 18 (24 @ 06:03) (18 - 18)  SpO2: 99% (24 @ 06:03) (98% - 99%)  I&O's Summary    2024 07:01  -  2024 07:00  --------------------------------------------------------  IN: 720 mL / OUT: 1800 mL / NET: -1080 mL    2024 07:01  -  2024 11:04  --------------------------------------------------------  IN: 400 mL / OUT: 0 mL / NET: 400 mL      Daily     Daily Weight in k.9 (2024 05:05)  CAPILLARY BLOOD GLUCOSE      POCT Blood Glucose.: 147 mg/dL (2024 16:38)      PHYSICAL EXAM:    GENERAL:  NAD  SKIN: No rashes or lesions  HENT: Atraumatic. Normocephalic. PERRL. Moist membranes.  NECK: Supple, No JVD. No lymphadenopathy.  PULMONARY: CTA B/L. No wheezing. No rales  CVS: Normal S1, S2. Rate and Rhythm are regular. No murmurs.  ABDOMEN/GI: Soft, Nontender, Nondistended; BS present  EXTREMITIES: Peripheral pulses intact. No edema B/L LE.  NEUROLOGIC:  No motor or sensory deficit.  PSYCH: Alert & oriented x 3    Consultant(s) Notes Reviewed:  [x ] YES  [ ] NO  Care Discussed with Consultants/Other Providers [ x] YES  [ ] NO    EKG reviewed  Telemetry reviewed    LABS:                        9.9    2.44  )-----------( 73       ( 2024 07:22 )             32.3     06-09    135  |  106  |  19  ----------------------------<  111<H>  4.6   |  18  |  1.5    Ca    9.9      2024 07:22  Mg     2.0     -                RADIOLOGY & ADDITIONAL TESTS:      Imaging or report Personally Reviewed:  [ ] YES  [ ] NO    Medications:  Standing  acetaminophen     Tablet .. 650 milliGRAM(s) Oral every 8 hours  atorvastatin 80 milliGRAM(s) Oral at bedtime  diltiazem    milliGRAM(s) Oral daily  DULoxetine 30 milliGRAM(s) Oral daily  gabapentin 300 milliGRAM(s) Oral at bedtime  methocarbamol 750 milliGRAM(s) Oral every 8 hours  metoprolol tartrate 50 milliGRAM(s) Oral two times a day  mycophenolate mofetil 500 milliGRAM(s) Oral two times a day  pantoprazole    Tablet 40 milliGRAM(s) Oral before breakfast  senna 2 Tablet(s) Oral at bedtime  tacrolimus 1 milliGRAM(s) Oral two times a day  tamsulosin 0.4 milliGRAM(s) Oral at bedtime    PRN Meds  aluminum hydroxide/magnesium hydroxide/simethicone Suspension 30 milliLiter(s) Oral every 4 hours PRN  HYDROmorphone  Injectable 0.5 milliGRAM(s) IV Push every 6 hours PRN  melatonin 3 milliGRAM(s) Oral at bedtime PRN  ondansetron Injectable 4 milliGRAM(s) IV Push every 8 hours PRN  oxyCODONE    IR 15 milliGRAM(s) Oral every 8 hours PRN  polyethylene glycol 3350 17 Gram(s) Oral two times a day PRN      Case discussed with resident    Care discussed with pt/family

## 2024-06-09 NOTE — PROGRESS NOTE ADULT - ASSESSMENT
69-year-old with PMH of HTN, HLD, HFpEF (70-75%) grade 2 DD, ESRD s/p kidney transplant 8 years ago at [place], chronic hep C, A-fib SP ablation preformed 5/12, brought in by ambulance with chief complaint of palpitations.  Per EMS patient's heart rate was in the 180s identified as SVT.     Palpitations / SVT  Chronic HFpEF  HTN / DL  ESRD s/p renal transplant  H/O Hep C  H/O A-fib s/p ablation  Pancytopenia  Macrocytic anemia               PLAN:    ·	Tele reviewed by me. Episodes of narrow complex tachycardia.   ·	EKG on admission: /min (Interpreted by me.   ·	Cont Cardizem  mg po daily  ·	EP f/u noted. Patient S/P Modified AVN Ablation plus DC Aveir  ·	Hold Eliquis for three days and restart on Tuesday.   ·	Troponin: 113-->154  ·	Pain management eval noted. Recommended to start Methocarbamol 750mg q8hr standing, Gabapentin 300mg qHS and Duloxetine 30 mg po daily. Lidocaine 4% patch to lower back  ·	Lumber spine MRI noted. Interval obliteration of the L4-5 disc space since the prior lumbar MRI from 11/27/2007 likely reflecting sequela of discitis osteomyelitis. Interval right L5 hemilaminectomy.  ·	Neurosurgery eval noted. No acute neurosurgical intervention. Recommended IR guided transformational steroid injections, targeting Left L5-S1  ·	Old record reviewed. ECHO done in March this year showed EF is 70-75%  ·	Nephrology eval noted. Recommended to cont Tacrolimus and check level in AM. Also cont Mycophenolate.   ·	Tacrolimus level is 13.5  ·	Cont his other home meds  ·	Check i's and o's and daily wt  ·	Low salt diet and water restriction to 1.5 L/D  ·	PT eval noted. Recommended home with home PT  ·	Anticipate d/c in AM after steroid injection by the IR    Progress Note Handoff    Pending (specify):  Consults:_______, Tests________, Test Results_______, Other_IR for steroid injection_______  Family discussion:  Disposition: Home___/SNF___/Other________/Unknown at this time________    Germain Shirani, MD  Spectra: [phone]

## 2024-06-10 ENCOUNTER — TRANSCRIPTION ENCOUNTER (OUTPATIENT)
Age: 69
End: 2024-06-10

## 2024-06-10 VITALS
RESPIRATION RATE: 18 BRPM | HEART RATE: 84 BPM | SYSTOLIC BLOOD PRESSURE: 104 MMHG | OXYGEN SATURATION: 99 % | DIASTOLIC BLOOD PRESSURE: 61 MMHG | TEMPERATURE: 98 F

## 2024-06-10 LAB
ANION GAP SERPL CALC-SCNC: 10 MMOL/L — SIGNIFICANT CHANGE UP (ref 7–14)
BUN SERPL-MCNC: 20 MG/DL — SIGNIFICANT CHANGE UP (ref 10–20)
CALCIUM SERPL-MCNC: 9.9 MG/DL — SIGNIFICANT CHANGE UP (ref 8.4–10.4)
CHLORIDE SERPL-SCNC: 106 MMOL/L — SIGNIFICANT CHANGE UP (ref 98–110)
CO2 SERPL-SCNC: 19 MMOL/L — SIGNIFICANT CHANGE UP (ref 17–32)
CREAT SERPL-MCNC: 1.5 MG/DL — SIGNIFICANT CHANGE UP (ref 0.7–1.5)
EGFR: 51 ML/MIN/1.73M2 — LOW
GLUCOSE SERPL-MCNC: 91 MG/DL — SIGNIFICANT CHANGE UP (ref 70–99)
HCT VFR BLD CALC: 32.4 % — LOW (ref 42–52)
HGB BLD-MCNC: 10.1 G/DL — LOW (ref 14–18)
MAGNESIUM SERPL-MCNC: 2 MG/DL — SIGNIFICANT CHANGE UP (ref 1.8–2.4)
MCHC RBC-ENTMCNC: 30.9 PG — SIGNIFICANT CHANGE UP (ref 27–31)
MCHC RBC-ENTMCNC: 31.2 G/DL — LOW (ref 32–37)
MCV RBC AUTO: 99.1 FL — HIGH (ref 80–94)
NRBC # BLD: 0 /100 WBCS — SIGNIFICANT CHANGE UP (ref 0–0)
PLATELET # BLD AUTO: 73 K/UL — LOW (ref 130–400)
PMV BLD: 11.6 FL — HIGH (ref 7.4–10.4)
POTASSIUM SERPL-MCNC: 4.7 MMOL/L — SIGNIFICANT CHANGE UP (ref 3.5–5)
POTASSIUM SERPL-SCNC: 4.7 MMOL/L — SIGNIFICANT CHANGE UP (ref 3.5–5)
RBC # BLD: 3.27 M/UL — LOW (ref 4.7–6.1)
RBC # FLD: 18.1 % — HIGH (ref 11.5–14.5)
SODIUM SERPL-SCNC: 135 MMOL/L — SIGNIFICANT CHANGE UP (ref 135–146)
WBC # BLD: 2.75 K/UL — LOW (ref 4.8–10.8)
WBC # FLD AUTO: 2.75 K/UL — LOW (ref 4.8–10.8)

## 2024-06-10 PROCEDURE — 99239 HOSP IP/OBS DSCHRG MGMT >30: CPT

## 2024-06-10 RX ORDER — METHOCARBAMOL 500 MG/1
1 TABLET, FILM COATED ORAL
Qty: 90 | Refills: 0
Start: 2024-06-10 | End: 2024-07-09

## 2024-06-10 RX ORDER — ACETAMINOPHEN 500 MG
2 TABLET ORAL
Qty: 180 | Refills: 0
Start: 2024-06-10 | End: 2024-07-09

## 2024-06-10 RX ORDER — GABAPENTIN 400 MG/1
1 CAPSULE ORAL
Qty: 30 | Refills: 0
Start: 2024-06-10 | End: 2024-07-09

## 2024-06-10 RX ORDER — DILTIAZEM HCL 120 MG
1 CAPSULE, EXT RELEASE 24 HR ORAL
Qty: 30 | Refills: 0
Start: 2024-06-10 | End: 2024-07-09

## 2024-06-10 RX ORDER — DULOXETINE HYDROCHLORIDE 30 MG/1
1 CAPSULE, DELAYED RELEASE ORAL
Qty: 30 | Refills: 0
Start: 2024-06-10 | End: 2024-07-09

## 2024-06-10 RX ADMIN — Medication 50 MILLIGRAM(S): at 17:20

## 2024-06-10 RX ADMIN — DULOXETINE HYDROCHLORIDE 30 MILLIGRAM(S): 30 CAPSULE, DELAYED RELEASE ORAL at 11:31

## 2024-06-10 RX ADMIN — Medication 650 MILLIGRAM(S): at 17:06

## 2024-06-10 RX ADMIN — Medication 240 MILLIGRAM(S): at 05:47

## 2024-06-10 RX ADMIN — Medication 650 MILLIGRAM(S): at 14:04

## 2024-06-10 RX ADMIN — MYCOPHENOLATE MOFETIL 500 MILLIGRAM(S): 250 CAPSULE ORAL at 17:20

## 2024-06-10 RX ADMIN — PANTOPRAZOLE SODIUM 40 MILLIGRAM(S): 20 TABLET, DELAYED RELEASE ORAL at 05:49

## 2024-06-10 RX ADMIN — TACROLIMUS 1 MILLIGRAM(S): 5 CAPSULE ORAL at 17:20

## 2024-06-10 RX ADMIN — METHOCARBAMOL 750 MILLIGRAM(S): 500 TABLET, FILM COATED ORAL at 05:48

## 2024-06-10 RX ADMIN — TACROLIMUS 1 MILLIGRAM(S): 5 CAPSULE ORAL at 05:47

## 2024-06-10 RX ADMIN — MYCOPHENOLATE MOFETIL 500 MILLIGRAM(S): 250 CAPSULE ORAL at 05:48

## 2024-06-10 RX ADMIN — Medication 50 MILLIGRAM(S): at 05:47

## 2024-06-10 RX ADMIN — Medication 650 MILLIGRAM(S): at 05:46

## 2024-06-10 RX ADMIN — METHOCARBAMOL 750 MILLIGRAM(S): 500 TABLET, FILM COATED ORAL at 14:05

## 2024-06-10 NOTE — CONSULT NOTE ADULT - CONSULT REASON
review of MRI
Lower back pain
S/P Afib Ablation/  Recurrent SVTs
Kidney transplant
Unlikeyl sciatica as neg leg raise test

## 2024-06-10 NOTE — DISCHARGE NOTE NURSING/CASE MANAGEMENT/SOCIAL WORK - NSDCPEFALRISK_GEN_ALL_CORE
For information on Fall & Injury Prevention, visit: [email or web address] OR  [email or web address] OR  https://www.cdc.gov/steadi/patient.html

## 2024-06-10 NOTE — PROGRESS NOTE ADULT - ASSESSMENT
Patient is a  70 yo M w/PMH of HTN, HLD, HFpEF (70-75%) grade 2 DD, ESRD s/p kidney transplant 8 years ago at [place], chronic hep C, and A-fib SP ablation performed on 5/12, now presenting for palpitations w/HR in 180s identified as SVT, resolved with 30 mg of adenosine and 20 mg of cardizem, now admitted to telemetry for work up and monitoring of SVT. Overnight, he developed another episode of SVT that resolved with total of 18 mg of adenosine and 30 mg of cardizem.    #SVT, chronic Afib post recurrent ablation  - ON Eliquis 2,5mg due to high bleeding risk (subtherapeutic) CHADsVaSc: 5 (stroke reported by patient caused decreased visual acuity)  - Per EMS was   - Patient stressed that he is compliant with home meds yet was not sure of names and timing as he has home health aide that help him out, doubt medication compliance at home  - EKG show sinus tachycardia with elevated aVR deep symmetrical T waves inferio-anterior with STD mostly strain with LVH and tachycardia as patient is off pain  - Trop elevated to be trended, EKG to be trended unlikely ACS  - SP Cardizem push 30mg IV by EMS  - will resume home Diltazem and lopressor  - Had another episode of SVT O/N on 6/4 that resolved with total of 18 mg adenosine and 30 mg of cardizem  - Started PO cardizem 240 mg daily overnight  - Has loop recorder  - S/p leadless PPM + AVN ablation (6/8)  - EP recs (6/8): hold Eliquis for 3 days post op.  Ok to resume Eliquis on Tuesday morning, No heavy lifting x 1 week, Pt may shower today, No bath/swimming x 1 week, ok to discharge home today (after pt seen by EP attending), follow up with EP in 1 month  - Restart Eliquis on 6/11    #Sciatica unlikely/Pain/History if substance abuse  - Leg raise test in negative, pain description is atypical  - Patient looks severely depressed disheveled uses Marijuana and used some other ORAL PILLS of abuse he does not know dishevelled has no family only aide at home  - Pain management consult  - On Oxy 15mg tid PRN at home  - Left groin ecchymosis 2/2 old hematoma 2/2 ablation fem access complication  - Pain regimen: Tylenol 650 mg q8hr standing, Oxycodone 15mg q8hr PRN, Hydromorphone 0.5mg IV q6hr PRN for break through pain, Methocarbamol 750mg q8hr standing, Gabapentin 300mg qHS, Duloxetine 30mg daily, Lidocaine 4% patch  - Encourage PT/ambulation  - Appreciate pain management recs  - Patient requesting MRI for sciatica  - PT recs (6/4): Home PT  - Neurosurgery recs (6/5): IR guided transformational steroid injections, targeting Left L5-S1, outpatient f/u with Dr. Arnold  - Pending IR-guided transformational steroid injections, targeting Left L5-S1, will be done by Dr. 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 Stuart, likely today, discharge after injection

## 2024-06-10 NOTE — PROGRESS NOTE ADULT - SUBJECTIVE AND OBJECTIVE BOX
[facility] FOLLOW UP NOTE  --------------------------------------------------------------------------------  24 hour events/subjective: Patient examined. Appears comfortable.    PAST HISTORY  --------------------------------------------------------------------------------  No significant changes to PMH, PSH, FHx, SHx, unless otherwise noted    ALLERGIES & MEDICATIONS  --------------------------------------------------------------------------------  Allergies    Rituxan (Hives)    Standing Inpatient Medications  acetaminophen     Tablet .. 650 milliGRAM(s) Oral every 8 hours  atorvastatin 80 milliGRAM(s) Oral at bedtime  diltiazem    milliGRAM(s) Oral daily  DULoxetine 30 milliGRAM(s) Oral daily  gabapentin 300 milliGRAM(s) Oral at bedtime  methocarbamol 750 milliGRAM(s) Oral every 8 hours  metoprolol tartrate 50 milliGRAM(s) Oral two times a day  mycophenolate mofetil 500 milliGRAM(s) Oral two times a day  pantoprazole    Tablet 40 milliGRAM(s) Oral before breakfast  senna 2 Tablet(s) Oral at bedtime  tacrolimus 1 milliGRAM(s) Oral two times a day  tamsulosin 0.4 milliGRAM(s) Oral at bedtime    PRN Inpatient Medications  aluminum hydroxide/magnesium hydroxide/simethicone Suspension 30 milliLiter(s) Oral every 4 hours PRN  HYDROmorphone  Injectable 0.5 milliGRAM(s) IV Push every 6 hours PRN  melatonin 3 milliGRAM(s) Oral at bedtime PRN  ondansetron Injectable 4 milliGRAM(s) IV Push every 8 hours PRN  oxyCODONE    IR 15 milliGRAM(s) Oral every 8 hours PRN  polyethylene glycol 3350 17 Gram(s) Oral two times a day PRN    VITALS/PHYSICAL EXAM  --------------------------------------------------------------------------------  T(C): 36.6 (06-10-24 @ 12:41), Max: 36.9 (06-09-24 @ 20:08)  HR: 79 (06-10-24 @ 12:41) (72 - 79)  BP: 128/63 (06-10-24 @ 12:41) (103/62 - 128/63)  RR: 18 (06-10-24 @ 12:41) (18 - 18)  SpO2: 100% (06-10-24 @ 12:41) (100% - 100%)    06-09-24 @ 07:01  -  06-10-24 @ 07:00  --------------------------------------------------------  IN: 1258 mL / OUT: 300 mL / NET: 958 mL    Physical Exam:  	Gen: NAD  	Pulm: CTA B/L  	CV: RRR, S1S2  	Abd: +BS, soft, nontender/nondistended  	: No suprapubic tenderness  	LE: Warm, no edema    LABS/STUDIES  --------------------------------------------------------------------------------              10.1   2.75  >-----------<  73       [06-10-24 @ 07:13]              32.4     135  |  106  |  20  ----------------------------<  91      [06-10-24 @ 07:13]  4.7   |  19  |  1.5        Ca     9.9     [06-10-24 @ 07:13]      Mg     2.0     [06-10-24 @ 07:13]    Creatinine Trend:  SCr 1.5 [06-10 @ 07:13]  SCr 1.5 [06-09 @ 07:22]  SCr 1.4 [06-08 @ 04:30]  SCr 1.5 [06-07 @ 05:58]  SCr 1.4 [06-05 @ 07:07]    Urinalysis - [06-10-24 @ 07:13]      Color  / Appearance  / SG  / pH       Gluc 91 / Ketone   / Bili  / Urobili        Blood  / Protein  / Leuk Est  / Nitrite       RBC  / WBC  / Hyaline  / Gran  / Sq Epi  / Non Sq Epi  / Bacteria     Iron 28, TIBC 185, %sat 15      [05-18-24 @ 05:21]  Ferritin 803      [05-18-24 @ 05:21]  HbA1c 5.1      [11-06-19 @ 15:00]  TSH 1.98      [03-15-24 @ 15:04]  Lipid: chol 84, TG 65, HDL 32, LDL --      [06-04-24 @ 07:04]

## 2024-06-10 NOTE — PROGRESS NOTE ADULT - ASSESSMENT
status post  donor Kidney transplant at [place] 7 years ago  baseline Cr "upper 1's to low 2's"  afib / svt  Pancytopenia / anemia   CAD / HFpEF  HTN  Cirrhosis    plan:    cont tacrolimus, current level acceptable  cont mycophenolate  daily BMP  followup with transplant center as outpatient

## 2024-06-10 NOTE — DISCHARGE NOTE NURSING/CASE MANAGEMENT/SOCIAL WORK - PATIENT PORTAL LINK FT
You can access the FollowMyHealth Patient Portal offered by [facility] by registering at the following website: [email or web address]. By joining [facility]’s FollowMyHealth portal, you will also be able to view your health information using other applications (apps) compatible with our system.

## 2024-06-10 NOTE — CONSULT NOTE ADULT - SUBJECTIVE AND OBJECTIVE BOX
Called to consider epidural steroid injection on this 69-year-old male with multilevel degenerative changes. Patient has previous surgery at L4-5 with posterior decompression. Surgical scarring makes the epidural space small at this level.    Patient this point is improving on conservative therapy. Epidural steroid injection deferred

## 2024-06-10 NOTE — DISCHARGE NOTE NURSING/CASE MANAGEMENT/SOCIAL WORK - NSDCFUADDAPPT_GEN_ALL_CORE_FT
APPTS ARE READY TO BE MADE: [X] YES    Best Family or Patient Contact (if needed):    Additional Information about above appointments (if needed):    1: Needs to follow up with an interventional radiologist for discussion of IR-guided transformational steroid injection within 2 weeks (Dr. Lion Stuart)  2:   3:     Other comments or requests:

## 2024-06-10 NOTE — PROGRESS NOTE ADULT - SUBJECTIVE AND OBJECTIVE BOX
----------Daily Progress Note----------    HISTORY OF PRESENT ILLNESS:  Patient is a  68 yo M w/PMH of HTN, HLD, HFpEF (70-75%) grade 2 DD, ESRD s/p kidney transplant 8 years ago at [facility], chronic hep C, and A-fib SP ablation performed on 5/12, now presenting for palpitations w/HR in 180s identified as SVT as per EMS w/total of 30 mg of adenosine and 20 mg of cardizem given, and 60 mcg of fentanyl given for sciatic pain, with HR subsequently decreasing into the 80s. Patient was normotensive. Patient reported left-sided chest pain in the morning that resolved w/tachycardia into the 180s that remained throughout the day. He denies any fevers, dyspnea, abdominal pain, nausea, diarrhea, or dysuria. He does endorse sciatic pain.    In the ED, vitals were stable, labs notable for trop 113 -> 154, pBNP 79246, Cr 1.5 (last known was 2.0), and Hgb 8.5 (around baseline), EKG notable for new deep symmetrical T wave anterio-inferior in the setting of LVH (Strain pattern?) with elevation in aVR, and CXR unremarkable.    He is now admitted to telemetry for work up and monitoring of SVT.     Cardiologist: Dr. Henderson    Today is hospital day 7d.     INTERVAL HOSPITAL COURSE / OVERNIGHT EVENTS:  O/N events:  None    24 hr events:  None    Patient was examined and seen at bedside. Reports L leg pain well controlled, endorses some pain at R groin and back, denies any chest pain or palpitations, endorses good appetite    Review of Systems: Otherwise unremarkable     <<<<<PAST MEDICAL & SURGICAL HISTORY>>>>>  CKD (chronic kidney disease)    ESRD (end stage renal disease)    HTN (hypertension)    HLD (hyperlipidemia)    Atrial fibrillation  on eliquis    COPD, mild  not on O2    Peripheral neuropathy  unclear cause    Lymphoma  ?questionable, not treated, not followed    Melanoma  R eye, s/p laser    Cirrhosis  possibly related to hepC    Breast cancer    Legally blind    Stroke    History of heroin abuse    History of chronic hepatitis    Marijuana use    History of kidney transplant  3 years ago    S/P arteriovenous (AV) fistula creation  prior old fistula in R arm    S/P lumpectomy, right breast    History of back surgery  s/p Left L5-S1 endoscopic laminoforaminotomy    History of loop recorder      ALLERGIES  Rituxan (Hives)      Home Medications:  Flomax 0.4 mg oral capsule: 1 cap(s) orally 2 times a day (10 May 2024 11:00)  mycophenolate mofetil 500 mg oral tablet: 2 tab(s) orally once a day (10 May 2024 11:00)  oxyCODONE 15 mg oral tablet: 1 tab(s) orally every 8 hours As needed Severe Pain (7 - 10) (10 May 2024 11:00)  senna leaf extract oral tablet: 2 tab(s) orally once a day (at bedtime) (18 May 2024 14:44)        MEDICATIONS  STANDING MEDICATIONS  acetaminophen     Tablet .. 650 milliGRAM(s) Oral every 8 hours  atorvastatin 80 milliGRAM(s) Oral at bedtime  diltiazem    milliGRAM(s) Oral daily  DULoxetine 30 milliGRAM(s) Oral daily  gabapentin 300 milliGRAM(s) Oral at bedtime  methocarbamol 750 milliGRAM(s) Oral every 8 hours  metoprolol tartrate 50 milliGRAM(s) Oral two times a day  mycophenolate mofetil 500 milliGRAM(s) Oral two times a day  pantoprazole    Tablet 40 milliGRAM(s) Oral before breakfast  senna 2 Tablet(s) Oral at bedtime  tacrolimus 1 milliGRAM(s) Oral two times a day  tamsulosin 0.4 milliGRAM(s) Oral at bedtime    PRN MEDICATIONS  aluminum hydroxide/magnesium hydroxide/simethicone Suspension 30 milliLiter(s) Oral every 4 hours PRN  HYDROmorphone  Injectable 0.5 milliGRAM(s) IV Push every 6 hours PRN  melatonin 3 milliGRAM(s) Oral at bedtime PRN  ondansetron Injectable 4 milliGRAM(s) IV Push every 8 hours PRN  oxyCODONE    IR 15 milliGRAM(s) Oral every 8 hours PRN  polyethylene glycol 3350 17 Gram(s) Oral two times a day PRN    VITALS:  T(F): 97.9  HR: 72  BP: 122/47  RR: 18  SpO2: 99%    <<<<<PHYSICAL EXAM>>>>>  GENERAL: NAD  PULMONARY: Clear to auscultation bilaterally. No wheezing or crackles  CARDIOVASCULAR: Regular rate and rhythm, S1-S2, no murmurs, rubs, or gallops  BREAST: Notable L sided gynecomastia  GASTROINTESTINAL: Soft, non-tender, non-distended, no guarding, + hepatomegaly  SKIN/EXTREMITIES: Warm, 2+ tibialis posterior pulses, no UE or LE edema, L thigh bruising, soft, R thigh access site slightly erythematous and tender, no exudates  NEUROLOGIC/MUSCULOSKELETAL: AOx4, grossly moving all extremities, no focal deficits    <<<<<LABS>>>>>                        9.9    2.44  )-----------( 73       ( 09 Jun 2024 07:22 )             32.3     06-09    135  |  106  |  19  ----------------------------<  111<H>  4.6   |  18  |  1.5    Ca    9.9      09 Jun 2024 07:22  Mg     2.0     06-09        Urinalysis Basic - ( 09 Jun 2024 07:22 )    Color: x / Appearance: x / SG: x / pH: x  Gluc: 111 mg/dL / Ketone: x  / Bili: x / Urobili: x   Blood: x / Protein: x / Nitrite: x   Leuk Esterase: x / RBC: x / WBC x   Sq Epi: x / Non Sq Epi: x / Bacteria: x          [id number]        <<<<<RADIOLOGY>>>>>      ----------------------------------------------------------------------------------------------------------------------------------------------------------------------------------------------- ----------Daily Progress Note----------    HISTORY OF PRESENT ILLNESS:  Patient is a  68 yo M w/PMH of HTN, HLD, HFpEF (70-75%) grade 2 DD, ESRD s/p kidney transplant 8 years ago at [place], chronic hep C, and A-fib SP ablation performed on 5/12, now presenting for palpitations w/HR in 180s identified as SVT as per EMS w/total of 30 mg of adenosine and 20 mg of cardizem given, and 60 mcg of fentanyl given for sciatic pain, with HR subsequently decreasing into the 80s. Patient was normotensive. Patient reported left-sided chest pain in the morning that resolved w/tachycardia into the 180s that remained throughout the day. He denies any fevers, dyspnea, abdominal pain, nausea, diarrhea, or dysuria. He does endorse sciatic pain.    In the ED, vitals were stable, labs notable for trop 113 -> 154, pBNP 75416, Cr 1.5 (last known was 2.0), and Hgb 8.5 (around baseline), EKG notable for new deep symmetrical T wave anterio-inferior in the setting of LVH (Strain pattern?) with elevation in aVR, and CXR unremarkable.    He is now admitted to telemetry for work up and monitoring of SVT.     Cardiologist: Dr. Henderson    Today is hospital day 7d.     INTERVAL HOSPITAL COURSE / OVERNIGHT EVENTS:  O/N events:  None    24 hr events:  None    Patient was examined and seen at bedside. Denies any pain, denies any chest pain or palpitations, endorses good appetite    Review of Systems: Otherwise unremarkable     <<<<<PAST MEDICAL & SURGICAL HISTORY>>>>>  CKD (chronic kidney disease)    ESRD (end stage renal disease)    HTN (hypertension)    HLD (hyperlipidemia)    Atrial fibrillation  on eliquis    COPD, mild  not on O2    Peripheral neuropathy  unclear cause    Lymphoma  ?questionable, not treated, not followed    Melanoma  R eye, s/p laser    Cirrhosis  possibly related to hepC    Breast cancer    Legally blind    Stroke    History of heroin abuse    History of chronic hepatitis    Marijuana use    History of kidney transplant  3 years ago    S/P arteriovenous (AV) fistula creation  prior old fistula in R arm    S/P lumpectomy, right breast    History of back surgery  s/p Left L5-S1 endoscopic laminoforaminotomy    History of loop recorder      ALLERGIES  Rituxan (Hives)      Home Medications:  Flomax 0.4 mg oral capsule: 1 cap(s) orally 2 times a day (10 May 2024 11:00)  mycophenolate mofetil 500 mg oral tablet: 2 tab(s) orally once a day (10 May 2024 11:00)  oxyCODONE 15 mg oral tablet: 1 tab(s) orally every 8 hours As needed Severe Pain (7 - 10) (10 May 2024 11:00)  senna leaf extract oral tablet: 2 tab(s) orally once a day (at bedtime) (18 May 2024 14:44)        MEDICATIONS  STANDING MEDICATIONS  acetaminophen     Tablet .. 650 milliGRAM(s) Oral every 8 hours  atorvastatin 80 milliGRAM(s) Oral at bedtime  diltiazem    milliGRAM(s) Oral daily  DULoxetine 30 milliGRAM(s) Oral daily  gabapentin 300 milliGRAM(s) Oral at bedtime  methocarbamol 750 milliGRAM(s) Oral every 8 hours  metoprolol tartrate 50 milliGRAM(s) Oral two times a day  mycophenolate mofetil 500 milliGRAM(s) Oral two times a day  pantoprazole    Tablet 40 milliGRAM(s) Oral before breakfast  senna 2 Tablet(s) Oral at bedtime  tacrolimus 1 milliGRAM(s) Oral two times a day  tamsulosin 0.4 milliGRAM(s) Oral at bedtime    PRN MEDICATIONS  aluminum hydroxide/magnesium hydroxide/simethicone Suspension 30 milliLiter(s) Oral every 4 hours PRN  HYDROmorphone  Injectable 0.5 milliGRAM(s) IV Push every 6 hours PRN  melatonin 3 milliGRAM(s) Oral at bedtime PRN  ondansetron Injectable 4 milliGRAM(s) IV Push every 8 hours PRN  oxyCODONE    IR 15 milliGRAM(s) Oral every 8 hours PRN  polyethylene glycol 3350 17 Gram(s) Oral two times a day PRN    VITALS:  T(F): 97.9  HR: 72  BP: 122/47  RR: 18  SpO2: 99%    <<<<<PHYSICAL EXAM>>>>>  GENERAL: NAD  PULMONARY: Clear to auscultation bilaterally. No wheezing or crackles  CARDIOVASCULAR: Regular rate and rhythm, S1-S2, no murmurs, rubs, or gallops  BREAST: Notable L sided gynecomastia  GASTROINTESTINAL: Soft, non-tender, non-distended, no guarding, + hepatomegaly  SKIN/EXTREMITIES: Warm, 2+ tibialis posterior pulses, no UE or LE edema, L thigh bruising, soft, R thigh access site slightly erythematous and tender, no exudates  NEUROLOGIC/MUSCULOSKELETAL: AOx4, grossly moving all extremities, no focal deficits    <<<<<LABS>>>>>                        9.9    2.44  )-----------( 73       ( 09 Jun 2024 07:22 )             32.3     06-09    135  |  106  |  19  ----------------------------<  111<H>  4.6   |  18  |  1.5    Ca    9.9      09 Jun 2024 07:22  Mg     2.0     06-09        Urinalysis Basic - ( 09 Jun 2024 07:22 )    Color: x / Appearance: x / SG: x / pH: x  Gluc: 111 mg/dL / Ketone: x  / Bili: x / Urobili: x   Blood: x / Protein: x / Nitrite: x   Leuk Esterase: x / RBC: x / WBC x   Sq Epi: x / Non Sq Epi: x / Bacteria: x          [id number]        <<<<<RADIOLOGY>>>>>      -----------------------------------------------------------------------------------------------------------------------------------------------------------------------------------------------

## 2024-06-10 NOTE — PROGRESS NOTE ADULT - ASSESSMENT
69-year-old with PMH of HTN, HLD, HFpEF (70-75%) grade 2 DD, ESRD s/p kidney transplant 8 years ago at [place], chronic hep C, A-fib SP ablation preformed 5/12, brought in by ambulance with chief complaint of palpitations.  Per EMS patient's heart rate was in the 180s identified as SVT.     Palpitations / SVT  Chronic HFpEF  HTN / DL  ESRD s/p renal transplant  H/O Hep C  H/O A-fib s/p ablation  Pancytopenia  Macrocytic anemia               PLAN:    ·	Tele reviewed by me. Episodes of narrow complex tachycardia.   ·	EKG on admission: /min (Interpreted by me.   ·	Cont Cardizem  mg po daily  ·	EP f/u noted. Patient S/P Modified AVN Ablation plus DC Aveir  ·	Hold Eliquis for three days and restart on Tuesday.   ·	Troponin: 113-->154  ·	Pain management eval noted. Recommended to start Methocarbamol 750mg q8hr standing, Gabapentin 300mg qHS and Duloxetine 30 mg po daily. Lidocaine 4% patch to lower back  ·	Lumber spine MRI noted. Interval obliteration of the L4-5 disc space since the prior lumbar MRI from 11/27/2007 likely reflecting sequela of discitis osteomyelitis. Interval right L5 hemilaminectomy.  ·	Neurosurgery eval noted. No acute neurosurgical intervention. Recommended IR guided transformational steroid injections, targeting Left L5-S1  ·	Old record reviewed. ECHO done in March this year showed EF is 70-75%  ·	Nephrology eval noted. Recommended to cont Tacrolimus and check level in AM. Also cont Mycophenolate.   ·	Tacrolimus level is 13.5  ·	Cont his other home meds  ·	Check i's and o's and daily wt  ·	Low salt diet and water restriction to 1.5 L/D  ·	PT eval noted. Recommended home with home PT  ·	Anticipate d/c in AM after steroid injection by the IR    Progress Note Handoff    Pending (specify):  Consults:_______, Tests________, Test Results_______, Other_IR for steroid injection_______  Family discussion:  Disposition: Home___/SNF___/Other________/Unknown at this time________    Germain Shirani, MD  Spectra: [phone]            69-year-old with PMH of HTN, HLD, HFpEF (70-75%) grade 2 DD, ESRD s/p kidney transplant 8 years ago at [place], chronic hep C, A-fib SP ablation preformed 5/12, brought in by ambulance with chief complaint of palpitations.  Per EMS patient's heart rate was in the 180s identified as SVT.     Palpitations / SVT  Chronic HFpEF  HTN / DL  ESRD s/p renal transplant  H/O Hep C  H/O A-fib s/p ablation  Pancytopenia  Macrocytic anemia               PLAN:    ·	Tele reviewed by me. Episodes of narrow complex tachycardia.   ·	EKG on admission: /min (Interpreted by me.   ·	Cont Cardizem  mg po daily  ·	EP f/u noted. Patient S/P Modified AVN Ablation plus DC Aveir  ·	Hold Eliquis for three days and restart on Tuesday.   ·	Troponin: 113-->154  ·	Pain management eval noted. Recommended to start Methocarbamol 750mg q8hr standing, Gabapentin 300mg qHS and Duloxetine 30 mg po daily. Lidocaine 4% patch to lower back  ·	Lumber spine MRI noted. Interval obliteration of the L4-5 disc space since the prior lumbar MRI from 11/27/2007 likely reflecting sequela of discitis osteomyelitis. Interval right L5 hemilaminectomy.  ·	Neurosurgery eval noted. No acute neurosurgical intervention. Recommended IR guided transformational steroid injections, targeting Left L5-S1  ·	Old record reviewed. ECHO done in March this year showed EF is 70-75%  ·	Nephrology eval noted. Recommended to cont Tacrolimus and check level in AM. Also cont Mycophenolate.   ·	Tacrolimus level is 13.5  ·	Cont his other home meds  ·	Check i's and o's and daily wt  ·	Low salt diet and water restriction to 1.5 L/D  ·	PT eval noted. Recommended home with home PT  ·	Can be discharged home after steroid injections by the IR    * Med rec reviewed. Plan of care d/w the pt. Time spent 37 minutes.     Progress Note Handoff    Pending (specify):  Consults:_______, Tests________, Test Results_______, Other_IR for steroid injection_______  Family discussion:  Disposition: Home___/SNF___/Other________/Unknown at this time________    Germain Mazariegos MD  Spectra: [phone]

## 2024-06-10 NOTE — PROGRESS NOTE ADULT - SUBJECTIVE AND OBJECTIVE BOX
CHOUDAVONTE LINK  69y Male    CHIEF COMPLAINT:    Patient is a 69y old  Male who presents with a chief complaint of Palpitations (2024 09:21)      INTERVAL HPI/OVERNIGHT EVENTS:    Patient seen and examined.    ROS: All other systems are negative.    Vital Signs:    T(F): 97.9 (06-10-24 @ 05:09), Max: 98.4 (24 @ 20:08)  HR: 72 (06-10-24 @ 05:09) (72 - 79)  BP: 122/47 (06-10-24 @ 05:09) (103/62 - 123/58)  RR: 18 (06-10-24 @ 05:09) (18 - 18)  SpO2: --  I&O's Summary    2024 07:01  -  10 Vlad 2024 07:00  --------------------------------------------------------  IN: 1258 mL / OUT: 300 mL / NET: 958 mL      Daily     Daily Weight in k.8 (10 Vlad 2024 05:09)  CAPILLARY BLOOD GLUCOSE          PHYSICAL EXAM:    GENERAL:  NAD  SKIN: No rashes or lesions  HENT: Atraumatic. Normocephalic. PERRL. Moist membranes.  NECK: Supple, No JVD. No lymphadenopathy.  PULMONARY: CTA B/L. No wheezing. No rales  CVS: Normal S1, S2. Rate and Rhythm are regular. No murmurs.  ABDOMEN/GI: Soft, Nontender, Nondistended; BS present  EXTREMITIES: Peripheral pulses intact. No edema B/L LE.  NEUROLOGIC:  No motor or sensory deficit.  PSYCH: Alert & oriented x 3    Consultant(s) Notes Reviewed:  [x ] YES  [ ] NO  Care Discussed with Consultants/Other Providers [ x] YES  [ ] NO    EKG reviewed  Telemetry reviewed    LABS:                        9.9    2.44  )-----------( 73       ( 2024 07:22 )             32.3     06-09    135  |  106  |  19  ----------------------------<  111<H>  4.6   |  18  |  1.5    Ca    9.9      2024 07:22  Mg     2.0                     RADIOLOGY & ADDITIONAL TESTS:      Imaging or report Personally Reviewed:  [ ] YES  [ ] NO    Medications:  Standing  acetaminophen     Tablet .. 650 milliGRAM(s) Oral every 8 hours  atorvastatin 80 milliGRAM(s) Oral at bedtime  diltiazem    milliGRAM(s) Oral daily  DULoxetine 30 milliGRAM(s) Oral daily  gabapentin 300 milliGRAM(s) Oral at bedtime  methocarbamol 750 milliGRAM(s) Oral every 8 hours  metoprolol tartrate 50 milliGRAM(s) Oral two times a day  mycophenolate mofetil 500 milliGRAM(s) Oral two times a day  pantoprazole    Tablet 40 milliGRAM(s) Oral before breakfast  senna 2 Tablet(s) Oral at bedtime  tacrolimus 1 milliGRAM(s) Oral two times a day  tamsulosin 0.4 milliGRAM(s) Oral at bedtime    PRN Meds  aluminum hydroxide/magnesium hydroxide/simethicone Suspension 30 milliLiter(s) Oral every 4 hours PRN  HYDROmorphone  Injectable 0.5 milliGRAM(s) IV Push every 6 hours PRN  melatonin 3 milliGRAM(s) Oral at bedtime PRN  ondansetron Injectable 4 milliGRAM(s) IV Push every 8 hours PRN  oxyCODONE    IR 15 milliGRAM(s) Oral every 8 hours PRN  polyethylene glycol 3350 17 Gram(s) Oral two times a day PRN      Case discussed with resident    Care discussed with pt/family           DAVONTE CHOU  69y Male    CHIEF COMPLAINT:    Patient is a 69y old  Male who presents with a chief complaint of Palpitations (2024 09:21)      INTERVAL HPI/OVERNIGHT EVENTS:    Patient seen and examined. Waiting for steroid injections in the back by IR    ROS: All other systems are negative.    Vital Signs:    T(F): 97.9 (06-10-24 @ 05:09), Max: 98.4 (24 @ 20:08)  HR: 72 (06-10-24 @ 05:09) (72 - 79)  BP: 122/47 (06-10-24 @ 05:09) (103/62 - 123/58)  RR: 18 (06-10-24 @ 05:09) (18 - 18)  SpO2: --  I&O's Summary    2024 07:01  -  10 Vlad 2024 07:00  --------------------------------------------------------  IN: 1258 mL / OUT: 300 mL / NET: 958 mL      Daily     Daily Weight in k.8 (10 Vlad 2024 05:09)  CAPILLARY BLOOD GLUCOSE          PHYSICAL EXAM:    GENERAL:  NAD  SKIN: No rashes or lesions  HENT: Atraumatic. Normocephalic. PERRL. Moist membranes.  NECK: Supple, No JVD. No lymphadenopathy.  PULMONARY: CTA B/L. No wheezing. No rales  CVS: Normal S1, S2. Rate and Rhythm are regular. No murmurs.  ABDOMEN/GI: Soft, Nontender, Nondistended; BS present  EXTREMITIES: Peripheral pulses intact. No edema B/L LE.  NEUROLOGIC:  No motor or sensory deficit.  PSYCH: Alert & oriented x 3    Consultant(s) Notes Reviewed:  [x ] YES  [ ] NO  Care Discussed with Consultants/Other Providers [ x] YES  [ ] NO    EKG reviewed  Telemetry reviewed    LABS:                        9.9    2.44  )-----------( 73       ( 2024 07:22 )             32.3     06-    135  |  106  |  19  ----------------------------<  111<H>  4.6   |  18  |  1.5    Ca    9.9      2024 07:22  Mg     2.0                     RADIOLOGY & ADDITIONAL TESTS:      Imaging or report Personally Reviewed:  [ ] YES  [ ] NO    Medications:  Standing  acetaminophen     Tablet .. 650 milliGRAM(s) Oral every 8 hours  atorvastatin 80 milliGRAM(s) Oral at bedtime  diltiazem    milliGRAM(s) Oral daily  DULoxetine 30 milliGRAM(s) Oral daily  gabapentin 300 milliGRAM(s) Oral at bedtime  methocarbamol 750 milliGRAM(s) Oral every 8 hours  metoprolol tartrate 50 milliGRAM(s) Oral two times a day  mycophenolate mofetil 500 milliGRAM(s) Oral two times a day  pantoprazole    Tablet 40 milliGRAM(s) Oral before breakfast  senna 2 Tablet(s) Oral at bedtime  tacrolimus 1 milliGRAM(s) Oral two times a day  tamsulosin 0.4 milliGRAM(s) Oral at bedtime    PRN Meds  aluminum hydroxide/magnesium hydroxide/simethicone Suspension 30 milliLiter(s) Oral every 4 hours PRN  HYDROmorphone  Injectable 0.5 milliGRAM(s) IV Push every 6 hours PRN  melatonin 3 milliGRAM(s) Oral at bedtime PRN  ondansetron Injectable 4 milliGRAM(s) IV Push every 8 hours PRN  oxyCODONE    IR 15 milliGRAM(s) Oral every 8 hours PRN  polyethylene glycol 3350 17 Gram(s) Oral two times a day PRN      Case discussed with resident    Care discussed with pt/family

## 2024-06-10 NOTE — PROGRESS NOTE ADULT - PROVIDER SPECIALTY LIST ADULT
Hospitalist
Internal Medicine
Nephrology
Nephrology
Electrophysiology
Electrophysiology
Hospitalist
Internal Medicine
Hospitalist
Internal Medicine
Internal Medicine
Nephrology
Nephrology

## 2024-06-12 NOTE — CHART NOTE - NSCHARTNOTESELECT_GEN_ALL_CORE
non clinical appointment information/Event Note
non clinical appointment information/Event Note
SVT/Event Note

## 2024-06-12 NOTE — CHART NOTE - NSCHARTNOTEFT_GEN_A_CORE
[facility] MRN [id number] left voicemail regarding to contact Neuroradiology 6/11 LW- left voicemail 6/12 lw

## 2024-06-12 NOTE — CHART NOTE - NSCHARTNOTEFT_GEN_A_CORE
[facility] MRN [id number] left voicemail regarding to contact Neuroradiology 6/11 LW- left voicemail 6/12 lw

## 2024-06-17 DIAGNOSIS — I47.10 SUPRAVENTRICULAR TACHYCARDIA, UNSPECIFIED: ICD-10-CM

## 2024-06-17 DIAGNOSIS — D69.6 THROMBOCYTOPENIA, UNSPECIFIED: ICD-10-CM

## 2024-06-17 DIAGNOSIS — I50.32 CHRONIC DIASTOLIC (CONGESTIVE) HEART FAILURE: ICD-10-CM

## 2024-06-17 DIAGNOSIS — D61.818 OTHER PANCYTOPENIA: ICD-10-CM

## 2024-06-17 DIAGNOSIS — M48.07 SPINAL STENOSIS, LUMBOSACRAL REGION: ICD-10-CM

## 2024-06-17 DIAGNOSIS — M54.16 RADICULOPATHY, LUMBAR REGION: ICD-10-CM

## 2024-06-17 DIAGNOSIS — K59.03 DRUG INDUCED CONSTIPATION: ICD-10-CM

## 2024-06-17 DIAGNOSIS — K74.69 OTHER CIRRHOSIS OF LIVER: ICD-10-CM

## 2024-06-17 DIAGNOSIS — H54.7 UNSPECIFIED VISUAL LOSS: ICD-10-CM

## 2024-06-17 DIAGNOSIS — R77.8 OTHER SPECIFIED ABNORMALITIES OF PLASMA PROTEINS: ICD-10-CM

## 2024-06-17 DIAGNOSIS — Z85.3 PERSONAL HISTORY OF MALIGNANT NEOPLASM OF BREAST: ICD-10-CM

## 2024-06-17 DIAGNOSIS — I48.91 UNSPECIFIED ATRIAL FIBRILLATION: ICD-10-CM

## 2024-06-17 DIAGNOSIS — Z88.8 ALLERGY STATUS TO OTHER DRUGS, MEDICAMENTS AND BIOLOGICAL SUBSTANCES: ICD-10-CM

## 2024-06-17 DIAGNOSIS — T40.2X5A ADVERSE EFFECT OF OTHER OPIOIDS, INITIAL ENCOUNTER: ICD-10-CM

## 2024-06-17 DIAGNOSIS — N18.9 CHRONIC KIDNEY DISEASE, UNSPECIFIED: ICD-10-CM

## 2024-06-17 DIAGNOSIS — G62.9 POLYNEUROPATHY, UNSPECIFIED: ICD-10-CM

## 2024-06-17 DIAGNOSIS — B18.2 CHRONIC VIRAL HEPATITIS C: ICD-10-CM

## 2024-06-17 DIAGNOSIS — I13.0 HYPERTENSIVE HEART AND CHRONIC KIDNEY DISEASE WITH HEART FAILURE AND STAGE 1 THROUGH STAGE 4 CHRONIC KIDNEY DISEASE, OR UNSPECIFIED CHRONIC KIDNEY DISEASE: ICD-10-CM

## 2024-06-17 DIAGNOSIS — Z85.840 PERSONAL HISTORY OF MALIGNANT NEOPLASM OF EYE: ICD-10-CM

## 2024-06-17 DIAGNOSIS — J44.9 CHRONIC OBSTRUCTIVE PULMONARY DISEASE, UNSPECIFIED: ICD-10-CM

## 2024-06-17 DIAGNOSIS — M48.061 SPINAL STENOSIS, LUMBAR REGION WITHOUT NEUROGENIC CLAUDICATION: ICD-10-CM
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
## 2024-06-17 DIAGNOSIS — E78.5 HYPERLIPIDEMIA, UNSPECIFIED: ICD-10-CM

## 2024-06-17 DIAGNOSIS — Z94.0 KIDNEY TRANSPLANT STATUS: ICD-10-CM

## 2024-06-22 ENCOUNTER — APPOINTMENT (OUTPATIENT)
Dept: NEUROSURGERY | Facility: CLINIC | Age: 69
End: 2024-06-22
Payer: MEDICARE

## 2024-06-22 DIAGNOSIS — M54.16 RADICULOPATHY, LUMBAR REGION: ICD-10-CM

## 2024-06-22 PROCEDURE — 99211 OFF/OP EST MAY X REQ PHY/QHP: CPT

## 2024-06-25 PROBLEM — M54.16 LEFT LUMBAR RADICULOPATHY: Status: ACTIVE | Noted: 2023-06-19

## 2024-07-11 ENCOUNTER — APPOINTMENT (OUTPATIENT)
Dept: ELECTROPHYSIOLOGY | Facility: CLINIC | Age: 69
End: 2024-07-11

## 2024-07-11 VITALS
BODY MASS INDEX: 21.22 KG/M2 | TEMPERATURE: 97.1 F | HEIGHT: 68 IN | DIASTOLIC BLOOD PRESSURE: 70 MMHG | SYSTOLIC BLOOD PRESSURE: 96 MMHG | WEIGHT: 140 LBS | HEART RATE: 81 BPM

## 2024-07-11 PROCEDURE — 93285 PRGRMG DEV EVAL SCRMS IP: CPT | Mod: 59

## 2024-07-11 PROCEDURE — 93280 PM DEVICE PROGR EVAL DUAL: CPT

## 2024-07-11 PROCEDURE — 99214 OFFICE O/P EST MOD 30 MIN: CPT | Mod: 25

## 2024-07-11 PROCEDURE — 93000 ELECTROCARDIOGRAM COMPLETE: CPT | Mod: 59

## 2024-07-11 PROCEDURE — G2211 COMPLEX E/M VISIT ADD ON: CPT

## 2024-07-11 RX ORDER — METOPROLOL SUCCINATE 50 MG/1
50 TABLET, EXTENDED RELEASE ORAL TWICE DAILY
Refills: 0 | Status: ACTIVE | COMMUNITY

## 2024-07-11 RX ORDER — GABAPENTIN 300 MG/1
300 CAPSULE ORAL DAILY
Refills: 0 | Status: ACTIVE | COMMUNITY

## 2024-07-11 RX ORDER — OXYCODONE HYDROCHLORIDE 15 MG/1
15 TABLET ORAL
Refills: 0 | Status: ACTIVE | COMMUNITY

## 2024-07-11 RX ORDER — MYCOPHENOLATE MOFETIL 500 MG/1
500 TABLET ORAL DAILY
Refills: 0 | Status: ACTIVE | COMMUNITY

## 2024-07-11 RX ORDER — PANTOPRAZOLE 40 MG/1
40 TABLET, DELAYED RELEASE ORAL DAILY
Refills: 0 | Status: ACTIVE | COMMUNITY

## 2024-07-11 RX ORDER — DULOXETINE HYDROCHLORIDE 30 MG/1
30 CAPSULE, DELAYED RELEASE PELLETS ORAL DAILY
Refills: 0 | Status: ACTIVE | COMMUNITY

## 2024-07-11 RX ORDER — POLYETHYLENE GLYCOL 3350 17 G/17G
17 POWDER, FOR SOLUTION ORAL TWICE DAILY
Refills: 0 | Status: ACTIVE | COMMUNITY

## 2024-07-16 NOTE — PATIENT PROFILE ADULT - DO YOU EVER NEED HELP READING HOSPITAL MATERIALS?
Pt calling she would like to have an rx for Ubrelvy, she was given samples to try before prescribing the medication, she is doing fine on them. Thanks    no

## 2024-07-24 ENCOUNTER — APPOINTMENT (OUTPATIENT)
Dept: NEUROSURGERY | Facility: CLINIC | Age: 69
End: 2024-07-24
Payer: MEDICARE

## 2024-07-24 VITALS — BODY MASS INDEX: 19.7 KG/M2 | WEIGHT: 130 LBS | HEIGHT: 68 IN

## 2024-07-24 DIAGNOSIS — M54.16 RADICULOPATHY, LUMBAR REGION: ICD-10-CM

## 2024-07-24 PROCEDURE — 99211 OFF/OP EST MAY X REQ PHY/QHP: CPT

## 2024-07-24 NOTE — ASSESSMENT
[FreeTextEntry1] : This is a 69-year-old gentleman who presents for neurosurgical revisit with regards to persistent left lower extremity radiculopathy; he has a pertinent past surgical history of left L5-S1 endoscopic foraminotomy which took place in September 2023.  At this time, we have discussed that we do not feel as though he is an optimal neurosurgical candidate due to his progressive cardiac decline.  He has been recommended to consult with the pain management, Dr. Gordon, to review interventional treatment options and or optimize his given medication regimen.  If his cardiac conditions were to stabilize, he can consider spinal column stimulation trial.  Patient is to return to the office as needed moving forward.  All questions and concerns have been addressed.

## 2024-07-24 NOTE — HISTORY OF PRESENT ILLNESS
[FreeTextEntry1] : This is a 69-year-old gentleman who presents for neurosurgical revisit with regards to persistent lumbosacral radiculopathy.  As review, he is status post left L5-S1 foraminotomy for which she experienced moderate relief for approximately 5 months.  Unfortunately, symptoms began to recur and physical therapy has been ineffective in managing the chronicity of his pain.  Epidural injections recommended but patient remains averse to such consideration.  Ultimately, he presents at this time to discuss neurosurgical treatment options.  We have discussed at length that due to his extensive cardiac history, with recent cardiac hospitalization, he is not an optimal candidate for additional neurosurgical intervention.  He can pursue interventional treatments through pain management as well as optimization of his given medication regimen.  In the future, he can be a candidate for spinal column stimulator trial and/or permanent placement but his cardiac history would have to be well-controlled for an extended period of time before that consideration.  CT of the abdomen pelvis in May while hospitalized which showed lumbar degenerative disc disease worse at L4-5.

## 2024-08-13 ENCOUNTER — NON-APPOINTMENT (OUTPATIENT)
Age: 69
End: 2024-08-13

## 2024-08-13 ENCOUNTER — APPOINTMENT (OUTPATIENT)
Dept: CARDIOLOGY | Facility: CLINIC | Age: 69
End: 2024-08-13
Payer: MEDICARE

## 2024-08-13 PROCEDURE — 93298 REM INTERROG DEV EVAL SCRMS: CPT

## 2024-09-06 ENCOUNTER — APPOINTMENT (OUTPATIENT)
Dept: ELECTROPHYSIOLOGY | Facility: CLINIC | Age: 69
End: 2024-09-06

## 2024-09-17 ENCOUNTER — APPOINTMENT (OUTPATIENT)
Dept: CARDIOLOGY | Facility: CLINIC | Age: 69
End: 2024-09-17
Payer: MEDICARE

## 2024-09-17 ENCOUNTER — NON-APPOINTMENT (OUTPATIENT)
Age: 69
End: 2024-09-17

## 2024-09-17 PROCEDURE — 93298 REM INTERROG DEV EVAL SCRMS: CPT

## 2024-10-17 ENCOUNTER — APPOINTMENT (OUTPATIENT)
Dept: ELECTROPHYSIOLOGY | Facility: CLINIC | Age: 69
End: 2024-10-17
Payer: MEDICARE

## 2024-10-17 VITALS
BODY MASS INDEX: 19.4 KG/M2 | WEIGHT: 131 LBS | DIASTOLIC BLOOD PRESSURE: 76 MMHG | TEMPERATURE: 97.1 F | SYSTOLIC BLOOD PRESSURE: 125 MMHG | HEIGHT: 69 IN

## 2024-10-17 VITALS — HEART RATE: 58 BPM

## 2024-10-17 PROCEDURE — 93280 PM DEVICE PROGR EVAL DUAL: CPT

## 2024-10-17 PROCEDURE — 93000 ELECTROCARDIOGRAM COMPLETE: CPT | Mod: 59

## 2024-10-17 PROCEDURE — 99214 OFFICE O/P EST MOD 30 MIN: CPT

## 2024-10-17 PROCEDURE — G2211 COMPLEX E/M VISIT ADD ON: CPT

## 2024-10-17 PROCEDURE — 93285 PRGRMG DEV EVAL SCRMS IP: CPT | Mod: 59

## 2024-10-17 RX ORDER — DILTIAZEM HYDROCHLORIDE 240 MG/1
240 TABLET, EXTENDED RELEASE ORAL
Qty: 90 | Refills: 3 | Status: ACTIVE | COMMUNITY
Start: 1900-01-01 | End: 1900-01-01

## 2024-10-17 RX ORDER — ATORVASTATIN CALCIUM 80 MG/1
80 TABLET, FILM COATED ORAL
Qty: 90 | Refills: 3 | Status: ACTIVE | COMMUNITY

## 2024-10-17 RX ORDER — APIXABAN 2.5 MG/1
2.5 TABLET, FILM COATED ORAL
Qty: 180 | Refills: 0 | Status: ACTIVE | COMMUNITY
Start: 1900-01-01 | End: 1900-01-01

## 2024-10-17 RX ORDER — ACETAMINOPHEN 325 MG/1
325 TABLET ORAL EVERY 8 HOURS
Refills: 0 | Status: ACTIVE | COMMUNITY

## 2024-10-18 NOTE — PATIENT PROFILE ADULT - ARE SIGNIFICANT INDICATORS COMPLETE.
Called and spoke to patient.(Monico) patient states understanding of Vitality Screening Form faxed to [employer].  [phone]. Confirmation received.    Copy of paperwork left up front for patient .  
Yes

## 2024-11-05 RX ORDER — DILTIAZEM HYDROCHLORIDE 240 MG/1
240 CAPSULE, EXTENDED RELEASE ORAL
Qty: 90 | Refills: 3 | Status: ACTIVE | COMMUNITY
Start: 2024-11-05 | End: 1900-01-01

## 2024-11-21 ENCOUNTER — APPOINTMENT (OUTPATIENT)
Dept: CARDIOLOGY | Facility: CLINIC | Age: 69
End: 2024-11-21
Payer: MEDICARE

## 2024-11-21 ENCOUNTER — NON-APPOINTMENT (OUTPATIENT)
Age: 69
End: 2024-11-21

## 2024-11-21 PROCEDURE — 93298 REM INTERROG DEV EVAL SCRMS: CPT

## 2024-11-29 ENCOUNTER — INPATIENT (INPATIENT)
Facility: HOSPITAL | Age: 69
LOS: 2 days | Discharge: HOME | DRG: 313 | End: 2024-12-02
Attending: INTERNAL MEDICINE | Admitting: INTERNAL MEDICINE
Payer: MEDICARE

## 2024-11-29 VITALS
WEIGHT: 139.99 LBS | SYSTOLIC BLOOD PRESSURE: 148 MMHG | OXYGEN SATURATION: 98 % | DIASTOLIC BLOOD PRESSURE: 82 MMHG | RESPIRATION RATE: 18 BRPM | TEMPERATURE: 98 F | HEART RATE: 50 BPM

## 2024-11-29 DIAGNOSIS — Z98.890 OTHER SPECIFIED POSTPROCEDURAL STATES: Chronic | ICD-10-CM

## 2024-11-29 DIAGNOSIS — R07.89 OTHER CHEST PAIN: ICD-10-CM

## 2024-11-29 DIAGNOSIS — Z94.0 KIDNEY TRANSPLANT STATUS: Chronic | ICD-10-CM

## 2024-11-29 LAB
ALBUMIN SERPL ELPH-MCNC: 4.4 G/DL — SIGNIFICANT CHANGE UP (ref 3.5–5.2)
ALP SERPL-CCNC: 100 U/L — SIGNIFICANT CHANGE UP (ref 30–115)
ALT FLD-CCNC: 8 U/L — SIGNIFICANT CHANGE UP (ref 0–41)
ANION GAP SERPL CALC-SCNC: 11 MMOL/L — SIGNIFICANT CHANGE UP (ref 7–14)
AST SERPL-CCNC: 17 U/L — SIGNIFICANT CHANGE UP (ref 0–41)
BASOPHILS # BLD AUTO: 0.01 K/UL — SIGNIFICANT CHANGE UP (ref 0–0.2)
BASOPHILS NFR BLD AUTO: 0.2 % — SIGNIFICANT CHANGE UP (ref 0–1)
BILIRUB SERPL-MCNC: 0.5 MG/DL — SIGNIFICANT CHANGE UP (ref 0.2–1.2)
BUN SERPL-MCNC: 33 MG/DL — HIGH (ref 10–20)
CALCIUM SERPL-MCNC: 10.3 MG/DL — SIGNIFICANT CHANGE UP (ref 8.4–10.4)
CHLORIDE SERPL-SCNC: 105 MMOL/L — SIGNIFICANT CHANGE UP (ref 98–110)
CO2 SERPL-SCNC: 21 MMOL/L — SIGNIFICANT CHANGE UP (ref 17–32)
CREAT SERPL-MCNC: 1.4 MG/DL — SIGNIFICANT CHANGE UP (ref 0.7–1.5)
EGFR: 55 ML/MIN/1.73M2 — LOW
EOSINOPHIL # BLD AUTO: 0.02 K/UL — SIGNIFICANT CHANGE UP (ref 0–0.7)
EOSINOPHIL NFR BLD AUTO: 0.4 % — SIGNIFICANT CHANGE UP (ref 0–8)
GLUCOSE SERPL-MCNC: 100 MG/DL — HIGH (ref 70–99)
HCT VFR BLD CALC: 45.9 % — SIGNIFICANT CHANGE UP (ref 42–52)
HGB BLD-MCNC: 14.4 G/DL — SIGNIFICANT CHANGE UP (ref 14–18)
IMM GRANULOCYTES NFR BLD AUTO: 0.5 % — HIGH (ref 0.1–0.3)
LYMPHOCYTES # BLD AUTO: 0.6 K/UL — LOW (ref 1.2–3.4)
LYMPHOCYTES # BLD AUTO: 10.9 % — LOW (ref 20.5–51.1)
MCHC RBC-ENTMCNC: 30.1 PG — SIGNIFICANT CHANGE UP (ref 27–31)
MCHC RBC-ENTMCNC: 31.4 G/DL — LOW (ref 32–37)
MCV RBC AUTO: 96 FL — HIGH (ref 80–94)
MONOCYTES # BLD AUTO: 0.27 K/UL — SIGNIFICANT CHANGE UP (ref 0.1–0.6)
MONOCYTES NFR BLD AUTO: 4.9 % — SIGNIFICANT CHANGE UP (ref 1.7–9.3)
NEUTROPHILS # BLD AUTO: 4.55 K/UL — SIGNIFICANT CHANGE UP (ref 1.4–6.5)
NEUTROPHILS NFR BLD AUTO: 83.1 % — HIGH (ref 42.2–75.2)
NRBC # BLD: 0 /100 WBCS — SIGNIFICANT CHANGE UP (ref 0–0)
PLATELET # BLD AUTO: 64 K/UL — LOW (ref 130–400)
PMV BLD: 12.3 FL — HIGH (ref 7.4–10.4)
POTASSIUM SERPL-MCNC: 4.5 MMOL/L — SIGNIFICANT CHANGE UP (ref 3.5–5)
POTASSIUM SERPL-SCNC: 4.5 MMOL/L — SIGNIFICANT CHANGE UP (ref 3.5–5)
PROT SERPL-MCNC: 6.8 G/DL — SIGNIFICANT CHANGE UP (ref 6–8)
RBC # BLD: 4.78 M/UL — SIGNIFICANT CHANGE UP (ref 4.7–6.1)
RBC # FLD: 14.4 % — SIGNIFICANT CHANGE UP (ref 11.5–14.5)
SODIUM SERPL-SCNC: 137 MMOL/L — SIGNIFICANT CHANGE UP (ref 135–146)
TROPONIN T, HIGH SENSITIVITY RESULT: 80 NG/L — CRITICAL HIGH (ref 6–21)
TROPONIN T, HIGH SENSITIVITY RESULT: 85 NG/L — CRITICAL HIGH (ref 6–21)
TROPONIN T, HIGH SENSITIVITY RESULT: 89 NG/L — CRITICAL HIGH (ref 6–21)
WBC # BLD: 5.48 K/UL — SIGNIFICANT CHANGE UP (ref 4.8–10.8)
WBC # FLD AUTO: 5.48 K/UL — SIGNIFICANT CHANGE UP (ref 4.8–10.8)

## 2024-11-29 PROCEDURE — 85025 COMPLETE CBC W/AUTO DIFF WBC: CPT

## 2024-11-29 PROCEDURE — 93010 ELECTROCARDIOGRAM REPORT: CPT

## 2024-11-29 PROCEDURE — 93005 ELECTROCARDIOGRAM TRACING: CPT

## 2024-11-29 PROCEDURE — 84484 ASSAY OF TROPONIN QUANT: CPT

## 2024-11-29 PROCEDURE — 84443 ASSAY THYROID STIM HORMONE: CPT

## 2024-11-29 PROCEDURE — 99285 EMERGENCY DEPT VISIT HI MDM: CPT

## 2024-11-29 PROCEDURE — 83036 HEMOGLOBIN GLYCOSYLATED A1C: CPT

## 2024-11-29 PROCEDURE — 80197 ASSAY OF TACROLIMUS: CPT

## 2024-11-29 PROCEDURE — 84100 ASSAY OF PHOSPHORUS: CPT

## 2024-11-29 PROCEDURE — 93307 TTE W/O DOPPLER COMPLETE: CPT

## 2024-11-29 PROCEDURE — 71045 X-RAY EXAM CHEST 1 VIEW: CPT | Mod: 26

## 2024-11-29 PROCEDURE — 80061 LIPID PANEL: CPT

## 2024-11-29 PROCEDURE — 82962 GLUCOSE BLOOD TEST: CPT

## 2024-11-29 PROCEDURE — 71045 X-RAY EXAM CHEST 1 VIEW: CPT

## 2024-11-29 PROCEDURE — 99223 1ST HOSP IP/OBS HIGH 75: CPT

## 2024-11-29 PROCEDURE — 93290 INTERROG DEV EVAL ICPMS IP: CPT

## 2024-11-29 PROCEDURE — 80180 DRUG SCRN QUAN MYCOPHENOLATE: CPT

## 2024-11-29 PROCEDURE — 36415 COLL VENOUS BLD VENIPUNCTURE: CPT

## 2024-11-29 PROCEDURE — 83735 ASSAY OF MAGNESIUM: CPT

## 2024-11-29 PROCEDURE — 85610 PROTHROMBIN TIME: CPT

## 2024-11-29 PROCEDURE — 80053 COMPREHEN METABOLIC PANEL: CPT

## 2024-11-29 RX ORDER — TAMSULOSIN HYDROCHLORIDE 0.4 MG/1
0.4 CAPSULE ORAL AT BEDTIME
Refills: 0 | Status: DISCONTINUED | OUTPATIENT
Start: 2024-11-29 | End: 2024-12-02

## 2024-11-29 RX ORDER — ENOXAPARIN SODIUM 30 MG/.3ML
60 INJECTION SUBCUTANEOUS EVERY 12 HOURS
Refills: 0 | Status: DISCONTINUED | OUTPATIENT
Start: 2024-11-29 | End: 2024-12-02

## 2024-11-29 RX ORDER — TACROLIMUS 5 MG/1
1 CAPSULE ORAL
Refills: 0 | Status: DISCONTINUED | OUTPATIENT
Start: 2024-11-29 | End: 2024-12-02

## 2024-11-29 RX ORDER — METOPROLOL TARTRATE 100 MG/1
50 TABLET, FILM COATED ORAL
Refills: 0 | Status: DISCONTINUED | OUTPATIENT
Start: 2024-11-29 | End: 2024-12-02

## 2024-11-29 RX ORDER — PANTOPRAZOLE SODIUM 40 MG/1
40 TABLET, DELAYED RELEASE ORAL
Refills: 0 | Status: DISCONTINUED | OUTPATIENT
Start: 2024-11-29 | End: 2024-12-02

## 2024-11-29 RX ORDER — ISOSORBIDE MONONITRATE 10 MG
30 TABLET ORAL DAILY
Refills: 0 | Status: DISCONTINUED | OUTPATIENT
Start: 2024-11-29 | End: 2024-12-02

## 2024-11-29 RX ORDER — OXYCODONE HYDROCHLORIDE 30 MG/1
15 TABLET ORAL EVERY 12 HOURS
Refills: 0 | Status: DISCONTINUED | OUTPATIENT
Start: 2024-11-29 | End: 2024-11-29

## 2024-11-29 RX ORDER — MYCOPHENOLATE MOFETIL 500 MG/1
500 TABLET ORAL
Refills: 0 | Status: DISCONTINUED | OUTPATIENT
Start: 2024-11-29 | End: 2024-12-02

## 2024-11-29 RX ORDER — INFLUENZA VIRUS VACCINE 15; 15; 15; 15 UG/.5ML; UG/.5ML; UG/.5ML; UG/.5ML
0.5 SUSPENSION INTRAMUSCULAR ONCE
Refills: 0 | Status: DISCONTINUED | OUTPATIENT
Start: 2024-11-29 | End: 2024-12-02

## 2024-11-29 RX ORDER — DILTIAZEM HYDROCHLORIDE 240 MG/1
240 CAPSULE, COATED, EXTENDED RELEASE ORAL DAILY
Refills: 0 | Status: DISCONTINUED | OUTPATIENT
Start: 2024-11-29 | End: 2024-12-01

## 2024-11-29 RX ORDER — SENNOSIDES 8.6 MG
2 TABLET ORAL AT BEDTIME
Refills: 0 | Status: DISCONTINUED | OUTPATIENT
Start: 2024-11-29 | End: 2024-12-02

## 2024-11-29 RX ORDER — GABAPENTIN 300 MG/1
300 CAPSULE ORAL AT BEDTIME
Refills: 0 | Status: DISCONTINUED | OUTPATIENT
Start: 2024-11-29 | End: 2024-12-02

## 2024-11-29 RX ORDER — OXYCODONE HYDROCHLORIDE 30 MG/1
15 TABLET ORAL ONCE
Refills: 0 | Status: DISCONTINUED | OUTPATIENT
Start: 2024-11-29 | End: 2024-11-29

## 2024-11-29 RX ORDER — POLYETHYLENE GLYCOL 3350 17 G/17G
17 POWDER, FOR SOLUTION ORAL
Refills: 0 | Status: DISCONTINUED | OUTPATIENT
Start: 2024-11-29 | End: 2024-12-02

## 2024-11-29 RX ORDER — DULOXETINE HCL 60 MG
30 CAPSULE,DELAYED RELEASE (ENTERIC COATED) ORAL DAILY
Refills: 0 | Status: DISCONTINUED | OUTPATIENT
Start: 2024-11-29 | End: 2024-12-02

## 2024-11-29 RX ORDER — OXYCODONE HYDROCHLORIDE 30 MG/1
15 TABLET ORAL EVERY 8 HOURS
Refills: 0 | Status: DISCONTINUED | OUTPATIENT
Start: 2024-11-29 | End: 2024-12-02

## 2024-11-29 RX ADMIN — OXYCODONE HYDROCHLORIDE 15 MILLIGRAM(S): 30 TABLET ORAL at 21:48

## 2024-11-29 RX ADMIN — Medication 30 MILLIGRAM(S): at 22:35

## 2024-11-29 RX ADMIN — Medication 324 MILLIGRAM(S): at 18:46

## 2024-11-29 RX ADMIN — TACROLIMUS 1 MILLIGRAM(S): 5 CAPSULE ORAL at 22:35

## 2024-11-29 RX ADMIN — Medication 30 MILLIGRAM(S): at 21:49

## 2024-11-29 RX ADMIN — GABAPENTIN 300 MILLIGRAM(S): 300 CAPSULE ORAL at 21:49

## 2024-11-29 RX ADMIN — METOPROLOL TARTRATE 50 MILLIGRAM(S): 100 TABLET, FILM COATED ORAL at 18:46

## 2024-11-29 RX ADMIN — ENOXAPARIN SODIUM 60 MILLIGRAM(S): 30 INJECTION SUBCUTANEOUS at 21:50

## 2024-11-29 RX ADMIN — OXYCODONE HYDROCHLORIDE 15 MILLIGRAM(S): 30 TABLET ORAL at 13:26

## 2024-11-29 RX ADMIN — MYCOPHENOLATE MOFETIL 500 MILLIGRAM(S): 500 TABLET ORAL at 22:35

## 2024-11-29 RX ADMIN — TAMSULOSIN HYDROCHLORIDE 0.4 MILLIGRAM(S): 0.4 CAPSULE ORAL at 21:50

## 2024-11-29 RX ADMIN — Medication 80 MILLIGRAM(S): at 21:49

## 2024-11-29 RX ADMIN — PANTOPRAZOLE SODIUM 40 MILLIGRAM(S): 40 TABLET, DELAYED RELEASE ORAL at 21:49

## 2024-11-29 RX ADMIN — DILTIAZEM HYDROCHLORIDE 240 MILLIGRAM(S): 240 CAPSULE, COATED, EXTENDED RELEASE ORAL at 21:49

## 2024-11-29 RX ADMIN — OXYCODONE HYDROCHLORIDE 15 MILLIGRAM(S): 30 TABLET ORAL at 22:18

## 2024-11-29 NOTE — ED ADULT NURSE NOTE - CHIEF COMPLAINT QUOTE
Patient joaquin from home in NAD a+ox3 has pacemaker with hx of afib co chest pressure started this morning- 324mg of aspirin given by EMS

## 2024-11-29 NOTE — CONSULT NOTE ADULT - ASSESSMENT
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  coming with CP with possible SVT the preceded that    Impression:  Possible Cardiac CP  Hx of CAD with RCA stent and disease in LAD and LCx  HFpEF with GIIIDD  Hx of Afib  ESRD with kidney transplant   Hx of CVA      Plan:  - Stop eliquis for now  - Start on Lovenox 60 mg BID  - Load with Aspirin and start 81 mg Aspirin tomorrow  - c/w imdur and metoprolol  - CP might have been precipitated by SVT - demand ischemia due to known disease in LAD and LCx  - trend troponin and EKG  - 2D echo, A1c, Lipid profile and TSH    Case discussed with Dr. Low and will be seen by Dr. 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  coming with CP with possible SVT the preceded that    Impression:  Possible Cardiac CP  Hx of CAD with RCA stent and disease in LAD and LCx  HFpEF with GIIIDD  Hx of Afib  ESRD with kidney transplant   Hx of CVA      Plan:  - Stop eliquis for now  - Start on Lovenox 60 mg BID  - Load with Aspirin and start 81 mg Aspirin tomorrow  - c/w imdur and metoprolol  - CP might have been precipitated by SVT - demand ischemia due to known disease in LAD and LCx  - trend troponin and EKG and recall cardiology if trop is significantly increasing or major EKG changes   - 2D echo, A1c, Lipid profile and TSH    Case discussed with Dr. Low and will be seen by Dr. 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  coming with CP with possible SVT the preceded that    Impression:  Possible Cardiac CP  Hx of CAD with RCA stent and disease in LAD and LCx  HFpEF with GIIIDD  Hx of Afib  ESRD with kidney transplant   Hx of CVA      Plan:  - Stop eliquis for now  - Start on Lovenox 60 mg BID  - Load with Aspirin and start 81 mg Aspirin tomorrow  - c/w imdur and metoprolol  - CP might have been precipitated by SVT - demand ischemia due to known disease in LAD and LCx vs Type I MI  - trend troponin and EKG and recall cardiology if trop is significantly increasing or major EKG changes   - 2D echo, A1c, Lipid profile and TSH    Case discussed with Dr. Low and will be seen by Dr. Hernandez tomorrow

## 2024-11-29 NOTE — ED PROVIDER NOTE - PROGRESS NOTE DETAILS
Dr. Christina Zhao, DO: Loop recorder interrogated and report was SVT, no afib. Patient has no pacemaker on chest xray

## 2024-11-29 NOTE — ED ADULT NURSE NOTE - OBJECTIVE STATEMENT
Pt presents to the ED c/o chest pressure that started this morning and woke him up out of his sleep.

## 2024-11-29 NOTE — H&P ADULT - NSHPLABSRESULTS_GEN_ALL_CORE
CBC Full  -  ( 29 Nov 2024 14:06 )  WBC Count : 5.48 K/uL  RBC Count : 4.78 M/uL  Hemoglobin : 14.4 g/dL  Hematocrit : 45.9 %  Platelet Count - Automated : 64 K/uL  Mean Cell Volume : 96.0 fL  Mean Cell Hemoglobin : 30.1 pg  Mean Cell Hemoglobin Concentration : 31.4 g/dL  Auto Neutrophil # : 4.55 K/uL  Auto Lymphocyte # : 0.60 K/uL  Auto Monocyte # : 0.27 K/uL  Auto Eosinophil # : 0.02 K/uL  Auto Basophil # : 0.01 K/uL  Auto Neutrophil % : 83.1 %  Auto Lymphocyte % : 10.9 %  Auto Monocyte % : 4.9 %  Auto Eosinophil % : 0.4 %  Auto Basophil % : 0.2 %    11-29    137  |  105  |  33[H]  ----------------------------<  100[H]  4.5   |  21  |  1.4    Ca    10.3      29 Nov 2024 14:06    TPro  6.8  /  Alb  4.4  /  TBili  0.5  /  DBili  x   /  AST  17  /  ALT  8   /  AlkPhos  100  11-29    Summary:   1. Hyperdynamic global left ventricular systolic function.   2. Left ventricular ejection fraction, by visual estimation, is 70 to   75%.   3. Moderately increased LV wall thickness.   4. Spectral Doppler shows restrictive pattern of left ventricular   myocardial filling (Grade III diastolic dysfunction).   5. Elevated mean left atrial pressure.   6. Normal right ventricular size and function.   7. Severely enlarged left atrium.   8. Mildly enlarged right atrium.   9. Mild aortic regurgitation.  10. Moderate mitral valve regurgitation.  11. Mild tricuspid regurgitation.  12. Dilatation of the aortic root (measuring 3.8 cm, indexed 2.24 cm/m2)   and ascending aorta (measuring 3.8 cm, indexed 2.24 cm/m2).  13. Estimated pulmonary artery systolic pressure is 38.5 mmHg assuming a   right atrial pressure of 8 mmHg, which is consistent with borderline   pulmonary hypertension.  14. Endocardial visualization was enhanced with intravenous echo contrast.  15.Compared to the previous study 1/24/24, the findings are similar.

## 2024-11-29 NOTE — H&P ADULT - ASSESSMENT
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 Liver cirrhosis portal hypertension with eso varices, brought in by ambulance with chief complaint of central crushing chest pain    #Chest pain  - history of typical chest pain  - off pain on exam  - EKG: diffuse subendocardial ischemia picture  - Trop 80  - DD: SVT as EMS reported SVT, ACS    Plan  - Trend trop  - trend EKG  - Consult EP  - cardiology consulted and call  - put on Lovenox instead of Eliquis for now    #Afib SP Ablation, SND s/p DC-Avier ([facility], non-dep, 2024)  - On Eliquis. Compliant. No bleeding issues  - On Metoprolol and CCB. Not a good candidate for long-term Amiodarone/AADs due to drug-drug interaction with  his renal transplant suppression medication.    - S/p Avier + attempt at AV node ablation.     Current meds:   dilTIAZem HCl  MG Oral Tablet Extended Release 24 Hour  Metoprolol Succinate ER 50 MG Oral Tablet Extended Release 24 Hour  Eliquis 2.5 MG Oral Tablet; Take 1 tablet twice daily    Plan  will keep on lovenox for now anticipating for procedure  keep metoprolol and diltiazem  EP consult     # Liver Cirrhosis with Possible Portal Hypertension  - stable HB  - prefer Carvedilol for rate control will consult EP    #ESRD sp Transplant  - CW tacrolimus and mycophenolate  - nephro consult  - get tacro and mycophen levels      #BPH  - Flomax home dose    #DVT Lovenox full AC  #GI Pantoprazole  #Diet: DASH  #ambulate as tolerated  #Dispo: tele    Hand off: follow EP, follow Cardiology consults  Follow trop, serial EKGs  immunosuppressive levels  Tele unit monitoring        Patient did not remember his meds and said he has multiple pharmacies he doesnt know them all, called his home aide [facility]  who said that his current hospital med list is uptodate and has not changes, also EP meds reviewed from last note in [facility], will get tacrolimus level and mycophenolate levels early tomorrow to confirm levels

## 2024-11-29 NOTE — H&P ADULT - NSHPPHYSICALEXAM_GEN_ALL_CORE
T(C): 36.7 (11-29-24 @ 12:18), Max: 36.7 (11-29-24 @ 12:18)  HR: 50 (11-29-24 @ 12:18) (50 - 50)  BP: 148/82 (11-29-24 @ 12:18) (148/82 - 148/82)  RR: 18 (11-29-24 @ 12:18) (18 - 18)  SpO2: 98% (11-29-24 @ 12:18) (98% - 98%)    CONSTITUTIONAL: poor hyigene shaking no chest pain  RESP: No respiratory distress, no use of accessory muscles; CTA b/l, no WRR  CV: RRR, +S1S2, no MRG; no JVD; no peripheral edema  GI: Soft, NT, ND, no rebound, no guarding; no palpable masses; no hepatosplenomegaly; no hernia palpated  SKIN: stitches seen above left nipple  PSYCH: Appropriate insight/judgment; A+O x 3, mood and affect appropriate, recent/remote memory intact

## 2024-11-29 NOTE — ED ADULT TRIAGE NOTE - CHIEF COMPLAINT QUOTE
Patient bibems from home in NAD a+ox3 has pacemaker with hx of afib co chest pressure started this morning Patient joaquin from home in NAD a+ox3 has pacemaker with hx of afib co chest pressure started this morning- 324mg of aspirin given by EMS

## 2024-11-29 NOTE — H&P ADULT - HISTORY OF PRESENT ILLNESS
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 Liver cirrhosis portal hypertension with eso varices, brought in by ambulance with chief complaint of central crushing chest pain  that woke him up from sleep this morning.  Reports felt fatigued before he went to sleep last night but had no pain at that time.  Pain has since subsided.  Denies palpitations, SOB, recent fever, chills, cough, leg pain or swelling.      Loop recorder: new SVT    Diagnosis: typical cardiogenic chest pain, new ST changes, SVT event on loop recorder per ED    Dr. Low, cardiologist and Dr. Henderson , EP.     EKG: new TRINIDAD in aVR, V1 and v2 not with diffuse STD in anterior, anterio inferior leads sinus controlled in muse  bedside pocus: Normal contractility remarkable biatrial enlargement and LVH no acute complication or RWMA by eyeballing  Trop 80    Cr 1.4 eGFR 55    Off chest pain  CXR: increase bronchovascular marking, no effusion, normal CT ratio

## 2024-11-29 NOTE — ED PROVIDER NOTE - CLINICAL SUMMARY MEDICAL DECISION MAKING FREE TEXT BOX
69-year-old male history of hypertension dyslipidemia CHF ESRD s/p renal transplant on tacrolimus and Prograf, SVT status post ablation presenting for evaluation of episode of chest pain now resolved.  Per EMS, patient became tachycardic and hypotensive transiently during transport.  Patient without other acute complaints at this time.  Well appearing, NAD, non toxic. NCAT PERRLA EOMI neck supple non tender normal wob cta bl rrr abdomen s nt nd no rebound no guarding WWPx4 neuro non focal.  Labs EKG imaging reviewed.  EKG currently similar to prior.  Prior stress test Reviewed.  Will admit for further evaluation

## 2024-11-29 NOTE — H&P ADULT - ATTENDING COMMENTS
Assessment    Transient chest pain likely secondary to SVT episode at home, now asymptomatic and in NSR  Troponinemia likely demand  Afib sp ablation, has loop recorder  ESRD s/p kidney transplant on immunosuppressants  Breast cancer s/p lumpectomy  Right eye melanoma  Chronic hep C  Liver cirrhosis with portal hypertension  BPH    Plan    - cardio recs appreciated, c/w lovenox bid and load with aspirin and continue 81, trend troponin, if higher elevations may need cath // LR interrogated in ED showed SVT, c/w current home AV marleni blockers, f/u cardio and EP  - home tacrolimus and mycophenolate  - flomax    Pending: trend troponin, monitor on tele, cardio and EP follow up, alert cardio if increase in trops    # DVT PPX: lovenox    GENERAL: NAD, lying in bed comfortably  HEAD:  Atraumatic, normocephalic  NERVOUS SYSTEM:  A&Ox3, moving all extremities, no focal deficits   EYES: EOMI, PERRL  NECK: Supple, trachea midline, no JVD  HEART: Regular rate and rhythm  LUNGS: Clear to auscultation bilaterally, no crackles, wheezing, or rhonchi  ABDOMEN: Soft, nontender, nondistended, +BS  EXTREMITIES: 2+ peripheral pulses bilaterally. No clubbing, cyanosis, or edema    75 minutes spent on review of labs, imaging studies, old records, obtaining history, personally examining patient, counselling and communicating with patient/ family, entering orders for medications/tests/etc, discussions with other health care providers, documentation in electronic health records, independent interpretation of labs, imaging/procedure results and care coordination.

## 2024-11-29 NOTE — ED PROVIDER NOTE - PHYSICAL EXAMINATION
GENERAL: Well-developed; well-nourished; in no acute distress.   SKIN: warm, dry, no rashes   HEAD: Normocephalic; atraumatic.  EYES: 2mm pupils, PERRLA, EOMI, no conjunctival erythema  ENT: No nasal discharge; airway clear. MMM  NECK: Supple; non tender.  CHEST: L chest wall surgical scar healing well with mild tenderness   CARD: Regular rate and rhythm. S1, S2 normal; no murmurs, gallops, or rubs.   RESP: LCTAB; No wheezes, rales, rhonchi, or stridor.  ABD: soft, nontender, and nondistended  EXT: Normal ROM.  No LE TTP or edema bilaterally.  NEURO: A/ox3, grossly unremarkable  PSYCH: Cooperative, appropriate.

## 2024-11-30 ENCOUNTER — RESULT REVIEW (OUTPATIENT)
Age: 69
End: 2024-11-30

## 2024-11-30 LAB
A1C WITH ESTIMATED AVERAGE GLUCOSE RESULT: 5.2 % — SIGNIFICANT CHANGE UP (ref 4–5.6)
ALBUMIN SERPL ELPH-MCNC: 3.7 G/DL — SIGNIFICANT CHANGE UP (ref 3.5–5.2)
ALP SERPL-CCNC: 79 U/L — SIGNIFICANT CHANGE UP (ref 30–115)
ALT FLD-CCNC: 6 U/L — SIGNIFICANT CHANGE UP (ref 0–41)
ANION GAP SERPL CALC-SCNC: 8 MMOL/L — SIGNIFICANT CHANGE UP (ref 7–14)
AST SERPL-CCNC: 14 U/L — SIGNIFICANT CHANGE UP (ref 0–41)
BASOPHILS # BLD AUTO: 0.02 K/UL — SIGNIFICANT CHANGE UP (ref 0–0.2)
BASOPHILS NFR BLD AUTO: 0.6 % — SIGNIFICANT CHANGE UP (ref 0–1)
BILIRUB SERPL-MCNC: 0.5 MG/DL — SIGNIFICANT CHANGE UP (ref 0.2–1.2)
BUN SERPL-MCNC: 36 MG/DL — HIGH (ref 10–20)
CALCIUM SERPL-MCNC: 9.8 MG/DL — SIGNIFICANT CHANGE UP (ref 8.4–10.4)
CHLORIDE SERPL-SCNC: 107 MMOL/L — SIGNIFICANT CHANGE UP (ref 98–110)
CHOLEST SERPL-MCNC: 176 MG/DL — SIGNIFICANT CHANGE UP
CO2 SERPL-SCNC: 20 MMOL/L — SIGNIFICANT CHANGE UP (ref 17–32)
CREAT SERPL-MCNC: 1.7 MG/DL — HIGH (ref 0.7–1.5)
EGFR: 44 ML/MIN/1.73M2 — LOW
EOSINOPHIL # BLD AUTO: 0.02 K/UL — SIGNIFICANT CHANGE UP (ref 0–0.7)
EOSINOPHIL NFR BLD AUTO: 0.6 % — SIGNIFICANT CHANGE UP (ref 0–8)
ESTIMATED AVERAGE GLUCOSE: 103 MG/DL — SIGNIFICANT CHANGE UP (ref 68–114)
GLUCOSE SERPL-MCNC: 127 MG/DL — HIGH (ref 70–99)
HCT VFR BLD CALC: 38.9 % — LOW (ref 42–52)
HDLC SERPL-MCNC: 43 MG/DL — SIGNIFICANT CHANGE UP
HGB BLD-MCNC: 12 G/DL — LOW (ref 14–18)
IMM GRANULOCYTES NFR BLD AUTO: 0.3 % — SIGNIFICANT CHANGE UP (ref 0.1–0.3)
INR BLD: 1.08 RATIO — SIGNIFICANT CHANGE UP (ref 0.65–1.3)
LIPID PNL WITH DIRECT LDL SERPL: 116 MG/DL — HIGH
LYMPHOCYTES # BLD AUTO: 0.61 K/UL — LOW (ref 1.2–3.4)
LYMPHOCYTES # BLD AUTO: 16.9 % — LOW (ref 20.5–51.1)
MAGNESIUM SERPL-MCNC: 2 MG/DL — SIGNIFICANT CHANGE UP (ref 1.8–2.4)
MCHC RBC-ENTMCNC: 29.9 PG — SIGNIFICANT CHANGE UP (ref 27–31)
MCHC RBC-ENTMCNC: 30.8 G/DL — LOW (ref 32–37)
MCV RBC AUTO: 96.8 FL — HIGH (ref 80–94)
MONOCYTES # BLD AUTO: 0.24 K/UL — SIGNIFICANT CHANGE UP (ref 0.1–0.6)
MONOCYTES NFR BLD AUTO: 6.6 % — SIGNIFICANT CHANGE UP (ref 1.7–9.3)
NEUTROPHILS # BLD AUTO: 2.72 K/UL — SIGNIFICANT CHANGE UP (ref 1.4–6.5)
NEUTROPHILS NFR BLD AUTO: 75 % — SIGNIFICANT CHANGE UP (ref 42.2–75.2)
NON HDL CHOLESTEROL: 133 MG/DL — HIGH
NRBC # BLD: 0 /100 WBCS — SIGNIFICANT CHANGE UP (ref 0–0)
PHOSPHATE SERPL-MCNC: 3.5 MG/DL — SIGNIFICANT CHANGE UP (ref 2.1–4.9)
PLATELET # BLD AUTO: 66 K/UL — LOW (ref 130–400)
PMV BLD: 13 FL — HIGH (ref 7.4–10.4)
POTASSIUM SERPL-MCNC: 4.4 MMOL/L — SIGNIFICANT CHANGE UP (ref 3.5–5)
POTASSIUM SERPL-SCNC: 4.4 MMOL/L — SIGNIFICANT CHANGE UP (ref 3.5–5)
PROT SERPL-MCNC: 5.6 G/DL — LOW (ref 6–8)
PROTHROM AB SERPL-ACNC: 12.8 SEC — SIGNIFICANT CHANGE UP (ref 9.95–12.87)
RBC # BLD: 4.02 M/UL — LOW (ref 4.7–6.1)
RBC # FLD: 14.6 % — HIGH (ref 11.5–14.5)
SODIUM SERPL-SCNC: 135 MMOL/L — SIGNIFICANT CHANGE UP (ref 135–146)
TACROLIMUS SERPL-MCNC: 4.1 NG/ML — SIGNIFICANT CHANGE UP
TRIGL SERPL-MCNC: 86 MG/DL — SIGNIFICANT CHANGE UP
TROPONIN T, HIGH SENSITIVITY RESULT: 81 NG/L — CRITICAL HIGH (ref 6–21)
TROPONIN T, HIGH SENSITIVITY RESULT: 89 NG/L — CRITICAL HIGH (ref 6–21)
TSH SERPL-MCNC: 2.86 UIU/ML — SIGNIFICANT CHANGE UP (ref 0.27–4.2)
WBC # BLD: 3.62 K/UL — LOW (ref 4.8–10.8)
WBC # FLD AUTO: 3.62 K/UL — LOW (ref 4.8–10.8)

## 2024-11-30 PROCEDURE — 99233 SBSQ HOSP IP/OBS HIGH 50: CPT

## 2024-11-30 PROCEDURE — 71045 X-RAY EXAM CHEST 1 VIEW: CPT | Mod: 26

## 2024-11-30 PROCEDURE — 93291 INTERROG DEV EVAL SCRMS IP: CPT | Mod: 26

## 2024-11-30 PROCEDURE — 93010 ELECTROCARDIOGRAM REPORT: CPT

## 2024-11-30 PROCEDURE — 93306 TTE W/DOPPLER COMPLETE: CPT | Mod: 26

## 2024-11-30 RX ADMIN — PANTOPRAZOLE SODIUM 40 MILLIGRAM(S): 40 TABLET, DELAYED RELEASE ORAL at 05:08

## 2024-11-30 RX ADMIN — DILTIAZEM HYDROCHLORIDE 240 MILLIGRAM(S): 240 CAPSULE, COATED, EXTENDED RELEASE ORAL at 05:08

## 2024-11-30 RX ADMIN — OXYCODONE HYDROCHLORIDE 15 MILLIGRAM(S): 30 TABLET ORAL at 22:07

## 2024-11-30 RX ADMIN — OXYCODONE HYDROCHLORIDE 15 MILLIGRAM(S): 30 TABLET ORAL at 05:08

## 2024-11-30 RX ADMIN — TACROLIMUS 1 MILLIGRAM(S): 5 CAPSULE ORAL at 16:59

## 2024-11-30 RX ADMIN — GABAPENTIN 300 MILLIGRAM(S): 300 CAPSULE ORAL at 21:04

## 2024-11-30 RX ADMIN — MYCOPHENOLATE MOFETIL 500 MILLIGRAM(S): 500 TABLET ORAL at 16:59

## 2024-11-30 RX ADMIN — Medication 30 MILLIGRAM(S): at 11:58

## 2024-11-30 RX ADMIN — TACROLIMUS 1 MILLIGRAM(S): 5 CAPSULE ORAL at 08:36

## 2024-11-30 RX ADMIN — METOPROLOL TARTRATE 50 MILLIGRAM(S): 100 TABLET, FILM COATED ORAL at 05:08

## 2024-11-30 RX ADMIN — Medication 81 MILLIGRAM(S): at 11:58

## 2024-11-30 RX ADMIN — OXYCODONE HYDROCHLORIDE 15 MILLIGRAM(S): 30 TABLET ORAL at 14:34

## 2024-11-30 RX ADMIN — Medication 80 MILLIGRAM(S): at 21:10

## 2024-11-30 RX ADMIN — OXYCODONE HYDROCHLORIDE 15 MILLIGRAM(S): 30 TABLET ORAL at 21:03

## 2024-11-30 RX ADMIN — MYCOPHENOLATE MOFETIL 500 MILLIGRAM(S): 500 TABLET ORAL at 08:36

## 2024-11-30 RX ADMIN — ENOXAPARIN SODIUM 60 MILLIGRAM(S): 30 INJECTION SUBCUTANEOUS at 08:36

## 2024-11-30 RX ADMIN — TAMSULOSIN HYDROCHLORIDE 0.4 MILLIGRAM(S): 0.4 CAPSULE ORAL at 21:03

## 2024-11-30 RX ADMIN — ENOXAPARIN SODIUM 60 MILLIGRAM(S): 30 INJECTION SUBCUTANEOUS at 23:07

## 2024-11-30 NOTE — CHART NOTE - NSCHARTNOTEFT_GEN_A_CORE
Electrophysiology    Pts device __LINQ 2 MDT ILR____ was interrogated on 11-30-24 via [employer]  Device working properly  Events: episodes of ST 9:17-11:46am 11/29 lasting from 3 to 39 min [id number]  *correlates with getting EMS and going to the hospital, does not correlate with chest tightness episodes    Underlying rhythm NSR  Battery good    Reviewed by attending    Contact EP ACP with any questions [id number]

## 2024-11-30 NOTE — PROGRESS NOTE ADULT - ASSESSMENT
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 Liver cirrhosis portal hypertension with eso varices, brought in by ambulance with chief complaint of central crushing chest pain    GENERAL: NAD, lying in bed comfortably  CHEST/LUNG: Clear to auscultation bilaterally; No rales, rhonchi, wheezing, or rubs. Unlabored respirations  HEART: Regular rate and rhythm; No murmurs, rubs, or gallops  ABDOMEN: Bowel sounds present; Soft, Nontender, Nondistended.    EXTREMITIES:  no edema  NERVOUS SYSTEM:  Alert & Oriented X3, speech clear. No deficits        #Chest pain  - history of typical chest pain  - off pain on exam  - EKG: diffuse subendocardial ischemia picture  - Trop 80  - Trend trop stable   - trend EKG  - Consult EP Events: episodes of ST 9:17-11:46am 11/29 lasting from 3 to 39 min [id number]  *correlates with getting EMS and going to the hospital, does not correlate with chest tightness episodes. Underlying rhythm NSR  - cardiology follow up   - put on Lovenox instead of Eliquis for now    #Afib SP Ablation, SND s/p DC-Avier ([facility], non-dep, 2024)  - On Eliquis. Compliant. No bleeding issues  - On Metoprolol and CCB. Not a good candidate for long-term Amiodarone/AADs due to drug-drug interaction with  his renal transplant suppression medication.    - S/p Avier + attempt at AV node ablation.     Current meds:   dilTIAZem HCl  MG Oral Tablet Extended Release 24 Hour  Metoprolol Succinate ER 50 MG Oral Tablet Extended Release 24 Hour  Eliquis 2.5 MG Oral Tablet; Take 1 tablet twice daily    Plan  will keep on lovenox for now anticipating for procedure  keep metoprolol and diltiazem    # Liver Cirrhosis with Possible Portal Hypertension  - stable HB  - prefer Carvedilol for rate control will consult EP    #ESRD sp Transplant  - CW tacrolimus and mycophenolate  - nephro consult  - get tacro and mycophen levels    #BPH  - Flomax home dose    #DVT Lovenox full AC  #GI Pantoprazole  #Diet: DASH  #ambulate as tolerated  #Dispo: tele    Hand off: follow EP, follow Cardiology consults  Follow trop, serial EKGs  immunosuppressive levels  Tele unit monitoring

## 2024-11-30 NOTE — PROGRESS NOTE ADULT - SUBJECTIVE AND OBJECTIVE BOX
SUBJECTIVE:    Patient is a 69y old Male who presents with a chief complaint of Chest pain (30 Nov 2024 11:20)      Today is hospital day 1d.     Overnight Events:     No more chest pain    PAST MEDICAL & SURGICAL HISTORY  CKD (chronic kidney disease)    ESRD (end stage renal disease)    HTN (hypertension)    HLD (hyperlipidemia)    Atrial fibrillation  on eliquis    COPD, mild  not on O2    Peripheral neuropathy  unclear cause    Lymphoma  ?questionable, not treated, not followed    Melanoma  R eye, s/p laser    Cirrhosis  possibly related to hepC    Breast cancer    Legally blind    Stroke    History of heroin abuse    History of chronic hepatitis    Marijuana use    History of kidney transplant  3 years ago    S/P arteriovenous (AV) fistula creation  prior old fistula in R arm    S/P lumpectomy, right breast    History of back surgery  s/p Left L5-S1 endoscopic laminoforaminotomy    History of loop recorder          ALLERGIES:  Rituxan (Hives)    MEDICATIONS:  STANDING MEDICATIONS  aspirin  chewable 81 milliGRAM(s) Oral daily  atorvastatin 80 milliGRAM(s) Oral at bedtime  diltiazem    milliGRAM(s) Oral daily  DULoxetine 30 milliGRAM(s) Oral daily  enoxaparin Injectable 60 milliGRAM(s) SubCutaneous every 12 hours  gabapentin 300 milliGRAM(s) Oral at bedtime  influenza  Vaccine (HIGH DOSE) 0.5 milliLiter(s) IntraMuscular once  isosorbide   mononitrate ER Tablet (IMDUR) 30 milliGRAM(s) Oral daily  metoprolol tartrate 50 milliGRAM(s) Oral two times a day  mycophenolate mofetil 500 milliGRAM(s) Oral two times a day  oxyCODONE    IR 15 milliGRAM(s) Oral every 8 hours  pantoprazole    Tablet 40 milliGRAM(s) Oral before breakfast  senna 2 Tablet(s) Oral at bedtime  tacrolimus ER Tablet (ENVARSUS XR) 1 milliGRAM(s) Oral <User Schedule>  tamsulosin 0.4 milliGRAM(s) Oral at bedtime    PRN MEDICATIONS  polyethylene glycol 3350 17 Gram(s) Oral two times a day PRN    VITALS:   ICU Vital Signs Last 24 Hrs  T(C): 36.8 (30 Nov 2024 12:30), Max: 36.8 (30 Nov 2024 12:30)  T(F): 98.2 (30 Nov 2024 12:30), Max: 98.2 (30 Nov 2024 12:30)  HR: 50 (30 Nov 2024 12:30) (48 - 55)  BP: 126/71 (30 Nov 2024 12:30) (113/64 - 156/89)  BP(mean): 89 (30 Nov 2024 12:30) (89 - 112)  RR: 18 (30 Nov 2024 12:30) (18 - 18)  SpO2: --        LABS:                        12.0   3.62  )-----------( 66       ( 30 Nov 2024 05:06 )             38.9     11-30    135  |  107  |  36[H]  ----------------------------<  127[H]  4.4   |  20  |  1.7[H]    Ca    9.8      30 Nov 2024 05:06  Phos  3.5     11-30  Mg     2.0     11-30    TPro  5.6[L]  /  Alb  3.7  /  TBili  0.5  /  DBili  x   /  AST  14  /  ALT  6   /  AlkPhos  79  11-30    PT/INR - ( 30 Nov 2024 05:06 )   PT: 12.80 sec;   INR: 1.08 ratio            Urinalysis Basic - ( 30 Nov 2024 05:06 )    Color: x / Appearance: x / SG: x / pH: x  Gluc: 127 mg/dL / Ketone: x  / Bili: x / Urobili: x   Blood: x / Protein: x / Nitrite: x   Leuk Esterase: x / RBC: x / WBC x   Sq Epi: x / Non Sq Epi: x / Bacteria: x                Cardiology:        TTE Echo Complete w/ Contrast w/o Doppler:   ACC: [id number] EXAM:  ECHO TTE WC COMP WO DOPP                          PROCEDURE DATE:  11/30/2024          INTERPRETATION:   [place] [address]             Phone: [phone].   TRANSTHORACIC ECHOCARDIOGRAM REPORT        Patient Name:   DAVONTE CHOU Accession #: [id number]  Medical Rec #:  [id number]           Height:      69.0 in 175.3 cm  YOB: 1955           Weight:      140.0 lb 63.50 kg  Patient Age:    69 years           BSA:         1.78 m²  Patient Gender: M                  BP:          113/64 mmHg      Date of Exam:        11/30/2024 9:06:07 AM  Referring Physician: Raghu Quintanilla  Sonographer:         Steff Mathias  Reading Physician:   Sylvester Dahl MD, [facility].    Procedure:     2D Echo/Doppler/Color Doppler Complete.  Indications:   R07.9 - Chest Pain, unspecified  Diagnosis:     R07.9 - Chest pain, unspecified  Study Details: Technically difficult study. Study quality was adversely   affected                 due to the patient being uncooperative.        Summary:   1. Left ventricular ejection fraction, by visual estimation, is 65 to   70%.   2. Hyperdynamic global left ventricular systolic function.   3. Severely enlarged left atrium.   4. Moderate left ventricular hypertrophy.   5. Mildly enlarged right atrium.   6. Degenerative mitral valve.   7. Moderate mitral valve regurgitation.   8. Mitral annular calcification.   9. Thickening of theanterior and posterior mitral valve leaflets.  10. Moderate tricuspid regurgitation.  11. Dilatation of the ascending aorta and aortic root at 4.1 cm.  12. Estimated pulmonary artery systolic pressure is 38.5 mmHg assuming a   right atrial pressure of 3 mmHg, which is consistent with borderline   pulmonary hypertension.  13. Mild aortic regurgitation.  14. Sclerotic aortic valve with normal opening.  15. Spectral Doppler shows pseudonormal pattern of left ventricular   myocardial filling (Grade II diastolic dysfunction).  16. Elevated mean left atrial pressure.    PHYSICIAN INTERPRETATION:  Left Ventricle: The left ventricular internal cavity size is normal.   There is moderate left ventricular hypertrophy. Global LV systolic   function was hyperdynamic. Left ventricular ejection fraction, by visual   estimation, is 65 to 70%. Spectral Doppler shows pseudonormal pattern of   left ventricular myocardial filling (Grade II diastolic dysfunction).   Elevated mean left atrial pressure.  Right Ventricle: Normal right ventricular size and function.  Left Atrium: Severely enlarged left atrium.  Right Atrium: Mildly enlarged right atrium.  Mitral Valve: The mitral valve is degenerative in appearance. Thickening   of the anterior and posterior mitral valve leaflets. There is mitral   annular calcification. No evidence of mitral stenosis. Moderate mitral   valve regurgitation is seen.  Tricuspid Valve: Structurally normal tricuspid valve, with normal leaflet   excursion. Moderate tricuspid regurgitation is visualized. Estimated   pulmonary artery systolic pressure is 38.5 mmHg assuming a right atrial   pressure of 3 mmHg, which is consistent with borderline pulmonary   hypertension.  Aortic Valve: The aortic valve is trileaflet. No evidence of aortic   stenosis. Sclerotic aortic valve with normal opening. Mild aortic valve   regurgitation is seen.  Pulmonic Valve: Structurally normal pulmonic valve, with normal leaflet   excursion. Trace pulmonic valve regurgitation.  Aorta: There isdilatation of the ascending aorta and aortic root.  Venous: The inferior vena cava was normal sized, with respiratory size   variation greater than 50%.  Additional Comments: A pacer wire is visualized in the right ventricle.      2D AND M-MODE MEASUREMENTS (normal ranges within parentheses):  Left Ventricle:                  Normal   Aorta/Left Atrium:            Normal  IVSd (2D):              1.30 cm (0.7-1.1) Aortic Root (2D):  4.10 cm   (2.4-3.7)  LVPWd (2D):             1.30 cm (0.7-1.1) Left Atrium (2D):  7.20 cm   (1.9-4.0)  LVIDd (2D):             4.50 cm (3.4-5.7)  LVIDs (2D):             2.70 cm  LV FS (2D):             40.0 %   (>25%)  Relative Wall Thickness  0.58    (<0.42)    SPECTRAL DOPPLER ANALYSIS:  Aortic Valve:  AoV VMax:   1.17 m/s AoV Area, Vmax:    2.92 cm² Vmax Indx:    1.64   cm²/m²  AoV VTI:     0.22 m   AoV Area, VTI:     3.37 cm² VTI Indx:     1.90   cm²/m²  AoV Pk Grad: 5.5 mmHg AoV Area, Mn Grad: 2.67 cm² Mn Grad Indx: 1.50   cm²/m²  AoV Mn Grad: 3.0 mmHg    LVOT Vmax: 0.98 m/s  LVOT VTI:  0.22 m  LVOT Diam: 2.10 cm    Tricuspid Valve and PA/RV Systolic Pressure: TR Max Velocity: 2.98 m/s RA   Pressure: 3 mmHg RVSP/PASP: 38.5 mmHg      [id number] Sylvester Dahl MD, [facility], Electronically signed on 11/30/2024 at   1:35:18 PM            *** Final *** (11-30-24 @ 09:06)  TTE Echo Complete w/ Contrast w/o Doppler:   ACC: [id number] EXAM:  ECHO TTE WC COMP WO DOPP                          PROCEDURE DATE:  01/24/2024          INTERPRETATION:   [place] [address]             Phone: [phone].   TRANSTHORACIC ECHOCARDIOGRAM REPORT        Patient Name:   DAVONTE CHOU Accession #: [id number]  Medical Rec #:  [id number]           Height:      69.0 in 175.3 cm  YOB: 1955           Weight:      133.0 lb 60.33 kg  Patient Age:    69 years           BSA:         1.74 m²  Patient Gender: M                  BP:          99/54 mmHg      Date of Exam:        1/24/2024 3:59:20 PM  Referring Physician: Aylin Pires  Sonographer:         Ave Quiroga  Reading Physician:   Brian Erazo MD.    Procedure:   2D Echo/Doppler/Color Doppler Complete.  Indications: I42.9 - Cardiomyopathy, unspecified  Diagnosis:   I42.9 - Cardiomyopathy, unspecified        Summary:   1. Left ventricular ejection fraction, by visual estimation, is 70 to   75%.   2. Hyperdynamic global left ventricular systolic function.   3. Severely enlarged left atrium.   4. Moderately increased LV wall thickness.   5. Spectral Doppler shows pseudonormal pattern of left ventricular   myocardial filling (Grade II diastolic dysfunction).   6. Normal right atrial size.   7. Mild to moderate mitral valve regurgitation.   8. Moderate thickening of the anterior and posterior mitral valve   leaflets.   9. Mild tricuspid regurgitation.  10. Mild aortic regurgitation.  11. Mild dilatation of the ascending aorta.  12. Estimated pulmonary artery systolic pressure is 40.3 mmHg assuming a   right atrial pressure of 8 mmHg, which is consistent with mild pulmonary   hypertension.    PHYSICIAN INTERPRETATION:  Left Ventricle: The left ventricular internal cavity size is normal. Left   ventricular wall thickness is moderately increased. Global LV systolic   function was hyperdynamic. Left ventricular ejection fraction, by visual   estimation, is 70 to 75%. Spectral Doppler shows pseudonormal pattern of   left ventricular myocardial filling (Grade II diastolic dysfunction).  Right Ventricle: Normal right ventricular size and function.  Left Atrium: Severely enlarged left atrium.  Right Atrium: Normal right atrial size.  Pericardium: There is no evidence of pericardial effusion.  Mitral Valve: Moderate thickening of the anterior and posterior mitral   valve leaflets. Mild to moderate mitral valve regurgitation is seen.  Tricuspid Valve: The tricuspid valve is normal in structure. Mild   tricuspid regurgitation is visualized. Estimated pulmonary artery   systolic pressure is 40.3 mmHg assuming a right atrial pressure of 8   mmHg, which is consistent with mild pulmonary hypertension.  Aortic Valve: The aortic valve is trileaflet. Aortic valve thickening   with normal leaflet opening. Mild aortic valve regurgitation is seen.  Pulmonic Valve: Structurally normal pulmonic valve, with normal leaflet   excursion. Trace pulmonic valve regurgitation.  Aorta: There is dilatation of the ascending aorta.  Pulmonary Artery: The pulmonary artery is of normal size and origin.      2D AND M-MODE MEASUREMENTS (normal ranges within parentheses):  Left Ventricle:                  Normal   Aorta/Left Atrium:            Normal  IVSd (2D):              1.30 cm (0.7-1.1) Aortic Root (2D):  3.60 cm   (2.4-3.7)  LVPWd (2D):             1.30 cm (0.7-1.1) Left Atrium (2D):  6.20 cm   (1.9-4.0)  LVIDd (2D):             4.90 cm (3.4-5.7)  LVIDs (2D):             3.20 cm  LV FS (2D):             34.7 %   (>25%)  Relative Wall Thickness  0.53    (<0.42)    SPECTRAL DOPPLER ANALYSIS:  LV DIASTOLIC FUNCTION:  MV Peak E: 1.27 m/s Decel Time: 153 msec  MV Peak A: 0.35 m/s  E/A Ratio: 3.65    Aortic Valve:  AoV VMax:    1.50 m/s AoV Area, Vmax:    2.40 cm² Vmax Indx:    1.38   cm²/m²  AoV VTI:     0.28 m   AoV Area, VTI:     2.51 cm² VTI Indx:     1.45   cm²/m²  AoV Pk Grad: 9.0 mmHg AoV Area, Mn Grad: 2.36 cm² Mn Grad Indx: 1.36   cm²/m²  AoV Mn Grad: 5.0 mmHg    LVOT Vmax: 0.95 m/s  LVOT VTI:  0.19 m  LVOT Diam: 2.20 cm    Aortic Insufficiency:  AI Half-time:  325 msec  AI Decel Rate: 3.50 m/s²    Mitral Valve:  MV P1/2 Time: 44.37 msec  MV Area, PHT: 4.96 cm²    Tricuspid Valve and PA/RV Systolic Pressure: TR Max Velocity: 2.84 m/s RA   Pressure: 8 mmHg RVSP/PASP: 40.3 mmHg      [id number] Brian Erazo MD, Electronically signed on 1/24/2024 at   5:26:17 PM            *** Final *** (01-24-24 @ 15:59)      RADIOLOGY:        Lines:  Central line:              Date placed:             Indication:   Intravenous Access:   NG tube:   Garcia Catheter:       Diagnositic orders     Provider to RN (11-30-24 @ 08:56) [Active]  influenza  Vaccine (HIGH DOSE) (11-29-24 @ 21:03) [Active]  Transfer In - Clerical Use Only (11-29-24 @ 20:38) [Active]  12 Lead ECG (11-29-24 @ 17:44) [Ordered.]  aspirin  chewable (11-29-24 @ 17:44) [Active]  Diet, DASH/TLC (11-29-24 @ 17:31) [Active]  oxyCODONE    IR (11-29-24 @ 17:27) [Active]  senna (11-29-24 @ 17:27) [Active]  polyethylene glycol 3350 (11-29-24 @ 17:27) [Active]  tacrolimus ER Tablet (ENVARSUS XR) (11-29-24 @ 17:27) [Active]  mycophenolate mofetil (11-29-24 @ 17:27) [Active]  gabapentin (11-29-24 @ 17:27) [Active]  DULoxetine (11-29-24 @ 17:27) [Active]  atorvastatin (11-29-24 @ 17:27) [Active]  metoprolol tartrate (11-29-24 @ 17:27) [Active]  diltiazem   CD (11-29-24 @ 17:27) [Active]  isosorbide   mononitrate ER Tablet (IMDUR) (11-29-24 @ 17:27) [Active]  tamsulosin (11-29-24 @ 17:27) [Active]  pantoprazole    Tablet (11-29-24 @ 17:24) [Active]  enoxaparin Injectable (11-29-24 @ 17:24) [Active]  Consult- Cardiology (11-29-24 @ 17:02) [Active]  Admit from ED (11-29-24 @ 16:12) [Active]  Admit to Inpatient Level of Care (11-29-24 @ 16:07) [Active]

## 2024-11-30 NOTE — CONSULT NOTE ADULT - ASSESSMENT
Chest pain / SVT  status post  donor Kidney transplant at [place] 8 years ago  baseline Cr "upper 1's to low 2's"  afib / svt  Pancytopenia / anemia   CAD / HFpEF  HTN  Cirrhosis    plan:    cont Envarsus  cont mycophenolate  daily BMP  cardio f/u  if plan for cardiac cath, start IV fluids

## 2024-11-30 NOTE — PROGRESS NOTE ADULT - NS ATTEST RISK PROBLEM GEN_ALL_CORE FT
the following   --------------------------------Complexity of data reviewed ----------------------------------------------  The Patients complexity of data reviewed  is Low [ ] Moderate [ ] High [x ] due to the following:   Reviewed prior external records [x ]  Considering/ Ordered a unique test:  Labs [x ] Imaging [x ] Stress Test  [ ] Other: Specify [ ]  Reviewed each unique test result [x ]   Assessment requiring an independent historian [ ]  Independent interpretation of:   X-Ray [ ] EKG [ ]  Other: Specify  [ tele ]  Discussion of management of tests with clinician outside of my group [Cardio]

## 2024-12-01 LAB
ALBUMIN SERPL ELPH-MCNC: 3.9 G/DL — SIGNIFICANT CHANGE UP (ref 3.5–5.2)
ALP SERPL-CCNC: 93 U/L — SIGNIFICANT CHANGE UP (ref 30–115)
ALT FLD-CCNC: 7 U/L — SIGNIFICANT CHANGE UP (ref 0–41)
ANION GAP SERPL CALC-SCNC: 10 MMOL/L — SIGNIFICANT CHANGE UP (ref 7–14)
AST SERPL-CCNC: 14 U/L — SIGNIFICANT CHANGE UP (ref 0–41)
BILIRUB SERPL-MCNC: 0.7 MG/DL — SIGNIFICANT CHANGE UP (ref 0.2–1.2)
BUN SERPL-MCNC: 25 MG/DL — HIGH (ref 10–20)
CALCIUM SERPL-MCNC: 10.4 MG/DL — SIGNIFICANT CHANGE UP (ref 8.4–10.5)
CHLORIDE SERPL-SCNC: 104 MMOL/L — SIGNIFICANT CHANGE UP (ref 98–110)
CO2 SERPL-SCNC: 21 MMOL/L — SIGNIFICANT CHANGE UP (ref 17–32)
CREAT SERPL-MCNC: 1.3 MG/DL — SIGNIFICANT CHANGE UP (ref 0.7–1.5)
EGFR: 60 ML/MIN/1.73M2 — SIGNIFICANT CHANGE UP
GLUCOSE BLDC GLUCOMTR-MCNC: 127 MG/DL — HIGH (ref 70–99)
GLUCOSE BLDC GLUCOMTR-MCNC: 128 MG/DL — HIGH (ref 70–99)
GLUCOSE SERPL-MCNC: 103 MG/DL — HIGH (ref 70–99)
MAGNESIUM SERPL-MCNC: 2 MG/DL — SIGNIFICANT CHANGE UP (ref 1.8–2.4)
POTASSIUM SERPL-MCNC: 4.4 MMOL/L — SIGNIFICANT CHANGE UP (ref 3.5–5)
POTASSIUM SERPL-SCNC: 4.4 MMOL/L — SIGNIFICANT CHANGE UP (ref 3.5–5)
PROT SERPL-MCNC: 5.9 G/DL — LOW (ref 6–8)
SODIUM SERPL-SCNC: 135 MMOL/L — SIGNIFICANT CHANGE UP (ref 135–146)

## 2024-12-01 PROCEDURE — 99233 SBSQ HOSP IP/OBS HIGH 50: CPT

## 2024-12-01 PROCEDURE — 93010 ELECTROCARDIOGRAM REPORT: CPT

## 2024-12-01 RX ORDER — ADENOSINE 3 MG/ML
12 INJECTION INTRAVENOUS ONCE
Refills: 0 | Status: COMPLETED | OUTPATIENT
Start: 2024-12-01 | End: 2024-12-01

## 2024-12-01 RX ORDER — ADENOSINE 3 MG/ML
6 INJECTION INTRAVENOUS ONCE
Refills: 0 | Status: COMPLETED | OUTPATIENT
Start: 2024-12-01 | End: 2024-12-01

## 2024-12-01 RX ORDER — AMIODARONE HYDROCHLORIDE 200 MG/1
150 TABLET ORAL ONCE
Refills: 0 | Status: COMPLETED | OUTPATIENT
Start: 2024-12-01 | End: 2024-12-01

## 2024-12-01 RX ORDER — AMIODARONE HYDROCHLORIDE 200 MG/1
0.5 TABLET ORAL
Qty: 450 | Refills: 0 | Status: DISCONTINUED | OUTPATIENT
Start: 2024-12-01 | End: 2024-12-02

## 2024-12-01 RX ORDER — METOPROLOL TARTRATE 100 MG/1
50 TABLET, FILM COATED ORAL ONCE
Refills: 0 | Status: COMPLETED | OUTPATIENT
Start: 2024-12-01 | End: 2024-12-01

## 2024-12-01 RX ORDER — ONDANSETRON HYDROCHLORIDE 4 MG/1
1 TABLET, FILM COATED ORAL EVERY 4 HOURS
Refills: 0 | Status: DISCONTINUED | OUTPATIENT
Start: 2024-12-01 | End: 2024-12-02

## 2024-12-01 RX ORDER — AMIODARONE HYDROCHLORIDE 200 MG/1
1 TABLET ORAL
Qty: 450 | Refills: 0 | Status: DISCONTINUED | OUTPATIENT
Start: 2024-12-01 | End: 2024-12-02

## 2024-12-01 RX ORDER — METOPROLOL TARTRATE 100 MG/1
5 TABLET, FILM COATED ORAL ONCE
Refills: 0 | Status: COMPLETED | OUTPATIENT
Start: 2024-12-01 | End: 2024-12-01

## 2024-12-01 RX ADMIN — AMIODARONE HYDROCHLORIDE 16.7 MG/MIN: 200 TABLET ORAL at 21:45

## 2024-12-01 RX ADMIN — OXYCODONE HYDROCHLORIDE 15 MILLIGRAM(S): 30 TABLET ORAL at 21:08

## 2024-12-01 RX ADMIN — TAMSULOSIN HYDROCHLORIDE 0.4 MILLIGRAM(S): 0.4 CAPSULE ORAL at 21:08

## 2024-12-01 RX ADMIN — ADENOSINE 12 MILLIGRAM(S): 3 INJECTION INTRAVENOUS at 15:03

## 2024-12-01 RX ADMIN — ADENOSINE 12 MILLIGRAM(S): 3 INJECTION INTRAVENOUS at 15:08

## 2024-12-01 RX ADMIN — ENOXAPARIN SODIUM 60 MILLIGRAM(S): 30 INJECTION SUBCUTANEOUS at 12:11

## 2024-12-01 RX ADMIN — TACROLIMUS 1 MILLIGRAM(S): 5 CAPSULE ORAL at 18:12

## 2024-12-01 RX ADMIN — AMIODARONE HYDROCHLORIDE 33.3 MG/MIN: 200 TABLET ORAL at 15:39

## 2024-12-01 RX ADMIN — Medication 30 MILLIGRAM(S): at 12:12

## 2024-12-01 RX ADMIN — Medication 81 MILLIGRAM(S): at 12:12

## 2024-12-01 RX ADMIN — OXYCODONE HYDROCHLORIDE 15 MILLIGRAM(S): 30 TABLET ORAL at 06:06

## 2024-12-01 RX ADMIN — Medication 30 MILLIGRAM(S): at 12:11

## 2024-12-01 RX ADMIN — METOPROLOL TARTRATE 50 MILLIGRAM(S): 100 TABLET, FILM COATED ORAL at 14:40

## 2024-12-01 RX ADMIN — GABAPENTIN 300 MILLIGRAM(S): 300 CAPSULE ORAL at 21:08

## 2024-12-01 RX ADMIN — ONDANSETRON HYDROCHLORIDE 1 MILLIGRAM(S): 4 TABLET, FILM COATED ORAL at 12:14

## 2024-12-01 RX ADMIN — Medication 80 MILLIGRAM(S): at 21:08

## 2024-12-01 RX ADMIN — MYCOPHENOLATE MOFETIL 500 MILLIGRAM(S): 500 TABLET ORAL at 18:12

## 2024-12-01 RX ADMIN — METOPROLOL TARTRATE 50 MILLIGRAM(S): 100 TABLET, FILM COATED ORAL at 14:16

## 2024-12-01 RX ADMIN — OXYCODONE HYDROCHLORIDE 15 MILLIGRAM(S): 30 TABLET ORAL at 13:13

## 2024-12-01 RX ADMIN — Medication 2 TABLET(S): at 21:08

## 2024-12-01 RX ADMIN — DILTIAZEM HYDROCHLORIDE 240 MILLIGRAM(S): 240 CAPSULE, COATED, EXTENDED RELEASE ORAL at 14:16

## 2024-12-01 RX ADMIN — ENOXAPARIN SODIUM 60 MILLIGRAM(S): 30 INJECTION SUBCUTANEOUS at 23:04

## 2024-12-01 RX ADMIN — MYCOPHENOLATE MOFETIL 500 MILLIGRAM(S): 500 TABLET ORAL at 06:06

## 2024-12-01 RX ADMIN — METOPROLOL TARTRATE 5 MILLIGRAM(S): 100 TABLET, FILM COATED ORAL at 14:40

## 2024-12-01 RX ADMIN — AMIODARONE HYDROCHLORIDE 600 MILLIGRAM(S): 200 TABLET ORAL at 15:40

## 2024-12-01 RX ADMIN — ADENOSINE 6 MILLIGRAM(S): 3 INJECTION INTRAVENOUS at 15:02

## 2024-12-01 RX ADMIN — TACROLIMUS 1 MILLIGRAM(S): 5 CAPSULE ORAL at 10:44

## 2024-12-01 RX ADMIN — PANTOPRAZOLE SODIUM 40 MILLIGRAM(S): 40 TABLET, DELAYED RELEASE ORAL at 06:06

## 2024-12-01 NOTE — DISCHARGE NOTE PROVIDER - ATTENDING DISCHARGE PHYSICAL EXAMINATION:
Vital Signs Last 24 Hrs  T(F): 98.2 (01 Dec 2024 04:34), Max: 98.2 (01 Dec 2024 04:34)  HR: 55 (01 Dec 2024 05:54) (55 - 72)  BP: 157/71 (01 Dec 2024 04:34) (157/71 - 161/88)  RR: 18 (01 Dec 2024 04:34) (18 - 18)  SpO2: --    PHYSICAL EXAM:  GENERAL: NAD, well-groomed, well-developed  HEAD:  Atraumatic, Normocephalic  EYES: EOMI, conjunctiva and sclera clear  ENMT: Moist mucous membranes, Good dentition, no thrush  NECK: Supple, No JVD  CHEST/LUNG: Clear to auscultation bilaterally, good air entry, non-labored breathing  HEART: IRR; S1/S2, 2/6 murmur   ABDOMEN: Soft, Nontender, Nondistended; Bowel sounds present  VASCULAR: Normal pulses, Normal capillary refill  EXTREMITIES: No calf tenderness, No cyanosis, No edema  LYMPH: Normal; No lymphadenopathy noted  SKIN: Warm, Intact  PSYCH: Normal mood, Normal affect  NERVOUS SYSTEM:  A/O x3, Good concentration; CN 2-12 intact, No focal deficits

## 2024-12-01 NOTE — PROGRESS NOTE ADULT - SUBJECTIVE AND OBJECTIVE BOX
Patient is a 69y old  Male who presents with a chief complaint of Chest pain (01 Dec 2024 13:35)      Patient seen and examined at bedside.    ALLERGIES:  Rituxan (Hives)    MEDICATIONS:  aMIOdarone Infusion 1 mG/Min IV Continuous <Continuous>  aMIOdarone Infusion 0.5 mG/Min IV Continuous <Continuous>  aspirin  chewable 81 milliGRAM(s) Oral daily  atorvastatin 80 milliGRAM(s) Oral at bedtime  DULoxetine 30 milliGRAM(s) Oral daily  enoxaparin Injectable 60 milliGRAM(s) SubCutaneous every 12 hours  gabapentin 300 milliGRAM(s) Oral at bedtime  influenza  Vaccine (HIGH DOSE) 0.5 milliLiter(s) IntraMuscular once  isosorbide   mononitrate ER Tablet (IMDUR) 30 milliGRAM(s) Oral daily  metoprolol tartrate 50 milliGRAM(s) Oral two times a day  mycophenolate mofetil 500 milliGRAM(s) Oral two times a day  ondansetron Injectable 1 milliGRAM(s) IV Push every 4 hours PRN  oxyCODONE    IR 15 milliGRAM(s) Oral every 8 hours  pantoprazole    Tablet 40 milliGRAM(s) Oral before breakfast  polyethylene glycol 3350 17 Gram(s) Oral two times a day PRN  senna 2 Tablet(s) Oral at bedtime  tacrolimus ER Tablet (ENVARSUS XR) 1 milliGRAM(s) Oral <User Schedule>  tamsulosin 0.4 milliGRAM(s) Oral at bedtime    Vital Signs Last 24 Hrs  T(F): 97.9 (01 Dec 2024 12:50), Max: 98.2 (01 Dec 2024 04:34)  HR: 67 (01 Dec 2024 12:50) (55 - 72)  BP: 139/72 (01 Dec 2024 12:50) (139/72 - 161/88)  RR: 18 (01 Dec 2024 12:50) (18 - 18)  SpO2: --  I&O's Summary    30 Nov 2024 07:01  -  01 Dec 2024 07:00  --------------------------------------------------------  IN: 311 mL / OUT: 900 mL / NET: -589 mL    01 Dec 2024 07:01  -  01 Dec 2024 17:17  --------------------------------------------------------  IN: 236 mL / OUT: 500 mL / NET: -264 mL        PHYSICAL EXAM:  General: NAD, A/O x 3  ENT: MMM  Neck: Supple, No JVD  Lungs: Clear to auscultation bilaterally  Cardio: RRR, S1/S2, No murmurs  Abdomen: Soft, Nontender, Nondistended; Bowel sounds present  Extremities: No cyanosis, No edema    LABS:                        12.0   3.62  )-----------( 66       ( 30 Nov 2024 05:06 )             38.9     12-01    135  |  104  |  25  ----------------------------<  103  4.4   |  21  |  1.3    Ca    10.4      01 Dec 2024 11:15  Phos  3.5     11-30  Mg     2.0     12-01    TPro  5.9  /  Alb  3.9  /  TBili  0.7  /  DBili  x   /  AST  14  /  ALT  7   /  AlkPhos  93  12-01      PT/INR - ( 30 Nov 2024 05:06 )   PT: 12.80 sec;   INR: 1.08 ratio                 11-30 Chol 176 mg/dL LDL -- HDL 43 mg/dL Trig 86 mg/dL  TSH 2.86   TSH with FT4 reflex --  Total T3 --              POCT Blood Glucose.: 128 mg/dL (01 Dec 2024 15:14)  POCT Blood Glucose.: 127 mg/dL (01 Dec 2024 07:36)      Urinalysis Basic - ( 01 Dec 2024 11:15 )    Color: x / Appearance: x / SG: x / pH: x  Gluc: 103 mg/dL / Ketone: x  / Bili: x / Urobili: x   Blood: x / Protein: x / Nitrite: x   Leuk Esterase: x / RBC: x / WBC x   Sq Epi: x / Non Sq Epi: x / Bacteria: x            RADIOLOGY & ADDITIONAL TESTS:    Care Discussed with Consultants/Other Providers:

## 2024-12-01 NOTE — DISCHARGE NOTE PROVIDER - PROVIDER TOKENS
PROVIDER:[TOKEN:[[id number]],FOLLOWUP:[1 week]],PROVIDER:[TOKEN:[[id number]],FOLLOWUP:[1 week]]

## 2024-12-01 NOTE — DISCHARGE NOTE PROVIDER - HOSPITAL COURSE
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 Liver cirrhosis portal hypertension with eso varices, brought in by ambulance with chief complaint of central crushing chest pain  that woke him up from sleep this morning.  Reports felt fatigued before he went to sleep last night but had no pain at that time.  Pain has since subsided.  Denies palpitations, SOB, recent fever, chills, cough, leg pain or swelling.      Loop recorder: new SVT    Diagnosis: typical cardiogenic chest pain, new ST changes, SVT event on loop recorder per ED    Dr. Low, cardiologist and Dr. Henderson , EP.     EKG: new TRINIDAD in aVR, V1 and v2 not with diffuse STD in anterior, anterio inferior leads sinus controlled in muse  bedside pocus: Normal contractility remarkable biatrial enlargement and LVH no acute complication or RWMA by eyeballing  Trop 80  #Chest pain  - history of typical chest pain  - off pain on exam  - EKG: diffuse subendocardial ischemia picture  - Trop 80  - Trend trop stable   - trend EKG  - Consult EP Events: episodes of ST 9:17-11:46am 11/29 lasting from 3 to 39 min [id number]  *correlates with getting EMS and going to the hospital, does not correlate with chest tightness episodes. Underlying rhythm NSR  - cardiology follow up   - put on Lovenox instead of Eliquis for now    #Afib SP Ablation, SND s/p DC-Avier ([facility], non-dep, 2024)  - On Eliquis. Compliant. No bleeding issues  - On Metoprolol and CCB. Not a good candidate for long-term Amiodarone/AADs due to drug-drug interaction with  his renal transplant suppression medication.    - S/p Avier + attempt at AV node ablation.     Current meds:   dilTIAZem HCl  MG Oral Tablet Extended Release 24 Hour  Metoprolol Succinate ER 50 MG Oral Tablet Extended Release 24 Hour  Eliquis 2.5 MG Oral Tablet; Take 1 tablet twice daily    Plan  will keep on lovenox for now anticipating for procedure  keep metoprolol and diltiazem    # Liver Cirrhosis with Possible Portal Hypertension  - stable HB  - prefer Carvedilol for rate control will consult EP    #ESRD sp Transplant  - CW tacrolimus and mycophenolate  - nephro consult  - get tacro and mycophen levels    #BPH  - Flomax home dose    #DVT Lovenox full AC  #GI Pantoprazole  #Diet: DASH  #ambulate as tolerated  #Dispo: tele    69-year-old with PMH of HTN, HLD, HFpEF (70-75%) grade 2 DD, ESRD s/p kidney transplant 8 years ago at [place], chronic hep C, A-fib SP ablation preformed 5/12 with loop recorder, CVA (TIA), SP breast cancer lumpectomy left side 1 month ago, 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 Liver cirrhosis portal hypertension with eso varices, brought in by ambulance with chief complaint of central crushing chest pain  that woke him up from sleep this morning.  Reports felt fatigued before he went to sleep last night but had no pain at that time.  Pain has since subsided.  Denies palpitations, SOB, recent fever, chills, cough, leg pain or swelling.      Loop recorder: new SVT    Diagnosis: typical cardiogenic chest pain, new ST changes, SVT event on loop recorder per ED    Dr. Low, cardiologist and Dr. Henderson , EP.     EKG: new TRINIDAD in aVR, V1 and v2 not with diffuse STD in anterior, anterio inferior leads sinus controlled in muse  bedside pocus: Normal contractility remarkable biatrial enlargement and LVH no acute complication or RWMA by eyeballing  Trop 80  #Chest pain  - history of typical chest pain  - off pain on exam  - EKG: diffuse subendocardial ischemia picture  - Trop 80-->89-->81  - Consult EP Events: episodes of ST 9:17-11:46am 11/29 lasting from 3 to 39 min [id number]  *correlates with getting EMS and going to the hospital, does not correlate with chest tightness episodes. Underlying rhythm NSR  - cardiology and EP both saw pt 12/1 and gave their clearance to dc the pt   - dc pt on eliquis   - no need for heart cath now, will be considered as elective     #Afib SP Ablation, SND s/p DC-Avier ([facility], non-dep, 2024)  - On Eliquis. Compliant. No bleeding issues  - On Metoprolol and CCB. Not a good candidate for long-term Amiodarone/AADs due to drug-drug interaction with  his renal transplant suppression medication. DC on metoprolol only    - S/p Avier + attempt at AV node ablation.       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 Liver cirrhosis portal hypertension with eso varices, brought in by ambulance with chief complaint of central crushing chest pain  that woke him up from sleep this morning.  Reports felt fatigued before he went to sleep last night but had no pain at that time.  Pain has since subsided.  Denies palpitations, SOB, recent fever, chills, cough, leg pain or swelling.      Loop recorder: new SVT    Diagnosis: typical cardiogenic chest pain, new ST changes, SVT event on loop recorder per ED    Dr. Low, cardiologist and Dr. Henderson , EP.     EKG: new TRINIDAD in aVR, V1 and v2 not with diffuse STD in anterior, anterio inferior leads sinus controlled in muse  bedside pocus: Normal contractility remarkable biatrial enlargement and LVH no acute complication or RWMA by eyeballing  Trop 80  #Chest pain  Patient came to the ED due to crushing chest pain. Loop recorder showed new SVT. EKG performed and showed diffuse subendocrdial iscemia. Trending trops were stable. Patient had an episode of tachycardia was given adenosine total of 30mg with no termination of elevated heart rate. Was then loaded with amiodarone and is currently vitally stable.  - history of typical chest pain  - off pain on exam  - EKG: diffuse subendocardial ischemia picture  - Trop 80-->89-->81  - Consult EP Events: episodes of ST 9:17-11:46am 11/29 lasting from 3 to 39 min [id number]  *correlates with getting EMS and going to the hospital, does not correlate with chest tightness episodes. Underlying rhythm NSR  - cardiology and EP both saw pt 12/1 and gave their clearance to dc the pt   - dc pt on eliquis   - no need for heart cath now, will be considered as elective   -Will transition to PO amio on DC    #Afib SP Ablation, SND s/p DC-[facility] ([facility], non-dep, 2024)  - On Eliquis. Compliant. No bleeding issues  - On Metoprolol and CCB. Not a good candidate for long-term Amiodarone/AADs due to drug-drug interaction with  his renal transplant suppression medication. DC on metoprolol only    - S/p Avier + attempt at AV node ablation.       # Liver Cirrhosis with Possible Portal Hypertension  - stable HB  - prefer Carvedilol for rate control will consult EP    #ESRD sp Transplant  - CW tacrolimus and mycophenolate  - nephro consult  - get tacro and mycophen levels    #BPH  - Flomax home dose    DC home, pt refused home care     Discussion of discharge plan of care including discharge diagnosis, medication reconciliation, and follow-ups, was conducted with Dr. Mazariegos on 12/2/2024, and discharge was approved.  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 Liver cirrhosis portal hypertension with eso varices, brought in by ambulance with chief complaint of central crushing chest pain  that woke him up from sleep this morning.  Reports felt fatigued before he went to sleep last night but had no pain at that time.  Pain has since subsided.  Denies palpitations, SOB, recent fever, chills, cough, leg pain or swelling.      Loop recorder: new SVT    Diagnosis: typical cardiogenic chest pain, new ST changes, SVT event on loop recorder per ED    Dr. Low, cardiologist and Dr. Henderson , EP.     EKG: new TRINIDAD in aVR, V1 and v2 not with diffuse STD in anterior, anterio inferior leads sinus controlled in muse  bedside pocus: Normal contractility remarkable biatrial enlargement and LVH no acute complication or RWMA by eyeballing  Trop 80  #Chest pain  Patient came to the ED due to crushing chest pain. Loop recorder showed new SVT. EKG performed and showed diffuse subendocrdial iscemia. Trending trops were stable. Patient had an episode of tachycardia was given adenosine total of 30mg with no termination of elevated heart rate. Was then loaded with amiodarone and is currently vitally stable.  - history of typical chest pain  - off pain on exam  - EKG: diffuse subendocardial ischemia picture  - Trop 80-->89-->81  - Consult EP Events: episodes of ST 9:17-11:46am 11/29 lasting from 3 to 39 min [id number]  *correlates with getting EMS and going to the hospital, does not correlate with chest tightness episodes. Underlying rhythm NSR  - cardiology and EP both saw pt 12/1 and gave their clearance to dc the pt   - dc pt on eliquis   - no need for heart cath now, will be considered as elective   -As per EP no need for amio    #Afib SP Ablation, SND s/p DC-[facility] ([facility], non-dep, 2024)  - On Eliquis. Compliant. No bleeding issues  - On Metoprolol and CCB. Not a good candidate for long-term Amiodarone/AADs due to drug-drug interaction with  his renal transplant suppression medication. DC on metoprolol only    - S/p Avier + attempt at AV node ablation.       # Liver Cirrhosis with Possible Portal Hypertension  - stable HB  - prefer Carvedilol for rate control will consult EP    #ESRD sp Transplant  - CW tacrolimus and mycophenolate  - nephro consult  - get tacro and mycophen levels    #BPH  - Flomax home dose    DC home, pt refused home care     Discussion of discharge plan of care including discharge diagnosis, medication reconciliation, and follow-ups, was conducted with Dr. Mazariegos on 12/2/2024, and discharge was approved.

## 2024-12-01 NOTE — CONSULT NOTE ADULT - ASSESSMENT
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  coming with CP with possible SVT the preceded that.   Hx of CAD with RCA stent and disease in LAD and LCx  HFpEF with GIIIDD  Hx of Afib  ESRD with kidney transplant   Hx of CVA    Elevated troponin likely secondary to demand ischemia from SVT. no more chest pain. troponin have remained negative.       Plan:  - Lovenox 60 mg BID for now. can restart eliquis upon discharge.   - Load with Aspirin and start 81 mg Aspirin tomorrow  - c/w imdur and metoprolol  - CP might have been precipitated by SVT - demand ischemia due to known disease in LAD and LCx vs Type I MI  - echo shows unchanged findings from prior echo- normal EF/G2DD, moderate MR, severe LA dilation, LVH.   - chest pain free now. monitor shows no episodes of SVT. some episodes of bradycardia.   - can follow up outpatient with Dr. Low for further management of SVT and existing CAD.

## 2024-12-01 NOTE — DISCHARGE NOTE PROVIDER - NSDCMRMEDTOKEN_GEN_ALL_CORE_FT
aspirin 81 mg oral tablet, chewable: 1 tab(s) orally once a day  atorvastatin 80 mg oral tablet: 1 tab(s) orally once a day (at bedtime)  DULoxetine 30 mg oral delayed release capsule: 1 cap(s) orally once a day  Eliquis 2.5 mg oral tablet: 1 tab(s) orally 2 times a day  Flomax 0.4 mg oral capsule: 1 cap(s) orally 2 times a day  gabapentin 300 mg oral capsule: 1 cap(s) orally once a day (at bedtime)  isosorbide mononitrate 30 mg oral tablet, extended release: 1 tab(s) orally once a day  metoprolol tartrate 50 mg oral tablet: 1 tab(s) orally 2 times a day  mycophenolate mofetil 500 mg oral tablet: 2 tab(s) orally once a day  oxyCODONE 15 mg oral tablet: 1 tab(s) orally every 8 hours As needed Severe Pain (7 - 10)  pantoprazole 40 mg oral delayed release tablet: 1 tab(s) orally once a day  polyethylene glycol 3350 oral powder for reconstitution: 17 gram(s) orally 2 times a day as needed for  constipation  senna leaf extract oral tablet: 2 tab(s) orally once a day (at bedtime)  tacrolimus 1 mg oral tablet, extended release: 1 tab(s) orally 2 times a day   amiodarone 200 mg oral tablet: 1 tab(s) orally 2 times a day Please take 2 times a day for 14 days  amiodarone 200 mg oral tablet: 1 tab(s) orally once a day Please take once a day daily  aspirin 81 mg oral tablet, chewable: 1 tab(s) orally once a day  atorvastatin 80 mg oral tablet: 1 tab(s) orally once a day (at bedtime)  DULoxetine 30 mg oral delayed release capsule: 1 cap(s) orally once a day  Eliquis 2.5 mg oral tablet: 1 tab(s) orally 2 times a day  Flomax 0.4 mg oral capsule: 1 cap(s) orally 2 times a day  gabapentin 300 mg oral capsule: 1 cap(s) orally once a day (at bedtime)  isosorbide mononitrate 30 mg oral tablet, extended release: 1 tab(s) orally once a day  metoprolol tartrate 50 mg oral tablet: 1 tab(s) orally 2 times a day  mycophenolate mofetil 500 mg oral tablet: 2 tab(s) orally once a day  oxyCODONE 15 mg oral tablet: 1 tab(s) orally every 8 hours As needed Severe Pain (7 - 10)  pantoprazole 40 mg oral delayed release tablet: 1 tab(s) orally once a day  polyethylene glycol 3350 oral powder for reconstitution: 17 gram(s) orally 2 times a day as needed for  constipation  senna leaf extract oral tablet: 2 tab(s) orally once a day (at bedtime)  tacrolimus 1 mg oral tablet, extended release: 1 tab(s) orally 2 times a day   aspirin 81 mg oral tablet, chewable: 1 tab(s) orally once a day  atorvastatin 80 mg oral tablet: 1 tab(s) orally once a day (at bedtime)  DULoxetine 30 mg oral delayed release capsule: 1 cap(s) orally once a day  Eliquis 2.5 mg oral tablet: 1 tab(s) orally 2 times a day  Flomax 0.4 mg oral capsule: 1 cap(s) orally 2 times a day  gabapentin 300 mg oral capsule: 1 cap(s) orally once a day (at bedtime)  isosorbide mononitrate 30 mg oral tablet, extended release: 1 tab(s) orally once a day  metoprolol tartrate 50 mg oral tablet: 1 tab(s) orally 2 times a day  mycophenolate mofetil 500 mg oral tablet: 1 tab(s) orally 2 times a day  oxyCODONE 15 mg oral tablet: 1 tab(s) orally every 8 hours As needed Severe Pain (7 - 10)  pantoprazole 40 mg oral delayed release tablet: 1 tab(s) orally once a day  polyethylene glycol 3350 oral powder for reconstitution: 17 gram(s) orally 2 times a day as needed for  constipation  senna leaf extract oral tablet: 2 tab(s) orally once a day (at bedtime)  tacrolimus 1 mg oral tablet, extended release: 1 tab(s) orally 2 times a day   aspirin 81 mg oral tablet, chewable: 1 tab(s) chewed once a day  aspirin 81 mg oral tablet, chewable: 1 tab(s) orally once a day  atorvastatin 80 mg oral tablet: 1 tab(s) orally once a day (at bedtime)  DULoxetine 30 mg oral delayed release capsule: 1 cap(s) orally once a day  Eliquis 2.5 mg oral tablet: 1 tab(s) orally 2 times a day  Flomax 0.4 mg oral capsule: 1 cap(s) orally 2 times a day  gabapentin 300 mg oral capsule: 1 cap(s) orally once a day (at bedtime)  isosorbide mononitrate 30 mg oral tablet, extended release: 1 tab(s) orally once a day  metoprolol tartrate 50 mg oral tablet: 1 tab(s) orally 2 times a day  mycophenolate mofetil 500 mg oral tablet: 1 tab(s) orally 2 times a day  oxyCODONE 15 mg oral tablet: 1 tab(s) orally every 8 hours As needed Severe Pain (7 - 10)  pantoprazole 40 mg oral delayed release tablet: 1 tab(s) orally once a day  polyethylene glycol 3350 oral powder for reconstitution: 17 gram(s) orally 2 times a day as needed for  constipation  senna leaf extract oral tablet: 2 tab(s) orally once a day (at bedtime)  tacrolimus 1 mg oral tablet, extended release: 1 tab(s) orally 2 times a day

## 2024-12-01 NOTE — DISCHARGE NOTE PROVIDER - CARE PROVIDER_API CALL
Kin Low  Interventional Cardiology  [address] [facility] [address], [address]  [place] [address]  Phone: [phone]  Fax: [phone]  Follow Up Time: 1 week    Porsha Henderson  Cardiac Electrophysiology  [address] [place] [id number]  Phone: [phone]  Fax: [phone]  Follow Up Time: 1 week

## 2024-12-01 NOTE — PROGRESS NOTE ADULT - ASSESSMENT
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 Liver cirrhosis portal hypertension with eso varices, brought in by ambulance with chief complaint of central crushing chest pain    #Afib RVR  -12/1 pt had been cleared for discharge by EP and cardiology, pt developed HR in th 170s  -In the AM pt's metoprolol and diltazem was held due to bradycardia - in discussion with Dr. Wilson and Dr. Elizabeth and there was agreement to dc the pt on metoprolol only.    -EP was contacted - given metoprolol 5mg IV push and PO dose of metrolol - no effect, adenosine was given 6mg and then 12mg x2- no affect; at that point rrt was called and amiodarone was started and pt responded       #Chest pain  - history of typical chest pain  - off pain on exam  - EKG: diffuse subendocardial ischemia picture  - Trop 80  - Trend trop stable   - trend EKG  - Consult EP Events: episodes of ST 9:17-11:46am 11/29 lasting from 3 to 39 min [id number]  *correlates with getting EMS and going to the hospital, does not correlate with chest tightness episodes. Underlying rhythm NSR  - cardiology Dr. Elizabeth ok dc 12/1 on aspirin 81mg   - eliquis     #Afib SP Ablation, SND s/p DC-[facility] ([facility], non-dep, 2024)  - On Eliquis. Compliant. No bleeding issues  - On Metoprolol and CCB. Not a good candidate for long-term Amiodarone/AADs due to drug-drug interaction with  his renal transplant suppression medication.    - S/p Avier + attempt at AV node ablation.   -will keep metoprolol and is currently on amiodarone drip     # Liver Cirrhosis with Possible Portal Hypertension  - stable HB    #ESRD sp Transplant  - CW tacrolimus and mycophenolate  - nephro consult  - get tacro and mycophen levels    #BPH  - Flomax home dose    #DVT Lovenox full AC  #GI Pantoprazole  #Diet: DASH  #ambulate as tolerated  #Dispo: tele  #Pending: EP review, rate control    Pt was critically ill today and required 1hr of critical care time

## 2024-12-01 NOTE — DISCHARGE NOTE PROVIDER - NSDCCPCAREPLAN_GEN_ALL_CORE_FT
PRINCIPAL DISCHARGE DIAGNOSIS  Diagnosis: Chest pressure  Assessment and Plan of Treatment: You were seen by both cardiology and electrophysiology for your chest pain.  they have deteremined that it is safe for you to be discharged.  Please take the aspirin 81mg daily and stop taking your diltizem (cardizem) medication.  Please call your doctor or come back to the hospital if there is worsening chest pain.  Please see your cardiologist Dr. Low within 1 week.      SECONDARY DISCHARGE DIAGNOSES  Diagnosis: Elevated troponin  Assessment and Plan of Treatment:

## 2024-12-01 NOTE — RAPID RESPONSE TEAM SUMMARY - NSSITUATIONBACKGROUNDRRT_GEN_ALL_CORE
Patient was found to have SVTs to the 170s sustained around 2:30pm. Upon chart review patient did not receive his home dose of cardizem and metoprolol this AM bc of bradycardia. Missed medication was given along with 5mg IV push of metoprolol but patient remained in SVT. Ep team was called and advised to give 6 mg of adenosine. Patient remained in SVT and EP recommended giving 12 mg. This was attempted one more time it did not work (Received 30 mg total across three pushes) Decision was made to load patient with amiodarone. Patient reverted to NSR after initial bolus and is scheduled to receive remaining loading dose.

## 2024-12-01 NOTE — PROGRESS NOTE ADULT - ASSESSMENT
Chest pain / SVT  status post  donor Kidney transplant at [place] 8 years ago  baseline Cr "upper 1's to low 2's"  afib / svt  Pancytopenia / anemia   CAD / HFpEF  HTN  Cirrhosis    plan:    cont Envarsus  cont mycophenolate  daily BMP  cardio f/u  if plan for cardiac cath, start IV fluids

## 2024-12-01 NOTE — PROGRESS NOTE ADULT - SUBJECTIVE AND OBJECTIVE BOX
[place] NEPHROLOGY FOLLOW UP NOTE  --------------------------------------------------------------------------------  24 hour events/subjective: Patient examined. Appears comfortable.    PAST HISTORY  --------------------------------------------------------------------------------  No significant changes to PMH, PSH, FHx, SHx, unless otherwise noted    ALLERGIES & MEDICATIONS  --------------------------------------------------------------------------------  Allergies    Rituxan (Hives)    Standing Inpatient Medications  aspirin  chewable 81 milliGRAM(s) Oral daily  atorvastatin 80 milliGRAM(s) Oral at bedtime  diltiazem    milliGRAM(s) Oral daily  DULoxetine 30 milliGRAM(s) Oral daily  enoxaparin Injectable 60 milliGRAM(s) SubCutaneous every 12 hours  gabapentin 300 milliGRAM(s) Oral at bedtime  influenza  Vaccine (HIGH DOSE) 0.5 milliLiter(s) IntraMuscular once  isosorbide   mononitrate ER Tablet (IMDUR) 30 milliGRAM(s) Oral daily  metoprolol tartrate 50 milliGRAM(s) Oral two times a day  mycophenolate mofetil 500 milliGRAM(s) Oral two times a day  oxyCODONE    IR 15 milliGRAM(s) Oral every 8 hours  pantoprazole    Tablet 40 milliGRAM(s) Oral before breakfast  senna 2 Tablet(s) Oral at bedtime  tacrolimus ER Tablet (ENVARSUS XR) 1 milliGRAM(s) Oral <User Schedule>  tamsulosin 0.4 milliGRAM(s) Oral at bedtime    PRN Inpatient Medications  ondansetron Injectable 1 milliGRAM(s) IV Push every 4 hours PRN  polyethylene glycol 3350 17 Gram(s) Oral two times a day PRN    VITALS/PHYSICAL EXAM  --------------------------------------------------------------------------------  T(C): 36.8 (12-01-24 @ 04:34), Max: 36.8 (12-01-24 @ 04:34)  HR: 55 (12-01-24 @ 05:54) (55 - 72)  BP: 157/71 (12-01-24 @ 04:34) (157/71 - 161/88)  RR: 18 (12-01-24 @ 04:34) (18 - 18)    11-30-24 @ 07:01  -  12-01-24 @ 07:00  --------------------------------------------------------  IN: 311 mL / OUT: 900 mL / NET: -589 mL    12-01-24 @ 07:01  -  12-01-24 @ 12:30  --------------------------------------------------------  IN: 236 mL / OUT: 0 mL / NET: 236 mL    Physical Exam:  	Gen: NAD  	Pulm: CTA B/L  	CV: RRR, S1S2  	Abd: +BS, soft, nontender/nondistended  	: No suprapubic tenderness  	LE: Warm, no edema    LABS/STUDIES  --------------------------------------------------------------------------------              12.0   3.62  >-----------<  66       [11-30-24 @ 05:06]              38.9     135  |  107  |  36  ----------------------------<  127      [11-30-24 @ 05:06]  4.4   |  20  |  1.7        Ca     9.8     [11-30-24 @ 05:06]      Mg     2.0     [11-30-24 @ 05:06]      Phos  3.5     [11-30-24 @ 05:06]    TPro  5.6  /  Alb  3.7  /  TBili  0.5  /  DBili  x   /  AST  14  /  ALT  6   /  AlkPhos  79  [11-30-24 @ 05:06]    PT/INR: PT 12.80, INR 1.08       [11-30-24 @ 05:06]    Creatinine Trend:  SCr 1.7 [11-30 @ 05:06]  SCr 1.4 [11-29 @ 14:06]    Urinalysis - [11-30-24 @ 05:06]      Color  / Appearance  / SG  / pH       Gluc 127 / Ketone   / Bili  / Urobili        Blood  / Protein  / Leuk Est  / Nitrite       RBC  / WBC  / Hyaline  / Gran  / Sq Epi  / Non Sq Epi  / Bacteria     Iron 28, TIBC 185, %sat 15      [05-18-24 @ 05:21]  Ferritin 803      [05-18-24 @ 05:21]  TSH 2.86      [11-30-24 @ 05:06]  Lipid: chol 176, TG 86, HDL 43, LDL --      [11-30-24 @ 05:06]

## 2024-12-01 NOTE — DISCHARGE NOTE PROVIDER - NSDCFUSCHEDAPPT_GEN_ALL_CORE_FT
[facility] Physician [facility]  CARDIOLOGY [address] [facility]  Scheduled Appointment: 12/26/2024

## 2024-12-02 ENCOUNTER — TRANSCRIPTION ENCOUNTER (OUTPATIENT)
Age: 69
End: 2024-12-02

## 2024-12-02 VITALS
HEART RATE: 63 BPM | RESPIRATION RATE: 18 BRPM | SYSTOLIC BLOOD PRESSURE: 165 MMHG | DIASTOLIC BLOOD PRESSURE: 82 MMHG | OXYGEN SATURATION: 100 %

## 2024-12-02 PROCEDURE — 99239 HOSP IP/OBS DSCHRG MGMT >30: CPT

## 2024-12-02 PROCEDURE — 99223 1ST HOSP IP/OBS HIGH 75: CPT

## 2024-12-02 RX ORDER — AMIODARONE HYDROCHLORIDE 200 MG/1
1 TABLET ORAL
Qty: 28 | Refills: 0
Start: 2024-12-02 | End: 2024-12-15

## 2024-12-02 RX ORDER — AMIODARONE HYDROCHLORIDE 200 MG/1
200 TABLET ORAL ONCE
Refills: 0 | Status: COMPLETED | OUTPATIENT
Start: 2024-12-02 | End: 2024-12-02

## 2024-12-02 RX ADMIN — OXYCODONE HYDROCHLORIDE 15 MILLIGRAM(S): 30 TABLET ORAL at 05:59

## 2024-12-02 RX ADMIN — Medication 81 MILLIGRAM(S): at 12:08

## 2024-12-02 RX ADMIN — Medication 30 MILLIGRAM(S): at 12:08

## 2024-12-02 RX ADMIN — METOPROLOL TARTRATE 50 MILLIGRAM(S): 100 TABLET, FILM COATED ORAL at 18:02

## 2024-12-02 RX ADMIN — TACROLIMUS 1 MILLIGRAM(S): 5 CAPSULE ORAL at 18:02

## 2024-12-02 RX ADMIN — POLYETHYLENE GLYCOL 3350 17 GRAM(S): 17 POWDER, FOR SOLUTION ORAL at 12:08

## 2024-12-02 RX ADMIN — MYCOPHENOLATE MOFETIL 500 MILLIGRAM(S): 500 TABLET ORAL at 05:59

## 2024-12-02 RX ADMIN — PANTOPRAZOLE SODIUM 40 MILLIGRAM(S): 40 TABLET, DELAYED RELEASE ORAL at 05:59

## 2024-12-02 RX ADMIN — TACROLIMUS 1 MILLIGRAM(S): 5 CAPSULE ORAL at 08:09

## 2024-12-02 RX ADMIN — AMIODARONE HYDROCHLORIDE 200 MILLIGRAM(S): 200 TABLET ORAL at 14:34

## 2024-12-02 RX ADMIN — MYCOPHENOLATE MOFETIL 500 MILLIGRAM(S): 500 TABLET ORAL at 18:01

## 2024-12-02 RX ADMIN — ENOXAPARIN SODIUM 60 MILLIGRAM(S): 30 INJECTION SUBCUTANEOUS at 12:08

## 2024-12-02 NOTE — DISCHARGE NOTE NURSING/CASE MANAGEMENT/SOCIAL WORK - PATIENT PORTAL LINK FT
You can access the FollowMyHealth Patient Portal offered by [facility] by registering at the following website: [email or web address]. By joining [employer]’s FollowMyHealth portal, you will also be able to view your health information using other applications (apps) compatible with our system.

## 2024-12-02 NOTE — PROGRESS NOTE ADULT - SUBJECTIVE AND OBJECTIVE BOX
Patient was seen and examined earlier this morning by me on 3C.  EMR reviewed    He is comfortable in bed.  He denies any chest pain.  He denies any palpitations.    Vitals:  T(C): 36.7 (12-01-24 @ 20:41), Max: 36.8 (11-30-24 @ 12:30)  HR: 57 (12-02-24 @ 10:13) (47 - 175)  BP: 154/76 (12-02-24 @ 10:13) (114/51 - 184/112)  RR: 18 (12-02-24 @ 08:16) (18 - 18)  SpO2: 97% (12-01-24 @ 20:41) (97% - 97%)    Telemetry: Sinus Rhythm    LABS:    12-01    135  |  104  |  25[H]  ----------------------------<  103[H]  4.4   |  21  |  1.3    Ca    10.4      01 Dec 2024 11:15  Mg     2.0     12-01    TPro  5.9[L]  /  Alb  3.9  /  TBili  0.7  /  DBili  x   /  AST  14  /  ALT  7   /  AlkPhos  93  12-01      MEDICATIONS  (STANDING):  aMIOdarone Infusion 1 mG/Min (33.3 mL/Hr) IV Continuous <Continuous>  aMIOdarone Infusion 0.5 mG/Min (16.7 mL/Hr) IV Continuous <Continuous>  aspirin  chewable 81 milliGRAM(s) Oral daily  atorvastatin 80 milliGRAM(s) Oral at bedtime  DULoxetine 30 milliGRAM(s) Oral daily  enoxaparin Injectable 60 milliGRAM(s) SubCutaneous every 12 hours  gabapentin 300 milliGRAM(s) Oral at bedtime  influenza  Vaccine (HIGH DOSE) 0.5 milliLiter(s) IntraMuscular once  isosorbide   mononitrate ER Tablet (IMDUR) 30 milliGRAM(s) Oral daily  metoprolol tartrate 50 milliGRAM(s) Oral two times a day  mycophenolate mofetil 500 milliGRAM(s) Oral two times a day  oxyCODONE    IR 15 milliGRAM(s) Oral every 8 hours  pantoprazole    Tablet 40 milliGRAM(s) Oral before breakfast  senna 2 Tablet(s) Oral at bedtime  tacrolimus ER Tablet (ENVARSUS XR) 1 milliGRAM(s) Oral <User Schedule>  tamsulosin 0.4 milliGRAM(s) Oral at bedtime    MEDICATIONS  (PRN):  ondansetron Injectable 1 milliGRAM(s) IV Push every 4 hours PRN Nausea and/or Vomiting  polyethylene glycol 3350 17 Gram(s) Oral two times a day PRN for constipation      PHYSICAL EXAM:  Constitutional: appears stated age, well developed/nourished, no acute distress  Eyes: EOM's intact.  PERRLA  ENMT: Normocephalic, atraumatic.    Neck: Jugular veins non-distended; no carotid bruits bilaterally.  Respiratory: respiratory pattern unlabored; no dullness to percussion; lungs clear to auscultation bilaterally.  Cardiovascular: Regular rhythm.  S1 and S2 normal.  No murmur nor rub appreciated.  Abdomen: Soft, non-tender.  Normal bowel sounds.  Extremities: extremities warm; no cyanosis, clubbing or edema.  Skin: No gross abnormalities noted.  Musculoskeletal: No gross deformities  Neurological: Alert, oriented x 3.  No focal neurologic deficits noted.

## 2024-12-02 NOTE — PROGRESS NOTE ADULT - SUBJECTIVE AND OBJECTIVE BOX
[place] NEPHROLOGY FOLLOW UP NOTE  --------------------------------------------------------------------------------  24 hour events/subjective: Patient examined. Appears comfortable.    PAST HISTORY  --------------------------------------------------------------------------------  No significant changes to PMH, PSH, FHx, SHx, unless otherwise noted    ALLERGIES & MEDICATIONS  --------------------------------------------------------------------------------  Allergies    Rituxan (Hives)    Standing Inpatient Medications  aMIOdarone Infusion 1 mG/Min IV Continuous <Continuous>  aMIOdarone Infusion 0.5 mG/Min IV Continuous <Continuous>  aspirin  chewable 81 milliGRAM(s) Oral daily  atorvastatin 80 milliGRAM(s) Oral at bedtime  DULoxetine 30 milliGRAM(s) Oral daily  enoxaparin Injectable 60 milliGRAM(s) SubCutaneous every 12 hours  gabapentin 300 milliGRAM(s) Oral at bedtime  influenza  Vaccine (HIGH DOSE) 0.5 milliLiter(s) IntraMuscular once  isosorbide   mononitrate ER Tablet (IMDUR) 30 milliGRAM(s) Oral daily  metoprolol tartrate 50 milliGRAM(s) Oral two times a day  mycophenolate mofetil 500 milliGRAM(s) Oral two times a day  oxyCODONE    IR 15 milliGRAM(s) Oral every 8 hours  pantoprazole    Tablet 40 milliGRAM(s) Oral before breakfast  senna 2 Tablet(s) Oral at bedtime  tacrolimus ER Tablet (ENVARSUS XR) 1 milliGRAM(s) Oral <User Schedule>  tamsulosin 0.4 milliGRAM(s) Oral at bedtime    PRN Inpatient Medications  ondansetron Injectable 1 milliGRAM(s) IV Push every 4 hours PRN  polyethylene glycol 3350 17 Gram(s) Oral two times a day PRN    VITALS/PHYSICAL EXAM  --------------------------------------------------------------------------------  T(C): 36.3 (12-02-24 @ 12:00), Max: 36.7 (12-01-24 @ 20:41)  HR: 58 (12-02-24 @ 14:33) (47 - 175)  BP: 122/72 (12-02-24 @ 14:33) (114/51 - 184/112)  RR: 18 (12-02-24 @ 14:33) (18 - 18)  SpO2: 97% (12-02-24 @ 14:33) (97% - 97%)    12-01-24 @ 07:01  -  12-02-24 @ 07:00  --------------------------------------------------------  IN: 469.6 mL / OUT: 700 mL / NET: -230.4 mL    12-02-24 @ 07:01  -  12-02-24 @ 14:40  --------------------------------------------------------  IN: 240 mL / OUT: 200 mL / NET: 40 mL    Physical Exam:  	Gen: NAD  	Pulm: CTA B/L  	CV: RRR, S1S2  	Abd: +BS, soft, nontender/nondistended  	: No suprapubic tenderness  	LE: Warm, no edema  LABS/STUDIES  --------------------------------------------------------------------------------    135  |  104  |  25  ----------------------------<  103      [12-01-24 @ 11:15]  4.4   |  21  |  1.3        Ca     10.4     [12-01-24 @ 11:15]      Mg     2.0     [12-01-24 @ 11:15]    TPro  5.9  /  Alb  3.9  /  TBili  0.7  /  DBili  x   /  AST  14  /  ALT  7   /  AlkPhos  93  [12-01-24 @ 11:15]    Creatinine Trend:  SCr 1.3 [12-01 @ 11:15]  SCr 1.7 [11-30 @ 05:06]  SCr 1.4 [11-29 @ 14:06]    Urinalysis - [12-01-24 @ 11:15]      Color  / Appearance  / SG  / pH       Gluc 103 / Ketone   / Bili  / Urobili        Blood  / Protein  / Leuk Est  / Nitrite       RBC  / WBC  / Hyaline  / Gran  / Sq Epi  / Non Sq Epi  / Bacteria     Iron 28, TIBC 185, %sat 15      [05-18-24 @ 05:21]  Ferritin 803      [05-18-24 @ 05:21]  TSH 2.86      [11-30-24 @ 05:06]  Lipid: chol 176, TG 86, HDL 43, LDL --      [11-30-24 @ 05:06]

## 2024-12-02 NOTE — CONSULT NOTE ADULT - ASSESSMENT
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 Controlled on Amio  Pt is not a good long term candidate for Amiodarone due to liver cirrhosis and Sotalol due to CKD and other meds interactions      AFL/AF  DC leadless PPM  AVN modification  CAD, PCI, residual disease LAD  liver cirrhosis / Hepatitis C  CKD, kidney transplant  HFpEF  CVA  HTN, HLD    Plan:  - Cont home meds  - Hold on amio and sotalol for now  - Outpatient fu

## 2024-12-02 NOTE — PHARMACOTHERAPY INTERVENTION NOTE - COMMENTS
Spoke with [facility] to confirm his mycophenolate dosing. Updated his mycophenolate frequency in his med rec from 2 tabs PO once daily to 1 tab PO 2 times daily. Spoke with [facility] to confirm his mycophenolate dosing. Updated his mycophenolate dose on admission med [facility] from 1000 mg (2 tabs) PO once daily to 500 mg (1 tab) PO 2 times daily.

## 2024-12-02 NOTE — CONSULT NOTE ADULT - SUBJECTIVE AND OBJECTIVE BOX
Outpt cardiologist:  Dr. 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 Liver cirrhosis portal hypertension with eso varices, brought in by ambulance with chief complaint of central crushing chest pain  that woke him up from sleep this morning.  Reports felt fatigued before he went to sleep last night but had no pain at that time.  Pain has since subsided.  Denies palpitations, SOB, recent fever, chills, cough, leg pain or swelling.      Loop recorder: new SVT    Diagnosis: typical cardiogenic chest pain, new ST changes, SVT event on loop recorder per ED    Dr. Low, cardiologist and Dr. Henderson , EP.     EKG: new TRINIDAD in aVR, V1 and v2 not with diffuse STD in anterior, anterio inferior leads sinus controlled in muse  bedside pocus: Normal contractility remarkable biatrial enlargement and LVH no acute complication or RWMA by eyeballing  Trop 80    Cr 1.4 eGFR 55    Off chest pain  CXR: increase bronchovascular marking, no effusion, normal CT ratio (2024 16:51)      Cardiology Fellow Notes  Typical CP that woke him up from sleep      PAST MEDICAL & SURGICAL HISTORY  CKD (chronic kidney disease)    ESRD (end stage renal disease)    HTN (hypertension)    HLD (hyperlipidemia)    Atrial fibrillation  on eliquis    COPD, mild  not on O2    Peripheral neuropathy  unclear cause    Lymphoma  ?questionable, not treated, not followed    Melanoma  R eye, s/p laser    Cirrhosis  possibly related to hepC    Breast cancer    Legally blind    Stroke    History of heroin abuse    History of chronic hepatitis    Marijuana use    History of kidney transplant  3 years ago    S/P arteriovenous (AV) fistula creation  prior old fistula in R arm    S/P lumpectomy, right breast    History of back surgery  s/p Left L5-S1 endoscopic laminoforaminotomy    History of loop recorder        FAMILY HISTORY:  FAMILY HISTORY:  FH: dementia  mother, alive    FHx: breast cancer  sister ( in 30s)        SOCIAL HISTORY:  Social History:  Denied smoking denied alcohol use (2024 16:51)      ALLERGIES:  Rituxan (Hives)      MEDICATIONS:  aspirin  chewable 324 milliGRAM(s) Oral once  atorvastatin 80 milliGRAM(s) Oral at bedtime  diltiazem    milliGRAM(s) Oral daily  DULoxetine 30 milliGRAM(s) Oral daily  enoxaparin Injectable 60 milliGRAM(s) SubCutaneous every 12 hours  gabapentin 300 milliGRAM(s) Oral at bedtime  isosorbide   mononitrate ER Tablet (IMDUR) 30 milliGRAM(s) Oral daily  metoprolol tartrate 50 milliGRAM(s) Oral two times a day  mycophenolate mofetil 500 milliGRAM(s) Oral two times a day  oxyCODONE    IR 15 milliGRAM(s) Oral every 8 hours  pantoprazole    Tablet 40 milliGRAM(s) Oral before breakfast  senna 2 Tablet(s) Oral at bedtime  tacrolimus ER Tablet (ENVARSUS XR) 1 milliGRAM(s) Oral <User Schedule>  tamsulosin 0.4 milliGRAM(s) Oral at bedtime    PRN:  polyethylene glycol 3350 17 Gram(s) Oral two times a day PRN      HOME MEDICATIONS:  Home Medications:  Flomax 0.4 mg oral capsule: 1 cap(s) orally 2 times a day (10 May 2024 11:00)  mycophenolate mofetil 500 mg oral tablet: 2 tab(s) orally once a day (10 May 2024 11:00)  oxyCODONE 15 mg oral tablet: 1 tab(s) orally every 8 hours As needed Severe Pain (7 - 10) (10 May 2024 11:00)  senna leaf extract oral tablet: 2 tab(s) orally once a day (at bedtime) (18 May 2024 14:44)      VITALS:   T(F): 98 ( @ 12:18), Max: 98 ( @ 12:18)  HR: 50 ( @ 12:18) (50 - 50)  BP: 148/82 ( @ 12:18) (148/82 - 148/82)  BP(mean): --  RR: 18 ( @ 12:18) (18 - 18)  SpO2: 98% ( @ 12:18) (98% - 98%)    I&O's Summary      REVIEW OF SYSTEMS:  CONSTITUTIONAL: No weakness, fevers or chills  HEENT: No visual changes, neck/ear pain  RESPIRATORY: No cough, sob  CARDIOVASCULAR: See HPI    PHYSICAL EXAM:  *General: Not in distress.  Non-toxic appearing.   *HEENT: EOMI  *Cardio: regular, S1, S2, no murmur  *Pulm: B/L BS.  No wheezing / crackles / rales  *Abdomen: Soft, non-tender, non-distended. Normoactive bowel sounds  *Extremities: No edema b/l le. Warm. Knee caps warm. Pulses + bilaterally. Normal capillary refill.   *Neuro: A&O x3. No focal deficits    LABS:                        14.4   5.48  )-----------( 64       ( 2024 14:06 )             45.9         137  |  105  |  33[H]  ----------------------------<  100[H]  4.5   |  21  |  1.4    Ca    10.3      2024 14:06    TPro  6.8  /  Alb  4.4  /  TBili  0.5  /  DBili  x   /  AST  17  /  ALT  8   /  AlkPhos  100                Troponin trend:    80        IMAGING:  - CXR:  Clear  Atelactasis in right base  - TTE:  EF 70% 3/2024 GIIIDD  - CATHETERIZATION:  2024:  70% pLcx and 70% mLAD stenoses  patent RCA stent    ECG:  NSR  LVH with repolarization changes   1mm V1 TRINIDAD (old)  present on 2024 EKG    TELEMETRY EVENTS:
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 Liver cirrhosis portal hypertension with eso varices, brought in by ambulance with chief complaint of central crushing chest pain  that woke him up from sleep this morning.  Reports felt fatigued before he went to sleep last night but had no pain at that time.  Pain has since subsided.  Denies palpitations, SOB, recent fever, chills, cough, leg pain or swelling.      Loop recorder: new SVT    Diagnosis: typical cardiogenic chest pain, new ST changes, SVT event on loop recorder per ED    Dr. Low, cardiologist and Dr. Henderson , EP.     EKG: new TRINIDAD in aVR, V1 and v2 not with diffuse STD in anterior, anterio inferior leads sinus controlled in muse  bedside pocus: Normal contractility remarkable biatrial enlargement and LVH no acute complication or RWMA by eyeballing  Trop 80    Cr 1.4 eGFR 55    Off chest pain  CXR: increase bronchovascular marking, no effusion, normal CT ratio (2024 16:51)      Electrophysiology:  69y Male, well known to the service with h/o HTN, HLD, HFpEF (70-75%) grade 2 DD, ESRD s/p kidney transplant 8 years ago at [place], chronic hep C, CVA (TIA), SP breast cancer lumpectomy left side 1 month ago, 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 Liver cirrhosis portal hypertension with eso varices, CAD, prior PCI, residual LAD disease, persistent A-fib SP several ablation last 24, loop recorder, with recurrence of atypical AFL/AF, subsequent DC leadless pacemaker placement and AVN modification 24, with decrease of incidence of AF/AFL, was doing well on current meds, until last Friday, he skipped his meds on Thu night and developed chest tightness at night several episodes that was waking him up from sleep, resolved by AM but he called EMS as he was not feeling well and was taken to [facility] ED. ILR was check in ED, noted SVT events in AM, 9:17-11:46am, see chat note. Patient mes were held yesterday, Cardizem and Metoprolol, with plan for discharge, In the afternoon patient went to SVT At 160s, treated with Metoprolol IV and PO, Adenosine 6+12+12mg unsuccessfully, was started on Amiodarone with conversion to NSR.        REVIEW OF SYSTEMS    [ ] A ten-point review of systems was otherwise negative except as noted.  [ ] Due to altered mental status/intubation, subjective information were not able to be obtained from the patient. History was obtained, to the extent possible, from review of the chart and collateral sources of information.      PAST MEDICAL & SURGICAL HISTORY:  CKD (chronic kidney disease)      ESRD (end stage renal disease)      HTN (hypertension)      HLD (hyperlipidemia)      Atrial fibrillation  on eliquis      COPD, mild  not on O2      Peripheral neuropathy  unclear cause      Lymphoma  ?questionable, not treated, not followed      Melanoma  R eye, s/p laser      Cirrhosis  possibly related to hepC      Breast cancer      Legally blind      Stroke      History of heroin abuse      History of chronic hepatitis      Marijuana use      History of kidney transplant  3 years ago      S/P arteriovenous (AV) fistula creation  prior old fistula in R arm      S/P lumpectomy, right breast      History of back surgery  s/p Left L5-S1 endoscopic laminoforaminotomy      History of loop recorder          Home Medications:  Flomax 0.4 mg oral capsule: 1 cap(s) orally 2 times a day (10 May 2024 11:00)  mycophenolate mofetil 500 mg oral tablet: 2 tab(s) orally once a day (10 May 2024 11:00)  oxyCODONE 15 mg oral tablet: 1 tab(s) orally every 8 hours As needed Severe Pain (7 - 10) (10 May 2024 11:00)  senna leaf extract oral tablet: 2 tab(s) orally once a day (at bedtime) (18 May 2024 14:44)      Allergies:  Rituxan (Hives)      FAMILY HISTORY:  FH: dementia  mother, alive    FHx: breast cancer  sister ( in 30s)        SOCIAL HISTORY: Former smoker/heroine user, no ETOH    CIGARETTES:  ALCOHOL:  MARIJUANA:  ILLICIT DRUGS:        PREVIOUS DIAGNOSTIC TESTING:      ECHO  FINDINGS:  < from: TTE Echo Complete w/ Contrast w/o Doppler (24 @ 09:06) >  Summary:   1. Left ventricular ejection fraction, by visual estimation, is 65 to   70%.   2. Hyperdynamic global left ventricular systolic function.   3. Severely enlarged left atrium.   4. Moderate left ventricular hypertrophy.   5. Mildly enlarged right atrium.   6. Degenerative mitral valve.   7. Moderate mitral valve regurgitation.   8. Mitral annular calcification.   9. Thickening of theanterior and posterior mitral valve leaflets.  10. Moderate tricuspid regurgitation.  11. Dilatation of the ascending aorta and aortic root at 4.1 cm.  12. Estimated pulmonary artery systolic pressure is 38.5 mmHg assuming a   right atrial pressure of 3 mmHg, which is consistent with borderline   pulmonary hypertension.  13. Mild aortic regurgitation.  14. Sclerotic aortic valve with normal opening.  15. Spectral Doppler shows pseudonormal pattern of left ventricular   myocardial filling (Grade II diastolic dysfunction).  16. Elevated mean left atrial pressure.    < end of copied text >    NON-INVASIVE   FINDINGS:    CATHETERIZATION  FINDINGS:  < from: Cardiac Catheterization (24 @ 16:38) >  Coronary Angiography   The coronary circulation is right dominant.      LM   Left main artery: The segment is medium sized.Angiography shows mild  atherosclerosis.    LAD   Left anterior descending artery: The segment is medium sized. Proximal  left anterior descending: Angiography shows mild  atherosclerosis. Mid left anterior descending: Angiography shows  moderate atherosclerosis. There is a 70 % stenosis. This is  a bifurcation lesion involving the First diagonal. Distal left  anterior descending: Angiography shows mild atherosclerosis. First  diagonal: The segment is medium sized. Angiography shows a single  lesion. There is an 80 % stenosis in the ostium portion of  the segment. This is a bifurcation lesion involving the Left anterior  descending artery.    CX   Circumflex: The segment is medium sized. Proximal circumflex:  Angiography shows a single lesion. There is a 70 % stenosis.  Distal circumflex: Angiography shows mild atherosclerosis. First  obtuse marginal: The segment is medium sized. Angiography  shows mild atherosclerosis.      RCA   Right coronary artery: The segment is large, dominant. Proximal right  coronary artery: Angiography shows mild  atherosclerosis. The previously placed stent is a drug-eluting stent  and is patent. Mid right coronary artery: Angiography shows  mild atherosclerosis. The previously placed stent is a drug-eluting  stent and is patent. There is mild in-stent restenosis. Distal  right coronary artery: Angiography shows mild atherosclerosis. Right  posterior descending artery: The segment is medium  sized. Angiography shows mild atheroscl    < end of copied text >    < from: Cardiac Cath Lab - Adult (19 @ 13:59) >  CORONARY CIRCULATION: The coronary circulation is right dominant. There was    significant 1-vessel coronary artery disease (RCA). Left main: This vessel    was not injected. LAD: This vessel was not injected. Circumflex: This    vessel was not injected. Proximal RCA: There was a diffuse 80 % stenosis.    The lesion was complex, eccentric, and moderately calcified. Mid RCA:    There was a tubular 80 % stenosis. The lesion was moderately calcified.    Distal RCA: Angiography showed minor luminal irregularities with no flow    limiting lesions. Right PDA: Normal. 1st posterolateral segment: The    vessel was large sized. Trifurcating vessel and middle branch has 70-80 %    eccentric obstruction.       COMPLICATIONS: No complications occurred during the cath lab visit.    IMPRESSIONS: There is significant single vessel coronary artery disease    (RCA).     --  Intervention on mid RCA: stent, rotational atherectomy, balloon    angioplasty.    --  Intervention on proximal RCA: stent, rotational atherectomy, balloon    angioplasty.    < end of copied text >    ELECTROPHYSIOLOGY STUDY  FINDINGS:  < from: Electrophysiology Order (12.15.23 @ 10:49) >  Symptomatic persistent Atrial Fibrillation status post successful  pulmonary vein antral isolation  Cavo-tricuspid Isthmus Ablation   Posterior Walll Isolation ("Box Lesion")     Mitral Isthmus Ablation     Superior Vena Cava Isolation     Complex Fractionated Atrial Electrogram (CFAE) ablation     < end of copied text >    < from: Electrophysiology Order (05.10.24 @ 13:08) >  Conclusions   Symptomatic persistent Atrial Fibrillation status post successful  pulmonary vein antral isolation  Mitral Isthmus Ablation   Posterior Walll Isolation ("Box Lesion")     < end of copied text >    < from: Cardiac Rhythm Management (24 @ 14:06) >  Successful dual chamber leadless pacemaker Implant (AMRYBETH CHAPPELL,  Robb/CHARLI, Dependent)  AV Node Ablation     < end of copied text >    VENOUS DUPLEX SCAN:  FINDINGS:    CHEST CT PULMONARY ANGIO with IV Contrast:  FINDINGS:      MEDICATIONS  (STANDING):  aMIOdarone Infusion 1 mG/Min (33.3 mL/Hr) IV Continuous <Continuous>  aMIOdarone Infusion 0.5 mG/Min (16.7 mL/Hr) IV Continuous <Continuous>  aspirin  chewable 81 milliGRAM(s) Oral daily  atorvastatin 80 milliGRAM(s) Oral at bedtime  DULoxetine 30 milliGRAM(s) Oral daily  enoxaparin Injectable 60 milliGRAM(s) SubCutaneous every 12 hours  gabapentin 300 milliGRAM(s) Oral at bedtime  influenza  Vaccine (HIGH DOSE) 0.5 milliLiter(s) IntraMuscular once  isosorbide   mononitrate ER Tablet (IMDUR) 30 milliGRAM(s) Oral daily  metoprolol tartrate 50 milliGRAM(s) Oral two times a day  mycophenolate mofetil 500 milliGRAM(s) Oral two times a day  oxyCODONE    IR 15 milliGRAM(s) Oral every 8 hours  pantoprazole    Tablet 40 milliGRAM(s) Oral before breakfast  senna 2 Tablet(s) Oral at bedtime  tacrolimus ER Tablet (ENVARSUS XR) 1 milliGRAM(s) Oral <User Schedule>  tamsulosin 0.4 milliGRAM(s) Oral at bedtime    MEDICATIONS  (PRN):  ondansetron Injectable 1 milliGRAM(s) IV Push every 4 hours PRN Nausea and/or Vomiting  polyethylene glycol 3350 17 Gram(s) Oral two times a day PRN for constipation      Vital Signs Last 24 Hrs  T(C): 36.7 (01 Dec 2024 20:41), Max: 36.7 (01 Dec 2024 20:41)  T(F): 98.1 (01 Dec 2024 20:41), Max: 98.1 (01 Dec 2024 20:41)  HR: 57 (02 Dec 2024 10:13) (47 - 175)  BP: 154/76 (02 Dec 2024 10:13) (114/51 - 184/112)  BP(mean): 102 (02 Dec 2024 10:13) (94 - 112)  RR: 18 (02 Dec 2024 08:16) (18 - 18)  SpO2: 97% (01 Dec 2024 20:41) (97% - 97%)    Parameters below as of 01 Dec 2024 20:41  Patient On (Oxygen Delivery Method): room air        PHYSICAL EXAM:    GENERAL: In no apparent distress, well nourished, and hydrated.  HEAD:  Atraumatic, Normocephalic  EYES: EOMI, PERRLA, conjunctiva and sclera clear  NECK: Supple and normal thyroid.  No JVD or carotid bruit.  Carotid pulse is 2+ bilaterally.  HEART: Regular rate and rhythm; No murmurs, rubs, or gallops.  PULMONARY: Clear to auscultation and perfusion.  No rales, wheezing, or rhonchi bilaterally.  ABDOMEN: Soft, Nontender, Nondistended; Bowel sounds present  EXTREMITIES:  2+ Peripheral Pulses, No clubbing, cyanosis, or edema  NEUROLOGICAL: Grossly nonfocal    I&O's Detail    01 Dec 2024 07:01  -  02 Dec 2024 07:00  --------------------------------------------------------  IN:    Amiodarone: 66.6 mL    Amiodarone: 167 mL    Oral Fluid: 236 mL  Total IN: 469.6 mL    OUT:    Voided (mL): 700 mL  Total OUT: 700 mL    Total NET: -230.4 mL      02 Dec 2024 07:  -  02 Dec 2024 11:41  --------------------------------------------------------  IN:    Oral Fluid: 240 mL  Total IN: 240 mL    OUT:  Total OUT: 0 mL    Total NET: 240 mL        Daily     Daily     INTERPRETATION OF TELEMETRY:    EC Lead ECG:   Ventricular Rate 66 BPM    QRS Duration 84 ms    Q-T Interval 430 ms    QTC Calculation(Bazett) 450 ms    R Axis 76 degrees    T Axis 201 degrees    Diagnosis Line Sinus rhythm with frequent Premature atrial complexes with occasional   atrial-pacedcomplexes  Minimal voltage criteria for LVH, may be normal variant ( Sokolow-Nathan )  Marked ST abnormality, possible inferior subendocardial injury  Marked ST abnormality, possible anterolateral subendocardial injury  Abnormal ECG    Confirmed by Sylvester Dahl ([id number]) on 2024 9:15:29 PM (24 @ 15:33)  12 Lead ECG:   Ventricular Rate 160 BPM    Atrial Rate 170 BPM    QRS Duration 86 ms    Q-T Interval 296 ms    QTC Calculation(Bazett) 482 ms    R Axis 79 degrees    T Axis 254 degrees    Diagnosis Line *** Critical Test Result: High HR  Undetermined rhythm Probable Atrial tachycardia  Septal infarct , age undetermined  Marked ST abnormality, possible inferior subendocardial injury  Abnormal ECG        LABS:        135  |  104  |  25[H]  ----------------------------<  103[H]  4.4   |  21  |  1.3    Ca    10.4      01 Dec 2024 11:15  Mg     2.0         TPro  5.9[L]  /  Alb  3.9  /  TBili  0.7  /  DBili  x   /  AST  14  /  ALT  7   /  AlkPhos  93      Troponin T, High Sensitivity Result: 81<-89<-85<-80    Thyroid Stimulating Hormone, Serum: 2.86 uIU/mL [0.27 - 4.20] (24 @ 05:06)    RADIOLOGY & ADDITIONAL STUDIES:      
 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 Liver cirrhosis portal hypertension with eso varices, brought in by ambulance with chief complaint of central crushing chest pain  that woke him up from sleep this morning.  Reports felt fatigued before he went to sleep last night but had no pain at that time.  Pain has since subsided.  Denies palpitations, SOB, recent fever, chills, cough, leg pain or swelling.      Loop recorder: new SVT    Diagnosis: typical cardiogenic chest pain, new ST changes, SVT event on loop recorder per ED    Dr. Low, cardiologist and Dr. Henderson , EP.       PAST MEDICAL & SURGICAL HISTORY  CKD (chronic kidney disease)    ESRD (end stage renal disease)    HTN (hypertension)    HLD (hyperlipidemia)    Atrial fibrillation  on eliquis    COPD, mild  not on O2    Peripheral neuropathy  unclear cause    Lymphoma  ?questionable, not treated, not followed    Melanoma  R eye, s/p laser    Cirrhosis  possibly related to hepC    Breast cancer    Legally blind    Stroke    History of heroin abuse    History of chronic hepatitis    Marijuana use    History of kidney transplant  3 years ago    S/P arteriovenous (AV) fistula creation  prior old fistula in R arm    S/P lumpectomy, right breast    History of back surgery  s/p Left L5-S1 endoscopic laminoforaminotomy    History of loop recorder        FAMILY HISTORY:  FAMILY HISTORY:  FH: dementia  mother, alive    FHx: breast cancer  sister ( in 30s)        SOCIAL HISTORY:  Social History:  Denied smoking denied alcohol use (2024 16:51)      ALLERGIES:  Rituxan (Hives)      MEDICATIONS:  aspirin  chewable 324 milliGRAM(s) Oral once  atorvastatin 80 milliGRAM(s) Oral at bedtime  diltiazem    milliGRAM(s) Oral daily  DULoxetine 30 milliGRAM(s) Oral daily  enoxaparin Injectable 60 milliGRAM(s) SubCutaneous every 12 hours  gabapentin 300 milliGRAM(s) Oral at bedtime  isosorbide   mononitrate ER Tablet (IMDUR) 30 milliGRAM(s) Oral daily  metoprolol tartrate 50 milliGRAM(s) Oral two times a day  mycophenolate mofetil 500 milliGRAM(s) Oral two times a day  oxyCODONE    IR 15 milliGRAM(s) Oral every 8 hours  pantoprazole    Tablet 40 milliGRAM(s) Oral before breakfast  senna 2 Tablet(s) Oral at bedtime  tacrolimus ER Tablet (ENVARSUS XR) 1 milliGRAM(s) Oral <User Schedule>  tamsulosin 0.4 milliGRAM(s) Oral at bedtime    PRN:  polyethylene glycol 3350 17 Gram(s) Oral two times a day PRN      HOME MEDICATIONS:  Home Medications:  Flomax 0.4 mg oral capsule: 1 cap(s) orally 2 times a day (10 May 2024 11:00)  mycophenolate mofetil 500 mg oral tablet: 2 tab(s) orally once a day (10 May 2024 11:00)  oxyCODONE 15 mg oral tablet: 1 tab(s) orally every 8 hours As needed Severe Pain (7 - 10) (10 May 2024 11:00)  senna leaf extract oral tablet: 2 tab(s) orally once a day (at bedtime) (18 May 2024 14:44)      REVIEW OF SYSTEMS:  CONSTITUTIONAL: No weakness, fevers or chills  HEENT: No visual changes, neck/ear pain  RESPIRATORY: No cough, sob  CARDIOVASCULAR: See HPI    PHYSICAL EXAM:  *General: Not in distress.  Non-toxic appearing.   *HEENT: EOMI  *Cardio: regular, S1, S2, no murmur  *Pulm: B/L BS.  No wheezing / crackles / rales  *Abdomen: Soft, non-tender, non-distended. Normoactive bowel sounds  *Extremities: No edema b/l le. Warm. Knee caps warm. Pulses + bilaterally. Normal capillary refill.   *Neuro: A&O x3. No focal deficits   TTE:  EF 70% 3/2024 GIIIDD  - CATHETERIZATION:  2024:  70% pLcx and 70% mLAD stenoses  patent RCA stent    ECG:  NSR  LVH with repolarization changes   1mm V1 TRINIDAD (old)  present on 2024 EKG          
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 Liver cirrhosis portal hypertension with eso varices, brought in by ambulance with chief complaint of central crushing chest pain  that woke him up from sleep this morning.  Pain has since subsided.    Loop recorder: new SVT  Diagnosis: typical cardiogenic chest pain, new ST changes, SVT event on loop recorder per ED  Dr. Low, cardiologist and Dr. Henderson , EP.     PAST HISTORY  --------------------------------------------------------------------------------  PAST MEDICAL & SURGICAL HISTORY:  ESRD (end stage renal disease)  HTN (hypertension)  HLD (hyperlipidemia)  Atrial fibrillation  COPD  Peripheral neuropathy  Melanoma  R eye, s/p laser  Cirrhosis possibly related to hepC  Breast cancer  Legally blind  Stroke  History of heroin abuse  History of chronic hepatitis  Marijuana use  History of kidney transplant  History of back surgery  s/p Left L5-S1 endoscopic laminoforaminotomy    FAMILY HISTORY:  FH: dementia mother  FHx: breast cancer sister ( in 30s)    ALLERGIES & MEDICATIONS  --------------------------------------------------------------------------------  Allergies    Rituxan (Hives)    Standing Inpatient Medications  aspirin  chewable 81 milliGRAM(s) Oral daily  atorvastatin 80 milliGRAM(s) Oral at bedtime  diltiazem    milliGRAM(s) Oral daily  DULoxetine 30 milliGRAM(s) Oral daily  enoxaparin Injectable 60 milliGRAM(s) SubCutaneous every 12 hours  gabapentin 300 milliGRAM(s) Oral at bedtime  influenza  Vaccine (HIGH DOSE) 0.5 milliLiter(s) IntraMuscular once  isosorbide   mononitrate ER Tablet (IMDUR) 30 milliGRAM(s) Oral daily  metoprolol tartrate 50 milliGRAM(s) Oral two times a day  mycophenolate mofetil 500 milliGRAM(s) Oral two times a day  oxyCODONE    IR 15 milliGRAM(s) Oral every 8 hours  pantoprazole    Tablet 40 milliGRAM(s) Oral before breakfast  senna 2 Tablet(s) Oral at bedtime  tacrolimus ER Tablet (ENVARSUS XR) 1 milliGRAM(s) Oral <User Schedule>  tamsulosin 0.4 milliGRAM(s) Oral at bedtime    PRN Inpatient Medications  polyethylene glycol 3350 17 Gram(s) Oral two times a day PRN    REVIEW OF SYSTEMS  --------------------------------------------------------------------------------  Gen: No fevers/chills  Head/Eyes/Ears/Mouth: No headache; No sinus pain/discomfort, sore throat  Respiratory: No dyspnea, cough, wheezing, hemoptysis  CV: No PND, orthopnea  GI: No abdominal pain, diarrhea, constipation, nausea, vomiting, melena, hematochezia  : No increased frequency, dysuria, hematuria, nocturia  All other systems were reviewed and are negative, except as noted.    VITALS/PHYSICAL EXAM  --------------------------------------------------------------------------------  T(C): 36.7 (24 @ 05:00), Max: 36.7 (24 @ 12:18)  HR: 48 (24 05:00) (48 - 55)  BP: 113/64 (24 @ 05:00) (113/64 - 156/89)  RR: 18 (24 @ 05:00) (18 - 18)  SpO2: 98% (24 @ 12:18) (98% - 98%)  Weight (kg): 63.5 (24 @ 12:18)    24 @ 07:01  -  24 @ 07:00  --------------------------------------------------------  IN: 0 mL / OUT: 150 mL / NET: -150 mL    24 @ 07:01  -  24 @ 11:22  --------------------------------------------------------  IN: 236 mL / OUT: 0 mL / NET: 236 mL    Physical Exam:  	Gen: NAD  	Pulm: CTA B/L  	CV: RRR, S1S2  	Back: No CVA tenderness; no sacral edema  	Abd: +BS, soft, nontender/nondistended  	: No suprapubic tenderness  	UE: Warm, no asterixis  	LE: Warm, no edema  	Neuro: AAO x3    LABS/STUDIES  --------------------------------------------------------------------------------              12.0   3.62  >-----------<  66       [24 05:06]              38.9     135  |  107  |  36  ----------------------------<  127      [24 05:06]  4.4   |  20  |  1.7        Ca     9.8     [24 05:06]      Mg     2.0     [24 05:06]      Phos  3.5     [24 05:06]    TPro  5.6  /  Alb  3.7  /  TBili  0.5  /  DBili  x   /  AST  14  /  ALT  6   /  AlkPhos  79  [24 05:06]    PT/INR: PT 12.80, INR 1.08       [24 05:06]    Creatinine Trend:  SCr 1.7 [:]  SCr 1.4 [ 14:06]    Urinalysis - [24 05:06]      Color  / Appearance  / SG  / pH       Gluc 127 / Ketone   / Bili  / Urobili        Blood  / Protein  / Leuk Est  / Nitrite       RBC  / WBC  / Hyaline  / Gran  / Sq Epi  / Non Sq Epi  / Bacteria     Iron 28, TIBC 185, %sat 15      [24 @ 05:21]  Ferritin 803      [24 05:21]  TSH 2.86      [24 05:06]  Lipid: chol 176, TG 86, HDL 43, LDL --      [24 05:06]    HBsAg Nonreact      [22 @ 04:30]  HCV 11.34, Reactive      [22 @ 04:30]  HIV Nonreact      [22 @ 04:30]    Immunofixation Serum:   IgM Kappa Band Identified    Reference Range: None Detected      [22 @ 04:30]  SPEP Interpretation: Gamma-Migrating Paraprotein Identified      [22 @ 04:30]  Immunofixation Urine: Reference Range: None Detected      [22 @ 12:58]  UPEP Interpretation: Normal Electrophoresis Pattern      [22 @ 12:58]  Cryoglobulin: Positive      [22 @ 04:30]

## 2024-12-02 NOTE — PROGRESS NOTE ADULT - ASSESSMENT
1] Type 2 MI      Known CAD  - Continue Aspirin and Imdur  - Stable for discharge.  Patient advised to follow up with Dr. Low next week.    2] SVT       Paroxysmal Atrial Fib  - Restart OAC with Eliquis  - Continue Amiodarone and Metoprolol  - EP input appreciated.    3] Hyperlipidemia  - On statin therapy    Discharge planning per primary team.  Patient advised to follow up with Dr. Kin Low(primary cardiologist)    Aram Hernandez MD (covering for Dr. Kin Low)  [phone] Office  On TEAMS

## 2024-12-02 NOTE — PROGRESS NOTE ADULT - SUBJECTIVE AND OBJECTIVE BOX
DAVONTE CHOU  69y Male    CHIEF COMPLAINT:    Patient is a 69y old  Male who presents with a chief complaint of Chest pain (01 Dec 2024 17:16)      INTERVAL HPI/OVERNIGHT EVENTS:    Patient seen and examined.    ROS: All other systems are negative.    Vital Signs:    T(F): 98.1 (12-01-24 @ 20:41), Max: 98.1 (12-01-24 @ 20:41)  HR: 55 (12-02-24 @ 06:00) (47 - 175)  BP: 165/82 (12-02-24 @ 06:00) (114/51 - 184/112)  RR: 18 (12-01-24 @ 20:41) (18 - 18)  SpO2: 97% (12-01-24 @ 20:41) (97% - 97%)  I&O's Summary    01 Dec 2024 07:01  -  02 Dec 2024 07:00  --------------------------------------------------------  IN: 469.6 mL / OUT: 700 mL / NET: -230.4 mL      Daily     Daily   CAPILLARY BLOOD GLUCOSE      POCT Blood Glucose.: 128 mg/dL (01 Dec 2024 15:14)      PHYSICAL EXAM:    GENERAL:  NAD  SKIN: No rashes or lesions  HENT: Atraumatic. Normocephalic. PERRL. Moist membranes.  NECK: Supple, No JVD. No lymphadenopathy.  PULMONARY: CTA B/L. No wheezing. No rales  CVS: Normal S1, S2. Rate and Rhythm are regular. No murmurs.  ABDOMEN/GI: Soft, Nontender, Nondistended; BS present  EXTREMITIES: Peripheral pulses intact. No edema B/L LE.  NEUROLOGIC:  No motor or sensory deficit.  PSYCH: Alert & oriented x 3    Consultant(s) Notes Reviewed:  [x ] YES  [ ] NO  Care Discussed with Consultants/Other Providers [ x] YES  [ ] NO    EKG reviewed  Telemetry reviewed    LABS:    12-01    135  |  104  |  25[H]  ----------------------------<  103[H]  4.4   |  21  |  1.3    Ca    10.4      01 Dec 2024 11:15  Mg     2.0     12-01    TPro  5.9[L]  /  Alb  3.9  /  TBili  0.7  /  DBili  x   /  AST  14  /  ALT  7   /  AlkPhos  93  12-01              RADIOLOGY & ADDITIONAL TESTS:    < from: Xray Chest 1 View- PORTABLE-Urgent (11.29.24 @ 13:18) >  IMPRESSION:    No radiographic evidence ofacute cardiopulmonary disease.    < end of copied text >  < from: TTE Echo Complete w/ Contrast w/o Doppler (11.30.24 @ 09:06) >    Summary:   1. Left ventricular ejection fraction, by visual estimation, is 65 to   70%.   2. Hyperdynamic global left ventricular systolic function.   3. Severely enlarged left atrium.   4. Moderate left ventricular hypertrophy.   5. Mildly enlarged right atrium.   6. Degenerative mitral valve.   7. Moderate mitral valve regurgitation.   8. Mitral annular calcification.   9. Thickening of theanterior and posterior mitral valve leaflets.  10. Moderate tricuspid regurgitation.  11. Dilatation of the ascending aorta and aortic root at 4.1 cm.  12. Estimated pulmonary artery systolic pressure is 38.5 mmHg assuming a   right atrial pressure of 3 mmHg, which is consistent with borderline   pulmonary hypertension.  13. Mild aortic regurgitation.  14. Sclerotic aortic valve with normal opening.  15. Spectral Doppler shows pseudonormal pattern of left ventricular   myocardial filling (Grade II diastolic dysfunction).  16. Elevated mean left atrial pressure.    < end of copied text >    Imaging or report Personally Reviewed:  [x ] YES  [ ] NO    Medications:  Standing  aMIOdarone Infusion 1 mG/Min IV Continuous <Continuous>  aMIOdarone Infusion 0.5 mG/Min IV Continuous <Continuous>  aspirin  chewable 81 milliGRAM(s) Oral daily  atorvastatin 80 milliGRAM(s) Oral at bedtime  DULoxetine 30 milliGRAM(s) Oral daily  enoxaparin Injectable 60 milliGRAM(s) SubCutaneous every 12 hours  gabapentin 300 milliGRAM(s) Oral at bedtime  influenza  Vaccine (HIGH DOSE) 0.5 milliLiter(s) IntraMuscular once  isosorbide   mononitrate ER Tablet (IMDUR) 30 milliGRAM(s) Oral daily  metoprolol tartrate 50 milliGRAM(s) Oral two times a day  mycophenolate mofetil 500 milliGRAM(s) Oral two times a day  oxyCODONE    IR 15 milliGRAM(s) Oral every 8 hours  pantoprazole    Tablet 40 milliGRAM(s) Oral before breakfast  senna 2 Tablet(s) Oral at bedtime  tacrolimus ER Tablet (ENVARSUS XR) 1 milliGRAM(s) Oral <User Schedule>  tamsulosin 0.4 milliGRAM(s) Oral at bedtime    PRN Meds  ondansetron Injectable 1 milliGRAM(s) IV Push every 4 hours PRN  polyethylene glycol 3350 17 Gram(s) Oral two times a day PRN      Case discussed with resident    Care discussed with pt/family           CHOUDAVONTE LINK  69y Male    CHIEF COMPLAINT:    Patient is a 69y old  Male who presents with a chief complaint of Chest pain (01 Dec 2024 17:16)      INTERVAL HPI/OVERNIGHT EVENTS:    Patient seen and examined. Feels better today. Denies any cp or palpitations. No sob.     ROS: All other systems are negative.    Vital Signs:    T(F): 98.1 (12-01-24 @ 20:41), Max: 98.1 (12-01-24 @ 20:41)  HR: 55 (12-02-24 @ 06:00) (47 - 175)  BP: 165/82 (12-02-24 @ 06:00) (114/51 - 184/112)  RR: 18 (12-01-24 @ 20:41) (18 - 18)  SpO2: 97% (12-01-24 @ 20:41) (97% - 97%)  I&O's Summary    01 Dec 2024 07:01  -  02 Dec 2024 07:00  --------------------------------------------------------  IN: 469.6 mL / OUT: 700 mL / NET: -230.4 mL      Daily     Daily   CAPILLARY BLOOD GLUCOSE      POCT Blood Glucose.: 128 mg/dL (01 Dec 2024 15:14)      PHYSICAL EXAM:    GENERAL:  NAD  SKIN: No rashes or lesions  HENT: Atraumatic. Normocephalic. PERRL. Moist membranes.  NECK: Supple, No JVD. No lymphadenopathy.  PULMONARY: CTA B/L. No wheezing. No rales  CVS: Normal S1, S2. Rate and Rhythm are regular. No murmurs.  ABDOMEN/GI: Soft, Nontender, Nondistended; BS present  EXTREMITIES: Peripheral pulses intact. No edema B/L LE.  NEUROLOGIC:  No motor or sensory deficit.  PSYCH: Alert & oriented x 3    Consultant(s) Notes Reviewed:  [x ] YES  [ ] NO  Care Discussed with Consultants/Other Providers [ x] YES  [ ] NO    EKG reviewed  Telemetry reviewed    LABS:    12-01    135  |  104  |  25[H]  ----------------------------<  103[H]   Creatinine Trend: 1.3<--, 1.7<--, 1.4<--  4.4   |  21  |  1.3    Ca    10.4      01 Dec 2024 11:15  Mg     2.0     12-01    TPro  5.9[L]  /  Alb  3.9  /  TBili  0.7  /  DBili  x   /  AST  14  /  ALT  7   /  AlkPhos  93  12-01              RADIOLOGY & ADDITIONAL TESTS:    < from: Xray Chest 1 View- PORTABLE-Urgent (11.29.24 @ 13:18) >  IMPRESSION:    No radiographic evidence ofacute cardiopulmonary disease.    < end of copied text >  < from: TTE Echo Complete w/ Contrast w/o Doppler (11.30.24 @ 09:06) >    Summary:   1. Left ventricular ejection fraction, by visual estimation, is 65 to   70%.   2. Hyperdynamic global left ventricular systolic function.   3. Severely enlarged left atrium.   4. Moderate left ventricular hypertrophy.   5. Mildly enlarged right atrium.   6. Degenerative mitral valve.   7. Moderate mitral valve regurgitation.   8. Mitral annular calcification.   9. Thickening of theanterior and posterior mitral valve leaflets.  10. Moderate tricuspid regurgitation.  11. Dilatation of the ascending aorta and aortic root at 4.1 cm.  12. Estimated pulmonary artery systolic pressure is 38.5 mmHg assuming a   right atrial pressure of 3 mmHg, which is consistent with borderline   pulmonary hypertension.  13. Mild aortic regurgitation.  14. Sclerotic aortic valve with normal opening.  15. Spectral Doppler shows pseudonormal pattern of left ventricular   myocardial filling (Grade II diastolic dysfunction).  16. Elevated mean left atrial pressure.    < end of copied text >    Imaging or report Personally Reviewed:  [x ] YES  [ ] NO    Medications:  Standing  aMIOdarone Infusion 1 mG/Min IV Continuous <Continuous>  aMIOdarone Infusion 0.5 mG/Min IV Continuous <Continuous>  aspirin  chewable 81 milliGRAM(s) Oral daily  atorvastatin 80 milliGRAM(s) Oral at bedtime  DULoxetine 30 milliGRAM(s) Oral daily  enoxaparin Injectable 60 milliGRAM(s) SubCutaneous every 12 hours  gabapentin 300 milliGRAM(s) Oral at bedtime  influenza  Vaccine (HIGH DOSE) 0.5 milliLiter(s) IntraMuscular once  isosorbide   mononitrate ER Tablet (IMDUR) 30 milliGRAM(s) Oral daily  metoprolol tartrate 50 milliGRAM(s) Oral two times a day  mycophenolate mofetil 500 milliGRAM(s) Oral two times a day  oxyCODONE    IR 15 milliGRAM(s) Oral every 8 hours  pantoprazole    Tablet 40 milliGRAM(s) Oral before breakfast  senna 2 Tablet(s) Oral at bedtime  tacrolimus ER Tablet (ENVARSUS XR) 1 milliGRAM(s) Oral <User Schedule>  tamsulosin 0.4 milliGRAM(s) Oral at bedtime    PRN Meds  ondansetron Injectable 1 milliGRAM(s) IV Push every 4 hours PRN  polyethylene glycol 3350 17 Gram(s) Oral two times a day PRN      Case discussed with resident    Care discussed with pt/family

## 2024-12-02 NOTE — PROGRESS NOTE ADULT - ASSESSMENT
Chest pain / SVT  status post  donor Kidney transplant at [place] 8 years ago  baseline Cr "upper 1's to low 2's"  afib / svt  Pancytopenia / anemia   CAD / HFpEF  HTN  Cirrhosis    plan:    cont Envarsus  cont mycophenolate  daily BMP  cardio f/u  if plan for cardiac cath, start IV fluids

## 2024-12-02 NOTE — CONSULT NOTE ADULT - NS ATTEND AMEND GEN_ALL_CORE FT
Patient known to have AFL  Limited medical options  Not an optimal candidate for AF/AFL ablation.   Will wait for AVN ablation at this time.  Risks/benefits discussed with patient. Agreeable  DC home

## 2024-12-02 NOTE — PROGRESS NOTE ADULT - ASSESSMENT
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 Liver cirrhosis portal hypertension with eso varices, brought in by ambulance with chief complaint of central crushing chest pain  that woke him up from sleep this morning.   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 Liver cirrhosis portal hypertension with eso varices, brought in by ambulance with chief complaint of central crushing chest pain  that woke him up from sleep this morning.       Paroxysmal Atrial flutter-fibrillation  HTN / DL  Chronic HFpEF  ESRD s/p Kidney transplant.   Chronic Hep C  H/O CVA  L sided Breast Ca s/p lumpectomy   R eye melanoma s/p surgery  H/O Heroine use  Liver cirrhosis  Portal HTN with esophageal varices                     PLAN:     ·	Tele reviewed by me. Pt is now in NSR  ·	Troponin: 80-->85-->89-->89-->81. Likely due to rapid atrial flutter/fib and demand ischemia.   ·	ECHO reviewed. EF is 65-70%. Mod MR, mod TR  ·	Events noted. Yesterday RR was called as pt had narrow complex tachycardia. After giving 30 mg of Adenosine pt did not convert in to NSR. Pt was in flutter  ·	Pt was started on Amiodarone drip as per EP recommendation and converted to NSR. Will switch to Amiodarone 200 mg po q 12h once the infusion is completed. Not a candidate for long term Amiodarone due to cirrhosis and Sotalol due to CKD  ·	EP f/u for discharge meds recommendations  ·	TSH is normal  ·	Cont Metoprolol 50 mg po q 12h for now  ·	Discharge planning after cleared by EP  ·	Cont his other home meds    * Med rec reviewed. Plan of care d/w the pt. Time spent 41 minutes.

## 2024-12-02 NOTE — DISCHARGE NOTE NURSING/CASE MANAGEMENT/SOCIAL WORK - FINANCIAL ASSISTANCE
[facility] provides services at a reduced cost to those who are determined to be eligible through [facility]’s financial assistance program. Information regarding [facility]’s financial assistance program can be found by going to [email or web address] or by calling [phone].

## 2024-12-04 DIAGNOSIS — N18.9 CHRONIC KIDNEY DISEASE, UNSPECIFIED: ICD-10-CM

## 2024-12-04 DIAGNOSIS — Z85.3 PERSONAL HISTORY OF MALIGNANT NEOPLASM OF BREAST: ICD-10-CM

## 2024-12-04 DIAGNOSIS — Z88.8 ALLERGY STATUS TO OTHER DRUGS, MEDICAMENTS AND BIOLOGICAL SUBSTANCES: ICD-10-CM

## 2024-12-04 DIAGNOSIS — I13.0 HYPERTENSIVE HEART AND CHRONIC KIDNEY DISEASE WITH HEART FAILURE AND STAGE 1 THROUGH STAGE 4 CHRONIC KIDNEY DISEASE, OR UNSPECIFIED CHRONIC KIDNEY DISEASE: ICD-10-CM

## 2024-12-04 DIAGNOSIS — Z79.60 LONG TERM (CURRENT) USE OF UNSPECIFIED IMMUNOMODULATORS AND IMMUNOSUPPRESSANTS: ICD-10-CM

## 2024-12-04 DIAGNOSIS — F14.10 COCAINE ABUSE, UNCOMPLICATED: ICD-10-CM

## 2024-12-04 DIAGNOSIS — Z86.73 PERSONAL HISTORY OF TRANSIENT ISCHEMIC ATTACK (TIA), AND CEREBRAL INFARCTION WITHOUT RESIDUAL DEFICITS: ICD-10-CM

## 2024-12-04 DIAGNOSIS — I50.22 CHRONIC SYSTOLIC (CONGESTIVE) HEART FAILURE: ICD-10-CM

## 2024-12-04 DIAGNOSIS — Z45.09 ENCOUNTER FOR ADJUSTMENT AND MANAGEMENT OF OTHER CARDIAC DEVICE: ICD-10-CM

## 2024-12-04 DIAGNOSIS — Z79.01 LONG TERM (CURRENT) USE OF ANTICOAGULANTS: ICD-10-CM

## 2024-12-04 DIAGNOSIS — Z87.891 PERSONAL HISTORY OF NICOTINE DEPENDENCE: ICD-10-CM

## 2024-12-04 DIAGNOSIS — I49.3 VENTRICULAR PREMATURE DEPOLARIZATION: ICD-10-CM

## 2024-12-04 DIAGNOSIS — I47.10 SUPRAVENTRICULAR TACHYCARDIA, UNSPECIFIED: ICD-10-CM

## 2024-12-04 DIAGNOSIS — Z85.840 PERSONAL HISTORY OF MALIGNANT NEOPLASM OF EYE: ICD-10-CM

## 2024-12-04 DIAGNOSIS — D61.818 OTHER PANCYTOPENIA: ICD-10-CM

## 2024-12-04 DIAGNOSIS — H54.7 UNSPECIFIED VISUAL LOSS: ICD-10-CM

## 2024-12-04 DIAGNOSIS — T86.10 UNSPECIFIED COMPLICATION OF KIDNEY TRANSPLANT: ICD-10-CM

## 2024-12-04 DIAGNOSIS — E78.5 HYPERLIPIDEMIA, UNSPECIFIED: ICD-10-CM

## 2024-12-04 DIAGNOSIS — N40.0 BENIGN PROSTATIC HYPERPLASIA WITHOUT LOWER URINARY TRACT SYMPTOMS: ICD-10-CM

## 2024-12-04 DIAGNOSIS — I21.A1 MYOCARDIAL INFARCTION TYPE 2: ICD-10-CM

## 2024-12-04 DIAGNOSIS — B18.2 CHRONIC VIRAL HEPATITIS C: ICD-10-CM

## 2024-12-04 LAB
MYCOPHENOLATE SERPL-MCNC: 2.5 UG/ML — SIGNIFICANT CHANGE UP (ref 1–3.5)
MYCOPHENOLIC ACID GLUCURONIDE: 71 UG/ML — SIGNIFICANT CHANGE UP (ref 15–125)

## 2024-12-12 ENCOUNTER — APPOINTMENT (OUTPATIENT)
Facility: CLINIC | Age: 69
End: 2024-12-12

## 2024-12-17 RX ORDER — AMIODARONE HYDROCHLORIDE 200 MG/1
1 TABLET ORAL
Qty: 30 | Refills: 0
Start: 2024-12-17 | End: 2025-01-15

## 2024-12-26 ENCOUNTER — APPOINTMENT (OUTPATIENT)
Dept: CARDIOLOGY | Facility: CLINIC | Age: 69
End: 2024-12-26
Payer: MEDICARE

## 2024-12-26 ENCOUNTER — NON-APPOINTMENT (OUTPATIENT)
Age: 69
End: 2024-12-26

## 2024-12-26 PROCEDURE — 93298 REM INTERROG DEV EVAL SCRMS: CPT

## 2025-01-02 ENCOUNTER — NON-APPOINTMENT (OUTPATIENT)
Age: 70
End: 2025-01-02

## 2025-01-02 ENCOUNTER — APPOINTMENT (OUTPATIENT)
Facility: CLINIC | Age: 70
End: 2025-01-02

## 2025-01-02 VITALS
BODY MASS INDEX: 21.33 KG/M2 | WEIGHT: 144 LBS | SYSTOLIC BLOOD PRESSURE: 122 MMHG | DIASTOLIC BLOOD PRESSURE: 78 MMHG | HEIGHT: 69 IN | HEART RATE: 51 BPM | OXYGEN SATURATION: 99 %

## 2025-01-02 PROCEDURE — 99214 OFFICE O/P EST MOD 30 MIN: CPT | Mod: 25

## 2025-01-02 PROCEDURE — 93000 ELECTROCARDIOGRAM COMPLETE: CPT | Mod: 59

## 2025-01-02 PROCEDURE — 93280 PM DEVICE PROGR EVAL DUAL: CPT

## 2025-01-02 PROCEDURE — 93285 PRGRMG DEV EVAL SCRMS IP: CPT | Mod: 59

## 2025-01-29 ENCOUNTER — APPOINTMENT (OUTPATIENT)
Dept: CARDIOLOGY | Facility: CLINIC | Age: 70
End: 2025-01-29
Payer: MEDICARE

## 2025-01-29 ENCOUNTER — NON-APPOINTMENT (OUTPATIENT)
Age: 70
End: 2025-01-29

## 2025-01-29 PROCEDURE — 93298 REM INTERROG DEV EVAL SCRMS: CPT

## 2025-03-05 ENCOUNTER — NON-APPOINTMENT (OUTPATIENT)
Age: 70
End: 2025-03-05

## 2025-03-05 ENCOUNTER — APPOINTMENT (OUTPATIENT)
Dept: CARDIOLOGY | Facility: CLINIC | Age: 70
End: 2025-03-05
Payer: MEDICARE

## 2025-03-05 PROCEDURE — 93298 REM INTERROG DEV EVAL SCRMS: CPT

## 2025-04-16 ENCOUNTER — APPOINTMENT (OUTPATIENT)
Dept: CARDIOLOGY | Facility: CLINIC | Age: 70
End: 2025-04-16
Payer: MEDICARE

## 2025-04-16 VITALS
DIASTOLIC BLOOD PRESSURE: 55 MMHG | TEMPERATURE: 97.1 F | SYSTOLIC BLOOD PRESSURE: 103 MMHG | BODY MASS INDEX: 20.61 KG/M2 | HEIGHT: 68 IN | WEIGHT: 136 LBS | HEART RATE: 63 BPM

## 2025-04-16 DIAGNOSIS — Z78.9 OTHER SPECIFIED HEALTH STATUS: ICD-10-CM

## 2025-04-16 DIAGNOSIS — I48.91 UNSPECIFIED ATRIAL FIBRILLATION: ICD-10-CM

## 2025-04-16 DIAGNOSIS — I25.10 ATHEROSCLEROTIC HEART DISEASE OF NATIVE CORONARY ARTERY W/OUT ANGINA PECTORIS: ICD-10-CM

## 2025-04-16 DIAGNOSIS — Z00.00 ENCOUNTER FOR GENERAL ADULT MEDICAL EXAMINATION W/OUT ABNORMAL FINDINGS: ICD-10-CM

## 2025-04-16 DIAGNOSIS — Z87.891 PERSONAL HISTORY OF NICOTINE DEPENDENCE: ICD-10-CM

## 2025-04-16 PROCEDURE — 99214 OFFICE O/P EST MOD 30 MIN: CPT

## 2025-04-16 PROCEDURE — 93000 ELECTROCARDIOGRAM COMPLETE: CPT

## 2025-04-17 ENCOUNTER — APPOINTMENT (OUTPATIENT)
Dept: ELECTROPHYSIOLOGY | Facility: CLINIC | Age: 70
End: 2025-04-17

## 2025-04-22 PROBLEM — Z87.891 FORMER SMOKER: Status: ACTIVE | Noted: 2025-04-22

## 2025-04-22 PROBLEM — Z78.9 QUIT DRUG USE IN REMOTE PAST: Status: ACTIVE | Noted: 2025-04-22

## 2025-04-22 RX ORDER — DULOXETINE HYDROCHLORIDE 30 MG/1
30 CAPSULE, DELAYED RELEASE PELLETS ORAL DAILY
Refills: 0 | Status: ACTIVE | COMMUNITY

## 2025-05-19 ENCOUNTER — NON-APPOINTMENT (OUTPATIENT)
Age: 70
End: 2025-05-19

## 2025-05-19 ENCOUNTER — APPOINTMENT (OUTPATIENT)
Dept: CARDIOLOGY | Facility: CLINIC | Age: 70
End: 2025-05-19
Payer: MEDICARE

## 2025-05-19 PROCEDURE — 93298 REM INTERROG DEV EVAL SCRMS: CPT

## 2025-05-20 NOTE — PATIENT PROFILE ADULT - NSPROMEDSPUMP_GEN_A_NUR
Department of Anesthesiology  Preprocedure Note       Name:  Johana Pereyra   Age:  54 y.o.  :  1970                                          MRN:  [id number]         Date:  2025      Surgeon: Surgeon(s):  Earl Lopez MD    Procedure: Procedure(s):  MR FUSION PROSTATE BIOPSY, ULTRASOUND    Medications prior to admission:   Prior to Admission medications    Medication Sig Start Date End Date Taking? Authorizing Provider   lisinopril (PRINIVIL;ZESTRIL) 10 MG tablet Take 1 tablet by mouth daily 3/28/25  Yes Fermín Hood DO   apixaban (ELIQUIS) 5 MG TABS tablet Take 1 tablet by mouth 2 times daily 3/19/25  Yes Fermín Hood DO   cephALEXin (KEFLEX) 500 MG capsule Take 1 capsule by mouth 3 times daily Take night prior to prostate biopsy and then twice daily thereafter 4/15/25   Earl Lopez MD   ciprofloxacin (CIPRO) 500 MG tablet Take 1 tablet by mouth 2 times daily Take first dose night prior to prostate biopsy and then twice a day thereafter until completed 4/15/25   Earl Lopez MD       Current medications:    Current Facility-Administered Medications   Medication Dose Route Frequency Provider Last Rate Last Admin    cefTRIAXone (ROCEPHIN) 1,000 mg in sterile water 10 mL IV syringe  1,000 mg IntraVENous Once Earl Lopez MD        lactated ringers infusion   IntraVENous Continuous Atiya Sandoval MD           Allergies:    Allergies   Allergen Reactions    Pcn [Penicillins] Anaphylaxis     Happened when he was a child.    Chantix [Varenicline Tartrate] Hives, Itching and Rash     Occurred after 10 days of therapy       Problem List:    Patient Active Problem List   Diagnosis Code    Deep vein thrombosis of right lower extremity (HCC) I82.401    Pulmonary embolus (HCC) I26.99    BRENNA (obstructive sleep apnea) G47.33    Insurance coverage problems Z59.71    Renal insufficiency N28.9    Tobacco abuse Z72.0    History of incarceration Z78.9    Primary hypertension I10    
none

## 2025-06-02 NOTE — DISCHARGE NOTE PROVIDER - NSDCDCMDCOMP_GEN_ALL_CORE
Regular rate and rhythm, Heart sounds S1 S2 present, no murmurs, rubs or gallops This document is complete and the patient is ready for discharge.

## 2025-06-23 ENCOUNTER — NON-APPOINTMENT (OUTPATIENT)
Age: 70
End: 2025-06-23

## 2025-06-23 ENCOUNTER — APPOINTMENT (OUTPATIENT)
Dept: CARDIOLOGY | Facility: CLINIC | Age: 70
End: 2025-06-23

## 2025-06-23 PROCEDURE — 93298 REM INTERROG DEV EVAL SCRMS: CPT

## 2025-07-04 NOTE — CONSULT NOTE ADULT - REASON FOR ADMISSION
chest pain
Santhosh Nazario(Attending)
chest pain

## 2025-07-16 ENCOUNTER — APPOINTMENT (OUTPATIENT)
Dept: CARDIOLOGY | Facility: CLINIC | Age: 70
End: 2025-07-16
Payer: MEDICARE

## 2025-07-16 VITALS
DIASTOLIC BLOOD PRESSURE: 80 MMHG | HEIGHT: 68 IN | BODY MASS INDEX: 20.16 KG/M2 | SYSTOLIC BLOOD PRESSURE: 156 MMHG | WEIGHT: 133 LBS

## 2025-07-16 VITALS — HEART RATE: 95 BPM

## 2025-07-16 PROCEDURE — 93000 ELECTROCARDIOGRAM COMPLETE: CPT

## 2025-07-16 PROCEDURE — 99214 OFFICE O/P EST MOD 30 MIN: CPT

## 2025-07-16 PROCEDURE — G2211 COMPLEX E/M VISIT ADD ON: CPT

## 2025-07-25 ENCOUNTER — NON-APPOINTMENT (OUTPATIENT)
Age: 70
End: 2025-07-25

## 2025-07-25 ENCOUNTER — APPOINTMENT (OUTPATIENT)
Dept: CARDIOLOGY | Facility: CLINIC | Age: 70
End: 2025-07-25
Payer: MEDICARE

## 2025-07-25 PROCEDURE — 93298 REM INTERROG DEV EVAL SCRMS: CPT

## 2025-08-28 ENCOUNTER — APPOINTMENT (OUTPATIENT)
Dept: CARDIOLOGY | Facility: CLINIC | Age: 70
End: 2025-08-28
Payer: MEDICARE

## 2025-08-28 ENCOUNTER — NON-APPOINTMENT (OUTPATIENT)
Age: 70
End: 2025-08-28

## 2025-08-28 PROCEDURE — 93298 REM INTERROG DEV EVAL SCRMS: CPT
